# Patient Record
Sex: FEMALE | Race: BLACK OR AFRICAN AMERICAN | Employment: OTHER | URBAN - NONMETROPOLITAN AREA
[De-identification: names, ages, dates, MRNs, and addresses within clinical notes are randomized per-mention and may not be internally consistent; named-entity substitution may affect disease eponyms.]

---

## 2017-02-26 PROBLEM — K52.9 GASTROENTERITIS: Status: ACTIVE | Noted: 2017-02-26

## 2017-02-27 PROBLEM — R31.9 HEMATURIA: Status: ACTIVE | Noted: 2017-02-27

## 2017-08-07 PROBLEM — K92.1 HEMATOCHEZIA: Status: ACTIVE | Noted: 2017-08-07

## 2017-08-07 PROBLEM — R10.84 GENERALIZED ABDOMINAL PAIN: Status: ACTIVE | Noted: 2017-08-07

## 2017-11-04 PROBLEM — J35.3 HYPERTROPHY OF TONSIL AND ADENOID: Chronic | Status: ACTIVE | Noted: 2017-11-04

## 2017-11-04 PROBLEM — Z88.9 MULTIPLE DRUG ALLERGIES: Status: ACTIVE | Noted: 2017-11-04

## 2019-06-13 PROBLEM — R51.9 HEAD ACHE: Status: ACTIVE | Noted: 2019-06-13

## 2019-06-13 PROBLEM — N39.0 URINARY TRACT INFECTION: Status: ACTIVE | Noted: 2019-06-13

## 2019-06-13 PROBLEM — N12 PYELONEPHRITIS: Status: ACTIVE | Noted: 2019-06-13

## 2019-06-13 PROBLEM — R07.89 CHEST WALL PAIN: Status: ACTIVE | Noted: 2019-06-13

## 2019-07-13 PROBLEM — R52 PAIN: Status: RESOLVED | Noted: 2019-06-13 | Resolved: 2019-07-13

## 2019-07-13 PROBLEM — N39.0 URINARY TRACT INFECTION: Status: RESOLVED | Noted: 2019-06-13 | Resolved: 2019-07-13

## 2019-09-17 ENCOUNTER — PREP FOR PROCEDURE (OUTPATIENT)
Dept: SURGERY | Age: 29
End: 2019-09-17

## 2019-09-17 RX ORDER — SODIUM CHLORIDE 9 MG/ML
INJECTION, SOLUTION INTRAVENOUS CONTINUOUS
Status: CANCELLED | OUTPATIENT
Start: 2019-09-17

## 2019-09-17 RX ORDER — METOPROLOL SUCCINATE 25 MG/1
25 TABLET, EXTENDED RELEASE ORAL ONCE
Status: CANCELLED | OUTPATIENT
Start: 2019-09-17 | End: 2019-09-17

## 2019-10-08 PROBLEM — K44.9 HIATAL HERNIA: Status: ACTIVE | Noted: 2019-10-08

## 2020-01-08 PROBLEM — D57.1 SICKLE CELL DISEASE (HCC): Status: ACTIVE | Noted: 2020-01-08

## 2020-01-08 PROBLEM — D57.1 SICKLE CELL ANEMIA (HCC): Status: ACTIVE | Noted: 2020-01-08

## 2020-07-30 PROBLEM — M79.672 FOOT PAIN, BILATERAL: Status: ACTIVE | Noted: 2020-07-30

## 2020-07-30 PROBLEM — L84 CALLUS OF HEEL: Status: ACTIVE | Noted: 2020-07-30

## 2020-07-30 PROBLEM — M79.671 FOOT PAIN, BILATERAL: Status: ACTIVE | Noted: 2020-07-30

## 2021-05-31 PROBLEM — R07.9 CHEST PAIN: Status: ACTIVE | Noted: 2021-05-31

## 2021-11-10 PROBLEM — R07.89 ATYPICAL CHEST PAIN: Status: ACTIVE | Noted: 2021-05-31

## 2021-11-10 PROBLEM — Z72.0 TOBACCO USE: Status: ACTIVE | Noted: 2021-11-10

## 2022-01-04 PROBLEM — Z87.19 HISTORY OF GASTRITIS: Status: ACTIVE | Noted: 2022-01-04

## 2022-01-06 PROBLEM — K59.04 CHRONIC IDIOPATHIC CONSTIPATION: Status: ACTIVE | Noted: 2022-01-06

## 2022-01-13 ENCOUNTER — PREP FOR PROCEDURE (OUTPATIENT)
Dept: SURGERY | Age: 32
End: 2022-01-13

## 2022-01-13 RX ORDER — SODIUM CHLORIDE 450 MG/100ML
INJECTION, SOLUTION INTRAVENOUS CONTINUOUS
Status: CANCELLED | OUTPATIENT
Start: 2022-01-13

## 2022-03-02 PROBLEM — J96.00 ACUTE RESPIRATORY FAILURE (HCC): Status: ACTIVE | Noted: 2022-03-02

## 2022-03-03 PROBLEM — J45.901 EXACERBATION OF ASTHMA: Status: ACTIVE | Noted: 2019-06-13

## 2022-06-25 PROBLEM — I50.33 ACUTE ON CHRONIC DIASTOLIC CONGESTIVE HEART FAILURE (HCC): Status: ACTIVE | Noted: 2022-06-25

## 2022-07-17 PROBLEM — R13.10 DYSPHAGIA: Status: ACTIVE | Noted: 2022-07-17

## 2022-08-13 PROBLEM — Z98.890 HISTORY OF ESOPHAGOGASTRODUODENOSCOPY (EGD): Status: ACTIVE | Noted: 2022-08-13

## 2022-08-13 PROBLEM — Z98.890 S/P ROBOT-ASSISTED SURGICAL PROCEDURE: Status: ACTIVE | Noted: 2022-08-13

## 2022-08-13 PROBLEM — R13.10 DYSPHAGIA: Status: RESOLVED | Noted: 2022-07-17 | Resolved: 2022-08-13

## 2022-08-22 PROBLEM — K43.2 PORT-SITE HERNIA: Status: ACTIVE | Noted: 2022-08-22

## 2022-08-22 PROBLEM — K91.89 PORT-SITE HERNIA: Status: ACTIVE | Noted: 2022-08-22

## 2022-08-22 PROBLEM — K43.9 VENTRAL HERNIA WITHOUT OBSTRUCTION OR GANGRENE: Status: ACTIVE | Noted: 2022-08-22

## 2023-01-13 PROBLEM — R10.13 EPIGASTRIC ABDOMINAL PAIN: Status: ACTIVE | Noted: 2023-01-13

## 2023-01-29 PROBLEM — R00.0 TACHYCARDIA: Status: ACTIVE | Noted: 2023-01-29

## 2023-01-29 PROBLEM — D72.829 LEUKOCYTOSIS: Status: ACTIVE | Noted: 2023-01-29

## 2023-02-06 ENCOUNTER — TELEPHONE (OUTPATIENT)
Dept: INTERNAL MEDICINE CLINIC | Age: 33
End: 2023-02-06

## 2023-02-06 ENCOUNTER — TELEPHONE (OUTPATIENT)
Dept: SURGERY | Age: 33
End: 2023-02-06

## 2023-02-06 NOTE — TELEPHONE ENCOUNTER
Mushtaq Martinez called to see what the next step was regarding [facility] referral.  Per Ron/Alix [facility] will be sending her an application to fill out to get her reestablish with their facility. Patient's wife, Asha Dean, voiced understanding.

## 2023-02-06 NOTE — TELEPHONE ENCOUNTER
Outpatient dietitian left  [facility] for patient to callback and schedule initial RD appt re:  Tube feedings.   Callback # provided

## 2023-02-08 ENCOUNTER — TELEPHONE (OUTPATIENT)
Dept: INTERNAL MEDICINE CLINIC | Age: 33
End: 2023-02-08

## 2023-02-08 ENCOUNTER — OFFICE VISIT (OUTPATIENT)
Dept: CARDIOLOGY CLINIC | Age: 33
End: 2023-02-08
Payer: MEDICAID

## 2023-02-08 ENCOUNTER — TELEPHONE (OUTPATIENT)
Dept: SURGERY | Age: 33
End: 2023-02-08

## 2023-02-08 ENCOUNTER — HOSPITAL ENCOUNTER (EMERGENCY)
Age: 33
Discharge: HOME | End: 2023-02-08
Attending: EMERGENCY MEDICINE
Payer: MEDICARE

## 2023-02-08 VITALS
HEART RATE: 104 BPM | DIASTOLIC BLOOD PRESSURE: 88 MMHG | RESPIRATION RATE: 18 BRPM | WEIGHT: 284 LBS | BODY MASS INDEX: 48.49 KG/M2 | TEMPERATURE: 98.5 F | HEIGHT: 64 IN | OXYGEN SATURATION: 97 % | SYSTOLIC BLOOD PRESSURE: 100 MMHG

## 2023-02-08 VITALS
HEART RATE: 108 BPM | SYSTOLIC BLOOD PRESSURE: 121 MMHG | BODY MASS INDEX: 48.49 KG/M2 | DIASTOLIC BLOOD PRESSURE: 82 MMHG | HEIGHT: 64 IN | WEIGHT: 284 LBS

## 2023-02-08 DIAGNOSIS — K94.29 IRRITATION AROUND PERCUTANEOUS ENDOSCOPIC GASTROSTOMY (PEG) TUBE SITE (HCC): Primary | ICD-10-CM

## 2023-02-08 DIAGNOSIS — R06.09 DOE (DYSPNEA ON EXERTION): Primary | ICD-10-CM

## 2023-02-08 DIAGNOSIS — R06.02 SOB (SHORTNESS OF BREATH): ICD-10-CM

## 2023-02-08 DIAGNOSIS — Z98.890 S/P ROBOT-ASSISTED SURGICAL PROCEDURE: ICD-10-CM

## 2023-02-08 PROCEDURE — 99214 OFFICE O/P EST MOD 30 MIN: CPT | Performed by: INTERNAL MEDICINE

## 2023-02-08 PROCEDURE — 99283 EMERGENCY DEPT VISIT LOW MDM: CPT

## 2023-02-08 PROCEDURE — 3074F SYST BP LT 130 MM HG: CPT | Performed by: INTERNAL MEDICINE

## 2023-02-08 PROCEDURE — 3079F DIAST BP 80-89 MM HG: CPT | Performed by: INTERNAL MEDICINE

## 2023-02-08 RX ORDER — DOCUSATE SODIUM 100 MG/1
100 CAPSULE, LIQUID FILLED ORAL 2 TIMES DAILY PRN
Qty: 30 CAPSULE | Refills: 2 | Status: SHIPPED | OUTPATIENT
Start: 2023-02-08

## 2023-02-08 RX ORDER — TRAMADOL HYDROCHLORIDE 50 MG/1
50-100 TABLET ORAL EVERY 6 HOURS PRN
Qty: 30 TABLET | Refills: 0 | Status: SHIPPED | OUTPATIENT
Start: 2023-02-08 | End: 2023-02-15

## 2023-02-08 ASSESSMENT — PAIN - FUNCTIONAL ASSESSMENT: PAIN_FUNCTIONAL_ASSESSMENT: 0-10

## 2023-02-08 ASSESSMENT — PAIN SCALES - GENERAL: PAINLEVEL_OUTOF10: 10

## 2023-02-08 NOTE — PROGRESS NOTES
Pt here for check up     Pt continues with chest pain, dizziness, sees stars, sob, concerned with hard knot in right upper arm and right leg,     Pt states med list is correct from 1/13/23

## 2023-02-08 NOTE — PROGRESS NOTES
[id number] [facility] [address]  [facility] [address]  Dept: [phone]  Dept Fax: [phone]  Loc: [phone]    Visit Date: 2/8/2023    Ms. Krystal Esposito is a 28 y.o. female  who presented for:  \"Chest pain, not feeling well\"  Preoperative risk assessment   HPI:   HPI   Nino Farrar is a pleasant 28year old female patient who  has a past medical history of Acute on chronic diastolic congestive heart failure ([address] JANI (acute kidney injury) ([address] Anemia, Anesthesia, Anxiety, Asthma, CAD (coronary artery disease), Depression, Diabetes ([address] Diet-controlled type 2 diabetes mellitus ([address] Epilepsy ([address] Fibroids, Gastro - esophageal reflux disease, Hypertension, Hypertrophy of tonsil and adenoid, Malignant carcinoid tumor of other sites ([address] Migraine, PONV (postoperative nausea and vomiting), Prolonged emergence from general anesthesia, Sickle cell anemia ([address] Sickle cell trait ([address] and Tumor associated pain. She has significant family history for premature CAD. She smokes cigarettes. Patient has chronic h/o chest pains. Echo 6/2022 revealed an EF of 60-65%. In 5/2021, she was seen in ER for chest pain, abnormal EKG. STEMI alert was activated, [facility] 5/31/2021 revealed no significant CAD. She had multiple prior ER visits for chest pain evaluation. Chest CTA 11/2021 was negative for PE. She has h/o multiple prior esophageal dilation procedures, gastritis. Recent chest CTA on 1/29/2023 was negative for PE. The patient is followed by GI, has feeding tube, states that gastric surgery is being considered. She reports continued chest pains, atypical, similar to what she had in the past. Patient reports mild shortness of breath, dyspnea on exertion. Reports h/o asthma, SOB sometimes responds to inhalers.        Current Outpatient Medications:     traMADol (ULTRAM) 50 MG tablet, Take 1-2 tablets by mouth every 6 hours as needed for Pain for up to 7 days. Max Daily Amount: 400 mg, Disp: 30 tablet, Rfl: 0    erythromycin (EES) 400 MG/5ML suspension, 5 mLs by Per G Tube route 3 times daily (with meals), Disp: 450 mL, Rfl: 3    ALBUTEROL SULFATE HFA IN, Inhale 1 puff into the lungs every 4-6 hours as needed, Disp: , Rfl:     topiramate (TOPAMAX) 100 MG tablet, Take 100 mg by mouth 2 times daily, Disp: , Rfl:     metFORMIN (GLUCOPHAGE) 500 MG tablet, Take 1,000 mg by mouth daily (with breakfast), Disp: , Rfl:     linaclotide (LINZESS) 145 MCG capsule, Take 145 mcg by mouth every morning (before breakfast), Disp: , Rfl:     baclofen (LIORESAL) 10 MG tablet, Take 10 mg by mouth daily, Disp: , Rfl:     promethazine (PHENERGAN) 25 MG tablet, Take 25 mg by mouth every 4-6 hours as needed for Nausea, Disp: , Rfl:     prazosin (MINIPRESS) 1 MG capsule, Take 1 mg by mouth nightly, Disp: , Rfl:     vitamin D-3 (CHOLECALCIFEROL) 125 MCG (5000 UT) TABS, Take 5,000 Units by mouth daily, Disp: , Rfl:     zinc sulfate (ZINCATE) 220 (50 Zn) MG capsule, Take 50 mg by mouth daily, Disp: , Rfl:     ARIPiprazole (ABILIFY) 10 MG tablet, Take 10 mg by mouth nightly, Disp: , Rfl:     busPIRone (BUSPAR) 5 MG tablet, Take 5 mg by mouth 3 times daily, Disp: , Rfl:     famotidine (PEPCID) 40 MG tablet, Take 1 tablet by mouth at bedtime, Disp: 30 tablet, Rfl: 5    furosemide (LASIX) 20 MG tablet, TAKE 1 TABLET BY MOUTH ONCE A DAY, Disp: 30 tablet, Rfl: 0    pantoprazole (PROTONIX) 40 MG tablet, Take 40 mg by mouth daily, Disp: , Rfl:     bisacodyl (DULCOLAX) 5 MG EC tablet, See Prep Instructions, Disp: 4 tablet, Rfl: 0    polyethylene glycol (GLYCOLAX) 17 GM/SCOOP powder, Dispense 238 Gram Bottle.   Use as Directed, Disp: 238 g, Rfl: 0    docusate sodium (COLACE) 100 MG capsule, Take 1 capsule by mouth daily as needed for Constipation, Disp: 30 capsule, Rfl: 0    clopidogrel (PLAVIX) 75 MG tablet, Take 1 tablet by mouth daily (Patient not taking: No sig reported), Disp: 30 tablet, Rfl: 1    acetaminophen (TYLENOL) 325 MG tablet, Take 2 tablets by mouth 4 times daily as needed for Pain or Fever, Disp: 60 tablet, Rfl: 0    FLOVENT HFA 44 MCG/ACT inhaler, INHALE 2 PUFFS BY MOUTH TWICE A DAY, Disp: , Rfl:     amitriptyline (ELAVIL) 100 MG tablet, Take 200 mg by mouth nightly 2 tabs nightly, Disp: , Rfl:     montelukast (SINGULAIR) 10 MG tablet, Take 10 mg by mouth nightly, Disp: , Rfl:     amLODIPine (NORVASC) 5 MG tablet, Take 1 tablet by mouth daily, Disp: 30 tablet, Rfl: 3    escitalopram (LEXAPRO) 10 MG tablet, Take 20 mg by mouth daily , Disp: , Rfl:     albuterol (PROVENTIL) (2.5 MG/3ML) 0.083% nebulizer solution, Take 3 mLs by nebulization every 6 hours as needed for Wheezing, Disp: 90 each, Rfl: 0    glucose monitoring kit (FREESTYLE) monitoring kit, 1 kit by Does not apply route daily Or whichever system insurance will pay for, Disp: 1 kit, Rfl: 0    Spacer/Aero-Holding Chambers (E-Z SPACER) AISLINN, 1 Device by Does not apply route daily, Disp: 1 Device, Rfl: 0    Past Medical History  Ching Martinez  has a past medical history of Acute on chronic diastolic congestive heart failure ([facility] JANI (acute kidney injury) ([facility] Anemia, Anesthesia, Anxiety, Asthma, CAD (coronary artery disease), Depression, Diabetes ([address] Diet-controlled type 2 diabetes mellitus ([facility] Epilepsy ([facility] Fibroids, Gastro - esophageal reflux disease, Hypertension, Hypertrophy of tonsil and adenoid, Malignant carcinoid tumor of other sites ([address] Migraine, PONV (postoperative nausea and vomiting), Prolonged emergence from general anesthesia, Sickle cell anemia ([address] Sickle cell trait ([address] and Tumor associated pain. Social History  Ching Martinez  reports that she has been smoking cigarettes. She started smoking about 6 years ago. She has a 1.50 pack-year smoking history. She has never used smokeless tobacco. She reports that she does not currently use alcohol. She reports that she does not use drugs.     Family History  Jodee Homans family history includes Cancer in her mother; Depression in her maternal grandmother; Diabetes in her maternal grandmother; Heart Attack in her paternal uncle; Heart Disease in her father, maternal grandmother, mother, and sister; High Blood Pressure in her father and mother; Radha Oswald in her mother.     Past Surgical History   Past Surgical History:   Procedure Laterality Date    ABDOMEN SURGERY  03/23/2017    Laparoscopic , Bi lat oopherectomy Dr Tung Ya      x2    BREAST REDUCTION SURGERY      CENTRAL VENOUS CATHETER Right 12/11/2015    right subclavian single lumen mediport insertion--Dr. Michel    CHOLECYSTECTOMY, LAPAROSCOPIC N/A 7/11/2019    CHOLECYSTECTOMY LAPAROSCOPIC performed by Zainab Archibald MD at [address] 8/24/2020    COLONOSCOPY POLYPECTOMY SNARE/COLD BIOPSY performed by Kaylee Mcwilliams MD at [address] [place] [address]  10/21/2013    Dilation and curettage, Diagnostic Hysteroscopy and laparoscopy (Dr. Rafa Hartman, [facility])    EGD  2019    EGD COLONOSCOPY N/A 1/8/2019    EGD DIL performed by Jose Elias Diaz MD at [facility]    EGD COLONOSCOPY Left 5/14/2019    EGD DILATATION performed by Jose Elias Diaz MD at [facility]    EGD COLONOSCOPY Left 8/27/2019    EGD DILATATION performed by Jose Elias Diaz MD at [facility]    ENDOSCOPY, COLON, DIAGNOSTIC      GASTRIC [address]    GASTRIC [address] N/A 1/25/2023    EGD PEG TUBE PLACEMENT performed by Kaylee Mcwilliams MD at [address]  2010, 2016    [place] , [address] [address] N/A 1/17/2023    Robotic Exploratory Paparoscopy with Extensive Lysis of Adhesions G Tube Insertion performed by Darby Uribe MD at [address] [place] [address] ([address] [place] [address])  04/2016    INSERTION / REMOVAL / REPLACEMENT VENOUS ACCESS CATHETER N/A 3/8/2019    REMOVAL OF LEFT PORT AND PLACEMENT OF SINGLE LUMEN MEDIPORT RIGHT SIDE performed by Opal Esquivel MD at [address] N/A 10/8/2019    ROBOTIC DIAGNOSTIC LAPAROSCOPY,  RECURRENT HIATAL HERNIA REPAIR, REVISION FUNDOPLICATION performed by Dave Arias MD at [facility] 18 N/A 7/15/2022    ROBOTIC LAPAROSCOPY, LYSIS OF ADHESIONS performed by Opal Esquivel MD at [address] [facility]  08/12/2016    Right Port Removal, Placement of new Port Left by Dr Dony Goodman  12/02/2019    Mediport insertion-IR Dr Joseph Singh (CERVIX NOT REMOVED)  4/8/16    PORT SURGERY N/A 11/11/2019    REMOVAL OF RIGHT MEDIPORT & ATTEMPTED PLACEMENT OF LEFT SINGLE LUMEN MEDIPORT performed by Opal Esquivel MD at [facility] Right 11/29/2019    RT INTERNAL JUGULAR SINGLE LUMEN MEDIPORT INSERTION performed by Opal Esquivel MD at [address] <30 MM DIAM Left 8/8/2017    EGD/DIL performed by Dennise Gasca MD at [facility]    [place]  [address] <30 MM DIAM N/A 9/26/2017    EGD DIL performed by Dennise Gasca MD at [facility]    [place]  [address] <30 MM DIAM Left 12/12/2017    EGD DIL performed by Dennise Gasca MD at [facility]    [place]  [address] <30 MM DIAM N/A 2/13/2018    EGD  DILATATION performed by Dennise Gasca MD at [address] [facility] [address] [facility] CTR VAD W/SUBQ PORT UNDER 5 YR Bilateral 1/5/2018    EXCISION OF MEDIPORT LEFT SIDE, INSERTION MEDIPORT RIGHT  IJ performed by Opal Esquivel MD at [address] OFFICE/OUTPT [address] [facility] N/A 5/15/2018    EGD DILATATION performed by Dennise Gasca MD at [facility]    [place] OFFICE/OUTPT VISIT,PROCEDURE ONLY Bilateral 9/10/2018    REMOVAL RT MEDIPORT, INSERTION LEFT performed by Opal Esquivel MD at [address] OFFICE/OUTPT VISIT,PROCEDURE ONLY N/A 11/16/2018    MEDIPORT REPOSITIONING performed by Jackelyn Lyons MD at [address]  [facility] N/A 11/6/2017    TONSILLECTOMY AND ADENOIDECTOMY performed by Foreign Latif MD at [facility]      neoendocrine    TUNNELED VENOUS PORT PLACEMENT      UPPER GASTROINTESTINAL ENDOSCOPY Left 4/3/2018    EGD DILATION SAVORY performed by Bin Pastor MD at [address] ENDOSCOPY Left 6/26/2018    EGD DILATION SAVORY performed by Bin Pastor MD at [address] ENDOSCOPY Left 2/26/2019    EGD DILATION SAVORY performed by Bin Pastor MD at [facility] 1 7/9/2019    EGD DILATION SAVORY performed by Bin Pastor MD at [address] ENDOSCOPY Left 9/9/2019    EGD BIOPSY performed by Adrian Vázquez MD at [place] [address] 4/1/2020    EGD DILATION BALLOON performed by Mian Charles MD at [address] Left 8/24/2020    EGD ESOPHAGOGASTRODUODENOSCOPY DILATATION performed by Mian Charles MD at [facility] 1 Left 8/24/2020    EGD BIOPSY performed by Mian Charles MD at [place] [address] N/A 12/2/2020    EGD DILATION BALLOON performed by Mian Charles MD at [place] [address] Left 12/2/2020    EGD BIOPSY performed by Mian Charles MD at [place] [address] Left 1/19/2022    EGD performed by Jackelyn Lyons MD at [facility] 1 Left 1/19/2022    EGD BIOPSY performed by Jackelyn Lyons MD at [place] [address] 7/15/2022    EGD [address] performed by Jackelyn Lyons MD at [address] 1/24/2023    EGD ESOPHAGOGASTRODUODENOSCOPY performed by Kaylee Mcwilliams MD at [facility] 41 N/A 8/22/2022    OPEN LEFT ABDOMINAL PORT SITE HERNIA REPAIR WITH MESH performed by Zainab Archibald MD at [facility] 130 EXTRACTION         Review of Systems   Constitutional: Negative for chills and fever  HENT: Negative for congestion, sinus pressure, sneezing and sore throat. Eyes: Negative for pain, discharge, redness and itching. Respiratory: Negative for apnea, cough  Gastrointestinal: Negative for blood in stool, constipation, diarrhea   Endocrine: Negative for cold intolerance, heat intolerance, polydipsia. Genitourinary: Negative for dysuria, enuresis, flank pain and hematuria. Musculoskeletal: Negative for arthralgias, joint swelling and neck pain. Neurological: Negative for numbness and headaches. Psychiatric/Behavioral: Negative for agitation, confusion, decreased concentration and dysphoric mood. Objective:     LMP 12/11/2015     Wt Readings from Last 3 Encounters:   01/27/23 295 lb 8 oz (134 kg)   01/11/23 292 lb (132.5 kg)   01/04/23 291 lb (132 kg)     BP Readings from Last 3 Encounters:   02/03/23 119/80   01/11/23 138/80   01/04/23 (!) 158/113       Nursing note and vitals reviewed. Physical Exam   Constitutional: Oriented to person, place, and time. Appears well-developed and well-nourished. ENT: Moist mucous membranes. No bleeding. Tongue is midline. Head: Normocephalic and atraumatic. Eyes: EOM are normal. Pupils are equal, round, and reactive to light. Neck: Normal range of motion. Neck supple. No JVD present. Cardiovascular: Normal rate, regular rhythm, no murmur, no rubs, and intact distal pulses. Pulmonary/Chest: Effort normal and breath sounds normal. No respiratory distress. No wheezes. No rales. Abdominal: Soft. Bowel sounds are normal. No distension. There is no tenderness. Musculoskeletal: Normal range of motion. no edema.    Neurological: Alert and oriented to person, place, and time. No cranial nerve deficit. Coordination normal.   Skin: Skin is warm and dry. Psychiatric: Normal mood and affect.        Lab Results   Component Value Date/Time    CKTOTAL 144 07/09/2019 09:15 AM    CKTOTAL 195 07/07/2019 12:23 PM       Lab Results   Component Value Date/Time    WBC 14.8 01/31/2023 07:22 AM    RBC 4.34 01/31/2023 07:22 AM    RBC 5.62 05/19/2012 03:40 PM    HGB 10.1 01/31/2023 07:22 AM    HCT 30.8 01/31/2023 07:22 AM    MCV 71.0 01/31/2023 07:22 AM    MCH 23.3 01/31/2023 07:22 AM    MCHC 32.8 01/31/2023 07:22 AM    RDW 15.6 12/23/2018 11:30 AM     01/31/2023 07:22 AM    MPV 11.0 01/31/2023 07:22 AM       Lab Results   Component Value Date/Time     02/02/2023 10:00 AM    K 3.8 02/02/2023 10:00 AM    K 3.7 01/21/2023 12:13 PM     02/02/2023 10:00 AM    CO2 21 02/02/2023 10:00 AM    BUN 14 02/02/2023 10:00 AM    LABALBU 3.6 01/25/2023 07:00 PM    LABALBU 4.0 04/30/2012 08:30 PM    CREATININE 0.7 02/02/2023 10:00 AM    CALCIUM 9.0 02/02/2023 10:00 AM    LABGLOM >60 02/02/2023 10:00 AM    GLUCOSE 91 02/02/2023 10:00 AM    GLUCOSE 78 12/23/2018 11:30 AM       Lab Results   Component Value Date/Time    ALKPHOS 89 01/25/2023 07:00 PM    ALT 15 01/25/2023 07:00 PM    AST 14 01/25/2023 07:00 PM    PROT 6.7 01/25/2023 07:00 PM    BILITOT <0.2 01/25/2023 07:00 PM    BILIDIR <0.2 10/05/2022 01:40 PM    LABALBU 3.6 01/25/2023 07:00 PM    LABALBU 4.0 04/30/2012 08:30 PM       Lab Results   Component Value Date/Time    MG 2.1 02/02/2023 10:00 AM       Lab Results   Component Value Date    INR 0.96 08/22/2022    INR 1.03 07/15/2022    INR 1.05 06/24/2022    PROTIME 1.09 09/24/2011    PROTIME 1.08 09/23/2011         Lab Results   Component Value Date/Time    LABA1C 5.8 01/25/2023 05:25 AM       Lab Results   Component Value Date/Time    TRIG 92 01/14/2023 05:47 AM    HDL 41 06/25/2022 04:07 AM    LDLCALC 111 06/25/2022 04:07 AM       Lab Results   Component Value Date/Time TSH 3.340 01/29/2023 06:38 AM         Testing Reviewed:      I have individually reviewed the cardiac test below:    ECHO:   Results for orders placed or performed during the hospital encounter of 06/24/22   Echo Complete   Result Value Ref Range    Left Ventricular Ejection Fraction 63     LVEF MODALITY ECHO     Narrative    Transthoracic Echocardiography Report (TTE)     Demographics      Patient Name   Karoline Fountain Gender               Female                  D      MR #           [id number]        Race                 Black                                      Ethnicity      Account #      [de-identified]        Room Number          0011      Accession      [id number]       Date of Study        06/25/2022   Number      Date of Birth  1990       Referring Physician  Gayla Guajardo MD      Age            28 year(s)       Nahomi Biggs MD                                   Physician     Procedure    Type of Study      TTE procedure:ECHOCARDIOGRAM COMPLETE 2D W DOPPLER W COLOR. Procedure Date  Date: 06/25/2022 Start: 12:17 PM    Study Location: Bedside  Technical Quality: Poor visualization due to body habitus. Indications:Possible stroke. Additional Medical History:Epilepsy, Coronary artery disease, Smoker,  Hyperlipidemia, Carcinoid tumor, Sickle cell anemia, Hypertension,  congestive heart failure, Morbid Obesity, Diabetic, Asthma. Patient Status: Routine    Height: 64 inches Weight: 300.01 pounds BSA: 2.32 m^2 BMI: 51.5 kg/m^2    BP: 124/71 mmHg    Allergies    - See Epic. Conclusions      Summary   Normal left ventricular size and systolic function. There were no regional wall motion abnormalities. Wall thickness was within normal limits. Ejection fraction was estimated at 60-65%.    IVC size is within normal limits with normal respiratory phasic changes. Signature      ----------------------------------------------------------------   Electronically signed by Froy Lubin MD (Interpreting   physician) on 06/25/2022 at 01:09 PM   ----------------------------------------------------------------      Findings      Mitral Valve   The mitral valve structure was normal with normal leaflet separation. DOPPLER: The transmitral velocity was within the normal range with no   evidence for mitral stenosis. There was no evidence of mitral   regurgitation. Aortic Valve   The aortic valve was trileaflet with normal thickness and cuspal   separation. DOPPLER: Transaortic velocity was within the normal range with   no evidence of aortic stenosis. There was no evidence of aortic   regurgitation. Tricuspid Valve   The tricuspid valve structure was normal with normal leaflet separation. DOPPLER: There was no evidence of tricuspid stenosis. There was trace   tricuspid regurgitation. Pulmonic Valve   The pulmonic valve leaflets exhibited normal thickness, no calcification,   and normal cuspal separation. DOPPLER: The transpulmonic velocity was   within the normal range with no evidence for regurgitation. Left Atrium   Left atrial size was normal.      Left Ventricle   Normal left ventricular size and systolic function. There were no regional wall motion abnormalities. Wall thickness was within normal limits. Ejection fraction was estimated at 60-65%. Right Atrium   Right atrial size was normal.      Right Ventricle   The right ventricular size was normal with normal systolic function and   wall thickness. Pericardial Effusion   The pericardium was normal in appearance with no evidence of a pericardial   effusion. Pleural Effusion   No evidence of pleural effusion. Aorta / Great Vessels   -Aortic root dimension within normal limits.   -The Pulmonary artery is within normal limits. -IVC size is within normal limits with normal respiratory phasic changes.      M-Mode/2D Measurements & Calculations      LV Diastolic    LV Systolic Dimension: 3 cm AV Cusp Separation: 2 cmAO   Dimension: 4.3  LV Volume Diastolic: 41.9   Root Dimension: 3.2 cmLA Area:   cm              ml                          15.3 cm^2   LV FS:30.2 %    LV Volume Systolic: 35 ml   LV PW           LV EDV/LV EDV Index: 45.9   Diastolic: 1 cm CJ/56 M^9LU ESV/LV ESV   Septum          Index: 35 ml/15 m^2         RV Diastolic Dimension: 2.4 cm   Diastolic: 1.2  EF Calculated: 57.9 %   cm                                          Ascending Aorta: 3 cm                                               LA volume/Index: 39.3 ml                                               /17m^2     Doppler Measurements & Calculations      MV Peak E-Wave: 109 cm/s   AV Peak Velocity: 179  LVOT Peak Velocity: 140   MV Peak A-Wave: 70.3 cm/s  cm/s                   cm/s   MV E/A Ratio: 1.55         AV Peak Gradient:      LVOT Peak Gradient: 8   MV Peak Gradient: 4.75     12.82 mmHg             mmHg   mmHg                                                     TV Peak E-Wave: 63.8   MV Deceleration Time: 218                         cm/s   msec                                              TV Peak A-Wave: 41.1                              IVRT: 92 msec          cm/s      MV E' Septal Velocity:                            TV Peak Gradient: 1.63   13.6 cm/s                  AV DVI (Vmax):0.78     mmHg   MV A' Septal Velocity: 7.9                        TR Velocity:282 cm/s   cm/s                                              TR Gradient:31.81 mmHg   MV E' Lateral Velocity:                           PV Peak Velocity: 73.7   14.1 cm/s                                         cm/s   MV A' Lateral Velocity:                           PV Peak Gradient: 2.17   9.4 cm/s                                          mmHg   E/E' septal: 8.01   E/E' lateral: 7.73 OK ED Velocity: 137 cm/s     [id number]   ]    Cath:5/2021  LEFT VENTRICULOGRAPHY:  No regional wall motion abnormality. Ejection  fraction estimated 55%. CORONARY ANGIOGRAM:  1. Right coronary artery:  Right coronary artery is patent without  significant stenotic lesions. The right coronary artery is a relatively  small nondominant vessel. 2.  Left main coronary artery:  Left main coronary artery is patent  without significant stenotic lesions. 3.  Left circumflex artery:  Dominant vessel, patent without significant  stenotic lesions. 4.  Left anterior descending artery:  LAD is patent without significant  lesions. There is high takeoff of first diagonal branch that is patent  without significant stenotic lesions     HEMODYNAMICS:  Left ventricular end-diastolic pressure 20 mmHg. MEDICATIONS:  See MAR. COMPLICATIONS:  None. ESTIMATED BLOOD LOSS:  Less than 50 mL. ACCESS:  Right radial artery access. Vasc Band was applied. Hemostasis  was achieved. IMPRESSION:  No significant coronary artery disease. No evidence for  myocardial infarction with acute coronary syndrome based on left cardiac  catheterization findings. RECOMMENDATIONS:  The labs were reviewed and the results from workup  from the ER is now available. COVID test was positive. The patient  reported some symptoms consistent with probably what seems to be mild  COVID including loss of taste and smell. She continues to have atypical  chest pain. She will be admitted to the hospitalist medicine service,  monitor in telemetry, risk factor modification for CAD, consider further  workup for etiology of chest pain including ruling out pulmonary  embolism.     AssessmentPlan:     Elin Murray is a pleasant 28year old female patient who  has a past medical history of Acute on chronic diastolic congestive heart failure ([address] JANI (acute kidney injury) ([facility] Anemia, Anesthesia, Anxiety, Asthma, CAD (coronary artery disease), Depression, Diabetes ([facility] Diet-controlled type 2 diabetes mellitus ([address] Epilepsy ([facility] Fibroids, Gastro - esophageal reflux disease, Hypertension, Hypertrophy of tonsil and adenoid, Malignant carcinoid tumor of other sites [facility]), Migraine, PONV (postoperative nausea and vomiting), Prolonged emergence from general anesthesia, Sickle cell anemia ([address] Sickle cell trait ([facility] and Tumor associated pain. She has significant family history for premature CAD. She smokes cigarettes. Patient has chronic h/o chest pains. Echo 6/2022 revealed an EF of 60-65%. In 5/2021, she was seen in ER for chest pain, abnormal EKG. STEMI alert was activated, [address] [place] [address] 5/31/2021 revealed no significant CAD. She had multiple prior ER visits for chest pain evaluation. Chest CTA 11/2021 was negative for PE. She has h/o multiple prior esophageal dilation procedures, gastritis. Recent chest CTA on 1/29/2023 was negative for PE. The patient is followed by GI, has feeding tube, states that gastric surgery is being considered. She reports continued chest pains, atypical, similar to what she had in the past. Patient reports mild shortness of breath, dyspnea on exertion. Reports h/o asthma, SOB sometimes responds to inhalers. Chronic chest pains  Significant FH for premature CAD  Morbid obesity   DM  HTN  Gastritis  Multiple prior esophageal dilation procedures  Sickle cell anemia    Has chronic chest pains  Extensive prior cardiac work up  Echo 6/2022 revealed an EF of 60-65%  In 5/2021, she was seen in ER for chest pain, abnormal EKG. STEMI alert was activated, [facility] 5/31/2021 revealed no significant CAD  Chest CTA 11/2021 was negative for PE  Recent chest CTA on 1/29/2023 was negative for PE  Still with atypical, noncardiac, chest pain  Feeding tube in place.  Followed by GI  Chronic SOB, PARRY. Has h/o asthma. Possible surgery planned per patient. Will need to investigate for underlying structural heart disease, will proceed with a transthoracic echocardiogram. Smoking: discussed with the patient the importance of smoke cessation especially with the risk of CAD/Lung disease  LIMIT NA INTAKE  Daily weight  Call office for weight gain, leg swelling, increased SOB    Above findings and plan of care were discussed with patient in details, patient's questions were answered.      Disposition:  RTC in 12 months             Electronically signed by Mitra Roche MD, [facility]    2/8/2023 at 11:57 AM EST

## 2023-02-08 NOTE — TELEPHONE ENCOUNTER
Outpatient dietitian spoke with patient and scheduled outpatient dietitian appt for Monday, 2/13/23 @ 1130 am.  She stated she has her tube feeding running at 50 ml/hr continusous feeding via pump - formula Vital 1.5. She c/o feeling dehydrated. She has [facility] nursing services in place. I then made a call to [facility] and spoke with Yared Santiago and she stated the water flush order was only for 30 ml water flush before and after each feeding. I then called Dr Bryan Zaidi office and spoke with Ricco Roberto and gave her recommended water flushes of 260 - 270 ml 3x/day. Ricco Roberto stated they would send over the updated water flush order to [facility]. I then called the patient back and explained to her of her water flush order and to disconnect the tubing from her port and then flush water 260 ml water 3x/day using her syringes. Pt appreciated the information and callback.

## 2023-02-09 NOTE — ED TRIAGE NOTES
Pt arrives via EMS with c/o green drainage, pain, and a \"smell\" from her feeding tube. Pt states she has had the tube for ~1 month. Pt states her pain is a 10/10 and she did not take her tramadol today.

## 2023-02-09 NOTE — ED PROVIDER NOTES
[facility] EMERGENCY DEPT      CHIEF COMPLAINT       Chief Complaint   Patient presents with    Feeding Tube Problem       Nurses Notes reviewed and I agree except as noted in the HPI. HISTORY OF PRESENT ILLNESS    Chloe Luz is a 28 y.o. female who presents with complaint of drainage around the PEG tube insertion site. No fevers, no redness of the skin. Onset: Acute  Duration: Noticed drainage today  Timing:   Location of Pain: No pain  Intesity/severity: Mild  Modifying Factors: Recently inserted PEG tube  Relieved by;  Previous Episodes; Tx Before arrival: None  REVIEW OF SYSTEMS      Review of Systems   Constitutional: Negative for fever, chills, diaphoresis and fatigue. HENT: Negative for congestion, drooling, facial swelling and sore throat. Eyes: Negative for photophobia, pain and discharge. Respiratory: Negative for cough, shortness of breath, wheezing and stridor. Cardiovascular: Negative for chest pain, palpitations and leg swelling. Gastrointestinal: Negative for abdominal pain, blood in stool and abdominal distention. Drainage around PEG tube. Genitourinary: Negative for dysuria, urgency, hematuria and difficulty urinating. Musculoskeletal: Negative for gait problem, neck pain and neck stiffness. Skin; No rash, No itching  Neurological: Negative for seizures, weakness and numbness. Psychiatric/Behavioral: Negative for hallucinations, confusion and agitation.      PAST MEDICAL HISTORY    has a past medical history of Acute on chronic diastolic congestive heart failure ([address] JANI (acute kidney injury) ([address] Anemia, Anesthesia, Anxiety, Asthma, CAD (coronary artery disease), Depression, Diabetes ([address] Diet-controlled type 2 diabetes mellitus ([address] Epilepsy ([address] Fibroids, Gastro - esophageal reflux disease, Hypertension, Hypertrophy of tonsil and adenoid, Malignant carcinoid tumor of other sites [facility]), Migraine, PONV (postoperative nausea and vomiting), Prolonged emergence from general anesthesia, Sickle cell anemia ([facility] Sickle cell trait ([facility] and Tumor associated pain. SURGICAL HISTORY      has a past surgical history that includes hernia repair (2010, 2016); [place] tooth extraction; tumor removal; Breast reduction surgery; myomectomy; Partial hysterectomy (4/8/16); Dilation and curettage of uterus (10/21/2013); Central venous catheter insertion (Right, 12/11/2015); Abdominal exploration surgery; Gastric fundoplication; Endoscopy, colon, diagnostic; other surgical history (08/12/2016); Tunneled venous port placement; Hysterectomy (04/2016); Abdomen surgery (03/23/2017); pr egd balloon dilation esophagus <30 mm diam (Left, 8/8/2017); pr egd balloon dilation esophagus <30 mm diam (N/A, 9/26/2017); Tonsillectomy and adenoidectomy (N/A, 11/6/2017); pr egd balloon dilation esophagus <30 mm diam (Left, 12/12/2017); pr insj prph ctr vad w/subq port under 5 yr (Bilateral, 1/5/2018); pr egd balloon dilation esophagus <30 mm diam (N/A, 2/13/2018); Upper gastrointestinal endoscopy (Left, 4/3/2018); pr office/outpt visit,procedure only (N/A, 5/15/2018); Upper gastrointestinal endoscopy (Left, 6/26/2018); pr office/outpt visit,procedure only (Bilateral, 9/10/2018); pr office/outpt visit,procedure only (N/A, 11/16/2018); egd colonoscopy (N/A, 1/8/2019); Upper gastrointestinal endoscopy (Left, 2/26/2019); INSERTION / REMOVAL / REPLACEMENT VENOUS ACCESS CATHETER (N/A, 3/8/2019); egd colonoscopy (Left, 5/14/2019); Cholecystectomy, laparoscopic (N/A, 7/11/2019); Upper gastrointestinal endoscopy (N/A, 7/9/2019); egd colonoscopy (Left, 8/27/2019); Gastric fundoplication; Upper gastrointestinal endoscopy (Left, 9/9/2019); laparoscopy (N/A, 10/8/2019); [facility] Surgery (N/A, 11/11/2019); Port Surgery (Right, 11/29/2019); other surgical history (12/02/2019); EGD (2019); Upper gastrointestinal endoscopy (N/A, 4/1/2020); Colonoscopy (N/A, 8/24/2020);  Upper gastrointestinal endoscopy (Left, 8/24/2020); Upper gastrointestinal endoscopy (Left, 8/24/2020); Upper gastrointestinal endoscopy (N/A, 12/2/2020); Upper gastrointestinal endoscopy (Left, 12/2/2020); Dental surgery; Upper gastrointestinal endoscopy (Left, 1/19/2022); Upper gastrointestinal endoscopy (Left, 1/19/2022); Knee arthroscopy; laparoscopy (N/A, 7/15/2022); Upper gastrointestinal endoscopy (N/A, 7/15/2022); ventral hernia repair (N/A, 8/22/2022); hiatal hernia repair (N/A, 1/17/2023); Upper gastrointestinal endoscopy (N/A, 1/24/2023); and Gastrostomy tube placement (N/A, 1/25/2023).     CURRENT MEDICATIONS       Previous Medications    ACETAMINOPHEN (TYLENOL) 325 MG TABLET    Take 2 tablets by mouth 4 times daily as needed for Pain or Fever    ALBUTEROL (PROVENTIL) (2.5 MG/3ML) 0.083% NEBULIZER SOLUTION    Take 3 mLs by nebulization every 6 hours as needed for Wheezing    ALBUTEROL SULFATE HFA IN    Inhale 1 puff into the lungs every 4-6 hours as needed    AMITRIPTYLINE (ELAVIL) 100 MG TABLET    Take 200 mg by mouth nightly 2 tabs nightly    AMLODIPINE (NORVASC) 5 MG TABLET    Take 1 tablet by mouth daily    ARIPIPRAZOLE (ABILIFY) 10 MG TABLET    Take 10 mg by mouth nightly    BACLOFEN (LIORESAL) 10 MG TABLET    Take 10 mg by mouth daily    BISACODYL (DULCOLAX) 5 MG EC TABLET    See Prep Instructions    BUSPIRONE (BUSPAR) 5 MG TABLET    Take 5 mg by mouth 3 times daily    CLOPIDOGREL (PLAVIX) 75 MG TABLET    Take 1 tablet by mouth daily    DOCUSATE SODIUM (COLACE) 100 MG CAPSULE    Take 1 capsule by mouth 2 times daily as needed for Constipation    ERYTHROMYCIN (EES) 400 MG/5ML SUSPENSION    5 mLs by Per G Tube route 3 times daily (with meals)    ESCITALOPRAM (LEXAPRO) 10 MG TABLET    Take 20 mg by mouth daily     FAMOTIDINE (PEPCID) 40 MG TABLET    Take 1 tablet by mouth at bedtime    FLOVENT HFA 44 MCG/ACT INHALER    INHALE 2 PUFFS BY MOUTH TWICE A DAY    FUROSEMIDE (LASIX) 20 MG TABLET    TAKE 1 TABLET BY MOUTH ONCE A DAY    GLUCOSE MONITORING KIT (FREESTYLE) MONITORING KIT    1 kit by Does not apply route daily Or whichever system insurance will pay for    LINACLOTIDE (LINZESS) 145 MCG CAPSULE    Take 145 mcg by mouth every morning (before breakfast)    METFORMIN (GLUCOPHAGE) 500 MG TABLET    Take 1,000 mg by mouth daily (with breakfast)    MONTELUKAST (SINGULAIR) 10 MG TABLET    Take 10 mg by mouth nightly    PANTOPRAZOLE (PROTONIX) 40 MG TABLET    Take 40 mg by mouth daily    POLYETHYLENE GLYCOL (GLYCOLAX) 17 GM/SCOOP POWDER    Dispense 238 Gram Bottle. Use as Directed    PRAZOSIN (MINIPRESS) 1 MG CAPSULE    Take 1 mg by mouth nightly    PROMETHAZINE (PHENERGAN) 25 MG TABLET    Take 25 mg by mouth every 4-6 hours as needed for Nausea    SPACER/AERO-HOLDING CHAMBERS (E-Z SPACER) AISLINN    1 Device by Does not apply route daily    TOPIRAMATE (TOPAMAX) 100 MG TABLET    Take 100 mg by mouth 2 times daily    TRAMADOL (ULTRAM) 50 MG TABLET    Take 1-2 tablets by mouth every 6 hours as needed for Pain for up to 7 days. Max Daily Amount: 400 mg    VITAMIN D-3 (CHOLECALCIFEROL) 125 MCG (5000 UT) TABS    Take 5,000 Units by mouth daily    ZINC SULFATE (ZINCATE) 220 (50 ZN) MG CAPSULE    Take 50 mg by mouth daily       ALLERGIES     is allergic to latex, ketorolac tromethamine, pcn [penicillins], percocet [oxycodone-acetaminophen], amoxicillin, ciprofloxacin, compazine [prochlorperazine maleate], dicyclomine hcl, fentanyl, fioricet [butalbital-apap-caffeine], flexeril [cyclobenzaprine], gabapentin, ibuprofen [ibuprofen], keflex [cephalexin], lisinopril, lorazepam, losartan, macrobid [nitrofurantoin monohyd macro], morphine, mushroom extract complex, reglan [metoclopramide], vicodin [hydrocodone-acetaminophen], vistaril [hydroxyzine hcl], zofran [ondansetron], and adhesive tape. FAMILY HISTORY     She indicated that her mother is . She indicated that her father is alive. She indicated that both of her sisters are alive.  She indicated that both of her brothers are alive. She indicated that her maternal grandmother is . She indicated that her maternal grandfather is . She indicated that her paternal grandmother is alive. She indicated that her paternal grandfather is . She indicated that her paternal uncle is . She indicated that the status of her neg hx is unknown.   family history includes Cancer in her mother; Depression in her maternal grandmother; Diabetes in her maternal grandmother; Heart Attack in her paternal uncle; Heart Disease in her father, maternal grandmother, mother, and sister; High Blood Pressure in her father and mother; Vikki December in her mother. SOCIAL HISTORY      reports that she has been smoking cigarettes. She started smoking about 6 years ago. She has a 1.50 pack-year smoking history. She has never used smokeless tobacco. She reports that she does not currently use alcohol. She reports that she does not use drugs. PHYSICAL EXAM     INITIAL VITALS:  height is 5' 4\" (1.626 m) and weight is 284 lb (128.8 kg). Her oral temperature is 98.5 °F (36.9 °C). Her blood pressure is 144/98 (abnormal) and her pulse is 104 (abnormal). Her respiration is 18 and oxygen saturation is 97%. Physical Exam   Constitutional:  well-developed and well-nourished. HENT: Head: Normocephalic, atraumatic, Bilateral external ears normal, Oropharynx mosit, No oral exudates, Nose normal.   Eyes: PERRL, EOMI, Conjunctiva normal, No discharge. No scleral icterus  Neck: Normal range of motion, No tenderness, Supple  Cardiovascular: Normal rate, regular rhythm, S1 normal and S2 normal.  Exam reveals no gallop. Pulmonary/Chest: Effort normal and breath sounds normal. No accessory muscle usage or stridor. No respiratory distress. no wheezes. has no rales. exhibits no tenderness. Abdominal: Soft. Bowel sounds are normal.  exhibits no distension. There is no tenderness. There is no rebound and no guarding.    There is mild yellowish drainage around the PEG tube insertion site, otherwise no signs of cellulitis, no bleeding. Extremities: No edema, no tenderness, no cyanosis, no clubbing. Musculoskeletal: Good range of motion in major joints is observed. No major deformities noted. Neurological: Alert and oriented ×3, normal motor function, normal sensory function, no focal deficits. GCS 15  Skin: Skin is warm, dry and intact. No rash noted. No erythema. Psychiatric: Affect normal, judgment normal, mood normal.  DIFFERENTIAL DIAGNOSIS:           MEDICAL DECISION MAKING / ED COURSE:     1) Number and Complexity of Problems            Problem List This Visit:         Chief Complaint   Patient presents with    Feeding Tube Problem            Differential Diagnosis includes (but not limited to):  PEG tube this fracture, cellulitis        Diagnoses Considered but I have low suspicion of:                Pertinent Comorbid Conditions:        2)  Data Reviewed (none if left blank)          My Independent interpretations:     EKG:      None    Imaging: None    Labs:      None                 Decision Rules/Clinical Scores utilized: None            External Documentation Reviewed:         Previous patient encounter documents & history available on EMR was reviewed              See Formal Diagnostic Results above for the lab and radiology tests and orders.     3)  Treatment and Disposition         ED Reassessment: Same         Case discussed with consulting clinician: None         Shared Decision-Making was performed and disposition discussed with the        Patient/Family and questions answered          Social determinants of health impacting treatment or disposition: None         Code Status: Full      Summary of Patient Presentation:      JULIENNE  /   Salvador Sanchez Reviewed:    Vitals:    02/08/23 2226   BP: (!) 144/98   Pulse: (!) 104   Resp: 18   Temp: 98.5 °F (36.9 °C)   TempSrc: Oral   SpO2: 97%   Weight: 284 lb (128.8 kg)   Height: 5' 4\" (1.626 m)       The patient was seen and examined. Appropriate diagnostic testing was performed and results reviewed with the patient. The results of pertinent diagnostic studies and exam findings were discussed. The patients provisional diagnosis and plan of care were discussed with the patient and present family who expressed understanding. Any medications were reviewed and indications and risks of medications were discussed with the patient /family present. Strict verbal and written return precautions, instructions and appropriate follow-up provided to  the patient. ED Medications administered this visit:  (None if blank)  Medications - No data to display            DIAGNOSTIC RESULTS     EKG: All EKG's are interpreted by the Emergency Department Physician who either signs or Co-signs this chart in the absence of a cardiologist.      RADIOLOGY: non-plain film images(s) such as CT, Ultrasound and MRI are read by the radiologist.  Plain radiographic images are visualized and preliminarily interpreted by the emergency physician unless otherwise stated below. LABS:   Labs Reviewed - No data to display    EMERGENCY DEPARTMENT COURSE:   Vitals:    Vitals:    02/08/23 2226   BP: (!) 144/98   Pulse: (!) 104   Resp: 18   Temp: 98.5 °F (36.9 °C)   TempSrc: Oral   SpO2: 97%   Weight: 284 lb (128.8 kg)   Height: 5' 4\" (1.626 m)         CRITICAL CARE:       CONSULTS:  None    PROCEDURES:  none    FINAL IMPRESSION      1. Irritation around percutaneous endoscopic gastrostomy (PEG) tube site [facility])          DISPOSITION/PLAN   Decision To Discharge    PATIENT REFERRED TO:  No follow-up provider specified.     DISCHARGE MEDICATIONS:  New Prescriptions    No medications on file       (Please note that portions of this note were completed with a voice recognition program.  Efforts were made to edit the dictations but occasionally words are mis-transcribed.)    Raj Elmore, [address] [place] [address],   02/12/23 [id number]

## 2023-02-12 ENCOUNTER — APPOINTMENT (OUTPATIENT)
Dept: CT IMAGING | Age: 33
End: 2023-02-12
Payer: MEDICARE

## 2023-02-12 ENCOUNTER — HOSPITAL ENCOUNTER (EMERGENCY)
Age: 33
Discharge: HOME | End: 2023-02-12
Attending: FAMILY MEDICINE
Payer: MEDICARE

## 2023-02-12 VITALS
TEMPERATURE: 98.9 F | HEIGHT: 64 IN | OXYGEN SATURATION: 98 % | HEART RATE: 94 BPM | WEIGHT: 274 LBS | DIASTOLIC BLOOD PRESSURE: 93 MMHG | BODY MASS INDEX: 46.78 KG/M2 | SYSTOLIC BLOOD PRESSURE: 143 MMHG | RESPIRATION RATE: 19 BRPM

## 2023-02-12 DIAGNOSIS — R10.9 ABDOMINAL PAIN, UNSPECIFIED ABDOMINAL LOCATION: ICD-10-CM

## 2023-02-12 DIAGNOSIS — R11.2 NAUSEA AND VOMITING, UNSPECIFIED VOMITING TYPE: Primary | ICD-10-CM

## 2023-02-12 LAB
ALBUMIN SERPL BCG-MCNC: 3.8 G/DL (ref 3.5–5.1)
ALP SERPL-CCNC: 108 U/L (ref 38–126)
ALT SERPL W/O P-5'-P-CCNC: 11 U/L (ref 11–66)
ANION GAP SERPL CALC-SCNC: 8 MEQ/L (ref 8–16)
AST SERPL-CCNC: 15 U/L (ref 5–40)
B-HCG SERPL QL: NEGATIVE
BASOPHILS ABSOLUTE: 0.1 THOU/MM3 (ref 0–0.1)
BASOPHILS NFR BLD AUTO: 0.5 %
BILIRUB CONJ SERPL-MCNC: < 0.2 MG/DL (ref 0–0.3)
BILIRUB SERPL-MCNC: 0.2 MG/DL (ref 0.3–1.2)
BUN SERPL-MCNC: 17 MG/DL (ref 7–22)
CALCIUM SERPL-MCNC: 9.6 MG/DL (ref 8.5–10.5)
CHLORIDE SERPL-SCNC: 105 MEQ/L (ref 98–111)
CO2 SERPL-SCNC: 27 MEQ/L (ref 23–33)
CREAT SERPL-MCNC: 0.7 MG/DL (ref 0.4–1.2)
DEPRECATED RDW RBC AUTO: 41.6 FL (ref 35–45)
EOSINOPHIL NFR BLD AUTO: 3 %
EOSINOPHILS ABSOLUTE: 0.4 THOU/MM3 (ref 0–0.4)
ERYTHROCYTE [DISTWIDTH] IN BLOOD BY AUTOMATED COUNT: 15.9 % (ref 11.5–14.5)
GFR SERPL CREATININE-BSD FRML MDRD: > 60 ML/MIN/1.73M2
GLUCOSE SERPL-MCNC: 150 MG/DL (ref 70–108)
HCT VFR BLD AUTO: 34.2 % (ref 37–47)
HGB BLD-MCNC: 10.7 GM/DL (ref 12–16)
IMM GRANULOCYTES # BLD AUTO: 0.07 THOU/MM3 (ref 0–0.07)
IMM GRANULOCYTES NFR BLD AUTO: 0.6 %
LIPASE SERPL-CCNC: 40.6 U/L (ref 5.6–51.3)
LYMPHOCYTES ABSOLUTE: 3.3 THOU/MM3 (ref 1–4.8)
LYMPHOCYTES NFR BLD AUTO: 26.3 %
MCH RBC QN AUTO: 23.3 PG (ref 26–33)
MCHC RBC AUTO-ENTMCNC: 31.3 GM/DL (ref 32.2–35.5)
MCV RBC AUTO: 74.3 FL (ref 81–99)
MONOCYTES ABSOLUTE: 0.8 THOU/MM3 (ref 0.4–1.3)
MONOCYTES NFR BLD AUTO: 6.3 %
NEUTROPHILS NFR BLD AUTO: 63.3 %
NRBC BLD AUTO-RTO: 0 /100 WBC
OSMOLALITY SERPL CALC.SUM OF ELEC: 283.8 MOSMOL/KG (ref 275–300)
PLATELET # BLD AUTO: 349 THOU/MM3 (ref 130–400)
PMV BLD AUTO: 9.4 FL (ref 9.4–12.4)
POTASSIUM SERPL-SCNC: 3.9 MEQ/L (ref 3.5–5.2)
PROT SERPL-MCNC: 7.9 G/DL (ref 6.1–8)
RBC # BLD AUTO: 4.6 MILL/MM3 (ref 4.2–5.4)
SEGMENTED NEUTROPHILS ABSOLUTE COUNT: 8 THOU/MM3 (ref 1.8–7.7)
SODIUM SERPL-SCNC: 140 MEQ/L (ref 135–145)
WBC # BLD AUTO: 12.7 THOU/MM3 (ref 4.8–10.8)

## 2023-02-12 PROCEDURE — 96361 HYDRATE IV INFUSION ADD-ON: CPT

## 2023-02-12 PROCEDURE — 36415 COLL VENOUS BLD VENIPUNCTURE: CPT

## 2023-02-12 PROCEDURE — 6360000004 HC RX CONTRAST MEDICATION: Performed by: FAMILY MEDICINE

## 2023-02-12 PROCEDURE — 82248 BILIRUBIN DIRECT: CPT

## 2023-02-12 PROCEDURE — 80053 COMPREHEN METABOLIC PANEL: CPT

## 2023-02-12 PROCEDURE — 84703 CHORIONIC GONADOTROPIN ASSAY: CPT

## 2023-02-12 PROCEDURE — 83690 ASSAY OF LIPASE: CPT

## 2023-02-12 PROCEDURE — 6360000002 HC RX W HCPCS: Performed by: FAMILY MEDICINE

## 2023-02-12 PROCEDURE — 96375 TX/PRO/DX INJ NEW DRUG ADDON: CPT

## 2023-02-12 PROCEDURE — 96374 THER/PROPH/DIAG INJ IV PUSH: CPT

## 2023-02-12 PROCEDURE — 74177 CT ABD & PELVIS W/CONTRAST: CPT

## 2023-02-12 PROCEDURE — 2580000003 HC RX 258: Performed by: FAMILY MEDICINE

## 2023-02-12 PROCEDURE — 99285 EMERGENCY DEPT VISIT HI MDM: CPT

## 2023-02-12 PROCEDURE — 85025 COMPLETE CBC W/AUTO DIFF WBC: CPT

## 2023-02-12 RX ORDER — 0.9 % SODIUM CHLORIDE 0.9 %
1000 INTRAVENOUS SOLUTION INTRAVENOUS ONCE
Status: COMPLETED | OUTPATIENT
Start: 2023-02-12 | End: 2023-02-12

## 2023-02-12 RX ORDER — ONDANSETRON 2 MG/ML
4 INJECTION INTRAMUSCULAR; INTRAVENOUS ONCE
Status: COMPLETED | OUTPATIENT
Start: 2023-02-12 | End: 2023-02-12

## 2023-02-12 RX ORDER — MORPHINE SULFATE 4 MG/ML
4 INJECTION, SOLUTION INTRAMUSCULAR; INTRAVENOUS ONCE
Status: COMPLETED | OUTPATIENT
Start: 2023-02-12 | End: 2023-02-12

## 2023-02-12 RX ADMIN — SODIUM CHLORIDE 1000 ML: 9 INJECTION, SOLUTION INTRAVENOUS at 01:26

## 2023-02-12 RX ADMIN — HYDROMORPHONE HYDROCHLORIDE 1 MG: 1 INJECTION, SOLUTION INTRAMUSCULAR; INTRAVENOUS; SUBCUTANEOUS at 02:56

## 2023-02-12 RX ADMIN — MORPHINE SULFATE 4 MG: 4 INJECTION, SOLUTION INTRAMUSCULAR; INTRAVENOUS at 01:26

## 2023-02-12 RX ADMIN — ONDANSETRON 4 MG: 2 INJECTION INTRAMUSCULAR; INTRAVENOUS at 01:26

## 2023-02-12 RX ADMIN — IOPAMIDOL 80 ML: 755 INJECTION, SOLUTION INTRAVENOUS at 01:54

## 2023-02-12 ASSESSMENT — PAIN SCALES - GENERAL
PAINLEVEL_OUTOF10: 10
PAINLEVEL_OUTOF10: 10

## 2023-02-12 ASSESSMENT — PAIN - FUNCTIONAL ASSESSMENT: PAIN_FUNCTIONAL_ASSESSMENT: 0-10

## 2023-02-12 ASSESSMENT — PAIN DESCRIPTION - LOCATION
LOCATION: ABDOMEN
LOCATION: ABDOMEN

## 2023-02-12 NOTE — ED NOTES
Patient medicated per MAR at this time. Patient resting in bed. Respirations easy and unlabored. No distress noted. Call light within reach.       Arpan Reed RN  02/12/23 [id number]

## 2023-02-12 NOTE — ED TRIAGE NOTES
Patient presents to ED with chief complaint of abdominal pain. Patient has g-tube that was placed in January and she states pain has been getting worse ever since placement. Patient also complaining of nausea. Patient states she took tramadol at home for pain with no relief. Patient resting in bed. Respirations easy and unlabored. No distress noted. Call light within reach.

## 2023-02-12 NOTE — ED PROVIDER NOTES
EMERGENCY DEPARTMENT ENCOUNTER     CHIEF COMPLAINT   Chief Complaint   Patient presents with    Abdominal Pain        HPI   Hailey Davison is a 28 y.o. female with hx of GERD, anemia, sickle cell trait and prior hx of fundoplication for her GERD and recent gastrostomy tube insertion, who presents with worsening abdominal pain, onset was today. The duration has been constant since the onset. The patient has associated nausea, having been seen in the ED for a similar issue on 2/8/23. There are no alleviating factors. The context is that the symptoms started spontaneously, without any known precipitants. REVIEW OF SYSTEMS   GI: +abdominal pain; no nausea, vomiting or diarrhea  General: No fevers  See HPI for further details.        PAST MEDICAL & SURGICAL HISTORY   Past Medical History:   Diagnosis Date    Acute on chronic diastolic congestive heart failure ([address] 6/25/2022    JANI (acute kidney injury) ([address] 7/7/2019    Anemia     Anesthesia     migraines    Anxiety     Asthma     CAD (coronary artery disease)     Depression     Diabetes ([address]     Diet-controlled type 2 diabetes mellitus ([address] 2016    Epilepsy ([address]     Fibroids     Gastro - esophageal reflux disease     Hypertension     Hypertrophy of tonsil and adenoid 11/4/2017    Malignant carcinoid tumor of other sites ([address] 5/14/2013    Migraine     PONV (postoperative nausea and vomiting)     Prolonged emergence from general anesthesia     Sickle cell anemia (HCC)     Sickle cell trait ([address]     PT STATES SHE HAS THE TRAIT NOT THE DISEASE    Tumor associated pain     neuroendrocrine tumor, gastroma     Past Surgical History:   Procedure Laterality Date    ABDOMEN SURGERY  03/23/2017    Laparoscopic , Bi lat oopherectomy Dr Hammond Shorten      x2    BREAST REDUCTION SURGERY      CENTRAL VENOUS CATHETER Right 12/11/2015    right subclavian single lumen mediport insertion--Dr. Michel    CHOLECYSTECTOMY, LAPAROSCOPIC N/A 7/11/2019    CHOLECYSTECTOMY LAPAROSCOPIC performed by Rober Gaspar MD at [address] 8/24/2020    COLONOSCOPY POLYPECTOMY SNARE/COLD BIOPSY performed by William Garcia MD at [facility]  10/21/2013    Dilation and curettage, Diagnostic Hysteroscopy and laparoscopy (Dr. Ricky Tavares, [facility])    EGD  2019    EGD COLONOSCOPY N/A 1/8/2019    EGD DIL performed by Rito Frank MD at [facility]    EGD COLONOSCOPY Left 5/14/2019    EGD [facility]ILATATION [facility]    EGD COLONOSCOPY Left 8/27/2019    EGD DILATATION performed by Rito Frank MD at [facility] [address], COLON, DIAGNOSTIC      GASTRIC FUNDOPLICATION      [facility]    GASTRIC [address] N/A 1/25/2023    EGD PEG TUBE PLACEMENT performed by William Garcia MD at [address]  2010, 2016    [place] , [address] N/A 1/17/2023    Robotic Exploratory Paparoscopy with Extensive Lysis of Adhesions G Tube Insertion performed by Sade Rockwell MD at [facility] ([facility])  04/2016    INSERTION / REMOVAL / REPLACEMENT VENOUS ACCESS CATHETER N/A 3/8/2019    REMOVAL OF LEFT PORT AND PLACEMENT OF SINGLE LUMEN MEDIPORT RIGHT SIDE performed by Rober Gaspar MD at [address] [facility] N/A 10/8/2019    ROBOTIC DIAGNOSTIC LAPAROSCOPY,  RECURRENT HIATAL HERNIA REPAIR, REVISION FUNDOPLICATION performed by Sade Rockwell MD at [address] [facility] 7/15/2022    ROBOTIC LAPAROSCOPY, LYSIS OF ADHESIONS performed by Rober Gaspar MD at [facility]  08/12/2016    Right Port Removal, Placement of new Port Left by Dr Sarah Wright  12/02/2019    Mediport insertion-IR Dr Adriana Marroquin (CERVIX NOT REMOVED)  4/8/16    PORT SURGERY N/A 11/11/2019    REMOVAL OF RIGHT MEDIPORT & ATTEMPTED PLACEMENT OF LEFT SINGLE LUMEN MEDIPORT performed by Cristina Akhtar MD at [address] Right 11/29/2019    RT INTERNAL JUGULAR SINGLE LUMEN MEDIPORT INSERTION performed by Cristina Akhtar MD at [address]  [place]  [address] <30 MM DIAM Left 8/8/2017    EGD/DIL performed by Dagoberto Nava MD at [facility] End[facility]    OR  [address] <30 MM DIAM N/A 9/26/2017    EGD DIL performed by Dagoberto Nava MD at [facility] Endoscopy    OR  [address] <30 MM DIAM Left 12/12/2017    EGD DIL performed by Dagoberto Nava MD at [facility] End[facility]    OR  [address] <30 MM DIAM N/A 2/13/2018    EGD  DILATATION performed by Dagoberto Nava MD at [address] [facility] [address] [facility] CTR VAD W/SUBQ PORT UNDER 5 YR Bilateral 1/5/2018    EXCISION OF MEDIPORT LEFT SIDE, INSERTION MEDIPORT RIGHT  IJ performed by Cristnia Akhtar MD at [address] [facility]  [place] OFFICE/OUTPT [address] N/A 5/15/2018    EGD DILATATION performed by Dagoberto Nava MD at [address] [facility] OFFICE/OUTPT [address] Bilateral 9/10/2018    REMOVAL RT MEDIPORT, INSERTION LEFT performed by Cristina Akhtar MD at [address] [facility]  [place] OFFICE/OUTPT [address] N/A 11/16/2018    MEDIPORT REPOSITIONING performed by Cristina Akhtar MD at [address] N/A 11/6/2017    TONSILLECTOMY AND ADENOIDECTOMY performed by Chary Overton MD at [address] [facility]      neoendocrine    TUNNELED VENOUS PORT PLACEMENT      UPPER GASTROINTESTINAL ENDOSCOPY Left 4/3/2018    EGD DILATION SAVORY performed by Dagoberto Nava MD at [facility] [address] Left 6/26/2018    EGD DILATION SAVORY performed by Dagoberto Nava MD at [facility] [address] Left 2/26/2019    EGD DILATION SAVORY performed by Dagoberto Nava MD at [facility] [address] 7/9/2019    EGD DILATION SAVORY performed by Olegario Younger MD at [address] [facility] ENDOSCOPY Left 9/9/2019    EGD BIOPSY performed by Calin Yeager MD at [address] [place] [address] 4/1/2020    EGD DILATION BALLOON performed by Dawna Bernabe MD at [address] [facility] Left 8/24/2020    EGD ESOPHAGOGASTRODUODENOSCOPY DILATATION performed by Dawna Bernabe MD at [address] [place] [address] 8/24/2020    EGD BIOPSY performed by Dawna Bernabe MD at [address] [place] [address] N/A 12/2/2020    EGD DILATION BALLOON performed by Dawna Bernabe MD at [address] [place] [address] 12/2/2020    EGD BIOPSY performed by Dawna Bernabe MD at [address] [place] [address] 1/19/2022    EGD performed by Dolly Sinclair MD at [address] [place] [address] 1/19/2022    EGD BIOPSY performed by Dolly Sinclair MD at [address] [place] [address] 7/15/2022    EGD [address] performed by Dolly Sinclair MD at [address] [facility] 1/24/2023    EGD ESOPHAGOGASTRODUODENOSCOPY performed by Dawna Bernabe MD at [facility] 8/22/2022    OPEN [address] performed by Dolly Sinclair MD at [address]   Current Outpatient Rx   Medication Sig Dispense Refill    Incontinence Supply Disposable (DEPEND SILHOUETTE BRIEFS L/XL) MISC Use as needed for urinary and bowel incontinence 5 each 5    docusate sodium (COLACE) 100 MG capsule Take 1 capsule by mouth 2 times daily as needed for Constipation 30 capsule 2    traMADol (ULTRAM) 50 MG tablet Take 1-2 tablets by mouth every 6 hours as needed for Pain for up to 7 days.  Max Daily Amount: 400 mg 30 tablet 0 erythromycin (EES) 400 MG/5ML suspension 5 mLs by Per G Tube route 3 times daily (with meals) 450 mL 3    ALBUTEROL SULFATE HFA IN Inhale 1 puff into the lungs every 4-6 hours as needed      topiramate (TOPAMAX) 100 MG tablet Take 100 mg by mouth 2 times daily      metFORMIN (GLUCOPHAGE) 500 MG tablet Take 1,000 mg by mouth daily (with breakfast)      linaclotide (LINZESS) 145 MCG capsule Take 145 mcg by mouth every morning (before breakfast)      baclofen (LIORESAL) 10 MG tablet Take 10 mg by mouth daily      promethazine (PHENERGAN) 25 MG tablet Take 25 mg by mouth every 4-6 hours as needed for Nausea      prazosin (MINIPRESS) 1 MG capsule Take 1 mg by mouth nightly      vitamin D-3 (CHOLECALCIFEROL) 125 MCG (5000 UT) TABS Take 5,000 Units by mouth daily      zinc sulfate (ZINCATE) 220 (50 Zn) MG capsule Take 50 mg by mouth daily      ARIPiprazole (ABILIFY) 10 MG tablet Take 10 mg by mouth nightly      busPIRone (BUSPAR) 5 MG tablet Take 5 mg by mouth 3 times daily      famotidine (PEPCID) 40 MG tablet Take 1 tablet by mouth at bedtime 30 tablet 5    furosemide (LASIX) 20 MG tablet TAKE 1 TABLET BY MOUTH ONCE A DAY 30 tablet 0    pantoprazole (PROTONIX) 40 MG tablet Take 40 mg by mouth daily      bisacodyl (DULCOLAX) 5 MG EC tablet See Prep Instructions 4 tablet 0    polyethylene glycol (GLYCOLAX) 17 GM/SCOOP powder Dispense 238 Gram Bottle.   Use as Directed 238 g 0    clopidogrel (PLAVIX) 75 MG tablet Take 1 tablet by mouth daily 30 tablet 1    acetaminophen (TYLENOL) 325 MG tablet Take 2 tablets by mouth 4 times daily as needed for Pain or Fever 60 tablet 0    FLOVENT HFA 44 MCG/ACT inhaler INHALE 2 PUFFS BY MOUTH TWICE A DAY      amitriptyline (ELAVIL) 100 MG tablet Take 200 mg by mouth nightly 2 tabs nightly      montelukast (SINGULAIR) 10 MG tablet Take 10 mg by mouth nightly      amLODIPine (NORVASC) 5 MG tablet Take 1 tablet by mouth daily 30 tablet 3    escitalopram (LEXAPRO) 10 MG tablet Take 20 mg by mouth daily       albuterol (PROVENTIL) (2.5 MG/3ML) 0.083% nebulizer solution Take 3 mLs by nebulization every 6 hours as needed for Wheezing 90 each 0    glucose monitoring kit (FREESTYLE) monitoring kit 1 kit by Does not apply route daily Or whichever system insurance will pay for 1 kit 0    Spacer/Aero-Holding Chambers (E-Z SPACER) AISLINN 1 Device by Does not apply route daily 1 Device 0        ALLERGIES   Allergies   Allergen Reactions    Latex     Ketorolac Tromethamine Anaphylaxis     Throat swelling    Pcn [Penicillins] Anaphylaxis    Percocet [Oxycodone-Acetaminophen]      hallucinations    Amoxicillin Hives    Ciprofloxacin Hives and Swelling    Compazine [Prochlorperazine Maleate] Itching    Dicyclomine Hcl Hives    Fentanyl Other (See Comments)     Pt states that her \"lips start busting out. \"    Fioricet [Butalbital-Apap-Caffeine] Swelling     Of the mouth, tongue    Flexeril [Cyclobenzaprine] Hives    Gabapentin Swelling     tongue    Ibuprofen [Ibuprofen] Other (See Comments)     Mouth swelling and ulcers    Keflex [Cephalexin] Itching    Lisinopril     Lorazepam Hives    Losartan      Elevated pulse    Macrobid [Nitrofurantoin Monohyd Macro] Itching    Morphine Other (See Comments)     HEADACHE UP WAKING    Mushroom Extract Complex Swelling    Reglan [Metoclopramide] Itching    Vicodin [Hydrocodone-Acetaminophen] Itching    Vistaril [Hydroxyzine Hcl] Itching    Zofran [Ondansetron] Hives    Adhesive Tape Rash        SOCIAL AND FAMILY HISTORY   Social History     Socioeconomic History    Marital status:      Spouse name: Maggie Tiwari    Number of children: 0    Years of education: 12   Tobacco Use    Smoking status: Every Day     Packs/day: 0.25     Years: 6.00     Pack years: 1.50     Types: Cigarettes     Start date: 3/1/2016    Smokeless tobacco: Never    Tobacco comments:     smokes 3 cig perday    Vaping Use    Vaping Use: Never used   Substance and Sexual Activity    Alcohol use: Not Currently    Drug use: No    Sexual activity: Yes     Partners: Female     Family History   Problem Relation Age of Onset    Cancer Mother     High Blood Pressure Mother     Heart Disease Mother         MI    Liver Cancer Mother     High Blood Pressure Father     Heart Disease Father     Heart Disease Sister     Diabetes Maternal Grandmother     Depression Maternal Grandmother     Heart Disease Maternal Grandmother         CHF    Heart Attack Paternal Uncle     Stroke Neg Hx     Colon Cancer Neg Hx     Esophageal Cancer Neg Hx     Rectal Cancer Neg Hx     Stomach Cancer Neg Hx         I reviewed all pertinent nursing notes and am in agreement. I reviewed patient's social, medical and surgical histories. PHYSICAL EXAM   VITAL SIGNS: BP (!) 143/93   Pulse 94   Temp 98.9 °F (37.2 °C) (Oral)   Resp 19   Ht 5' 4\" (1.626 m)   Wt 274 lb (124.3 kg)   LMP 12/11/2015   SpO2 98%   BMI 47.03 kg/m²   Constitutional: Well developed, well nourished, mild acute distress  Eyes: Sclera nonicteric, conjunctiva moist  Respiratory: No retractions, no accessory muscle use, normal respiratory effort  Cardiovascular: Normal rate, normal rhythm, no murmurs  GI: Soft, moderate mid abdominal tenderness, no guarding, bowel sounds present, no audible bruits or palpable pulsatile masses; PEG present  Musculoskeletal: No edema, no deformity, no CVA tenderness to palpation   Integument: No rash, dry skin  Neurologic: Alert & oriented, normal speech    RADIOLOGY/PROCEDURES   CT ABDOMEN PELVIS W IV CONTRAST Additional Contrast? None   Final Result   Mild soft tissue swelling around the left upper abdomen percutaneous    gastrostomy tube. Consider cellulitis. There is no abscess. This document has been electronically signed by:  Mayelin Cardenas MD on 02/12/2023 02:21 AM      All CTs at this facility use dose modulation techniques and iterative    reconstructions, and/or weight-based dosing   when appropriate to reduce radiation to a low as reasonably achievable. LABS  Labs Reviewed   BASIC METABOLIC PANEL - Abnormal; Notable for the following components:       Result Value    Glucose 150 (*)     All other components within normal limits   CBC WITH AUTO DIFFERENTIAL - Abnormal; Notable for the following components:    WBC 12.7 (*)     Hemoglobin 10.7 (*)     Hematocrit 34.2 (*)     MCV 74.3 (*)     MCH 23.3 (*)     MCHC 31.3 (*)     RDW-CV 15.9 (*)     Segs Absolute 8.0 (*)     All other components within normal limits   HEPATIC FUNCTION PANEL - Abnormal; Notable for the following components: Total Bilirubin 0.2 (*)     All other components within normal limits   LIPASE   HCG, SERUM, QUALITATIVE   ANION GAP   GLOMERULAR FILTRATION RATE, ESTIMATED   OSMOLALITY       INITIAL IMPRESSION:  Differential diagnosis: pancreatitis, gastritis, SBO, acute on chronic abdominal pain, non-specific back pain, UTI, acute on chronic abdominal pain. PLAN  I ordered CBC, BMP, LFT, lipase, CTAP with IV contrast for diagnostics. I also ordered IV fluids, zofran and morphine for symptom control. Vitals:    02/12/23 0129   BP: (!) 143/93   Pulse: 94   Resp: 19   Temp:    SpO2: 98%             FINAL IMPRESSION   1. Nausea and vomiting, unspecified vomiting type    2. Abdominal pain, unspecified abdominal location            ED COURSE & MEDICAL DECISION MAKING   33yo female with multiple medical co-morbidities, who presents with vomiting and abdominal pain near her PEG site. She was previously diagnosed with some kind of hematoma around the insertion site. She has had prior PEG placement by Dr. Mouna Meyers and Dr. Robert Vogel. Her pain is worse today. She receives all her nutrition via the PEG, and claimed that the vomiting would occur after feeds. In any event, pt has good bowel sounds without signs of peritonitis. Will need to evaluate for SBO or other acute emergent surgical or infectious causes of her pain.     Follow-up: her CT was encouraging, did not show any signs of SBO. I did not find any evidence of cellulitis based on my bedside exam. PT did receive two doses of medications for pain, and will be dc home without any need for RX. Dispo: Discharge home. Pertinent Labs & Imaging studies reviewed and interpreted.  (See chart for details)   See EMR for medications prescribed    (This note was completed with a voice recognition program)       Jessica Menendez MD  02/12/23 [id number]

## 2023-02-13 ENCOUNTER — OFFICE VISIT (OUTPATIENT)
Dept: INTERNAL MEDICINE CLINIC | Age: 33
End: 2023-02-13
Payer: MEDICARE

## 2023-02-13 VITALS — HEIGHT: 64 IN | WEIGHT: 290.4 LBS | BODY MASS INDEX: 49.58 KG/M2

## 2023-02-13 DIAGNOSIS — K31.84 GASTROPARESIS: ICD-10-CM

## 2023-02-13 DIAGNOSIS — K90.9 INTESTINAL MALABSORPTION, UNSPECIFIED TYPE: Primary | ICD-10-CM

## 2023-02-13 PROCEDURE — 97802 MEDICAL NUTRITION INDIV IN: CPT | Performed by: DIETITIAN, REGISTERED

## 2023-02-13 NOTE — PATIENT INSTRUCTIONS
Continue 240 ml water flushes 4x/day. Keep at 5 cartons/day for Vital 1.5 continuous feeding. Dietitian to find out if ok with surgery Rupert Fried, ANIAS if can start diet soon & if can transition you over to bolus feedings. Start doing walks and/or chair exercises each day.

## 2023-02-13 NOTE — PROGRESS NOTES
[address] [place] [address]. [address] [place] [address] [place]., [facility], [address]  [phone] (phone)  [phone] (fax)    Patient Name: Lauren Dela Cruz. Date of Birth: [id number]. MRN: [id number]      Assessment: Patient is a 28 y.o. female seen for Initial MNT visit for .     -Nutritionally relevant labs:   Lab Results   Component Value Date/Time    LABA1C 5.8 01/25/2023 05:25 AM    LABA1C 5.8 06/24/2022 08:10 PM    LABA1C 6.9 (H) 11/10/2021 12:27 PM    GLUCOSE 150 (H) 02/12/2023 01:22 AM    GLUCOSE 91 02/02/2023 10:00 AM    GLUCOSE 78 12/23/2018 11:30 AM    GLUCOSE 78 03/26/2018 02:41 PM    CHOL 172 06/25/2022 04:07 AM    HDL 41 06/25/2022 04:07 AM    LDLCALC 111 06/25/2022 04:07 AM    TRIG 92 01/14/2023 05:47 AM     -Blood sugar trends: Only checking as needed. No low episodes with tube feeding. Affinity tube feeding pump in back pack. Running 50 ml/hr Vital 1.5 formula x 24 hr/day (5 cartons/day). Since Wednesday of last week giving additional water flush of 260 ml 3x/day  Pt c/o no energy and wanting more calories.    -Impression of Dietary Intake:  NPO Tube feeding dependent    Current Outpatient Medications on File Prior to Visit   Medication Sig Dispense Refill    Incontinence Supply Disposable (DEPEND SILHOUETTE BRIEFS L/XL) MISC Use as needed for urinary and bowel incontinence 5 each 5    docusate sodium (COLACE) 100 MG capsule Take 1 capsule by mouth 2 times daily as needed for Constipation 30 capsule 2    traMADol (ULTRAM) 50 MG tablet Take 1-2 tablets by mouth every 6 hours as needed for Pain for up to 7 days.  Max Daily Amount: 400 mg 30 tablet 0    erythromycin (EES) 400 MG/5ML suspension 5 mLs by Per G Tube route 3 times daily (with meals) 450 mL 3    ALBUTEROL SULFATE HFA IN Inhale 1 puff into the lungs every 4-6 hours as needed      topiramate (TOPAMAX) 100 MG tablet Take 100 mg by mouth 2 times daily      metFORMIN (GLUCOPHAGE) 500 MG tablet Take 1,000 mg by mouth daily (with breakfast)      linaclotide (LINZESS) 145 MCG capsule Take 145 mcg by mouth every morning (before breakfast)      baclofen (LIORESAL) 10 MG tablet Take 10 mg by mouth daily      promethazine (PHENERGAN) 25 MG tablet Take 25 mg by mouth every 4-6 hours as needed for Nausea      prazosin (MINIPRESS) 1 MG capsule Take 1 mg by mouth nightly      vitamin D-3 (CHOLECALCIFEROL) 125 MCG (5000 UT) TABS Take 5,000 Units by mouth daily      zinc sulfate (ZINCATE) 220 (50 Zn) MG capsule Take 50 mg by mouth daily      ARIPiprazole (ABILIFY) 10 MG tablet Take 10 mg by mouth nightly      busPIRone (BUSPAR) 5 MG tablet Take 5 mg by mouth 3 times daily      furosemide (LASIX) 20 MG tablet TAKE 1 TABLET BY MOUTH ONCE A DAY 30 tablet 0    pantoprazole (PROTONIX) 40 MG tablet Take 40 mg by mouth daily      bisacodyl (DULCOLAX) 5 MG EC tablet See Prep Instructions 4 tablet 0    polyethylene glycol (GLYCOLAX) 17 GM/SCOOP powder Dispense 238 Gram Bottle.   Use as Directed 238 g 0    acetaminophen (TYLENOL) 325 MG tablet Take 2 tablets by mouth 4 times daily as needed for Pain or Fever 60 tablet 0    FLOVENT HFA 44 MCG/ACT inhaler INHALE 2 PUFFS BY MOUTH TWICE A DAY      amitriptyline (ELAVIL) 100 MG tablet Take 200 mg by mouth nightly 2 tabs nightly      montelukast (SINGULAIR) 10 MG tablet Take 10 mg by mouth nightly      amLODIPine (NORVASC) 5 MG tablet Take 1 tablet by mouth daily 30 tablet 3    escitalopram (LEXAPRO) 10 MG tablet Take 20 mg by mouth daily       albuterol (PROVENTIL) (2.5 MG/3ML) 0.083% nebulizer solution Take 3 mLs by nebulization every 6 hours as needed for Wheezing 90 each 0    glucose monitoring kit (FREESTYLE) monitoring kit 1 kit by Does not apply route daily Or whichever system insurance will pay for 1 kit 0    Spacer/Aero-Holding Chambers (E-Z SPACER) AISLINN 1 Device by Does not apply route daily 1 Device 0    famotidine (PEPCID) 40 MG tablet Take 1 tablet by mouth at bedtime 30 tablet 5    clopidogrel (PLAVIX) 75 MG tablet Take 1 tablet by mouth daily 30 tablet 1     No current facility-administered medications on file prior to visit. Vitals from current and previous visits:  Ht 5' 4\" (1.626 m)   Wt 290 lb 6.4 oz (131.7 kg)   LMP 12/11/2015   BMI 49.85 kg/m²     -Body mass index is 49.85 kg/m². Greater than 40 - Morbid Obesity / Extreme Obesity / Grade III. -Weight goal: lose weight. Nutrition Diagnosis:   Unable to take in oral food and beverages related to currently undergoing MNT as evidenced by need for enteral nutrition support which was started by provider in the hospital d/t s/p abdominal intestinal surgery          Intervention:  - Instructions:  Continue current TF regimen and increase water flushes to 240 ml 4x/day. -Handouts given for: You-tube exercise resource list.    Patient Instructions   Continue 240 ml water flushes 4x/day. Keep at 5 cartons/day for Vital 1.5 continuous feeding. Dietitian to find out if ok with surgery Tiffanie Elise CNP if can start diet soon & if can transition you over to bolus feedings. Start doing walks and/or chair exercises each day. Estimated Daily Nutrient Needs:  Energy Requirements Based On: Kcal/kg  Weight Used for Energy Requirements:  (128.5kg)  Energy (kcal/day): ~ 1516-0142 kcals (10-15 kcals/kg/day)  Weight Used for Protein Requirements: Ideal (55 kg)  Protein (g/day):  (1.3-2 g/kg)  Method Used for Fluid Requirements: Other (Comment)  Fluid (ml/day): 2000 ml or less/day (CHF)    Current TF regimen = 1775 kcal, 80 gms protein, 905 ml free water, 7 gms fiber/day. Free water + additional water flushes (240 4x/day) = 1865 ml/day    Comprehension verified using teachback method.     Monitoring/Evaluation:   -Followup visit: 4 weeks with dietitian.   -Receptiveness to education/goals: Agreeable.  -Evaluation of education: Indicates understanding.  -Readiness to change: contemplation - ambivalent about change bumping up water flushes.  -Expected compliance: good. Thank you for your referral of this patient. Total time involved in direct patient education: 30 minutes for initial MNT visit.

## 2023-02-14 ENCOUNTER — TELEPHONE (OUTPATIENT)
Dept: SURGERY | Age: 33
End: 2023-02-14

## 2023-02-14 ENCOUNTER — OFFICE VISIT (OUTPATIENT)
Dept: SURGERY | Age: 33
End: 2023-02-14

## 2023-02-14 VITALS
RESPIRATION RATE: 20 BRPM | TEMPERATURE: 97.3 F | BODY MASS INDEX: 49.63 KG/M2 | SYSTOLIC BLOOD PRESSURE: 138 MMHG | HEIGHT: 64 IN | WEIGHT: 290.7 LBS | OXYGEN SATURATION: 98 % | DIASTOLIC BLOOD PRESSURE: 80 MMHG | HEART RATE: 89 BPM

## 2023-02-14 DIAGNOSIS — K31.84 GASTROPARESIS: ICD-10-CM

## 2023-02-14 DIAGNOSIS — Z09 S/P ABDOMINAL SURGERY, FOLLOW-UP EXAM: ICD-10-CM

## 2023-02-14 DIAGNOSIS — D64.9 CHRONIC ANEMIA: ICD-10-CM

## 2023-02-14 DIAGNOSIS — E86.0 DEHYDRATION: Primary | ICD-10-CM

## 2023-02-14 PROCEDURE — 99024 POSTOP FOLLOW-UP VISIT: CPT | Performed by: NURSE PRACTITIONER

## 2023-02-14 RX ORDER — FAMOTIDINE 40 MG/1
40 TABLET, FILM COATED ORAL NIGHTLY
Qty: 30 TABLET | Refills: 3 | Status: SHIPPED | OUTPATIENT
Start: 2023-02-14

## 2023-02-14 NOTE — TELEPHONE ENCOUNTER
Gastric Emptying scheduled for 2/21/2023 @ 815am Arrive at 745am    * Arrive 30 minutes prior    * NPO after midnight    * Bring a current list of medications    * No Zofran, Opiates, Antispasmodic agents, Atropine, Nifedipine, Procardia, Progesterone, Octreotide, Theophylline, Benzodiazepine, or Phentolamine for 48 HOURS PRIOR  * Patient should not have any caffeine on the day of their exam  * Patient will be at hospital 4.5 hours for this exam.  * Please report to Main Radiology 1st floor    Left message for patient to call office.

## 2023-02-16 ENCOUNTER — APPOINTMENT (OUTPATIENT)
Dept: CT IMAGING | Age: 33
End: 2023-02-16
Payer: MEDICARE

## 2023-02-16 ENCOUNTER — HOSPITAL ENCOUNTER (OUTPATIENT)
Age: 33
Setting detail: OBSERVATION
Discharge: HOME | End: 2023-02-18
Attending: EMERGENCY MEDICINE | Admitting: HOSPITALIST
Payer: MEDICARE

## 2023-02-16 ENCOUNTER — TELEPHONE (OUTPATIENT)
Dept: INTERNAL MEDICINE CLINIC | Age: 33
End: 2023-02-16

## 2023-02-16 ENCOUNTER — TELEPHONE (OUTPATIENT)
Dept: SURGERY | Age: 33
End: 2023-02-16

## 2023-02-16 DIAGNOSIS — K94.20 COMPLICATION OF GASTROSTOMY TUBE (HCC): Primary | ICD-10-CM

## 2023-02-16 LAB
ALBUMIN SERPL BCG-MCNC: 4 G/DL (ref 3.5–5.1)
ALP SERPL-CCNC: 108 U/L (ref 38–126)
ALT SERPL W/O P-5'-P-CCNC: 14 U/L (ref 11–66)
ANION GAP SERPL CALC-SCNC: 12 MEQ/L (ref 8–16)
AST SERPL-CCNC: 15 U/L (ref 5–40)
BASOPHILS ABSOLUTE: 0 THOU/MM3 (ref 0–0.1)
BASOPHILS NFR BLD AUTO: 0.4 %
BILIRUB CONJ SERPL-MCNC: < 0.2 MG/DL (ref 0–0.3)
BILIRUB SERPL-MCNC: 0.3 MG/DL (ref 0.3–1.2)
BUN SERPL-MCNC: 9 MG/DL (ref 7–22)
CALCIUM SERPL-MCNC: 9.5 MG/DL (ref 8.5–10.5)
CHLORIDE SERPL-SCNC: 104 MEQ/L (ref 98–111)
CO2 SERPL-SCNC: 24 MEQ/L (ref 23–33)
CREAT SERPL-MCNC: 0.6 MG/DL (ref 0.4–1.2)
DEPRECATED RDW RBC AUTO: 42.8 FL (ref 35–45)
EOSINOPHIL NFR BLD AUTO: 1.5 %
EOSINOPHILS ABSOLUTE: 0.1 THOU/MM3 (ref 0–0.4)
ERYTHROCYTE [DISTWIDTH] IN BLOOD BY AUTOMATED COUNT: 16 % (ref 11.5–14.5)
GFR SERPL CREATININE-BSD FRML MDRD: > 60 ML/MIN/1.73M2
GLUCOSE BLD STRIP.AUTO-MCNC: 83 MG/DL (ref 70–108)
GLUCOSE SERPL-MCNC: 89 MG/DL (ref 70–108)
HCT VFR BLD AUTO: 35.9 % (ref 37–47)
HGB BLD-MCNC: 11.1 GM/DL (ref 12–16)
IMM GRANULOCYTES # BLD AUTO: 0.03 THOU/MM3 (ref 0–0.07)
IMM GRANULOCYTES NFR BLD AUTO: 0.3 %
LIPASE SERPL-CCNC: 18 U/L (ref 5.6–51.3)
LYMPHOCYTES ABSOLUTE: 2.5 THOU/MM3 (ref 1–4.8)
LYMPHOCYTES NFR BLD AUTO: 26.8 %
MCH RBC QN AUTO: 23.1 PG (ref 26–33)
MCHC RBC AUTO-ENTMCNC: 30.9 GM/DL (ref 32.2–35.5)
MCV RBC AUTO: 74.8 FL (ref 81–99)
MONOCYTES ABSOLUTE: 0.4 THOU/MM3 (ref 0.4–1.3)
MONOCYTES NFR BLD AUTO: 4.8 %
NEUTROPHILS NFR BLD AUTO: 66.2 %
NRBC BLD AUTO-RTO: 0 /100 WBC
OSMOLALITY SERPL CALC.SUM OF ELEC: 277.6 MOSMOL/KG (ref 275–300)
PLATELET # BLD AUTO: 332 THOU/MM3 (ref 130–400)
PMV BLD AUTO: 10.2 FL (ref 9.4–12.4)
POTASSIUM SERPL-SCNC: 3.6 MEQ/L (ref 3.5–5.2)
PROT SERPL-MCNC: 7.9 G/DL (ref 6.1–8)
RBC # BLD AUTO: 4.8 MILL/MM3 (ref 4.2–5.4)
SEGMENTED NEUTROPHILS ABSOLUTE COUNT: 6.2 THOU/MM3 (ref 1.8–7.7)
SODIUM SERPL-SCNC: 140 MEQ/L (ref 135–145)
WBC # BLD AUTO: 9.3 THOU/MM3 (ref 4.8–10.8)

## 2023-02-16 PROCEDURE — G0378 HOSPITAL OBSERVATION PER HR: HCPCS

## 2023-02-16 PROCEDURE — 2580000003 HC RX 258: Performed by: STUDENT IN AN ORGANIZED HEALTH CARE EDUCATION/TRAINING PROGRAM

## 2023-02-16 PROCEDURE — 80076 HEPATIC FUNCTION PANEL: CPT

## 2023-02-16 PROCEDURE — 82948 REAGENT STRIP/BLOOD GLUCOSE: CPT

## 2023-02-16 PROCEDURE — 74177 CT ABD & PELVIS W/CONTRAST: CPT

## 2023-02-16 PROCEDURE — 85025 COMPLETE CBC W/AUTO DIFF WBC: CPT

## 2023-02-16 PROCEDURE — 99223 1ST HOSP IP/OBS HIGH 75: CPT | Performed by: HOSPITALIST

## 2023-02-16 PROCEDURE — 96374 THER/PROPH/DIAG INJ IV PUSH: CPT

## 2023-02-16 PROCEDURE — 36415 COLL VENOUS BLD VENIPUNCTURE: CPT

## 2023-02-16 PROCEDURE — 6360000002 HC RX W HCPCS: Performed by: STUDENT IN AN ORGANIZED HEALTH CARE EDUCATION/TRAINING PROGRAM

## 2023-02-16 PROCEDURE — 96361 HYDRATE IV INFUSION ADD-ON: CPT

## 2023-02-16 PROCEDURE — 80048 BASIC METABOLIC PNL TOTAL CA: CPT

## 2023-02-16 PROCEDURE — 83690 ASSAY OF LIPASE: CPT

## 2023-02-16 PROCEDURE — 6360000004 HC RX CONTRAST MEDICATION: Performed by: STUDENT IN AN ORGANIZED HEALTH CARE EDUCATION/TRAINING PROGRAM

## 2023-02-16 PROCEDURE — 99285 EMERGENCY DEPT VISIT HI MDM: CPT

## 2023-02-16 RX ORDER — SODIUM CHLORIDE 9 MG/ML
INJECTION, SOLUTION INTRAVENOUS PRN
Status: DISCONTINUED | OUTPATIENT
Start: 2023-02-16 | End: 2023-02-18 | Stop reason: HOSPADM

## 2023-02-16 RX ORDER — SODIUM CHLORIDE 0.9 % (FLUSH) 0.9 %
5-40 SYRINGE (ML) INJECTION EVERY 12 HOURS SCHEDULED
Status: DISCONTINUED | OUTPATIENT
Start: 2023-02-16 | End: 2023-02-18 | Stop reason: HOSPADM

## 2023-02-16 RX ORDER — DEXTROSE MONOHYDRATE 100 MG/ML
INJECTION, SOLUTION INTRAVENOUS CONTINUOUS PRN
Status: DISCONTINUED | OUTPATIENT
Start: 2023-02-16 | End: 2023-02-18 | Stop reason: HOSPADM

## 2023-02-16 RX ORDER — FAMOTIDINE 20 MG/1
40 TABLET, FILM COATED ORAL NIGHTLY
Status: DISCONTINUED | OUTPATIENT
Start: 2023-02-16 | End: 2023-02-16

## 2023-02-16 RX ORDER — BACLOFEN 10 MG/1
10 TABLET ORAL DAILY
Status: DISCONTINUED | OUTPATIENT
Start: 2023-02-16 | End: 2023-02-18 | Stop reason: HOSPADM

## 2023-02-16 RX ORDER — PROPRANOLOL HCL 60 MG
60 CAPSULE, EXTENDED RELEASE 24HR ORAL DAILY
Status: DISCONTINUED | OUTPATIENT
Start: 2023-02-16 | End: 2023-02-18 | Stop reason: HOSPADM

## 2023-02-16 RX ORDER — AMLODIPINE BESYLATE 5 MG/1
5 TABLET ORAL DAILY
Status: DISCONTINUED | OUTPATIENT
Start: 2023-02-16 | End: 2023-02-18 | Stop reason: HOSPADM

## 2023-02-16 RX ORDER — PROPRANOLOL HCL 60 MG
60 CAPSULE, EXTENDED RELEASE 24HR ORAL DAILY
COMMUNITY

## 2023-02-16 RX ORDER — FLUTICASONE PROPIONATE 110 UG/1
2 AEROSOL, METERED RESPIRATORY (INHALATION) 2 TIMES DAILY
Status: DISCONTINUED | OUTPATIENT
Start: 2023-02-16 | End: 2023-02-18 | Stop reason: HOSPADM

## 2023-02-16 RX ORDER — SODIUM CHLORIDE, SODIUM LACTATE, POTASSIUM CHLORIDE, CALCIUM CHLORIDE 600; 310; 30; 20 MG/100ML; MG/100ML; MG/100ML; MG/100ML
INJECTION, SOLUTION INTRAVENOUS CONTINUOUS
Status: DISCONTINUED | OUTPATIENT
Start: 2023-02-16 | End: 2023-02-18 | Stop reason: HOSPADM

## 2023-02-16 RX ORDER — DROPERIDOL 2.5 MG/ML
1.25 INJECTION, SOLUTION INTRAMUSCULAR; INTRAVENOUS ONCE
Status: DISCONTINUED | OUTPATIENT
Start: 2023-02-16 | End: 2023-02-16 | Stop reason: HOSPADM

## 2023-02-16 RX ORDER — ESCITALOPRAM OXALATE 5 MG/5ML
20 SOLUTION ORAL DAILY
Status: DISCONTINUED | OUTPATIENT
Start: 2023-02-16 | End: 2023-02-16

## 2023-02-16 RX ORDER — MONTELUKAST SODIUM 10 MG/1
10 TABLET ORAL NIGHTLY
Status: DISCONTINUED | OUTPATIENT
Start: 2023-02-16 | End: 2023-02-18 | Stop reason: HOSPADM

## 2023-02-16 RX ORDER — SODIUM CHLORIDE 0.9 % (FLUSH) 0.9 %
5-40 SYRINGE (ML) INJECTION PRN
Status: DISCONTINUED | OUTPATIENT
Start: 2023-02-16 | End: 2023-02-18 | Stop reason: HOSPADM

## 2023-02-16 RX ORDER — TOPIRAMATE 100 MG/1
100 TABLET, FILM COATED ORAL 2 TIMES DAILY
Status: DISCONTINUED | OUTPATIENT
Start: 2023-02-16 | End: 2023-02-18 | Stop reason: HOSPADM

## 2023-02-16 RX ORDER — DOCUSATE SODIUM 100 MG/1
100 CAPSULE, LIQUID FILLED ORAL 2 TIMES DAILY PRN
Status: DISCONTINUED | OUTPATIENT
Start: 2023-02-16 | End: 2023-02-18 | Stop reason: HOSPADM

## 2023-02-16 RX ORDER — ACETAMINOPHEN 325 MG/1
650 TABLET ORAL 4 TIMES DAILY PRN
Status: DISCONTINUED | OUTPATIENT
Start: 2023-02-16 | End: 2023-02-18 | Stop reason: HOSPADM

## 2023-02-16 RX ORDER — ALBUTEROL SULFATE 90 UG/1
2 AEROSOL, METERED RESPIRATORY (INHALATION) EVERY 4 HOURS PRN
Status: DISCONTINUED | OUTPATIENT
Start: 2023-02-16 | End: 2023-02-18 | Stop reason: HOSPADM

## 2023-02-16 RX ORDER — AMITRIPTYLINE HYDROCHLORIDE 100 MG/1
100 TABLET, FILM COATED ORAL NIGHTLY
Status: DISCONTINUED | OUTPATIENT
Start: 2023-02-16 | End: 2023-02-18 | Stop reason: HOSPADM

## 2023-02-16 RX ORDER — PROMETHAZINE HYDROCHLORIDE 25 MG/ML
6.25 INJECTION, SOLUTION INTRAMUSCULAR; INTRAVENOUS EVERY 6 HOURS PRN
Status: DISCONTINUED | OUTPATIENT
Start: 2023-02-16 | End: 2023-02-18 | Stop reason: HOSPADM

## 2023-02-16 RX ORDER — ENOXAPARIN SODIUM 100 MG/ML
30 INJECTION SUBCUTANEOUS 2 TIMES DAILY
Status: DISCONTINUED | OUTPATIENT
Start: 2023-02-16 | End: 2023-02-16

## 2023-02-16 RX ORDER — ARIPIPRAZOLE 10 MG/1
10 TABLET ORAL
Status: DISCONTINUED | OUTPATIENT
Start: 2023-02-16 | End: 2023-02-18 | Stop reason: HOSPADM

## 2023-02-16 RX ORDER — PRAZOSIN HYDROCHLORIDE 1 MG/1
1 CAPSULE ORAL NIGHTLY
Status: DISCONTINUED | OUTPATIENT
Start: 2023-02-16 | End: 2023-02-18 | Stop reason: HOSPADM

## 2023-02-16 RX ORDER — PANTOPRAZOLE SODIUM 40 MG/1
40 TABLET, DELAYED RELEASE ORAL DAILY
Status: DISCONTINUED | OUTPATIENT
Start: 2023-02-17 | End: 2023-02-18 | Stop reason: HOSPADM

## 2023-02-16 RX ORDER — INSULIN LISPRO 100 [IU]/ML
0-4 INJECTION, SOLUTION INTRAVENOUS; SUBCUTANEOUS NIGHTLY
Status: DISCONTINUED | OUTPATIENT
Start: 2023-02-16 | End: 2023-02-18 | Stop reason: HOSPADM

## 2023-02-16 RX ORDER — ESCITALOPRAM OXALATE 20 MG/1
20 TABLET ORAL DAILY
Status: DISCONTINUED | OUTPATIENT
Start: 2023-02-16 | End: 2023-02-18 | Stop reason: HOSPADM

## 2023-02-16 RX ORDER — BUSPIRONE HYDROCHLORIDE 5 MG/1
5 TABLET ORAL 3 TIMES DAILY
Status: DISCONTINUED | OUTPATIENT
Start: 2023-02-16 | End: 2023-02-18 | Stop reason: HOSPADM

## 2023-02-16 RX ORDER — INSULIN LISPRO 100 [IU]/ML
0-4 INJECTION, SOLUTION INTRAVENOUS; SUBCUTANEOUS
Status: DISCONTINUED | OUTPATIENT
Start: 2023-02-17 | End: 2023-02-18 | Stop reason: HOSPADM

## 2023-02-16 RX ORDER — DEXTROSE, SODIUM CHLORIDE, SODIUM LACTATE, POTASSIUM CHLORIDE, AND CALCIUM CHLORIDE 5; .6; .31; .03; .02 G/100ML; G/100ML; G/100ML; G/100ML; G/100ML
INJECTION, SOLUTION INTRAVENOUS CONTINUOUS
Status: DISCONTINUED | OUTPATIENT
Start: 2023-02-16 | End: 2023-02-16

## 2023-02-16 RX ORDER — POLYETHYLENE GLYCOL 3350 17 G/17G
17 POWDER, FOR SOLUTION ORAL DAILY PRN
Status: DISCONTINUED | OUTPATIENT
Start: 2023-02-16 | End: 2023-02-18 | Stop reason: HOSPADM

## 2023-02-16 RX ORDER — PROMETHAZINE HYDROCHLORIDE 25 MG/1
25 TABLET ORAL EVERY 4 HOURS PRN
Status: DISCONTINUED | OUTPATIENT
Start: 2023-02-16 | End: 2023-02-18 | Stop reason: HOSPADM

## 2023-02-16 RX ADMIN — HYDROMORPHONE HYDROCHLORIDE 0.5 MG: 1 INJECTION, SOLUTION INTRAMUSCULAR; INTRAVENOUS; SUBCUTANEOUS at 15:37

## 2023-02-16 RX ADMIN — SODIUM CHLORIDE, POTASSIUM CHLORIDE, SODIUM LACTATE AND CALCIUM CHLORIDE: 600; 310; 30; 20 INJECTION, SOLUTION INTRAVENOUS at 22:11

## 2023-02-16 RX ADMIN — IOPAMIDOL 80 ML: 755 INJECTION, SOLUTION INTRAVENOUS at 16:39

## 2023-02-16 RX ADMIN — SODIUM CHLORIDE, SODIUM LACTATE, POTASSIUM CHLORIDE, CALCIUM CHLORIDE AND DEXTROSE MONOHYDRATE: 5; 600; 310; 30; 20 INJECTION, SOLUTION INTRAVENOUS at 15:44

## 2023-02-16 ASSESSMENT — PAIN DESCRIPTION - LOCATION
LOCATION: ABDOMEN

## 2023-02-16 ASSESSMENT — PAIN DESCRIPTION - ORIENTATION
ORIENTATION: MID

## 2023-02-16 ASSESSMENT — PAIN - FUNCTIONAL ASSESSMENT
PAIN_FUNCTIONAL_ASSESSMENT: 0-10

## 2023-02-16 ASSESSMENT — PAIN DESCRIPTION - DESCRIPTORS
DESCRIPTORS: ACHING;CRAMPING;SHOOTING
DESCRIPTORS: ACHING;CRAMPING;PRESSURE
DESCRIPTORS: BURNING;SORE

## 2023-02-16 ASSESSMENT — PAIN SCALES - GENERAL
PAINLEVEL_OUTOF10: 10
PAINLEVEL_OUTOF10: 7

## 2023-02-16 ASSESSMENT — PAIN DESCRIPTION - FREQUENCY: FREQUENCY: CONTINUOUS

## 2023-02-16 ASSESSMENT — PAIN DESCRIPTION - PAIN TYPE: TYPE: ACUTE PAIN

## 2023-02-16 NOTE — PROGRESS NOTES
Pharmacy Medication History Note      List of current medications patient is taking is complete. Source of information: patient    Changes made to medication list:  Medications removed (include reason, ex. therapy complete or physician discontinued): Bisacodyl - patient states not taking  Clopidogrel - patient states not taking  Erythromycin - patient states not taking  Vitamin D3 - patient states not taking  Zinc - patient states not taking    Medications added/doses adjusted:  Amitriptyline - changed directions from 100 mg 2 tabs at night to 100 mg 1 tab at night per patient and dispense report  Aripiprazole - changed from 10 mg tablet to 1mg/mL liquid take 10 mL via peg tube daily per dispense report and patient  Escitalopram - changed from 10 mg tablet 2T daily to escitalopram 5mg/5mL liquid take 20 mL via peg tube daily per patient and dispense report  Flovent - 44 mcg to 110 mcg per patient and dispense report  Metformin - changed metformin 500 mg tabs to metformin 500mg/5mL take 10 mL via peg tube daily  Propranolol ER 60 mg once daily added per dispense report and patient    Other notes (ex. Recent course of antibiotics, Coumadin dosing):  Patient hasn't taken most of her meds in the last 2 days due to Gtube complications (even inhaler)  Patient confirmed use of both famotidine and pantoprazole  Patient states she is not using the nicotine patches shown on the dispense report  Denies use of other OTC or herbal medications.       Allergies reviewed      Electronically signed by Colby Steele on 2/16/2023 at 5:46 PM

## 2023-02-16 NOTE — ED PROVIDER NOTES
[facility] EMERGENCY DEPARTMENT    EMERGENCY MEDICINE     Pt Name: Loy Walker  MRN: [id number]  [place] 1990  Date of evaluation: 2/16/2023  Treating Resident Physician: Sivan Alcazar MD  Supervising Physician: Miiran Fernandez, [address] [place] [address]       Chief Complaint   Patient presents with    G Tube Complications     History obtained from chart review and the patient. HISTORY OF PRESENT ILLNESS    HPI    Loy Walker is a 28 y.o. female with a past medical history significant for multiple abdominal surgeries, hiatal hernia, obesity, dyslipidemia, placement of gastrostomy tube , presents to the emergency department for evaluation of leaking gastrostomy tube. Patient was seen at her GI specialist office yesterday and walking home reportedly fell and afterwards her G-tube now has a leak in it. She states every time food or meds or water injected it comes out the site of the tube external causing a mass. She also notes abdominal pain which is not new. States it feels like the balloon on the G-tube is deflating. The patient has no other acute complaints at this time.     REVIEW OF SYSTEMS   Review of Systems  ROS otherwise negative unless mentioned above in HPI    PAST MEDICAL AND SURGICAL HISTORY     Past Medical History:   Diagnosis Date    Acute on chronic diastolic congestive heart failure ([address] 6/25/2022    JANI (acute kidney injury) ([address] 7/7/2019    Anemia     Anesthesia     migraines    Anxiety     Asthma     CAD (coronary artery disease)     Depression     Diabetes ([address]     Diet-controlled type 2 diabetes mellitus ([address] 2016    Epilepsy ([address]     Fibroids     Gastro - esophageal reflux disease     Hypertension     Hypertrophy of tonsil and adenoid 11/4/2017    Malignant carcinoid tumor of other sites ([address] 5/14/2013    Migraine     PONV (postoperative nausea and vomiting)     Prolonged emergence from general anesthesia     Sickle cell anemia (HCC)     Sickle cell trait ([address] PT STATES SHE HAS THE TRAIT NOT THE DISEASE    Tumor associated pain     neuroendrocrine tumor, gastroma       Past Surgical History:   Procedure Laterality Date    ABDOMEN SURGERY  03/23/2017    Laparoscopic , Bi lat oopherectomy Dr Addis Gonzáles      x2    BREAST REDUCTION SURGERY      CENTRAL VENOUS CATHETER Right 12/11/2015    right subclavian single lumen mediport insertion--Dr. Michel    CHOLECYSTECTOMY, LAPAROSCOPIC N/A 7/11/2019    CHOLECYSTECTOMY LAPAROSCOPIC performed by Joe Pepper MD at [place] [address] N/A 8/24/2020    COLONOSCOPY POLYPECTOMY SNARE/COLD BIOPSY performed by Solis Casillas MD at [address] [place] [address]  10/21/2013    Dilation and curettage, Diagnostic Hysteroscopy and laparoscopy (Dr. Sonia Rodriguez, [facility])    EGD  2019    EGD COLONOSCOPY N/A 1/8/2019    EGD DIL performed by Svetlana Aguayo MD at [facility]    EGD COLONOSCOPY Left 5/14/2019    EGD DILATATION performed by Svetlana Aguayo MD at [facility]    EGD COLONOSCOPY Left 8/27/2019    EGD DILATATION performed by Svetlana Aguayo MD at [facility], COLON, DIAGNOSTIC      GASTRIC [address] [facility]    GASTRIC [address] N/A 1/25/2023    EGD PEG TUBE PLACEMENT performed by Solis Casillas MD at [address]  2010, 2016    [place] , [address] [facility] [facility] [address] [facility] [address] N/A 1/17/2023    Robotic Exploratory Paparoscopy with Extensive Lysis of Adhesions G Tube Insertion performed by Sabrina Song MD at [address] [place] [address] ([address] [place] [address])  04/2016    INSERTION / REMOVAL / REPLACEMENT VENOUS ACCESS CATHETER N/A 3/8/2019    REMOVAL OF LEFT PORT AND PLACEMENT OF SINGLE LUMEN MEDIPORT RIGHT SIDE performed by Joe Pepper MD at [address] [facility] 10/8/2019    ROBOTIC DIAGNOSTIC LAPAROSCOPY,  RECURRENT HIATAL HERNIA REPAIR, REVISION FUNDOPLICATION performed by David Huggins MD at [facility] 18 N/A 7/15/2022    ROBOTIC LAPAROSCOPY, LYSIS OF ADHESIONS performed by Ian Serrano MD at [facility]  08/12/2016    Right Port Removal, Placement of new Port Left by Dr Daniel Serna  12/02/2019    Mediport insertion-IR Dr Dimas Parnell (CERVIX NOT REMOVED)  4/8/16    PORT SURGERY N/A 11/11/2019    REMOVAL OF RIGHT MEDIPORT & ATTEMPTED PLACEMENT OF LEFT SINGLE LUMEN MEDIPORT performed by Ian Serrano MD at [address] Right 11/29/2019    RT INTERNAL JUGULAR SINGLE LUMEN MEDIPORT INSERTION performed by Ian Serrano MD at [address] [facility]  [address] <30 MM DIAM Left 8/8/2017    EGD/DIL performed by Vinay Jimenez MD at [facility]    MD  [address] <30 MM DIAM N/A 9/26/2017    EGD DIL performed by Vinay Jimenez MD at [facility]    MD  [address] <30 MM DIAM Left 12/12/2017    EGD DIL performed by Vinay Jimenez MD at [facility]    MD  [address] <30 MM DIAM N/A 2/13/2018    EGD  DILATATION performed by Vinay Jimenez MD at [address] [facility] [address] [facility] [address] VAD W/SUBQ PORT UNDER 5 YR Bilateral 1/5/2018    EXCISION OF MEDIPORT LEFT SIDE, INSERTION MEDIPORT RIGHT  IJ performed by Ian Serrano MD [facility] [address] [facility] OFF[facility]/OUTPT [address] [facility] N/A 5/15/2018    EGD DILATATION performed by Vinay Jimenez MD at [address] [facility] OFFICE/OUTPT VISIT,PROCEDURE ONLY Bilateral 9/10/2018    REMOVAL RT MEDIPORT, INSERTION LEFT performed by Ian Serrano MD at [address] [facility]  [place] OFFICE/OUTPT [address] [facility] N/A 11/16/2018    MEDIPORT REPOSITIONING performed by Ian Serrano MD at [address] [facility] [address] 11/6/2017    TONSILLECTOMY AND ADENOIDECTOMY performed by Jeffery Hurt MD at [address] REMOVAL      neoendocrine    TUNNELED VENOUS PORT PLACEMENT      UPPER GASTROINTESTINAL ENDOSCOPY Left 4/3/2018    EGD DILATION SAVORY performed by Apoorva Wise MD at [address] [place] [address] Left 6/26/2018    EGD DILATION SAVORY performed by Apoorva Wise MD at [address] [facility] ENDOSCOPY Left 2/26/2019    EGD DILATION SAVORY performed by Apoorva Wise MD at [address] [place] [address] 7/9/2019    EGD DILATION SAVORY performed by Apoorva Wise MD at [address] [facility] ENDOSCOPY Left 9/9/2019    EGD BIOPSY performed by Zarina Mesa MD at [address] [place] [address] 4/1/2020    EGD DILATION BALLOON performed by Jose Sloan MD at [address] [place] [address] 8/24/2020    EGD ESOPHAGOGASTRODUODENOSCOPY DILATATION performed by Jose Sloan MD at [address] [place] [address] Left 8/24/2020    EGD BIOPSY performed by Jose Sloan MD at [address] [place] [address] N/A 12/2/2020    EGD DILATION BALLOON performed by Jose Sloan MD at [address] [place] [address] Left 12/2/2020    EGD BIOPSY performed by Jose Sloan MD at [address] [place] [address] Left 1/19/2022    EGD performed by Alix Ko MD at [address] [place] [address] Left 1/19/2022    EGD BIOPSY performed by Alix Ko MD at [address] [place] [address] 7/15/2022    EGD [address] performed by Alix Ko MD at [place] [address] 1/24/2023    EGD ESOPHAGOGASTRODUODENOSCOPY performed by Jose Sloan MD at [facility] 8/22/2022    OPEN LEFT [address] [place] [address] performed by Alix Ko MD at [address] [place] [address] MEDICATIONS     Current Discharge Medication List        CONTINUE these medications which have NOT CHANGED    Details   propranolol (INDERAL LA) 60 MG extended release capsule Take 60 mg by mouth daily      famotidine (PEPCID) 40 MG tablet Take 1 tablet by mouth nightly  Qty: 30 tablet, Refills: 3      Incontinence Supply Disposable (DEPEND SILHOUETTE BRIEFS L/XL) MISC Use as needed for urinary and bowel incontinence  Qty: 5 each, Refills: 5    Associated Diagnoses: Other urinary incontinence      docusate sodium (COLACE) 100 MG capsule Take 1 capsule by mouth 2 times daily as needed for Constipation  Qty: 30 capsule, Refills: 2      erythromycin (EES) 400 MG/5ML suspension 5 mLs by Per G Tube route 3 times daily (with meals)  Qty: 450 mL, Refills: 3      ALBUTEROL SULFATE HFA IN Inhale 1 puff into the lungs every 4-6 hours as needed      topiramate (TOPAMAX) 100 MG tablet Take 100 mg by mouth 2 times daily      metFORMIN HCl 500 MG/5ML SOLN 1,000 mg daily (with breakfast) Take 10 mL via peg tube once daily      linaclotide (LINZESS) 145 MCG capsule Take 145 mcg by mouth every morning (before breakfast)      baclofen (LIORESAL) 10 MG tablet Take 10 mg by mouth daily      promethazine (PHENERGAN) 25 MG tablet Take 25 mg by mouth every 4-6 hours as needed for Nausea      prazosin (MINIPRESS) 1 MG capsule Take 1 mg by mouth nightly      ARIPiprazole (ABILIFY) 1 MG/ML SOLN solution Take 10 mg by mouth nightly 10 mL via peg tube every evening      busPIRone (BUSPAR) 5 MG tablet Take 5 mg by mouth 3 times daily      furosemide (LASIX) 20 MG tablet TAKE 1 TABLET BY MOUTH ONCE A DAY  Qty: 30 tablet, Refills: 0      pantoprazole (PROTONIX) 40 MG tablet Take 40 mg by mouth daily      bisacodyl (DULCOLAX) 5 MG EC tablet See Prep Instructions  Qty: 4 tablet, Refills: 0    Associated Diagnoses: Chronic diarrhea; Encounter for colonoscopy due to history of colonic polyp      polyethylene glycol (GLYCOLAX) 17 GM/SCOOP powder Dispense 238 Gram Bottle. Use as Directed  Qty: 238 g, Refills: 0    Associated Diagnoses: Chronic diarrhea; Encounter for colonoscopy due to history of colonic polyp      clopidogrel (PLAVIX) 75 MG tablet Take 1 tablet by mouth daily  Qty: 30 tablet, Refills: 1      acetaminophen (TYLENOL) 325 MG tablet Take 2 tablets by mouth 4 times daily as needed for Pain or Fever  Qty: 60 tablet, Refills: 0      fluticasone (FLOVENT HFA) 110 MCG/ACT inhaler 2 puffs 2 times daily      amitriptyline (ELAVIL) 100 MG tablet Take 100 mg by mouth nightly      montelukast (SINGULAIR) 10 MG tablet Take 10 mg by mouth nightly      amLODIPine (NORVASC) 5 MG tablet Take 1 tablet by mouth daily  Qty: 30 tablet, Refills: 3      escitalopram (LEXAPRO) 5 MG/5ML solution 20 mg daily Take 20 mL via peg tube once daily      albuterol (PROVENTIL) (2.5 MG/3ML) 0.083% nebulizer solution Take 3 mLs by nebulization every 6 hours as needed for Wheezing  Qty: 90 each, Refills: 0      glucose monitoring kit (FREESTYLE) monitoring kit 1 kit by Does not apply route daily Or whichever system insurance will pay for  Qty: 1 kit, Refills: 0    Associated Diagnoses: Glucose intolerance (impaired glucose tolerance)      Spacer/Aero-Holding Chambers (E-Z SPACER) AISLINN 1 Device by Does not apply route daily  Qty: 1 Device, Refills: 0             ALLERGIES     Allergies   Allergen Reactions    Latex     Ketorolac Tromethamine Anaphylaxis     Throat swelling    Pcn [Penicillins] Anaphylaxis    Percocet [Oxycodone-Acetaminophen]      hallucinations    Amoxicillin Hives    Ciprofloxacin Hives and Swelling    Compazine [Prochlorperazine Maleate] Itching    Dicyclomine Hcl Hives    Fentanyl Other (See Comments)     Pt states that her \"lips start busting out. \"    Fioricet [Butalbital-Apap-Caffeine] Swelling     Of the mouth, tongue    Flexeril [Cyclobenzaprine] Hives    Gabapentin Swelling     tongue    Ibuprofen [Ibuprofen] Other (See Comments)     Mouth swelling and ulcers    Keflex [Cephalexin] Itching    Lisinopril     Lorazepam Hives    Losartan      Elevated pulse    Macrobid [Nitrofurantoin Monohyd Macro] Itching    Morphine Other (See Comments)     HEADACHE UP WAKING    Mushroom Extract Complex Swelling    Reglan [Metoclopramide] Itching    Vicodin [Hydrocodone-Acetaminophen] Itching    Vistaril [Hydroxyzine Hcl] Itching    Zofran [Ondansetron] Hives    Adhesive Tape Rash       FAMILY HISTORY     Family History   Problem Relation Age of Onset    Cancer Mother     High Blood Pressure Mother     Heart Disease Mother         MI    Liver Cancer Mother     High Blood Pressure Father     Heart Disease Father     Heart Disease Sister     Diabetes Maternal Grandmother     Depression Maternal Grandmother     Heart Disease Maternal Grandmother         CHF    Heart Attack Paternal Uncle     Stroke Neg Hx     Colon Cancer Neg Hx     Esophageal Cancer Neg Hx     Rectal Cancer Neg Hx     Stomach Cancer Neg Hx        SOCIAL HISTORY     Social History     Tobacco Use    Smoking status: Every Day     Packs/day: 0.25     Years: 6.00     Pack years: 1.50     Types: Cigarettes     Start date: 3/1/2016    Smokeless tobacco: Never    Tobacco comments:     smokes 3 cig perday    Vaping Use    Vaping Use: Never used   Substance Use Topics    Alcohol use: Not Currently    Drug use: No       PHYSICAL EXAM     ED Triage Vitals   BP Temp Temp src Pulse Resp SpO2 Height Weight   -- -- -- -- -- -- -- --       Vitals Reviewed:    Vitals:    02/16/23 1758 02/16/23 1902 02/16/23 2005 02/16/23 2258   BP: (!) 150/92 (!) 160/103 (!) 152/98    Pulse: 89 82 84    Resp: 17 17 17    Temp:       TempSrc:       SpO2: 100% 100% 99%    Weight:    283 lb (128.4 kg)   Height:    5' 4\" (1.626 m)       Initial vital signs and nursing assessment reviewed and normal. Body mass index is 48.58 kg/m². Pulsoximetry is normal per my interpretation. Physical Exam  Vitals and nursing note reviewed.    Constitutional: General: She is not in acute distress. Appearance: Normal appearance. She is obese. She is not ill-appearing, toxic-appearing or diaphoretic. HENT:      Head: Normocephalic and atraumatic. Nose: Nose normal.      Mouth/Throat:      Mouth: Mucous membranes are moist.      Pharynx: Oropharynx is clear. Eyes:      Conjunctiva/sclera: Conjunctivae normal.   Cardiovascular:      Rate and Rhythm: Normal rate and regular rhythm. Pulses: Normal pulses. Heart sounds: Normal heart sounds. Pulmonary:      Effort: Pulmonary effort is normal.      Breath sounds: Normal breath sounds. Abdominal:      General: Bowel sounds are normal.      Palpations: Abdomen is soft. Tenderness: There is generalized abdominal tenderness. Musculoskeletal:      Cervical back: Normal range of motion. Skin:     General: Skin is warm and dry. Capillary Refill: Capillary refill takes less than 2 seconds. Neurological:      Mental Status: She is alert and oriented to person, place, and time. FORMAL DIAGNOSTIC RESULTS     RADIOLOGY: Interpretation per the Radiologist below, if available at the time of this note (none if blank):    CT ABDOMEN PELVIS W IV CONTRAST Additional Contrast? None   Final Result   1. No solid organ injury. No fracture. 2. The percutaneous gastrostomy tube terminates within the stomach unchanged in position from the prior study. No bowel obstruction, free fluid, fluid collection, or free air is identified. The left rectus musculature is thickened at the gastrostomy tube    insertion site however no abdominal wall fluid collection or subcutaneous fat stranding is observed. 3. Chronic findings are discussed. **This report has been created using voice recognition software. It may contain minor errors which are inherent in voice recognition technology. **      Final report electronically signed by Dr Tabby Delgado on 2/16/2023 4:50 PM          LABS: (none if blank)  Labs Reviewed   CBC WITH AUTO DIFFERENTIAL - Abnormal; Notable for the following components:       Result Value    Hemoglobin 11.1 (*)     Hematocrit 35.9 (*)     MCV 74.8 (*)     MCH 23.1 (*)     MCHC 30.9 (*)     RDW-CV 16.0 (*)     All other components within normal limits   HEPATIC FUNCTION PANEL   BASIC METABOLIC PANEL W/ REFLEX TO MG FOR LOW K   LIPASE   ANION GAP   GLOMERULAR FILTRATION RATE, ESTIMATED   OSMOLALITY   BASIC METABOLIC PANEL W/ REFLEX TO MG FOR LOW K   POCT GLUCOSE   POCT GLUCOSE   POCT GLUCOSE       (Any cultures that may have been sent were not resulted at the time of this patient visit)    [facility]     ____________    ED COURSE:  ED Course as of 02/16/23 2313   Thu Feb 16, 2023   1234 Call to Dr. Marian Buckley - needs to be changed under endoscopy, needs to be admitted for change in OR. Will keep NPO until than. [SC]   1328 Updated patient on plan for admission, she wants pain meds.  [SC]      ED Course User Index  [SC] Rajeev Arzate MD         ED MEDICATIONS ADMINISTERED:  (None if blank)  Medications   sodium chloride flush 0.9 % injection 5-40 mL (has no administration in time range)   sodium chloride flush 0.9 % injection 5-40 mL (has no administration in time range)   0.9 % sodium chloride infusion (has no administration in time range)   polyethylene glycol (GLYCOLAX) packet 17 g (has no administration in time range)   acetaminophen (TYLENOL) tablet 650 mg (has no administration in time range)   albuterol sulfate HFA (PROVENTIL;VENTOLIN;PROAIR) 108 (90 Base) MCG/ACT inhaler 2 puff (has no administration in time range)   amitriptyline (ELAVIL) tablet 100 mg (0 mg Oral Held 2/16/23 2215)   amLODIPine (NORVASC) tablet 5 mg (0 mg Oral Held 2/16/23 2231)   ARIPiprazole (ABILIFY) tablet 10 mg (0 mg Oral Held 2/16/23 2216)   baclofen (LIORESAL) tablet 10 mg (0 mg Oral Held 2/16/23 2216)   busPIRone (BUSPAR) tablet 5 mg (0 mg Oral Held 2/16/23 2217)   docusate sodium (COLACE) capsule 100 mg (has no administration in time range)   fluticasone (FLOVENT HFA) 110 MCG/ACT inhaler 2 puff (has no administration in time range)   linaclotide (LINZESS) capsule 145 mcg (Patient Supplied) (has no administration in time range)   montelukast (SINGULAIR) tablet 10 mg (0 mg Oral Held 2/16/23 2218)   pantoprazole (PROTONIX) tablet 40 mg (has no administration in time range)   prazosin (MINIPRESS) capsule 1 mg (0 mg Oral Held 2/16/23 2219)   promethazine (PHENERGAN) tablet 25 mg (has no administration in time range)   propranolol (INDERAL LA) extended release capsule 60 mg (0 mg Oral Held 2/16/23 2219)   topiramate (TOPAMAX) tablet 100 mg (0 mg Oral Held 2/16/23 2219)   insulin lispro (HUMALOG) injection vial 0-4 Units (has no administration in time range)   insulin lispro (HUMALOG) injection vial 0-4 Units (has no administration in time range)   lactated ringers IV soln infusion ( IntraVENous New Bag 2/16/23 2211)   promethazine (PHENERGAN) injection 6.25 mg (has no administration in time range)   glucose chewable tablet 16 g (has no administration in time range)   dextrose bolus 10% 125 mL (has no administration in time range)     Or   dextrose bolus 10% 250 mL (has no administration in time range)   glucagon (rDNA) injection 1 mg (has no administration in time range)   dextrose 10 % infusion (has no administration in time range)   escitalopram (LEXAPRO) tablet 20 mg (0 mg Oral Held 2/16/23 2218)   HYDROmorphone (DILAUDID) injection 0.5 mg (0.5 mg IntraVENous Given 2/16/23 1537)   iopamidol (ISOVUE-370) 76 % injection 80 mL (80 mLs IntraVENous Given 2/16/23 1639)         PROCEDURES: (None if blank)  Procedures:       CRITICAL CARE: (None if blank)      DISCHARGE PRESCRIPTIONS: (None if blank)  Current Discharge Medication List          MDM:   Medical Decision Making  26-year-old female with a complex medical history including recent placement of a gastrostomy tube initially 4 weeks ago and a repeat 1 3 weeks ago. Now presenting with leaking from the distal portion of her tube every time it is used. She also has abdominal pain however she states this is no worse than usual.  Did also have some nausea. I talked to her gastroenterologist Dr. Nitza Davis, who notes that the tube needs to be replaced with endoscopy guidance given that is a fairly recent site and there is a risk of not being able to pass a new tube. He recommends patient be admitted under the medicine service and either he or his colleague Dr. Titi Hernandez will replace the tube in endoscopy. Given patient's ongoing abdominal pain and status may be slightly worse than usual she was given pain medication as well as labs and a CT scan. CT scan is unremarkable does not show any new concerning findings or misplacement of the G-tube. She was given additional dose of antiemetic in the emergency department as well as IV fluids. Hospital service consulted for admission. Problems Addressed:  Complication of gastrostomy tube [facility]): undiagnosed new problem with uncertain prognosis    Amount and/or Complexity of Data Reviewed  External Data Reviewed: notes. Labs: ordered. Radiology: ordered and independent interpretation performed. Discussion of management or test interpretation with external provider(s): Gastroenterology, Hospitalist Service    Risk  Parenteral controlled substances. Decision regarding hospitalization. FINAL IMPRESSION     Final diagnoses:   Complication of gastrostomy tube ([facility]         DISPOSITION / PLAN   DISPOSITION Admitted 02/16/2023 06:42:25 PM        This transcription was electronically signed. Parts of this transcriptions may have been dictated by use of voice recognition software and electronically transcribed, and parts may have been transcribed with the assistance of an ED scribe. The transcription may contain errors not detected in proofreading.  Please refer to my supervising physician's documentation if my documentation differs.     Electronically Signed: Lucille Roy MD, 02/16/23, 11:13 PM         Constance Diop MD  Resident  02/16/23 9186

## 2023-02-16 NOTE — ED NOTES
Pt presents to ED with G-tub complications and burning at site. Pt had g-tube placed in January and receives feeds. Pt states she was walking home from the doctors office last night and fell, denies LOC. Since last night patient has noted leaking from g-tube and states \"I can feel the balloon slowly deflating\". Patient states burning when feeds are going on. Pain rated at a 10 out of 10. VSS, call light left within reach.       Emily Leon Trout  02/16/23 3772

## 2023-02-16 NOTE — TELEPHONE ENCOUNTER
Outpatient Dietitian callback to patient. Patient requesting extra fiber for her tube feeding - pt state Karmen Hebert CNP recommending this. Explained to patient will add to her current TF order for her to get fiber powder (Nutrisource Fiber) and RD will need this approved by Wing Georgie CNP. Pt states she is doing her extra water flushes each day of 240 ml 4x/day. Call made to [facility] and spoke with Drew Choudhury, to add Nutrisource Fiber - 1 scoop 2 x/daily via her PEG on her current TF order of Vital 1.5 formula 5 cartons/day. They will contact Wing Georgie CNP for this approval.  Appreciation given.

## 2023-02-16 NOTE — TELEPHONE ENCOUNTER
Patients HH calling [facility]-states she has a small hole in the Gtube-it is still working and she is using it.

## 2023-02-16 NOTE — ED NOTES
Right sided chest power injectable medi port accessed. Patient tolerated well. Blood work sent to the lab.       Kavita Guzman RN  02/16/23 [id number]

## 2023-02-17 LAB
ANION GAP SERPL CALC-SCNC: 13 MEQ/L (ref 8–16)
BUN SERPL-MCNC: 8 MG/DL (ref 7–22)
CALCIUM SERPL-MCNC: 9.1 MG/DL (ref 8.5–10.5)
CHLORIDE SERPL-SCNC: 103 MEQ/L (ref 98–111)
CO2 SERPL-SCNC: 24 MEQ/L (ref 23–33)
CREAT SERPL-MCNC: 0.6 MG/DL (ref 0.4–1.2)
GFR SERPL CREATININE-BSD FRML MDRD: > 60 ML/MIN/1.73M2
GLUCOSE BLD STRIP.AUTO-MCNC: 106 MG/DL (ref 70–108)
GLUCOSE BLD STRIP.AUTO-MCNC: 59 MG/DL (ref 70–108)
GLUCOSE BLD STRIP.AUTO-MCNC: 69 MG/DL (ref 70–108)
GLUCOSE BLD STRIP.AUTO-MCNC: 75 MG/DL (ref 70–108)
GLUCOSE BLD STRIP.AUTO-MCNC: 76 MG/DL (ref 70–108)
GLUCOSE BLD STRIP.AUTO-MCNC: 78 MG/DL (ref 70–108)
GLUCOSE BLD STRIP.AUTO-MCNC: 94 MG/DL (ref 70–108)
GLUCOSE SERPL-MCNC: 84 MG/DL (ref 70–108)
OSMOLALITY SERPL CALC.SUM OF ELEC: 276.9 MOSMOL/KG (ref 275–300)
POTASSIUM SERPL-SCNC: 3.9 MEQ/L (ref 3.5–5.2)
SODIUM SERPL-SCNC: 140 MEQ/L (ref 135–145)

## 2023-02-17 PROCEDURE — 80048 BASIC METABOLIC PNL TOTAL CA: CPT

## 2023-02-17 PROCEDURE — 6370000000 HC RX 637 (ALT 250 FOR IP): Performed by: STUDENT IN AN ORGANIZED HEALTH CARE EDUCATION/TRAINING PROGRAM

## 2023-02-17 PROCEDURE — 94640 AIRWAY INHALATION TREATMENT: CPT

## 2023-02-17 PROCEDURE — 99232 SBSQ HOSP IP/OBS MODERATE 35: CPT | Performed by: NURSE PRACTITIONER

## 2023-02-17 PROCEDURE — 96372 THER/PROPH/DIAG INJ SC/IM: CPT

## 2023-02-17 PROCEDURE — 6370000000 HC RX 637 (ALT 250 FOR IP): Performed by: NURSE PRACTITIONER

## 2023-02-17 PROCEDURE — 2580000003 HC RX 258: Performed by: STUDENT IN AN ORGANIZED HEALTH CARE EDUCATION/TRAINING PROGRAM

## 2023-02-17 PROCEDURE — G0378 HOSPITAL OBSERVATION PER HR: HCPCS

## 2023-02-17 PROCEDURE — 36415 COLL VENOUS BLD VENIPUNCTURE: CPT

## 2023-02-17 PROCEDURE — 6360000002 HC RX W HCPCS: Performed by: STUDENT IN AN ORGANIZED HEALTH CARE EDUCATION/TRAINING PROGRAM

## 2023-02-17 PROCEDURE — 96376 TX/PRO/DX INJ SAME DRUG ADON: CPT

## 2023-02-17 PROCEDURE — 82948 REAGENT STRIP/BLOOD GLUCOSE: CPT

## 2023-02-17 RX ORDER — MORPHINE SULFATE 2 MG/ML
1 INJECTION, SOLUTION INTRAMUSCULAR; INTRAVENOUS ONCE
Status: DISCONTINUED | OUTPATIENT
Start: 2023-02-17 | End: 2023-02-18 | Stop reason: HOSPADM

## 2023-02-17 RX ORDER — SUMATRIPTAN 6 MG/.5ML
6 INJECTION, SOLUTION SUBCUTANEOUS ONCE
Status: COMPLETED | OUTPATIENT
Start: 2023-02-17 | End: 2023-02-17

## 2023-02-17 RX ADMIN — PROMETHAZINE HYDROCHLORIDE 6.25 MG: 25 INJECTION INTRAMUSCULAR; INTRAVENOUS at 08:44

## 2023-02-17 RX ADMIN — HYDROMORPHONE HYDROCHLORIDE 0.5 MG: 1 INJECTION, SOLUTION INTRAMUSCULAR; INTRAVENOUS; SUBCUTANEOUS at 08:43

## 2023-02-17 RX ADMIN — HYDROMORPHONE HYDROCHLORIDE 0.5 MG: 1 INJECTION, SOLUTION INTRAMUSCULAR; INTRAVENOUS; SUBCUTANEOUS at 19:42

## 2023-02-17 RX ADMIN — FLUTICASONE PROPIONATE 2 PUFF: 110 AEROSOL, METERED RESPIRATORY (INHALATION) at 09:25

## 2023-02-17 RX ADMIN — DEXTROSE MONOHYDRATE 125 ML: 100 INJECTION, SOLUTION INTRAVENOUS at 12:43

## 2023-02-17 RX ADMIN — HYDROMORPHONE HYDROCHLORIDE 0.5 MG: 1 INJECTION, SOLUTION INTRAMUSCULAR; INTRAVENOUS; SUBCUTANEOUS at 22:46

## 2023-02-17 RX ADMIN — DEXTROSE MONOHYDRATE 125 ML: 100 INJECTION, SOLUTION INTRAVENOUS at 19:48

## 2023-02-17 RX ADMIN — SUMATRIPTAN 6 MG: 6 INJECTION, SOLUTION SUBCUTANEOUS at 12:53

## 2023-02-17 RX ADMIN — HYDROMORPHONE HYDROCHLORIDE 0.5 MG: 1 INJECTION, SOLUTION INTRAMUSCULAR; INTRAVENOUS; SUBCUTANEOUS at 00:25

## 2023-02-17 RX ADMIN — SODIUM CHLORIDE, PRESERVATIVE FREE 10 ML: 5 INJECTION INTRAVENOUS at 00:36

## 2023-02-17 RX ADMIN — SODIUM CHLORIDE, POTASSIUM CHLORIDE, SODIUM LACTATE AND CALCIUM CHLORIDE: 600; 310; 30; 20 INJECTION, SOLUTION INTRAVENOUS at 12:57

## 2023-02-17 RX ADMIN — SODIUM CHLORIDE, PRESERVATIVE FREE 10 ML: 5 INJECTION INTRAVENOUS at 19:48

## 2023-02-17 RX ADMIN — HYDROMORPHONE HYDROCHLORIDE 0.5 MG: 1 INJECTION, SOLUTION INTRAMUSCULAR; INTRAVENOUS; SUBCUTANEOUS at 15:27

## 2023-02-17 RX ADMIN — HYDROMORPHONE HYDROCHLORIDE 0.5 MG: 1 INJECTION, SOLUTION INTRAMUSCULAR; INTRAVENOUS; SUBCUTANEOUS at 03:39

## 2023-02-17 RX ADMIN — PROMETHAZINE HYDROCHLORIDE 6.25 MG: 25 INJECTION INTRAMUSCULAR; INTRAVENOUS at 23:50

## 2023-02-17 RX ADMIN — FLUTICASONE PROPIONATE 2 PUFF: 110 AEROSOL, METERED RESPIRATORY (INHALATION) at 16:26

## 2023-02-17 ASSESSMENT — PAIN DESCRIPTION - ORIENTATION
ORIENTATION: MID
ORIENTATION: MID
ORIENTATION: MID;UPPER
ORIENTATION: LEFT

## 2023-02-17 ASSESSMENT — PAIN DESCRIPTION - LOCATION
LOCATION: ABDOMEN

## 2023-02-17 ASSESSMENT — PAIN SCALES - GENERAL
PAINLEVEL_OUTOF10: 9
PAINLEVEL_OUTOF10: 8
PAINLEVEL_OUTOF10: 10
PAINLEVEL_OUTOF10: 10
PAINLEVEL_OUTOF10: 9
PAINLEVEL_OUTOF10: 10
PAINLEVEL_OUTOF10: 10
PAINLEVEL_OUTOF10: 9
PAINLEVEL_OUTOF10: 9

## 2023-02-17 ASSESSMENT — PAIN DESCRIPTION - DESCRIPTORS
DESCRIPTORS: TENDER
DESCRIPTORS: ACHING;PRESSURE
DESCRIPTORS: TENDER
DESCRIPTORS: PRESSURE;SHARP

## 2023-02-17 NOTE — RT PROTOCOL NOTE
RT Inhaler-Nebulizer Bronchodilator Protocol Note    There is a bronchodilator order in the chart from a provider indicating to follow the RT Bronchodilator Protocol and there is an Initiate RT Inhaler-Nebulizer Bronchodilator Protocol order as well (see protocol at bottom of note). CXR Findings:  No results found. The findings from the last RT Protocol Assessment were as follows:   History Pulmonary Disease: None or smoker <15 pack years  Respiratory Pattern:    Breath Sounds:    Cough: Strong, spontaneous, non-productive  Indication for Bronchodilator Therapy: On home bronchodilators  Bronchodilator Assessment Score:      Aerosolized bronchodilator medication orders have been revised according to the RT Inhaler-Nebulizer Bronchodilator Protocol below. Respiratory Therapist to perform RT Therapy Protocol Assessment initially then follow the protocol. Repeat RT Therapy Protocol Assessment PRN for score 0-3 or on second treatment, BID, and PRN for scores above 3. No Indications - adjust the frequency to every 6 hours PRN wheezing or bronchospasm, if no treatments needed after 48 hours then discontinue using Per Protocol order mode. If indication present, adjust the RT bronchodilator orders based on the Bronchodilator Assessment Score as indicated below. Use Inhaler orders unless patient has one or more of the following: on home nebulizer, not able to hold breath for 10 seconds, is not alert and oriented, cannot activate and use MDI correctly, or respiratory rate 25 breaths per minute or more, then use the equivalent nebulizer order(s) with same Frequency and PRN reasons based on the score. If a patient is on this medication at home then do not decrease Frequency below that used at home. 0-3 - enter or revise RT bronchodilator order(s) to equivalent RT Bronchodilator order with Frequency of every 4 hours PRN for wheezing or increased work of breathing using Per Protocol order mode.         4-6 - enter or revise RT Bronchodilator order(s) to two equivalent RT bronchodilator orders with one order with BID Frequency and one order with Frequency of every 4 hours PRN wheezing or increased work of breathing using Per Protocol order mode. 7-10 - enter or revise RT Bronchodilator order(s) to two equivalent RT bronchodilator orders with one order with TID Frequency and one order with Frequency of every 4 hours PRN wheezing or increased work of breathing using Per Protocol order mode. 11-13 - enter or revise RT Bronchodilator order(s) to one equivalent RT bronchodilator order with QID Frequency and an Albuterol order with Frequency of every 4 hours PRN wheezing or increased work of breathing using Per Protocol order mode. Greater than 13 - enter or revise RT Bronchodilator order(s) to one equivalent RT bronchodilator order with every 4 hours Frequency and an Albuterol order with Frequency of every 2 hours PRN wheezing or increased work of breathing using Per Protocol order mode. RT to enter RT Home Evaluation for COPD & MDI Assessment order using Per Protocol order mode.     Electronically signed by Lara Stephens RCP on 2/17/2023 at 9:29 AM

## 2023-02-17 NOTE — ED NOTES
Patient assisted to restroom and returned to bed at this time without complication. Patient resting in bed. Respirations easy and unlabored. No distress noted. Call light within reach.       Darren Ortega RN  02/16/23 2006

## 2023-02-17 NOTE — CARE COORDINATION
02/17/23 1306   Readmission Assessment   Number of Days since last admission? 8-30 days   Previous Disposition Home with Home Health   Who is being Interviewed Patient   What was the patient's/caregiver's perception as to why they think they needed to return back to the hospital?   (hole in PEG tube)   Did you visit your Primary Care Physician after you left the hospital, before you returned this time? Yes   Did you see a specialist, such as Cardiac, Pulmonary, Orthopedic Physician, etc. after you left the hospital? Yes   Who advised the patient to return to the hospital? Self-referral   Does the patient report anything that got in the way of taking their medications? Yes   What reasons did they give? Other (Comment)  (Hole in PEG tube.)   In our efforts to provide the best possible care to you and others like you, can you think of anything that we could have done to help you after you left the hospital the first time, so that you might not have needed to return so soon? Other (Comment)  Theodore Lafleur was ready for discharge.  She plans to return home and resume the services she has in place.)

## 2023-02-17 NOTE — PLAN OF CARE
Problem: Pain  Goal: Verbalizes/displays adequate comfort level or baseline comfort level  Outcome: Progressing  Flowsheets (Taken 2/17/2023 1827)  Verbalizes/displays adequate comfort level or baseline comfort level:   Encourage patient to monitor pain and request assistance   Assess pain using appropriate pain scale   Administer analgesics based on type and severity of pain and evaluate response   Implement non-pharmacological measures as appropriate and evaluate response     Problem: Skin/Tissue Integrity  Goal: Absence of new skin breakdown  Description: 1. Monitor for areas of redness and/or skin breakdown  2. Assess vascular access sites hourly  3. Every 4-6 hours minimum:  Change oxygen saturation probe site  4. Every 4-6 hours:  If on nasal continuous positive airway pressure, respiratory therapy assess nares and determine need for appliance change or resting period.   Outcome: Progressing     Problem: Safety - Adult  Goal: Free from fall injury  Outcome: Progressing  Flowsheets (Taken 2/17/2023 1827)  Free From Fall Injury: Instruct family/caregiver on patient safety     Problem: Risk for Elopement  Goal: Patient will not exit the unit/facility without proper excort  Outcome: Progressing  Flowsheets (Taken 2/17/2023 1827)  Nursing Interventions for Elopement Risk:   Assist with personal care needs such as toileting, eating, dressing, as needed to reduce the risk of wandering   Make sure patient has all necessary personal care items     Problem: Chronic Conditions and Co-morbidities  Goal: Patient's chronic conditions and co-morbidity symptoms are monitored and maintained or improved  Outcome: Progressing  Flowsheets (Taken 2/17/2023 1827)  Care Plan - Patient's Chronic Conditions and Co-Morbidity Symptoms are Monitored and Maintained or Improved: Monitor and assess patient's chronic conditions and comorbid symptoms for stability, deterioration, or improvement     Problem: Respiratory - Adult  Goal: Achieves optimal ventilation and oxygenation  2/17/2023 1827 by Александр Moya RN  Outcome: Progressing  Flowsheets (Taken 2/17/2023 1827)  Achieves optimal ventilation and oxygenation:   Assess for changes in respiratory status   Assess for changes in mentation and behavior    Care plan reviewed with patient. Patient verbalize understanding of the plan of care and contribute to goal setting.

## 2023-02-17 NOTE — ED PROVIDER NOTES
[address]    Pt Name: Eduardo Claudio  MRN: [id number]  [place] 1990  Date of evaluation: 9/12/20      I personally saw and examined the patient. I have reviewed and agree with the Resident findings, including all diagnostic interpretations and treatment plans as written. I was present for the key portion of any procedures performed and the inclusive time noted in any critical care statement. History: This patient was seen with Titi Veras, resident physician. This is a 43-year-old female that had a fall while walking and apparently pulled on the G-tube. Since that time it has been unable to be used. She states that it sprays everywhere. She is dependent on the G-tube for feeding. We obtained a CT scan of the abdomen that does show that the G-tube terminates within the stomach as expected. However it is likely cracked based on the clinical presentation. My resident has discussed the case with GI and they are planning on replacing it. Patient is being admitted.                 DO Snow Bullard DO  02/16/23 1904

## 2023-02-17 NOTE — CARE COORDINATION
Case Management Assessment  Initial Evaluation    Date/Time of Evaluation: 2/17/2023 12:59 PM  Assessment Completed by: Kait Dwyer RN    If patient is discharged prior to next notation, then this note serves as note for discharge by case management. Patient Name: Sara Fairchild                   YOB: 1990  Diagnosis: Complication of gastrostomy tube [facility]) [K94.20]                   Date / Time: 2/16/2023 12:34 PM  Location: [address] [place] [address]     Patient Admission Status: Observation   Readmission Risk Low 0-14, Mod 15-19), High > 20: Readmission Risk Score: 20.7    Current PCP: AURELIO Ugalde CNP  PCP verified by CM? Yes    Chart Reviewed: Yes      History Provided by: Patient  Patient Orientation: Alert and Oriented, Person, Place, Situation, Self    Patient Cognition: Alert    Hospitalization in the last 30 days (Readmission):  Yes    If yes, Readmission Assessment in  Navigator will be completed. Advance Directives:      Code Status: Full Code   Patient's Primary Decision Maker is: Legal Next of Kin      Discharge Planning:    Patient lives with: Spouse/Significant Other Type of Home: Apartment  Primary Care Giver: Self  Patient Support Systems include: Spouse/Significant Other, Family Members   Current Financial resources: Medicaid, Medicare  Current community resources: ECF/Home Care  Current services prior to admission: Home Care, [facility] ([place] [id number] and [facility] ([facility] [place] for enteral feedings.)            Current DME:              Type of Home Care services:  Nursing Services    ADLS  Prior functional level: Independent in ADLs/IADLs  Current functional level: Independent in ADLs/IADLs    Family can provide assistance at DC: Yes  Would you like Case Management to discuss the discharge plan with any other family members/significant others, and if so, who?  No  Plans to Return to Present Housing: Yes  Other Identified Issues/Barriers to RETURNING to current housing: None  Potential Assistance needed at discharge: Home Care            Potential DME:    Patient expects to discharge to: [address] [place] [address] for transportation at discharge: Other (see comment) (Lyft)    Financial    Payor: Kelle Ryan / Plan: Aniket Vitale / Product Type: *No Product type* /     Does insurance require precert for SNF: Yes    Potential assistance Purchasing Medications: No  Meds-to-Beds request:        [address] [place] [address] [id number] [place], [place] [id number] [facility] [phone] [facility] [phone]  [address] [place] [id number]  Phone: [phone] Fax: [phone]    [address] [place], [place] [address] [place] [address]  Phone: [phone] Fax: [phone] [facility] [address]  [address] [place] [address] [phone] Jeneane Laws [phone]  [facility]  [address] [place] [address]  Phone: [phone] Fax: [phone]      Notes:    Factors facilitating achievement of predicted outcomes: Family support, Caregiver support, Cooperative, Pleasant, and Has needed Durable Medical Equipment at home    Barriers to discharge: Pain    Additional Case Management Notes: Patient presents for evaluation of leaking gastrostomy tube. GI consult for gastrostomy tube replacement. IV fluids, DM management, prn Tylenol, Dilaudid, and Phenergan, NPO, incentive spirometry, SCD's, up with assistance. CT of abdomen:  1. No solid organ injury. No fracture. 2. The percutaneous gastrostomy tube terminates within the stomach unchanged in position from the prior study. No bowel obstruction, free fluid, fluid collection, or free air is identified. The left rectus musculature is thickened at the gastrostomy tube    insertion site however no abdominal wall fluid collection or subcutaneous fat stranding is observed. 3. Chronic findings are discussed.      Procedure: N/A    The Plan for Transition of Care is related to the following treatment goals of Complication of gastrostomy tube [facility]) [K94.20]    Patient Goals/Plan/Treatment Preferences: Met with Carolyn Holland. She resides at home with her wife. She is current with [facility] for enteral feedings and supplies and [facility] for skilled nursing. Insurance and PCP verified. Carolyn Holland is able to afford her medications and has the DME needed. She uses [employer] for transportation. Carolyn Holland will return home at discharge and resume the services she has in place. Transportation/Food Security/Housekeeping Addressed: No issues identified.      Rich Lindo RN  Case Management Department

## 2023-02-17 NOTE — PLAN OF CARE
Problem: Respiratory - Adult  Goal: Achieves optimal ventilation and oxygenation  Outcome: Progressing   Continue therapy as ordered to maintain aeration.

## 2023-02-17 NOTE — H&P
History & Physical       Patient: Beatriz Duran  YOB: 1990    MRN: [id number]     Acct: [de-identified]    PCP: AURELIO Lazaro CNP    Date of Admission: 2/16/2023    Date of Service: Patient seen / examined on 02/16/23 and admitted to Observation with expected LOS greater than two midnights due to medical therapy. ASSESSMENT / PLAN:    PEG Tube Leakage  Secondary to trauma. Leak observed with flush. PEG tube unable to be replaced in ED (d/t recent placement & lack of maturity). Patient has a mediport that can be accessed only inpatient and therefore will need to be admitted for PEG tube replacement in endoscopy suite. GI consulted by ED provider. Keep NPO in anticipation of replacement tomorrow. Hx of HTN  Amlodipine, propanolol resumed with hold parameters. Type 2 DM  Home metformin held. Low-dose corrective algorithm. Hypoglycemic protocol. POC glucose. Hx of Asthma  Bronchodilators as needed. Pulmonary hygiene - incentive spirometry. Hiatal Hernia  PPI resumed. Patient has consultation with general surgery at [facility] scheduled to address the hiatal hernia. Anxiety / Depression  Amitriptyline, BuSpar, Lexapro resumed. Hx of Migraine  Topiramate resumed    Chief Complaint:  Feeding tube leak    History of Present Illness:  28 y.o. female with an hx of HTN, DM 2, Asthma, sickle cell trait, migraine, anxiety and depression who presented to [address] [place] [address] for evaluation of feeding tube leakage. Patient reports walking home from her doctor's appointment yesterday. She felt lightheaded and fell on her way home. She sustained trauma to her PEG tube. Overnight, she noticed leakage around her feeding tube site and came in today for evaluation. Patient had her PEG tube originally placed on 01/13/2023 by Dr. Shelia Salazar. Patient's G-tube became dislodged and was replaced on 01/25/2023 by Dr. Malvin Loyola.   Patient PEG tube was placed due to hiatal hernia, leukocytosis, nausea/vomiting, dehydration chronic epigastric pain. Patient denies chest pain, shortness of breath, palpitations, lightheadedness, dizziness, syncope or LOC. In the ED, CT scan of the abdomen does show the G-tube terminates within the stomach unchanged in position from prior study. However, after attempting to flush it was determined that there was leakage from the site. GI was contacted by ED provided who advised admission with plans for PEG tube replacement. Hospitalist was contacted for admission. Please refer to A&P for further information.       Past Medical History:    Past Medical History:   Diagnosis Date    Acute on chronic diastolic congestive heart failure ([address] 6/25/2022    JANI (acute kidney injury) ([facility] 7/7/2019    Anemia     Anesthesia     migraines    Anxiety     Asthma     CAD (coronary artery disease)     Depression     Diabetes ([facility]     Diet-controlled type 2 diabetes mellitus ([facility] 2016    Epilepsy ([facility]     Fibroids     Gastro - esophageal reflux disease     Hypertension     Hypertrophy of tonsil and adenoid 11/4/2017    Malignant carcinoid tumor of other sites ([facility] 5/14/2013    Migraine     PONV (postoperative nausea and vomiting)     Prolonged emergence from general anesthesia     Sickle cell anemia (HCC)     Sickle cell trait (HCC)     PT STATES SHE HAS THE TRAIT NOT THE DISEASE    Tumor associated pain     neuroendrocrine tumor, gastroma     Past Surgical History:    Past Surgical History:   Procedure Laterality Date    ABDOMEN SURGERY  03/23/2017    Laparoscopic , Bi lat oopherectomy Dr Denise Duval      x2    BREAST REDUCTION SURGERY      CENTRAL VENOUS CATHETER Right 12/11/2015    right subclavian single lumen mediport insertion--Dr. Michel    CHOLECYSTECTOMY, LAPAROSCOPIC N/A 7/11/2019    CHOLECYSTECTOMY LAPAROSCOPIC performed by Sarkis Wilcox MD at [address] [place] [address] N/A 8/24/2020    COLONOSCOPY POLYPECTOMY SNARE/COLD BIOPSY performed by Latesha Raphael MD at [address] [facility] OF UTERUS  10/21/2013    Dilation and curettage, Diagnostic Hysteroscopy and laparoscopy (Dr. Briaan Shukla, [facility])    EGD  2019    EGD COLONOSCOPY N/A 1/8/2019    EGD DIL performed by Eladio Serrano MD at [facility]    EGD COLONOSCOPY Left 5/14/2019    EGD DILATATION performed by Eladio Serrano MD at [facility] Endoscopy    EGD COLONOSCOPY Left 8/27/2019    EGD DILATATION performed by Eladio Serrano MD at [facility] Endoscopy    ENDOSCOPY, COLON, DIAGNOSTIC      GASTRIC FUNDOPLICATION      OSU    GASTRIC [address] N/A 1/25/2023    EGD PEG TUBE PLACEMENT performed by Latesha Raphael MD at [address]  2010, 2016    [place] , [address] [address] N/A 1/17/2023    Robotic Exploratory Paparoscopy with Extensive Lysis of Adhesions G Tube Insertion performed by Imani Martinez MD at . [facility] [address])  04/2016    INSERTION / REMOVAL / REPLACEMENT VENOUS ACCESS CATHETER N/A 3/8/2019    REMOVAL OF LEFT PORT AND PLACEMENT OF SINGLE LUMEN MEDIPORT RIGHT SIDE performed by Merary Hill MD [facility] [address] [facility] N/A 10/8/2019    ROBOTIC DIAGNOSTIC LAPAROSCOPY,  RECURRENT HIATAL HERNIA REPAIR, REVISION FUNDOPLICATION [facility] N/A 7/15/2022    ROBOTIC LAPAROSCOPY, LYSIS OF ADHESIONS performed by Merary Hill MD at [address] [facility]  08/12/2016    Right Port Removal, Placement of new Port Left by Dr Manish Winn  12/02/2019    Mediport insertion-IR Dr Glory Grimaldo (CERVIX NOT REMOVED)  4/8/16    PORT SURGERY N/A 11/11/2019    REMOVAL OF RIGHT MEDIPORT & ATTEMPTED PLACEMENT OF LEFT SINGLE LUMEN MEDIPORT performed by Merary Hill MD at [address] [facility] Right 11/29/2019    RT INTERNAL JUGULAR SINGLE LUMEN MEDIPORT INSERTION performed by Jesusita Otero MD at [address] <30 MM DIAM Left 8/8/2017    EGD/DIL performed by Taqueria Espino MD at [facility] Endoscopy    [address] <30 MM DIAM N/A 9/26/2017    EGD DIL performed by Taqueria Espino MD at [facility] Endoscopy    PA  [address] <30 MM DIAM Left 12/12/2017    EGD DIL performed by Taqueria Espino MD at [facility] Endoscopy    [address] <30 MM DIAM N/A 2/13/2018    EGD  DILATATION performed by Taqueria Espino MD at [address] [facility] [address] [facility] [address] VAD W/SUBQ PORT UNDER 5 YR Bilateral 1/5/2018    EXCISION OF MEDIPORT LEFT SIDE, INSERTION MEDIPORT RIGHT  IJ performed by Jesusita Otero MD at [address] OFFICE/OUTPT [address] [facility] N/A 5/15/2018    EGD DILATATION performed by Taqueria Espino MD at [address] [facility] OFFICE/OUTPT VISIT,PROCEDURE ONLY Bilateral 9/10/2018    REMOVAL RT MEDIPORT, INSERTION LEFT performed by Jesusita Otero MD at [address] OFFICE/OUTPT [address] [facility] [address] N/A 11/16/2018    MEDIPORT REPOSITIONING performed by Jesusita Otero MD at [address] [facility] N/A 11/6/2017    TONSILLECTOMY AND ADENOIDECTOMY performed by Leigh Viera MD at [facility]      neoendocrine    TUNNELED VENOUS PORT PLACEMENT      UPPER GASTROINTESTINAL ENDOSCOPY Left 4/3/2018    EGD DILATION SAVORY performed by Taqueria Espino MD at [address] Left 6/26/2018    EGD DILATION SAVORY performed by [facility] MD at [facility] Left 2/26/2019    EGD DILATION SAVORY performed by Taqueria Espino MD at [facility] N/A 7/9/2019    EGD DILATION SAVORY performed by Taqueria Espino MD at [address] [facility] ENDOSCOPY Left 9/9/2019    EGD BIOPSY performed by Wendy Perez MD at [facility] [address] 4/1/2020    EGD DILATION BALLOON performed by Nikki Berry MD at . [address] Left 8/24/2020    EGD ESOPHAGOGASTRODUODENOSCOPY DILATATION performed by Nikki Berry MD at [facility] [address] Left 8/24/2020    EGD BIOPSY performed by Nikki Berry MD at [facility] [address] N/A 12/2/2020    EGD DILATION BALLOON performed by Nikki Berry MD at [facility] [address] [facility] 12/2/2020    EGD BIOPSY performed by Nikki Berry MD at [facility] [address] Left 1/19/2022    EGD performed by Keanu Lu MD at [facility] [address] [facility] 1/19/2022    EGD BIOPSY performed by Keanu Lu MD at [place] [address] 7/15/2022    EGD [address] performed by Keanu Lu MD at . [address] 1/24/2023    EGD ESOPHAGOGASTRODUODENOSCOPY performed by Nikki Berry MD at [place] [address] N/A 8/22/2022    [address] performed by Keanu Lu MD at [facility]        Medications Prior to Admission:   No current facility-administered medications on file prior to encounter.      Current Outpatient Medications on File Prior to Encounter   Medication Sig Dispense Refill    propranolol (INDERAL LA) 60 MG extended release capsule Take 60 mg by mouth daily      famotidine (PEPCID) 40 MG tablet Take 1 tablet by mouth nightly 30 tablet 3    Incontinence Supply Disposable (DEPEND SILHOUETTE BRIEFS L/XL) MISC Use as needed for urinary and bowel incontinence 5 each 5    docusate sodium (COLACE) 100 MG capsule Take 1 capsule by mouth 2 times daily as needed for Constipation 30 capsule 2 erythromycin (EES) 400 MG/5ML suspension 5 mLs by Per G Tube route 3 times daily (with meals) (Patient not taking: Reported on 2/16/2023) 450 mL 3    ALBUTEROL SULFATE HFA IN Inhale 1 puff into the lungs every 4-6 hours as needed      topiramate (TOPAMAX) 100 MG tablet Take 100 mg by mouth 2 times daily      metFORMIN HCl 500 MG/5ML SOLN 1,000 mg daily (with breakfast) Take 10 mL via peg tube once daily      linaclotide (LINZESS) 145 MCG capsule Take 145 mcg by mouth every morning (before breakfast)      baclofen (LIORESAL) 10 MG tablet Take 10 mg by mouth daily      promethazine (PHENERGAN) 25 MG tablet Take 25 mg by mouth every 4-6 hours as needed for Nausea      prazosin (MINIPRESS) 1 MG capsule Take 1 mg by mouth nightly      ARIPiprazole (ABILIFY) 1 MG/ML SOLN solution Take 10 mg by mouth nightly 10 mL via peg tube every evening      busPIRone (BUSPAR) 5 MG tablet Take 5 mg by mouth 3 times daily      furosemide (LASIX) 20 MG tablet TAKE 1 TABLET BY MOUTH ONCE A DAY 30 tablet 0    pantoprazole (PROTONIX) 40 MG tablet Take 40 mg by mouth daily      bisacodyl (DULCOLAX) 5 MG EC tablet See Prep Instructions (Patient not taking: Reported on 2/16/2023) 4 tablet 0    polyethylene glycol (GLYCOLAX) 17 GM/SCOOP powder Dispense 238 Gram Bottle.   Use as Directed 238 g 0    clopidogrel (PLAVIX) 75 MG tablet Take 1 tablet by mouth daily (Patient not taking: Reported on 2/16/2023) 30 tablet 1    acetaminophen (TYLENOL) 325 MG tablet Take 2 tablets by mouth 4 times daily as needed for Pain or Fever 60 tablet 0    fluticasone (FLOVENT HFA) 110 MCG/ACT inhaler 2 puffs 2 times daily      amitriptyline (ELAVIL) 100 MG tablet Take 100 mg by mouth nightly      montelukast (SINGULAIR) 10 MG tablet Take 10 mg by mouth nightly      amLODIPine (NORVASC) 5 MG tablet Take 1 tablet by mouth daily 30 tablet 3    escitalopram (LEXAPRO) 5 MG/5ML solution 20 mg daily Take 20 mL via peg tube once daily      albuterol (PROVENTIL) (2.5 MG/3ML) 0.083% nebulizer solution Take 3 mLs by nebulization every 6 hours as needed for Wheezing 90 each 0    glucose monitoring kit (FREESTYLE) monitoring kit 1 kit by Does not apply route daily Or whichever system insurance will pay for 1 kit 0    Spacer/Aero-Holding Chambers (E-Z SPACER) AISLINN 1 Device by Does not apply route daily 1 Device 0     Allergies:   Latex, Ketorolac tromethamine, Pcn [penicillins], Percocet [oxycodone-acetaminophen], Amoxicillin, Ciprofloxacin, Compazine [prochlorperazine maleate], Dicyclomine hcl, Fentanyl, Fioricet [butalbital-apap-caffeine], Flexeril [cyclobenzaprine], Gabapentin, Ibuprofen [ibuprofen], Keflex [cephalexin], Lisinopril, Lorazepam, Losartan, Macrobid [nitrofurantoin monohyd macro], Morphine, Mushroom extract complex, Reglan [metoclopramide], Vicodin [hydrocodone-acetaminophen], Vistaril [hydroxyzine hcl], Zofran [ondansetron], and Adhesive tape    Social History:   Social History     Socioeconomic History    Marital status:      Spouse name: Candi Pastor    Number of children: 0    Years of education: 12    Highest education level: Not on file   Occupational History    Not on file   Tobacco Use    Smoking status: Every Day     Packs/day: 0.25     Years: 6.00     Pack years: 1.50     Types: Cigarettes     Start date: 3/1/2016    Smokeless tobacco: Never    Tobacco comments:     smokes 3 cig perday    Vaping Use    Vaping Use: Never used   Substance and Sexual Activity    Alcohol use: Not Currently    Drug use: No    Sexual activity: Yes     Partners: Female   Other Topics Concern    Not on file   Social History Narrative    Not on file     Social Determinants of Health     Financial Resource Strain: Not on file   Food Insecurity: Not on file   Transportation Needs: Not on file   Physical Activity: Not on file   Stress: Not on file   Social Connections: Not on file   Intimate Partner Violence: Not on file   Housing Stability: Not on file     Family History:    Family History   Problem Relation Age of Onset    Cancer Mother     High Blood Pressure Mother     Heart Disease Mother         MI    Liver Cancer Mother     High Blood Pressure Father     Heart Disease Father     Heart Disease Sister     Diabetes Maternal Grandmother     Depression Maternal Grandmother     Heart Disease Maternal Grandmother         CHF    Heart Attack Paternal Uncle     Stroke Neg Hx     Colon Cancer Neg Hx     Esophageal Cancer Neg Hx     Rectal Cancer Neg Hx     Stomach Cancer Neg Hx      REVIEW OF SYSTEMS:  A 14-point ROS was obtained and negative, with the exception of pertinent positives as listed below:    Positive for abdominal pain and nausea. PHYSICAL EXAM:  Vitals:    02/16/23 1644 02/16/23 1758 02/16/23 1902 02/16/23 2005   BP: (!) 144/92 (!) 150/92 (!) 160/103 (!) 152/98   Pulse: 85 89 82 84   Resp: 16 17 17 17   Temp:       TempSrc:       SpO2: 99% 100% 100% 99%   Weight:         General appearance: Alert / well-appearing. Cooperative. NAD. HEENT:  Normocephalic / atraumatic. PERRL. EOM intact. Conjunctivae appear normal.  Neck: Supple. No JVD. Respiratory: Normal respiratory effort on RA. CTAB. No wheezes / rales / rhonchi. Cardiovascular: RRR. Normal S1/S2. No murmurs / rubs / gallops. Abdomen: Soft / non-tender / non-distended. BS present. G tube present. Musculoskeletal: No cyanosis or edema. Skin: Warm / dry. Normal turgor. Neurologic: A/O x 3. Speech normal. Answers questions appropriately. CN intact. No obvious focal neurologic deficits. Psychiatric: Thought content / judgment / insight appear appropriate. Capillary refill: Brisk bilaterally. Peripheral pulses: +2 bilaterally.     Labs:   Results for orders placed or performed during the hospital encounter of 02/16/23   CBC with Auto Differential   Result Value Ref Range    WBC 9.3 4.8 - 10.8 thou/mm3    RBC 4.80 4.20 - 5.40 mill/mm3    Hemoglobin 11.1 (L) 12.0 - 16.0 gm/dl    Hematocrit 35.9 (L) 37.0 - 47.0 %    MCV 74.8 (L) 81.0 - 99.0 fL    MCH 23.1 (L) 26.0 - 33.0 pg    MCHC 30.9 (L) 32.2 - 35.5 gm/dl    RDW-CV 16.0 (H) 11.5 - 14.5 %    RDW-SD 42.8 35.0 - 45.0 fL    Platelets 212 630 - 623 thou/mm3    MPV 10.2 9.4 - 12.4 fL    Seg Neutrophils 66.2 %    Lymphocytes 26.8 %    Monocytes 4.8 %    Eosinophils 1.5 %    Basophils 0.4 %    Immature Granulocytes 0.3 %    Segs Absolute 6.2 1.8 - 7.7 thou/mm3    Lymphocytes Absolute 2.5 1.0 - 4.8 thou/mm3    Monocytes Absolute 0.4 0.4 - 1.3 thou/mm3    Eosinophils Absolute 0.1 0.0 - 0.4 thou/mm3    Basophils Absolute 0.0 0.0 - 0.1 thou/mm3    Immature Grans (Abs) 0.03 0.00 - 0.07 thou/mm3    nRBC 0 /100 wbc   Hepatic Function Panel   Result Value Ref Range    Albumin 4.0 3.5 - 5.1 g/dL    Total Bilirubin 0.3 0.3 - 1.2 mg/dL    Bilirubin, Direct <0.2 0.0 - 0.3 mg/dL    Alkaline Phosphatase 108 38 - 126 U/L    AST 15 5 - 40 U/L    ALT 14 11 - 66 U/L    Total Protein 7.9 6.1 - 8.0 g/dL   Basic Metabolic Panel w/ Reflex to MG   Result Value Ref Range    Sodium 140 135 - 145 meq/L    Potassium reflex Magnesium 3.6 3.5 - 5.2 meq/L    Chloride 104 98 - 111 meq/L    CO2 24 23 - 33 meq/L    Glucose 89 70 - 108 mg/dL    BUN 9 7 - 22 mg/dL    Creatinine 0.6 0.4 - 1.2 mg/dL    Calcium 9.5 8.5 - 10.5 mg/dL   Lipase   Result Value Ref Range    Lipase 18.0 5.6 - 51.3 U/L   Anion Gap   Result Value Ref Range    Anion Gap 12.0 8.0 - 16.0 meq/L   Glomerular Filtration Rate, Estimated   Result Value Ref Range    Est, Glom Filt Rate >60 >60 ml/min/1.73m2   Osmolality   Result Value Ref Range    Osmolality Calc 277.6 275.0 - 300.0 mOsmol/kg       Radiology:     Result Date: 2/16/2023  PROCEDURE: CT ABDOMEN PELVIS W IV CONTRAST CLINICAL INFORMATION: Fall. Abdominal pain. Pain at G-tube site. COMPARISON: CT abdomen and pelvis 2/12/2023.  TECHNIQUE: Axial 5 mm CT images were obtained through the abdomen and pelvis after the administration of 80  cc Isovue 370 intravenous contrast. Coronal and sagittal reconstructions were obtained. All CT scans at this facility use dose modulation, iterative reconstruction, and/or weight-based dosing when appropriate to reduce radiation dose to as low as reasonably achievable. FINDINGS: Lung bases: Unremarkable. Liver/gallbladder/bilary tree: The gallbladder is surgically absent. No biliary ductal dilatation is identified. Hepatic steatosis is unchanged. No liver lesions are observed. Pancreas: Normal. Spleen : Normal. Adrenal glands: Normal. Kidneys/ ureters/ bladder: No renal calculus, hydronephrosis, or hydroureter is present. The urinary bladder is partially distended and grossly unremarkable. Gastrointestinal:  The G-tube terminates within the stomach (series 301, image 25). No bowel obstruction, free fluid, fluid collection, or free air is visualized. The rectus abdominal musculature surrounding the exiting portion of the catheter is thickened. No intramuscular fluid collection or abdominal wall fluid collection is observed. Retroperitoneum / lymph nodes: The aorta is not dilated. No lymphadenopathy is present. Pelvis: The uterus appears surgically absent. No adnexal lesions are observed. Musculoskeletal: The visualized skeletal structures appear intact. Abdominal wall related to subcutaneous injections is observed. 1. No solid organ injury. No fracture. 2. The percutaneous gastrostomy tube terminates within the stomach unchanged in position from the prior study. No bowel obstruction, free fluid, fluid collection, or free air is identified. The left rectus musculature is thickened at the gastrostomy tube  insertion site however no abdominal wall fluid collection or subcutaneous fat stranding is observed. 3. Chronic findings are discussed. **This report has been created using voice recognition software. It may contain minor errors which are inherent in voice recognition technology. ** Final report electronically signed by Dr Katalina Garg on 2/16/2023 4:50 PM      FEN/GI/DVT:  IVF: LR @ 75 mL/hr  Electrolytes: Monitor and replace per protocols  Diet:  NPO  GI PPX: On PPI for GERD  DVT Prophylaxis: SCDs    CODE STATUS:  Full    Thank you AURELIO Flaherty CNP for the opportunity to be involved in this patient's care.     Electronically signed by Savannah Morris MD on 2/16/2023 at 8:44 PM

## 2023-02-17 NOTE — ED NOTES
ED to inpatient nurses report    Chief Complaint   Patient presents with    G Tube Complications      Present to ED from home  LOC: alert and orientated to name, place, date  Vital signs   Vitals:    02/16/23 1407 02/16/23 1644 02/16/23 1758 02/16/23 1902   BP:  (!) 144/92 (!) 150/92 (!) 160/103   Pulse: 89 85 89 82   Resp: 18 16 17 17   Temp:       TempSrc:       SpO2: 100% 99% 100% 100%   Weight:          Oxygen Baseline room air WNL    Current needs required room air WNL Bipap/Cpap No  LDAs:    Mobility: Independent  Pending ED orders: none at this time  Present condition: patient resting in cot with call light in reach.        C-SSRS Risk of Suicide: No Risk  Swallow Screening    Preferred Language: English     Electronically signed by Deshaun Mayberry RN on 2/16/2023 at 7:27 PM     Ventura Guzman RN  02/16/23 1930

## 2023-02-17 NOTE — PROGRESS NOTES
Comprehensive Nutrition Assessment    Type and Reason for Visit:  Initial, Positive Nutrition Screen (weight loss, home TF)    Nutrition Recommendations/Plan:   When able to resume TF - recommend substitute Vital 1.2 at 60 ml/hr (for home regimen as [facility] does not carry Vital 1.5). Recommend 180 ml free water flush three times/day (if no IVF). Recommend folllow up with OP RD at discharge (pt. Followed pta). Will monitor need for additional nutrition interventions. Malnutrition Assessment:  Malnutrition Status: At risk for malnutrition (Comment) (02/17/23 1908)    Context:  Acute Illness     Findings of the 6 clinical characteristics of malnutrition:  Energy Intake:   (receives TF for nutrition support)  Weight Loss:  No significant weight loss     Body Fat Loss:  No significant body fat loss     Muscle Mass Loss:  No significant muscle mass loss    Fluid Accumulation:  Unable to assess     Strength:  Not Performed    Nutrition Assessment:     Pt. nutritionally compromised AEB NPO status, TF on hold for PEG tube replacement. At risk for further nutrition compromise r/t admit with PEG tube leakage (PEG tube was placed due to hiatal hernia, leukocytosis, nausea/vomiting, dehydration chronic epigastric pain) and underlying medical condition (hx CHF, CAD, depression, DM, epilepsy, GERD, malignant carcinoid tumor of other sites). Nutrition Related Findings:      Wound Type: Surgical Incision (abdominal incision - PEG tube; replaced 1/25; plan replacement today)     Pt. Report/Treatments/Miscellaneous: pt. Familiar to RDs from recent admit; pt. Also followed by OP RD for home TF management; many GI issues; followed by GI- plan gastric emptying study 2/23 -suspect gastroparesis; OP RD chart notes reviewed- state pt.  Told them GI CNP wanted her to start fiber modular (nothing documented in GI note); concerned w/ additional fiber if possibility of gastroparesis - recommend hold additional fiber until gastric emptying study resulted; pt. Reports overall tolerating TF pta; states had some nausea but has gotten better; was eating a little chicken noodle soup pta (as instructed by GI)  GI Status: reports regular BMs pta  Pertinent Labs: 2/17: Glucose 84, Sodium 140, BUN 8, Cr 0.6  Pertinent Meds: Lexapro, Phenergan, Linzess, Colace, Glycolax      Current Nutrition Intake & Therapies:    Average Meal Intake: NPO     Diet NPO  Current Tube Feeding (TF) Orders:  Feeding Route: PEG  Formula: Peptide Based  Schedule: Continuous  Feeding Regimen: Vital 1.5 at 50 ml/hr; substitute Vital 1.2 at 60 ml/hr  Additives/Modulars: None  Water Flushes: 240 ml free water flush four times/day pta; recommend 180 ml TID  Current TF & Flush Orders Provides: Vital 1.5 at 50 ml/hr w/ 240 ml free water flush provides pt. with 1778 kcals, 80 grams protein, 222 gm CHO, 7 gm fiber, 1865 ml free water (905 TF, 960 flushes) in 2145 ml volume (1185 TF, 960 flushes)/24 hours  Goal TF & Flush Orders Provides: Vital 1.2 at 60 ml/hr providing pt. with 1728 kcals, 108 gm protein, 159 gm CHO, 7 gm fiber, 1708 ml free water (1168 TF, 540 flushes) in 1980 ml volume (1440 TF, 540 flushes)/24 hours    Anthropometric Measures:  Height: 5' 4\" (162.6 cm)  Ideal Body Weight (IBW): 120 lbs (55 kg)    Admission Body Weight: 283 lb (128.4 kg) (2/16 ? stated weight; no edema)  Current Body Weight: 283 lb (128.4 kg) (2/16 ? stated weight; no edema),   IBW.     Current BMI (kg/m2): 48.6  Usual Body Weight:  (per EMR: 12/2/20: 294# 3 oz, 3/2/22: 301# 14 oz, 8/22/22: 293# 13 oz)                       BMI Categories: Obese Class 3 (BMI 40.0 or greater)    Estimated Daily Nutrient Needs:  Energy Requirements Based On: Kcal/kg  Weight Used for Energy Requirements: Other (Comment) (128)  Energy (kcal/day): 9614-7461 kcals (12-15)  Weight Used for Protein Requirements: Ideal (55)  Protein (g/day):  grams (1.3-2)  Method Used for Fluid Requirements: Other (Comment)  Fluid (ml/day): 2000 ml or less/day (CHF)    Nutrition Diagnosis:   Inadequate oral intake related to altered GI function as evidenced by nutrition support - enteral nutrition    Nutrition Interventions:   Food and/or Nutrient Delivery: Start Tube Feeding  Nutrition Education/Counseling: Education initiated (2/17 Discussed plan for Vital 1.2 as substitute for Vital 1.5. Pt. voices understanding.)  Coordination of Nutrition Care: Continue to monitor while inpatient       Goals:     Goals:  Tolerate nutrition support at goal rate, by next RD assessment       Nutrition Monitoring and Evaluation:      Food/Nutrient Intake Outcomes: Diet Advancement/Tolerance, Enteral Nutrition Intake/Tolerance  Physical Signs/Symptoms Outcomes: Biochemical Data, Chewing or Swallowing, GI Status, Fluid Status or Edema, Nutrition Focused Physical Findings, Skin, Weight    Discharge Planning:    Enteral Nutrition     Saddie Cowden, RD, LD  Contact: [phone]

## 2023-02-17 NOTE — PROGRESS NOTES
Hospitalist Progress Note    Patient:  Loy Walker      Unit/Bed:5K-18/018-A    YOB: 1990    MRN: [id number]       Acct: [de-identified]     PCP: AURELIO Osorio CNP    Date of Admission: 2/16/2023    Assessment/Plan:    Peg tube malfunction secondary to trauma. PEG tube was dislodged. NPO / IVF. GI consulted for replacement. Active Migraine. Unable to take PO due to dysphagia. [place] SQ Imitrex. Primary hypertension. Unable to give meds until PEG is placed. Type 2 diabetes. Holding metformin. SSI. Hypoglycemic protocol in place. Hx of Asthma. No exacerbation. Hiatal hernia, aware. Anxiety/depression/  Morbid obesity. BMI 48. 58. Chief Complaint: Feeding tube leak    Hospital Course:     28 y.o. female with an hx of HTN, DM 2, Asthma, sickle cell trait, migraine, anxiety and depression who presented to [facility] for evaluation of feeding tube leakage. Patient reports walking home from her doctor's appointment yesterday. She felt lightheaded and fell on her way home. She sustained trauma to her PEG tube. Overnight, she noticed leakage around her feeding tube site and came in today for evaluation. Patient had her PEG tube originally placed on 01/13/2023 by Dr. Jose A Russell. Patient's G-tube became dislodged and was replaced on 01/25/2023 by Dr. Edgard Vizcarra. Patient PEG tube was placed due to hiatal hernia, leukocytosis, nausea/vomiting, dehydration chronic epigastric pain. Patient denies chest pain, shortness of breath, palpitations, lightheadedness, dizziness, syncope or LOC. In the ED, CT scan of the abdomen does show the G-tube terminates within the stomach unchanged in position from prior study. However, after attempting to flush it was determined that there was leakage from the site. GI was contacted by ED provided who advised admission with plans for PEG tube replacement. Hospitalist was contacted for admission.   Please refer to A&P for further information. Subjective: reports active migraine. Sitting in the dark. Wash cloth on head. Reports generalized headache. Photosensitivity. Wants excerdrin. Also reports going abdominal pain. Asking about when she can be discharged. Medications:  Reviewed    Infusion Medications    sodium chloride      lactated ringers IV soln 75 mL/hr at 02/17/23 1257    dextrose       Scheduled Medications    morphine  1 mg IntraVENous Once    sodium chloride flush  5-40 mL IntraVENous 2 times per day    amitriptyline  100 mg Oral Nightly    amLODIPine  5 mg Oral Daily    ARIPiprazole  10 mg Oral QHS    baclofen  10 mg Oral Daily    busPIRone  5 mg Oral TID    fluticasone  2 puff Inhalation BID    linaclotide  145 mcg Oral QAM AC    montelukast  10 mg Oral Nightly    pantoprazole  40 mg Oral Daily    prazosin  1 mg Oral Nightly    propranolol  60 mg Oral Daily    topiramate  100 mg Oral BID    insulin lispro  0-4 Units SubCUTAneous TID WC    insulin lispro  0-4 Units SubCUTAneous Nightly    escitalopram  20 mg Oral Daily     PRN Meds: HYDROmorphone **OR** HYDROmorphone, sodium chloride flush, sodium chloride, polyethylene glycol, acetaminophen, albuterol sulfate HFA, docusate sodium, promethazine, promethazine, glucose, dextrose bolus **OR** dextrose bolus, glucagon (rDNA), dextrose      Intake/Output Summary (Last 24 hours) at 2/17/2023 1314  Last data filed at 2/17/2023 0056  Gross per 24 hour   Intake 688.44 ml   Output --   Net 688.44 ml       Diet:  Diet NPO    Exam:  BP (!) 142/78   Pulse 90   Temp 97.6 °F (36.4 °C) (Oral)   Resp 16   Ht 5' 4\" (1.626 m)   Wt 283 lb (128.4 kg)   LMP 12/11/2015   SpO2 97%   BMI 48.58 kg/m²     General appearance: No apparent distress, appears stated age and cooperative. HEENT: Pupils equal, round, and reactive to light. Conjunctivae/corneas clear. Neck: Supple, with full range of motion. No jugular venous distention. Trachea midline. Respiratory:  Normal respiratory effort. Clear to auscultation, bilaterally without Rales/Wheezes/Rhonchi. Cardiovascular: Regular rate and rhythm with normal S1/S2 without murmurs, rubs or gallops. Abdomen: Soft, obese, non-tender, non-distended with normal bowel sounds. Musculoskeletal: passive and active ROM x 4 extremities. Skin: Skin color, texture, turgor normal.  No rashes or lesions. Neurologic:  Neurovascularly intact without any focal sensory/motor deficits. Cranial nerves: II-XII intact, grossly non-focal.  Psychiatric: Alert and oriented, thought content appropriate, normal insight  Capillary Refill: Brisk,< 3 seconds   Peripheral Pulses: +2 palpable, equal bilaterally       Labs:   Recent Labs     02/16/23  1400   WBC 9.3   HGB 11.1*   HCT 35.9*        Recent Labs     02/16/23  1400 02/17/23  0452    140   K 3.6 3.9    103   CO2 24 24   BUN 9 8   CREATININE 0.6 0.6   CALCIUM 9.5 9.1     Recent Labs     02/16/23  1400   AST 15   ALT 14   BILIDIR <0.2   BILITOT 0.3   ALKPHOS 108     No results for input(s): INR in the last 72 hours. No results for input(s): Cherre Gash in the last 72 hours. Urinalysis:      Lab Results   Component Value Date/Time    NITRU NEGATIVE 01/29/2023 05:16 AM    WBCUA 2-4 01/29/2023 05:16 AM    WBCUA 0-5 12/23/2018 10:43 AM    BACTERIA NONE SEEN 01/29/2023 05:16 AM    RBCUA 0-2 01/29/2023 05:16 AM    BLOODU NEGATIVE 01/29/2023 05:16 AM    SPECGRAV 1.021 03/02/2022 12:01 AM    GLUCOSEU NEGATIVE 01/29/2023 05:16 AM       Radiology:  CT ABDOMEN PELVIS W IV CONTRAST Additional Contrast? None   Final Result   1. No solid organ injury. No fracture. 2. The percutaneous gastrostomy tube terminates within the stomach unchanged in position from the prior study. No bowel obstruction, free fluid, fluid collection, or free air is identified.  The left rectus musculature is thickened at the gastrostomy tube    insertion site however no abdominal wall fluid collection or subcutaneous fat stranding is observed. 3. Chronic findings are discussed. **This report has been created using voice recognition software. It may contain minor errors which are inherent in voice recognition technology. **      Final report electronically signed by Dr Micheline Landers on 2/16/2023 4:50 PM          Diet: Diet NPO    DVT prophylaxis: [] Lovenox                                 [x] SCDs                                 [] SQ Heparin                                 [] Encourage ambulation           [] Already on Anticoagulation     Disposition:    [x] Home       [] TCU       [] Rehab       [] Psych       [] SNF       [] [place]       [] Other-    Code Status: Full Code        Electronically signed by AURELIO Castro CNP on 2/17/2023 at 1:14 PM

## 2023-02-17 NOTE — ED NOTES
ED to inpatient nurses report    Chief Complaint   Patient presents with    G Tube Complications      Present to ED from home  LOC: alert and orientated to name, place, date  Vital signs   Vitals:    02/16/23 1644 02/16/23 1758 02/16/23 1902 02/16/23 2005   BP: (!) 144/92 (!) 150/92 (!) 160/103 (!) 152/98   Pulse: 85 89 82 84   Resp: 16 17 17 17   Temp:       TempSrc:       SpO2: 99% 100% 100% 99%   Weight:          Oxygen Baseline room air    Current needs required none Bipap/Cpap No  LDAs:    Mobility: Requires assistance * 1  Pending ED orders: none  Present condition: stable      C-SSRS Risk of Suicide: No Risk  Swallow Screening pass  Preferred Language: [place]     Electronically signed by Alta Cedeño RN on 2/16/2023 at 8:15 PM      Alta Cedeño RN  02/16/23 2016

## 2023-02-18 VITALS
WEIGHT: 283 LBS | HEART RATE: 82 BPM | HEIGHT: 64 IN | OXYGEN SATURATION: 97 % | TEMPERATURE: 98.4 F | BODY MASS INDEX: 48.32 KG/M2 | DIASTOLIC BLOOD PRESSURE: 81 MMHG | RESPIRATION RATE: 18 BRPM | SYSTOLIC BLOOD PRESSURE: 166 MMHG

## 2023-02-18 LAB
GLUCOSE BLD STRIP.AUTO-MCNC: 71 MG/DL (ref 70–108)
GLUCOSE BLD STRIP.AUTO-MCNC: 77 MG/DL (ref 70–108)
GLUCOSE BLD STRIP.AUTO-MCNC: 80 MG/DL (ref 70–108)

## 2023-02-18 PROCEDURE — 6360000002 HC RX W HCPCS: Performed by: STUDENT IN AN ORGANIZED HEALTH CARE EDUCATION/TRAINING PROGRAM

## 2023-02-18 PROCEDURE — 96361 HYDRATE IV INFUSION ADD-ON: CPT

## 2023-02-18 PROCEDURE — 99239 HOSP IP/OBS DSCHRG MGMT >30: CPT | Performed by: NURSE PRACTITIONER

## 2023-02-18 PROCEDURE — 92610 EVALUATE SWALLOWING FUNCTION: CPT

## 2023-02-18 PROCEDURE — G0378 HOSPITAL OBSERVATION PER HR: HCPCS

## 2023-02-18 PROCEDURE — 82948 REAGENT STRIP/BLOOD GLUCOSE: CPT

## 2023-02-18 PROCEDURE — 94640 AIRWAY INHALATION TREATMENT: CPT

## 2023-02-18 PROCEDURE — 6360000002 HC RX W HCPCS: Performed by: NURSE PRACTITIONER

## 2023-02-18 PROCEDURE — 2580000003 HC RX 258: Performed by: STUDENT IN AN ORGANIZED HEALTH CARE EDUCATION/TRAINING PROGRAM

## 2023-02-18 PROCEDURE — 96376 TX/PRO/DX INJ SAME DRUG ADON: CPT

## 2023-02-18 PROCEDURE — 94761 N-INVAS EAR/PLS OXIMETRY MLT: CPT

## 2023-02-18 RX ORDER — HEPARIN SODIUM,PORCINE 10 UNIT/ML
5 VIAL (ML) INTRAVENOUS PRN
Status: DISCONTINUED | OUTPATIENT
Start: 2023-02-18 | End: 2023-02-18

## 2023-02-18 RX ORDER — HEPARIN SODIUM (PORCINE) LOCK FLUSH IV SOLN 100 UNIT/ML 100 UNIT/ML
500 SOLUTION INTRAVENOUS PRN
Status: DISCONTINUED | OUTPATIENT
Start: 2023-02-18 | End: 2023-02-18 | Stop reason: HOSPADM

## 2023-02-18 RX ADMIN — HYDROMORPHONE HYDROCHLORIDE 0.5 MG: 1 INJECTION, SOLUTION INTRAMUSCULAR; INTRAVENOUS; SUBCUTANEOUS at 12:48

## 2023-02-18 RX ADMIN — HEPARIN 500 UNITS: 100 SYRINGE at 15:32

## 2023-02-18 RX ADMIN — SODIUM CHLORIDE, POTASSIUM CHLORIDE, SODIUM LACTATE AND CALCIUM CHLORIDE: 600; 310; 30; 20 INJECTION, SOLUTION INTRAVENOUS at 01:58

## 2023-02-18 RX ADMIN — SODIUM CHLORIDE, PRESERVATIVE FREE 10 ML: 5 INJECTION INTRAVENOUS at 09:03

## 2023-02-18 RX ADMIN — FLUTICASONE PROPIONATE 2 PUFF: 110 AEROSOL, METERED RESPIRATORY (INHALATION) at 08:28

## 2023-02-18 RX ADMIN — HYDROMORPHONE HYDROCHLORIDE 0.5 MG: 1 INJECTION, SOLUTION INTRAMUSCULAR; INTRAVENOUS; SUBCUTANEOUS at 06:24

## 2023-02-18 RX ADMIN — HYDROMORPHONE HYDROCHLORIDE 0.5 MG: 1 INJECTION, SOLUTION INTRAMUSCULAR; INTRAVENOUS; SUBCUTANEOUS at 09:34

## 2023-02-18 RX ADMIN — HYDROMORPHONE HYDROCHLORIDE 0.5 MG: 1 INJECTION, SOLUTION INTRAMUSCULAR; INTRAVENOUS; SUBCUTANEOUS at 01:56

## 2023-02-18 ASSESSMENT — ENCOUNTER SYMPTOMS
SINUS PRESSURE: 0
RHINORRHEA: 0
ANAL BLEEDING: 0
NAUSEA: 1
EYE PAIN: 0
ALLERGIC/IMMUNOLOGIC NEGATIVE: 1
PHOTOPHOBIA: 0
CONSTIPATION: 0
EYE ITCHING: 0
VOMITING: 0
ABDOMINAL PAIN: 1
COUGH: 0
COLOR CHANGE: 0
CHEST TIGHTNESS: 0
ABDOMINAL DISTENTION: 0
APNEA: 0
EYE DISCHARGE: 0
WHEEZING: 0
DIARRHEA: 1
BACK PAIN: 0
CHOKING: 0
BLOOD IN STOOL: 0
SHORTNESS OF BREATH: 0
SORE THROAT: 0

## 2023-02-18 ASSESSMENT — PAIN DESCRIPTION - ORIENTATION
ORIENTATION: LEFT;UPPER
ORIENTATION: MID
ORIENTATION: MID
ORIENTATION: LEFT;UPPER
ORIENTATION: LEFT;UPPER

## 2023-02-18 ASSESSMENT — PAIN DESCRIPTION - LOCATION
LOCATION: ABDOMEN
LOCATION_2: HEAD
LOCATION: ABDOMEN

## 2023-02-18 ASSESSMENT — PAIN SCALES - GENERAL
PAINLEVEL_OUTOF10: 9
PAINLEVEL_OUTOF10: 10
PAINLEVEL_OUTOF10: 6
PAINLEVEL_OUTOF10: 8
PAINLEVEL_OUTOF10: 10
PAINLEVEL_OUTOF10: 9
PAINLEVEL_OUTOF10: 6
PAINLEVEL_OUTOF10: 10

## 2023-02-18 ASSESSMENT — PAIN DESCRIPTION - INTENSITY: RATING_2: 10

## 2023-02-18 ASSESSMENT — PAIN DESCRIPTION - PAIN TYPE
TYPE: ACUTE PAIN

## 2023-02-18 ASSESSMENT — PAIN DESCRIPTION - DESCRIPTORS
DESCRIPTORS: SHARP
DESCRIPTORS: TENDER
DESCRIPTORS: OTHER (COMMENT)
DESCRIPTORS: TENDER
DESCRIPTORS: BURNING
DESCRIPTORS_2: ACHING
DESCRIPTORS: SHARP

## 2023-02-18 ASSESSMENT — PAIN - FUNCTIONAL ASSESSMENT
PAIN_FUNCTIONAL_ASSESSMENT: ACTIVITIES ARE NOT PREVENTED
PAIN_FUNCTIONAL_ASSESSMENT: ACTIVITIES ARE NOT PREVENTED

## 2023-02-18 NOTE — CONSULTS
Chief Complaint:  PEG tube malfunction    History of present Illness: Placido Vinson is a 28 y.o. female, well known to the practice, again admitted to [address] [place] [address] on 2/16/23 after sustaining a fall and damaging her PEG tube. The patient was seen at our office on 2/15/23, please refer to my OV note in Epic. She states her \"insurance didn't pick me up. \"  She states she contacted her transportation and was told it would \"almost 2 hours before they could get there and your office closed and the doors were locked. \"  She decided to walk home. She states she was crossing the area where the [employer] was located between [facility] and [facility] when she felt \"light-headed\" and fell, striking her abdomen as she fell. She states she \"caught myself\" but there are no abrasions to her palms or knees indicating a fall. There are no abrasions or bruising to the abdomen. The patient states she noted the PEG tube leaking when she arrived home on 2/15/23, but did not come to the ER until 2/16/23 after her home health nurse evaluated the site, called our office and we directed her to the ER. The patient currently complains of pain \"all in my stomach. \"  She has been taking IV Dilaudid every 3 hours per the RN. She denies nausea or vomiting for me. States she was \"able to take some chicken broth at my house after the fall. \"  She has long standing history of GERD and dysphagia. Recently had EGD with dilation with Dr Lorenzo Flannery on October 17, 2022. She had robotic laparoscopy with Dr Carmen Kay on 1/13/23 for complaints of abdominal pain, recurrent hiatal hernia and excess nausea and vomiting. He performed extensive lysis of adhesions and placed a G tube during that procedure. The G tube was then dislodged and replaced by Dr Lorenzo Flannery on 1/25/23.   Patient had been tolerating her tube feeds prior to admission but was complaining of loose stools so we had recommended a higher fiber content tube feed at her last office visit on Thursday. We had referred her to [facility] per her request during her 2/15 office visit for the chronic abdominal pain and recurrent hiatal hernia as well as dysphagia. She is noted to be lying comfortably in the hospital bed, in no acute distress, playing on her cellphone, upon my arrival to the bedside. I noted a large soiled and moist abdominal dressing around her PEG tube. I removed this. I examined the PEG tube and noted two tears near the distal tip of the tubing at the level of the flush ports. There was another pinpoint hole noted under the clamp near the 15 cm writing on the tubing. I called and discussed the findings with Dr Germán Eden, as he is covering for Dr Robert Vogel who is out of town. He stated to \"cut off the broken portion off tubing and replace the flush ports. \"  I questioned cutting it at the level of the 15 cm writing but Dr Ana Bruno assured me the PEG tube would still function properly even at a shorter length. I cut the tubing at the 15 cm parth and replaced the flush ports. I flushed the tube multiple times with tap water and no further leakage was noted. I called and spoke with Yeni Truong, AURELIO-CNP, Attending, regarding this. Past Medical History:  has a past medical history of Acute on chronic diastolic congestive heart failure ([address] JANI (acute kidney injury) ([address] Anemia, Anesthesia, Anxiety, Asthma, CAD (coronary artery disease), Depression, Diabetes ([address] Diet-controlled type 2 diabetes mellitus ([address] Epilepsy ([address] Fibroids, Gastro - esophageal reflux disease, Hypertension, Hypertrophy of tonsil and adenoid, Malignant carcinoid tumor of other sites [facility]), Migraine, PONV (postoperative nausea and vomiting), Prolonged emergence from general anesthesia, Sickle cell anemia ([address] Sickle cell trait ([address] and Tumor associated pain.     Past Surgical History:   Past Surgical History:   Procedure Laterality Date    ABDOMEN SURGERY  03/23/2017 Laparoscopic , Bi lat oopherectomy Dr Thomas Latif      x2    BREAST REDUCTION SURGERY      CENTRAL VENOUS CATHETER Right 12/11/2015    right subclavian single lumen mediport insertion--Dr. Michel    CHOLECYSTECTOMY, LAPAROSCOPIC N/A 7/11/2019    CHOLECYSTECTOMY LAPAROSCOPIC performed by Higinio Cuevas MD at [address] 8/24/2020    COLONOSCOPY POLYPECTOMY SNARE/COLD BIOPSY performed by Mansoor Roque MD at [facility]  10/21/2013    Dilation and curettage, Diagnostic Hysteroscopy and laparoscopy (Dr. Elijah Mckinney, [facility])    EGD  2019    EGD COLONOSCOPY N/A 1/8/2019    EGD DIL performed by Jeri Ellsworth MD at [facility]    EGD COLONOSCOPY Left 5/14/2019    EGD DILATATION performed by Jeri Ellsworth MD at [facility]    EGD COLONOSCOPY Left 8/27/2019    EGD DILATATION [facility] [address], COLON, DIAGNOSTIC      GASTRIC [address]    GASTRIC [address] [facility] N/A 1/25/2023    EGD PEG TUBE PLACEMENT performed by Mansoor Roque MD at [address]  2010, 2016    [place] , [address] [address] N/A 1/17/2023    Robotic Exploratory Paparoscopy with Extensive Lysis of Adhesions G Tube Insertion performed by Melanie Lundberg MD at [address] [place] [address] ([facility])  04/2016    INSERTION / REMOVAL / REPLACEMENT VENOUS ACCESS CATHETER N/A 3/8/2019    REMOVAL OF LEFT PORT AND PLACEMENT OF SINGLE LUMEN MEDIPORT RIGHT SIDE performed by Higinio Cuevas MD at [address] N/A 10/8/2019    ROBOTIC DIAGNOSTIC LAPAROSCOPY,  RECURRENT HIATAL HERNIA REPAIR, REVISION FUNDOPLICATION performed by Melanie Lundberg MD at [address] [facility] 7/15/2022    ROBOTIC LAPAROSCOPY, LYSIS OF ADHESIONS performed by Higinio Cuevas MD at [address] [place] [address] SURGICAL HISTORY  08/12/2016    Right Port Removal, Placement of new Port Left by Dr Lizzy Hawk  12/02/2019    Mediport insertion-IR Dr Doris Morse (CERVIX NOT REMOVED)  4/8/16    PORT SURGERY N/A 11/11/2019    REMOVAL OF RIGHT MEDIPORT & ATTEMPTED PLACEMENT OF LEFT SINGLE LUMEN MEDIPORT performed by Carlos Alberto Ryan MD at [address] Right 11/29/2019    RT INTERNAL JUGULAR SINGLE LUMEN MEDIPORT INSERTION performed by Carlos Alberto Ryan MD at [address] [facility]  [place]  [address] <30 MM DIAM Left 8/8/2017    EGD[facility]DIL [facility]    [place]  [address] <30 MM DIAM N/A 9/26/2017    EGD DIL performed by Mark Chapa MD at [facility]    [place]  [address] <30 MM DIAM Left 12/12/2017    EGD DIL [facility]    [place]  [address] <30 MM DIAM N/A 2/13/2018    EGD  DILATATION performed by Mark Chapa MD at [address] [facility] [address] [facility] CTR VAD W/SUBQ PORT UNDER 5 YR Bilateral 1/5/2018    EXCISION OF MEDIPORT LEFT SIDE, INSERTION MEDIPORT RIGHT  IJ performed by Carlos Alberto Ryan MD at [address] OFFICE/OUTPT [address] [facility] N/A 5/15/2018    EGD DILATATION performed by Mark Chapa MD at [address] [facility] OFFICE/OUTPT VISIT,PROCEDURE ONLY Bilateral 9/10/2018    REMOVAL RT MEDIPORT, INSERTION LEFT performed by Carlos Alberto Ryan MD at [address]  [place] OFFICE/OUTPT [address] [facility] N/A 11/16/2018    MEDIPORT REPOSITIONING performed by Carlos Alberto Ryan MD at [address] [facility] [address] 11/6/2017    TONSILLECTOMY AND ADENOIDECTOMY performed by Ysaebl Joel MD at [address] [facility]      neoendocrine    TUNNELED VENOUS PORT PLACEMENT      UPPER GASTROINTESTINAL ENDOSCOPY Left 4/3/2018    EGD DILATION SAVORY performed by Mark Chapa MD at [address] [facility] GASTROINTESTINAL ENDOSCOPY Left 6/26/2018    EGD DILATION SAVORY performed by Taqueria Espino MD at [facility] Left 2/26/2019    EGD DILATION SAVORY performed by Taqueria Espino MD at [place] [address] 7/9/2019    EGD DILATION SAVORY performed by Taqueria Espino MD at [address] [facility] ENDOSCOPY Left 9/9/2019    EGD BIOPSY performed by Jarvis Foster MD at [place] [address] 4/1/2020    EGD DILATION BALLOON performed by Gibran Daugherty MD at [facility] Left 8/24/2020    EGD ESOPHAGOGASTRODUODENOSCOPY DILATATION performed by Gibran Daugherty MD at [facility] [address] Left 8/24/2020    EGD BIOPSY performed by Gibran Daugherty MD at [place] [address] N/A 12/2/2020    EGD DILATION BALLOON performed by Gibran Daugherty MD at [place] [address] Left 12/2/2020    EGD BIOPSY performed by Gibran Daugherty MD at [place] [address] Left 1/19/2022    EGD performed by Jesusita Otero MD at [facility] [address] Left 1/19/2022    EGD BIOPSY performed by Jesusita Otero MD at [place] [address] 7/15/2022    EGD [address] performed by Jesusita Otero MD at [address] [facility] 1/24/2023    EGD ESOPHAGOGASTRODUODENOSCOPY performed by Gibran Daugherty MD at [facility] 8/22/2022    OPEN LEFT [address] [facility] performed by Jesusita Otero MD at [address] [facility]. S.W History:  reports that she has been smoking cigarettes. She started smoking about 6 years ago. She has a 1.50 pack-year smoking history.  She has never used smokeless tobacco. She reports that she does not currently use alcohol. She reports that she does not use drugs. Family History: family history includes Cancer in her mother; Depression in her maternal grandmother; Diabetes in her maternal grandmother; Heart Attack in her paternal uncle; Heart Disease in her father, maternal grandmother, mother, and sister; High Blood Pressure in her father and mother; Aby Leaks in her mother.     Review of Systems:   -History obtained from chart review and the patient  General ROS: negative  Psychological ROS: positive for - anxiety and depression  negative for - suicidal ideation  ENT ROS: negative  Hematological and Lymphatic ROS: positive for - hx of anemia and Sickle Cell trait  negative for - bleeding problems, blood clots, blood transfusions, bruising, fatigue, jaundice, night sweats, pallor, swollen lymph nodes, or weight loss  Endocrine ROS: positive for - DM  negative for - breast changes, galactorrhea, hair pattern changes, hot flashes, malaise/lethargy, mood swings, palpitations, polydipsia/polyuria, skin changes, temperature intolerance, or unexpected weight changes  Respiratory ROS: negative  Cardiovascular ROS: negative  Gastrointestinal ROS: positive for - abdominal pain, heartburn, and swallowing difficulty/pain  negative for - appetite loss, blood in stools, change in bowel habits, constipation, diarrhea, gas/bloating, hematemesis, melena, or stool incontinence  Genito-Urinary ROS: negative  Musculoskeletal ROS: negative  Neurological ROS: negative  Dermatological ROS: negative    Allergies: Latex, Ketorolac tromethamine, Pcn [penicillins], Percocet [oxycodone-acetaminophen], Amoxicillin, Ciprofloxacin, Compazine [prochlorperazine maleate], Dicyclomine hcl, Fentanyl, Fioricet [butalbital-apap-caffeine], Flexeril [cyclobenzaprine], Gabapentin, Ibuprofen [ibuprofen], Keflex [cephalexin], Lisinopril, Lorazepam, Losartan, Macrobid [nitrofurantoin monohyd macro], Morphine, Mushroom extract complex, Reglan [metoclopramide], Vicodin [hydrocodone-acetaminophen], Vistaril [hydroxyzine hcl], Zofran [ondansetron], and Adhesive tape    Home Meds:   Prior to Admission medications    Medication Sig Start Date End Date Taking?  Authorizing Provider   propranolol (INDERAL LA) 60 MG extended release capsule Take 60 mg by mouth daily   Yes Historical Provider, MD   famotidine (PEPCID) 40 MG tablet Take 1 tablet by mouth nightly 2/14/23   AURELIO Bonner CNP   Incontinence Supply Disposable (DEPEND SILHOUETTE BRIEFS L/XL) MISC Use as needed for urinary and bowel incontinence 2/9/23   Tray Blanton MD   docusate sodium (COLACE) 100 MG capsule Take 1 capsule by mouth 2 times daily as needed for Constipation 2/8/23   AURELIO Bonner CNP   ALBUTEROL SULFATE HFA IN Inhale 1 puff into the lungs every 4-6 hours as needed    Historical Provider, MD   topiramate (TOPAMAX) 100 MG tablet Take 100 mg by mouth 2 times daily    Historical Provider, MD   metFORMIN HCl 500 MG/5ML SOLN 1,000 mg daily (with breakfast) Take 10 mL via peg tube once daily    Historical Provider, MD   linaclotide (LINZESS) 145 MCG capsule Take 145 mcg by mouth every morning (before breakfast)    Historical Provider, MD   baclofen (LIORESAL) 10 MG tablet Take 10 mg by mouth daily    Historical Provider, MD   promethazine (PHENERGAN) 25 MG tablet Take 25 mg by mouth every 4-6 hours as needed for Nausea    Historical Provider, MD   prazosin (MINIPRESS) 1 MG capsule Take 1 mg by mouth nightly    Historical Provider, MD   ARIPiprazole (ABILIFY) 1 MG/ML SOLN solution Take 10 mg by mouth nightly 10 mL via peg tube every evening    Historical Provider, MD   busPIRone (BUSPAR) 5 MG tablet Take 5 mg by mouth 3 times daily    Historical Provider, MD   furosemide (LASIX) 20 MG tablet TAKE 1 TABLET BY MOUTH ONCE A DAY 10/24/22   Zhao García MD   pantoprazole (PROTONIX) 40 MG tablet Take 40 mg by mouth daily    Historical Provider, MD   polyethylene glycol (GLYCOLAX) 17 GM/SCOOP powder Dispense 238 Gram Bottle.   Use as Directed 9/21/22   AURELIO Scales CNP   acetaminophen (TYLENOL) 325 MG tablet Take 2 tablets by mouth 4 times daily as needed for Pain or Fever 3/3/22   Tristan Martinez MD   fluticasone [facility] HFA) 110 MCG/ACT inhaler 2 puffs 2 times daily 2/4/22   Historical Provider, MD   amitriptyline (ELAVIL) 100 MG tablet Take 100 mg by mouth nightly 4/5/21   Historical Provider, MD   montelukast (SINGULAIR) 10 MG tablet Take 10 mg by mouth nightly    Historical Provider, MD   amLODIPine (NORVASC) 5 MG tablet Take 1 tablet by mouth daily 4/2/20   Elaina Tran MD   escitalopram (LEXAPRO) 5 MG/5ML solution 20 mg daily Take 20 mL via peg tube once daily    Historical Provider, MD   albuterol (PROVENTIL) (2.5 MG/3ML) 0.083% nebulizer solution Take 3 mLs by nebulization every 6 hours as needed for Wheezing 8/2/18   Gracy Lennox, APRN - CNP   glucose monitoring kit (FREESTYLE) monitoring kit 1 kit by Does not apply route daily Or whichever system insurance will pay for 7/31/18   Loren Evans DO   Spacer/Aero-Holding Chambers (E-Z SPACER) AISLINN 1 Device by Does not apply route daily 8/10/17   Loren Evans DO       Current Meds:  Current Facility-Administered Medications:     HYDROmorphone (DILAUDID) injection 0.25 mg, 0.25 mg, IntraVENous, Q3H PRN **OR** HYDROmorphone (DILAUDID) injection 0.5 mg, 0.5 mg, IntraVENous, Q3H PRN, Caitlyn Cunningham MD, 0.5 mg at 02/18/23 0934    morphine (PF) injection 1 mg, 1 mg, IntraVENous, Once, Caitlyn Cunningham MD    sodium chloride flush 0.9 % injection 5-40 mL, 5-40 mL, IntraVENous, 2 times per day, Andris Essex, MD, 10 mL at 02/18/23 0903    sodium chloride flush 0.9 % injection 5-40 mL, 5-40 mL, IntraVENous, PRN, Andris Essex, MD    0.9 % sodium chloride infusion, , IntraVENous, PRN, Andris Essex, MD    polyethylene glycol (GLYCOLAX) packet 17 g, 17 g, Oral, Daily PRN, Andris Essex, MD    acetaminophen ([address]) tablet 650 mg, 650 mg, Oral, 4x Daily PRN, Tianna Shepherd MD    albuterol sulfate HFA (PROVENTIL;VENTOLIN;PROAIR) 108 (90 Base) MCG/ACT inhaler 2 puff, 2 puff, Inhalation, Q4H PRN, Tianna Shepherd MD    amitriptyline (ELAVIL) tablet 100 mg, 100 mg, Oral, Nightly, Tianna Shepherd MD    amLODIPine (NORVASC) tablet 5 mg, 5 mg, Oral, Daily, Tianna Shepherd MD    ARIPiprazole (ABILIFY) tablet 10 mg, 10 mg, Oral, QHS, Tianna Shepherd MD    baclofen (LIORESAL) tablet 10 mg, 10 mg, Oral, Daily, Tianna Shepherd MD    busPIRone (BUSPAR) tablet 5 mg, 5 mg, Oral, TID, Tianna Shepherd MD    docusate sodium (COLACE) capsule 100 mg, 100 mg, Oral, BID PRN, Tianna Shepherd MD    fluticasone (FLOVENT HFA) 110 MCG/ACT inhaler 2 puff, 2 puff, Inhalation, BID, Tianna Shepherd MD, 2 puff at 02/18/23 0211    linaclotide (LINZESS) capsule 145 mcg (Patient Supplied), 145 mcg, Oral, QAM AC, Tianna Shepherd MD    montelukast (SINGULAIR) tablet 10 mg, 10 mg, Oral, Nightly, Tianna Shepherd MD    pantoprazole (PROTONIX) tablet 40 mg, 40 mg, Oral, Daily, Tianna Shepherd MD    prazosin (MINIPRESS) capsule 1 mg, 1 mg, Oral, Nightly, Tianna Shepherd MD    promethazine (PHENERGAN) tablet 25 mg, 25 mg, Oral, Q4H PRN, Tianna Shepherd MD    propranolol (INDERAL LA) extended release capsule 60 mg, 60 mg, Oral, Daily, Tianna Shepherd MD    topiramate (TOPAMAX) tablet 100 mg, 100 mg, Oral, BID, Tianna Shepherd MD    insulin lispro (HUMALOG) injection vial 0-4 Units, 0-4 Units, SubCUTAneous, TID WC, Tianna Shepherd MD    insulin lispro (HUMALOG) injection vial 0-4 Units, 0-4 Units, SubCUTAneous, Nightly, Tianna Shepherd MD    lactated ringers IV soln infusion, , IntraVENous, Continuous, Tianna Shepherd MD, Last Rate: 75 mL/hr at 02/18/23 0158, New Bag at 02/18/23 0158    promethazine (PHENERGAN) injection 6.25 mg, 6.25 mg, IntraMUSCular, Q6H PRN, Tianna Shepherd MD, 6.25 mg at 02/17/23 2490    glucose chewable tablet 16 g, 4 tablet, Oral, PRN, Tianna Shepherd MD    dextrose bolus 10% 125 mL, 125 mL, IntraVENous, PRN, Stopped at 02/17/23 2018 **OR** dextrose bolus 10% 250 mL, 250 mL, IntraVENous, PRN, Anayeli Dalal MD    glucagon (rDNA) injection 1 mg, 1 mg, SubCUTAneous, PRN, Anayeli Dalal MD    dextrose 10 % infusion, , IntraVENous, Continuous PRN, Anayeli Dalal MD    escitalopram (LEXAPRO) tablet 20 mg, 20 mg, Oral, Daily, Anayeli Dalal MD    Vital Signs:  Vitals:    02/18/23 0934   BP:    Pulse:    Resp: 18   Temp:    SpO2:        Weight:  Wt Readings from Last 3 Encounters:   02/16/23 283 lb (128.4 kg)   02/15/23 289 lb (131.1 kg)   02/14/23 290 lb 11.2 oz (131.9 kg)       Physical Exam:  General Appearance:  awake, alert, oriented, in no acute distress, well developed, well nourished, and obese  female  General: Pt is lying comfortably in bed , in no apparent distress playing with cellphone  HEENT: Atraumatic, Pupils reactive, No pallor/icterus. Oral mucosa moist/No thrush  Neck: No thyroid enlargement, No cervical or supraclavicular lymphaedenopathy  CVS: Regular rate and rhythm, No murmurs. No rubs or gallops  RS: Good b/l air entry, Clear to auscultation b/l  Abd: soft, non-tender, non-distended, no visible veins.  (+) scars. No hepatosplenomegaly or palpable masses, bowel sounds active. PEG tube LUQ near breast.  2 small tears noted in tubing near flush ports. Additional tear noted just distal to 15 cm writing.     Ext: No clubbing, cyanosis, edema  CNS: alert, oriented, no gross focal motor deficits    Labs:   Lab Results   Component Value Date/Time    WBC 9.3 02/16/2023 02:00 PM    HGB 11.1 02/16/2023 02:00 PM    HCT 35.9 02/16/2023 02:00 PM    MCV 74.8 02/16/2023 02:00 PM     02/16/2023 02:00 PM     Lab Results   Component Value Date/Time     02/17/2023 04:52 AM    K 3.9 02/17/2023 04:52 AM     02/17/2023 04:52 AM    CO2 24 02/17/2023 04:52 AM    BUN 8 02/17/2023 04:52 AM    CREATININE 0.6 02/17/2023 04:52 AM    GLUCOSE 84 02/17/2023 04:52 AM    GLUCOSE 78 12/23/2018 11:30 AM CALCIUM 9.1 02/17/2023 04:52 AM     Lab Results   Component Value Date/Time    ALKPHOS 108 02/16/2023 02:00 PM    ALT 14 02/16/2023 02:00 PM    AST 15 02/16/2023 02:00 PM    PROT 7.9 02/16/2023 02:00 PM    BILITOT 0.3 02/16/2023 02:00 PM    BILIDIR <0.2 02/16/2023 02:00 PM    LABALBU 4.0 02/16/2023 02:00 PM    LABALBU 4.0 04/30/2012 08:30 PM     Lab Results   Component Value Date/Time    LACTA 1.1 01/29/2023 12:11 AM     Lab Results   Component Value Date/Time    AMYLASE 30 10/05/2022 01:40 PM     Lab Results   Component Value Date/Time    LIPASE 18.0 02/16/2023 02:00 PM     Lab Results   Component Value Date/Time    INR 0.96 08/22/2022 10:18 AM       Radiology:  PROCEDURE: CT ABDOMEN PELVIS W IV CONTRAST       CLINICAL INFORMATION: Fall. Abdominal pain. Pain at G-tube site. COMPARISON: CT abdomen and pelvis 2/12/2023. TECHNIQUE: Axial 5 mm CT images were obtained through the abdomen and pelvis after the administration of 80  cc Isovue 370 intravenous contrast. Coronal and sagittal reconstructions were obtained. All CT scans at this facility use dose modulation, iterative reconstruction, and/or weight-based dosing when appropriate to reduce radiation dose to as low as reasonably achievable. FINDINGS:    Lung bases: Unremarkable. Liver/gallbladder/bilary tree: The gallbladder is surgically absent. No biliary ductal dilatation is identified. Hepatic steatosis is unchanged. No liver lesions are observed. Pancreas: Normal.   Spleen : Normal.   Adrenal glands: Normal.       Kidneys/ ureters/ bladder: No renal calculus, hydronephrosis, or hydroureter is present. The urinary bladder is partially distended and grossly unremarkable. Gastrointestinal:  The G-tube terminates within the stomach (series 301, image 25). No bowel obstruction, free fluid, fluid collection, or free air is visualized.  The rectus abdominal musculature surrounding the exiting portion of the catheter is thickened. No intramuscular fluid collection or abdominal wall fluid collection is observed. Retroperitoneum / lymph nodes: The aorta is not dilated. No lymphadenopathy is present. Pelvis: The uterus appears surgically absent. No adnexal lesions are observed. Musculoskeletal: The visualized skeletal structures appear intact. Abdominal wall related to subcutaneous injections is observed. Impression   1. No solid organ injury. No fracture. 2. The percutaneous gastrostomy tube terminates within the stomach unchanged in position from the prior study. No bowel obstruction, free fluid, fluid collection, or free air is identified. The left rectus musculature is thickened at the gastrostomy tube    insertion site however no abdominal wall fluid collection or subcutaneous fat stranding is observed. 3. Chronic findings are discussed. **This report has been created using voice recognition software. It may contain minor errors which are inherent in voice recognition technology. **       Final report electronically signed by Dr Terra Rosales on 2/16/2023 4:50 PM     ASSESSMENT AND PLAN:   PEG tube malfunction - s/p fall per pt report. Tube trimmed to remove broken areas and flush ports replaced. Tube flushes with water flushes w/o signs of additional leakage. Will have RN apply abdominal binder to further protect PEG tube. May resume tube feeding per home. Dysphagia - chronic issue for patient. Will have Speech Therapy see to identify safest diet consistency for patient. Abdominal  pain - chronic. Pain mgt per Attending. Pt was using Tramadol at home according to our office records on 2/15/23. Would recommend resuming oral opioids and stop IV narcotics but will defer to Attending. Hx of HTN - home antihypertensive resumed by Attending  Hx of DM type 2 - SSI per Attending  Hx of Asthma  Hx of hiatal hernia with GERD - continue PPI.   Hx of Anxiety/Depression - home amitriptyline, aripiprazole, buspirone, and escitalopram resumed by Attending. If patient tolerates oral nutrition and resumption of TF, can be discharged this weekend per our standpoint. Follow up our office next week to re-evaluate the PEG tube. Thank You   for allowing me to participate in the care of this patient. Assessment and POC were discussed with Dr Haritha Mcdaniel as well as Dr Mahesh Garnica via telephone.     Time In Room:  0945  Time Out Room:  1005  Total Dictation Time:  65 min (including chart review) and time fixing PEG tube    AURELIO Crooks - CNP  2/18/2023  10:19 AM

## 2023-02-18 NOTE — PROGRESS NOTES
[address] [place] [address] Dr Jyoti WILCOX [place]  [place] [address]  Dept: [phone]  Dept Fax: [phone]  Loc: [phone]    Visit Date: 2/14/2023    Eboni Dubon is a 28 y.o. female who presents today for:  Chief Complaint   Patient presents with    Post-Op Check     S/P 1. Robotic diagnostic laparoscopy with extensive lysis of adhesions (one hour). 2.  Robotic insertion, 24-French G-tube 1/13/23 - Seen in ER 2/12 for nausea, vomiting and pain near PEG tube       HPI:     HPI    Ade Wu is a 35-year old female patient who presents status post robotic diagnostic laparoscopy with extensive IVANIA, robotic insertion of G-tube on 1/13/23. Tube was dislodged right after surgery and subsequently needed PEG replaced by Dr. Man Malik. No hiatal hernia was found during surgery. When Dr Man Malik attempted to replace this on 1/24/23 there was large amount of food particles in the patient's stomach even though patient was NPO at the time and states she was compliant with that order. There is concern for gastroparesis and gastric emptying study is planned for 2/21/23 at [facility]. Patient is now on tube feeds continuous due to intolerance to solid food. She is using Vital at 50 cc/hr per dieticians order. She is doing 180 cc water 6 times a day. She states epigastric pain is better than before surgery but still present. Taking Ultram for pain. Constant nausea. No real vomiting. Taking zofran and Phenergan as needed for nausea. Having diarrhea due to tube feeds. No urinary complaints. Taking Protonix and Pepcid for GERD. Incisions are healing without signs of infection. PEG in place without bleeding or drainage. Tube workout without issues. No fevers or chills. SOB on dyspnea. No chest pain. Referred patient back to [address] [place] [address] but she does not want to see them now for many reasons. Considering [facility] as an option.      Past Medical History:   Diagnosis Date    Acute on chronic diastolic congestive heart failure ([facility] 6/25/2022    JANI (acute kidney injury) ([address] 7/7/2019    Anemia     Anesthesia     migraines    Anxiety     Asthma     CAD (coronary artery disease)     Depression     Diabetes ([address]     Diet-controlled type 2 diabetes mellitus ([address] 2016    Epilepsy ([address]     Fibroids     Gastro - esophageal reflux disease     Hypertension     Hypertrophy of tonsil and adenoid 11/4/2017    Malignant carcinoid tumor of other sites ([facility] 5/14/2013    Migraine     PONV (postoperative nausea and vomiting)     Prolonged emergence from general anesthesia     Sickle cell anemia (HCC)     Sickle cell trait ([facility]     PT STATES SHE HAS THE TRAIT NOT THE DISEASE    Tumor associated pain     neuroendrocrine tumor, gastroma      Past Surgical History:   Procedure Laterality Date    ABDOMEN SURGERY  03/23/2017    Laparoscopic , Bi lat oopherectomy Dr Trinity Nolasco      x2    BREAST REDUCTION SURGERY      CENTRAL VENOUS CATHETER Right 12/11/2015    right subclavian single lumen mediport insertion--Dr. Michel    CHOLECYSTECTOMY, LAPAROSCOPIC N/A 7/11/2019    CHOLECYSTECTOMY LAPAROSCOPIC performed by Patterson Bosworth, MD at [address] [place] [address] N/A 8/24/2020    COLONOSCOPY POLYPECTOMY SNARE/COLD BIOPSY performed by Sean Azar MD at [address] [place] [address]  10/21/2013    Dilation and curettage, Diagnostic Hysteroscopy and laparoscopy (Dr. Tommie Hatchet, [facility])    EGD  2019    EGD COLONOSCOPY N/A 1/8/2019    EGD DIL performed by Gabriella Chauhan MD at [facility]    EGD COLONOSCOPY Left 5/14/2019    EGD DILATATION performed by Gabriella Chauhan MD at [facility]    EGD COLONOSCOPY Left 8/27/2019    EGD DILATATION performed by Gabriella Chauhan MD at [facility]    ENDOSCOPY, COLON, DIAGNOSTIC      GASTRIC FUNDOPLICATION      [facility]    GASTRIC FUNDOPLICATION      GASTROSTOMY TUBE PLACEMENT N/A 1/25/2023    EGD PEG TUBE PLACEMENT performed by Dylan Arnold MD at [address]  2010, 2016    [place] , [address] N/A 1/17/2023    Robotic Exploratory Paparoscopy with Extensive Lysis of Adhesions G Tube Insertion performed by Eleazar Marcelo MD at [address] ([address])  04/2016    INSERTION / REMOVAL / REPLACEMENT VENOUS ACCESS CATHETER N/A 3/8/2019    REMOVAL OF LEFT PORT AND PLACEMENT OF SINGLE LUMEN MEDIPORT RIGHT SIDE performed by Dasia Gonzalez MD at [address] N/A 10/8/2019    ROBOTIC DIAGNOSTIC LAPAROSCOPY,  RECURRENT HIATAL HERNIA REPAIR, REVISION FUNDOPLICATION performed by Eleazar Marcelo MD at [address] N/A 7/15/2022    ROBOTIC LAPAROSCOPY, LYSIS OF ADHESIONS performed by Dasia Gonzalez MD at [facility]  08/12/2016    Right Port Removal, Placement of new Port Left by Dr Curtis Landry  12/02/2019    Mediport insertion-IR Dr Kelvin Meza (CERVIX NOT REMOVED)  4/8/16    PORT SURGERY N/A 11/11/2019    REMOVAL OF RIGHT MEDIPORT & ATTEMPTED PLACEMENT OF LEFT SINGLE LUMEN MEDIPORT performed by Dasia Gonzalez MD at [facility] Right 11/29/2019    RT INTERNAL JUGULAR SINGLE LUMEN MEDIPORT INSERTION performed by Dasia Gonzalez MD at [address] <30 MM DIAM Left 8/8/2017    EGD/DIL performed by Odilia Quan MD at [address] Endoscopy    [place]  [address] <30 MM DIAM N/A 9/26/2017    EGD DIL performed by Odilia Quan MD at [address] Endoscopy    [place]  [address] <30 MM DIAM Left 12/12/2017    EGD DIL performed by Odilia Quan MD at [address] Endoscopy    [place]  [address] <30 MM DIAM N/A 2/13/2018    EGD  DILATATION performed by Odilia Quan MD at [address] [facility] [address] [facility] CTR VAD W/SUBQ PORT UNDER 5 YR Bilateral 1/5/2018    EXCISION OF MEDIPORT LEFT SIDE, INSERTION MEDIPORT RIGHT  IJ performed by Iron Michel MD at [facility] OR    SC OFFICE/OUTPT VISIT,PROCEDURE ONLY N/A 5/15/2018    EGD DILATATION performed by Ajit Washburn MD at [facility] Endoscopy    SC OFFICE/OUTPT VISIT,PROCEDURE ONLY Bilateral 9/10/2018    REMOVAL RT MEDIPORT, INSERTION LEFT performed by Iron Michel MD at [facility] OR    SC OFFICE/OUTPT VISIT,PROCEDURE ONLY N/A 11/16/2018    MEDIPORT REPOSITIONING performed by Iron Michel MD at [facility] OR    TONSILLECTOMY AND ADENOIDECTOMY N/A 11/6/2017    TONSILLECTOMY AND ADENOIDECTOMY performed by Charlie Weiss MD at [facility] OR    TUMOR REMOVAL      neoendocrine    TUNNELED VENOUS PORT PLACEMENT      UPPER GASTROINTESTINAL ENDOSCOPY Left 4/3/2018    EGD DILATION SAVORY performed by Ajit Washburn MD at [facility] Endoscopy    UPPER GASTROINTESTINAL ENDOSCOPY Left 6/26/2018    EGD DILATION SAVORY performed by Ajit Washburn MD at [facility] Endoscopy    UPPER GASTROINTESTINAL ENDOSCOPY Left 2/26/2019    EGD DILATION SAVORY performed by Ajit Washburn MD at [facility] Endoscopy    UPPER GASTROINTESTINAL ENDOSCOPY N/A 7/9/2019    EGD DILATION SAVORY performed by Ajit Washburn MD at [facility] Endoscopy    UPPER GASTROINTESTINAL ENDOSCOPY Left 9/9/2019    EGD BIOPSY performed by Vivienne Leon MD at [facility] Endoscopy    UPPER GASTROINTESTINAL ENDOSCOPY N/A 4/1/2020    EGD DILATION BALLOON performed by Frida Elaine MD at [facility] OR    UPPER GASTROINTESTINAL ENDOSCOPY Left 8/24/2020    EGD ESOPHAGOGASTRODUODENOSCOPY DILATATION performed by Frida Elaine MD at [facility] Endoscopy    UPPER GASTROINTESTINAL ENDOSCOPY Left 8/24/2020    EGD BIOPSY performed by Frida Elaine MD at [facility] Endoscopy    UPPER GASTROINTESTINAL ENDOSCOPY N/A 12/2/2020    EGD DILATION BALLOON performed by Frida Elaine MD at [facility] Endoscopy    UPPER GASTROINTESTINAL ENDOSCOPY Left 12/2/2020    EGD BIOPSY performed by Frida Elaine MD at [facility] Endoscopy    UPPER GASTROINTESTINAL  ENDOSCOPY Left 1/19/2022    EGD performed by Cony Salmon MD at [facility]    UPPER GASTROINTESTINAL ENDOSCOPY Left 1/19/2022    EGD BIOPSY performed by Cony Salmon MD at [facility]    UPPER GASTROINTESTINAL ENDOSCOPY N/A 7/15/2022    EGD WITH DILATION AND BIOPSY performed by Cony Salmon MD at [address] [facility] N/A 1/24/2023    EGD ESOPHAGOGASTRODUODENOSCOPY performed by Ivy Ulrich MD at [place] [address] N/A 8/22/2022    OPEN LEFT ABDOMINAL PORT [address] performed by Cony Salmon MD at [facility]         Family History   Problem Relation Age of Onset    Cancer Mother     High Blood Pressure Mother     Heart Disease Mother         MI    Liver Cancer Mother     High Blood Pressure Father     Heart Disease Father     Heart Disease Sister     Diabetes Maternal Grandmother     Depression Maternal Grandmother     Heart Disease Maternal Grandmother         CHF    Heart Attack Paternal Uncle     Stroke Neg Hx     Colon Cancer Neg Hx     Esophageal Cancer Neg Hx     Rectal Cancer Neg Hx     Stomach Cancer Neg Hx        Social History     Tobacco Use    Smoking status: Every Day     Packs/day: 0.25     Years: 6.00     Pack years: 1.50     Types: Cigarettes     Start date: 3/1/2016    Smokeless tobacco: Never    Tobacco comments:     smokes 3 cig perday    Substance Use Topics    Alcohol use: Not Currently      Current Outpatient Medications   Medication Sig Dispense Refill    famotidine (PEPCID) 40 MG tablet Take 1 tablet by mouth nightly 30 tablet 3    Incontinence Supply Disposable (DEPEND SILHOUETTE BRIEFS L/XL) MISC Use as needed for urinary and bowel incontinence 5 each 5    docusate sodium (COLACE) 100 MG capsule Take 1 capsule by mouth 2 times daily as needed for Constipation 30 capsule 2    ALBUTEROL SULFATE HFA IN Inhale 1 puff into the lungs every 4-6 hours as needed      topiramate (TOPAMAX) 100 MG tablet Take 100 mg by mouth 2 times daily      metFORMIN HCl 500 MG/5ML SOLN 1,000 mg daily (with breakfast) Take 10 mL via peg tube once daily      linaclotide (LINZESS) 145 MCG capsule Take 145 mcg by mouth every morning (before breakfast)      baclofen (LIORESAL) 10 MG tablet Take 10 mg by mouth daily      promethazine (PHENERGAN) 25 MG tablet Take 25 mg by mouth every 4-6 hours as needed for Nausea      prazosin (MINIPRESS) 1 MG capsule Take 1 mg by mouth nightly      ARIPiprazole (ABILIFY) 1 MG/ML SOLN solution Take 10 mg by mouth nightly 10 mL via peg tube every evening      busPIRone (BUSPAR) 5 MG tablet Take 5 mg by mouth 3 times daily      furosemide (LASIX) 20 MG tablet TAKE 1 TABLET BY MOUTH ONCE A DAY 30 tablet 0    pantoprazole (PROTONIX) 40 MG tablet Take 40 mg by mouth daily      polyethylene glycol (GLYCOLAX) 17 GM/SCOOP powder Dispense 238 Gram Bottle.   Use as Directed 238 g 0    acetaminophen (TYLENOL) 325 MG tablet Take 2 tablets by mouth 4 times daily as needed for Pain or Fever 60 tablet 0    fluticasone (FLOVENT HFA) 110 MCG/ACT inhaler 2 puffs 2 times daily      amitriptyline (ELAVIL) 100 MG tablet Take 100 mg by mouth nightly      montelukast (SINGULAIR) 10 MG tablet Take 10 mg by mouth nightly      amLODIPine (NORVASC) 5 MG tablet Take 1 tablet by mouth daily 30 tablet 3    escitalopram (LEXAPRO) 5 MG/5ML solution 20 mg daily Take 20 mL via peg tube once daily      albuterol (PROVENTIL) (2.5 MG/3ML) 0.083% nebulizer solution Take 3 mLs by nebulization every 6 hours as needed for Wheezing 90 each 0    glucose monitoring kit (FREESTYLE) monitoring kit 1 kit by Does not apply route daily Or whichever system insurance will pay for 1 kit 0    Spacer/Aero-Holding Chambers (E-Z SPACER) AISLINN 1 Device by Does not apply route daily 1 Device 0    propranolol (INDERAL LA) 60 MG extended release capsule Take 60 mg by mouth daily       No current facility-administered medications for this visit.      Facility-Administered Medications Ordered in Other Visits   Medication Dose Route Frequency Provider Last Rate Last Admin    heparin flush 100 UNIT/ML injection 500 Units  500 Units IntraCATHeter PRN AURELIO Roland - CNP   500 Units at 02/18/23 1532    HYDROmorphone (DILAUDID) injection 0.25 mg  0.25 mg IntraVENous Q3H PRN Audrey Plummer MD        Or    HYDROmorphone (DILAUDID) injection 0.5 mg  0.5 mg IntraVENous Q3H PRN Audrey Plummer MD   0.5 mg at 02/18/23 1248    morphine (PF) injection 1 mg  1 mg IntraVENous Once Audrey Plummer MD        sodium chloride flush 0.9 % injection 5-40 mL  5-40 mL IntraVENous 2 times per day Arabella Cardenas MD   10 mL at 02/18/23 0903    sodium chloride flush 0.9 % injection 5-40 mL  5-40 mL IntraVENous PRN Arabella Cardenas MD        0.9 % sodium chloride infusion   IntraVENous PRN Arabella Cardenas MD        polyethylene glycol (GLYCOLAX) packet 17 g  17 g Oral Daily PRN Arabella Cardenas MD        acetaminophen (TYLENOL) tablet 650 mg  650 mg Oral 4x Daily PRN Arabella Cardenas MD        albuterol sulfate HFA (PROVENTIL;VENTOLIN;PROAIR) 108 (90 Base) MCG/ACT inhaler 2 puff  2 puff Inhalation Q4H PRN Arabella Cardenas MD        amitriptyline (ELAVIL) tablet 100 mg  100 mg Oral Nightly Arabella Cardenas MD        amLODIPine (NORVASC) tablet 5 mg  5 mg Oral Daily Arabella Cardenas MD        ARIPiprazole (ABILIFY) tablet 10 mg  10 mg Oral QHS Arabella Cardenas MD        baclofen (LIORESAL) tablet 10 mg  10 mg Oral Daily Arabella Cardenas MD        busPIRone (BUSPAR) tablet 5 mg  5 mg Oral TID Arabella Cardenas MD        docusate sodium (COLACE) capsule 100 mg  100 mg Oral BID PRN Arabella Cardenas MD        fluticasone (FLOVENT HFA) 110 MCG/ACT inhaler 2 puff  2 puff Inhalation BID Arabella Cardenas MD   2 puff at 02/18/23 3284    linaclotide (LINZESS) capsule 145 mcg (Patient Supplied)  145 mcg Oral QAM AC Arabella Cardenas MD        montelukast (SINGULAIR) tablet 10 mg  10 mg Oral Nightly Arabella Cardenas MD        pantoprazole (PROTONIX) tablet 40 mg  40 mg Oral Daily Jony Fountain MD        prazosin (MINIPRESS) capsule 1 mg  1 mg Oral Nightly Jony Fountain MD        promethazine (PHENERGAN) tablet 25 mg  25 mg Oral Q4H PRN Jony Fountain MD        propranolol (INDERAL LA) extended release capsule 60 mg  60 mg Oral Daily Jony Fountain MD        topiramate (TOPAMAX) tablet 100 mg  100 mg Oral BID Jony Fountain MD        insulin lispro (HUMALOG) injection vial 0-4 Units  0-4 Units SubCUTAneous TID WC Jony Fountain MD        insulin lispro (HUMALOG) injection vial 0-4 Units  0-4 Units SubCUTAneous Nightly Jony Fountain MD        lactated ringers IV soln infusion   IntraVENous Continuous Jony Fountain MD   Stopped at 02/18/23 1529    promethazine (PHENERGAN) injection 6.25 mg  6.25 mg IntraMUSCular Q6H PRN Jony Fountain MD   6.25 mg at 02/17/23 2350    glucose chewable tablet 16 g  4 tablet Oral PRN Jony Fountain MD        dextrose bolus 10% 125 mL  125 mL IntraVENous PRN Jony Fountain MD   Stopped at 02/17/23 2001    Or    dextrose bolus 10% 250 mL  250 mL IntraVENous PRN Jony Fountain MD        glucagon (rDNA) injection 1 mg  1 mg SubCUTAneous PRN Jony Fountain MD        dextrose 10 % infusion   IntraVENous Continuous PRN Jony Fountain MD        escitalopram (LEXAPRO) tablet 20 mg  20 mg Oral Daily Jony Fountain MD         Allergies   Allergen Reactions    Latex     Ketorolac Tromethamine Anaphylaxis     Throat swelling    Pcn [Penicillins] Anaphylaxis    Percocet [Oxycodone-Acetaminophen]      hallucinations    Amoxicillin Hives    Ciprofloxacin Hives and Swelling    Compazine [Prochlorperazine Maleate] Itching    Dicyclomine Hcl Hives    Fentanyl Other (See Comments)     Pt states that her \"lips start busting out. \"    Fioricet [Butalbital-Apap-Caffeine] Swelling     Of the mouth, tongue    Flexeril [Cyclobenzaprine] Hives    Gabapentin Swelling     tongue    Ibuprofen [Ibuprofen] Other (See Comments)     Mouth swelling and ulcers    Keflex [Cephalexin] Itching    Lisinopril     Lorazepam Hives    Losartan      Elevated pulse    Macrobid [Nitrofurantoin Monohyd Macro] Itching    Morphine Other (See Comments)     HEADACHE UP WAKING    Mushroom Extract Complex Swelling    Reglan [Metoclopramide] Itching    Vicodin [Hydrocodone-Acetaminophen] Itching    Vistaril [Hydroxyzine Hcl] Itching    Zofran [Ondansetron] Hives    Adhesive Tape Rash       Subjective:     Review of Systems   Constitutional:  Positive for appetite change and fatigue. Negative for activity change, chills, diaphoresis, fever and unexpected weight change. HENT:  Negative for congestion, dental problem, hearing loss, rhinorrhea, sinus pressure and sore throat. Eyes:  Negative for photophobia, pain, discharge, itching and visual disturbance. Respiratory:  Negative for apnea, cough, choking, chest tightness, shortness of breath and wheezing. Cardiovascular:  Negative for chest pain, palpitations and leg swelling. Gastrointestinal:  Positive for abdominal pain, diarrhea and nausea. Negative for abdominal distention, anal bleeding, blood in stool, constipation and vomiting. Endocrine: Negative. Genitourinary:  Negative for decreased urine volume, difficulty urinating, dysuria, frequency and urgency. Musculoskeletal:  Negative for arthralgias, back pain, gait problem, joint swelling, myalgias and neck pain. Skin:  Negative for color change, pallor, rash and wound. Allergic/Immunologic: Negative. Neurological:  Positive for headaches. Negative for dizziness, tremors, weakness and numbness. Hematological: Negative. Psychiatric/Behavioral: Negative.        Objective:   /80 (Site: Right Upper Arm, Position: Sitting, Cuff Size: Medium Adult)   Pulse 89   Temp 97.3 °F (36.3 °C) (Temporal)   Resp 20   Ht 5' 4\" (1.626 m)   Wt 290 lb 11.2 oz (131.9 kg)   LMP 12/11/2015   SpO2 98%   BMI 49.90 kg/m²   Wt Readings from Last 3 Encounters:   02/16/23 283 lb (128.4 kg) 02/15/23 289 lb (131.1 kg)   02/14/23 290 lb 11.2 oz (131.9 kg)         Physical Exam  Vitals reviewed. Constitutional:       General: She is not in acute distress. Appearance: Normal appearance. She is well-developed. She is obese. She is not ill-appearing or toxic-appearing. HENT:      Head: Normocephalic and atraumatic. Right Ear: Hearing and external ear normal.      Left Ear: Hearing and external ear normal.      Nose: Nose normal.      Mouth/Throat:      Mouth: Mucous membranes are not pale, not dry and not cyanotic. Eyes:      General: Lids are normal.   Neck:      Trachea: Trachea and phonation normal.   Cardiovascular:      Rate and Rhythm: Normal rate and regular rhythm. Pulses: Normal pulses. Heart sounds: S1 normal and S2 normal.   Pulmonary:      Effort: Pulmonary effort is normal. No tachypnea, bradypnea, accessory muscle usage or respiratory distress. Breath sounds: Normal breath sounds. No decreased breath sounds, wheezing or rales. Chest:      Chest wall: No tenderness. Abdominal:      General: Bowel sounds are normal. There is no distension. Palpations: Abdomen is soft. There is no mass. Tenderness: There is abdominal tenderness in the epigastric area. Musculoskeletal:         General: No tenderness. Normal range of motion. Cervical back: Normal range of motion and neck supple. Skin:     General: Skin is warm and dry. Findings: No abrasion, bruising, burn, ecchymosis, erythema, laceration, lesion or rash. Neurological:      Mental Status: She is alert and oriented to person, place, and time. Motor: No tremor, atrophy or abnormal muscle tone. Coordination: Coordination normal.      Gait: Gait normal.      Deep Tendon Reflexes: Reflexes are normal and symmetric. Psychiatric:         Speech: Speech normal.         Behavior: Behavior normal.         Thought Content:  Thought content normal.       Lab Results   Component Value Date    WBC 9.3 02/16/2023    HGB 11.1 (L) 02/16/2023    HCT 35.9 (L) 02/16/2023    MCV 74.8 (L) 02/16/2023     02/16/2023     Lab Results   Component Value Date/Time     02/17/2023 04:52 AM    K 3.9 02/17/2023 04:52 AM     02/17/2023 04:52 AM    CO2 24 02/17/2023 04:52 AM    BUN 8 02/17/2023 04:52 AM    CREATININE 0.6 02/17/2023 04:52 AM    GLUCOSE 84 02/17/2023 04:52 AM    GLUCOSE 78 12/23/2018 11:30 AM    CALCIUM 9.1 02/17/2023 04:52 AM      Patient Active Problem List   Diagnosis    Intractable abdominal pain    Nausea and vomiting    Carcinoid tumor    Pelvic inflammatory disease    Intractable nausea and vomiting    Anemia    Diet-controlled type 2 diabetes mellitus ([address]    Esophageal dysphagia    Esophageal stricture    Morbid obesity (HCC)    Migraine equivalent    Mild persistent asthma without complication    Dyslipidemia    Moderate persistent asthma with acute exacerbation    Gastroenteritis    Hematuria    Diarrhea    Diarrhea of presumed infectious origin    Hematochezia    Generalized abdominal pain    Hypertrophy of tonsil and adenoid    Multiple drug allergies    S/P T&A (status post tonsillectomy and adenoidectomy)    Iron deficiency anemia    Poor intravenous access    Abnormal Pap smear of cervix    Abnormal uterine bleeding    Exacerbation of asthma    Chest wall pain    Female pelvic pain    Head ache    Heartburn    Incarcerated ventral hernia    Malignant carcinoid tumor of other sites [facility])    Pyelonephritis    Spigelian hernia    Uterine leiomyoma    Acute kidney injury ([address]    Hypernatremia    Hypokalemia    Volume depletion, gastrointestinal loss    Essential hypertension    Hiatal hernia    S/P Nissen fundoplication (without gastrostomy tube) procedure    Sickle cell anemia (HCC)    Sickle cell disease (HCC)    Callus of heel    Foot pain, bilateral    Atypical chest pain    Tobacco use    History of gastritis    Weight gain    Chronic idiopathic constipation Acute respiratory failure (HCC)    Stroke-like symptom    Acute on chronic diastolic congestive heart failure (HCC)    Dysphagia    S/P robot-assisted surgical procedure    History of esophagogastroduodenoscopy (EGD)    Port-site hernia    Ventral hernia without obstruction or gangrene    Abdominal pain, epigastric    Tachycardia    Leukocytosis    Complication of gastrostomy tube [facility])       Assessment:     Status post diagnostic laparoscopy with extensive IVANIA, robotic insertion of G-tube on 1/13/23  Replacement of PEG tube by Dr. Arash Ochoa on 1/25/23  Epigastric pain  Nausea  Morbid obesity (BMI 48)  Possible gastroparesis     Plan:     Abdomen benign. Incisions are healing well without signs of infection. Continue wound care as directed. PEG in place. Working well. Continue tube feeds and water flushes per dietician recommendations. Follow up with her in 4 weeks. GI appointment. Gastric emptying study ordered. Continue clear liquids as tolerated  Okay for imodium as needed  Wear abdominal binder for comfort as needed  Ultram for pain control  Antiemetics as needed  Lifting/activity restrictions discussed with patient. Questions answered. Follow up in 2 weeks. Signs and symptoms reviewed with patient that would be concerning and need her to return to office for re-evaluation. Patient states she will call if she has questions or concerns.   Referral to [facility] if [facility] not wanted  Follow-up with PCP as directed       Electronically signed by AURELIO Andrade CNP on 2/18/2023 at 6:56 PM

## 2023-02-18 NOTE — DISCHARGE SUMMARY
Hospital Medicine Discharge Summary      Patient Identification:   Timbo Guadarrama   : 1990  MRN: [id number]   Account: [de-identified]      Patient's PCP: AURELIO Lozoya CNP    Admit Date: 2023     Discharge Date:   2023    Admitting Physician: Kellie Angeles MD     Discharge Physician: Kathrene Jeans, APRN - CNP     Discharge Diagnoses and Hospital Course:    Presented to the ED after a fall and PEG tube now leaking. Peg tube malfunction secondary to trauma. PEG tube noted to have holes in tube. She was kept NPO  and given IVF. GI consulted for replacement. They seen today. Tube was cut at bedside and ports were replaced. PEG was functional. TF restarted per dietician recs and tolerated. Seen by SLP who gave recs for soft/bite sized diet and thin liquids. Active Migraine, improved. Unable to take PO due to dysphagia. Given SQ Imitrex . Primary hypertension. Home regimen to resume. Type 2 diabetes. Held metformin IP. Given SSI. Hypoglycemic protocol in place. Hx of Asthma. No exacerbation. Hiatal hernia, aware. Anxiety/depression  Morbid obesity. BMI 48.58. PEG tube fixed by GI. Home today with OP follow up. The patient was seen and examined on day of discharge and this discharge summary is in conjunction with any daily progress note from day of discharge. Exam:     Vitals:  Vitals:    23 1004 23 1107 23 1115 23 1248   BP:  (!) 151/109 (!) 166/81    Pulse:  82     Resp:    Temp:  98.4 °F (36.9 °C)     TempSrc:  Oral     SpO2:  97%     Weight:       Height:         Weight: Weight: 283 lb (128.4 kg)     24 hour intake/output:  Intake/Output Summary (Last 24 hours) at 2023 1357  Last data filed at 2023 0645  Gross per 24 hour   Intake 2813 ml   Output --   Net 2813 ml           Labs:  For convenience and continuity at follow-up the following most recent labs are provided:      CBC:    Lab Results   Component Value Date/Time    WBC 9.3 02/16/2023 02:00 PM    HGB 11.1 02/16/2023 02:00 PM    HCT 35.9 02/16/2023 02:00 PM     02/16/2023 02:00 PM       Renal:    Lab Results   Component Value Date/Time     02/17/2023 04:52 AM    K 3.9 02/17/2023 04:52 AM     02/17/2023 04:52 AM    CO2 24 02/17/2023 04:52 AM    BUN 8 02/17/2023 04:52 AM    CREATININE 0.6 02/17/2023 04:52 AM    CALCIUM 9.1 02/17/2023 04:52 AM    PHOS 4.6 02/02/2023 10:00 AM         Significant Diagnostic Studies    Radiology:   CT ABDOMEN PELVIS W IV CONTRAST Additional Contrast? None   Final Result   1. No solid organ injury. No fracture. 2. The percutaneous gastrostomy tube terminates within the stomach unchanged in position from the prior study. No bowel obstruction, free fluid, fluid collection, or free air is identified. The left rectus musculature is thickened at the gastrostomy tube    insertion site however no abdominal wall fluid collection or subcutaneous fat stranding is observed. 3. Chronic findings are discussed. **This report has been created using voice recognition software. It may contain minor errors which are inherent in voice recognition technology. **      Final report electronically signed by Dr Chuck Ling on 2/16/2023 4:50 PM             Consults:     IP CONSULT TO GI    Disposition:    [x] Home       [] TCU       [] Rehab       [] Psych       [] SNF       [] [place]       [] Other-    Condition at Discharge: Stable    Code Status:  Full Code     Patient Instructions: Activity: activity as tolerated  Diet: ADULT DIET; Dysphagia - Soft and Bite Sized      Follow-up visits:   No follow-up provider specified.        Discharge Medications:        Medication List        CONTINUE taking these medications      acetaminophen 325 MG tablet  Commonly known as: TYLENOL  Take 2 tablets by mouth 4 times daily as needed for Pain or Fever     * ALBUTEROL SULFATE HFA IN     * albuterol (2.5 MG/3ML) 0.083% nebulizer solution  Commonly known as: PROVENTIL  Take 3 mLs by nebulization every 6 hours as needed for Wheezing     amitriptyline 100 MG tablet  Commonly known as: ELAVIL     amLODIPine 5 MG tablet  Commonly known as: NORVASC  Take 1 tablet by mouth daily     ARIPiprazole 1 MG/ML Soln solution  Commonly known as: ABILIFY     baclofen 10 MG tablet  Commonly known as: LIORESAL     busPIRone 5 MG tablet  Commonly known as: BUSPAR     Depend Silhouette Briefs L/XL Misc  Use as needed for urinary and bowel incontinence     docusate sodium 100 MG capsule  Commonly known as: COLACE  Take 1 capsule by mouth 2 times daily as needed for Constipation     E-Z Spacer [place]ina  1 Device by Does not apply route daily     escitalopram 5 MG/5ML solution  Commonly known as: LEXAPRO     famotidine 40 MG tablet  Commonly known as: Pepcid  Take 1 tablet by mouth nightly     fluticasone 110 MCG/ACT inhaler  Commonly known as: FLOVENT HFA     furosemide 20 MG tablet  Commonly known as: LASIX  TAKE 1 TABLET BY MOUTH ONCE A DAY     glucose monitoring kit  1 kit by Does not apply route daily Or whichever system insurance will pay for     linaclotide 145 MCG capsule  Commonly known as: LINZESS     metFORMIN HCl 500 MG/5ML Soln     montelukast 10 MG tablet  Commonly known as: SINGULAIR     pantoprazole 40 MG tablet  Commonly known as: PROTONIX     polyethylene glycol 17 GM/SCOOP powder  Commonly known as: GLYCOLAX  Dispense 238 Gram Bottle. Use as Directed     prazosin 1 MG capsule  Commonly known as: MINIPRESS     promethazine 25 MG tablet  Commonly known as: PHENERGAN     propranolol 60 MG extended release capsule  Commonly known as: INDERAL LA     topiramate 100 MG tablet  Commonly known as: TOPAMAX           * This list has 2 medication(s) that are the same as other medications prescribed for you. Read the directions carefully, and ask your doctor or other care provider to review them with you.                 STOP taking these medications      clopidogrel 75 MG tablet  Commonly known as: PLAVIX     Dulcolax 5 MG EC tablet  Generic drug: bisacodyl     erythromycin 400 MG/5ML suspension  Commonly known as: EES              Time Spent on discharge is more than 35 minutes in the examination, evaluation, counseling and review of medications and discharge plan. Signed: Thank you AURELIO Truong CNP for the opportunity to be involved in this patient's care.     Electronically signed by AURELIO Salazar CNP on 2/18/2023 at 1:57 PM

## 2023-02-18 NOTE — DISCHARGE INSTRUCTIONS
Learning About Living With a Feeding Tube  What is tube feeding? Your body needs nutrition to stay strong and help you live a healthy life. If you're unable to eat, or if you have an illness that makes it hard to swallow food, you may need a feeding tube. The tube is placed in the stomach and is used to give food, liquids, and medicines. Depending on why you need a feeding tube, you may have it for several weeks or months or longer. When you first get a feeding tube, your biggest challenge may be your new relationship with food. For many people, eating and savoring food is one of the most pleasing parts of daily life. You may grieve the loss of the daily habit of eating and the social aspects of sharing food with others. If you've struggled to get enough nutrition--if it's been hard to eat or swallow--having a feeding tube can help you regain your health and strength. And understanding how a feeding tube works is a first step toward dealing with changes that come with having the tube. It can also help you avoid common problems that can occur. What can you expect when you have a feeding tube? After surgery to insert a feeding tube, you'll have a 6- to 12-inch tube coming out of your belly. The tube is about the same width as a pen. There are different ways the tube can be used for feeding. Your doctor will help you decide which is best for you and how often feedings should occur. A feeding syringe. A syringe is connected to the tube. A nutritional mixture (formula) is put into the syringe and flows into the tube and your stomach. This is called bolus feeding. A gravity bag. Formula is placed into a special bag that is hung on a hook or a pole. The height and weight of the bag make the food flow down the tube and into your stomach. A bag and pump. A pump is used to push formula from a bag through the tube. This is also called continuous feeding. How do you use a feeding tube?   It's important that the food you use for tube feeding has the right blend of nutrients for you. And the food needs to be the correct thickness so the tube doesn't clog. For most people, a liquid formula that you can buy in a can works best for tube feeding. Your doctor or dietitian will help you find the right formula to use. Each time you use the tube for feeding:  Make sure that the tube-feeding formula is at room temperature. Wash your hands before you handle the tube and formula. Wash the top of the can of formula before you open it. Follow your doctor's instructions for how much formula to use for each feeding. If using a feeding syringe: Connect the syringe to the tube, and put the formula into the syringe. Hold the syringe up high so the formula flows into the tube. Use the plunger on the syringe to gently push any remaining formula into the tube. If using a gravity bag: Connect the bag to the tube, and add the formula to the bag. Hang the bag on a hook or pole about 18 inches above the stomach. Depending on the type of formula, the food may take a few hours to flow through the tube. Ask your doctor what you can expect and how long it should take. If using a bag and pump, follow the instructions that come with the pump. Flush the tube with warm water before and after feedings or before and after giving medicines through the tube. You can use a syringe to push water through the tube. Sit up or keep your head up during the feeding and for 30 to 60 minutes after. If you feel sick to your stomach or have stomach cramps during the feeding, slow the rate that the formula comes through the tube. Then slowly increase the rate as you can manage it. Keep the formula in the refrigerator after you open it. Follow your doctor's instructions about how long formula can sit out at room temperature. Throw away any open cans of food after 24 hours, even if they have been refrigerated.   Talk with your doctor about changing your feedings or medicines if you are having problems with diarrhea, constipation, or vomiting. How do you care for a feeding tube? Keep it clean. That's the most important thing you need to know about caring for your tube. Flush the tube with warm water before and after feedings or giving medicines. You can use a syringe to push water through the tube. Clean the end (opening) of the tube every day with an antiseptic wipe. Always wash your hands before touching the tube. Tape the tube to your body so the end is facing up. Look for medical tape in your local drugstore. It may irritate your skin less than other types of tape. Change the position of the tape every few days. Clamp the tube when you're not using it. Put the clamp close to your body so that food and liquids don't run down the tube. Keep the skin around the tube clean and dry. How do you avoid problems with a feeding tube? Blocked tube. A blocked tube can happen when the tube isn't flushed or when formula or medicines are too thick. Prevent blockage by flushing the tube with warm water before and after using the tube. If the tube is blocked, try to clear it by flushing the tube. Call your doctor if the tube won't clear. Don't use a wire or anything else to try to unclog a tube. A wire can poke a hole in the tube. Tube falls out. Don't try to put the tube back in by yourself. Call your doctor right away. The tube needs to be replaced before the opening in your belly closes, which can happen within hours. Leaking tube. A tube that leaks may be blocked, or it may not fit right. After checking the tube and flushing it to make sure that the tube isn't blocked, call your doctor. Where can you learn more? Go to http://www.woods.com/ and enter F416 to learn more about \"Learning About Living With a Feeding Tube. \"  Current as of: May 9, 2022               Content Version: 13.5  © 7837-4034 [employer].    Care instructions adapted under license by [facility] ([facility]). If you have questions about a medical condition or this instruction, always ask your healthcare professional. [address] any warranty or liability for your use of this information.

## 2023-02-18 NOTE — PLAN OF CARE
Problem: Respiratory - Adult  Goal: Achieves optimal ventilation and oxygenation  2/18/2023 0831 by Enrrique Garcia RCP  Outcome: Adequate for Discharge     Problem: Respiratory - Adult  Goal: Clear lung sounds  Description: Clear lung sounds  Outcome: Adequate for Discharge     Patient mutually agreed on goals.

## 2023-02-18 NOTE — PROGRESS NOTES
[facility] [facility] 5[facility]  Clinical Swallow Evaluation      SLP Individual Minutes  Time In: 1844  Time Out: 7627  Minutes: 8  Timed Code Treatment Minutes: 0 Minutes       Date: 2023  Patient Name: Sara Fairchild      CSN: [id number]   : 1990  (28 y.o.)  Gender: female   Referring Physician:  AURELIO Silva CNP    Diagnosis: <principal problem not specified>    History of Present Illness/Injury: Patient admit to [facility] with above medical dx. Per physician H&P, patient is a \"31 y.o. female with an hx of HTN, DM 2, Asthma, sickle cell trait, migraine, anxiety and depression who presented to [address] [place] [address] for evaluation of feeding tube leakage. Patient reports walking home from her doctor's appointment yesterday. She felt lightheaded and fell on her way home. She sustained trauma to her PEG tube. Overnight, she noticed leakage around her feeding tube site and came in today for evaluation. Patient had her PEG tube originally placed on 2023 by Dr. Holden Gamez. Patient's G-tube became dislodged and was replaced on 2023 by Dr. Arianna Chacon. Patient PEG tube was placed due to hiatal hernia, leukocytosis, nausea/vomiting, dehydration chronic epigastric pain. Patient denies chest pain, shortness of breath, palpitations, lightheadedness, dizziness, syncope or LOC. In the ED, CT scan of the abdomen does show the G-tube terminates within the stomach unchanged in position from prior study. However, after attempting to flush it was determined that there was leakage from the site. GI was contacted by ED provided who advised admission with plans for PEG tube replacement. Hospitalist was contacted for admission. Please refer to A&P for further information. \"   ST consult for CSE to further assess and determine patient appropriateness to resume PO intake.      Past Medical History:   Diagnosis Date    Acute on chronic diastolic congestive heart failure ([facility] 6/25/2022    JANI (acute kidney injury) ([facility] 7/7/2019    Anemia     Anesthesia     migraines    Anxiety     Asthma     CAD (coronary artery disease)     Depression     Diabetes ([facility]     Diet-controlled type 2 diabetes mellitus ([facility] 2016    Epilepsy ([facility]     Fibroids     Gastro - esophageal reflux disease     Hypertension     Hypertrophy of tonsil and adenoid 11/4/2017    Malignant carcinoid tumor of other sites ([facility] 5/14/2013    Migraine     PONV (postoperative nausea and vomiting)     Prolonged emergence from general anesthesia     Sickle cell anemia (HCC)     Sickle cell trait ([facility]     PT STATES SHE HAS THE TRAIT NOT THE DISEASE    Tumor associated pain     neuroendrocrine tumor, gastroma       SUBJECTIVE:  Session approved by Aletha Anaya. Patient seen upright in bed. Pleasant and cooperative. OBJECTIVE:    Pain:  No pain reported. Current Diet: NPO (baseline diet is soft/bite sized and thin liquids)    Respiratory Status:  Room Air    Behavioral Observation:  Alert and cooperative    CRANIAL NERVE ASSESSMENT   CN V (Trigeminal) Closes and Opens Mandible WFL    Rotary Jaw Movement WFL      CN VII (Facial) Cheeks Hold Food out of Sulci WFL    Opens, Closes/Seals, Protrudes, Retracts Lips WFL    General Appearance WFL    Sensation Not Tested      CN X (Vagus - Pharyngeal) Raises Back of Tongue WFL      CN XI (Accessory) Lifts Soft Palate WFL      CN XII (Hypoglossal) Elevates Tongue Up and Back WFL    Protrusion   WFL    Lateralizes Tongue WFL    Sensation Not Tested      Other Observations Dentition Intact     Vocal Quality WFL     Cough Not tested      Patient Evaluated Using: Thin Liquids, Puree, and Coarse Solids    Oral Phase:  WFL    Pharyngeal Phase: WFL:  Pharyngeal phase appears WFL but cannot rule out pharyngeal phase deficits from a bedside swallowing evaluation alone.     Signs and Symptoms of Laryngeal Penetration/Aspiration: No signs/symptoms of aspiration evident in this evaluation, but cannot rule out silent aspiration. Impressions: Patient presents with Mod I oral swallow function with inability to fully discern potential presence of pharyngeal phase deficits without formal instrumentation. Demonstration of slow mastication; however, effective bolus formation/transit when provided with extra time. Patient with what appeared to be a timely pharyngeal swallow and absence of overt s/s of airway invasion. Admits to difficulty chewing up breads as well as meats that aren't cut up. Recommendations for resumption of soft/bite sized diet and thin liquids. No further ST services warranted at this time. RECOMMENDATIONS/ASSESSMENT:  Instrumental Evaluation: Instrumental evaluation not indicated at this time. Diet Recommendations:  Soft/bite sized diet and thin liquids   Strategies:  Full Upright Position, Small Bite/Sip, and Alternate Solids and Liquids   Rehabilitation Potential: excellent  Discharge Recommendations: Home with Home Exercise Program    EDUCATION:  Learner: Patient  Education:  Reviewed results and recommendations of this evaluation and Reviewed diet and strategies  Evaluation of Education: Verbalizes understanding and Family not present    PLAN:  No further speech therapy services indicated. PATIENT GOAL:    Return to least restrictive diet.       Liliana Markham M.S. Kush Jacob 2/18/2023

## 2023-02-18 NOTE — PLAN OF CARE
Problem: Pain  Goal: Verbalizes/displays adequate comfort level or baseline comfort level  2/17/2023 2230 by Claudene Plater, RN  Outcome: Progressing  2/17/2023 1827 by Matthew Savage RN  Outcome: Progressing  Flowsheets (Taken 2/17/2023 1827)  Verbalizes/displays adequate comfort level or baseline comfort level:   Encourage patient to monitor pain and request assistance   Assess pain using appropriate pain scale   Administer analgesics based on type and severity of pain and evaluate response   Implement non-pharmacological measures as appropriate and evaluate response     Problem: Skin/Tissue Integrity  Goal: Absence of new skin breakdown  Description: 1. Monitor for areas of redness and/or skin breakdown  2. Assess vascular access sites hourly  3. Every 4-6 hours minimum:  Change oxygen saturation probe site  4. Every 4-6 hours:  If on nasal continuous positive airway pressure, respiratory therapy assess nares and determine need for appliance change or resting period. 2/17/2023 2230 by Claudene Plater, RN  Outcome: Progressing  2/17/2023 1827 by Matthew Savage RN  Outcome: Progressing     Problem: Safety - Adult  Goal: Free from fall injury  2/17/2023 2230 by Claudene Plater, RN  Outcome: Progressing  2/17/2023 1827 by Matthew Savage RN  Outcome: Progressing  Flowsheets (Taken 2/17/2023 1827)  Free From Fall Injury: Instruct family/caregiver on patient safety     Problem: Risk for Elopement  Goal: Patient will not exit the unit/facility without proper excort  2/17/2023 2230 by Claudene Plater, RN  Outcome: Progressing  2/17/2023 1827 by Matthew Savage RN  Outcome: Progressing  Flowsheets (Taken 2/17/2023 1827)  Nursing Interventions for Elopement Risk:   Assist with personal care needs such as toileting, eating, dressing, as needed to reduce the risk of wandering   Make sure patient has all necessary personal care items   Care plan reviewed with patient.   Patient verbalizes understanding of the plan of care and contributes to goal setting.

## 2023-02-18 NOTE — DISCHARGE INSTR - DIET
Good nutrition is important when healing from an illness, injury, or surgery. Follow any nutrition recommendations given to you during your hospital stay. If you were given an oral nutrition supplement while in the hospital, continue to take this supplement at home. You can take it with meals, in-between meals, and/or before bedtime. These supplements can be purchased at most local grocery stores, pharmacies, and chain [employer]-stores. If you have any questions about your diet or nutrition, call the hospital and ask for the dietitian.   Soft bite sized food

## 2023-02-20 ENCOUNTER — HOSPITAL ENCOUNTER (OUTPATIENT)
Dept: NON INVASIVE DIAGNOSTICS | Age: 33
Discharge: HOME | End: 2023-02-20
Payer: MEDICARE

## 2023-02-20 ENCOUNTER — HOSPITAL ENCOUNTER (OUTPATIENT)
Age: 33
Setting detail: OBSERVATION
Discharge: HOME | End: 2023-02-22
Attending: EMERGENCY MEDICINE | Admitting: INTERNAL MEDICINE
Payer: MEDICARE

## 2023-02-20 DIAGNOSIS — R06.02 SOB (SHORTNESS OF BREATH): ICD-10-CM

## 2023-02-20 DIAGNOSIS — K94.23 LEAKING PERCUTANEOUS ENDOSCOPIC GASTROSTOMY (PEG) TUBE (HCC): Primary | ICD-10-CM

## 2023-02-20 DIAGNOSIS — Z98.890 S/P ROBOT-ASSISTED SURGICAL PROCEDURE: ICD-10-CM

## 2023-02-20 DIAGNOSIS — R06.09 DOE (DYSPNEA ON EXERTION): ICD-10-CM

## 2023-02-20 LAB
LV EF: 58 %
LVEF MODALITY: NORMAL

## 2023-02-20 PROCEDURE — 96374 THER/PROPH/DIAG INJ IV PUSH: CPT

## 2023-02-20 PROCEDURE — 99285 EMERGENCY DEPT VISIT HI MDM: CPT

## 2023-02-20 PROCEDURE — 93307 TTE W/O DOPPLER COMPLETE: CPT

## 2023-02-20 RX ORDER — SODIUM CHLORIDE, SODIUM LACTATE, POTASSIUM CHLORIDE, CALCIUM CHLORIDE 600; 310; 30; 20 MG/100ML; MG/100ML; MG/100ML; MG/100ML
INJECTION, SOLUTION INTRAVENOUS CONTINUOUS
Status: ACTIVE | OUTPATIENT
Start: 2023-02-21 | End: 2023-02-22

## 2023-02-20 RX ORDER — TRAMADOL HYDROCHLORIDE 50 MG/1
50-100 TABLET ORAL EVERY 6 HOURS PRN
Qty: 30 TABLET | Refills: 0 | Status: SHIPPED | OUTPATIENT
Start: 2023-02-20 | End: 2023-02-27

## 2023-02-20 ASSESSMENT — PAIN DESCRIPTION - LOCATION: LOCATION: ABDOMEN

## 2023-02-20 ASSESSMENT — PAIN SCALES - GENERAL: PAINLEVEL_OUTOF10: 8

## 2023-02-20 ASSESSMENT — PAIN - FUNCTIONAL ASSESSMENT: PAIN_FUNCTIONAL_ASSESSMENT: 0-10

## 2023-02-20 NOTE — CARE COORDINATION
2/20/23, 7:22 AM EST    Patient goals/plan/ treatment preferences discussed by  and . Patient goals/plan/ treatment preferences reviewed with patient/ family. Patient/ family verbalize understanding of discharge plan and are in agreement with goal/plan/treatment preferences. Understanding was demonstrated using the teach back method. AVS provided by RN at time of discharge, which includes all necessary medical information pertaining to the patients current course of illness, treatment, post-discharge goals of care, and treatment preferences. Services At/After Discharge: Home Health       IMM Letter  IMM Letter given to Patient/Family/Significant other/Guardian/POA/by[de-identified] Pt. Access  IMM Letter date given[de-identified] 02/16/23  IMM Letter time given[de-identified] 1909  Observation Status Letter date given[de-identified] 02/16/23  Observation Status Letter time given[de-identified] 1909     Patient [facility] with [facility].

## 2023-02-21 PROBLEM — K94.23 PEG TUBE MALFUNCTION (HCC): Status: ACTIVE | Noted: 2023-02-21

## 2023-02-21 LAB
ALBUMIN SERPL BCG-MCNC: 3.8 G/DL (ref 3.5–5.1)
ALP SERPL-CCNC: 105 U/L (ref 38–126)
ALT SERPL W/O P-5'-P-CCNC: 15 U/L (ref 11–66)
ANION GAP SERPL CALC-SCNC: 12 MEQ/L (ref 8–16)
AST SERPL-CCNC: 17 U/L (ref 5–40)
BASOPHILS ABSOLUTE: 0 THOU/MM3 (ref 0–0.1)
BASOPHILS NFR BLD AUTO: 0.3 %
BILIRUB CONJ SERPL-MCNC: < 0.2 MG/DL (ref 0–0.3)
BILIRUB SERPL-MCNC: < 0.2 MG/DL (ref 0.3–1.2)
BUN SERPL-MCNC: 12 MG/DL (ref 7–22)
CALCIUM SERPL-MCNC: 9.5 MG/DL (ref 8.5–10.5)
CHLORIDE SERPL-SCNC: 104 MEQ/L (ref 98–111)
CO2 SERPL-SCNC: 25 MEQ/L (ref 23–33)
CREAT SERPL-MCNC: 0.6 MG/DL (ref 0.4–1.2)
DEPRECATED RDW RBC AUTO: 42.6 FL (ref 35–45)
EOSINOPHIL NFR BLD AUTO: 1.9 %
EOSINOPHILS ABSOLUTE: 0.2 THOU/MM3 (ref 0–0.4)
ERYTHROCYTE [DISTWIDTH] IN BLOOD BY AUTOMATED COUNT: 16 % (ref 11.5–14.5)
FERRITIN SERPL IA-MCNC: 61 NG/ML (ref 10–291)
GFR SERPL CREATININE-BSD FRML MDRD: > 60 ML/MIN/1.73M2
GLUCOSE BLD STRIP.AUTO-MCNC: 63 MG/DL (ref 70–108)
GLUCOSE BLD STRIP.AUTO-MCNC: 83 MG/DL (ref 70–108)
GLUCOSE BLD STRIP.AUTO-MCNC: 88 MG/DL (ref 70–108)
GLUCOSE SERPL-MCNC: 96 MG/DL (ref 70–108)
HCT VFR BLD AUTO: 34.6 % (ref 37–47)
HGB BLD-MCNC: 10.9 GM/DL (ref 12–16)
IMM GRANULOCYTES # BLD AUTO: 0.06 THOU/MM3 (ref 0–0.07)
IMM GRANULOCYTES NFR BLD AUTO: 0.5 %
IRON SATN MFR SERPL: 8 % (ref 20–50)
IRON SERPL-MCNC: 28 UG/DL (ref 50–170)
LIPASE SERPL-CCNC: 50.6 U/L (ref 5.6–51.3)
LYMPHOCYTES ABSOLUTE: 3 THOU/MM3 (ref 1–4.8)
LYMPHOCYTES NFR BLD AUTO: 24.3 %
MCH RBC QN AUTO: 23.4 PG (ref 26–33)
MCHC RBC AUTO-ENTMCNC: 31.5 GM/DL (ref 32.2–35.5)
MCV RBC AUTO: 74.4 FL (ref 81–99)
MONOCYTES ABSOLUTE: 0.7 THOU/MM3 (ref 0.4–1.3)
MONOCYTES NFR BLD AUTO: 6 %
NEUTROPHILS NFR BLD AUTO: 67 %
NRBC BLD AUTO-RTO: 0 /100 WBC
OSMOLALITY SERPL CALC.SUM OF ELEC: 280.9 MOSMOL/KG (ref 275–300)
PLATELET # BLD AUTO: 316 THOU/MM3 (ref 130–400)
PMV BLD AUTO: 9.4 FL (ref 9.4–12.4)
POTASSIUM SERPL-SCNC: 4.1 MEQ/L (ref 3.5–5.2)
PROT SERPL-MCNC: 7.5 G/DL (ref 6.1–8)
RBC # BLD AUTO: 4.65 MILL/MM3 (ref 4.2–5.4)
SEGMENTED NEUTROPHILS ABSOLUTE COUNT: 8.2 THOU/MM3 (ref 1.8–7.7)
SODIUM SERPL-SCNC: 141 MEQ/L (ref 135–145)
TIBC SERPL-MCNC: 360 UG/DL (ref 171–450)
WBC # BLD AUTO: 12.3 THOU/MM3 (ref 4.8–10.8)

## 2023-02-21 PROCEDURE — 6360000002 HC RX W HCPCS: Performed by: PSYCHIATRY & NEUROLOGY

## 2023-02-21 PROCEDURE — 96366 THER/PROPH/DIAG IV INF ADDON: CPT

## 2023-02-21 PROCEDURE — 96376 TX/PRO/DX INJ SAME DRUG ADON: CPT

## 2023-02-21 PROCEDURE — 80076 HEPATIC FUNCTION PANEL: CPT

## 2023-02-21 PROCEDURE — 83690 ASSAY OF LIPASE: CPT

## 2023-02-21 PROCEDURE — 6370000000 HC RX 637 (ALT 250 FOR IP): Performed by: PHYSICIAN ASSISTANT

## 2023-02-21 PROCEDURE — 2580000003 HC RX 258: Performed by: STUDENT IN AN ORGANIZED HEALTH CARE EDUCATION/TRAINING PROGRAM

## 2023-02-21 PROCEDURE — 82728 ASSAY OF FERRITIN: CPT

## 2023-02-21 PROCEDURE — G0378 HOSPITAL OBSERVATION PER HR: HCPCS

## 2023-02-21 PROCEDURE — 96361 HYDRATE IV INFUSION ADD-ON: CPT

## 2023-02-21 PROCEDURE — 83540 ASSAY OF IRON: CPT

## 2023-02-21 PROCEDURE — C9113 INJ PANTOPRAZOLE SODIUM, VIA: HCPCS | Performed by: PHYSICIAN ASSISTANT

## 2023-02-21 PROCEDURE — 2580000003 HC RX 258: Performed by: PHYSICIAN ASSISTANT

## 2023-02-21 PROCEDURE — 36415 COLL VENOUS BLD VENIPUNCTURE: CPT

## 2023-02-21 PROCEDURE — 80048 BASIC METABOLIC PNL TOTAL CA: CPT

## 2023-02-21 PROCEDURE — 6370000000 HC RX 637 (ALT 250 FOR IP): Performed by: PSYCHIATRY & NEUROLOGY

## 2023-02-21 PROCEDURE — 83550 IRON BINDING TEST: CPT

## 2023-02-21 PROCEDURE — 82948 REAGENT STRIP/BLOOD GLUCOSE: CPT

## 2023-02-21 PROCEDURE — 94640 AIRWAY INHALATION TREATMENT: CPT

## 2023-02-21 PROCEDURE — 6360000002 HC RX W HCPCS: Performed by: STUDENT IN AN ORGANIZED HEALTH CARE EDUCATION/TRAINING PROGRAM

## 2023-02-21 PROCEDURE — 6360000002 HC RX W HCPCS: Performed by: PHYSICIAN ASSISTANT

## 2023-02-21 PROCEDURE — 85025 COMPLETE CBC W/AUTO DIFF WBC: CPT

## 2023-02-21 PROCEDURE — 2580000003 HC RX 258: Performed by: PSYCHIATRY & NEUROLOGY

## 2023-02-21 PROCEDURE — 96375 TX/PRO/DX INJ NEW DRUG ADDON: CPT

## 2023-02-21 PROCEDURE — 96365 THER/PROPH/DIAG IV INF INIT: CPT

## 2023-02-21 PROCEDURE — 99221 1ST HOSP IP/OBS SF/LOW 40: CPT | Performed by: PHYSICIAN ASSISTANT

## 2023-02-21 RX ORDER — POLYETHYLENE GLYCOL 3350 17 G/17G
17 POWDER, FOR SOLUTION ORAL DAILY PRN
Status: DISCONTINUED | OUTPATIENT
Start: 2023-02-21 | End: 2023-02-23 | Stop reason: HOSPADM

## 2023-02-21 RX ORDER — FLUTICASONE PROPIONATE 110 UG/1
2 AEROSOL, METERED RESPIRATORY (INHALATION) 2 TIMES DAILY
Status: DISCONTINUED | OUTPATIENT
Start: 2023-02-21 | End: 2023-02-23 | Stop reason: HOSPADM

## 2023-02-21 RX ORDER — INSULIN LISPRO 100 [IU]/ML
0-4 INJECTION, SOLUTION INTRAVENOUS; SUBCUTANEOUS NIGHTLY
Status: DISCONTINUED | OUTPATIENT
Start: 2023-02-21 | End: 2023-02-23 | Stop reason: HOSPADM

## 2023-02-21 RX ORDER — POTASSIUM CHLORIDE 20 MEQ/1
40 TABLET, EXTENDED RELEASE ORAL PRN
Status: DISCONTINUED | OUTPATIENT
Start: 2023-02-21 | End: 2023-02-23 | Stop reason: HOSPADM

## 2023-02-21 RX ORDER — ACETAMINOPHEN 650 MG/1
650 SUPPOSITORY RECTAL EVERY 6 HOURS PRN
Status: DISCONTINUED | OUTPATIENT
Start: 2023-02-21 | End: 2023-02-23 | Stop reason: HOSPADM

## 2023-02-21 RX ORDER — FAMOTIDINE 20 MG/1
40 TABLET, FILM COATED ORAL NIGHTLY
Status: DISCONTINUED | OUTPATIENT
Start: 2023-02-21 | End: 2023-02-23 | Stop reason: HOSPADM

## 2023-02-21 RX ORDER — ESCITALOPRAM OXALATE 20 MG/1
20 TABLET ORAL DAILY
Status: DISCONTINUED | OUTPATIENT
Start: 2023-02-21 | End: 2023-02-23 | Stop reason: HOSPADM

## 2023-02-21 RX ORDER — SODIUM CHLORIDE 9 MG/ML
INJECTION, SOLUTION INTRAVENOUS PRN
Status: DISCONTINUED | OUTPATIENT
Start: 2023-02-21 | End: 2023-02-23 | Stop reason: HOSPADM

## 2023-02-21 RX ORDER — ARIPIPRAZOLE 10 MG/1
10 TABLET ORAL
Status: DISCONTINUED | OUTPATIENT
Start: 2023-02-21 | End: 2023-02-23 | Stop reason: HOSPADM

## 2023-02-21 RX ORDER — SODIUM CHLORIDE 9 MG/ML
INJECTION, SOLUTION INTRAVENOUS CONTINUOUS
Status: DISCONTINUED | OUTPATIENT
Start: 2023-02-21 | End: 2023-02-21

## 2023-02-21 RX ORDER — BACLOFEN 10 MG/1
10 TABLET ORAL DAILY
Status: DISCONTINUED | OUTPATIENT
Start: 2023-02-21 | End: 2023-02-23 | Stop reason: HOSPADM

## 2023-02-21 RX ORDER — BUSPIRONE HYDROCHLORIDE 5 MG/1
5 TABLET ORAL 3 TIMES DAILY
Status: DISCONTINUED | OUTPATIENT
Start: 2023-02-21 | End: 2023-02-23 | Stop reason: HOSPADM

## 2023-02-21 RX ORDER — SODIUM CHLORIDE 0.9 % (FLUSH) 0.9 %
5-40 SYRINGE (ML) INJECTION PRN
Status: DISCONTINUED | OUTPATIENT
Start: 2023-02-21 | End: 2023-02-23 | Stop reason: HOSPADM

## 2023-02-21 RX ORDER — ALBUTEROL SULFATE 2.5 MG/3ML
2.5 SOLUTION RESPIRATORY (INHALATION) EVERY 6 HOURS PRN
Status: DISCONTINUED | OUTPATIENT
Start: 2023-02-21 | End: 2023-02-23 | Stop reason: HOSPADM

## 2023-02-21 RX ORDER — PROPRANOLOL HCL 60 MG
60 CAPSULE, EXTENDED RELEASE 24HR ORAL DAILY
Status: DISCONTINUED | OUTPATIENT
Start: 2023-02-21 | End: 2023-02-23 | Stop reason: HOSPADM

## 2023-02-21 RX ORDER — MONTELUKAST SODIUM 10 MG/1
10 TABLET ORAL NIGHTLY
Status: DISCONTINUED | OUTPATIENT
Start: 2023-02-21 | End: 2023-02-23 | Stop reason: HOSPADM

## 2023-02-21 RX ORDER — INSULIN LISPRO 100 [IU]/ML
0-4 INJECTION, SOLUTION INTRAVENOUS; SUBCUTANEOUS
Status: DISCONTINUED | OUTPATIENT
Start: 2023-02-21 | End: 2023-02-23 | Stop reason: HOSPADM

## 2023-02-21 RX ORDER — AMITRIPTYLINE HYDROCHLORIDE 100 MG/1
100 TABLET, FILM COATED ORAL NIGHTLY
Status: DISCONTINUED | OUTPATIENT
Start: 2023-02-21 | End: 2023-02-21 | Stop reason: ALTCHOICE

## 2023-02-21 RX ORDER — POTASSIUM CHLORIDE 7.45 MG/ML
10 INJECTION INTRAVENOUS PRN
Status: DISCONTINUED | OUTPATIENT
Start: 2023-02-21 | End: 2023-02-23 | Stop reason: HOSPADM

## 2023-02-21 RX ORDER — PRAZOSIN HYDROCHLORIDE 1 MG/1
1 CAPSULE ORAL NIGHTLY
Status: DISCONTINUED | OUTPATIENT
Start: 2023-02-21 | End: 2023-02-23 | Stop reason: HOSPADM

## 2023-02-21 RX ORDER — ACETAMINOPHEN 325 MG/1
650 TABLET ORAL EVERY 6 HOURS PRN
Status: DISCONTINUED | OUTPATIENT
Start: 2023-02-21 | End: 2023-02-23 | Stop reason: HOSPADM

## 2023-02-21 RX ORDER — MAGNESIUM SULFATE IN WATER 40 MG/ML
2000 INJECTION, SOLUTION INTRAVENOUS PRN
Status: DISCONTINUED | OUTPATIENT
Start: 2023-02-21 | End: 2023-02-23 | Stop reason: HOSPADM

## 2023-02-21 RX ORDER — HYDRALAZINE HYDROCHLORIDE 20 MG/ML
5 INJECTION INTRAMUSCULAR; INTRAVENOUS EVERY 6 HOURS PRN
Status: DISCONTINUED | OUTPATIENT
Start: 2023-02-21 | End: 2023-02-23 | Stop reason: HOSPADM

## 2023-02-21 RX ORDER — PANTOPRAZOLE SODIUM 40 MG/10ML
40 INJECTION, POWDER, LYOPHILIZED, FOR SOLUTION INTRAVENOUS DAILY
Status: DISCONTINUED | OUTPATIENT
Start: 2023-02-21 | End: 2023-02-23 | Stop reason: HOSPADM

## 2023-02-21 RX ORDER — FUROSEMIDE 40 MG/1
20 TABLET ORAL DAILY
Status: DISCONTINUED | OUTPATIENT
Start: 2023-02-21 | End: 2023-02-21 | Stop reason: ALTCHOICE

## 2023-02-21 RX ORDER — SODIUM CHLORIDE 0.9 % (FLUSH) 0.9 %
5-40 SYRINGE (ML) INJECTION EVERY 12 HOURS SCHEDULED
Status: DISCONTINUED | OUTPATIENT
Start: 2023-02-21 | End: 2023-02-23 | Stop reason: HOSPADM

## 2023-02-21 RX ORDER — DEXTROSE MONOHYDRATE 100 MG/ML
INJECTION, SOLUTION INTRAVENOUS CONTINUOUS PRN
Status: DISCONTINUED | OUTPATIENT
Start: 2023-02-21 | End: 2023-02-23 | Stop reason: HOSPADM

## 2023-02-21 RX ORDER — AMLODIPINE BESYLATE 5 MG/1
5 TABLET ORAL DAILY
Status: DISCONTINUED | OUTPATIENT
Start: 2023-02-21 | End: 2023-02-23 | Stop reason: HOSPADM

## 2023-02-21 RX ORDER — TOPIRAMATE 100 MG/1
100 TABLET, FILM COATED ORAL 2 TIMES DAILY
Status: DISCONTINUED | OUTPATIENT
Start: 2023-02-21 | End: 2023-02-23 | Stop reason: HOSPADM

## 2023-02-21 RX ADMIN — IRON SUCROSE 300 MG: 20 INJECTION, SOLUTION INTRAVENOUS at 18:35

## 2023-02-21 RX ADMIN — Medication 16 G: at 18:59

## 2023-02-21 RX ADMIN — HYDROMORPHONE HYDROCHLORIDE 0.25 MG: 1 INJECTION, SOLUTION INTRAMUSCULAR; INTRAVENOUS; SUBCUTANEOUS at 15:46

## 2023-02-21 RX ADMIN — FLUTICASONE PROPIONATE 2 PUFF: 110 AEROSOL, METERED RESPIRATORY (INHALATION) at 17:04

## 2023-02-21 RX ADMIN — SODIUM CHLORIDE, PRESERVATIVE FREE 10 ML: 5 INJECTION INTRAVENOUS at 09:09

## 2023-02-21 RX ADMIN — PANTOPRAZOLE SODIUM 40 MG: 40 INJECTION, POWDER, FOR SOLUTION INTRAVENOUS at 08:58

## 2023-02-21 RX ADMIN — SODIUM CHLORIDE: 9 INJECTION, SOLUTION INTRAVENOUS at 04:27

## 2023-02-21 RX ADMIN — HYDROMORPHONE HYDROCHLORIDE 0.5 MG: 1 INJECTION, SOLUTION INTRAMUSCULAR; INTRAVENOUS; SUBCUTANEOUS at 22:30

## 2023-02-21 RX ADMIN — HYDROMORPHONE HYDROCHLORIDE 0.25 MG: 1 INJECTION, SOLUTION INTRAMUSCULAR; INTRAVENOUS; SUBCUTANEOUS at 05:02

## 2023-02-21 RX ADMIN — HYDROMORPHONE HYDROCHLORIDE 0.5 MG: 1 INJECTION, SOLUTION INTRAMUSCULAR; INTRAVENOUS; SUBCUTANEOUS at 02:07

## 2023-02-21 RX ADMIN — SODIUM CHLORIDE, POTASSIUM CHLORIDE, SODIUM LACTATE AND CALCIUM CHLORIDE: 600; 310; 30; 20 INJECTION, SOLUTION INTRAVENOUS at 01:27

## 2023-02-21 RX ADMIN — HYDROMORPHONE HYDROCHLORIDE 0.25 MG: 1 INJECTION, SOLUTION INTRAMUSCULAR; INTRAVENOUS; SUBCUTANEOUS at 10:46

## 2023-02-21 RX ADMIN — HYDROMORPHONE HYDROCHLORIDE 0.25 MG: 1 INJECTION, SOLUTION INTRAMUSCULAR; INTRAVENOUS; SUBCUTANEOUS at 20:13

## 2023-02-21 ASSESSMENT — ENCOUNTER SYMPTOMS
ABDOMINAL PAIN: 1
EYES NEGATIVE: 1
RESPIRATORY NEGATIVE: 1
ALLERGIC/IMMUNOLOGIC NEGATIVE: 1

## 2023-02-21 ASSESSMENT — PAIN DESCRIPTION - ORIENTATION
ORIENTATION: MID

## 2023-02-21 ASSESSMENT — PAIN SCALES - GENERAL
PAINLEVEL_OUTOF10: 9
PAINLEVEL_OUTOF10: 10
PAINLEVEL_OUTOF10: 9
PAINLEVEL_OUTOF10: 9
PAINLEVEL_OUTOF10: 10

## 2023-02-21 ASSESSMENT — PAIN - FUNCTIONAL ASSESSMENT
PAIN_FUNCTIONAL_ASSESSMENT: 0-10
PAIN_FUNCTIONAL_ASSESSMENT: 0-10

## 2023-02-21 ASSESSMENT — PAIN DESCRIPTION - DESCRIPTORS
DESCRIPTORS: SHARP;STABBING
DESCRIPTORS: SHARP;BURNING
DESCRIPTORS: STABBING;SHARP

## 2023-02-21 ASSESSMENT — PAIN DESCRIPTION - LOCATION
LOCATION: ABDOMEN

## 2023-02-21 NOTE — PROGRESS NOTES
Comprehensive Nutrition Assessment    Type and Reason for Visit:  Initial, RD Nutrition Re-Screen/LOS (PEG tube)    Nutrition Recommendations/Plan:   NPO vs Diet per Physician  When able to resume TF - recommend substitute Vital 1.2 at 60 ml/hr (for home regimen as [facility] does not carry Vital 1.5). Recommend 180 ml free water flush three times/day (if no IVF). Recommend folllow up with OP RD at discharge (pt. Followed pta). Will monitor need for additional nutrition interventions. Malnutrition Assessment:  Malnutrition Status: At risk for malnutrition (Comment) (02/21/23 1218)    Context:  Acute Illness     Findings of the 6 clinical characteristics of malnutrition:  Energy Intake:  Mild decrease in energy intake (Comment)  Weight Loss:  No significant weight loss     Body Fat Loss:  No significant body fat loss     Muscle Mass Loss:  No significant muscle mass loss    Fluid Accumulation:  No significant fluid accumulation     Strength:  Not Performed    Nutrition Assessment:     Pt. nutritionally compromised AEB NPO; TF held. At risk for further nutrition compromise r/t admit with PEG tube leakage (PEG tube was placed due to hiatal hernia, leukocytosis, nausea/vomiting, dehydration chronic epigastric pain), recent admit 2/16-2/18 for similar issues, and underlying medical condition (hx CHF, CAD, depression, DM, epilepsy, GERD, malignant carcinoid tumor of other sites). Nutrition Related Findings:    Pt. Report/Treatments/Miscellaneous: Pt seen resting in bed, reported not running TF since discharge d/t PEG tube leakage and stated she has not been doing well with soft diet that was started last admit; SLP rec soft/bite sized diet with thin liquids 2/18; Per GI note last admit pt was scheduled for Gastric Emptying study 2/21/23 d/t suspected gastroparesis; Per OP RD notes pt.  Told them GI CNP wanted her to start fiber modular (nothing documented in GI note); concerned w/ additional fiber if possibility of gastroparesis - recommend hold additional fiber until gastric emptying study resulted; Pt was tolerating TF prior to last admit; GI Consulted. GI Status: No BM  Pertinent Labs: Na 141, BUN 12, Cr 0.6, Glucose 96  Pertinent Meds: Pepcid, Humalog, Protonix, IVF 75 ml/hr     Wound Type: Surgical Incision (abd)       Current Nutrition Intake & Therapies:    Average Meal Intake: NPO  Average Supplements Intake: NPO  Diet NPO  Current Tube Feeding (TF) Orders:  Feeding Route: PEG  Formula: Peptide Based  Schedule: Continuous  Feeding Regimen: Vital 1.5 at 50 ml/hr; substitute Vital 1.2 at 60 ml/hr  Additives/Modulars: None  Water Flushes: 240 ml free water flush four times/day pta; recommend 180 ml TID  Current TF & Flush Orders Provides: Vital 1.5 at 50 ml/hr w/ 240 ml free water flush provides pt. with 1778 kcals, 80 grams protein, 222 gm CHO, 7 gm fiber, 1865 ml free water (905 TF, 960 flushes) in 2145 ml volume (1185 TF, 960 flushes)/24 hours  Goal TF & Flush Orders Provides: Vital 1.2 at 60 ml/hr providing pt. with 1728 kcals, 108 gm protein, 159 gm CHO, 7 gm fiber, 1708 ml free water (1168 TF, 540 flushes) in 1980 ml volume (1440 TF, 540 flushes)/24 hours    Anthropometric Measures:  Height: 5' 4\" (162.6 cm)  Ideal Body Weight (IBW): 120 lbs (55 kg)    Admission Body Weight: 283 lb (128.4 kg) (2/20; No Edema)  Current Body Weight: 283 lb (128.4 kg) (2/20; No Edema), 235.8 % IBW.  Weight Source: Stated  Current BMI (kg/m2): 48.6  Usual Body Weight:  (per EMR: 12/2/20: 294# 3 oz, 3/2/22: 301# 14 oz, 8/22/22: 293# 13 oz)     Weight Adjustment For: No Adjustment                 BMI Categories: Obese Class 3 (BMI 40.0 or greater)    Estimated Daily Nutrient Needs:  Energy Requirements Based On: Kcal/kg  Weight Used for Energy Requirements: Current (128.4 kg)  Energy (kcal/day): 7835-3414 (12-15 kcal/kg)  Weight Used for Protein Requirements: Ideal (55 kg)  Protein (g/day):  (1.3-2 g/kg IBW) Fluid (ml/day): 2000 ml or less/day (CHF)    Nutrition Diagnosis:   Inadequate oral intake related to altered GI function as evidenced by nutrition support - enteral nutrition    Nutrition Interventions:   Food and/or Nutrient Delivery: Continue NPO (resume TF when medically able)  Nutrition Education/Counseling: Education not indicated  Coordination of Nutrition Care: Continue to monitor while inpatient       Goals:     Goals: Initiate nutrition support, by next RD assessment       Nutrition Monitoring and Evaluation:   Behavioral-Environmental Outcomes: None Identified  Food/Nutrient Intake Outcomes: Diet Advancement/Tolerance, Enteral Nutrition Intake/Tolerance  Physical Signs/Symptoms Outcomes: Biochemical Data, GI Status, Fluid Status or Edema, Weight, Skin, Nutrition Focused Physical Findings    Discharge Planning:     Too soon to determine, Enteral [address], [address]  Contact: [phone]

## 2023-02-21 NOTE — PROGRESS NOTES
Internal Medicine Resident Progress Note    Patient:  Grecia Ferreira    YOB: 1990  Unit/Bed:5K-09/009-A  MRN: [id number]    Acct: [de-identified]   PCP: AURELIO Perez CNP    Date of Admission: 2/20/2023    Code Status: Full Code    Assessment/Plan:  Complication of PEG tube  Patient was recently discharged within the last few days after similar complaint with PEG tube. PEG tube appeared to be slowly coming out with some fluid seeping out from the PEG tube site. GI consulted, hold p.o. medications, converted to IV, n.p.o. Chronic microcytic anemia  Baseline hemoglobin 12, hemoglobin on admission 10.9. Iron panel: Iron 28 iron saturation 8% TIBC 360 ferritin 61. Likely secondary to sickle cell anemia  Ordered IV Venofer, switch to p.o. iron every other day upon discharge. Primary hypertension, controlled  Continue home regimen as soon as PEG tube is secure and usable. IV hydralazine as needed ordered. NIDDM 2  Takes metformin at home. Low-dose sliding scale ordered with Accu-Cheks, hypoglycemic protocol  Sickle cell anemia  Patient has a port placed in center of chest from 12/2015, patient claims this is for sickle cell anemia. Cannot find records for reason in chart. Depression  Lexapro held  History of asthma  Not in acute exacerbation. As needed albuterol ordered. Hiatal hernia  Noted in history, had G-tube placement attempt on 1/13/2023. Expected discharge date:  2/22    Disposition:   [x] Home  [] TCU  [] Rehab  [] Psych  [] SNF  [] [place]  [] Other-    ===================================================================      Chief Complaint: Complications with PEG tube    Hospital Course:     Patient is a 26-year-old female with a PMH of hiatal hernia, sickle cell anemia, chronic microcytic anemia, short bowel syndrome who presented to [address] for PEG tube leaking. Patient was recently discharged on 2/18 for similar issue.   Per H&P, patient stated she has been advancing diet and home and it is going well. GI was consulted on 2/21. All p.o. medications held and patient placed on n.p.o. Subjective (past 24 hours):     Patient was found laying in bed in mild distress. Patient admitted to vomiting in the last 24 hours, headache, continued nausea, and a mixture of constipation and diarrhea. ROS: reviewed complete ROS unchanged unless otherwise stated in hospital course/subjective portion. Medications:  Reviewed    Infusion Medications    sodium chloride      dextrose      lactated ringers IV soln Stopped (02/21/23 8539)     Scheduled Medications    amLODIPine  5 mg Oral Daily    ARIPiprazole  10 mg Oral QHS    baclofen  10 mg Oral Daily    busPIRone  5 mg Oral TID    escitalopram  20 mg PEG Tube Daily    [Held by provider] famotidine  40 mg Oral Nightly    fluticasone  2 puff Inhalation BID    montelukast  10 mg Oral Nightly    pantoprazole  40 mg IntraVENous Daily    prazosin  1 mg Oral Nightly    propranolol  60 mg Oral Daily    topiramate  100 mg Oral BID    sodium chloride flush  5-40 mL IntraVENous 2 times per day    insulin lispro  0-4 Units SubCUTAneous TID WC    insulin lispro  0-4 Units SubCUTAneous Nightly     PRN Meds: albuterol, sodium chloride flush, sodium chloride, polyethylene glycol, acetaminophen **OR** acetaminophen, potassium chloride **OR** potassium alternative oral replacement **OR** potassium chloride, magnesium sulfate, HYDROmorphone, glucose, dextrose bolus **OR** dextrose bolus, glucagon (rDNA), dextrose, trimethobenzamide      No intake or output data in the 24 hours ending 02/21/23 1615    Exam:  BP (!) 141/79   Pulse 86   Temp 98.7 °F (37.1 °C) (Oral)   Resp 16   Ht 5' 4\" (1.626 m)   Wt 283 lb (128.4 kg)   LMP 12/11/2015   SpO2 100%   BMI 48.58 kg/m²     General: No distress, appears stated age. Eyes:  PERRL. Conjunctivae/corneas clear. HENT: Head normal appearing.  Nares normal. Oral mucosa moist. Hearing intact.   Neck: Supple, with full range of motion. Trachea midline.  No gross JVD appreciated.  Respiratory:  Normal effort. Clear to auscultation, without rales or wheezes or rhonchi.  Port noted in center of chest  Cardiovascular: Normal rate, regular rhythm with normal S1/S2 without murmurs.    No lower extremity edema.   Abdomen: Soft, non-tender, non-distended with normal bowel sounds.  PEG tube noted in center of abdomen, with some fluid coming out  Musculoskeletal: No joint swelling or tenderness. Normal tone. No abnormal movements.   Skin: Warm and dry. No rashes or lesions.  Neurologic:  No focal sensory/motor deficits in the upper or lower extremities. Cranial nerves:  grossly non-focal 2-12.     Psychiatric: Alert and oriented, normal insight and thought content.   Capillary Refill: Brisk,< 3 seconds.  Peripheral Pulses: +2 palpable, equal bilaterally.       Labs:   Recent Labs     02/21/23 0135   WBC 12.3*   HGB 10.9*   HCT 34.6*        Recent Labs     02/21/23 0135      K 4.1      CO2 25   BUN 12   CREATININE 0.6   CALCIUM 9.5     Recent Labs     02/21/23 0135   AST 17   ALT 15   BILIDIR <0.2   BILITOT <0.2*   ALKPHOS 105     No results for input(s): INR in the last 72 hours.  No results for input(s): TROPONINT in the last 72 hours.  No results for input(s): PROCAL in the last 72 hours.   Lab Results   Component Value Date/Time    NITRU NEGATIVE 01/29/2023 05:16 AM    WBCUA 2-4 01/29/2023 05:16 AM    WBCUA 0-5 12/23/2018 10:43 AM    BACTERIA NONE SEEN 01/29/2023 05:16 AM    RBCUA 0-2 01/29/2023 05:16 AM    BLOODU NEGATIVE 01/29/2023 05:16 AM    SPECGRAV 1.021 03/02/2022 12:01 AM    GLUCOSEU NEGATIVE 01/29/2023 05:16 AM       Radiology (48 hours):  No results found.     Last echo:    Lab Results   Component Value Date    LVEF 63 06/25/2022               DVT prophylaxis:    [] Lovenox  [x] SCDs  [] SQ Heparin  [] Gtt Herparin  [] Encourage ambulation   [] Already on  Anticoagulation       Diet: Diet NPO  PT/OT: None  Tele: [] Continuous, [x] None  IVF: LR 75 cc/hr    Electronically signed by Galileo Pruett DO on 2/21/2023 at 4:15 PM    Case was discussed with Attending, Dr. Jocelyne Norwood MD

## 2023-02-21 NOTE — H&P
Hospitalist - History & Physical      Patient: Denis     Unit/Bed:17/017A  YOB: 1990  MRN: [id number]   Acct: [de-identified]   PCP: AURELIO Miguel CNP      Assessment and Plan:        PEG tube malfunction:   GI consult for replacement  Essential hypertension:   Continue home medications  Type 2 DM: Metformin held  Blood sugars have been well controlled      CC:  PEG tube is leaking    HPI: Patient reports that her tube is leaking again. The patient states she had an issue last week and GI made some modifications to her PEG. Now she has more leaking. She has been advancing her diet at home and that is going well. Patient is admitted to observation for GI to replace the tube. ROS: Review of Systems   Constitutional: Negative. HENT: Negative. Eyes: Negative. Respiratory: Negative. Cardiovascular: Negative. Gastrointestinal:  Positive for abdominal pain. Endocrine: Negative. Genitourinary: Negative. Musculoskeletal: Negative. Skin: Negative. Allergic/Immunologic: Negative. Neurological: Negative. Hematological: Negative. Psychiatric/Behavioral: Negative.        PMH:    Past Medical History:   Diagnosis Date    Acute on chronic diastolic congestive heart failure ([address] 6/25/2022    JANI (acute kidney injury) ([address] 7/7/2019    Anemia     Anesthesia     migraines    Anxiety     Asthma     CAD (coronary artery disease)     Depression     Diabetes ([address]     Diet-controlled type 2 diabetes mellitus ([address] 2016    Epilepsy ([facility]     Fibroids     Gastro - esophageal reflux disease     Hypertension     Hypertrophy of tonsil and adenoid 11/4/2017    Malignant carcinoid tumor of other sites ([address] 5/14/2013    Migraine     PONV (postoperative nausea and vomiting)     Prolonged emergence from general anesthesia     Sickle cell anemia (HCC)     Sickle cell trait ([address]     PT STATES SHE HAS THE TRAIT NOT THE DISEASE    Tumor associated pain neuroendrocrine tumor, gastroma     SHX:    Social History     Socioeconomic History    Marital status:      Spouse name: Trent Blair    Number of children: 0    Years of education: 12    Highest education level: Not on file   Occupational History    Not on file   Tobacco Use    Smoking status: Every Day     Packs/day: 0.25     Years: 6.00     Pack years: 1.50     Types: Cigarettes     Start date: 3/1/2016    Smokeless tobacco: Never    Tobacco comments:     smokes 3 cig perday    Vaping Use    Vaping Use: Never used   Substance and Sexual Activity    Alcohol use: Not Currently    Drug use: No    Sexual activity: Yes     Partners: Female   Other Topics Concern    Not on file   Social History Narrative    Not on file     Social Determinants of Health     Financial Resource Strain: Not on file   Food Insecurity: Not on file   Transportation Needs: Not on file   Physical Activity: Not on file   Stress: Not on file   Social Connections: Not on file   Intimate Partner Violence: Not on file   Housing Stability: Not on file     FHX:   Family History   Problem Relation Age of Onset    Cancer Mother     High Blood Pressure Mother     Heart Disease Mother         MI    Liver Cancer Mother     High Blood Pressure Father     Heart Disease Father     Heart Disease Sister     Diabetes Maternal Grandmother     Depression Maternal Grandmother     Heart Disease Maternal Grandmother         CHF    Heart Attack Paternal Uncle     Stroke Neg Hx     Colon Cancer Neg Hx     Esophageal Cancer Neg Hx     Rectal Cancer Neg Hx     Stomach Cancer Neg Hx      Allergies:    Allergies   Allergen Reactions    Latex     Ketorolac Tromethamine Anaphylaxis     Throat swelling    Pcn [Penicillins] Anaphylaxis    Percocet [Oxycodone-Acetaminophen]      hallucinations    Amoxicillin Hives    Ciprofloxacin Hives and Swelling    Compazine [Prochlorperazine Maleate] Itching    Dicyclomine Hcl Hives    Fentanyl Other (See Comments)     Pt states that her \"lips start busting out. \"    Fioricet [Butalbital-Apap-Caffeine] Swelling     Of the mouth, tongue    Flexeril [Cyclobenzaprine] Hives    Gabapentin Swelling     tongue    Ibuprofen [Ibuprofen] Other (See Comments)     Mouth swelling and ulcers    Keflex [Cephalexin] Itching    Lisinopril     Lorazepam Hives    Losartan      Elevated pulse    Macrobid [Nitrofurantoin Monohyd Macro] Itching    Morphine Other (See Comments)     HEADACHE UP WAKING    Mushroom Extract Complex Swelling    Reglan [Metoclopramide] Itching    Vicodin [Hydrocodone-Acetaminophen] Itching    Vistaril [Hydroxyzine Hcl] Itching    Zofran [Ondansetron] Hives    Adhesive Tape Rash     Medications:     sodium chloride      sodium chloride 75 mL/hr at 02/21/23 0427    lactated ringers IV soln Stopped (02/21/23 0427)      amLODIPine  5 mg Oral Daily    ARIPiprazole  10 mg Oral QHS    baclofen  10 mg Oral Daily    busPIRone  5 mg Oral TID    escitalopram  20 mg PEG Tube Daily    famotidine  40 mg Oral Nightly    fluticasone  2 puff Inhalation BID    montelukast  10 mg Oral Nightly    pantoprazole  40 mg IntraVENous Daily    prazosin  1 mg Oral Nightly    propranolol  60 mg Oral Daily    topiramate  100 mg Oral BID    sodium chloride flush  5-40 mL IntraVENous 2 times per day     albuterol, 2.5 mg, Q6H PRN  sodium chloride flush, 5-40 mL, PRN  sodium chloride, , PRN  polyethylene glycol, 17 g, Daily PRN  acetaminophen, 650 mg, Q6H PRN   Or  acetaminophen, 650 mg, Q6H PRN  potassium chloride, 40 mEq, PRN   Or  potassium alternative oral replacement, 40 mEq, PRN   Or  potassium chloride, 10 mEq, PRN  magnesium sulfate, 2,000 mg, PRN  HYDROmorphone, 0.25 mg, Q4H PRN      Labs:   Recent Results (from the past 24 hour(s))   CBC with Auto Differential    Collection Time: 02/21/23  1:35 AM   Result Value Ref Range    WBC 12.3 (H) 4.8 - 10.8 thou/mm3    RBC 4.65 4.20 - 5.40 mill/mm3    Hemoglobin 10.9 (L) 12.0 - 16.0 gm/dl    Hematocrit 34.6 (L) 37.0 - 47.0 %    MCV 74.4 (L) 81.0 - 99.0 fL    MCH 23.4 (L) 26.0 - 33.0 pg    MCHC 31.5 (L) 32.2 - 35.5 gm/dl    RDW-CV 16.0 (H) 11.5 - 14.5 %    RDW-SD 42.6 35.0 - 45.0 fL    Platelets 898 670 - 241 thou/mm3    MPV 9.4 9.4 - 12.4 fL    Seg Neutrophils 67.0 %    Lymphocytes 24.3 %    Monocytes 6.0 %    Eosinophils 1.9 %    Basophils 0.3 %    Immature Granulocytes 0.5 %    Segs Absolute 8.2 (H) 1.8 - 7.7 thou/mm3    Lymphocytes Absolute 3.0 1.0 - 4.8 thou/mm3    Monocytes Absolute 0.7 0.4 - 1.3 thou/mm3    Eosinophils Absolute 0.2 0.0 - 0.4 thou/mm3    Basophils Absolute 0.0 0.0 - 0.1 thou/mm3    Immature Grans (Abs) 0.06 0.00 - 0.07 thou/mm3    nRBC 0 /100 wbc   Basic Metabolic Panel w/ Reflex to MG    Collection Time: 02/21/23  1:35 AM   Result Value Ref Range    Sodium 141 135 - 145 meq/L    Potassium reflex Magnesium 4.1 3.5 - 5.2 meq/L    Chloride 104 98 - 111 meq/L    CO2 25 23 - 33 meq/L    Glucose 96 70 - 108 mg/dL    BUN 12 7 - 22 mg/dL    Creatinine 0.6 0.4 - 1.2 mg/dL    Calcium 9.5 8.5 - 10.5 mg/dL   Hepatic Function Panel    Collection Time: 02/21/23  1:35 AM   Result Value Ref Range    Albumin 3.8 3.5 - 5.1 g/dL    Total Bilirubin <0.2 (L) 0.3 - 1.2 mg/dL    Bilirubin, Direct <0.2 0.0 - 0.3 mg/dL    Alkaline Phosphatase 105 38 - 126 U/L    AST 17 5 - 40 U/L    ALT 15 11 - 66 U/L    Total Protein 7.5 6.1 - 8.0 g/dL   Lipase    Collection Time: 02/21/23  1:35 AM   Result Value Ref Range    Lipase 50.6 5.6 - 51.3 U/L   Osmolality    Collection Time: 02/21/23  1:35 AM   Result Value Ref Range    Osmolality Calc 280.9 275.0 - 300.0 mOsmol/kg   Anion Gap    Collection Time: 02/21/23  1:35 AM   Result Value Ref Range    Anion Gap 12.0 8.0 - 16.0 meq/L   Glomerular Filtration Rate, Estimated    Collection Time: 02/21/23  1:35 AM   Result Value Ref Range    Est, Glom Filt Rate >60 >60 ml/min/1.73m2         Vital Signs: T: 98.1F P: 101 RR: 18 B/P: 154/98: FiO2: RA: O2 Sat:100%: I/O: No intake or output data in the 24 hours ending 02/21/23 0708      General:   no acute distress  HEENT:  normocephalic and atraumatic. No scleral icterus. PEARLA, mucous membranes moist  Neck: supple. Trachea midline. No JVD. Full ROM, no meningismus. Lungs: clear to auscultation. No retractions, no accessory muscle use. Cardiac: RRR, no murmur, 2+ pulses  Abdomen: soft. Nontender. Bowel sounds active  Extremities:  No clubbing, cyanosis x 4, no edema    Vasculature: capillary refill < 3 seconds. Skin:  warm and dry. no visible rashes  Psych:  Alert and oriented x3. Affect appropriate  Lymph:  No supraclavicular adenopathy. Neurologic:  CN II-XII grossly intact. No focal deficit. Data: (All radiographs, tracings, PFTs, and imaging are personally viewed and interpreted unless otherwise noted).    EKG:  none this encounter        Electronically signed by  Anthony Kim PA-C

## 2023-02-21 NOTE — ED NOTES
Pt resting in bed, medicated per mar. PT denies any further needs at this time, call light within reach.       Beckie Medina RN  02/21/23 2006

## 2023-02-21 NOTE — ED NOTES
Pt resting in bed on phone. Respirations easy and unlabored. Call light within reach.       Jj Latif  02/21/23 0503

## 2023-02-21 NOTE — PROGRESS NOTES
Pt admitted to 5 from ED from  home . Complaints: Peg tube malfunction. IV normal saline infusing into the medi port at a rate of 75 mls/ hour with about 900 mls in the bag still. IV site free of s/s of infection or infiltration. Vital signs obtained. Assessment and data collection initiated. Two nurse skin assessment performed by Tad Pfeiffer and Mariano Peng RN. Oriented to room. Policies and procedures for  explained. All questions answered with no further questions at this time. Fall prevention and safety brochure discussed with patient. Bed alarm on. Call light in reach. Oriented to room. Gila Moreno, RN, RN 2/21/2023 8:40AM     Explained patients right to have family, representative or physician notified of their admission. Patient has Declined for physician to be notified. Patient has Declined for family/representative to be notified.

## 2023-02-21 NOTE — ED TRIAGE NOTES
Pt presents to the ED through triage with c/o g tube complication. Pt states she was told her g tube may not be working properly due to having leakage around the site.  Pt states she is having abdominal cramping above where the g tube is placed

## 2023-02-21 NOTE — ED PROVIDER NOTES
[address] [facility] [address] [address] EMERGENCY DEPT    EMERGENCY MEDICINE     Pt Name: Eliot Sánchez  MRN: [id number]  [place] 1990  Date of evaluation: 2/20/2023  Treating Resident Physician: Markus Hargrove MD  Supervising Physician: Kelle Menjivar, [facility]       Chief Complaint   Patient presents with    G Tube Complications     History obtained from chart review and the patient. HISTORY OF PRESENT ILLNESS    HPI    Eliot Sánchez is a 28 y.o. female with a past medical history significant for 26-year-old female with a complex medical history including recent issues with a newly placed PEG tube. , presents to the emergency department for evaluation of leaking from her PEG tube. Patient was seen and admitted last week for leaking from her PEG tube, gastroenterology was consulted and they recommended shortening the tube and cutting out the broken section. She was discharged home and now is still having leaking from the PEG tube at a more proximal site. She reports waking up with a puddle of tube feed on her belly and that the tube feed pump does not work correctly. She reports having her home health nurse come and try to flush this and again having water and tube feeds shooting out from the tube. She does note continued abdominal pain however this is no worse than her normal.  Also has been eating more soft foods this is going well. No fevers. The patient has no other acute complaints at this time.            REVIEW OF SYSTEMS   Review of Systems  Negative unless documented in HPI    PAST MEDICAL AND SURGICAL HISTORY     Past Medical History:   Diagnosis Date    Acute on chronic diastolic congestive heart failure ([address] 6/25/2022    JANI (acute kidney injury) ([address] 7/7/2019    Anemia     Anesthesia     migraines    Anxiety     Asthma     CAD (coronary artery disease)     Depression     Diabetes ([address]     Diet-controlled type 2 diabetes mellitus ([address] 2016    Epilepsy ([address]     Fibroids     Gastro - esophageal reflux disease     Hypertension     Hypertrophy of tonsil and adenoid 11/4/2017    Malignant carcinoid tumor of other sites [facility]) 5/14/2013    Migraine     PONV (postoperative nausea and vomiting)     Prolonged emergence from general anesthesia     Sickle cell anemia ([facility]     Sickle cell trait ([facility]     PT STATES SHE HAS THE TRAIT NOT THE DISEASE    Tumor associated pain     neuroendrocrine tumor, gastroma       Past Surgical History:   Procedure Laterality Date    ABDOMEN SURGERY  03/23/2017    Laparoscopic , Bi lat oopherectomy Dr Zeinab Lamar      x2    [address] VENOUS CATHETER Right 12/11/2015    right subclavian single lumen mediport insertion--Dr. Michel    CHOLECYSTECTOMY, LAPAROSCOPIC N/A 7/11/2019    CHOLECYSTECTOMY LAPAROSCOPIC performed by Annemarie Negro MD at [address] 8/24/2020    COLONOSCOPY POLYPECTOMY SNARE/COLD BIOPSY performed by Latanya Millan MD at [address] [place] [address]  10/21/2013    Dilation and curettage, Diagnostic Hysteroscopy and laparoscopy (Dr. Ashley Wren, [facility])    EGD  2019    EGD COLONOSCOPY N/A 1/8/2019    EGD DIL performed by Emili Welch MD at [facility]    EGD COLONOSCOPY Left 5/14/2019    EGD DILATATION performed by Emili Welch MD at [facility]    EGD COLONOSCOPY Left 8/27/2019    EGD DILATATION performed by Emili Welch MD at [facility]    ENDOSCOPY, COLON, DIAGNOSTIC      GASTRIC [facility]    GASTRIC [address] N/A 1/25/2023    EGD PEG TUBE PLACEMENT performed by Latanya Millan MD at [address]  2010, 2016    [place] , [address] N/A 1/17/2023    Robotic Exploratory Paparoscopy with Extensive Lysis of Adhesions G Tube Insertion performed by Gary Murrieta MD at [address] [facility] ([address] [place] [address])  04/2016 INSERTION / REMOVAL / REPLACEMENT VENOUS ACCESS CATHETER N/A 3/8/2019    REMOVAL OF LEFT PORT AND PLACEMENT OF SINGLE LUMEN MEDIPORT RIGHT SIDE performed by Annemarie Negro MD at [address] N/A 10/8/2019    ROBOTIC DIAGNOSTIC LAPAROSCOPY,  RECURRENT HIATAL HERNIA REPAIR, REVISION FUNDOPLICATION performed by Gary Murrieta MD at [facility] N/A 7/15/2022    ROBOTIC LAPAROSCOPY, LYSIS OF ADHESIONS performed by Annemarie Negro MD at [address] [facility] [facility] [address]  08/12/2016    Right Port Removal, Placement of new Port Left by Dr Shari Arroyo  12/02/2019    Mediport insertion-IR Dr Ana Shirley (CERVIX NOT REMOVED)  4/8/16    PORT SURGERY N/A 11/11/2019    REMOVAL OF RIGHT MEDIPORT & ATTEMPTED PLACEMENT OF LEFT SINGLE LUMEN MEDIPORT performed by Annemarie Negro MD at [address] Right 11/29/2019    RT INTERNAL JUGULAR SINGLE LUMEN MEDIPORT INSERTION performed by Annemarie Negro MD at [address] <30 MM DIAM Left 8/8/2017    EGD/DIL performed by Emili Welch MD at [facility]    MD  [address] <30 MM DIAM N/A 9/26/2017    EGD DIL performed by Emili Welch MD at [facility]    MD  [address] <30 MM DIAM Left 12/12/2017    EGD DIL performed by Emili Welch MD at [facility]    MD  [address] <30 MM DIAM N/A 2/13/2018    EGD  DILATATION performed by Emili Welch MD at [address] [facility] [address] [facility] CTR VAD W/SUBQ PORT UNDER 5 YR Bilateral 1/5/2018    EXCISION OF MEDIPORT LEFT SIDE, INSERTION MEDIPORT RIGHT  IJ performed by Annemarie Negro MD at [address] OFFICE/OUTPT [address] N/A 5/15/2018    EGD DILATATION performed by Emili Welch MD at [facility]    MD OFFICE/OUTPT VISIT,PROCEDURE ONLY Bilateral 9/10/2018    REMOVAL RT MEDIPORT, INSERTION LEFT performed by Caesar Kerr MD at [address] [facility] OFFICE/OUTPT [address] N/A 11/16/2018    MEDIPORT REPOSITIONING performed by Caesar Kerr MD at [address] [facility] [address] [facility] [address] N/A 11/6/2017    TONSILLECTOMY AND ADENOIDECTOMY performed by Jony Regalado MD at [address] [facility]      neoendocrine    TUNNELED VENOUS PORT PLACEMENT      UPPER GASTROINTESTINAL ENDOSCOPY Left 4/3/2018    EGD DILATION SAVORY performed by Christopher Guillermo MD at [facility] Left 6/26/2018    EGD DILATION SAVORY performed by Christopher Guillermo MD at [address] ENDOSCOPY Left 2/26/2019    EGD DILATION SAVORY performed by Christopher Guillermo MD at [facility] N/A 7/9/2019    EGD DILATION SAVORY performed by Christopher Guillermo MD at [address] ENDOSCOPY Left 9/9/2019    EGD BIOPSY performed by Juan Kingston MD at [facility] 4/1/2020    EGD DILATION BALLOON performed by Dominique Reynoso MD at [address] [place] [address] Left 8/24/2020    EGD ESOPHAGOGASTRODUODENOSCOPY DILATATION performed by Dominique Reynoso MD at [facility] 8/24/2020    EGD BIOPSY performed by Dominique Reynoso MD at [facility] N/A 12/2/2020    EGD DILATION BALLOON performed by Dominique Reynoso MD at [facility] 12/2/2020    EGD BIOPSY performed by Dominique Reynoso MD at [facility] 1/19/2022    EGD performed by Caesar Kerr MD at [facility] 1/19/2022    EGD BIOPSY performed by Caesar Kerr MD at [facility] 7/15/2022    EGD [address] performed by Caesar Kerr MD at [place] [facility] [address] UPPER GASTROINTESTINAL ENDOSCOPY N/A 1/24/2023    EGD ESOPHAGOGASTRODUODENOSCOPY performed by Keaton Aguilera MD at [place] [address] N/A 8/22/2022    OPEN LEFT ABDOMINAL PORT SITE HERNIA REPAIR WITH MESH performed by Gurmeet Santoyo MD at [address] [place] [address]     Previous Medications    ACETAMINOPHEN (TYLENOL) 325 MG TABLET    Take 2 tablets by mouth 4 times daily as needed for Pain or Fever    ALBUTEROL (PROVENTIL) (2.5 MG/3ML) 0.083% NEBULIZER SOLUTION    Take 3 mLs by nebulization every 6 hours as needed for Wheezing    ALBUTEROL SULFATE HFA IN    Inhale 1 puff into the lungs every 4-6 hours as needed    AMITRIPTYLINE (ELAVIL) 100 MG TABLET    Take 100 mg by mouth nightly    AMLODIPINE (NORVASC) 5 MG TABLET    Take 1 tablet by mouth daily    ARIPIPRAZOLE (ABILIFY) 1 MG/ML SOLN SOLUTION    Take 10 mg by mouth nightly 10 mL via peg tube every evening    BACLOFEN (LIORESAL) 10 MG TABLET    Take 10 mg by mouth daily    BUSPIRONE (BUSPAR) 5 MG TABLET    Take 5 mg by mouth 3 times daily    DOCUSATE SODIUM (COLACE) 100 MG CAPSULE    Take 1 capsule by mouth 2 times daily as needed for Constipation    ESCITALOPRAM (LEXAPRO) 5 MG/5ML SOLUTION    20 mg daily Take 20 mL via peg tube once daily    FAMOTIDINE (PEPCID) 40 MG TABLET    Take 1 tablet by mouth nightly    FLUTICASONE (FLOVENT HFA) 110 MCG/ACT INHALER    2 puffs 2 times daily    FUROSEMIDE (LASIX) 20 MG TABLET    TAKE 1 TABLET BY MOUTH ONCE A DAY    GLUCOSE MONITORING KIT (FREESTYLE) MONITORING KIT    1 kit by Does not apply route daily Or whichever system insurance will pay for    INCONTINENCE SUPPLY DISPOSABLE (DEPEND SILHOUETTE BRIEFS L/XL) MISC    Use as needed for urinary and bowel incontinence    LINACLOTIDE (LINZESS) 145 MCG CAPSULE    Take 145 mcg by mouth every morning (before breakfast)    METFORMIN  MG/5ML SOLN    1,000 mg daily (with breakfast) Take 10 mL via peg tube once daily MONTELUKAST (SINGULAIR) 10 MG TABLET    Take 10 mg by mouth nightly    PANTOPRAZOLE (PROTONIX) 40 MG TABLET    Take 40 mg by mouth daily    POLYETHYLENE GLYCOL (GLYCOLAX) 17 GM/SCOOP POWDER    Dispense 238 Gram Bottle. Use as Directed    PRAZOSIN (MINIPRESS) 1 MG CAPSULE    Take 1 mg by mouth nightly    PROMETHAZINE (PHENERGAN) 25 MG TABLET    Take 25 mg by mouth every 4-6 hours as needed for Nausea    PROPRANOLOL (INDERAL LA) 60 MG EXTENDED RELEASE CAPSULE    Take 60 mg by mouth daily    SPACER/AERO-HOLDING CHAMBERS (E-Z SPACER) AISLINN    1 Device by Does not apply route daily    TOPIRAMATE (TOPAMAX) 100 MG TABLET    Take 100 mg by mouth 2 times daily    TRAMADOL (ULTRAM) 50 MG TABLET    Take 1-2 tablets by mouth every 6 hours as needed for Pain for up to 7 days. Max Daily Amount: 400 mg       ALLERGIES     Allergies   Allergen Reactions    Latex     Ketorolac Tromethamine Anaphylaxis     Throat swelling    Pcn [Penicillins] Anaphylaxis    Percocet [Oxycodone-Acetaminophen]      hallucinations    Amoxicillin Hives    Ciprofloxacin Hives and Swelling    Compazine [Prochlorperazine Maleate] Itching    Dicyclomine Hcl Hives    Fentanyl Other (See Comments)     Pt states that her \"lips start busting out. \"    Fioricet [Butalbital-Apap-Caffeine] Swelling     Of the mouth, tongue    Flexeril [Cyclobenzaprine] Hives    Gabapentin Swelling     tongue    Ibuprofen [Ibuprofen] Other (See Comments)     Mouth swelling and ulcers    Keflex [Cephalexin] Itching    Lisinopril     Lorazepam Hives    Losartan      Elevated pulse    Macrobid [Nitrofurantoin Monohyd Macro] Itching    Morphine Other (See Comments)     HEADACHE UP WAKING    Mushroom Extract Complex Swelling    Reglan [Metoclopramide] Itching    Vicodin [Hydrocodone-Acetaminophen] Itching    Vistaril [Hydroxyzine Hcl] Itching    Zofran [Ondansetron] Hives    Adhesive Tape Rash       FAMILY HISTORY     Family History   Problem Relation Age of Onset    Cancer Mother High Blood Pressure Mother     Heart Disease Mother         [place]    Liver Cancer Mother     High Blood Pressure Father     Heart Disease Father     Heart Disease Sister     Diabetes Maternal Grandmother     Depression Maternal Grandmother     Heart Disease Maternal Grandmother         CHF    Heart Attack Paternal Uncle     Stroke Neg Hx     Colon Cancer Neg Hx     Esophageal Cancer Neg Hx     Rectal Cancer Neg Hx     Stomach Cancer Neg Hx        SOCIAL HISTORY     Social History     Tobacco Use    Smoking status: Every Day     Packs/day: 0.25     Years: 6.00     Pack years: 1.50     Types: Cigarettes     Start date: 3/1/2016    Smokeless tobacco: Never    Tobacco comments:     smokes 3 cig perday    Vaping Use    Vaping Use: Never used   Substance Use Topics    Alcohol use: Not Currently    Drug use: No       PHYSICAL EXAM     ED Triage Vitals [02/20/23 2214]   BP Temp Temp Source Heart Rate Resp SpO2 Height Weight   (!) 166/115 98.1 °F (36.7 °C) Oral 98 18 99 % 5' 4\" (1.626 m) 283 lb (128.4 kg)       Vitals Reviewed:    Vitals:    02/20/23 2214 02/21/23 0131 02/21/23 0209   BP: (!) 166/115 (!) 133/110 (!) 154/98   Pulse: 98 97 (!) 101   Resp: 18 18 18   Temp: 98.1 °F (36.7 °C)     TempSrc: Oral     SpO2: 99% 100% 100%   Weight: 283 lb (128.4 kg)     Height: 5' 4\" (1.626 m)         Initial vital signs and nursing assessment reviewed and normal. Body mass index is 48.58 kg/m². Pulsoximetry is normal per my interpretation. Physical Exam  Vitals and nursing note reviewed. Constitutional:       General: She is not in acute distress. Appearance: Normal appearance. She is obese. She is not ill-appearing, toxic-appearing or diaphoretic. HENT:      Head: Normocephalic and atraumatic. Nose: Nose normal.      Mouth/Throat:      Mouth: Mucous membranes are moist.      Pharynx: Oropharynx is clear.    Eyes:      Conjunctiva/sclera: Conjunctivae normal.   Cardiovascular:      Rate and Rhythm: Normal rate and regular rhythm. Pulses: Normal pulses. Heart sounds: Normal heart sounds. Pulmonary:      Effort: Pulmonary effort is normal.      Breath sounds: Normal breath sounds. Abdominal:      General: Abdomen is flat. Bowel sounds are normal.      Palpations: Abdomen is soft. Tenderness: There is generalized abdominal tenderness (baseline for patient, no worse than usual). Musculoskeletal:      Cervical back: Normal range of motion. Skin:     General: Skin is warm and dry. Capillary Refill: Capillary refill takes less than 2 seconds. Neurological:      Mental Status: She is alert and oriented to person, place, and time. FORMAL DIAGNOSTIC RESULTS     RADIOLOGY: Interpretation per the Radiologist below, if available at the time of this note (none if blank): No orders to display       LABS: (none if blank)  Labs Reviewed   CBC WITH AUTO DIFFERENTIAL - Abnormal; Notable for the following components:       Result Value    WBC 12.3 (*)     Hemoglobin 10.9 (*)     Hematocrit 34.6 (*)     MCV 74.4 (*)     MCH 23.4 (*)     MCHC 31.5 (*)     RDW-CV 16.0 (*)     Segs Absolute 8.2 (*)     All other components within normal limits   HEPATIC FUNCTION PANEL - Abnormal; Notable for the following components: Total Bilirubin <0.2 (*)     All other components within normal limits   BASIC METABOLIC PANEL W/ REFLEX TO MG FOR LOW K   LIPASE   OSMOLALITY   ANION GAP   GLOMERULAR FILTRATION RATE, ESTIMATED       (Any cultures that may have been sent were not resulted at the time of this patient visit)    [facility]     ED COURSE:  ED Course as of 02/21/23 0244 Mon Feb 20, 2023   2341 Call to Dr. Almanzar Congress - Gastroenterology he recommends admitting the patient to the hospitalist service, he will likely replace the tube tomorrow afternoon.   N.p.o. after midnight. [SC]   2351 Referral for admission sent to [facility]	Monty Payne PA-C [facility]) via [facility].    [SC]      ED Course User Index  [SC] Marques Shankar MD         ED MEDICATIONS ADMINISTERED:  (None if blank)  Medications   lactated ringers IV soln infusion ( IntraVENous New Bag 2/21/23 0127)   HYDROmorphone (DILAUDID) injection 0.5 mg (0.5 mg IntraVENous Given 2/21/23 0207)         PROCEDURES: (None if blank)  Procedures:       CRITICAL CARE: (None if blank)      DISCHARGE PRESCRIPTIONS: (None if blank)  New Prescriptions    No medications on file       MDM:   Medical Decision Making  28-year-old female with medical history including CHF, hiatal hernia, PEG tube placement, recent PEG tube malfunctions. Presented again with fluid leaking from her PEG tube, pain around her PEG tube site. Patient does have multiple points of leakage from the PEG tube. Given the PEG tube was placed less than 6 weeks ago, the tube cannot be replaced in the emergency department and she needs to see gastroenterology for this. I called her gastroenterologist Dr. Vicki Garcia, who recommends admission to the hospital and that he would likely replace the center and to scopic guidance tomorrow afternoon, he also recommends n.p.o. after midnight. Patient's port was accessed, labs were obtained, hospitalist service consulted for admission. Problems Addressed:  Leaking percutaneous endoscopic gastrostomy (PEG) tube [facility]): undiagnosed new problem with uncertain prognosis    Amount and/or Complexity of Data Reviewed  External Data Reviewed: notes. Labs: ordered. Discussion of management or test interpretation with external provider(s): Gastroenterology - Dr. Michael Jarvis  Parenteral controlled substances. Decision regarding hospitalization. FINAL IMPRESSION     Final diagnoses:   Leaking percutaneous endoscopic gastrostomy (PEG) tube ([facility]       DISPOSITION / PLAN   DISPOSITION Admitted 02/21/2023 02:11:23 AM        This transcription was electronically signed.  Parts of this transcriptions may have been dictated by use of voice recognition software and electronically transcribed, and parts may have been transcribed with the assistance of an ED scribe. The transcription may contain errors not detected in proofreading. Please refer to my supervising physician's documentation if my documentation differs.     Electronically Signed: Cait Alvarado MD, 02/21/23, 2:44 AM         Yair Armando MD  Resident  02/21/23 0658

## 2023-02-22 ENCOUNTER — ANESTHESIA (OUTPATIENT)
Dept: ENDOSCOPY | Age: 33
End: 2023-02-22
Payer: MEDICARE

## 2023-02-22 ENCOUNTER — ANESTHESIA EVENT (OUTPATIENT)
Dept: ENDOSCOPY | Age: 33
End: 2023-02-22
Payer: MEDICARE

## 2023-02-22 VITALS
RESPIRATION RATE: 18 BRPM | TEMPERATURE: 98.1 F | DIASTOLIC BLOOD PRESSURE: 98 MMHG | OXYGEN SATURATION: 96 % | HEIGHT: 64 IN | WEIGHT: 283 LBS | BODY MASS INDEX: 48.32 KG/M2 | HEART RATE: 88 BPM | SYSTOLIC BLOOD PRESSURE: 143 MMHG

## 2023-02-22 LAB
ANION GAP SERPL CALC-SCNC: 12 MEQ/L (ref 8–16)
BASOPHILS ABSOLUTE: 0 THOU/MM3 (ref 0–0.1)
BASOPHILS NFR BLD AUTO: 0.4 %
BUN SERPL-MCNC: 9 MG/DL (ref 7–22)
CALCIUM SERPL-MCNC: 9.3 MG/DL (ref 8.5–10.5)
CHLORIDE SERPL-SCNC: 107 MEQ/L (ref 98–111)
CO2 SERPL-SCNC: 23 MEQ/L (ref 23–33)
CREAT SERPL-MCNC: 0.6 MG/DL (ref 0.4–1.2)
DEPRECATED RDW RBC AUTO: 42.5 FL (ref 35–45)
EOSINOPHIL NFR BLD AUTO: 1.9 %
EOSINOPHILS ABSOLUTE: 0.2 THOU/MM3 (ref 0–0.4)
ERYTHROCYTE [DISTWIDTH] IN BLOOD BY AUTOMATED COUNT: 15.9 % (ref 11.5–14.5)
GFR SERPL CREATININE-BSD FRML MDRD: > 60 ML/MIN/1.73M2
GLUCOSE BLD STRIP.AUTO-MCNC: 92 MG/DL (ref 70–108)
GLUCOSE SERPL-MCNC: 86 MG/DL (ref 70–108)
HCT VFR BLD AUTO: 37.8 % (ref 37–47)
HGB BLD-MCNC: 11.6 GM/DL (ref 12–16)
IMM GRANULOCYTES # BLD AUTO: 0.03 THOU/MM3 (ref 0–0.07)
IMM GRANULOCYTES NFR BLD AUTO: 0.3 %
LYMPHOCYTES ABSOLUTE: 2.3 THOU/MM3 (ref 1–4.8)
LYMPHOCYTES NFR BLD AUTO: 26.1 %
MCH RBC QN AUTO: 22.9 PG (ref 26–33)
MCHC RBC AUTO-ENTMCNC: 30.7 GM/DL (ref 32.2–35.5)
MCV RBC AUTO: 74.7 FL (ref 81–99)
MONOCYTES ABSOLUTE: 0.6 THOU/MM3 (ref 0.4–1.3)
MONOCYTES NFR BLD AUTO: 6.7 %
NEUTROPHILS NFR BLD AUTO: 64.6 %
NRBC BLD AUTO-RTO: 0 /100 WBC
PLATELET # BLD AUTO: 302 THOU/MM3 (ref 130–400)
PMV BLD AUTO: 9.4 FL (ref 9.4–12.4)
POTASSIUM SERPL-SCNC: 4 MEQ/L (ref 3.5–5.2)
RBC # BLD AUTO: 5.06 MILL/MM3 (ref 4.2–5.4)
SEGMENTED NEUTROPHILS ABSOLUTE COUNT: 5.7 THOU/MM3 (ref 1.8–7.7)
SODIUM SERPL-SCNC: 142 MEQ/L (ref 135–145)
WBC # BLD AUTO: 8.9 THOU/MM3 (ref 4.8–10.8)

## 2023-02-22 PROCEDURE — 6360000002 HC RX W HCPCS: Performed by: STUDENT IN AN ORGANIZED HEALTH CARE EDUCATION/TRAINING PROGRAM

## 2023-02-22 PROCEDURE — G0378 HOSPITAL OBSERVATION PER HR: HCPCS

## 2023-02-22 PROCEDURE — 2709999900 HC NON-CHARGEABLE SUPPLY: Performed by: INTERNAL MEDICINE

## 2023-02-22 PROCEDURE — 2580000003 HC RX 258: Performed by: PHYSICIAN ASSISTANT

## 2023-02-22 PROCEDURE — 36415 COLL VENOUS BLD VENIPUNCTURE: CPT

## 2023-02-22 PROCEDURE — 3609013300 HC EGD TUBE PLACEMENT: Performed by: INTERNAL MEDICINE

## 2023-02-22 PROCEDURE — 6360000002 HC RX W HCPCS: Performed by: NURSE ANESTHETIST, CERTIFIED REGISTERED

## 2023-02-22 PROCEDURE — C9113 INJ PANTOPRAZOLE SODIUM, VIA: HCPCS | Performed by: PHYSICIAN ASSISTANT

## 2023-02-22 PROCEDURE — 94640 AIRWAY INHALATION TREATMENT: CPT

## 2023-02-22 PROCEDURE — 6360000002 HC RX W HCPCS: Performed by: PHYSICIAN ASSISTANT

## 2023-02-22 PROCEDURE — 3700000000 HC ANESTHESIA ATTENDED CARE: Performed by: INTERNAL MEDICINE

## 2023-02-22 PROCEDURE — 2500000003 HC RX 250 WO HCPCS: Performed by: NURSE ANESTHETIST, CERTIFIED REGISTERED

## 2023-02-22 PROCEDURE — 3700000001 HC ADD 15 MINUTES (ANESTHESIA): Performed by: INTERNAL MEDICINE

## 2023-02-22 PROCEDURE — 96376 TX/PRO/DX INJ SAME DRUG ADON: CPT

## 2023-02-22 PROCEDURE — 2580000003 HC RX 258: Performed by: NURSE ANESTHETIST, CERTIFIED REGISTERED

## 2023-02-22 PROCEDURE — 82948 REAGENT STRIP/BLOOD GLUCOSE: CPT

## 2023-02-22 PROCEDURE — 85025 COMPLETE CBC W/AUTO DIFF WBC: CPT

## 2023-02-22 PROCEDURE — 7100000011 HC PHASE II RECOVERY - ADDTL 15 MIN: Performed by: INTERNAL MEDICINE

## 2023-02-22 PROCEDURE — 7100000010 HC PHASE II RECOVERY - FIRST 15 MIN: Performed by: INTERNAL MEDICINE

## 2023-02-22 PROCEDURE — 80048 BASIC METABOLIC PNL TOTAL CA: CPT

## 2023-02-22 PROCEDURE — 99238 HOSP IP/OBS DSCHRG MGMT 30/<: CPT | Performed by: STUDENT IN AN ORGANIZED HEALTH CARE EDUCATION/TRAINING PROGRAM

## 2023-02-22 PROCEDURE — 6360000002 HC RX W HCPCS: Performed by: PSYCHIATRY & NEUROLOGY

## 2023-02-22 RX ORDER — PROPOFOL 10 MG/ML
INJECTION, EMULSION INTRAVENOUS PRN
Status: DISCONTINUED | OUTPATIENT
Start: 2023-02-22 | End: 2023-02-22 | Stop reason: SDUPTHER

## 2023-02-22 RX ORDER — HEPARIN SODIUM (PORCINE) LOCK FLUSH IV SOLN 100 UNIT/ML 100 UNIT/ML
500 SOLUTION INTRAVENOUS ONCE
Status: COMPLETED | OUTPATIENT
Start: 2023-02-22 | End: 2023-02-22

## 2023-02-22 RX ORDER — LIDOCAINE HYDROCHLORIDE 20 MG/ML
INJECTION, SOLUTION INFILTRATION; PERINEURAL PRN
Status: DISCONTINUED | OUTPATIENT
Start: 2023-02-22 | End: 2023-02-22 | Stop reason: SDUPTHER

## 2023-02-22 RX ORDER — SODIUM CHLORIDE 9 MG/ML
INJECTION, SOLUTION INTRAVENOUS CONTINUOUS PRN
Status: DISCONTINUED | OUTPATIENT
Start: 2023-02-22 | End: 2023-02-22 | Stop reason: SDUPTHER

## 2023-02-22 RX ORDER — SODIUM CHLORIDE 0.9 % (FLUSH) 0.9 %
5-40 SYRINGE (ML) INJECTION EVERY 12 HOURS SCHEDULED
Status: CANCELLED | OUTPATIENT
Start: 2023-02-22

## 2023-02-22 RX ORDER — SODIUM CHLORIDE 0.9 % (FLUSH) 0.9 %
5-40 SYRINGE (ML) INJECTION PRN
Status: CANCELLED | OUTPATIENT
Start: 2023-02-22

## 2023-02-22 RX ORDER — SODIUM CHLORIDE 9 MG/ML
INJECTION, SOLUTION INTRAVENOUS PRN
Status: CANCELLED | OUTPATIENT
Start: 2023-02-22

## 2023-02-22 RX ADMIN — FLUTICASONE PROPIONATE 2 PUFF: 110 AEROSOL, METERED RESPIRATORY (INHALATION) at 06:30

## 2023-02-22 RX ADMIN — SODIUM CHLORIDE: 9 INJECTION, SOLUTION INTRAVENOUS at 16:51

## 2023-02-22 RX ADMIN — PROPOFOL 50 MG: 10 INJECTION, EMULSION INTRAVENOUS at 17:05

## 2023-02-22 RX ADMIN — HYDROMORPHONE HYDROCHLORIDE 0.5 MG: 1 INJECTION, SOLUTION INTRAMUSCULAR; INTRAVENOUS; SUBCUTANEOUS at 18:22

## 2023-02-22 RX ADMIN — PANTOPRAZOLE SODIUM 40 MG: 40 INJECTION, POWDER, FOR SOLUTION INTRAVENOUS at 09:25

## 2023-02-22 RX ADMIN — LIDOCAINE HYDROCHLORIDE 100 MG: 20 INJECTION, SOLUTION INFILTRATION; PERINEURAL at 17:01

## 2023-02-22 RX ADMIN — HYDROMORPHONE HYDROCHLORIDE 0.5 MG: 1 INJECTION, SOLUTION INTRAMUSCULAR; INTRAVENOUS; SUBCUTANEOUS at 12:19

## 2023-02-22 RX ADMIN — SODIUM CHLORIDE, PRESERVATIVE FREE 10 ML: 5 INJECTION INTRAVENOUS at 09:25

## 2023-02-22 RX ADMIN — HYDROMORPHONE HYDROCHLORIDE 0.5 MG: 1 INJECTION, SOLUTION INTRAMUSCULAR; INTRAVENOUS; SUBCUTANEOUS at 09:08

## 2023-02-22 RX ADMIN — HEPARIN 500 UNITS: 100 SYRINGE at 20:31

## 2023-02-22 RX ADMIN — PROPOFOL 50 MG: 10 INJECTION, EMULSION INTRAVENOUS at 17:09

## 2023-02-22 RX ADMIN — HYDROMORPHONE HYDROCHLORIDE 0.5 MG: 1 INJECTION, SOLUTION INTRAMUSCULAR; INTRAVENOUS; SUBCUTANEOUS at 04:03

## 2023-02-22 RX ADMIN — PROPOFOL 100 MG: 10 INJECTION, EMULSION INTRAVENOUS at 17:01

## 2023-02-22 RX ADMIN — HYDROMORPHONE HYDROCHLORIDE 0.5 MG: 1 INJECTION, SOLUTION INTRAMUSCULAR; INTRAVENOUS; SUBCUTANEOUS at 15:06

## 2023-02-22 RX ADMIN — TRIMETHOBENZAMIDE HYDROCHLORIDE 200 MG: 100 INJECTION INTRAMUSCULAR at 04:03

## 2023-02-22 RX ADMIN — FLUTICASONE PROPIONATE 2 PUFF: 110 AEROSOL, METERED RESPIRATORY (INHALATION) at 18:08

## 2023-02-22 ASSESSMENT — PAIN DESCRIPTION - DESCRIPTORS
DESCRIPTORS: STABBING
DESCRIPTORS: ACHING;SHOOTING
DESCRIPTORS: CRUSHING
DESCRIPTORS: JABBING
DESCRIPTORS: STABBING;SPASM
DESCRIPTORS: ACHING

## 2023-02-22 ASSESSMENT — PAIN DESCRIPTION - LOCATION
LOCATION: ABDOMEN

## 2023-02-22 ASSESSMENT — PAIN SCALES - GENERAL
PAINLEVEL_OUTOF10: 9
PAINLEVEL_OUTOF10: 9
PAINLEVEL_OUTOF10: 10
PAINLEVEL_OUTOF10: 9
PAINLEVEL_OUTOF10: 7
PAINLEVEL_OUTOF10: 5
PAINLEVEL_OUTOF10: 9
PAINLEVEL_OUTOF10: 9

## 2023-02-22 ASSESSMENT — PAIN DESCRIPTION - PAIN TYPE
TYPE: ACUTE PAIN
TYPE: ACUTE PAIN

## 2023-02-22 ASSESSMENT — PAIN DESCRIPTION - FREQUENCY: FREQUENCY: CONTINUOUS

## 2023-02-22 ASSESSMENT — PAIN - FUNCTIONAL ASSESSMENT
PAIN_FUNCTIONAL_ASSESSMENT: ACTIVITIES ARE NOT PREVENTED
PAIN_FUNCTIONAL_ASSESSMENT: PREVENTS OR INTERFERES SOME ACTIVE ACTIVITIES AND ADLS
PAIN_FUNCTIONAL_ASSESSMENT: PREVENTS OR INTERFERES SOME ACTIVE ACTIVITIES AND ADLS
PAIN_FUNCTIONAL_ASSESSMENT: NONE - DENIES PAIN
PAIN_FUNCTIONAL_ASSESSMENT: ACTIVITIES ARE NOT PREVENTED

## 2023-02-22 ASSESSMENT — PAIN DESCRIPTION - ORIENTATION
ORIENTATION: OTHER (COMMENT)
ORIENTATION: OTHER (COMMENT)
ORIENTATION: LOWER
ORIENTATION: LOWER

## 2023-02-22 ASSESSMENT — PAIN DESCRIPTION - ONSET: ONSET: ON-GOING

## 2023-02-22 NOTE — CARE COORDINATION
02/22/23 1112   Readmission Assessment   Number of Days since last admission? 1-7 days   Previous Disposition Home with Home Health   Who is being Interviewed Patient   What was the patient's/caregiver's perception as to why they think they needed to return back to the hospital?   (Leaking PEG tube)   Did you visit your Primary Care Physician after you left the hospital, before you returned this time? No   Why weren't you able to visit your PCP? Other (Comment)  (Patient was re-admitted two days after discharge.)   Did you see a specialist, such as Cardiac, Pulmonary, Orthopedic Physician, etc. after you left the hospital? No   Who advised the patient to return to the hospital? Self-referral   What reasons did they give? Other (Comment)  (PEG malfunctioning per patient.)   In our efforts to provide the best possible care to you and others like you, can you think of anything that we could have done to help you after you left the hospital the first time, so that you might not have needed to return so soon?  Other (Comment)  Brendan Shaikh plans to return home with [facility].)

## 2023-02-22 NOTE — PROGRESS NOTES
CLINICAL PHARMACY: DISCHARGE MED RECONCILIATION/REVIEW    [facility]) Select Patient?: No  Total # of Interventions Recommended: 0   -   Total # Interventions Accepted: 0  Intervention Severity:   - Level 1 Intervention Present?: No   - Level 2 #: 0   - Level 3 #: 0   Time Spent (min): 15    Additional Documentation:

## 2023-02-22 NOTE — PROGRESS NOTES
Recovery mode. Patient denies discomfort. Passing gas, taking fluids. Dr. Shannon Capps discussed findings with patient. Report called to [facility] on 5K. Discharge instructions provided and understanding verbalized.

## 2023-02-22 NOTE — RT PROTOCOL NOTE
RT Inhaler-Nebulizer Bronchodilator Protocol Note    There is a bronchodilator order in the chart from a provider indicating to follow the RT Bronchodilator Protocol and there is an Initiate RT Inhaler-Nebulizer Bronchodilator Protocol order as well (see protocol at bottom of note). CXR Findings:  No results found. The findings from the last RT Protocol Assessment were as follows:   History Pulmonary Disease: Chronic pulmonary disease (Asthma)  Respiratory Pattern: Regular pattern and RR 12-20 bpm  Breath Sounds: Clear breath sounds  Cough: Strong, spontaneous, non-productive  Indication for Bronchodilator Therapy: On home bronchodilators (prn at home)  Bronchodilator Assessment Score: 2    Aerosolized bronchodilator medication orders have been revised according to the RT Inhaler-Nebulizer Bronchodilator Protocol below. Respiratory Therapist to perform RT Therapy Protocol Assessment initially then follow the protocol. Repeat RT Therapy Protocol Assessment PRN for score 0-3 or on second treatment, BID, and PRN for scores above 3. No Indications - adjust the frequency to every 6 hours PRN wheezing or bronchospasm, if no treatments needed after 48 hours then discontinue using Per Protocol order mode. If indication present, adjust the RT bronchodilator orders based on the Bronchodilator Assessment Score as indicated below. Use Inhaler orders unless patient has one or more of the following: on home nebulizer, not able to hold breath for 10 seconds, is not alert and oriented, cannot activate and use MDI correctly, or respiratory rate 25 breaths per minute or more, then use the equivalent nebulizer order(s) with same Frequency and PRN reasons based on the score. If a patient is on this medication at home then do not decrease Frequency below that used at home.     0-3 - enter or revise RT bronchodilator order(s) to equivalent RT Bronchodilator order with Frequency of every 4 hours PRN for wheezing or increased work of breathing using Per Protocol order mode. 4-6 - enter or revise RT Bronchodilator order(s) to two equivalent RT bronchodilator orders with one order with BID Frequency and one order with Frequency of every 4 hours PRN wheezing or increased work of breathing using Per Protocol order mode. 7-10 - enter or revise RT Bronchodilator order(s) to two equivalent RT bronchodilator orders with one order with TID Frequency and one order with Frequency of every 4 hours PRN wheezing or increased work of breathing using Per Protocol order mode. 11-13 - enter or revise RT Bronchodilator order(s) to one equivalent RT bronchodilator order with QID Frequency and an Albuterol order with Frequency of every 4 hours PRN wheezing or increased work of breathing using Per Protocol order mode. Greater than 13 - enter or revise RT Bronchodilator order(s) to one equivalent RT bronchodilator order with every 4 hours Frequency and an Albuterol order with Frequency of every 2 hours PRN wheezing or increased work of breathing using Per Protocol order mode. RT to enter RT Home Evaluation for COPD & MDI Assessment order using Per Protocol order mode.     Electronically signed by Paulette Halsted, RCP on 2/22/2023 at 6:36 AM

## 2023-02-22 NOTE — PLAN OF CARE
Problem: Chronic Conditions and Co-morbidities  Goal: Patient's chronic conditions and co-morbidity symptoms are monitored and maintained or improved  Outcome: Progressing  Flowsheets (Taken 2/21/2023 1919)  Care Plan - Patient's Chronic Conditions and Co-Morbidity Symptoms are Monitored and Maintained or Improved: Monitor and assess patient's chronic conditions and comorbid symptoms for stability, deterioration, or improvement     Problem: Pain  Goal: Verbalizes/displays adequate comfort level or baseline comfort level  Outcome: Progressing  Flowsheets (Taken 2/21/2023 1919)  Verbalizes/displays adequate comfort level or baseline comfort level:   Encourage patient to monitor pain and request assistance   Assess pain using appropriate pain scale   Administer analgesics based on type and severity of pain and evaluate response   Implement non-pharmacological measures as appropriate and evaluate response     Problem: Safety - Adult  Goal: Free from fall injury  Outcome: Progressing  Flowsheets (Taken 2/21/2023 1919)  Free From Fall Injury: Instruct family/caregiver on patient safety     Problem: Nutrition Deficit:  Goal: Optimize nutritional status  Outcome: Progressing  Flowsheets (Taken 2/21/2023 1919)  Nutrient intake appropriate for improving, restoring, or maintaining nutritional needs:   Assess nutritional status and recommend course of action   Monitor oral intake, labs, and treatment plans   Recommend appropriate diets, oral nutritional supplements, and vitamin/mineral supplements    Care plan reviewed with patient. Patient verbalize's understanding of the plan of care and contribute to goal setting.

## 2023-02-22 NOTE — PLAN OF CARE
Problem: Chronic Conditions and Co-morbidities  Goal: Patient's chronic conditions and co-morbidity symptoms are monitored and maintained or improved  2/22/2023 0150 by Olga Rios RN  Outcome: Progressing  2/21/2023 1919 by Ana Turcios RN  Outcome: Progressing  Flowsheets (Taken 2/21/2023 1919)  Care Plan - Patient's Chronic Conditions and Co-Morbidity Symptoms are Monitored and Maintained or Improved: Monitor and assess patient's chronic conditions and comorbid symptoms for stability, deterioration, or improvement     Problem: Pain  Goal: Verbalizes/displays adequate comfort level or baseline comfort level  2/22/2023 0150 by Olga Rios RN  Outcome: Progressing  2/21/2023 1919 by Ana Turcios RN  Outcome: Progressing  Flowsheets (Taken 2/21/2023 1919)  Verbalizes/displays adequate comfort level or baseline comfort level:   Encourage patient to monitor pain and request assistance   Assess pain using appropriate pain scale   Administer analgesics based on type and severity of pain and evaluate response   Implement non-pharmacological measures as appropriate and evaluate response     Problem: Safety - Adult  Goal: Free from fall injury  2/22/2023 0150 by Olga Rios RN  Outcome: Progressing  2/21/2023 1919 by Ana Turcios RN  Outcome: Progressing  [address] [place] (Taken 2/21/2023 1919)  Free From Fall Injury: Instruct family/caregiver on patient safety     Problem: Nutrition Deficit:  Goal: Optimize nutritional status  2/22/2023 0150 by Olga Rios RN  Outcome: Progressing  2/21/2023 1919 by Ana Turcios RN  Outcome: Progressing  Flowsheets (Taken 2/21/2023 1919)  Nutrient intake appropriate for improving, restoring, or maintaining nutritional needs:   Assess nutritional status and recommend course of action   Monitor oral intake, labs, and treatment plans   Recommend appropriate diets, oral nutritional supplements, and vitamin/mineral supplements    Care plan reviewed with patient. Patient verbalizes understanding of the plan of care and contribute to goal setting.

## 2023-02-22 NOTE — CONSULTS
[address]                    [address] [place] [address]                                  CONSULTATION    PATIENT NAME: Rachelle Castellanos                  :        1990  MED REC NO:   [id number]                           ROOM:       0009  ACCOUNT NO:   [de-identified]                           ADMIT DATE: 2023  PROVIDER:     JANICE Otero:  2023    HISTORY OF PRESENT ILLNESS:  The patient is known to the practice, had  recently placed PEG tube by me after she removed her gastrostomy tube  placed during the surgery with lysis of adhesions by Dr. Ella Davidson. G-tube was placed on 2023. The patient had dysphagia, nausea, vomit, inability to keep food. Had  fundoplication surgery, a Nissen which was done more than one time. She  had fundoplication done by Dr. Yaw Knight lately who was searching for  her gastrotomy tube. She removed it. I have to put a new PEG tube in  her not long time ago. Since then, she was able to use the tube with no  difficulty. She was tolerating that, but she was not able to eat oral  food in significant amount. She needs a PEG tube for maintenance. She  had seen my nurse practitioner and evaluated in the office recently. She said in the way back home she fell and she had a little damage with  her tube, which is difficult to believe as the tube is very hard,  difficult to cut really even with the scissor. The tube was repaired  twice in her last admission to [address] [place] [address] per the nurse practitioner  with the tube was cut, was removed and a cap was inserted with no  difficulty. She went home. She came yesterday through the ER,  complaining the PEG tube being leaking. A hole was seen over the  retainer ring with leakage when fluid was given in it, indicating recent  damage to the PEG tube.   At this time, she is complaining of diffuse  abdominal discomfort, not very severe, more around the PEG tube. The  tube looked site to be clean. Mostly pus coming out of it. Seen a  leakage from a few pin hole above the retainer ring at this time. The  patient complained not able to keep food down. She needs a PEG tube. She asked that to be repaired to go home. She lives with her wife at  this time. She seemed to take the medication at home, however, but not  all the time. PAST MEDICAL HISTORY:  Significant for dysphagia chronic, PEG tube  placement more than one time in the past, a fundoplication and hernia  repair, lysis of adhesions, tooth surgery in the past, chronic  congestive heart failure, coronary artery disease, depression, diabetes,  morbid obese, gastric reflux, hypertension, possible migraine,  postoperative nausea and vomit, prolonged emergent from general  anesthesia, sickle cell trait. PAST SURGICAL HISTORY:  Multiple surgeries including PEG tube replaced  twice, exploratory laparotomy, lysis of adhesions, breast reduction,  hernia repair, dental surgery, colonoscopy with polypectomy, gastric  fundoplication, multiple laparoscopy, upper endoscopy dilation done more  than one time, acute GI bleed with EGD. SOCIAL HISTORY:  She stopped smoking six years ago. No smokeless  tobacco.  No current alcohol or drug abuse. FAMILY HISTORY:  Significant for cancer in her mother, depression,  diabetes, coronary artery disease. ALLERGIES:  EXTENSIVE LIST INCLUDING PENICILLIN LISTED, PERCOCET,  AMOXICILLIN, CIPRO, COMPAZINE, FENTANYL, FLEXERIL, LISINOPRIL,  LORAZEPAM, LOSARTAN, MACROBID, MUSHROOM EXTRACT COMPLEX, REGLAN,  VICODIN. MEDICATIONS:  Listed Inderal, Pepcid, Colace, albuterol inhaler,  Topamax, Linzess, Abilify, BuSpar, Lasix, Protonix, flavored inhalers,  Singulair, Norvasc, Proventil. PHYSICAL EXAMINATION:  GENERAL:  Appeared to be comfortable, not short of breath, not using  accessory muscle.   VITAL SIGNS:  Her BMI is 48.58, her weight 283, her temperature 98.7,  her blood pressure 140/79. HEENT:  Her head is atraumatic. Sclerae anicteric. Her pupils are  reactive to light and accommodation. Her oral cavity is normal with no  lesion. NECK:  Supple. CHEST:  Poor air entry bilaterally. CARDIOVASCULAR:  S1, S2. No murmur. ABDOMEN:  Soft. No tenderness. The PEG tube site appeared to be clean.  _____ more than one proximal to the retainer ring, likely by somebody, I  believe, the patient herself or her wife, difficult to confirm. The  patient is denying that at this time. EXTREMITIES:  No clubbing. No cyanosis. LABORATORY DATA:  Sodium and potassium are normal.  BUN and creatinine  are normal.  ALT and AST are normal.  White blood cell 12, H and H 10.9  and 34.6, MCV is 74.4. Iron 28, saturation is 8. IMPRESSION:  1. Dysphagia, PEG tube placed. 2.  Malfunctioning of PEG tube had been more than one time in short  period of time. I believe somebody doing that, it could be the patient  herself due to some specific type of disorder. 3.  Morbid obesity. 4.  Diabetes. 5.  Hypertension. 6.  Congestive heart failure. PLAN:  1. Keep n.p.o.  2.  Can eat if the plan with Speech Therapy, more on soft diet. 3.  PEG tube to be replaced tomorrow with endoscopy. 4.  Counseled the patient about preventing from damaging the PEG tube as  repeat endoscopy on her would change the PEG tube will have a high risk  for bleeding complications because the PEG tube site is fresh, has been  a little bit more than a month. 5.  A psychology consultation is needed in this patient with readmission  for the same problem. Thank you for allowing me to participate in this patient's care.         Sheyla Stewart M.D.    D: 02/21/2023 18:41:37       T: 02/21/2023 20:32:51     [email or web address]  Job#: [id number]     Doc#: [id number]    CC:  Breana Anna CNP

## 2023-02-22 NOTE — CARE COORDINATION
2/22/23, 1:41 PM EST    Patient goals/plan/ treatment preferences discussed by  and . Patient goals/plan/ treatment preferences reviewed with patient/ family. Patient/ family verbalize understanding of discharge plan and are in agreement with goal/plan/treatment preferences. Understanding was demonstrated using the teach back method. AVS provided by RN at time of discharge, which includes all necessary medical information pertaining to the patients current course of illness, treatment, post-discharge goals of care, and treatment preferences. Services At/After Discharge: Nursing service       IMM Letter  Observation Status Letter date given[de-identified] 02/21/23  Observation Status Letter time given[de-identified] 0214  Observation Status Letter given to Patient/Family/Significant other/Guardian/POA/by[de-identified] [place]. [place] and [place].

## 2023-02-22 NOTE — CARE COORDINATION
Case Management Assessment  Initial Evaluation    Date/Time of Evaluation: 2/22/2023 11:24 AM  Assessment Completed by: Ping Koch RN    If patient is discharged prior to next notation, then this note serves as note for discharge by case management. Patient Name: Marylene Haddock                   YOB: 1990  Diagnosis: PEG tube malfunction ([facility] [address] [K94.23]  Leaking percutaneous endoscopic gastrostomy (PEG) tube [facility]) [K94.23]                   Date / Time: 2/20/2023 10:11 PM  [facility]     Patient Admission Status: Observation   Readmission Risk Low 0-14, Mod 15-19), High > 20: Readmission Risk Score: 20.7    Current PCP: AURELIO Kim CNP  PCP verified by CM? Yes    Chart Reviewed: Yes      History Provided by: Patient  Patient Orientation: Alert and Oriented, Person, Place, Situation, Self    Patient Cognition: Alert    Hospitalization in the last 30 days (Readmission):  Yes    If yes, Readmission Assessment in  Navigator will be completed. Advance Directives:      Code Status: Full Code   Patient's Primary Decision Maker is: Legal Next of Kin      Discharge Planning:    Patient lives with: Spouse/Significant Other Type of Home: Apartment  Primary Care Giver: Self  Patient Support Systems include: Spouse/Significant Other   Current Financial resources: Medicaid, Medicare  Current community resources: ECF/Home Care  Current services prior to admission: Home Care            Current DME:              Type of Home Care services:  Nursing Services    ADLS  Prior functional level: Independent in ADLs/IADLs  Current functional level: Independent in ADLs/IADLs    Family can provide assistance at DC: Yes  Would you like Case Management to discuss the discharge plan with any other family members/significant others, and if so, who?  No  Plans to Return to Present Housing: Yes  Other Identified Issues/Barriers to RETURNING to current housing: None  Potential Assistance needed at discharge: Kulwant John Paul            Potential DME:    Patient expects to discharge to: [address] [place] [address] for transportation at discharge: [address]: Desiree Vera / Plan: Bernadette Gates / Product Type: *No Product type* /     Does insurance require precert for SNF: Yes    Potential assistance Purchasing Medications: No  Meds-to-Beds request:        [facility] [id number] - [place], [address] [phone] - F [phone]  [address] [address]  Phone: [phone] Fax: [address] [place], [place] - [address]  [address] [place] [address]  Phone: [phone] Fax: [phone] [facility] [address] [place]  [address] [place], [place] [address] [phone] Bambi Segundo [phone] [address] [place] [address]  [address] [place] [address]  Phone: [phone] Fax: [phone]      Notes:    Factors facilitating achievement of predicted outcomes: Family support, Motivated, and Pleasant    Barriers to discharge: Pain and medical stability. Additional Case Management Notes: Patient presents due to PEG tube malfunction. Iron level 28- IV Venofer x 1 dose. GI consulted, plan for PEG exchange today, Magnesium and Potassium replacement protocol, prn Tylenol, Dilaudid, and Tigan, NPO, SCD's, up with assistance. Procedure: EGD and PEG exchange today. The Plan for Transition of Care is related to the following treatment goals of PEG tube malfunction ([facility] [address] [K94.23]  Leaking percutaneous endoscopic gastrostomy (PEG) tube [facility]) [X64.61]    Patient Goals/Plan/Treatment Preferences: Carolyn Holland is from home with her wife. She is current with [facility] and [facility] for PEG care and enteral feedings. Insurance and PCP verified. She will have transportation to home, uses Lyft. Carolyn Holland will return home at discharge and resume the services she has in place.    Transportation/Food Security/Housekeeping Addressed: No issues identified.      Marcia Hinojosa RN  Case Management Department

## 2023-02-22 NOTE — PLAN OF CARE
Problem: Respiratory - Adult  Goal: Clear lung sounds  2/22/2023 1811 by Tori Ledesma RCP  Outcome: Progressing  Note: Mdi to maintain open airways. Patient mutually agreed on goals.

## 2023-02-22 NOTE — BRIEF OP NOTE
Brief Postoperative Note      Patient: Romel Martins  YOB: 1990  MRN: [id number]    Date of Procedure: 2/22/2023    [facility]Op Diagnosis[facility] [K94.23]    Post-Op Diagnosis: The placement of the PEG tube by the guidance of endoscopy       Procedure(s):  EGD PEG TUBE PLACEMENT    Surgeon(s):  Saida Garnica MD    Assistant:  * No surgical staff found *    Anesthesia: Monitor Anesthesia Care    Estimated Blood Loss (mL): None    Complications: None    Specimens:   * No specimens in log *    Implants:  Implant Name Type Inv. Item Serial No.  Lot No. LRB No. Used Action   gastrostomy tube with enfit connector 20fr     [id number] N/A 1 Implanted         Drains:   Gastrostomy/Enterostomy/Jejunostomy Tube Percutaneous Endoscopic Gastrostomy (PEG) LUQ (Active)   Drainage Appearance Green 02/18/23 0829   Site Description Painful 02/18/23 1107   J Port Status Clamped 02/17/23 1933   G Port Status Clamped 02/17/23 1933   Surrounding Skin Clean, dry & intact 02/21/23 0830   Dressing Status Other (Comment) 02/21/23 0830   Dressing Type Other (Comment) 02/16/23 2244   G-Tube Care Completed Yes 02/21/23 0830   Tube Feeding Other Tube Feeding (must specify product in comment) 02/18/23 1203   Tube feeding/verify rate (mL/hr) 60 mL/hr 02/18/23 1203   Tube Feeding Intake (mL) 182 ml 02/18/23 1526   Free Water/Flush (mL) 60 mL 02/02/23 1945   Action Taken Other (comment) 02/22/23 0930   Residual Volume (ml) 5 ml 01/31/23 1314       [REMOVED] External Urinary Catheter (Removed)   Site Assessment Clean;Dry; Intact 01/16/23 0427   Placement Repositioned 01/16/23 0427   Securement Method Securing device (Describe) 01/16/23 0427   Catheter Care Suction Canister/Tubing changed 01/14/23 1407   Perineal Care Yes 01/15/23 2010   Suction 40 mmgHg continuous 01/16/23 0427   Urine Color Yellow 01/15/23 2010   Urine Appearance Clear 01/14/23 1407   Output (mL) 1000 mL 01/14/23 2333       Findings: Replacement of the PEG tube by guidance of endoscopy    Electronically signed by Kaden Elaine MD on 2/22/2023 at 5:18 PM

## 2023-02-22 NOTE — PROGRESS NOTES
EGD completed. Tolerated well. Photos taken. No biopsies. Gastrostomy tube replacement. 20FR gastrostomy tube inserted and placement verified with scope. Scope [id number].

## 2023-02-22 NOTE — PRE SEDATION
[facility]  Sedation/Analgesia History & Physical    Patient: Bea Campos: 1990  Med Rec#: [id number] Acc#: [id number]   Provider Performing Procedure: Angeline Grimaldo MD  Primary Care Physician: AURELIO Carlisle CNP    PRE-PROCEDURE   Full CODE [x]Yes  DNR-CCA/DNR-CC []Yes   Brief History/Pre-Procedure Diagnosis: History of dysphagia has malfunction of the PEG tube which we took more than 1 time to fix however likely patient Kept on cath and holes in it so we have to replace it          MEDICAL HISTORY  []CAD/Valve  []Liver Disease  []Lung Disease []Diabetes  []Hypertension []Renal Disease  []Additional information:       has a past medical history of Acute on chronic diastolic congestive heart failure ([address] JANI (acute kidney injury) ([facility] [address] Anemia, Anesthesia, Anxiety, Asthma, CAD (coronary artery disease), Depression, Diabetes ([facility] [address] Diet-controlled type 2 diabetes mellitus ([facility] [address] Epilepsy ([facility] [address] Fibroids, Gastro - esophageal reflux disease, Hypertension, Hypertrophy of tonsil and adenoid, Malignant carcinoid tumor of other sites [facility]), Migraine, PONV (postoperative nausea and vomiting), Prolonged emergence from general anesthesia, Sickle cell anemia ([address] Sickle cell trait ([address] and Tumor associated pain. SURGICAL HISTORY   has a past surgical history that includes hernia repair (2010, 2016); [place] tooth extraction; tumor removal; Breast reduction surgery; myomectomy; Partial hysterectomy (4/8/16); Dilation and curettage of uterus (10/21/2013); Central venous catheter insertion (Right, 12/11/2015); Abdominal exploration surgery; Gastric fundoplication; Endoscopy, colon, diagnostic; other surgical history (08/12/2016); Tunneled venous port placement; Hysterectomy (04/2016); Abdomen surgery (03/23/2017); pr egd balloon dilation esophagus <30 mm diam (Left, 8/8/2017); pr egd balloon dilation esophagus <30 mm diam (N/A, 9/26/2017);  Tonsillectomy and adenoidectomy (N/A, 11/6/2017); pr egd balloon dilation esophagus <30 mm diam (Left, 12/12/2017); pr insj prph ctr vad w/subq port under 5 yr (Bilateral, 1/5/2018); pr egd balloon dilation esophagus <30 mm diam (N/A, 2/13/2018); Upper gastrointestinal endoscopy (Left, 4/3/2018); pr office/outpt visit,procedure only (N/A, 5/15/2018); Upper gastrointestinal endoscopy (Left, 6/26/2018); pr office/outpt visit,procedure only (Bilateral, 9/10/2018); pr office/outpt visit,procedure only (N/A, 11/16/2018); egd colonoscopy (N/A, 1/8/2019); Upper gastrointestinal endoscopy (Left, 2/26/2019); INSERTION / REMOVAL / REPLACEMENT VENOUS ACCESS CATHETER (N/A, 3/8/2019); egd colonoscopy (Left, 5/14/2019); Cholecystectomy, laparoscopic (N/A, 7/11/2019); Upper gastrointestinal endoscopy (N/A, 7/9/2019); egd colonoscopy (Left, 8/27/2019); Gastric fundoplication; Upper gastrointestinal endoscopy (Left, 9/9/2019); laparoscopy (N/A, 10/8/2019); [address] Surgery (N/A, 11/11/2019); Port Surgery (Right, 11/29/2019); other surgical history (12/02/2019); EGD (2019); Upper gastrointestinal endoscopy (N/A, 4/1/2020); Colonoscopy (N/A, 8/24/2020); Upper gastrointestinal endoscopy (Left, 8/24/2020); Upper gastrointestinal endoscopy (Left, 8/24/2020); Upper gastrointestinal endoscopy (N/A, 12/2/2020); Upper gastrointestinal endoscopy (Left, 12/2/2020); Dental surgery; Upper gastrointestinal endoscopy (Left, 1/19/2022); Upper gastrointestinal endoscopy (Left, 1/19/2022); Knee arthroscopy; laparoscopy (N/A, 7/15/2022); Upper gastrointestinal endoscopy (N/A, 7/15/2022); ventral hernia repair (N/A, 8/22/2022); hiatal hernia repair (N/A, 1/17/2023); Upper gastrointestinal endoscopy (N/A, 1/24/2023); and Gastrostomy tube placement (N/A, 1/25/2023).   Additional information:       ALLERGIES   Allergies as of 02/20/2023 - Reviewed 02/20/2023   Allergen Reaction Noted    Latex  05/15/2018    Ketorolac tromethamine Anaphylaxis 07/03/2012    Pcn [penicillins] Anaphylaxis 10/17/2016    Percocet [oxycodone-acetaminophen]  07/03/2012    Amoxicillin Hives 02/04/2016    Ciprofloxacin Hives and Swelling 09/20/2012    Compazine [prochlorperazine maleate] Itching 09/01/2016    Dicyclomine hcl Hives 05/17/2013    Fentanyl Other (See Comments) 11/01/2016    Fioricet [butalbital-apap-caffeine] Swelling 08/24/2012    Flexeril [cyclobenzaprine] Hives 03/08/2013    Gabapentin Swelling 07/30/2020    Ibuprofen [ibuprofen] Other (See Comments) 07/03/2012    Keflex [cephalexin] Itching 03/09/2016    Lisinopril  05/14/2019    Lorazepam Hives 10/29/2013    Losartan  09/19/2019    Macrobid [nitrofurantoin monohyd macro] Itching 02/11/2016    Morphine Other (See Comments) 07/03/2012    Mushroom extract complex Swelling 10/17/2016    Reglan [metoclopramide] Itching 10/16/2015    Vicodin [hydrocodone-acetaminophen] Itching 11/11/2019    Vistaril [hydroxyzine hcl] Itching 03/12/2017    Zofran [ondansetron] Hives 03/08/2013    Adhesive tape Rash 03/22/2017     Additional information:       MEDICATIONS   Coumadin Use Last 7 Days [x]No []Yes  Antiplatelet drug therapy use last 7 days  [x]No []Yes  Other anticoagulant use last 7 days  [x]No []Yes    Current Facility-Administered Medications:     albuterol (PROVENTIL) nebulizer solution 2.5 mg, 2.5 mg, Nebulization, Q6H PRN, Latricia Rojas PA-C    amLODIPine (NORVASC) tablet 5 mg, 5 mg, Oral, Daily, Latricia Rojas PA-C    ARIPiprazole (ABILIFY) tablet 10 mg, 10 mg, Oral, QHS, Latricia Rojas PA-C    baclofen (LIORESAL) tablet 10 mg, 10 mg, Oral, Daily, Latricia Rojas PA-C    busPIRone (BUSPAR) tablet 5 mg, 5 mg, Oral, TID, Latricia Rojas PA-C    escitalopram (LEXAPRO) tablet 20 mg, 20 mg, PEG Tube, Daily, Latricia R Brown, PA-C    [Held by provider] famotidine (PEPCID) tablet 40 mg, 40 mg, Oral, Nightly, Latricia Rojas PA-C    fluticasone (FLOVENT HFA) 110 MCG/ACT inhaler 2 puff, 2 puff, Inhalation, BID, [employer], PA-C, 2 puff at 02/22/23 0630 montelukast (SINGULAIR) tablet 10 mg, 10 mg, Oral, Nightly, Latricia Rojas PA-C    pantoprazole (PROTONIX) injection 40 mg, 40 mg, IntraVENous, Daily, [employer], PA-C, 40 mg at 02/22/23 5808    prazosin (MINIPRESS) capsule 1 mg, 1 mg, Oral, Nightly, JIMENEZ Fung-C    propranolol (INDERAL LA) extended release capsule 60 mg, 60 mg, Oral, Daily, Latricia Rojas PA-C    topiramate (TOPAMAX) tablet 100 mg, 100 mg, Oral, BID, Latricia Rojas PA-C    sodium chloride flush 0.9 % injection 5-40 mL, 5-40 mL, IntraVENous, 2 times per day, [place], PA-C, 10 mL at 02/22/23 1075    sodium chloride flush 0.9 % injection 5-40 mL, 5-40 mL, IntraVENous, PRN, [employer], PA-C    0.9 % sodium chloride infusion, , IntraVENous, PRN, [employer], PA-C    polyethylene glycol (GLYCOLAX) packet 17 g, 17 g, Oral, Daily PRN, [employer], PA-C    acetaminophen (TYLENOL) tablet 650 mg, 650 mg, Oral, Q6H PRN **OR** acetaminophen (TYLENOL) suppository 650 mg, 650 mg, Rectal, Q6H PRN, Latricia Rojas PA-C    potassium chloride (KLOR-CON M) extended release tablet 40 mEq, 40 mEq, Oral, PRN **OR** potassium bicarb-citric acid (EFFER-K) effervescent tablet 40 mEq, 40 mEq, Oral, PRN **OR** potassium chloride 10 mEq/100 mL IVPB (Peripheral Line), 10 mEq, IntraVENous, PRN, [employer], PA-C    magnesium sulfate 2000 mg in 50 mL IVPB premix, 2,000 mg, IntraVENous, PRN, JIMENEZ Fung-C    insulin lispro (HUMALOG) injection vial 0-4 Units, 0-4 Units, SubCUTAneous, TID Lane GRANDE DO    insulin lispro (HUMALOG) injection vial 0-4 Units, 0-4 Units, SubCUTAneous, Nightly, Christiano Tanner, DO    glucose chewable tablet 16 g, 4 tablet, Oral, PRN, Christiano Dent, DO, 16 g at 02/21/23 5195    dextrose bolus 10% 125 mL, 125 mL, IntraVENous, PRN **OR** dextrose bolus 10% 250 mL, 250 mL, IntraVENous, PRN, Christiano Dent, DO    glucagon (rDNA) injection 1 mg, 1 mg, SubCUTAneous, PRN, [place], DO    dextrose 10 % infusion, , IntraVENous, Continuous PRN, Aidee Mujica Ciro, DO    trimethobenzamide Lenord Leas) injection 200 mg, 200 mg, IntraMUSCular, Q6H PRN, Aidee Tanner, DO, 200 mg at 02/22/23 0403    hydrALAZINE (APRESOLINE) injection 5 mg, 5 mg, IntraVENous, Q6H PRN, Aidee Barriosblat, DO    HYDROmorphone (DILAUDID) injection 0.25 mg, 0.25 mg, IntraVENous, Q3H PRN **OR** HYDROmorphone (DILAUDID) injection 0.5 mg, 0.5 mg, IntraVENous, Q3H PRN, Shellie Bernardo, , 0.5 mg at 02/22/23 1506  Prior to Admission medications    Medication Sig Start Date End Date Taking? Authorizing Provider   traMADol (ULTRAM) 50 MG tablet Take 1-2 tablets by mouth every 6 hours as needed for Pain for up to 7 days.  Max Daily Amount: 400 mg 2/20/23 2/27/23  AURELIO Oswald CNP   propranolol (INDERAL LA) 60 MG extended release capsule Take 60 mg by mouth daily    Historical Provider, MD   famotidine (PEPCID) 40 MG tablet Take 1 tablet by mouth nightly 2/14/23   AURELIO Oswald CNP   Incontinence Supply Disposable (DEPEND SILHOUETTE BRIEFS L/XL) MISC Use as needed for urinary and bowel incontinence 2/9/23   Ella Davidson MD   docusate sodium (COLACE) 100 MG capsule Take 1 capsule by mouth 2 times daily as needed for Constipation 2/8/23   AURELIO Oswald CNP   topiramate (TOPAMAX) 100 MG tablet Take 100 mg by mouth 2 times daily    Historical Provider, MD   metFORMIN HCl 500 MG/5ML SOLN 1,000 mg daily (with breakfast) Take 10 mL via peg tube once daily    Historical Provider, MD   linaclotide (LINZESS) 145 MCG capsule Take 145 mcg by mouth every morning (before breakfast)    Historical Provider, MD   baclofen (LIORESAL) 10 MG tablet Take 10 mg by mouth daily    Historical Provider, MD   promethazine (PHENERGAN) 25 MG tablet Take 25 mg by mouth every 4-6 hours as needed for Nausea    Historical Provider, MD   prazosin (MINIPRESS) 1 MG capsule Take 1 mg by mouth nightly Historical Provider, MD   ARIPiprazole (ABILIFY) 1 MG/ML SOLN solution Take 10 mg by mouth nightly 10 mL via peg tube every evening    Historical Provider, MD   busPIRone (BUSPAR) 5 MG tablet Take 5 mg by mouth 3 times daily    Historical Provider, MD   pantoprazole (PROTONIX) 40 MG tablet Take 40 mg by mouth daily    Historical Provider, MD   polyethylene glycol (GLYCOLAX) 17 GM/SCOOP powder Dispense 238 Gram Bottle.   Use as Directed 9/21/22   AURELIO Barksdale CNP   acetaminophen (TYLENOL) 325 MG tablet Take 2 tablets by mouth 4 times daily as needed for Pain or Fever 3/3/22   Amelia Lerma MD   fluticasone [facility] HFA) 110 MCG/ACT inhaler 2 puffs 2 times daily 2/4/22   Historical Provider, MD   montelukast (SINGULAIR) 10 MG tablet Take 10 mg by mouth nightly    Historical Provider, MD   amLODIPine (NORVASC) 5 MG tablet Take 1 tablet by mouth daily 4/2/20   Brooke Ayoub MD   escitalopram (LEXAPRO) 5 MG/5ML solution 20 mg daily Take 20 mL via peg tube once daily    Historical Provider, MD   albuterol (PROVENTIL) (2.5 MG/3ML) 0.083% nebulizer solution Take 3 mLs by nebulization every 6 hours as needed for Wheezing 8/2/18   AURELIO Howell CNP   glucose monitoring kit (FREESTYLE) monitoring kit 1 kit by Does not apply route daily Or whichever system insurance will pay for 7/31/18   Khalida Tello DO   Spacer/Aero-Holding Chambers (E-Z SPACER) AISLINN 1 Device by Does not apply route daily 8/10/17   Khalida Tello DO     Additional information:       PHYSICAL:   Heart:  [x]Regular rate and rhythm  []Other:    Lungs:  [x]Clear    []Other:    Abdomen: [x]Soft    []Other:    Mental Status: [x]Alert & Oriented  []Other:      VITAL SIGNS   Patient Vitals for the past 24 hrs:   BP Temp Temp src Pulse Resp SpO2   02/22/23 1607 (!) 162/95 -- -- 78 18 99 %   02/22/23 1530 137/88 98.6 °F (37 °C) Oral 91 18 100 %   02/22/23 1506 -- -- -- -- 18 --   02/22/23 1219 -- -- -- -- 18 --   02/22/23 0908 -- -- -- -- 18 --   02/22/23 0900 (!) 144/99 98 °F (36.7 °C) Oral 86 18 99 %   02/22/23 0400 129/89 98.2 °F (36.8 °C) Oral 93 18 100 %   02/21/23 2010 (!) 145/90 98.3 °F (36.8 °C) Oral 89 18 100 %       PLANNED PROCEDURE    [x]EGD  []Colonoscopy []Flex Sigmoid  []ERCP []EUS   []Cystoscopy  [] CATH [] BRONCH   Consent: I have discussed with the patient and/or the patient representative the indication, alternatives, and the possible risks and/or complications of the planned procedure and the anesthesia methods. The patient and/or patient representative appear to understand and agree to proceed. SEDATION  Planned agent:[x]Midazolam []Meperidine [x]Sublimaze []Morphine  []Diazepam  []Other:     ASA Classification: Class 3 - A patient with severe systemic disease that limits activity but is not incapacitating    Airway Assessment: Mallampati Class III - (soft palate & base of uvula are visible)    Monitoring and Safety: The patient will be placed on a cardiac monitor and vital signs, pulse oximetry and level of consciousness will be continuously evaluated throughout the procedure. The patient will be closely monitored until recovery from the medications is complete and the patient has returned to baseline status. Respiratory therapy will be on standby during the procedure. [x]Pre-procedure diagnostic studies complete and results available. Comment:    [x]Previous sedation/anesthesia experiences assessed. Comment:    [x]The patient is an appropriate candidate to undergo the planned procedure sedation and anesthesia. (Refer to nursing sedation/analgesia documentation record)  [x]Formulation and discussion of sedation/procedure plan, risks, and expectations with patient and/or responsible adult completed. [x]Patient examined immediately prior to the procedure.  (Refer to nursing sedation/analgesia documentation record)    Alejo Omer MD, MD   Electronically signed 2/22/2023 at 4:52 PM

## 2023-02-22 NOTE — ANESTHESIA PRE PROCEDURE
Department of Anesthesiology  Preprocedure Note       Name:  Migel Whelan   Age:  28 y.o.  :  1990                                          MRN:  [id number]         Date:  2023      Surgeon: Duy Greenwood):  Keaton Aguilera MD    Procedure: Procedure(s):  EGD PEG TUBE PLACEMENT    Medications prior to admission:   Prior to Admission medications    Medication Sig Start Date End Date Taking? Authorizing Provider   traMADol (ULTRAM) 50 MG tablet Take 1-2 tablets by mouth every 6 hours as needed for Pain for up to 7 days.  Max Daily Amount: 400 mg 23  AURELIO Vega CNP   propranolol (INDERAL LA) 60 MG extended release capsule Take 60 mg by mouth daily    Historical Provider, MD   famotidine (PEPCID) 40 MG tablet Take 1 tablet by mouth nightly 23   AURELIO Vega CNP   Incontinence Supply Disposable (DEPEND SILHOUETTE BRIEFS L/XL) MISC Use as needed for urinary and bowel incontinence 23   Marquez Ayers MD   docusate sodium (COLACE) 100 MG capsule Take 1 capsule by mouth 2 times daily as needed for Constipation 23   AURELIO Vega CNP   topiramate (TOPAMAX) 100 MG tablet Take 100 mg by mouth 2 times daily    Historical Provider, MD   metFORMIN HCl 500 MG/5ML SOLN 1,000 mg daily (with breakfast) Take 10 mL via peg tube once daily    Historical Provider, MD   linaclotide (LINZESS) 145 MCG capsule Take 145 mcg by mouth every morning (before breakfast)    Historical Provider, MD   baclofen (LIORESAL) 10 MG tablet Take 10 mg by mouth daily    Historical Provider, MD   promethazine (PHENERGAN) 25 MG tablet Take 25 mg by mouth every 4-6 hours as needed for Nausea    Historical Provider, MD   prazosin (MINIPRESS) 1 MG capsule Take 1 mg by mouth nightly    Historical Provider, MD   ARIPiprazole (ABILIFY) 1 MG/ML SOLN solution Take 10 mg by mouth nightly 10 mL via peg tube every evening    Historical Provider, MD   busPIRone (BUSPAR) 5 MG tablet Take 5 mg by mouth 3 times daily    Historical Provider, MD   pantoprazole (PROTONIX) 40 MG tablet Take 40 mg by mouth daily    Historical Provider, MD   polyethylene glycol (GLYCOLAX) 17 GM/SCOOP powder Dispense 238 Gram Bottle. Use as Directed 9/21/22   AURELIO Goodwin CNP   acetaminophen (TYLENOL) 325 MG tablet Take 2 tablets by mouth 4 times daily as needed for Pain or Fever 3/3/22   Sarbjit Mahoney MD   fluticasone (FLOVENT HFA) 110 MCG/ACT inhaler 2 puffs 2 times daily 2/4/22   Historical Provider, MD   montelukast (SINGULAIR) 10 MG tablet Take 10 mg by mouth nightly    Historical Provider, MD   amLODIPine (NORVASC) 5 MG tablet Take 1 tablet by mouth daily 4/2/20   Jorge Moss MD   escitalopram (LEXAPRO) 5 MG/5ML solution 20 mg daily Take 20 mL via peg tube once daily    Historical Provider, MD   albuterol (PROVENTIL) (2.5 MG/3ML) 0.083% nebulizer solution Take 3 mLs by nebulization every 6 hours as needed for Wheezing 8/2/18   AURELIO Chaparro CNP   glucose monitoring kit (FREESTYLE) monitoring kit 1 kit by Does not apply route daily Or whichever system insurance will pay for 7/31/18   Hill Echevarria DO   Spacer/Aero-Holding Chambers (E-Z SPACER) AISLINN 1 Device by Does not apply route daily 8/10/17   Hill Echevarria DO       Current medications:    No current facility-administered medications for this visit. No current outpatient medications on file.      Facility-Administered Medications Ordered in Other Visits   Medication Dose Route Frequency Provider Last Rate Last Admin    albuterol (PROVENTIL) nebulizer solution 2.5 mg  2.5 mg Nebulization Q6H PRN Christophe Shane PA-C        amLODIPine (NORVASC) tablet 5 mg  5 mg Oral Daily Christophe Shane PA-C        ARIPiprazole (ABILIFY) tablet 10 mg  10 mg Oral QHS Christophe Shane PA-C        baclofen (LIORESAL) tablet 10 mg  10 mg Oral Daily Christophe Shane PA-C        busPIRone (BUSPAR) tablet 5 mg  5 mg Oral TID Christophe Shane PA-C        escitalopram (LEXAPRO) tablet 20 mg  20 mg PEG Tube Daily Latricia R WHITNEY Rojas        [Held by provider] famotidine (PEPCID) tablet 40 mg  40 mg Oral Nightly [employer], PA-C        fluticasone (FLOVENT HFA) 110 MCG/ACT inhaler 2 puff  2 puff Inhalation BID [employer], PA-C   2 puff at 02/22/23 0630    montelukast (SINGULAIR) tablet 10 mg  10 mg Oral Nightly [employer], PA-C        pantoprazole (PROTONIX) injection 40 mg  40 mg IntraVENous Daily [employer], PA-C   40 mg at 02/22/23 4062    prazosin (MINIPRESS) capsule 1 mg  1 mg Oral Nightly Latricia R WHITNEY Rojas        propranolol (INDERAL LA) extended release capsule 60 mg  60 mg Oral Daily [employer], PA-C        topiramate (TOPAMAX) tablet 100 mg  100 mg Oral BID [employer], PA-C        sodium chloride flush 0.9 % injection 5-40 mL  5-40 mL IntraVENous 2 times per day [employer], PA-C   10 mL at 02/22/23 5236    sodium chloride flush 0.9 % injection 5-40 mL  5-40 mL IntraVENous PRN [employer], PA-C        0.9 % sodium chloride infusion   IntraVENous PRN [employer], PA-C        polyethylene glycol (GLYCOLAX) packet 17 g  17 g Oral Daily PRN [employer], PA-C        acetaminophen (TYLENOL) tablet 650 mg  650 mg Oral Q6H PRN [employer], PA-C        Or    acetaminophen (TYLENOL) suppository 650 mg  650 mg Rectal Q6H PRN [employer], PA-C        potassium chloride (KLOR-CON M) extended release tablet 40 mEq  40 mEq Oral PRN [employer], PA-C        Or    potassium bicarb-citric acid (EFFER-K) effervescent tablet 40 mEq  40 mEq Oral PRN [employer], PA-C        Or    potassium chloride 10 mEq/100 mL IVPB (Peripheral Line)  10 mEq IntraVENous PRN [employer], PA-C        magnesium sulfate 2000 mg in 50 mL IVPB premix  2,000 mg IntraVENous PRN [employer], PA-C        insulin lispro (HUMALOG) injection vial 0-4 Units  0-4 Units SubCUTAneous TID [address] [employer] [place], DO        insulin lispro (HUMALOG) injection vial 0-4 Units  0-4 Units SubCUTAneous Nightly [facility], DO        glucose chewable tablet 16 g  4 tablet Oral PRN [facility]   16 g at 02/21/23 1859    dextrose bolus 10% 125 mL  125 mL IntraVENous PRN Alvarado Trung Wainblat, DO        Or    dextrose bolus 10% 250 mL  250 mL IntraVENous PRN Alvarado Trung Wainblat, DO        glucagon (rDNA) injection 1 mg  1 mg SubCUTAneous PRN Alvarado Trung [place]ainblat, DO        dextrose 10 % infusion   IntraVENous Continuous PRN [facility], DO        trimethobenzamide Azeb Ice) injection 200 mg  200 mg IntraMUSCular Q6H PRN [facility]   200 mg at 02/22/23 0403    hydrALAZINE (APRESOLINE) injection 5 mg  5 mg IntraVENous Q6H PRN [place], DO        HYDROmorphone (DILAUDID) injection 0.25 mg  0.25 mg IntraVENous Q3H PRN Jo Ann Manger, DO        Or    HYDROmorphone (DILAUDID) injection 0.5 mg  0.5 mg IntraVENous Q3H PRN Jo Ann Manger, DO   0.5 mg at 02/22/23 1506       Allergies: Allergies   Allergen Reactions    Latex     Ketorolac Tromethamine Anaphylaxis     Throat swelling    Pcn [Penicillins] Anaphylaxis    Percocet [Oxycodone-Acetaminophen]      hallucinations    Amoxicillin Hives    Ciprofloxacin Hives and Swelling    Compazine [Prochlorperazine Maleate] Itching    Dicyclomine Hcl Hives    Fentanyl Other (See Comments)     Pt states that her \"lips start busting out. \"    Fioricet [Butalbital-Apap-Caffeine] Swelling     Of the mouth, tongue    Flexeril [Cyclobenzaprine] Hives    Gabapentin Swelling     tongue    Ibuprofen [Ibuprofen] Other (See Comments)     Mouth swelling and ulcers    Keflex [Cephalexin] Itching    Lisinopril     Lorazepam Hives    Losartan      Elevated pulse    Macrobid [Nitrofurantoin Monohyd Macro] Itching    Morphine Other (See Comments)     HEADACHE UP WAKING    Mushroom Extract Complex Swelling    Reglan [Metoclopramide] Itching    Vicodin [Hydrocodone-Acetaminophen] Itching    Vistaril [Hydroxyzine Hcl] Itching    Zofran [Ondansetron] Hives    Adhesive Tape Rash       Problem List:    Patient Active Problem List   Diagnosis Code    Intractable abdominal pain R10.9    Nausea and vomiting R11.2    Carcinoid tumor D3A.00    Pelvic inflammatory disease N73.9    Intractable nausea and vomiting R11.2    Anemia D64.9    Diet-controlled type 2 diabetes mellitus ([facility] E11.9    Esophageal dysphagia R13.19    Esophageal stricture K22.2    Morbid obesity ([facility] E66.01    Migraine equivalent G43. 109    Mild persistent asthma without complication R11.69    Dyslipidemia E78.5    Moderate persistent asthma with acute exacerbation J45.41    Gastroenteritis K52.9    Hematuria R31.9    Diarrhea R19.7    Diarrhea of presumed infectious origin R19.7    Hematochezia K92.1    Generalized abdominal pain R10.84    Hypertrophy of tonsil and adenoid J35.3    Multiple drug allergies Z88.9    S/P T&A (status post tonsillectomy and adenoidectomy) Z90.89    Iron deficiency anemia D50.9    Poor intravenous access Z78.9    Abnormal Pap smear of cervix R87.619    Abnormal uterine bleeding N93.9    Exacerbation of asthma J45. 0    Chest wall pain R07.89    Female pelvic pain R10.2    Head ache R51.9    Heartburn R12    Incarcerated ventral hernia K43.6    Malignant carcinoid tumor of other sites ([facility] C7A.098    Pyelonephritis N12    Spigelian hernia K43.9    Uterine leiomyoma D25.9    Acute kidney injury ([facility] N17.9    Hypernatremia E87.0    Hypokalemia E87.6    Volume depletion, gastrointestinal loss E86.9    Essential hypertension I10    Hiatal hernia K44.9    S/P Nissen fundoplication (without gastrostomy tube) procedure Z98.890    Sickle cell anemia (HCC) D57.1    Sickle cell disease (HCC) D57.1    Callus of heel L84    Foot pain, bilateral M79.671, M79.672    Atypical chest pain R07.89    Tobacco use Z72.0    History of gastritis Z87.19    Weight gain R63.5    Chronic idiopathic constipation K59.04    Acute respiratory failure (HCC) J96.00    Stroke-like symptom R29.90    Acute on chronic diastolic congestive heart failure (HCC) I50.33    Dysphagia R13.10    S/P robot-assisted surgical procedure Z98.890    History of esophagogastroduodenoscopy (EGD) Z98.890    Port-site hernia K91.89, K43.2    Ventral hernia without obstruction or gangrene K43.9    Abdominal pain, epigastric R10.13    Tachycardia R00.0    Leukocytosis A18.287    Com[facility] K94.20    PEG tube malfunction ([facility]) K94.23       Past Medical History:        Diagnosis Date    Acute on chronic diastolic congestive heart failure ([address] 6/25/2022    JANI (acute kidney injury) ([facility] 7/7/2019    Anemia     Anesthesia     migraines    Anxiety     Asthma     CAD (coronary artery disease)     Depression     Diabetes ([address]     Diet-controlled type 2 diabetes mellitus ([address] 2016    Epilepsy ([address]     Fibroids     Gastro - esophageal reflux disease     Hypertension     Hypertrophy of tonsil and adenoid 11/4/2017    Malignant carcinoid tumor of other sites ([address] 5/14/2013    Migraine     PONV (postoperative nausea and vomiting)     Prolonged emergence from general anesthesia     Sickle cell anemia (HCC)     Sickle cell trait (HCC)     PT STATES SHE HAS THE TRAIT NOT THE DISEASE    Tumor associated pain     neuroendrocrine tumor, gastroma       Past Surgical History:        Procedure Laterality Date    ABDOMEN SURGERY  03/23/2017    Laparoscopic , Bi lat oopherectomy Dr Joey Mitchell      x2    BREAST REDUCTION SURGERY      CENTRAL VENOUS CATHETER Right 12/11/2015    right subclavian single lumen mediport insertion--Dr. Michel    CHOLECYSTECTOMY, LAPAROSCOPIC N/A 7/11/2019    CHOLECYSTECTOMY LAPAROSCOPIC performed by Valerie Fernandez MD at [place] [address] N/A 8/24/2020 COLONOSCOPY POLYPECTOMY SNARE/COLD BIOPSY performed by Hussain Prieto MD at [address] OF UTERUS  10/21/2013    Dilation and curettage, Diagnostic Hysteroscopy and laparoscopy (Dr. Wilfrido Mcdaniel, [facility])   [facility] EGD  2019    EGD COLONOSCOPY N/A 1/8/2019    EGD DIL performed by Torrey Perry MD at [facility] EGD COLONOSCOPY Left 5/14/2019    EGD DILATATION performed by Torrey Perry MD at [facility]    EGD COLONOSCOPY Left 8/27/2019    [facility]GD [facility]    ENDOSCOPY, COLON, DIAGNOSTIC      GASTRIC FUNDOPLICATION      [facility]    GASTRIC FUNDOPLICATION      [address] N/A 1/25/2023    EGD PEG TUBE PLACEMENT performed by Hussain Prieto MD at [place] [address]  2010, 2016    [place] , [facility] N/A 1/17/2023    Robotic Exploratory Paparoscopy with Extensive Lysis of Adhesions G Tube Insertion performed by Pineda Lee MD at [address] ([address] [place] [address])  04/2016    INSERTION / REMOVAL / REPLACEMENT VENOUS ACCESS CATHETER N/A 3/8/2019    REMOVAL OF LEFT PORT AND PLACEMENT OF SINGLE LUMEN MEDIPORT RIGHT SIDE performed by Shary Osgood, MD at [address] [place] [address] N/A 10/8/2019    ROBOTIC DIAGNOSTIC LAPAROSCOPY,  RECURRENT HIATAL HERNIA REPAIR, REVISION FUNDOPLICATION performed by Pineda Lee MD at [place] [address] 7/15/2022    ROBOTIC LAPAROSCOPY, LYSIS OF ADHESIONS performed by Shary Osgood, MD at [place] [address]  08/12/2016    Right Port Removal, Placement of new Port Left by Dr Codi Boone  12/02/2019    Mediport insertion-IR Dr Delvis Dockery (CERVIX NOT REMOVED)  4/8/16    PORT SURGERY N/A 11/11/2019    REMOVAL OF RIGHT MEDIPORT & ATTEMPTED PLACEMENT OF LEFT SINGLE LUMEN MEDIPORT performed by Kelly Mayen Adan Serrano MD at [address] [place] Right 11/29/2019    RT INTERNAL JUGULAR SINGLE LUMEN MEDIPORT INSERTION performed by Jose Skinner MD at [address] <30 MM DIAM Left 8/8/2017    EGD/DIL performed by Juan Cedillo MD at [facility] End[facility]    [place]  [address] <30 MM DIAM N/A 9/26/2017    EGD DIL performed by Juan Cedillo MD at [address] [facility]  [address] <30 MM DIAM Left 12/12/2017    EGD DIL performed by Juan Cedillo MD at [address] [facility]  [address] <30 MM DIAM N/A 2/13/2018    EGD  DILATATION performed by Juan Cedillo MD at [facility] [address] [facility] CTR VAD W/SUBQ PORT UNDER 5 YR Bilateral 1/5/2018    EXCISION OF MEDIPORT LEFT SIDE, INSERTION MEDIPORT RIGHT  IJ performed by Jose Skinner MD at [facility] OFFICE/OUTPT [facility] N/A 5/15/2018    EGD DILATATION performed by Juan Cedillo MD at [facility] OFFICE/OUTPT VISIT,PROCEDURE ONLY Bilateral 9/10/2018    REMOVAL RT MEDIPORT, INSERTION LEFT performed by Jose Skinner MD at [address] OFFICE/OUTPT [facility] N/A 11/16/2018    MEDIPORT REPOSITIONING performed by Jose Skinner MD at [address] N/A 11/6/2017    TONSILLECTOMY AND ADENOIDECTOMY performed by Walter Manrique MD at [facility]      neoendocrine    TUNNELED VENOUS PORT PLACEMENT      UPPER GASTROINTESTINAL ENDOSCOPY Left 4/3/2018    EGD DILATION SAVORY performed by Juan Cedillo MD at [address] [facility] ENDOSCOPY Left 6/26/2018    EGD DILATION SAVORY performed by Juan Cedillo MD at [address] [facility] ENDOSCOPY Left 2/26/2019    EGD DILATION SAVORY performed by Juan Cedillo MD at [facility] N/A 7/9/2019    EGD DILATION SAVORY performed by Juan Cedillo MD at [facility] End[facility]    UPPER GASTROINTESTINAL ENDOSCOPY Left 9/9/2019    EGD BIOPSY performed by Zeeshan Burk MD at [address] [facility] N/A 4/1/2020    EGD DILATION BALLOON performed by Angeline Grimaldo MD at [facility] 8/24/2020    EGD ESOPHAGOGASTRODUODENOSCOPY DILATATION performed by Angeline Grimaldo MD at [address] [facility] 8/24/2020    EGD BIOPSY performed by Angeline Grimaldo MD at [facility] N/A 12/2/2020    EGD DILATION BALLOON performed by Angeline Grimaldo MD at [address] [facility] 12/2/2020    EGD BIOPSY performed by Angeline Grimaldo MD at [address] [place] [address] 1/19/2022    EGD performed by Neli Sweet MD at [address] [place] [address] 1/19/2022    EGD BIOPSY performed by Neli Sweet MD at [address] [place] [address] 7/15/2022    E[address] performed by Neli Sweet MD at [address] [place] [address] 1/24/2023    EGD ESOPHAGOGASTRODUODENOSCOPY performed by Angeline Grimaldo MD at [address] N/A 8/22/2022    [place] performed by Neli Sweet MD at [facility] EXTRACTION         Social History:    Social History     Tobacco Use    Smoking status: Every Day     Packs/day: 0.25     Years: 6.00     Pack years: 1.50     Types: Cigarettes     Start date: 3/1/2016    Smokeless tobacco: Never    Tobacco comments:     smokes 3 cig perday    Substance Use Topics    Alcohol use: Not Currently                                Ready to quit: Not Answered  Counseling given: Not Answered  Tobacco comments: smokes 3 cig perday       Vital Signs (Current): There were no vitals filed for this visit. BP Readings from Last 3 Encounters:   02/22/23 (!) 162/95   02/18/23 (!) 166/81   02/15/23 (!) 161/117       NPO Status:                                                                                 BMI:   Wt Readings from Last 3 Encounters:   02/20/23 283 lb (128.4 kg)   02/16/23 283 lb (128.4 kg)   02/15/23 289 lb (131.1 kg)     There is no height or weight on file to calculate BMI.    CBC:   Lab Results   Component Value Date/Time    WBC 8.9 02/22/2023 04:51 AM    RBC 5.06 02/22/2023 04:51 AM    RBC 5.62 05/19/2012 03:40 PM    HGB 11.6 02/22/2023 04:51 AM    HCT 37.8 02/22/2023 04:51 AM    MCV 74.7 02/22/2023 04:51 AM    RDW 15.6 12/23/2018 11:30 AM     02/22/2023 04:51 AM       CMP:   Lab Results   Component Value Date/Time     02/22/2023 04:51 AM    K 4.0 02/22/2023 04:51 AM     02/22/2023 04:51 AM    CO2 23 02/22/2023 04:51 AM    BUN 9 02/22/2023 04:51 AM    CREATININE 0.6 02/22/2023 04:51 AM    AGRATIO 1.4 12/23/2018 11:30 AM    LABGLOM >60 02/22/2023 04:51 AM    GLUCOSE 86 02/22/2023 04:51 AM    GLUCOSE 78 12/23/2018 11:30 AM    PROT 7.5 02/21/2023 01:35 AM    CALCIUM 9.3 02/22/2023 04:51 AM    BILITOT <0.2 02/21/2023 01:35 AM    ALKPHOS 105 02/21/2023 01:35 AM    AST 17 02/21/2023 01:35 AM    ALT 15 02/21/2023 01:35 AM       POC Tests:   Recent Labs     02/22/23  0742   POCGLU 92       Coags:   Lab Results   Component Value Date/Time    PROTIME 1.09 09/24/2011 05:36 AM    INR 0.96 08/22/2022 10:18 AM    APTT 36.4 08/22/2022 10:18 AM       HCG (If Applicable):   Lab Results   Component Value Date    PREGTESTUR NEGATIVE 02/26/2017    PREGSERUM NEGATIVE 02/12/2023        ABGs: No results found for: PHART, PO2ART, JCT7JXM, KFT7ICO, BEART, Z5KOYGLU     Type & Screen (If Applicable):  Lab Results   Component Value Date    LABRH POS 03/31/2020       Drug/Infectious Status (If Applicable):  No results found for: HIV, HEPCAB    COVID-19 Screening (If Applicable):   Lab Results Component Value Date/Time    COVID19 NOT DETECTED 12/25/2022 03:20 PM           Anesthesia Evaluation  Patient summary reviewed and Nursing notes reviewed   history of anesthetic complications (pt denied anesthetic complications when asked ): PONV. Airway: Mallampati: II  TM distance: <3 FB   Neck ROM: full  Mouth opening: > = 3 FB   Dental:    (+) poor dentition      Pulmonary:   (+) asthma:                            Cardiovascular:    (+) hypertension: no interval change, CAD: no interval change, CHF: no interval change,       ECG reviewed  Rhythm: regular  Rate: normal  Echocardiogram reviewed                  Neuro/Psych:   (+) headaches:, psychiatric history:            GI/Hepatic/Renal:   (+) hiatal hernia, GERD:, morbid obesity          Endo/Other:    (+) DiabetesType II DM, , .                 Abdominal:   (+) obese,           Vascular: Other Findings:             Anesthesia Plan      MAC     ASA 3       Induction: intravenous. Anesthetic plan and risks discussed with patient. Use of blood products discussed with patient whom. Plan discussed with CRNA.     Attending anesthesiologist reviewed and agrees with Preprocedure content                SHANTEL DENNEY   2/22/2023

## 2023-02-22 NOTE — DISCHARGE SUMMARY
Internal Medicine Resident Discharge Summary      Patient Identification:   Junie Mccrary   : 1990  MRN: [id number]   Account: [de-identified]   Patient's PCP: AURELIO Roper CNP    Admit Date: 2023   Discharge Date:   23      Admitting Physician: Raulito Ramsey MD  Discharge Physician: Adalgisa Collins DO       Discharge Diagnoses:  Complication of PEG tube  Patient was recently discharged within the last few days after similar complaint with PEG tube. PEG tube appeared to be slowly coming out with some fluid seeping out from the PEG tube site. Follow-up with GI in 2 weeks, PCP  Chronic microcytic anemia  Baseline hemoglobin 12, hemoglobin on admission 10.9. Iron panel: Iron 28 iron saturation 8% TIBC 360 ferritin 61. Likely secondary to sickle cell anemia  Continue with p.o. iron every other day upon discharge. Primary hypertension, controlled  NIDDM 2  Sickle cell anemia  Patient has a port placed in center of chest from 2015, patient claims this is for sickle cell anemia. Cannot find records for reason in chart. Depression  History of asthma  Not in acute exacerbation. Hiatal hernia  Noted in history, had G-tube placement attempt on 2023. Hospital Course:   Junie Mccrary is a 28 y.o. female with PMHx of hiatal hernia, sickle cell anemia, chronic microcytic anemia, short bowel syndrome who presented to [address] for PEG tube leaking. Patient was recently discharged on  for trauma to the PEG tube. Per H&P, patient stated she has been advancing diet and home and it is going well. GI was consulted on  and made patient n.p.o. at midnight. Patient had PEG tube replacement by Dr. Greta Salinas. The patient was seen and examined on day of discharge and this discharge summary is in conjunction with any daily progress note from day of discharge. The patient is discharged in stable condition.        Exam:   Vitals:  Vitals:    23 1530 02/22/23 1607 02/22/23 1719 02/22/23 1726   BP: 137/88 (!) 162/95 (!) 169/84 (!) 150/105   Pulse: 91 78 85 87   Resp: 18 18 16 16   Temp: 98.6 °F (37 °C)      TempSrc: Oral      SpO2: 100% 99% 99% 96%   Weight:       Height:         Weight: Weight: 283 lb (128.4 kg)     General appearance: No apparent distress, well developed, appears stated age. Eyes:  Pupils equal, round, and reactive to light. Conjunctivae/corneas clear. HENT: Head normal in appearance. External nares normal.  Oral mucosa moist without lesions. Hearing grossly intact. Neck: Supple, with full range of motion. Trachea midline. No gross JVD appreciated. Respiratory:  Normal respiratory effort. Clear to auscultation, bilaterally without rales or wheezes or rhonchi. Cardiovascular: Normal rate, regular rhythm with normal S1/S2 without murmurs. No lower extremity edema. Abdomen: Soft, non-tender, non-distended with normal bowel sounds. Musculoskeletal: There is no joint swelling or tenderness. Normal tone. No abnormal movements. Skin: Warm and dry. No rashes or lesions. Neurologic:  No focal sensory/motor deficits in the upper and lower extremities. Cranial nerves:  grossly non-focal 2-12. Psychiatric: Alert and oriented, normal insight and thought content. Capillary Refill: Brisk,< 3 seconds. Peripheral Pulses: +2 palpable, equal bilaterally. Labs:  For convenience the most recent labs are provided:  CBC:    Lab Results   Component Value Date/Time    WBC 8.9 02/22/2023 04:51 AM    HGB 11.6 02/22/2023 04:51 AM    HCT 37.8 02/22/2023 04:51 AM     02/22/2023 04:51 AM     Renal:    Lab Results   Component Value Date/Time     02/22/2023 04:51 AM    K 4.0 02/22/2023 04:51 AM     02/22/2023 04:51 AM    CO2 23 02/22/2023 04:51 AM    BUN 9 02/22/2023 04:51 AM    CREATININE 0.6 02/22/2023 04:51 AM    CALCIUM 9.3 02/22/2023 04:51 AM    PHOS 4.6 02/02/2023 10:00 AM     Liver:   Lab Results   Component Value Date/Time AST 17 02/21/2023 01:35 AM    ALT 15 02/21/2023 01:35 AM         Significant Diagnostic Studies    Radiology:   No orders to display          Consults:   IP CONSULT TO GI    Disposition: Home  Condition at Discharge: Stable    Code Status:  Full Code     Patient Instructions:    Discharge lab work: None  Activity: activity as tolerated  Diet: Diet NPO      Follow-up visits:   [address]/[facility]  [address] [id number]  [address] [place] [address], [place] [id number]    Follow up on 3/10/2023  your appointment time is at 9:30a, Please arrive 15mins early, Bring insurance card & Photo ID, co-pay, medication bottles & completed forms. Discharge Medications:      Medication List        CHANGE how you take these medications      albuterol (2.5 MG/3ML) 0.083% nebulizer solution  Commonly known as: PROVENTIL  Take 3 mLs by nebulization every 6 hours as needed for Wheezing  What changed: Another medication with the same name was removed. Continue taking this medication, and follow the directions you see here.             CONTINUE taking these medications      acetaminophen 325 MG tablet  Commonly known as: TYLENOL  Take 2 tablets by mouth 4 times daily as needed for Pain or Fever     amLODIPine 5 MG tablet  Commonly known as: NORVASC  Take 1 tablet by mouth daily     ARIPiprazole 1 MG/ML Soln solution  Commonly known as: ABILIFY     baclofen 10 MG tablet  Commonly known as: LIORESAL     busPIRone 5 MG tablet  Commonly known as: BUSPAR     Depend Silhouette Briefs L/XL Misc  Use as needed for urinary and bowel incontinence     docusate sodium 100 MG capsule  Commonly known as: COLACE  Take 1 capsule by mouth 2 times daily as needed for Constipation     E-Z Spacer Medina  1 Device by Does not apply route daily     escitalopram 5 MG/5ML solution  Commonly known as: LEXAPRO     famotidine 40 MG tablet  Commonly known as: Pepcid  Take 1 tablet by mouth nightly     fluticasone 110 MCG/ACT inhaler  Commonly known as: FLOVENT HFA     glucose monitoring kit  1 kit by Does not apply route daily Or whichever system insurance will pay for     linaclotide 145 MCG capsule  Commonly known as: LINZESS     metFORMIN HCl 500 MG/5ML Soln     montelukast 10 MG tablet  Commonly known as: SINGULAIR     pantoprazole 40 MG tablet  Commonly known as: PROTONIX     polyethylene glycol 17 GM/SCOOP powder  Commonly known as: GLYCOLAX  Dispense 238 Gram Bottle. Use as Directed     prazosin 1 MG capsule  Commonly known as: MINIPRESS     promethazine 25 MG tablet  Commonly known as: PHENERGAN     propranolol 60 MG extended release capsule  Commonly known as: INDERAL LA     topiramate 100 MG tablet  Commonly known as: TOPAMAX     traMADol 50 MG tablet  Commonly known as: ULTRAM  Take 1-2 tablets by mouth every 6 hours as needed for Pain for up to 7 days. Max Daily Amount: 400 mg            STOP taking these medications      amitriptyline 100 MG tablet  Commonly known as: ELAVIL     furosemide 20 MG tablet  Commonly known as: LASIX                   Time Spent on discharge is 35 minutes in the examination, evaluation, counseling and review of medications and discharge plan. Thank you AURELIO Roper CNP for the opportunity to be involved in this patient's care.       Signed:    Electronically signed by Adalgisa Collins DO on 2/22/23 at 6:05 PM EST     Case was discussed with Attending, Dr. Ayesha Lawrence DO

## 2023-02-23 NOTE — OP NOTE
[address] [place] [address]                                OPERATIVE REPORT    PATIENT NAME: Willow Cox                  :        1990  MED REC NO:   [id number]                           ROOM:       0009  ACCOUNT NO:   [de-identified]                           ADMIT DATE: 2023  PROVIDER:     JANICE Baer OF PROCEDURE:  2023    SURGEON:  Kay Díaz MD    INDICATIONS:  The patient has malfunction of PEG tube, dysphagia. PEG  tube was cut more than one time. The patient claimed that she fell on  the floor and had it damaged. We have fixed it more than one time. New  holes were seen recently close to the end of it. ASA CLASSIFICATION:  III. ESTIMATED BLOOD LOSS:  None. DESCRIPTION OF PROCEDURE:  The patient was brought to the GI lab. Consent was obtained. Risks involved with the procedure explained to  the patient. Informed consent obtained. The patient was monitored  during the procedure with pulse oximetry, blood pressure monitoring, and  oxygen by nasal cannula. Sedation by incremental doses of IV propofol  was given Anesthesia Service to achieve monitored anesthesia care. With the patient in the supine position, the PEG tube site was cleaned  more than one time. Mouthpiece was put to keep the oral cavity open. After sedation with IV propofol infusion, monitored anesthesia care was  induced. The scope was advanced without difficulty up to the antrum. The PEG tube site was grabbed using a [employer]. The PEG tube from the  skin site was cut, and tube was inserted into the PEG tube. Then, the  PEG tube was pulled off the oral cavity with a guidewire. The PEG tube  was discarded. From the skin site over the guidewire, I inserted a  20-[place] replacement PEG tube over the guidewire. Scope was advanced  again to inspect the insertion.   The PEG tube confirmed to be into the  gastric lumen. The balloon was inflated to the 's  recommendations of 20 mL. The PEG was advanced. The skin marked at 6  cm. Scope was withdrawn. The procedure was terminated with no  immediate complication. IMPRESSION:  Replacement of the PEG tube using endoscopy. PLAN:  1. Start using the PEG tube today for feeding and medication as I  confirm the position with endoscopy. 2.  From GI point of view, the patient is stable to be discharged home. 3.  The patient should really stop damaging the PEG tube as likely this  will lead to more complications with the PEG tube as the site might  become infected. I have discussed that with her. She thinks that she  is going to do what would damage the PEG tube. The patient will be  transferred to the floor, and PEG tube can be started to be used today.         Tevin Valle M.D.    D: 02/22/2023 17:36:34       T: 02/23/2023 1:06:22     [email or web address]  Job#: [id number]     Doc#: [id number]    CC:

## 2023-02-24 NOTE — ANESTHESIA POSTPROCEDURE EVALUATION
Department of Anesthesiology  Postprocedure Note    Patient: Romel Martins  MRN: [id number]  YOB: 1990  Date of evaluation: 2/24/2023      Procedure Summary     Date: 02/22/23 Room / Location: [address] [place] [address]    Anesthesia Start: 0364 Anesthesia Stop: 6819    Procedure: EGD PEG TUBE PLACEMENT (Abdomen) Diagnosis:       PEG tube malfunction ([address]      (PEG tube malfunction ([address] [Q50.31])    Surgeons: Saida Garnica MD Responsible Provider: Alem Ruiz MD    Anesthesia Type: MAC ASA Status: 3          Anesthesia Type: No value filed.     Juan Phase I:      Juan Phase II: Juan Score: 10      Anesthesia Post Evaluation    Patient location during evaluation: PACU  Patient participation: complete - patient participated  Level of consciousness: awake and alert  Airway patency: patent  Nausea & Vomiting: no nausea  Complications: no  Cardiovascular status: blood pressure returned to baseline and hemodynamically stable  Respiratory status: acceptable and spontaneous ventilation  Hydration status: euvolemic

## 2023-02-28 ENCOUNTER — HOSPITAL ENCOUNTER (EMERGENCY)
Age: 33
Discharge: HOME | End: 2023-02-28
Attending: EMERGENCY MEDICINE
Payer: MEDICARE

## 2023-02-28 ENCOUNTER — APPOINTMENT (OUTPATIENT)
Dept: GENERAL RADIOLOGY | Age: 33
End: 2023-02-28
Payer: MEDICARE

## 2023-02-28 VITALS
BODY MASS INDEX: 49 KG/M2 | OXYGEN SATURATION: 99 % | TEMPERATURE: 98.6 F | HEIGHT: 64 IN | HEART RATE: 80 BPM | WEIGHT: 287 LBS | RESPIRATION RATE: 17 BRPM | DIASTOLIC BLOOD PRESSURE: 91 MMHG | SYSTOLIC BLOOD PRESSURE: 140 MMHG

## 2023-02-28 DIAGNOSIS — R10.9 ABDOMINAL PAIN, UNSPECIFIED ABDOMINAL LOCATION: Primary | ICD-10-CM

## 2023-02-28 LAB
ALBUMIN SERPL BCG-MCNC: 3.9 G/DL (ref 3.5–5.1)
ALP SERPL-CCNC: 118 U/L (ref 38–126)
ALT SERPL W/O P-5'-P-CCNC: 13 U/L (ref 11–66)
ANION GAP SERPL CALC-SCNC: 13 MEQ/L (ref 8–16)
AST SERPL-CCNC: 13 U/L (ref 5–40)
BASOPHILS ABSOLUTE: 0 THOU/MM3 (ref 0–0.1)
BASOPHILS NFR BLD AUTO: 0.4 %
BILIRUB CONJ SERPL-MCNC: < 0.2 MG/DL (ref 0–0.3)
BILIRUB SERPL-MCNC: 0.2 MG/DL (ref 0.3–1.2)
BUN SERPL-MCNC: 12 MG/DL (ref 7–22)
CALCIUM SERPL-MCNC: 9.3 MG/DL (ref 8.5–10.5)
CHLORIDE SERPL-SCNC: 107 MEQ/L (ref 98–111)
CO2 SERPL-SCNC: 23 MEQ/L (ref 23–33)
CREAT SERPL-MCNC: 0.6 MG/DL (ref 0.4–1.2)
DEPRECATED RDW RBC AUTO: 43.8 FL (ref 35–45)
EKG ATRIAL RATE: 95 BPM
EKG P AXIS: 36 DEGREES
EKG P-R INTERVAL: 144 MS
EKG Q-T INTERVAL: 358 MS
EKG QRS DURATION: 82 MS
EKG QTC CALCULATION (BAZETT): 449 MS
EKG R AXIS: 25 DEGREES
EKG T AXIS: 5 DEGREES
EKG VENTRICULAR RATE: 95 BPM
EOSINOPHIL NFR BLD AUTO: 2.2 %
EOSINOPHILS ABSOLUTE: 0.3 THOU/MM3 (ref 0–0.4)
ERYTHROCYTE [DISTWIDTH] IN BLOOD BY AUTOMATED COUNT: 16 % (ref 11.5–14.5)
GFR SERPL CREATININE-BSD FRML MDRD: > 60 ML/MIN/1.73M2
GLUCOSE SERPL-MCNC: 94 MG/DL (ref 70–108)
HCT VFR BLD AUTO: 36.6 % (ref 37–47)
HGB BLD-MCNC: 11.1 GM/DL (ref 12–16)
HGB RETIC QN AUTO: 25.1 PG (ref 28.2–35.7)
IMM GRANULOCYTES # BLD AUTO: 0.07 THOU/MM3 (ref 0–0.07)
IMM GRANULOCYTES NFR BLD AUTO: 0.6 %
IMM RETICS NFR: 21.9 % (ref 3–15.9)
LIPASE SERPL-CCNC: 20.6 U/L (ref 5.6–51.3)
LYMPHOCYTES ABSOLUTE: 2.3 THOU/MM3 (ref 1–4.8)
LYMPHOCYTES NFR BLD AUTO: 20.2 %
MCH RBC QN AUTO: 23 PG (ref 26–33)
MCHC RBC AUTO-ENTMCNC: 30.3 GM/DL (ref 32.2–35.5)
MCV RBC AUTO: 75.9 FL (ref 81–99)
MONOCYTES ABSOLUTE: 0.6 THOU/MM3 (ref 0.4–1.3)
MONOCYTES NFR BLD AUTO: 5.6 %
NEUTROPHILS NFR BLD AUTO: 71 %
NRBC BLD AUTO-RTO: 0 /100 WBC
OSMOLALITY SERPL CALC.SUM OF ELEC: 284.5 MOSMOL/KG (ref 275–300)
PLATELET # BLD AUTO: 276 THOU/MM3 (ref 130–400)
PMV BLD AUTO: 9.9 FL (ref 9.4–12.4)
POTASSIUM SERPL-SCNC: 4.2 MEQ/L (ref 3.5–5.2)
PROT SERPL-MCNC: 7.6 G/DL (ref 6.1–8)
RBC # BLD AUTO: 4.82 MILL/MM3 (ref 4.2–5.4)
RETICS # AUTO: 94 THOU/MM3 (ref 20–115)
RETICS/RBC NFR AUTO: 2 % (ref 0.5–2)
SEGMENTED NEUTROPHILS ABSOLUTE COUNT: 8.1 THOU/MM3 (ref 1.8–7.7)
SODIUM SERPL-SCNC: 143 MEQ/L (ref 135–145)
WBC # BLD AUTO: 11.4 THOU/MM3 (ref 4.8–10.8)

## 2023-02-28 PROCEDURE — 2580000003 HC RX 258: Performed by: STUDENT IN AN ORGANIZED HEALTH CARE EDUCATION/TRAINING PROGRAM

## 2023-02-28 PROCEDURE — 93010 ELECTROCARDIOGRAM REPORT: CPT | Performed by: INTERNAL MEDICINE

## 2023-02-28 PROCEDURE — 96374 THER/PROPH/DIAG INJ IV PUSH: CPT

## 2023-02-28 PROCEDURE — 99285 EMERGENCY DEPT VISIT HI MDM: CPT

## 2023-02-28 PROCEDURE — 71045 X-RAY EXAM CHEST 1 VIEW: CPT

## 2023-02-28 PROCEDURE — 83690 ASSAY OF LIPASE: CPT

## 2023-02-28 PROCEDURE — 6360000004 HC RX CONTRAST MEDICATION: Performed by: STUDENT IN AN ORGANIZED HEALTH CARE EDUCATION/TRAINING PROGRAM

## 2023-02-28 PROCEDURE — 93005 ELECTROCARDIOGRAM TRACING: CPT | Performed by: STUDENT IN AN ORGANIZED HEALTH CARE EDUCATION/TRAINING PROGRAM

## 2023-02-28 PROCEDURE — 49465 FLUORO EXAM OF G/COLON TUBE: CPT

## 2023-02-28 PROCEDURE — 85046 RETICYTE/HGB CONCENTRATE: CPT

## 2023-02-28 PROCEDURE — 43762 RPLC GTUBE NO REVJ TRC: CPT

## 2023-02-28 PROCEDURE — 6360000002 HC RX W HCPCS: Performed by: EMERGENCY MEDICINE

## 2023-02-28 PROCEDURE — 82248 BILIRUBIN DIRECT: CPT

## 2023-02-28 PROCEDURE — 85025 COMPLETE CBC W/AUTO DIFF WBC: CPT

## 2023-02-28 PROCEDURE — 36415 COLL VENOUS BLD VENIPUNCTURE: CPT

## 2023-02-28 PROCEDURE — 6360000002 HC RX W HCPCS: Performed by: STUDENT IN AN ORGANIZED HEALTH CARE EDUCATION/TRAINING PROGRAM

## 2023-02-28 PROCEDURE — 80053 COMPREHEN METABOLIC PANEL: CPT

## 2023-02-28 RX ORDER — HEPARIN SODIUM (PORCINE) LOCK FLUSH IV SOLN 100 UNIT/ML 100 UNIT/ML
300 SOLUTION INTRAVENOUS ONCE
Status: COMPLETED | OUTPATIENT
Start: 2023-02-28 | End: 2023-02-28

## 2023-02-28 RX ORDER — 0.9 % SODIUM CHLORIDE 0.9 %
1000 INTRAVENOUS SOLUTION INTRAVENOUS ONCE
Status: COMPLETED | OUTPATIENT
Start: 2023-02-28 | End: 2023-02-28

## 2023-02-28 RX ORDER — DROPERIDOL 2.5 MG/ML
2.5 INJECTION, SOLUTION INTRAMUSCULAR; INTRAVENOUS ONCE
Status: COMPLETED | OUTPATIENT
Start: 2023-02-28 | End: 2023-02-28

## 2023-02-28 RX ADMIN — DIATRIZOATE MEGLUMINE AND DIATRIZOATE SODIUM 30 ML: 600; 100 SOLUTION ORAL; RECTAL at 07:19

## 2023-02-28 RX ADMIN — HEPARIN 300 UNITS: 100 SYRINGE at 08:49

## 2023-02-28 RX ADMIN — SODIUM CHLORIDE 1000 ML: 9 INJECTION, SOLUTION INTRAVENOUS at 07:05

## 2023-02-28 RX ADMIN — DROPERIDOL 2.5 MG: 2.5 INJECTION, SOLUTION INTRAMUSCULAR; INTRAVENOUS at 07:51

## 2023-02-28 ASSESSMENT — PAIN SCALES - GENERAL: PAINLEVEL_OUTOF10: 10

## 2023-02-28 ASSESSMENT — PAIN - FUNCTIONAL ASSESSMENT: PAIN_FUNCTIONAL_ASSESSMENT: 0-10

## 2023-02-28 ASSESSMENT — PAIN DESCRIPTION - LOCATION: LOCATION: ABDOMEN

## 2023-02-28 NOTE — DISCHARGE INSTRUCTIONS
Take your medications as prescribed. Keep your scheduled appointment with the general surgeon tomorrow for reevaluation of your pain and symptoms. Return to the emergency department for any new or worsening symptoms.

## 2023-02-28 NOTE — ED PROVIDER NOTES
[facility] [address] [address] EMERGENCY DEPT      EMERGENCY MEDICINE     Pt Name: Alia Moore  MRN: [id number]  [place] 1990  Date of evaluation: 2/28/2023  Provider: Tiffanie Harris MD  Supervising Physician: Dr Chevy Cunningham       Chief Complaint   Patient presents with    Abdominal Pain    Nausea     HISTORY OF PRESENT ILLNESS   Alia Moore is a 28 y.o. female with pmhx of CKD, carcinoid tumor partially removed at [address] [place] [address] sickle cell anemia, with who presents to the emergency department for abdominal pain around her G-tube site over the last 2 days. She also describes having some minimal discharge that was yellowish 3 days ago noticed by her nurse that has not been present since then. She mentions having some nausea but no emesis. Chronically has diarrhea but denies any blood in it. Denies any new international travel. She also mentions having potential flareup of her sickle cell disease with bilateral leg pain which is where she typically gets her flareup she states. She took tramadol this morning. She also states that when she puts water with her G-tube this morning was little bit more difficult than usual.    She denies any chest pain, does however mention some minimal dyspnea a few days ago. Patient was recently seen here 2/20/2023 for leaking percutaneous endoscopic gastrostomy tube, she had been recently discharged from the hospital from a similar complaint. She has follow-up with GI in 2 weeks. She has chronic microcytic anemia, she also has sickle cell anemia and has a port that is placed in 2015. Had it replaced by GI during this visit.        PASTMEDICAL HISTORY     Past Medical History:   Diagnosis Date    Acute on chronic diastolic congestive heart failure ([address] 6/25/2022    JANI (acute kidney injury) ([address] 7/7/2019    Anemia     Anesthesia     migraines    Anxiety     Asthma     CAD (coronary artery disease)     Depression     Diabetes ([address]     Diet-controlled type 2 diabetes mellitus ([address] 2016    Epilepsy ([address]     Fibroids     Gastro - esophageal reflux disease     Hypertension     Hypertrophy of tonsil and adenoid 11/4/2017    Malignant carcinoid tumor of other sites [facility]) 5/14/2013    Migraine     PONV (postoperative nausea and vomiting)     Prolonged emergence from general anesthesia     Sickle cell anemia (HCC)     Sickle cell trait ([facility]     PT STATES SHE HAS THE TRAIT NOT THE DISEASE    Tumor associated pain     neuroendrocrine tumor, gastroma       Patient Active Problem List   Diagnosis Code    Intractable abdominal pain R10.9    Nausea and vomiting R11.2    Carcinoid tumor D3A.00    Pelvic inflammatory disease N73.9    Intractable nausea and vomiting R11.2    Anemia D64.9    Diet-controlled type 2 diabetes mellitus ([address] E11.9    Esophageal dysphagia R13.19    Esophageal stricture K22.2    Morbid obesity ([facility] E66.01    Migraine equivalent G43.109    Mild persistent asthma without complication W69.27    Dyslipidemia E78.5    Moderate persistent asthma with acute exacerbation J45.41    Gastroenteritis K52.9    Hematuria R31.9    Diarrhea R19.7    Diarrhea of presumed infectious origin R19.7    Hematochezia K92.1    Generalized abdominal pain R10.84    Hypertrophy of tonsil and adenoid J35.3    Multiple drug allergies Z88.9    S/P T&A (status post tonsillectomy and adenoidectomy) Z90.89    Iron deficiency anemia D50.9    Poor intravenous access Z78.9    Abnormal Pap smear of cervix R87.619    Abnormal uterine bleeding N93.9    Exacerbation of asthma J45.901    Chest wall pain R07.89    Female pelvic pain R10.2    Head ache R51.9    Heartburn R12    Incarcerated ventral hernia K43.6    Malignant carcinoid tumor of other sites [facility]) C7A.098    Pyelonephritis N12    Spigelian hernia K43.9    Uterine leiomyoma D25.9    Acute kidney injury (HCC) N17.9    Hypernatremia E87.0    Hypokalemia E87.6    Volume depletion, gastrointestinal loss E86.9    Essential hypertension I10 Hiatal hernia K44.9    S/P Nissen fundoplication (without gastrostomy tube) procedure Z98.890    Sickle cell anemia (HCC) D57.1    Sickle cell disease (HCC) D57.1    Callus of heel L84    Foot pain, bilateral M79.671, M79.672    Atypical chest pain R07.89    Tobacco use Z72.0    History of gastritis Z87.19    Weight gain R63.5    Chronic idiopathic constipation K59.04    Acute respiratory failure ([facility]) J96.00    Stroke-like symptom R29.90    Acute on chronic diastolic congestive heart failure ([facility]) I50.33    Dysphagia R13.10    S/P robot-assisted surgical procedure Z98.890    History of esophagogastroduodenoscopy (EGD) Z98.890    Port-site hernia K91.89, K43.2    Ventral hernia without obstruction or gangrene K43.9    Abdominal pain, epigastric R10.13    Tachycardia R00.0    Leukocytosis C57.908    Complication of gastrostomy tube ([facility]) K94.20    Leaking percutaneous endoscopic gastrostomy (PEG) tube ([facility] K94.23     SURGICAL HISTORY       Past Surgical History:   Procedure Laterality Date    ABDOMEN SURGERY  03/23/2017    Laparoscopic , Bi lat oopherectomy Dr Timothy Lopez      x2    BREAST REDUCTION SURGERY      CENTRAL VENOUS CATHETER Right 12/11/2015    right subclavian single lumen mediport insertion--Dr. Michel    CHOLECYSTECTOMY, LAPAROSCOPIC N/A 7/11/2019    CHOLECYSTECTOMY LAPAROSCOPIC performed by Sena Arce MD at [place] [address] N/A 8/24/2020    COLONOSCOPY POLYPECTOMY SNARE/COLD BIOPSY performed by Kate Young MD at [address] [place] [address]  10/21/2013    Dilation and curettage, Diagnostic Hysteroscopy and laparoscopy (Dr. Christina Cm, [facility])    EGD  2019    EGD COLONOSCOPY N/A 1/8/2019    EGD DIL performed by Su Monzon MD at [facility]    EGD COLONOSCOPY Left 5/14/2019    EGD DILATATION performed by Su Monzon MD at [facility] End[facility]    EGD COLONOSCOPY Left 8/27/2019    EGD DILATATION performed by Stephanie Grissom MD at [facility]    ENDOSCOPY, COLON, DIAGNOSTIC      GASTRIC FUNDOPLICATION      [facility]    GASTRIC FUNDOPLICATION      GASTROSTOMY TUBE PLACEMENT N/A 1/25/2023    EGD PEG TUBE PLACEMENT performed by Jules Pop MD at [facility] 34 N/A 2/22/2023    EGD PEG TUBE PLACEMENT performed by Jules Pop MD at [address]  2010, 2016    [place] , [address] N/A 1/17/2023    Robotic Exploratory Paparoscopy with Extensive Lysis of Adhesions G Tube Insertion performed by Carole Oates MD at [address] ([address] [facility] [address])  04/2016    INSERTION / REMOVAL / REPLACEMENT VENOUS ACCESS CATHETER N/A 3/8/2019    REMOVAL OF LEFT PORT AND PLACEMENT OF SINGLE LUMEN MEDIPORT RIGHT SIDE performed by Alcira Washington MD at [address] [facility] N/A 10/8/2019    ROBOTIC DIAGNOSTIC LAPAROSCOPY,  RECURRENT HIATAL HERNIA REPAIR, REVISION FUNDOPLICATION performed by Carole Oates MD at [facility] 18 N/A 7/15/2022    ROBOTIC LAPAROSCOPY, LYSIS OF ADHESIONS performed by Alcira Washington MD at [facility]  08/12/2016    Right Port Removal, Placement of new Port Left by Dr Willeen Siemens  12/02/2019    Mediport insertion-IR Dr Chrissy Centeno (CERVIX NOT REMOVED)  4/8/16    PORT SURGERY N/A 11/11/2019    REMOVAL OF RIGHT MEDIPORT & ATTEMPTED PLACEMENT OF LEFT SINGLE LUMEN MEDIPORT performed by Alcira Washington MD at [facility] Right 11/29/2019    RT INTERNAL JUGULAR SINGLE LUMEN MEDIPORT INSERTION performed by Alcira Washington MD at [address] [facility] [facility]  [address] <30 MM DIAM Left 8/8/2017    EGD/DIL performed by Stephanie Grissom MD at [facility] Endoscopy    [place]  [address] <30 MM DIAM N/A 9/26/2017    EGD DIL performed by Stephanie Grissom MD at [address] DILATION ESOPHAGUS <30 MM DIAM Left 12/12/2017    EGD DIL performed by Evaristo Pop MD at [facility]    [place]  [address] <30 MM DIAM N/A 2/13/2018    EGD  DILATATION performed by Evaristo Pop MD at [address] [facility] [address] CTR VAD W/SUBQ PORT UNDER 5 YR Bilateral 1/5/2018    EXCISION OF MEDIPORT LEFT SIDE, INSERTION MEDIPORT RIGHT  IJ performed by Estuardo Armenta MD at [facility] OFFICE/OUTPT [address] [place] [address] N/A 5/15/2018    EGD DILATATION performed by Evaristo Pop MD at [address] [facility] OFFICE/OUTPT VISIT,PROCEDURE ONLY Bilateral 9/10/2018    REMOVAL RT MEDIPORT, INSERTION LEFT performed by Estuardo Armenta MD at [facility] OFFICE/OUTPT [address] [place] [address] N/A 11/16/2018    MEDIPORT REPOSITIONING performed by Estuardo Armenta MD at [address] [facility] [address] N/A 11/6/2017    TONSILLECTOMY AND ADENOIDECTOMY performed by Jyothi Riley MD at [facility]      neoendocrine    TUNNELED VENOUS PORT PLACEMENT      UPPER GASTROINTESTINAL ENDOSCOPY Left 4/3/2018    EGD DILATION SAVORY performed by Evaristo Pop MD at [address] [facility] Left 6/26/2018    EGD DILATION SAVORY performed by Evaristo Pop MD at [address] [facility],[address]G[facility] Left 2/26/2019    EGD DILATION SAVORY performed by Evaristo Pop MD at [address] [facility],7G N/A 7/9/2019    EGD DILATION SAVORY performed by Evaristo Pop MD at [address] [facility] Left 9/9/2019    EGD BIOPSY performed by Reshma Pollock MD at [address] [facility] 4/1/2020    EGD DILATION BALLOON performed by Blaise Beaver MD at [address] [place] [address] Left 8/24/2020    EGD ESOPHAGOGASTRODUODENOSCOPY DILATATION performed by Blaise Beaver MD at [address] [facility],7G Left 8/24/2020    EGD BIOPSY performed by Dali Vigil MD at [facility] 110 N/A 12/2/2020    EGD DILATION BALLOON performed by Dali Vigil MD at [facility] [address] Left 12/2/2020    EGD BIOPSY performed by Dali Vigil MD at [facility] 110 Left 1/19/2022    EGD performed by Kaila Ruiz MD at [address] Endoscopy    [address] Left 1/19/2022    EGD BIOPSY performed by Kaila Ruiz MD at [facility] 110 N/A 7/15/2022    EGD [address] performed by Kaila Ruiz MD at [address] 1/24/2023    EGD ESOPHAGOGASTRODUODENOSCOPY performed by Dali Vigil MD at [facility] N/A 8/22/2022    OPEN LEFT ABDOMINAL [address] performed by Kaila Ruiz MD at [address] [facility] [address]       Previous Medications    ACETAMINOPHEN (TYLENOL) 325 MG TABLET    Take 2 tablets by mouth 4 times daily as needed for Pain or Fever    ALBUTEROL (PROVENTIL) (2.5 MG/3ML) 0.083% NEBULIZER SOLUTION    Take 3 mLs by nebulization every 6 hours as needed for Wheezing    AMLODIPINE (NORVASC) 5 MG TABLET    Take 1 tablet by mouth daily    ARIPIPRAZOLE (ABILIFY) 1 MG/ML SOLN SOLUTION    Take 10 mg by mouth nightly 10 mL via peg tube every evening    BACLOFEN (LIORESAL) 10 MG TABLET    Take 10 mg by mouth daily    BUSPIRONE (BUSPAR) 5 MG TABLET    Take 5 mg by mouth 3 times daily    DOCUSATE SODIUM (COLACE) 100 MG CAPSULE    Take 1 capsule by mouth 2 times daily as needed for Constipation    ESCITALOPRAM (LEXAPRO) 5 MG/5ML SOLUTION    20 mg daily Take 20 mL via peg tube once daily    FAMOTIDINE (PEPCID) 40 MG TABLET    Take 1 tablet by mouth nightly    FLUTICASONE (FLOVENT HFA) 110 MCG/ACT INHALER    2 puffs 2 times daily    GLUCOSE MONITORING KIT (PaperFliesSTYLE) MONITORING KIT    1 kit by Does not apply route daily Or whichever system insurance will pay for    INCONTINENCE SUPPLY DISPOSABLE (DEPEND SILHOUETTE BRIEFS L/XL) MISC    Use as needed for urinary and bowel incontinence    LINACLOTIDE (LINZESS) 145 MCG CAPSULE    Take 145 mcg by mouth every morning (before breakfast)    METFORMIN  MG/5ML SOLN    1,000 mg daily (with breakfast) Take 10 mL via peg tube once daily    MONTELUKAST (SINGULAIR) 10 MG TABLET    Take 10 mg by mouth nightly    PANTOPRAZOLE (PROTONIX) 40 MG TABLET    Take 40 mg by mouth daily    POLYETHYLENE GLYCOL (GLYCOLAX) 17 GM/SCOOP POWDER    Dispense 238 Gram Bottle. Use as Directed    PRAZOSIN (MINIPRESS) 1 MG CAPSULE    Take 1 mg by mouth nightly    PROMETHAZINE (PHENERGAN) 25 MG TABLET    Take 25 mg by mouth every 4-6 hours as needed for Nausea    PROPRANOLOL (INDERAL LA) 60 MG EXTENDED RELEASE CAPSULE    Take 60 mg by mouth daily    SPACER/AERO-HOLDING CHAMBERS (E-Z SPACER) AISLINN    1 Device by Does not apply route daily    SULFAMETHOXAZOLE-TRIMETHOPRIM (BACTRIM DS;SEPTRA DS) 800-160 MG PER TABLET    Take 1 tablet by mouth 2 times daily for 5 days    TOPIRAMATE (TOPAMAX) 100 MG TABLET    Take 100 mg by mouth 2 times daily       ALLERGIES     is allergic to latex, ketorolac tromethamine, pcn [penicillins], amoxicillin, ciprofloxacin, compazine [prochlorperazine maleate], dicyclomine hcl, fentanyl, fioricet [butalbital-apap-caffeine], flexeril [cyclobenzaprine], gabapentin, ibuprofen [ibuprofen], keflex [cephalexin], lisinopril, lorazepam, losartan, macrobid [nitrofurantoin monohyd macro], mushroom extract complex, reglan [metoclopramide], vicodin [hydrocodone-acetaminophen], vistaril [hydroxyzine hcl], zofran [ondansetron], and adhesive tape. FAMILY HISTORY     She indicated that her mother is . She indicated that her father is alive. She indicated that both of her sisters are alive. She indicated that both of her brothers are alive. She indicated that her maternal grandmother is . She indicated that her maternal grandfather is . She indicated that her paternal grandmother is alive. She indicated that her paternal grandfather is . She indicated that her paternal uncle is . She indicated that the status of her neg hx is unknown. SOCIAL HISTORY       Social History     Tobacco Use    Smoking status: Every Day     Packs/day: 0.25     Years: 6.00     Pack years: 1.50     Types: Cigarettes     Start date: 3/1/2016    Smokeless tobacco: Never    Tobacco comments:     smokes 3 cig perday    Vaping Use    Vaping Use: Never used   Substance Use Topics    Alcohol use: Not Currently    Drug use: No       PHYSICAL EXAM       ED Triage Vitals [23 0533]   BP Temp Temp Source Heart Rate Resp SpO2 Height Weight   (!) 133/92 98.6 °F (37 °C) Oral 88 16 98 % 5' 4\" (1.626 m) 287 lb (130.2 kg)       Physical Exam  Vitals and nursing note reviewed. Constitutional:       General: She is not in acute distress. Appearance: She is obese. She is not ill-appearing, toxic-appearing or diaphoretic. HENT:      Head: Normocephalic and atraumatic. Right Ear: External ear normal.      Left Ear: External ear normal.      Nose: Nose normal.      Mouth/Throat:      Mouth: Mucous membranes are moist.      Pharynx: Oropharynx is clear. Eyes:      General: No scleral icterus. Conjunctiva/sclera: Conjunctivae normal.   Cardiovascular:      Rate and Rhythm: Normal rate and regular rhythm. Pulses: Normal pulses. Heart sounds: Normal heart sounds. No murmur heard. Pulmonary:      Effort: Pulmonary effort is normal. No respiratory distress. Breath sounds: Normal breath sounds. No wheezing. Abdominal:      General: Abdomen is flat. There is no distension. Palpations: Abdomen is soft. Tenderness: There is no abdominal tenderness. There is no guarding or rebound.       Comments: G-tube present left upper quadrant, site is well-appearing no surrounding edema erythema or fluctuance palpated. Multiple old surgical scars throughout abdomen that are well-healed. Musculoskeletal:         General: No deformity. Normal range of motion. Cervical back: Normal range of motion and neck supple. No rigidity. No muscular tenderness. Comments: DP pulses are 2+ bilaterally, normal dorsiflexion and plantarflexion. Lymphadenopathy:      Cervical: No cervical adenopathy. Skin:     General: Skin is warm and dry. Capillary Refill: Capillary refill takes less than 2 seconds. Coloration: Skin is not jaundiced. Neurological:      General: No focal deficit present. Mental Status: She is alert and oriented to person, place, and time. Psychiatric:         Mood and Affect: Mood normal.         Behavior: Behavior normal.       FORMAL DIAGNOSTIC RESULTS     RADIOLOGY: Interpretation per the Radiologist below, if available at the time of this note (none if blank):    XR CHEST PORTABLE   Final Result   Stable radiographic appearance of the chest. No evidence of an acute process. **This report has been created using voice recognition software. It may contain minor errors which are inherent in voice recognition technology. **      Final report electronically signed by Dr. Kyle Bloch on 2/28/2023 7:34 AM      XR INJ CONTRAST GASTRIC TUBE PERC   Final Result   Patent G-tube which appears normally positioned. **This report has been created using voice recognition software. It may contain minor errors which are inherent in voice recognition technology. **      Final report electronically signed by Dr. Kyle Bloch on 2/28/2023 7:36 AM          LABS: (none if blank)  Labs Reviewed   CBC WITH AUTO DIFFERENTIAL - Abnormal; Notable for the following components:       Result Value    WBC 11.4 (*)     Hemoglobin 11.1 (*)     Hematocrit 36.6 (*)     MCV 75.9 (*)     MCH 23.0 (*)     MCHC 30.3 (*) RDW-CV 16.0 (*)     Segs Absolute 8.1 (*)     All other components within normal limits   HEPATIC FUNCTION PANEL - Abnormal; Notable for the following components: Total Bilirubin 0.2 (*)     All other components within normal limits   RETICULOCYTES - Abnormal; Notable for the following components:    Immature Retic Fract 21.9 (*)     Retic Hemoglobin 25.1 (*)     All other components within normal limits   BASIC METABOLIC PANEL   LIPASE   ANION GAP   GLOMERULAR FILTRATION RATE, ESTIMATED   OSMOLALITY       (Any cultures that may have been sent were not resulted at the time of this patient visit)    [facility] / ED COURSE:     External Documentation Reviewed:         Previous patient encounter documents & history available on EMR was reviewed: Patient was seen on 2/20/2023. 1)           Differential Diagnosis includes (but not limited to):  G Tube malfunction, sickle cell crisis, gastritis, viral illness        Diagnoses Considered but I have low suspicion of:   Cellullitis       Decision Rules/Clinical Scores utilized:               See Formal Diagnostic Results above for the lab and radiology tests and orders. 3)  Treatment and Disposition         ED Reassessment:  See ED course         Case discussed with consulting clinician:           Shared Decision-Making was performed and disposition discussed with the        Patient/Family and questions answered          Social determinants of health impacting treatment or disposition:  none      Summary of Patient Presentation:      ED Course as of 02/28/23 0829   e Feb 28, 2023   0624 ECHO 2/8/23    \" Conclusions      Summary   Normal left ventricle size and systolic function. Ejection fraction was   estimated at 55-60%. There were no regional left ventricular wall motion   abnormalities and wall thickness was within normal limits. \"      [AL]   8701 Immature Retic Fract(!): 21.9  baseline [AL]   0717 Hemoglobin Quant(!): 11.1  baseline [AL] 0717 WBC(!): 11.4 [AL]   0718 Platelet Count: 856 [AL]   0741 Lipase: 20.6 [AL]   0741 Sodium: 143 [AL]   0741 Potassium: 4.2 [AL]   0741 Chloride: 107 [AL]   0741 Creatinine: 0.6 [AL]   0741 AST: 13 [AL]   0741 ALT: 13 [AL]   0741 XR INJ CONTRAST GASTRIC TUBE PERC  \"IMPRESSION:  Patent G-tube which appears normally positioned. \" [AL]   5044 XR CHEST PORTABLE  \"IMPRESSION:  Stable radiographic appearance of the chest. No evidence of an acute process. \" [AL]   [id number] She was reevaluated has significant improvement of her pain is comfortable with being discharged home to follow-up with her outpatient general surgeon tomorrow. [AL]      ED Course User Index  [AL] Mikhail Gaytan MD         MDM:   This is a 43-year-old female who was recently mated for evaluation of a malfunctioning G-tube had a replaced on this admission returns today with some pain and irritation in her site over the last few days. No fevers at home. Her abdominal examination is benign, G-tube study performed today shows patency no issues. She has no erythema warmth or edema at the local site of her G-tube. No signs of sickle cell crisis at this time or acute chest.  She is not having any chest pain. She has some mild dyspnea few days ago however is not having any respiratory distress her breathing is nonlabored. She initially declined the pain medication ordered, then agreed. She has follow-up with general surgery scheduled for tomorrow for reevaluation. Vitals Reviewed:    Vitals:    02/28/23 0533 02/28/23 0640 02/28/23 0750   BP: (!) 133/92 (!) 145/99 (!) 140/91   Pulse: 88 98 80   Resp: 16 16 17   Temp: 98.6 °F (37 °C)     TempSrc: Oral     SpO2: 98% 100% 99%   Weight: 287 lb (130.2 kg)     Height: 5' 4\" (1.626 m)         The patient was seen and examined. Appropriate diagnostic testing was performed and results reviewed with the patient. The results of pertinent diagnostic studies and exam findings were discussed.  The patients provisional diagnosis and plan of care were discussed with the patient and present family who expressed understanding. Any medications were reviewed and indications and risks of medications were discussed with the patient /family present. Strict verbal and written return precautions, instructions and appropriate follow-up provided to  the patient . ED Medications administered this visit:  (None if blank)  Medications   diatrizoate meglumine-sodium (GASTROGRAFIN) 66-10 % solution 30 mL (30 mLs PEG Tube Given 2/28/23 0719)   droperidol (INAPSINE) injection 2.5 mg (2.5 mg IntraVENous Given 2/28/23 0751)   0.9 % sodium chloride bolus (1,000 mLs IntraVENous New Bag 2/28/23 0705)         PROCEDURES: (None if blank)  Procedures:     CRITICAL CARE: (None if blank)      DISCHARGE PRESCRIPTIONS: (None if blank)  New Prescriptions    No medications on file       FINAL IMPRESSION      1.  Abdominal pain, unspecified abdominal location        DISPOSITION/PLAN   DISPOSITION Decision To Discharge 02/28/2023 08:29:14 AM      OUTPATIENT FOLLOW UP THE PATIENT:  Lennox Au, APRN - CNP  [facility]  [phone]    Schedule an appointment as soon as possible for a visit in 2 days      General Surgeon    Go in 1 day      MD Elaina Mcmillan MD  Resident  02/28/23 3286

## 2023-02-28 NOTE — ED TRIAGE NOTES
Patient presents to ED from home with complaints of abdominal pain and nausea for several weeks, denies vomiting or irregular stools. Patient states pain is in ULQ near site entry for gtube. Patient states that there is a quarter size mass under her skin that is painful to touch. Patient has history of sickle cell disease and hiatal hernia and is due to see a surgeon tomorrow. Patient states she has am implanted port and does not want any peripheral IV's started.

## 2023-02-28 NOTE — ED NOTES
Pt resting on cot with call light in reach. Medicated with droperidol. No concerns voiced at this time.      Flaco Azevedo RN  02/28/23 0800

## 2023-03-06 DIAGNOSIS — Z09 S/P ABDOMINAL SURGERY, FOLLOW-UP EXAM: Primary | ICD-10-CM

## 2023-03-06 RX ORDER — TRAMADOL HYDROCHLORIDE 50 MG/1
50 TABLET ORAL EVERY 6 HOURS PRN
Qty: 30 TABLET | Refills: 0 | Status: SHIPPED | OUTPATIENT
Start: 2023-03-06 | End: 2023-03-12

## 2023-03-07 ENCOUNTER — HOSPITAL ENCOUNTER (OUTPATIENT)
Dept: NUCLEAR MEDICINE | Age: 33
Discharge: HOME | End: 2023-03-07
Payer: MEDICARE

## 2023-03-07 DIAGNOSIS — K31.84 GASTROPARESIS: ICD-10-CM

## 2023-03-07 PROCEDURE — 3430000000 HC RX DIAGNOSTIC RADIOPHARMACEUTICAL: Performed by: NURSE PRACTITIONER

## 2023-03-07 PROCEDURE — A9541 TC99M SULFUR COLLOID: HCPCS | Performed by: NURSE PRACTITIONER

## 2023-03-07 PROCEDURE — 78264 GASTRIC EMPTYING IMG STUDY: CPT

## 2023-03-07 RX ADMIN — Medication 1 MILLICURIE: at 08:32

## 2023-03-08 ENCOUNTER — APPOINTMENT (OUTPATIENT)
Dept: GENERAL RADIOLOGY | Age: 33
End: 2023-03-08
Payer: MEDICARE

## 2023-03-08 ENCOUNTER — HOSPITAL ENCOUNTER (EMERGENCY)
Age: 33
Discharge: HOME | End: 2023-03-08
Attending: EMERGENCY MEDICINE
Payer: MEDICARE

## 2023-03-08 VITALS
HEART RATE: 91 BPM | RESPIRATION RATE: 18 BRPM | BODY MASS INDEX: 42.91 KG/M2 | OXYGEN SATURATION: 99 % | SYSTOLIC BLOOD PRESSURE: 149 MMHG | TEMPERATURE: 98.8 F | DIASTOLIC BLOOD PRESSURE: 82 MMHG | WEIGHT: 250 LBS

## 2023-03-08 DIAGNOSIS — K94.23 PEG TUBE MALFUNCTION (HCC): Primary | ICD-10-CM

## 2023-03-08 LAB
ALBUMIN SERPL BCG-MCNC: 3.9 G/DL (ref 3.5–5.1)
ALP SERPL-CCNC: 120 U/L (ref 38–126)
ALT SERPL W/O P-5'-P-CCNC: 12 U/L (ref 11–66)
AMPHETAMINES UR QL SCN: NEGATIVE
ANION GAP SERPL CALC-SCNC: 10 MEQ/L (ref 8–16)
AST SERPL-CCNC: 15 U/L (ref 5–40)
BACTERIA URNS QL MICRO: ABNORMAL /HPF
BARBITURATES UR QL SCN: NEGATIVE
BASOPHILS ABSOLUTE: 0 THOU/MM3 (ref 0–0.1)
BASOPHILS NFR BLD AUTO: 0.3 %
BENZODIAZ UR QL SCN: NEGATIVE
BILIRUB SERPL-MCNC: 0.3 MG/DL (ref 0.3–1.2)
BILIRUB UR QL STRIP.AUTO: NEGATIVE
BUN SERPL-MCNC: 12 MG/DL (ref 7–22)
BZE UR QL SCN: NEGATIVE
CALCIUM SERPL-MCNC: 9.5 MG/DL (ref 8.5–10.5)
CANNABINOIDS UR QL SCN: NEGATIVE
CASTS #/AREA URNS LPF: ABNORMAL /LPF
CASTS 2: ABNORMAL /LPF
CHARACTER UR: CLEAR
CHLORIDE SERPL-SCNC: 106 MEQ/L (ref 98–111)
CHP ED QC CHECK: 78
CO2 SERPL-SCNC: 24 MEQ/L (ref 23–33)
COLOR: YELLOW
CREAT SERPL-MCNC: 0.6 MG/DL (ref 0.4–1.2)
CRYSTALS URNS MICRO: ABNORMAL
DEPRECATED RDW RBC AUTO: 44.3 FL (ref 35–45)
EOSINOPHIL NFR BLD AUTO: 1.9 %
EOSINOPHILS ABSOLUTE: 0.2 THOU/MM3 (ref 0–0.4)
EPITHELIAL CELLS, UA: ABNORMAL /HPF
ERYTHROCYTE [DISTWIDTH] IN BLOOD BY AUTOMATED COUNT: 15.9 % (ref 11.5–14.5)
FENTANYL: NEGATIVE
GFR SERPL CREATININE-BSD FRML MDRD: > 60 ML/MIN/1.73M2
GLUCOSE BLD STRIP.AUTO-MCNC: 78 MG/DL (ref 70–108)
GLUCOSE SERPL-MCNC: 80 MG/DL (ref 70–108)
GLUCOSE UR QL STRIP.AUTO: NEGATIVE MG/DL
HCT VFR BLD AUTO: 39.2 % (ref 37–47)
HGB BLD-MCNC: 11.4 GM/DL (ref 12–16)
HGB UR QL STRIP.AUTO: ABNORMAL
IMM GRANULOCYTES # BLD AUTO: 0.04 THOU/MM3 (ref 0–0.07)
IMM GRANULOCYTES NFR BLD AUTO: 0.4 %
KETONES UR QL STRIP.AUTO: NEGATIVE
LIPASE SERPL-CCNC: 19.5 U/L (ref 5.6–51.3)
LYMPHOCYTES ABSOLUTE: 2.4 THOU/MM3 (ref 1–4.8)
LYMPHOCYTES NFR BLD AUTO: 23.9 %
MAGNESIUM SERPL-MCNC: 2.1 MG/DL (ref 1.6–2.4)
MCH RBC QN AUTO: 22.6 PG (ref 26–33)
MCHC RBC AUTO-ENTMCNC: 29.1 GM/DL (ref 32.2–35.5)
MCV RBC AUTO: 77.8 FL (ref 81–99)
MISCELLANEOUS 2: ABNORMAL
MONOCYTES ABSOLUTE: 0.5 THOU/MM3 (ref 0.4–1.3)
MONOCYTES NFR BLD AUTO: 5 %
NEUTROPHILS NFR BLD AUTO: 68.5 %
NITRITE UR QL STRIP: NEGATIVE
NRBC BLD AUTO-RTO: 0 /100 WBC
OPIATES UR QL SCN: NEGATIVE
OSMOLALITY SERPL CALC.SUM OF ELEC: 278.1 MOSMOL/KG (ref 275–300)
OXYCODONE: NEGATIVE
PCP UR QL SCN: NEGATIVE
PH UR STRIP.AUTO: 7.5 [PH] (ref 5–9)
PLATELET # BLD AUTO: 359 THOU/MM3 (ref 130–400)
PMV BLD AUTO: 9.3 FL (ref 9.4–12.4)
POTASSIUM SERPL-SCNC: 4 MEQ/L (ref 3.5–5.2)
PROT SERPL-MCNC: 7.9 G/DL (ref 6.1–8)
PROT UR STRIP.AUTO-MCNC: NEGATIVE MG/DL
RBC # BLD AUTO: 5.04 MILL/MM3 (ref 4.2–5.4)
RBC URINE: ABNORMAL /HPF
RENAL EPI CELLS #/AREA URNS HPF: ABNORMAL /[HPF]
SEGMENTED NEUTROPHILS ABSOLUTE COUNT: 6.8 THOU/MM3 (ref 1.8–7.7)
SODIUM SERPL-SCNC: 140 MEQ/L (ref 135–145)
SP GR UR REFRACT.AUTO: 1.02 (ref 1–1.03)
UROBILINOGEN, URINE: 1 EU/DL (ref 0–1)
WBC # BLD AUTO: 9.9 THOU/MM3 (ref 4.8–10.8)
WBC #/AREA URNS HPF: ABNORMAL /HPF
WBC #/AREA URNS HPF: NEGATIVE /[HPF]
YEAST LIKE FUNGI URNS QL MICRO: ABNORMAL

## 2023-03-08 PROCEDURE — 81001 URINALYSIS AUTO W/SCOPE: CPT

## 2023-03-08 PROCEDURE — 43762 RPLC GTUBE NO REVJ TRC: CPT

## 2023-03-08 PROCEDURE — 6360000004 HC RX CONTRAST MEDICATION: Performed by: EMERGENCY MEDICINE

## 2023-03-08 PROCEDURE — 80053 COMPREHEN METABOLIC PANEL: CPT

## 2023-03-08 PROCEDURE — 6370000000 HC RX 637 (ALT 250 FOR IP): Performed by: EMERGENCY MEDICINE

## 2023-03-08 PROCEDURE — 6360000002 HC RX W HCPCS: Performed by: EMERGENCY MEDICINE

## 2023-03-08 PROCEDURE — 49465 FLUORO EXAM OF G/COLON TUBE: CPT

## 2023-03-08 PROCEDURE — 83735 ASSAY OF MAGNESIUM: CPT

## 2023-03-08 PROCEDURE — 83690 ASSAY OF LIPASE: CPT

## 2023-03-08 PROCEDURE — 85025 COMPLETE CBC W/AUTO DIFF WBC: CPT

## 2023-03-08 PROCEDURE — 99284 EMERGENCY DEPT VISIT MOD MDM: CPT

## 2023-03-08 PROCEDURE — 36415 COLL VENOUS BLD VENIPUNCTURE: CPT

## 2023-03-08 PROCEDURE — 80307 DRUG TEST PRSMV CHEM ANLYZR: CPT

## 2023-03-08 PROCEDURE — 96374 THER/PROPH/DIAG INJ IV PUSH: CPT

## 2023-03-08 PROCEDURE — 82948 REAGENT STRIP/BLOOD GLUCOSE: CPT

## 2023-03-08 RX ORDER — MORPHINE SULFATE 2 MG/ML
2 INJECTION, SOLUTION INTRAMUSCULAR; INTRAVENOUS ONCE
Status: COMPLETED | OUTPATIENT
Start: 2023-03-08 | End: 2023-03-08

## 2023-03-08 RX ORDER — HEPARIN SODIUM (PORCINE) LOCK FLUSH IV SOLN 100 UNIT/ML 100 UNIT/ML
300 SOLUTION INTRAVENOUS ONCE
Status: COMPLETED | OUTPATIENT
Start: 2023-03-08 | End: 2023-03-08

## 2023-03-08 RX ORDER — LIDOCAINE HYDROCHLORIDE 20 MG/ML
JELLY TOPICAL PRN
Status: DISCONTINUED | OUTPATIENT
Start: 2023-03-08 | End: 2023-03-08 | Stop reason: HOSPADM

## 2023-03-08 RX ADMIN — LIDOCAINE HYDROCHLORIDE: 20 JELLY TOPICAL at 11:37

## 2023-03-08 RX ADMIN — HEPARIN 300 UNITS: 100 SYRINGE at 15:30

## 2023-03-08 RX ADMIN — MORPHINE SULFATE 2 MG: 2 INJECTION, SOLUTION INTRAMUSCULAR; INTRAVENOUS at 13:32

## 2023-03-08 RX ADMIN — DIATRIZOATE MEGLUMINE AND DIATRIZOATE SODIUM 30 ML: 600; 100 SOLUTION ORAL; RECTAL at 14:14

## 2023-03-08 ASSESSMENT — ENCOUNTER SYMPTOMS
CHEST TIGHTNESS: 0
RHINORRHEA: 0
DIARRHEA: 0
SORE THROAT: 0
WHEEZING: 0
NAUSEA: 0
EYE ITCHING: 0
ABDOMINAL PAIN: 1
EYE REDNESS: 0
CHOKING: 0
BLOOD IN STOOL: 0
VOICE CHANGE: 0
SHORTNESS OF BREATH: 0
SINUS PRESSURE: 0
PHOTOPHOBIA: 0
VOMITING: 0
BACK PAIN: 0
CONSTIPATION: 0
TROUBLE SWALLOWING: 0
EYE PAIN: 0
ABDOMINAL DISTENTION: 0
COUGH: 0
EYE DISCHARGE: 0

## 2023-03-08 ASSESSMENT — PAIN - FUNCTIONAL ASSESSMENT
PAIN_FUNCTIONAL_ASSESSMENT: 0-10

## 2023-03-08 ASSESSMENT — PAIN SCALES - GENERAL
PAINLEVEL_OUTOF10: 8

## 2023-03-08 ASSESSMENT — PAIN DESCRIPTION - LOCATION: LOCATION: ABDOMEN

## 2023-03-08 NOTE — ED NOTES
Pt ambulated to bathroom for urine specimen, tolerated well. Pt states the PEG tube does not connect to her feeding system and that she just needs a \"connector. \" PEG tube insertion site cleaned up and Lido applied.  VSS     [facility]  03/08/23 3992

## 2023-03-08 NOTE — DISCHARGE INSTRUCTIONS
Patient had what appears to be a minor complication of the PEG tube. This is been replaced. She is instructed to follow-up with her primary care physician and gastroenterologist and do so within the next 1 to 2 days. She is instructed to continue PEG tube feedings as previously prescribed. She is instructed to return to the nearest emergency room immediately for any new or worsening complaints.

## 2023-03-08 NOTE — ED NOTES
Pt to ED via intake with c/o PEG tube misplacement. Pt reports they went to [facility] after the PEG tube fell out. Pt reports the tube was replaced with the wrong tube. Pt reports they were told by Dr Elaine to come to ER for replacement.      Destiny Anderson RN  03/08/23 2668

## 2023-03-08 NOTE — ED NOTES
Patient resting in bed. Respirations easy and unlabored. No distress noted. Call light within reach. Pt states she feels her BG is low and wanted BG checked.           Dacia Chavis RN  03/08/23 [address] [place] [address], RN  03/08/23 1459

## 2023-03-08 NOTE — ED PROVIDER NOTES
[facility]  eMERGENCY dEPARTMENT eNCOUnter          CHIEF COMPLAINT       Chief Complaint   Patient presents with    Other     PEG tube malfunction       Nurses Notes reviewed and I agree except as noted in the HPI. HISTORY OF PRESENT ILLNESS    Lucas Smith is a 28 y.o. female who presents PEG tube complications. Apparently she had a PEG tube dislodgment yesterday and went to [facility] where they replaced it with a PEG tube. Today she goes home and she finds out that this does not work with her tube feeds. She came in here for further evaluation and treatment. Patient is complaining of a little abdominal pain. No blood through the ostomy. No blood in her stool. No blood in her urine. Denies being pregnant has a history of hysterectomy according to her. Patient states that there is fluid coming out of the PEG tube. Appears to be in the correct place. No other physical complaints at this time. REVIEW OF SYSTEMS     Review of Systems   Constitutional:  Negative for activity change, appetite change, diaphoresis, fatigue and unexpected weight change. HENT:  Negative for congestion, ear discharge, ear pain, hearing loss, rhinorrhea, sinus pressure, sore throat, trouble swallowing and voice change. Eyes:  Negative for photophobia, pain, discharge, redness and itching. Respiratory:  Negative for cough, choking, chest tightness, shortness of breath and wheezing. Cardiovascular:  Negative for chest pain, palpitations and leg swelling. Gastrointestinal:  Positive for abdominal pain. Negative for abdominal distention, blood in stool, constipation, diarrhea, nausea and vomiting. Endocrine: Negative for polydipsia, polyphagia and polyuria. Genitourinary:  Negative for decreased urine volume, difficulty urinating, dysuria, enuresis, frequency, hematuria and urgency.    Musculoskeletal:  Negative for arthralgias, back pain, gait problem, myalgias, neck pain and neck stiffness. Skin:  Negative for pallor and rash. Allergic/Immunologic: Negative for immunocompromised state. Neurological:  Negative for dizziness, tremors, seizures, syncope, facial asymmetry, weakness, light-headedness, numbness and headaches. Hematological:  Negative for adenopathy. Does not bruise/bleed easily. Psychiatric/Behavioral:  Negative for agitation, hallucinations and suicidal ideas. The patient is not nervous/anxious. PAST MEDICAL HISTORY    has a past medical history of Acute on chronic diastolic congestive heart failure ([address] JANI (acute kidney injury) ([address] Anemia, Anesthesia, Anxiety, Asthma, CAD (coronary artery disease), Depression, Diabetes ([facility] [address] Diet-controlled type 2 diabetes mellitus ([facility] [address] Epilepsy ([facility] [address] Fibroids, Gastro - esophageal reflux disease, Hypertension, Hypertrophy of tonsil and adenoid, Malignant carcinoid tumor of other sites [facility]), Migraine, PONV (postoperative nausea and vomiting), Prolonged emergence from general anesthesia, Sickle cell anemia ([address] Sickle cell trait ([facility] [address] and Tumor associated pain. SURGICAL HISTORY      has a past surgical history that includes hernia repair (2010, 2016); [place] tooth extraction; tumor removal; Breast reduction surgery; myomectomy; Partial hysterectomy (4/8/16); Dilation and curettage of uterus (10/21/2013); Central venous catheter insertion (Right, 12/11/2015); Abdominal exploration surgery; Gastric fundoplication; Endoscopy, colon, diagnostic; other surgical history (08/12/2016); Tunneled venous port placement; Hysterectomy (04/2016); Abdomen surgery (03/23/2017); pr egd balloon dilation esophagus <30 mm diam (Left, 8/8/2017); pr egd balloon dilation esophagus <30 mm diam (N/A, 9/26/2017);  Tonsillectomy and adenoidectomy (N/A, 11/6/2017); pr egd balloon dilation esophagus <30 mm diam (Left, 12/12/2017); pr insj prph ctr vad w/subq port under 5 yr (Bilateral, 1/5/2018); pr egd balloon dilation esophagus <30 mm diam (N/A, 2/13/2018); Upper gastrointestinal endoscopy (Left, 4/3/2018); pr office/outpt visit,procedure only (N/A, 5/15/2018); Upper gastrointestinal endoscopy (Left, 6/26/2018); pr office/outpt visit,procedure only (Bilateral, 9/10/2018); pr office/outpt visit,procedure only (N/A, 11/16/2018); egd colonoscopy (N/A, 1/8/2019); Upper gastrointestinal endoscopy (Left, 2/26/2019); INSERTION / REMOVAL / REPLACEMENT VENOUS ACCESS CATHETER (N/A, 3/8/2019); egd colonoscopy (Left, 5/14/2019); Cholecystectomy, laparoscopic (N/A, 7/11/2019); Upper gastrointestinal endoscopy (N/A, 7/9/2019); egd colonoscopy (Left, 8/27/2019); Gastric fundoplication; Upper gastrointestinal endoscopy (Left, 9/9/2019); laparoscopy (N/A, 10/8/2019); [address] Surgery (N/A, 11/11/2019); Port Surgery (Right, 11/29/2019); other surgical history (12/02/2019); EGD (2019); Upper gastrointestinal endoscopy (N/A, 4/1/2020); Colonoscopy (N/A, 8/24/2020); Upper gastrointestinal endoscopy (Left, 8/24/2020); Upper gastrointestinal endoscopy (Left, 8/24/2020); Upper gastrointestinal endoscopy (N/A, 12/2/2020); Upper gastrointestinal endoscopy (Left, 12/2/2020); Dental surgery; Upper gastrointestinal endoscopy (Left, 1/19/2022); Upper gastrointestinal endoscopy (Left, 1/19/2022); Knee arthroscopy; laparoscopy (N/A, 7/15/2022); Upper gastrointestinal endoscopy (N/A, 7/15/2022); ventral hernia repair (N/A, 8/22/2022); hiatal hernia repair (N/A, 1/17/2023); Upper gastrointestinal endoscopy (N/A, 1/24/2023); Gastrostomy tube placement (N/A, 1/25/2023); and Gastrostomy tube placement (N/A, 2/22/2023). CURRENT MEDICATIONS       Discharge Medication List as of 3/8/2023  3:20 PM        CONTINUE these medications which have NOT CHANGED    Details   traMADol (ULTRAM) 50 MG tablet Take 1 tablet by mouth every 6 hours as needed for Pain for up to 30 doses.  Max Daily Amount: 200 mg, Disp-30 tablet, R-0Normal      propranolol (INDERAL LA) 60 MG extended release capsule Take 60 mg by mouth dailyHistorical Med      famotidine (PEPCID) 40 MG tablet Take 1 tablet by mouth nightly, Disp-30 tablet, R-3Normal      Incontinence Supply Disposable (DEPEND SILHOUETTE BRIEFS L/XL) MISC Disp-5 each, R-5, PrintUse as needed for urinary and bowel incontinence      docusate sodium (COLACE) 100 MG capsule Take 1 capsule by mouth 2 times daily as needed for Constipation, Disp-30 capsule, R-2Normal      topiramate (TOPAMAX) 100 MG tablet Take 100 mg by mouth 2 times dailyHistorical Med      metFORMIN HCl 500 MG/5ML SOLN 1,000 mg daily (with breakfast) Take 10 mL via peg tube once dailyHistorical Med      linaclotide (LINZESS) 145 MCG capsule Take 145 mcg by mouth every morning (before breakfast)Historical Med      baclofen (LIORESAL) 10 MG tablet Take 10 mg by mouth dailyHistorical Med      promethazine (PHENERGAN) 25 MG tablet Take 25 mg by mouth every 4-6 hours as needed for NauseaHistorical Med      prazosin (MINIPRESS) 1 MG capsule Take 1 mg by mouth nightlyHistorical Med      ARIPiprazole (ABILIFY) 1 MG/ML SOLN solution Take 10 mg by mouth nightly 10 mL via peg tube every eveningHistorical Med      busPIRone (BUSPAR) 5 MG tablet Take 5 mg by mouth 3 times dailyHistorical Med      pantoprazole (PROTONIX) 40 MG tablet Take 40 mg by mouth dailyHistorical Med      polyethylene glycol (GLYCOLAX) 17 GM/SCOOP powder Dispense 238 Gram Bottle.   Use as Directed, Disp-238 g, R-0Normal      acetaminophen (TYLENOL) 325 MG tablet Take 2 tablets by mouth 4 times daily as needed for Pain or Fever, Disp-60 tablet, R-0Normal      fluticasone (FLOVENT HFA) 110 MCG/ACT inhaler 2 puffs 2 times dailyHistorical Med      montelukast (SINGULAIR) 10 MG tablet Take 10 mg by mouth nightlyHistorical Med      amLODIPine (NORVASC) 5 MG tablet Take 1 tablet by mouth daily, Disp-30 tablet, R-3Print      escitalopram (LEXAPRO) 5 MG/5ML solution 20 mg daily Take 20 mL via peg tube once dailyHistorical Med      albuterol (PROVENTIL) (2.5 MG/3ML) 0.083% nebulizer solution Take 3 mLs by nebulization every 6 hours as needed for Wheezing, Disp-90 each, R-0Normal      glucose monitoring kit (FREESTYLE) monitoring kit DAILY Starting 2018, Disp-1 kit, R-0, NormalOr whichever system insurance will pay for      Spacer/Aero-Holding Chambers (E-Z SPACER) AISLINN DAILY Starting Thu 8/10/2017, Disp-1 Device, R-0, Normal             ALLERGIES     is allergic to latex, ketorolac tromethamine, pcn [penicillins], amoxicillin, ciprofloxacin, compazine [prochlorperazine maleate], dicyclomine hcl, fentanyl, fioricet [butalbital-apap-caffeine], flexeril [cyclobenzaprine], gabapentin, ibuprofen [ibuprofen], keflex [cephalexin], lisinopril, lorazepam, losartan, macrobid [nitrofurantoin monohyd macro], mushroom extract complex, reglan [metoclopramide], vicodin [hydrocodone-acetaminophen], vistaril [hydroxyzine hcl], zofran [ondansetron], and adhesive tape.    FAMILY HISTORY     She indicated that her mother is . She indicated that her father is alive. She indicated that both of her sisters are alive. She indicated that both of her brothers are alive. She indicated that her maternal grandmother is . She indicated that her maternal grandfather is . She indicated that her paternal grandmother is alive. She indicated that her paternal grandfather is . She indicated that her paternal uncle is . She indicated that the status of her neg hx is unknown.   family history includes Cancer in her mother; Depression in her maternal grandmother; Diabetes in her maternal grandmother; Heart Attack in her paternal uncle; Heart Disease in her father, maternal grandmother, mother, and sister; High Blood Pressure in her father and mother; Liver Cancer in her mother.    SOCIAL HISTORY      reports that she has been smoking cigarettes. She started smoking about 7 years ago. She has a 1.50 pack-year smoking history. She has never used smokeless  tobacco. She reports that she does not currently use alcohol. She reports that she does not use drugs. PHYSICAL EXAM     INITIAL VITALS:  weight is 250 lb (113.4 kg). Her oral temperature is 98.8 °F (37.1 °C). Her blood pressure is 149/82 (abnormal) and her pulse is 91. Her respiration is 18 and oxygen saturation is 99%. Physical Exam  Vitals and nursing note reviewed. Constitutional:       General: She is not in acute distress. Appearance: She is well-developed. She is not diaphoretic. HENT:      Head: Normocephalic and atraumatic. Right Ear: External ear normal.      Left Ear: External ear normal.      Nose: Nose normal.      Mouth/Throat:      Pharynx: No oropharyngeal exudate. Eyes:      General: No scleral icterus. Right eye: No discharge. Left eye: No discharge. Conjunctiva/sclera: Conjunctivae normal.      Pupils: Pupils are equal, round, and reactive to light. Neck:      Thyroid: No thyromegaly. Vascular: No JVD. Trachea: No tracheal deviation. Cardiovascular:      Rate and Rhythm: Normal rate and regular rhythm. Pulses:           Carotid pulses are 2+ on the right side and 2+ on the left side. Radial pulses are 2+ on the right side and 2+ on the left side. Femoral pulses are 2+ on the right side and 2+ on the left side. Popliteal pulses are 2+ on the right side and 2+ on the left side. Dorsalis pedis pulses are 2+ on the right side and 2+ on the left side. Posterior tibial pulses are 2+ on the right side and 2+ on the left side. Heart sounds: Normal heart sounds, S1 normal and S2 normal. No murmur heard. No friction rub. No gallop. Pulmonary:      Effort: Pulmonary effort is normal.      Breath sounds: Normal breath sounds. No stridor. No decreased breath sounds, wheezing, rhonchi or rales. Chest:      Chest wall: No tenderness. Abdominal:      General: Bowel sounds are normal. There is no distension. Palpations: Abdomen is soft. There is no mass. Tenderness: There is no abdominal tenderness. There is no guarding or rebound. Hernia: No hernia is present. Comments: PEG tube in place, feeding material and fluid in tube. Minor leakage around the ostomy. No signs of infection. Musculoskeletal:         General: No tenderness. Normal range of motion. Cervical back: Normal range of motion and neck supple. Right lower leg: No edema. Left lower leg: No edema. Lymphadenopathy:      Cervical: No cervical adenopathy. Skin:     General: Skin is warm and dry. Capillary Refill: Capillary refill takes less than 2 seconds. Findings: No bruising, ecchymosis, lesion or rash. Neurological:      General: No focal deficit present. Mental Status: She is alert and oriented to person, place, and time. Mental status is at baseline. Cranial Nerves: No cranial nerve deficit. Coordination: Coordination normal.      Deep Tendon Reflexes: Reflexes are normal and symmetric. Psychiatric:         Speech: Speech normal.         Behavior: Behavior normal.         Thought Content: Thought content normal.         Judgment: Judgment normal.         DIFFERENTIAL DIAGNOSIS:   PEG tube malfunction complication of PEG tube    DIAGNOSTIC RESULTS     EKG: All EKG's are interpreted by the Emergency Department Physician who either signs or Co-signs this chart in the absence of a cardiologist.  None    RADIOLOGY: non-plain film images(s) such as CT, Ultrasound and MRI are read by the radiologist.  XR INJ CONTRAST GASTRIC TUBE PERC   Final Result   PEG tube is present within the gastric antrum. **This report has been created using voice recognition software. It may contain minor errors which are inherent in voice recognition technology. **      Final report electronically signed by Dr. Davis Seen on 3/8/2023 3:07 PM            LABS:   Labs Reviewed   CBC WITH AUTO DIFFERENTIAL - Abnormal; Notable for the following components:       Result Value    Hemoglobin 11.4 (*)     MCV 77.8 (*)     MCH 22.6 (*)     MCHC 29.1 (*)     RDW-CV 15.9 (*)     MPV 9.3 (*)     All other components within normal limits   URINE WITH REFLEXED MICRO - Abnormal; Notable for the following components:    Blood, Urine TRACE (*)     All other components within normal limits   POCT GLUCOSE - Normal   LIPASE   MAGNESIUM   COMPREHENSIVE METABOLIC PANEL   URINE DRUG SCREEN   ANION GAP   GLOMERULAR FILTRATION RATE, ESTIMATED   OSMOLALITY   POCT GLUCOSE       EMERGENCY DEPARTMENT COURSE:   Vitals:    Vitals:    03/08/23 1139 03/08/23 1237 03/08/23 1334 03/08/23 1514   BP: (!) 148/92 (!) 141/94 (!) 155/102 (!) 149/82   Pulse: 88 84 94 91   Resp: 18 20 18 18   Temp:       TempSrc:       SpO2: 100% 100% 99% 99%   Weight:         Patient was assessed at bedside, PEG tube appears to be functioning, however they do not have connectors to their feeding apparatus. We will make an attempt to find the appropriate feeding tube. Here today we found the appropriate feeding tube. I went back in and changed out the tube without any incident. X-ray did reveal that the tube was in place. At this point I feel the patient be safely discharged home. She was given 1 dose of pain medication here. She is instructed to follow-up with her gastroenterologist.  This was discussed extensively at bedside with the patient who understood and agreed with the plan. Patient is subsequently discharged home in stable condition. Patient had what appears to be a minor complication of the PEG tube. This is been replaced. She is instructed to follow-up with her primary care physician and gastroenterologist and do so within the next 1 to 2 days. She is instructed to continue PEG tube feedings as previously prescribed. She is instructed to return to the nearest emergency room immediately for any new or worsening complaints.     CRITICAL CARE:   None    CONSULTS:  None    PROCEDURES:  PEG tube replaced.   No complications    FINAL IMPRESSION      1. PEG tube malfunction [facility])          DISPOSITION/PLAN   Discharge    PATIENT REFERRED TO:  Chevy Lacey MD  [address] [place] [address]. [address]  [phone]    Call in 1 day      DISCHARGE MEDICATIONS:  Discharge Medication List as of 3/8/2023  3:20 PM          (Please note that portions of this note were completed with a voice recognition program.  Efforts were made to edit the dictations but occasionally words are mis-transcribed.)    DO Rona Sheppard DO  03/08/23 2147

## 2023-03-08 NOTE — ED NOTES
Pt ambulated to the bathroom, tolerated well. Pt returned to bed. Pt is complaining of pain at this time. Pt updated on POC. Pt voices no other concerns at this time. [place] provided for comfort. S.       [place] Rising  03/08/23 5433

## 2023-03-08 NOTE — ED NOTES
Pt resting in bed. Pt medicated per MAR. Pt updated on POC. Pt voices no concerns at this time. Respirations are even and unlabored. CORTEZ Burnett  03/08/23 2228

## 2023-03-13 DIAGNOSIS — Z09 S/P ABDOMINAL SURGERY, FOLLOW-UP EXAM: ICD-10-CM

## 2023-03-13 RX ORDER — TRAMADOL HYDROCHLORIDE 50 MG/1
50 TABLET ORAL EVERY 12 HOURS PRN
Qty: 14 TABLET | Refills: 0 | Status: SHIPPED | OUTPATIENT
Start: 2023-03-13 | End: 2023-03-20

## 2023-03-15 ENCOUNTER — HOSPITAL ENCOUNTER (EMERGENCY)
Age: 33
Discharge: HOME | End: 2023-03-16
Attending: STUDENT IN AN ORGANIZED HEALTH CARE EDUCATION/TRAINING PROGRAM
Payer: MEDICARE

## 2023-03-15 DIAGNOSIS — R10.9 ABDOMINAL PAIN, UNSPECIFIED ABDOMINAL LOCATION: Primary | ICD-10-CM

## 2023-03-15 PROCEDURE — 96376 TX/PRO/DX INJ SAME DRUG ADON: CPT

## 2023-03-15 PROCEDURE — 96374 THER/PROPH/DIAG INJ IV PUSH: CPT

## 2023-03-15 PROCEDURE — 96375 TX/PRO/DX INJ NEW DRUG ADDON: CPT

## 2023-03-15 PROCEDURE — 83690 ASSAY OF LIPASE: CPT

## 2023-03-15 PROCEDURE — 80053 COMPREHEN METABOLIC PANEL: CPT

## 2023-03-15 PROCEDURE — 84484 ASSAY OF TROPONIN QUANT: CPT

## 2023-03-15 PROCEDURE — 99285 EMERGENCY DEPT VISIT HI MDM: CPT

## 2023-03-15 PROCEDURE — 36415 COLL VENOUS BLD VENIPUNCTURE: CPT

## 2023-03-15 PROCEDURE — 82248 BILIRUBIN DIRECT: CPT

## 2023-03-15 PROCEDURE — 85025 COMPLETE CBC W/AUTO DIFF WBC: CPT

## 2023-03-15 RX ORDER — MORPHINE SULFATE 4 MG/ML
4 INJECTION, SOLUTION INTRAMUSCULAR; INTRAVENOUS ONCE
Status: COMPLETED | OUTPATIENT
Start: 2023-03-16 | End: 2023-03-16

## 2023-03-15 ASSESSMENT — PAIN - FUNCTIONAL ASSESSMENT: PAIN_FUNCTIONAL_ASSESSMENT: 0-10

## 2023-03-15 ASSESSMENT — PAIN DESCRIPTION - LOCATION: LOCATION: ABDOMEN

## 2023-03-15 ASSESSMENT — PAIN SCALES - GENERAL: PAINLEVEL_OUTOF10: 10

## 2023-03-16 ENCOUNTER — APPOINTMENT (OUTPATIENT)
Dept: CT IMAGING | Age: 33
End: 2023-03-16
Payer: MEDICARE

## 2023-03-16 VITALS
OXYGEN SATURATION: 95 % | TEMPERATURE: 97.7 F | SYSTOLIC BLOOD PRESSURE: 124 MMHG | HEIGHT: 64 IN | WEIGHT: 290 LBS | HEART RATE: 81 BPM | BODY MASS INDEX: 49.51 KG/M2 | DIASTOLIC BLOOD PRESSURE: 79 MMHG | RESPIRATION RATE: 20 BRPM

## 2023-03-16 LAB
ALBUMIN SERPL BCG-MCNC: 4.1 G/DL (ref 3.5–5.1)
ALP SERPL-CCNC: 142 U/L (ref 38–126)
ALT SERPL W/O P-5'-P-CCNC: 12 U/L (ref 11–66)
AMORPH SED URNS QL MICRO: ABNORMAL
ANION GAP SERPL CALC-SCNC: 10 MEQ/L (ref 8–16)
AST SERPL-CCNC: 13 U/L (ref 5–40)
BACTERIA URNS QL MICRO: ABNORMAL /HPF
BASOPHILS ABSOLUTE: 0.1 THOU/MM3 (ref 0–0.1)
BASOPHILS NFR BLD AUTO: 0.5 %
BILIRUB CONJ SERPL-MCNC: < 0.2 MG/DL (ref 0–0.3)
BILIRUB SERPL-MCNC: < 0.2 MG/DL (ref 0.3–1.2)
BILIRUB UR QL STRIP.AUTO: NEGATIVE
BUN SERPL-MCNC: 18 MG/DL (ref 7–22)
CALCIUM SERPL-MCNC: 9.9 MG/DL (ref 8.5–10.5)
CASTS #/AREA URNS LPF: ABNORMAL /LPF
CHARACTER UR: ABNORMAL
CHLORIDE SERPL-SCNC: 102 MEQ/L (ref 98–111)
CO2 SERPL-SCNC: 26 MEQ/L (ref 23–33)
COLOR: ABNORMAL
CREAT SERPL-MCNC: 0.8 MG/DL (ref 0.4–1.2)
CRYSTALS URNS MICRO: PRESENT
DEPRECATED RDW RBC AUTO: 43 FL (ref 35–45)
EKG ATRIAL RATE: 83 BPM
EKG P AXIS: 46 DEGREES
EKG P-R INTERVAL: 152 MS
EKG Q-T INTERVAL: 358 MS
EKG QRS DURATION: 80 MS
EKG QTC CALCULATION (BAZETT): 420 MS
EKG R AXIS: 14 DEGREES
EKG T AXIS: 28 DEGREES
EKG VENTRICULAR RATE: 83 BPM
EOSINOPHIL NFR BLD AUTO: 1.7 %
EOSINOPHILS ABSOLUTE: 0.2 THOU/MM3 (ref 0–0.4)
EPITHELIAL CELLS, UA: ABNORMAL /HPF
ERYTHROCYTE [DISTWIDTH] IN BLOOD BY AUTOMATED COUNT: 15.7 % (ref 11.5–14.5)
GFR SERPL CREATININE-BSD FRML MDRD: > 60 ML/MIN/1.73M2
GLUCOSE SERPL-MCNC: 116 MG/DL (ref 70–108)
GLUCOSE UR QL STRIP.AUTO: NEGATIVE MG/DL
HCT VFR BLD AUTO: 38.4 % (ref 37–47)
HGB BLD-MCNC: 11.7 GM/DL (ref 12–16)
HGB UR QL STRIP.AUTO: ABNORMAL
IMM GRANULOCYTES # BLD AUTO: 0.08 THOU/MM3 (ref 0–0.07)
IMM GRANULOCYTES NFR BLD AUTO: 0.6 %
KETONES UR QL STRIP.AUTO: NEGATIVE
LACTATE SERPL-SCNC: 0.7 MMOL/L (ref 0.5–2)
LIPASE SERPL-CCNC: 29.4 U/L (ref 5.6–51.3)
LYMPHOCYTES ABSOLUTE: 3.6 THOU/MM3 (ref 1–4.8)
LYMPHOCYTES NFR BLD AUTO: 28.4 %
MCH RBC QN AUTO: 23 PG (ref 26–33)
MCHC RBC AUTO-ENTMCNC: 30.5 GM/DL (ref 32.2–35.5)
MCV RBC AUTO: 75.6 FL (ref 81–99)
MISCELLANEOUS 2: ABNORMAL
MISCELLANEOUS LAB TEST RESULT: ABNORMAL
MONOCYTES ABSOLUTE: 0.7 THOU/MM3 (ref 0.4–1.3)
MONOCYTES NFR BLD AUTO: 5.2 %
MUCOUS THREADS URNS QL MICRO: ABNORMAL
NEUTROPHILS NFR BLD AUTO: 63.6 %
NITRITE UR QL STRIP: NEGATIVE
NRBC BLD AUTO-RTO: 0 /100 WBC
OSMOLALITY SERPL CALC.SUM OF ELEC: 278.6 MOSMOL/KG (ref 275–300)
PH UR STRIP.AUTO: 5.5 [PH] (ref 5–9)
PLATELET # BLD AUTO: 402 THOU/MM3 (ref 130–400)
PMV BLD AUTO: 9.2 FL (ref 9.4–12.4)
POTASSIUM SERPL-SCNC: 4.2 MEQ/L (ref 3.5–5.2)
PROT SERPL-MCNC: 7.7 G/DL (ref 6.1–8)
PROT UR STRIP.AUTO-MCNC: 30 MG/DL
RBC # BLD AUTO: 5.08 MILL/MM3 (ref 4.2–5.4)
RBC URINE: ABNORMAL /HPF
RENAL EPI CELLS #/AREA URNS HPF: ABNORMAL /[HPF]
SEGMENTED NEUTROPHILS ABSOLUTE COUNT: 8.1 THOU/MM3 (ref 1.8–7.7)
SODIUM SERPL-SCNC: 138 MEQ/L (ref 135–145)
SP GR UR REFRACT.AUTO: > 1.03 (ref 1–1.03)
TROPONIN T: < 0.01 NG/ML
UROBILINOGEN, URINE: 0.2 EU/DL (ref 0–1)
WBC # BLD AUTO: 12.8 THOU/MM3 (ref 4.8–10.8)
WBC #/AREA URNS HPF: ABNORMAL /HPF
WBC #/AREA URNS HPF: NEGATIVE /[HPF]
YEAST LIKE FUNGI URNS QL MICRO: ABNORMAL

## 2023-03-16 PROCEDURE — 83605 ASSAY OF LACTIC ACID: CPT

## 2023-03-16 PROCEDURE — 96376 TX/PRO/DX INJ SAME DRUG ADON: CPT

## 2023-03-16 PROCEDURE — 74177 CT ABD & PELVIS W/CONTRAST: CPT

## 2023-03-16 PROCEDURE — 6360000002 HC RX W HCPCS: Performed by: STUDENT IN AN ORGANIZED HEALTH CARE EDUCATION/TRAINING PROGRAM

## 2023-03-16 PROCEDURE — 6360000004 HC RX CONTRAST MEDICATION: Performed by: STUDENT IN AN ORGANIZED HEALTH CARE EDUCATION/TRAINING PROGRAM

## 2023-03-16 PROCEDURE — 93005 ELECTROCARDIOGRAM TRACING: CPT | Performed by: STUDENT IN AN ORGANIZED HEALTH CARE EDUCATION/TRAINING PROGRAM

## 2023-03-16 PROCEDURE — 96375 TX/PRO/DX INJ NEW DRUG ADDON: CPT

## 2023-03-16 PROCEDURE — 81001 URINALYSIS AUTO W/SCOPE: CPT

## 2023-03-16 PROCEDURE — 93010 ELECTROCARDIOGRAM REPORT: CPT | Performed by: INTERNAL MEDICINE

## 2023-03-16 PROCEDURE — 96374 THER/PROPH/DIAG INJ IV PUSH: CPT

## 2023-03-16 RX ORDER — MORPHINE SULFATE 4 MG/ML
4 INJECTION, SOLUTION INTRAMUSCULAR; INTRAVENOUS ONCE
Status: COMPLETED | OUTPATIENT
Start: 2023-03-16 | End: 2023-03-16

## 2023-03-16 RX ORDER — DIPHENHYDRAMINE HYDROCHLORIDE 50 MG/ML
25 INJECTION INTRAMUSCULAR; INTRAVENOUS ONCE
Status: COMPLETED | OUTPATIENT
Start: 2023-03-16 | End: 2023-03-16

## 2023-03-16 RX ADMIN — MORPHINE SULFATE 4 MG: 4 INJECTION, SOLUTION INTRAMUSCULAR; INTRAVENOUS at 06:27

## 2023-03-16 RX ADMIN — MORPHINE SULFATE 4 MG: 4 INJECTION, SOLUTION INTRAMUSCULAR; INTRAVENOUS at 01:49

## 2023-03-16 RX ADMIN — MORPHINE SULFATE 4 MG: 4 INJECTION, SOLUTION INTRAMUSCULAR; INTRAVENOUS at 00:09

## 2023-03-16 RX ADMIN — HYDROMORPHONE HYDROCHLORIDE 1 MG: 1 INJECTION, SOLUTION INTRAMUSCULAR; INTRAVENOUS; SUBCUTANEOUS at 03:25

## 2023-03-16 RX ADMIN — IOPAMIDOL 80 ML: 755 INJECTION, SOLUTION INTRAVENOUS at 01:13

## 2023-03-16 RX ADMIN — DIPHENHYDRAMINE HYDROCHLORIDE 25 MG: 50 INJECTION, SOLUTION INTRAMUSCULAR; INTRAVENOUS at 04:42

## 2023-03-16 ASSESSMENT — PAIN SCALES - GENERAL
PAINLEVEL_OUTOF10: 10
PAINLEVEL_OUTOF10: 7
PAINLEVEL_OUTOF10: 9
PAINLEVEL_OUTOF10: 7
PAINLEVEL_OUTOF10: 9
PAINLEVEL_OUTOF10: 7
PAINLEVEL_OUTOF10: 10
PAINLEVEL_OUTOF10: 9
PAINLEVEL_OUTOF10: 10
PAINLEVEL_OUTOF10: 10
PAINLEVEL_OUTOF10: 7
PAINLEVEL_OUTOF10: 10

## 2023-03-16 ASSESSMENT — PAIN - FUNCTIONAL ASSESSMENT
PAIN_FUNCTIONAL_ASSESSMENT: 0-10

## 2023-03-16 ASSESSMENT — PAIN DESCRIPTION - LOCATION
LOCATION: ABDOMEN

## 2023-03-16 NOTE — ED NOTES
Pt updated on plan of care. Voiced no needs. Call light in reach.      Linnette Worley RN  03/16/23 0021

## 2023-03-16 NOTE — ED PROVIDER NOTES
[facility] EMERGENCY DEPT      EMERGENCY MEDICINE     Pt Name: Samantha Nichols  MRN: [id number]  Birthdate 1990  Date of evaluation: 3/15/2023  Provider: Ryan Collins MD    CHIEF COMPLAINT       Chief Complaint   Patient presents with    Abdominal Pain     HISTORY OF PRESENT ILLNESS   Samantha Nichols is a pleasant 32 y.o. female who presents to the emergency department from from home, by private vehicle for evaluation of abdominal pain.  Patient reports she has chronic abdominal pain and a G-tube in place for gastroparesis.  She reports generalized cramping abdominal pain that she rates 10/10 in severity.  The pain is acute on chronic in nature.  She denies fevers.  She denies nausea or vomiting.  She denies chest pain or shortness of breath.  She denies dysuria, hematuria, increased urinary frequency or urgency.  She denies vaginal discharge or concern for sexually transmitted infections.    PASTMEDICAL HISTORY     Past Medical History:   Diagnosis Date    Acute on chronic diastolic congestive heart failure ([facility]) 6/25/2022    JANI (acute kidney injury) ([facility]) 7/7/2019    Anemia     Anesthesia     migraines    Anxiety     Asthma     CAD (coronary artery disease)     Depression     Diabetes ([facility])     Diet-controlled type 2 diabetes mellitus ([facility]) 2016    Epilepsy ([facility])     Fibroids     Gastro - esophageal reflux disease     Hypertension     Hypertrophy of tonsil and adenoid 11/4/2017    Malignant carcinoid tumor of other sites ([facility]) 5/14/2013    Migraine     PONV (postoperative nausea and vomiting)     Prolonged emergence from general anesthesia     Sickle cell anemia (HCC)     Sickle cell trait ([facility])     PT STATES SHE HAS THE TRAIT NOT THE DISEASE    Tumor associated pain     neuroendrocrine tumor, gastroma       Patient Active Problem List   Diagnosis Code    Intractable abdominal pain R10.9    Nausea and vomiting R11.2    Carcinoid tumor D3A.00    Pelvic inflammatory disease N73.9    Intractable  nausea and vomiting R11.2    Anemia D64.9    Diet-controlled type 2 diabetes mellitus (HCC) E11.9    Esophageal dysphagia R13.19    Esophageal stricture K22.2    Morbid obesity (HCC) E66.01    Migraine equivalent G43.109    Mild persistent asthma without complication V49.43    Dyslipidemia E78.5    Moderate persistent asthma with acute exacerbation J45.41    Gastroenteritis K52.9    Hematuria R31.9    Diarrhea R19.7    Diarrhea of presumed infectious origin R19.7    Hematochezia K92.1    Generalized abdominal pain R10.84    Hypertrophy of tonsil and adenoid J35.3    Multiple drug allergies Z88.9    S/P T&A (status post tonsillectomy and adenoidectomy) Z90.89    Iron deficiency anemia D50.9    Poor intravenous access Z78.9    Abnormal Pap smear of cervix R87.619    Abnormal uterine bleeding N93.9    Exacerbation of asthma J45.901    Chest wall pain R07.89    Female pelvic pain R10.2    Head ache R51.9    Heartburn R12    Incarcerated ventral hernia K43.6    Malignant carcinoid tumor of other sites [facility]) C7A.098    Pyelonephritis N12    Spigelian hernia K43.9    Uterine leiomyoma D25.9    Acute kidney injury (HCC) N17.9    Hypernatremia E87.0    Hypokalemia E87.6    Volume depletion, gastrointestinal loss E86.9    Essential hypertension I10    Hiatal hernia K44.9    S/P Nissen fundoplication (without gastrostomy tube) procedure Z98.890    Sickle cell anemia (HCC) D57.1    Sickle cell disease (HCC) D57.1    Callus of heel L84    Foot pain, bilateral M79.671, M79.672    Atypical chest pain R07.89    Tobacco use Z72.0    History of gastritis Z87.19    Weight gain R63.5    Chronic idiopathic constipation K59.04    Acute respiratory failure (HCC) J96.00    Stroke-like symptom R29.90    Acute on chronic diastolic congestive heart failure (HCC) I50.33    Dysphagia R13.10    S/P robot-assisted surgical procedure Z98.890    History of esophagogastroduodenoscopy (EGD) Z98.890    Port-site hernia K91.89, K43.2    Ventral hernia without obstruction or gangrene K43.9    Abdominal pain, epigastric R10.13    Tachycardia R00.0    Leukocytosis D02.430    Complication of gastrostomy tube ([facility] [address] K94.20    Leaking percutaneous endoscopic gastrostomy (PEG) tube ([facility] [address] K94.23     SURGICAL HISTORY       Past Surgical History:   Procedure Laterality Date    ABDOMEN SURGERY  03/23/2017    Laparoscopic , Bi lat oopherectomy Dr Chely Lozano      x2    BREAST REDUCTION SURGERY      CENTRAL VENOUS CATHETER Right 12/11/2015    right subclavian single lumen mediport insertion--Dr. Michel    CHOLECYSTECTOMY, LAPAROSCOPIC N/A 7/11/2019    CHOLECYSTECTOMY LAPAROSCOPIC performed by Wesley Bautista MD at [address] 8/24/2020    COLONOSCOPY POLYPECTOMY SNARE/COLD BIOPSY performed by Lupe Hahn MD at [address] [place] [address]  10/21/2013    Dilation and curettage, Diagnostic Hysteroscopy and laparoscopy (Dr. Raisa Garcia, [facility])    EGD  2019    EGD COLONOSCOPY N/A 1/8/2019    EGD DIL performed by Kassidy Van MD at [facility]    EGD COLONOSCOPY Left 5/14/2019    EGD DILATATION performed by Kassidy Van MD at [facility]    EGD COLONOSCOPY Left 8/27/2019    EGD DILATATION performed by Kassidy Van MD at [facility]    ENDOSCOPY, COLON, DIAGNOSTIC      GASTRIC FUNDOPLICATION      [facility]    GASTRIC [address] N/A 1/25/2023    EGD PEG TUBE PLACEMENT performed by Lupe Hahn MD at [place] [address] N/A 2/22/2023    EGD PEG TUBE PLACEMENT performed by Lupe Hahn MD at [address]  2010, 2016    [place] , [address] [address] N/A 1/17/2023    Robotic Exploratory Paparoscopy with Extensive Lysis of Adhesions G Tube Insertion performed by Africa Garcia MD at [address] [facility] ([address] [place] [address])  04/2016    INSERTION / REMOVAL / REPLACEMENT VENOUS ACCESS CATHETER N/A 3/8/2019    REMOVAL OF LEFT PORT AND PLACEMENT OF SINGLE LUMEN MEDIPORT RIGHT SIDE performed by Jah Culver MD at [address] N/A 10/8/2019    ROBOTIC DIAGNOSTIC LAPAROSCOPY,  RECURRENT HIATAL HERNIA REPAIR, REVISION FUNDOPLICATION performed by Ella Davidson MD at [place] [address] N/A 7/15/2022    ROBOTIC LAPAROSCOPY, LYSIS OF ADHESIONS performed by Jah Culver MD at [address] [facility]  08/12/2016    Right Port Removal, Placement of new Port Left by Dr Sandrine Abdi  12/02/2019    Mediport insertion-IR Dr Tai Worley (CERVIX NOT REMOVED)  4/8/16    PORT SURGERY N/A 11/11/2019    REMOVAL OF RIGHT MEDIPORT & ATTEMPTED PLACEMENT OF LEFT SINGLE LUMEN MEDIPORT performed by Jah Culver MD at [facility] Right 11/29/2019    RT INTERNAL JUGULAR SINGLE LUMEN MEDIPORT INSERTION performed by Jah Culver MD at [facility]  [address] <30 MM DIAM Left 8/8/2017    EGD/DIL performed by Jono Morales MD at [facility]    [place]  [address] <30 MM DIAM N/A 9/26/2017    EGD DIL performed by Jono Morales MD at [facility]    [place]  [address] <30 MM DIAM Left 12/12/2017    EGD DIL performed by Jono Morales MD at [facility]    [place]  [address] <30 MM DIAM N/A 2/13/2018    EGD  DILATATION performed by Jono Morales MD at [facility] [address] [facility] CTR VAD W/SUBQ PORT UNDER 5 YR Bilateral 1/5/2018    EXCISION OF MEDIPORT LEFT SIDE, INSERTION MEDIPORT RIGHT  IJ performed by Jah Culver MD at [facility] OFFICE/OUTPT [facility] N/A 5/15/2018    EGD DILATATION performed by Jono Morales MD at [address] [facility] OFFICE/OUTPT VISIT,PROCEDURE ONLY Bilateral 9/10/2018    REMOVAL RT MEDIPORT, INSERTION LEFT performed by Jah Culver MD at [facility] OFFICE/OUTPT VISIT,PROCEDURE ONLY N/A 11/16/2018    MEDIPORT REPOSITIONING performed by Seward Saint, MD at [address] N/A 11/6/2017    TONSILLECTOMY AND ADENOIDECTOMY performed by Adam Peñaloza MD at [address] [place] [address]      neoendocrine    TUNNELED VENOUS PORT PLACEMENT      UPPER GASTROINTESTINAL ENDOSCOPY Left 4/3/2018    EGD DILATION SAVORY performed by Tamera Salazar MD at [place] [address] Left 6/26/2018    EGD DILATION SAVORY performed by Tamera Salazar MD at [address] [facility] ENDOSCOPY Left 2/26/2019    EGD DILATION SAVORY performed by Tamera Salazar MD at [place] [address] 7/9/2019    EGD DILATION SAVORY performed by Tamera Salazar MD at [facility] ENDOSCOPY Left 9/9/2019    EGD BIOPSY performed by Keli Mo MD at [place] [address] 4/1/2020    EGD DILATION BALLOON performed by Keyonna Mercado MD at [address] [place] [address] Left 8/24/2020    EGD ESOPHAGOGASTRODUODENOSCOPY DILATATION performed by Keyonna Mercado MD at [place] [address] Left 8/24/2020    EGD BIOPSY performed by Keyonna Mercado MD at [place] [address] N/A 12/2/2020    EGD DILATION BALLOON performed by Keyonna Mercado MD at [place] [address] Left 12/2/2020    EGD BIOPSY performed by Keynona Mercado MD at [place] [address] Left 1/19/2022    EGD performed by Seward Saint, MD at [place] [address] Left 1/19/2022    EGD BIOPSY performed by Seward Saint, MD at [place] [address] 7/15/2022    EGD [address] performed by Seward Saint, MD at [address] [place] [address] 1/24/2023    EGD ESOPHAGOGASTRODUODENOSCOPY performed by Pham Pat MD at [facility] [address] N/A 8/22/2022    OPEN LEFT ABDOMINAL PORT SITE HERNIA REPAIR WITH MESH performed by Lola Solano MD at [address] [place] [address]       Discharge Medication List as of 3/16/2023  6:31 AM        CONTINUE these medications which have NOT CHANGED    Details   traMADol (ULTRAM) 50 MG tablet Take 1 tablet by mouth every 12 hours as needed for Pain for up to 7 days.  Max Daily Amount: 100 mg, Disp-14 tablet, R-0Normal      propranolol (INDERAL LA) 60 MG extended release capsule Take 60 mg by mouth dailyHistorical Med      famotidine (PEPCID) 40 MG tablet Take 1 tablet by mouth nightly, Disp-30 tablet, R-3Normal      Incontinence Supply Disposable (DEPEND SILHOUETTE BRIEFS L/XL) MISC Disp-5 each, R-5, PrintUse as needed for urinary and bowel incontinence      docusate sodium (COLACE) 100 MG capsule Take 1 capsule by mouth 2 times daily as needed for Constipation, Disp-30 capsule, R-2Normal      topiramate (TOPAMAX) 100 MG tablet Take 100 mg by mouth 2 times dailyHistorical Med      metFORMIN HCl 500 MG/5ML SOLN 1,000 mg daily (with breakfast) Take 10 mL via peg tube once dailyHistorical Med      linaclotide (LINZESS) 145 MCG capsule Take 145 mcg by mouth every morning (before breakfast)Historical Med      baclofen (LIORESAL) 10 MG tablet Take 10 mg by mouth dailyHistorical Med      promethazine (PHENERGAN) 25 MG tablet Take 25 mg by mouth every 4-6 hours as needed for NauseaHistorical Med      prazosin (MINIPRESS) 1 MG capsule Take 1 mg by mouth nightlyHistorical Med      ARIPiprazole (ABILIFY) 1 MG/ML SOLN solution Take 10 mg by mouth nightly 10 mL via peg tube every eveningHistorical Med      busPIRone (BUSPAR) 5 MG tablet Take 5 mg by mouth 3 times dailyHistorical Med      pantoprazole (PROTONIX) 40 MG tablet Take 40 mg by mouth dailyHistorical Med polyethylene glycol (GLYCOLAX) 17 GM/SCOOP powder Dispense 238 Gram Bottle. Use as Directed, Disp-238 g, R-0Normal      acetaminophen (TYLENOL) 325 MG tablet Take 2 tablets by mouth 4 times daily as needed for Pain or Fever, Disp-60 tablet, R-0Normal      fluticasone (FLOVENT HFA) 110 MCG/ACT inhaler 2 puffs 2 times dailyHistorical Med      montelukast (SINGULAIR) 10 MG tablet Take 10 mg by mouth nightlyHistorical Med      amLODIPine (NORVASC) 5 MG tablet Take 1 tablet by mouth daily, Disp-30 tablet, R-3Print      escitalopram (LEXAPRO) 5 MG/5ML solution 20 mg daily Take 20 mL via peg tube once dailyHistorical Med      albuterol (PROVENTIL) (2.5 MG/3ML) 0.083% nebulizer solution Take 3 mLs by nebulization every 6 hours as needed for Wheezing, Disp-90 each, R-0Normal      glucose monitoring kit (FREESTYLE) monitoring kit DAILY Starting 2018, Disp-1 kit, R-0, NormalOr whichever system insurance will pay for      Ina Pacheco (E-Z SPACER) AISLINN DAILY Starting Thu 8/10/2017, Disp-1 Device, R-0, Normal             ALLERGIES     is allergic to latex, ketorolac tromethamine, pcn [penicillins], amoxicillin, ciprofloxacin, compazine [prochlorperazine maleate], dicyclomine hcl, fentanyl, fioricet [butalbital-apap-caffeine], flexeril [cyclobenzaprine], gabapentin, ibuprofen [ibuprofen], keflex [cephalexin], lisinopril, lorazepam, losartan, macrobid [nitrofurantoin monohyd macro], mushroom extract complex, reglan [metoclopramide], vicodin [hydrocodone-acetaminophen], vistaril [hydroxyzine hcl], zofran [ondansetron], and adhesive tape. FAMILY HISTORY     She indicated that her mother is . She indicated that her father is alive. She indicated that both of her sisters are alive. She indicated that both of her brothers are alive. She indicated that her maternal grandmother is . She indicated that her maternal grandfather is . She indicated that her paternal grandmother is alive. She indicated that her paternal grandfather is . She indicated that her paternal uncle is . She indicated that the status of her neg hx is unknown. SOCIAL HISTORY       Social History     Tobacco Use    Smoking status: Every Day     Packs/day: 0.25     Years: 6.00     Pack years: 1.50     Types: Cigarettes     Start date: 3/1/2016    Smokeless tobacco: Never    Tobacco comments:     smokes 3 cig perday    Vaping Use    Vaping Use: Never used   Substance Use Topics    Alcohol use: Not Currently    Drug use: No       PHYSICAL EXAM       ED Triage Vitals [03/15/23 2328]   BP Temp Temp Source Heart Rate Resp SpO2 Height Weight   (!) 155/117 97.7 °F (36.5 °C) Oral 90 18 100 % 5' 4\" (1.626 m) 290 lb (131.5 kg)       Additional Vital Signs:  Vitals:    23 0627   BP:    Pulse:    Resp: 20   Temp:    SpO2:      Physical Exam  Constitutional:  Well developed, well nourished, no acute distress, non-toxic appearance   Eyes: No scleral icterus conjunctiva normal   HENT:  Atraumatic, external ears normal, nose normal, oropharynx moist, no pharyngeal exudates. Cardiovascular:  Normal rate, normal rhythm, no murmurs, no gallops, no rubs   GI:  Soft, nondistended, G-tube in place, nontender, no organomegaly, no mass, no rebound, no guarding   :  No costovertebral angle tenderness   Musculoskeletal:  No edema, no tenderness, no deformities. Back- no tenderness  Integument:  Well hydrated, no rash   Lymphatic:  No lymphadenopathy noted   Neurologic:  Alert & oriented x 3, no focal deficits noted   Psychiatric:  Speech and behavior appropriate  FORMAL DIAGNOSTIC RESULTS     RADIOLOGY: Interpretation per the Radiologist below, if available at the time of this note (none if blank):    CT ABDOMEN PELVIS W IV CONTRAST Additional Contrast? None   Final Result   1. Gastrostomy tube again noted. No high-grade bowel inflammatory changes. 2. No evidence of a high-grade bowel obstruction.       This document has been electronically signed by: Iliana Canas DO    on 03/16/2023 01:54 AM      All CTs at this facility use dose modulation techniques and iterative    reconstructions, and/or weight-based dosing   when appropriate to reduce radiation to a low as reasonably achievable. LABS: (none if blank)  Labs Reviewed   CBC WITH AUTO DIFFERENTIAL - Abnormal; Notable for the following components:       Result Value    WBC 12.8 (*)     Hemoglobin 11.7 (*)     MCV 75.6 (*)     MCH 23.0 (*)     MCHC 30.5 (*)     RDW-CV 15.7 (*)     Platelets 150 (*)     MPV 9.2 (*)     Segs Absolute 8.1 (*)     Immature Grans (Abs) 0.08 (*)     All other components within normal limits   BASIC METABOLIC PANEL - Abnormal; Notable for the following components:    Glucose 116 (*)     All other components within normal limits   HEPATIC FUNCTION PANEL - Abnormal; Notable for the following components: Total Bilirubin <0.2 (*)     Alkaline Phosphatase 142 (*)     All other components within normal limits   URINE WITH REFLEXED MICRO - Abnormal; Notable for the following components:    Specific Gravity, Urine > 1.030 (*)     Blood, Urine LARGE (*)     Protein, UA 30 (*)     Color, UA GABRIELA (*)     Character, Urine TURBID (*)     All other components within normal limits   LIPASE   LACTIC ACID   TROPONIN   ANION GAP   GLOMERULAR FILTRATION RATE, ESTIMATED   OSMOLALITY       (Any cultures that may have been sent were not resulted at the time of this patient visit)    [facility] / ED COURSE:     1) Number and Complexity of Problems            Problem List This Visit:         Chief Complaint   Patient presents with    Abdominal Pain            Differential Diagnosis includes (but not limited to):   Pancreatitis, cholecystitis, gastroenteritis, acute on chronic abdominal pain, irritable bowel syndrome        Diagnoses Considered but I have low suspicion of:   Appendicitis, AAA rupture, pneumoperitoneum             Pertinent Comorbid Conditions:    Chronic abdominal pain, G-tube in place    2)  Data Reviewed (none if left blank)          My Independent interpretations:       Labs:     Mild leukocytosis otherwise unremarkable                 Decision Rules/Clinical Scores utilized: None            External Documentation Reviewed:         Previous patient encounter documents & history available on EMR was reviewed              See Formal Diagnostic Results above for the lab and radiology tests and orders. 3)  Treatment and Disposition         ED Reassessment: Patient required multiple rounds of pain medication to improve her pain. Her laboratory eval as well as the imaging were overall unremarkable for acute pathology. Discussed admission for intractable abdominal pain however the patient prefers discharge with outpatient follow-up. I recommended Bentyl however the patient reports she is allergic to Bentyl and prefers supportive care otherwise. Strict return precautions discussed. Relative degree of diagnostic uncertainty considering the persistence of her pain despite a negative work-up was discussed. Case discussed with consulting clinician: None         Shared Decision-Making was performed and disposition discussed with the        Patient/Family and questions answered          Social determinants of health impacting treatment or disposition: None         Code Status: Did not discuss      Summary of Patient Presentation:      JULIENNE  /   Josefa Alonso Reviewed:    Vitals:    03/16/23 0406 03/16/23 0521 03/16/23 0625 03/16/23 0627   BP: (!) 150/97 (!) 141/94 124/79    Pulse: 83 73 81    Resp: 20 20 20 20   Temp:       TempSrc:       SpO2: 94% 100% 95%    Weight:       Height:           The patient was seen and examined. Appropriate diagnostic testing was performed and results reviewed with the patient. The results of pertinent diagnostic studies and exam findings were discussed.  The patients provisional diagnosis and plan of care were discussed with the patient and present family who expressed understanding. Any medications were reviewed and indications and risks of medications were discussed with the patient /family present. Strict verbal and written return precautions, instructions and appropriate follow-up provided to  the patient. ED Medications administered this visit:  (None if blank)  Medications   morphine injection 4 mg (4 mg IntraVENous Given 3/16/23 0009)   iopamidol (ISOVUE-370) 76 % injection 80 mL (80 mLs IntraVENous Given 3/16/23 0113)   morphine injection 4 mg (4 mg IntraVENous Given 3/16/23 0149)   HYDROmorphone (DILAUDID) injection 1 mg (1 mg IntraVENous Given 3/16/23 0325)   diphenhydrAMINE (BENADRYL) injection 25 mg (25 mg IntraVENous Given 3/16/23 0442)   morphine injection 4 mg (4 mg IntraVENous Given 3/16/23 0627)         PROCEDURES: (None if blank)  Procedures:     CRITICAL CARE: (None if blank)      DISCHARGE PRESCRIPTIONS: (None if blank)  Discharge Medication List as of 3/16/2023  6:31 AM          FINAL IMPRESSION      1.  Abdominal pain, unspecified abdominal location          DISPOSITION/PLAN   DISPOSITION Decision To Discharge 03/16/2023 06:32:03 AM      OUTPATIENT FOLLOW UP THE PATIENT:  Cheyenne Marcelo, APRN - CNP  [address]    In 3 days        MD Zurdo Laguna MD  03/16/23 6788

## 2023-03-16 NOTE — ED NOTES
Pt ambulated to restroom and back with no assistance. Lab was contacted and they are unable to locate urine at this time. Steady gait. Provider updated. Updated on plan of care. Call light in reach.      Suri Deras RN  03/16/23 5873 Dayday Winchester RN  03/16/23 5200

## 2023-03-16 NOTE — ED TRIAGE NOTES
Patient presents to ED via EMS with chief complaint of abdominal pain. Patient states abdominal pain began Sunday and has worsened. Pain rated 10/10. Patient has  peg tube with continuous tube feedings. Patient resting in bed. Respirations easy and unlabored. No distress noted. Call light within reach.

## 2023-03-16 NOTE — ED NOTES
Pt in bed sleeping with no s/s of distress noted. Voiced no needs. Call light in reach.      Adalberto Pierce, CATY  03/16/23 6429

## 2023-03-16 NOTE — ED NOTES
Pt in bed resting with eyes closed and lights dimmed. Updated on plan of care. Call light in reach.      Jen Moreno, CATY  03/16/23 9757

## 2023-03-17 ENCOUNTER — HOSPITAL ENCOUNTER (EMERGENCY)
Age: 33
Discharge: HOME | End: 2023-03-17
Attending: EMERGENCY MEDICINE
Payer: MEDICARE

## 2023-03-17 ENCOUNTER — APPOINTMENT (OUTPATIENT)
Dept: CT IMAGING | Age: 33
End: 2023-03-17
Payer: MEDICARE

## 2023-03-17 VITALS
OXYGEN SATURATION: 100 % | WEIGHT: 290 LBS | BODY MASS INDEX: 49.51 KG/M2 | RESPIRATION RATE: 18 BRPM | SYSTOLIC BLOOD PRESSURE: 157 MMHG | HEART RATE: 71 BPM | HEIGHT: 64 IN | DIASTOLIC BLOOD PRESSURE: 84 MMHG | TEMPERATURE: 98.8 F

## 2023-03-17 DIAGNOSIS — R10.84 GENERALIZED ABDOMINAL PAIN: Primary | ICD-10-CM

## 2023-03-17 LAB
ALBUMIN SERPL BCG-MCNC: 4.2 G/DL (ref 3.5–5.1)
ALP SERPL-CCNC: 122 U/L (ref 38–126)
ALT SERPL W/O P-5'-P-CCNC: 13 U/L (ref 11–66)
ANION GAP SERPL CALC-SCNC: 9 MEQ/L (ref 8–16)
AST SERPL-CCNC: 15 U/L (ref 5–40)
BASOPHILS ABSOLUTE: 0.1 THOU/MM3 (ref 0–0.1)
BASOPHILS NFR BLD AUTO: 0.6 %
BILIRUB SERPL-MCNC: 0.2 MG/DL (ref 0.3–1.2)
BILIRUB UR QL STRIP.AUTO: NEGATIVE
BUN SERPL-MCNC: 14 MG/DL (ref 7–22)
CALCIUM SERPL-MCNC: 9.5 MG/DL (ref 8.5–10.5)
CHARACTER UR: CLEAR
CHLORIDE SERPL-SCNC: 104 MEQ/L (ref 98–111)
CO2 SERPL-SCNC: 27 MEQ/L (ref 23–33)
COLOR: YELLOW
CREAT SERPL-MCNC: 0.7 MG/DL (ref 0.4–1.2)
DEPRECATED RDW RBC AUTO: 43 FL (ref 35–45)
EOSINOPHIL NFR BLD AUTO: 2 %
EOSINOPHILS ABSOLUTE: 0.2 THOU/MM3 (ref 0–0.4)
ERYTHROCYTE [DISTWIDTH] IN BLOOD BY AUTOMATED COUNT: 15.9 % (ref 11.5–14.5)
GFR SERPL CREATININE-BSD FRML MDRD: > 60 ML/MIN/1.73M2
GLUCOSE SERPL-MCNC: 93 MG/DL (ref 70–108)
GLUCOSE UR QL STRIP.AUTO: NEGATIVE MG/DL
HCT VFR BLD AUTO: 42.6 % (ref 37–47)
HGB BLD-MCNC: 12.5 GM/DL (ref 12–16)
HGB UR QL STRIP.AUTO: NEGATIVE
IMM GRANULOCYTES # BLD AUTO: 0.04 THOU/MM3 (ref 0–0.07)
IMM GRANULOCYTES NFR BLD AUTO: 0.4 %
KETONES UR QL STRIP.AUTO: NEGATIVE
LACTIC ACID, SEPSIS: 1.2 MMOL/L (ref 0.5–1.9)
LIPASE SERPL-CCNC: 18.5 U/L (ref 5.6–51.3)
LYMPHOCYTES ABSOLUTE: 2.9 THOU/MM3 (ref 1–4.8)
LYMPHOCYTES NFR BLD AUTO: 31.7 %
MAGNESIUM SERPL-MCNC: 2.1 MG/DL (ref 1.6–2.4)
MCH RBC QN AUTO: 22.4 PG (ref 26–33)
MCHC RBC AUTO-ENTMCNC: 29.3 GM/DL (ref 32.2–35.5)
MCV RBC AUTO: 76.2 FL (ref 81–99)
MONOCYTES ABSOLUTE: 0.6 THOU/MM3 (ref 0.4–1.3)
MONOCYTES NFR BLD AUTO: 6.4 %
NEUTROPHILS NFR BLD AUTO: 58.9 %
NITRITE UR QL STRIP: NEGATIVE
NRBC BLD AUTO-RTO: 0 /100 WBC
OSMOLALITY SERPL CALC.SUM OF ELEC: 279.6 MOSMOL/KG (ref 275–300)
PH UR STRIP.AUTO: >= 9 [PH] (ref 5–9)
PLATELET # BLD AUTO: 394 THOU/MM3 (ref 130–400)
PMV BLD AUTO: 9.4 FL (ref 9.4–12.4)
POTASSIUM SERPL-SCNC: 3.6 MEQ/L (ref 3.5–5.2)
PROT SERPL-MCNC: 8.1 G/DL (ref 6.1–8)
PROT UR STRIP.AUTO-MCNC: NEGATIVE MG/DL
RBC # BLD AUTO: 5.59 MILL/MM3 (ref 4.2–5.4)
SEGMENTED NEUTROPHILS ABSOLUTE COUNT: 5.4 THOU/MM3 (ref 1.8–7.7)
SODIUM SERPL-SCNC: 140 MEQ/L (ref 135–145)
SP GR UR REFRACT.AUTO: > 1.03 (ref 1–1.03)
UROBILINOGEN, URINE: 0.2 EU/DL (ref 0–1)
WBC # BLD AUTO: 9.1 THOU/MM3 (ref 4.8–10.8)
WBC #/AREA URNS HPF: NEGATIVE /[HPF]

## 2023-03-17 PROCEDURE — 6370000000 HC RX 637 (ALT 250 FOR IP): Performed by: EMERGENCY MEDICINE

## 2023-03-17 PROCEDURE — 85025 COMPLETE CBC W/AUTO DIFF WBC: CPT

## 2023-03-17 PROCEDURE — 2580000003 HC RX 258: Performed by: EMERGENCY MEDICINE

## 2023-03-17 PROCEDURE — 93005 ELECTROCARDIOGRAM TRACING: CPT | Performed by: EMERGENCY MEDICINE

## 2023-03-17 PROCEDURE — 80053 COMPREHEN METABOLIC PANEL: CPT

## 2023-03-17 PROCEDURE — 83605 ASSAY OF LACTIC ACID: CPT

## 2023-03-17 PROCEDURE — 83735 ASSAY OF MAGNESIUM: CPT

## 2023-03-17 PROCEDURE — 99285 EMERGENCY DEPT VISIT HI MDM: CPT

## 2023-03-17 PROCEDURE — 36415 COLL VENOUS BLD VENIPUNCTURE: CPT

## 2023-03-17 PROCEDURE — 81003 URINALYSIS AUTO W/O SCOPE: CPT

## 2023-03-17 PROCEDURE — 6360000002 HC RX W HCPCS: Performed by: EMERGENCY MEDICINE

## 2023-03-17 PROCEDURE — 96374 THER/PROPH/DIAG INJ IV PUSH: CPT

## 2023-03-17 PROCEDURE — 74177 CT ABD & PELVIS W/CONTRAST: CPT

## 2023-03-17 PROCEDURE — 83690 ASSAY OF LIPASE: CPT

## 2023-03-17 PROCEDURE — 6360000004 HC RX CONTRAST MEDICATION: Performed by: EMERGENCY MEDICINE

## 2023-03-17 PROCEDURE — C9113 INJ PANTOPRAZOLE SODIUM, VIA: HCPCS | Performed by: EMERGENCY MEDICINE

## 2023-03-17 RX ORDER — 0.9 % SODIUM CHLORIDE 0.9 %
1000 INTRAVENOUS SOLUTION INTRAVENOUS ONCE
Status: DISCONTINUED | OUTPATIENT
Start: 2023-03-17 | End: 2023-03-17

## 2023-03-17 RX ORDER — DROPERIDOL 2.5 MG/ML
1.25 INJECTION, SOLUTION INTRAMUSCULAR; INTRAVENOUS EVERY 6 HOURS PRN
Status: DISCONTINUED | OUTPATIENT
Start: 2023-03-17 | End: 2023-03-17 | Stop reason: HOSPADM

## 2023-03-17 RX ORDER — SUCRALFATE 1 G/1
1 TABLET ORAL ONCE
Status: COMPLETED | OUTPATIENT
Start: 2023-03-17 | End: 2023-03-17

## 2023-03-17 RX ORDER — PANTOPRAZOLE SODIUM 40 MG/10ML
40 INJECTION, POWDER, LYOPHILIZED, FOR SOLUTION INTRAVENOUS ONCE
Status: COMPLETED | OUTPATIENT
Start: 2023-03-17 | End: 2023-03-17

## 2023-03-17 RX ORDER — ACETAMINOPHEN 325 MG/1
650 TABLET ORAL ONCE
Status: COMPLETED | OUTPATIENT
Start: 2023-03-17 | End: 2023-03-17

## 2023-03-17 RX ORDER — 0.9 % SODIUM CHLORIDE 0.9 %
1000 INTRAVENOUS SOLUTION INTRAVENOUS ONCE
Status: COMPLETED | OUTPATIENT
Start: 2023-03-17 | End: 2023-03-17

## 2023-03-17 RX ORDER — DIPHENHYDRAMINE HYDROCHLORIDE 50 MG/ML
50 INJECTION INTRAMUSCULAR; INTRAVENOUS ONCE
Status: DISCONTINUED | OUTPATIENT
Start: 2023-03-17 | End: 2023-03-17 | Stop reason: HOSPADM

## 2023-03-17 RX ADMIN — ACETAMINOPHEN 650 MG: 325 TABLET ORAL at 11:25

## 2023-03-17 RX ADMIN — IOPAMIDOL 80 ML: 755 INJECTION, SOLUTION INTRAVENOUS at 10:53

## 2023-03-17 RX ADMIN — SODIUM CHLORIDE 1000 ML: 9 INJECTION, SOLUTION INTRAVENOUS at 11:24

## 2023-03-17 RX ADMIN — PANTOPRAZOLE SODIUM 40 MG: 40 INJECTION, POWDER, FOR SOLUTION INTRAVENOUS at 11:25

## 2023-03-17 RX ADMIN — SODIUM CHLORIDE 1000 ML: 9 INJECTION, SOLUTION INTRAVENOUS at 10:20

## 2023-03-17 RX ADMIN — SUCRALFATE 1 G: 1 TABLET ORAL at 11:25

## 2023-03-17 ASSESSMENT — PAIN - FUNCTIONAL ASSESSMENT: PAIN_FUNCTIONAL_ASSESSMENT: 0-10

## 2023-03-17 ASSESSMENT — PAIN SCALES - GENERAL: PAINLEVEL_OUTOF10: 10

## 2023-03-17 ASSESSMENT — PAIN DESCRIPTION - LOCATION: LOCATION: ABDOMEN

## 2023-03-17 NOTE — ED PROVIDER NOTES
[place] ENCOUNTER          Pt Name: Chelsea Hinojosa  MRN: [id number]  [place] 1990  Date of evaluation: 3/17/2023    CHIEF COMPLAINT       Chief Complaint   Patient presents with    Abdominal Pain       Nurses Notes reviewed and I agree except as noted in the HPI. HISTORY OF PRESENT ILLNESS    Chelsea Hinojosa is a 28 y.o. pleasant female who presents to the emergency department for evaluation of abdominal pain. She reports her pain is ongoing and generalized in nature, at times feels crampy at other times as discomfort. She describes her pain as being severe. Patient states that she was seen in the emergency department yesterday and that they would not admit her at that time. She states she called Dr. Zach Duval office and patient states \"Dr. Zach Duval office said that I should have stayed\". Patient denies fever, vomiting, black or bloody stools. Her last bowel movement was this morning. She also reports having a bowel movement last night after she went home from the emergency department. She states she is having ongoing diarrhea. She also reports ongoing nausea. She states she tried to take tramadol which she has leftover from her surgery for pain but it is not helping. Patient has been getting her continuous tube feeds as directed. Her most recent abdominal surgery was in January at [facility], she had a hiatal hernia and reports that her fundoplication band was cut, she was admitted for 21 days. The patient has no other acute complaints at this time.         REVIEW OF SYSTEMS   Review of Systems    PAST MEDICAL AND SURGICAL HISTORY     Past Medical History:   Diagnosis Date    Acute on chronic diastolic congestive heart failure ([address] 6/25/2022    JANI (acute kidney injury) ([address] 7/7/2019    Anemia     Anesthesia     migraines    Anxiety     Asthma     CAD (coronary artery disease)     Depression     Diabetes ([address]     Diet-controlled type 2 diabetes mellitus ([facility] 2016    Epilepsy ([facility]     Fibroids     Gastro - esophageal reflux disease     Hypertension     Hypertrophy of tonsil and adenoid 11/4/2017    Malignant carcinoid tumor of other sites ([facility] 5/14/2013    Migraine     PONV (postoperative nausea and vomiting)     Prolonged emergence from general anesthesia     Sickle cell anemia ([facility]     Sickle cell trait ([facility]     PT STATES SHE HAS THE TRAIT NOT THE DISEASE    Tumor associated pain     neuroendrocrine tumor, gastroma     Past Surgical History:   Procedure Laterality Date    ABDOMEN SURGERY  03/23/2017    Laparoscopic , Bi lat oopherectomy Dr Luanna Lundborg      x2    [address] VENOUS CATHETER Right 12/11/2015    right subclavian single lumen mediport insertion--Dr. Michel    CHOLECYSTECTOMY, LAPAROSCOPIC N/A 7/11/2019    CHOLECYSTECTOMY LAPAROSCOPIC performed by Diana Hagen MD at [address] 8/24/2020    COLONOSCOPY POLYPECTOMY SNARE/COLD BIOPSY performed by Kaden Elaine MD at [address] [place] [address]  10/21/2013    Dilation and curettage, Diagnostic Hysteroscopy and laparoscopy (Dr. Domingo Lopez, [facility])    EGD  2019    EGD COLONOSCOPY N/A 1/8/2019    EGD DIL performed by Beatris Hernandez MD at [facility]    EGD COLONOSCOPY Left 5/14/2019    EGD DILATATION performed by Beatris Hernandez MD at [facility]    EGD COLONOSCOPY Left 8/27/2019    EGD DILATATION performed by Beatris Hernandez MD at [facility]    ENDOSCOPY, COLON, DIAGNOSTIC      GASTRIC FUNDOPLICATION      [facility]    GASTRIC [address] N/A 1/25/2023    EGD PEG TUBE PLACEMENT performed by Kaden Elaine MD at [facility] N/A 2/22/2023    EGD PEG TUBE PLACEMENT performed by Kaden Elaine MD at [address]  2010, 2016    Richie Crow 1 1/17/2023    Robotic Exploratory Paparoscopy with Extensive Lysis of Adhesions G Tube Insertion performed by Femi Cheek MD at [facility] ([address])  04/2016    INSERTION / REMOVAL / REPLACEMENT VENOUS ACCESS CATHETER N/A 3/8/2019    REMOVAL OF LEFT PORT AND PLACEMENT OF SINGLE LUMEN MEDIPORT RIGHT SIDE performed by Rg Felder MD at [address] [facility] N/A 10/8/2019    ROBOTIC DIAGNOSTIC LAPAROSCOPY,  RECURRENT HIATAL HERNIA REPAIR, REVISION FUNDOPLICATION performed by Femi Cheek MD at [facility] 18 N/A 7/15/2022    ROBOTIC LAPAROSCOPY, LYSIS OF ADHESIONS performed by Rg Felder MD at [facility]  08/12/2016    Right Port Removal, Placement of new Port Left by Dr Juana Mendez  12/02/2019    Mediport insertion-IR Dr Jacinta Hernandez (CERVIX NOT REMOVED)  4/8/16    PORT SURGERY N/A 11/11/2019    REMOVAL OF RIGHT MEDIPORT & ATTEMPTED PLACEMENT OF LEFT SINGLE LUMEN MEDIPORT performed by Rg Felder MD at [facility] Right 11/29/2019    RT INTERNAL JUGULAR SINGLE LUMEN MEDIPORT INSERTION performed by Rg Felder MD at [facility]  [address] <30 MM DIAM Left 8/8/2017    EGD/DIL performed by Yrn Gu MD at [facility]    [place]  [address] <30 MM DIAM N/A 9/26/2017    EGD DIL performed by Yrn Gu MD at [facility]    [place]  [address] <30 MM DIAM Left 12/12/2017    EGD DIL performed by Yrn Gu MD at [facility]    [place]  [address] <30 MM DIAM N/A 2/13/2018    EGD  DILATATION performed by Yrn Gu MD at [facility] [address] [facility] VAD W/SUBQ PORT UNDER 5 YR Bilateral 1/5/2018    EXCISION OF MEDIPORT LEFT SIDE, INSERTION MEDIPORT RIGHT  IJ performed by Rg Felder MD at [facility] OFFICE/OUTPT [address] [place] [address] ONLY N/A 5/15/2018    EGD DILATATION performed by Steph Deutsch MD at [address] [facility] OFFICE/OUTPT VISIT,PROCEDURE ONLY Bilateral 9/10/2018    REMOVAL RT MEDIPORT, INSERTION LEFT performed by Lola Solano MD at [address] OFFICE/OUTPT [address] [facility] [address] N/A 11/16/2018    MEDIPORT REPOSITIONING performed by Lola Solano MD at [address] N/A 11/6/2017    TONSILLECTOMY AND ADENOIDECTOMY performed by Lisha Lewis MD at [address] [facility]      neoendocrine    TUNNELED VENOUS PORT PLACEMENT      UPPER GASTROINTESTINAL ENDOSCOPY Left 4/3/2018    EGD DILATION SAVORY performed by Steph Deutsch MD at [place] [address] Left 6/26/2018    EGD DILATION SAVORY performed by Steph Deutsch MD at [address] [facility] ENDOSCOPY Left 2/26/2019    EGD DILATION SAVORY performed by Steph Deutsch MD at [place] [address] N/A 7/9/2019    EGD DILATION SAVORY performed by Steph Deutsch MD at [place] [address] Left 9/9/2019    EGD BIOPSY performed by Smai Bonds MD at [place] [address] 4/1/2020    EGD DILATION BALLOON performed by Pham Pat MD at [address] [place] [address] Left 8/24/2020    EGD ESOPHAGOGASTRODUODENOSCOPY DILATATION performed by Pham Pat MD at [place] [address] Left 8/24/2020    EGD BIOPSY performed by Pham Pat MD at [place] [address] N/A 12/2/2020    EGD DILATION BALLOON performed by Pham Pat MD at [place] [address] Left 12/2/2020    EGD BIOPSY performed by Pham Pat MD at [place] [address] Left 1/19/2022    EGD performed by Lola Solano MD at [place] [address] Left 1/19/2022 EGD BIOPSY performed by Diana Hagen MD at [facility] [address] 7/15/2022    EGD WITH DILATION AND BIOPSY performed by Diana Hagen MD at [facility] [address]offey 1/24/2023    EGD ESOPHAGOGASTRODUODENOSCOPY performed by Kaden Elaine MD at [facility] N/A 8/22/2022    OPEN LEFT [address] performed by Diana Hagen MD at [address] [facility]   No current facility-administered medications for this encounter. Current Outpatient Medications:     traMADol (ULTRAM) 50 MG tablet, Take 1 tablet by mouth every 12 hours as needed for Pain for up to 7 days.  Max Daily Amount: 100 mg, Disp: 14 tablet, Rfl: 0    propranolol (INDERAL LA) 60 MG extended release capsule, Take 60 mg by mouth daily, Disp: , Rfl:     famotidine (PEPCID) 40 MG tablet, Take 1 tablet by mouth nightly, Disp: 30 tablet, Rfl: 3    Incontinence Supply Disposable (DEPEND SILHOUETTE BRIEFS L/XL) MISC, Use as needed for urinary and bowel incontinence, Disp: 5 each, Rfl: 5    docusate sodium (COLACE) 100 MG capsule, Take 1 capsule by mouth 2 times daily as needed for Constipation, Disp: 30 capsule, Rfl: 2    topiramate (TOPAMAX) 100 MG tablet, Take 100 mg by mouth 2 times daily, Disp: , Rfl:     metFORMIN HCl 500 MG/5ML SOLN, 1,000 mg daily (with breakfast) Take 10 mL via peg tube once daily, Disp: , Rfl:     linaclotide (LINZESS) 145 MCG capsule, Take 145 mcg by mouth every morning (before breakfast), Disp: , Rfl:     baclofen (LIORESAL) 10 MG tablet, Take 10 mg by mouth daily, Disp: , Rfl:     promethazine (PHENERGAN) 25 MG tablet, Take 25 mg by mouth every 4-6 hours as needed for Nausea, Disp: , Rfl:     prazosin (MINIPRESS) 1 MG capsule, Take 1 mg by mouth nightly, Disp: , Rfl:     ARIPiprazole (ABILIFY) 1 MG/ML SOLN solution, Take 10 mg by mouth nightly 10 mL via peg tube every evening, Disp: , Rfl:     busPIRone (BUSPAR) 5 MG tablet, Take 5 mg by mouth 3 times daily, Disp: , Rfl:     pantoprazole (PROTONIX) 40 MG tablet, Take 40 mg by mouth daily, Disp: , Rfl:     polyethylene glycol (GLYCOLAX) 17 GM/SCOOP powder, Dispense 238 Gram Bottle. Use as Directed, Disp: 238 g, Rfl: 0    acetaminophen (TYLENOL) 325 MG tablet, Take 2 tablets by mouth 4 times daily as needed for Pain or Fever, Disp: 60 tablet, Rfl: 0    fluticasone (FLOVENT HFA) 110 MCG/ACT inhaler, 2 puffs 2 times daily, Disp: , Rfl:     montelukast (SINGULAIR) 10 MG tablet, Take 10 mg by mouth nightly, Disp: , Rfl:     amLODIPine (NORVASC) 5 MG tablet, Take 1 tablet by mouth daily, Disp: 30 tablet, Rfl: 3    escitalopram (LEXAPRO) 5 MG/5ML solution, 20 mg daily Take 20 mL via peg tube once daily, Disp: , Rfl:     albuterol (PROVENTIL) (2.5 MG/3ML) 0.083% nebulizer solution, Take 3 mLs by nebulization every 6 hours as needed for Wheezing, Disp: 90 each, Rfl: 0    glucose monitoring kit (FREESTYLE) monitoring kit, 1 kit by Does not apply route daily Or whichever system insurance will pay for, Disp: 1 kit, Rfl: 0    Spacer/Aero-Holding Chambers (E-Z SPACER) AISLINN, 1 Device by Does not apply route daily, Disp: 1 Device, Rfl: 0    ALLERGIES     Allergies   Allergen Reactions    Latex     Ketorolac Tromethamine Anaphylaxis     Throat swelling    Pcn [Penicillins] Anaphylaxis    Amoxicillin Hives    Ciprofloxacin Hives and Swelling    Compazine [Prochlorperazine Maleate] Itching    Dicyclomine Hcl Hives    Fentanyl Other (See Comments)     Pt states that her \"lips start busting out. \"    Fioricet [Butalbital-Apap-Caffeine] Swelling     Of the mouth, tongue    Flexeril [Cyclobenzaprine] Hives    Gabapentin Swelling     tongue    Ibuprofen [Ibuprofen] Other (See Comments)     Mouth swelling and ulcers    Keflex [Cephalexin] Itching    Lisinopril     Lorazepam Hives    Losartan      Elevated pulse    Macrobid [Nitrofurantoin Monohyd Macro] Itching    Mushroom Extract Complex Swelling    Reglan [Metoclopramide] Itching    Vicodin [Hydrocodone-Acetaminophen] Itching    Vistaril [Hydroxyzine Hcl] Itching    Zofran [Ondansetron] Hives    Adhesive Tape Rash       SOCIAL HISTORY     Social History     Social History Narrative    Not on file     Social History     Tobacco Use    Smoking status: Every Day     Packs/day: 0.25     Years: 6.00     Pack years: 1.50     Types: Cigarettes     Start date: 3/1/2016    Smokeless tobacco: Never    Tobacco comments:     smokes 3 cig perday    Vaping Use    Vaping Use: Never used   Substance Use Topics    Alcohol use: Not Currently    Drug use: No       FAMILY HISTORY     Family History   Problem Relation Age of Onset    Cancer Mother     High Blood Pressure Mother     Heart Disease Mother         MI    Liver Cancer Mother     High Blood Pressure Father     Heart Disease Father     Heart Disease Sister     Diabetes Maternal Grandmother     Depression Maternal Grandmother     Heart Disease Maternal Grandmother         CHF    Heart Attack Paternal Uncle     Stroke Neg Hx     Colon Cancer Neg Hx     Esophageal Cancer Neg Hx     Rectal Cancer Neg Hx     Stomach Cancer Neg Hx        PHYSICAL EXAM     ED Triage Vitals [03/17/23 0902]   BP Temp Temp Source Heart Rate Resp SpO2 Height Weight   136/82 98.8 °F (37.1 °C) Oral 77 18 98 % 5' 4\" (1.626 m) 290 lb (131.5 kg)       Additional Vital Signs:  Vitals:    03/17/23 1123   BP: (!) 157/84   Pulse: 71   Resp: 18   Temp:    SpO2: 100%       CONSTITUTIONAL: [Awake, alert, non toxic, well developed, well nourished, no acute distress]  HEAD: [Normocephalic, atraumatic]  EYES: [Pupils equal, round & reactive to light, extraocular movements intact, no nystagmus, clear conjunctiva, non-icteric sclera]  ENT: [External ear canal clear without evidence of cerumen impaction or foreign body, TM's clear without erythema or bulging. Nares patent without drainage, septum appears midline.  Moist mucus membranes, oropharynx clear without exudate, erythema, or mass. Uvula midline]  NECK: [Nontender and supple. No meningismus, no appreciated lymphadenopathy. Intact full range of motion. C-spine midline without vertebral tenderness. Trachea midline.]  CHEST: [Inspection normal, no lesions, equal rise. No crepitus or tenderness upon palpation.]  CARDIOVASCULAR: [Regular rate, rhythm, normal S1 and S2. No appreciated murmurs, rubs, or gallops. No pulse deficits appreciated. Intact distal perfusion. JVD not appreciated.]  PULMONARY: [Respiratory distress absent. Respiratory effort normal. Breath sounds clear to auscultation without rhonchi, rales, or wheezing. No accessory muscle use. No stridor]  ABDOMEN: [Inspection normal, with well healed surgical scars, PEG tube in place with tube feeds infusing, site is clean/dry/intact. Soft, mild diffuse tenderness, non-distended, with normoactive bowel sounds. No palpable masses, rebound, or guarding]  MUSCULOSKELETAL: [Extremities nontender to palpation. No gross deformity or evidence of external trauma. Intact range of motion. Sensation intact. No clubbing, cyanosis, or edema.]  SKIN: [Warm, dry. No jaundice, rash, urticaria, or petechiae]  NEUROLOGIC: [Alert and oriented x 3, GCS 15, normal mentation for age. Moves all four extremities. No gross sensory deficit. Cerebellar function grossly normal.]  PSYCHIATRIC: [Normal mood and affect, thought process is clear and linear]         MEDICAL DECISION MAKING   Initial Assessment:   51-year-old female who presents for generalized abdominal pain, acute on chronic, reports nausea, on exam reports diffuse tenderness. .  Differential diagnosis includes but is not limited to chronic abdominal pain, dehydration,? Small bowel obstruction,? Pancreatitis,?   Hepatitis    My imaging interpretation: (none if blank)    Decision Rules/Clinical Scores utilized in MDM:      CDC Social determinants of health considered to potentially effect treatment and/or disposition plan:    Healthy People 2030, U.S. Department of Health and Human Services, Office of Disease Prevention and Health Promotion. Medical Commodities impacting treatment or disposition:  See below  Past Medical History:   Diagnosis Date    Acute on chronic diastolic congestive heart failure ([facility] 6/25/2022    JANI (acute kidney injury) ([facility] 7/7/2019    Anemia     Anesthesia     migraines    Anxiety     Asthma     CAD (coronary artery disease)     Depression     Diabetes ([address]     Diet-controlled type 2 diabetes mellitus ([address] 2016    Epilepsy ([address]     Fibroids     Gastro - esophageal reflux disease     Hypertension     Hypertrophy of tonsil and adenoid 11/4/2017    Malignant carcinoid tumor of other sites ([facility] 5/14/2013    Migraine     PONV (postoperative nausea and vomiting)     Prolonged emergence from general anesthesia     Sickle cell anemia (HCC)     Sickle cell trait ([facility]     PT STATES SHE HAS THE TRAIT NOT THE DISEASE    Tumor associated pain     neuroendrocrine tumor, gastroma       Plan:   CBC, CMP, lipase, urinalysis, EKG, CT of the abdomen and pelvis  IV fluids, IV antiemetic, due to patient's reported more than 20 allergies we will give Benadryl IV to avoid itching, also Protonix and Carafate  Plan to review records or speak with patient's GI specialist to confirm her statements above regarding need for admission      PREVIOUS RECORDS   Old records reviewed, patient's telephone conversation with the GI office this morning reviewed. Recommendation from GI as below. Patient had CT scan that found no abnormalities yesterday. Patient should use a heating pad and Tylenol for chronic abdominal pain. Make sure patient is having daily bowel movement as she was given narcotics in ED and can worsen abdominal pain by causing constipation. Patient HGB is stable at 11.8 as she has chronic anemia. She should present to ER for persistent 10/10 abdominal pain. Thanks. Previous visit summary and patient history available on EMR and was reviewed. History obtained from chart review and the patient. Pertinent previous records reviewed:  previous notes, admissions and hospitalizations . Code Status: Available at time of initial evaluation and reviewed. DIAGNOSTIC RESULTS     EKG: All EKG's are interpreted by the Emergency Department Physician who either signs or Co-signsthis chart in the absence of a cardiologist.  EKG shows normal sinus rhythm at a rate of 67 bpm, ND interval 146 ms, QRS duration 82 ms, QTc 412 ms, no ST elevation or depression, my interpretation. RADIOLOGY: non-plain film images(s) such as CT,Ultrasound and MRI are read by the radiologist.    CT ABDOMEN PELVIS W IV CONTRAST Additional Contrast? None   Final Result       1. No evidence of acute intra-abdominal or intrapelvic abnormality. 2. Moderate retained stool. **This report has been created using voice recognition software. It may contain minor errors which are inherent in voice recognition technology. **      Final report electronically signed by Dr. Eli Salinas MD on 3/17/2023 11:21 AM        [] Visualized and interpreted by me   [x] Radiologist's Wet Read Report Reviewed   [] Discussed withRadiologist.    LABS:   Labs Reviewed   CBC WITH AUTO DIFFERENTIAL - Abnormal; Notable for the following components:       Result Value    RBC 5.59 (*)     MCV 76.2 (*)     MCH 22.4 (*)     MCHC 29.3 (*)     RDW-CV 15.9 (*)     All other components within normal limits   COMPREHENSIVE METABOLIC PANEL - Abnormal; Notable for the following components:     Total Protein 8.1 (*)     Total Bilirubin 0.2 (*)     All other components within normal limits   URINALYSIS WITH REFLEX TO CULTURE - Abnormal; Notable for the following components:    Specific Gravity, Urine > 1.030 (*)     All other components within normal limits   LIPASE   MAGNESIUM   ANION GAP   GLOMERULAR FILTRATION RATE, ESTIMATED   OSMOLALITY   LACTATE, SEPSIS       (Any cultures that may have been sent were not resulted at the time of this patient visit)    ED Medications administered this visit:   Medications   0.9 % sodium chloride bolus (0 mLs IntraVENous Stopped 3/17/23 1233)   pantoprazole (PROTONIX) injection 40 mg (40 mg IntraVENous Given 3/17/23 1125)   sucralfate (CARAFATE) tablet 1 g (1 g Oral Given 3/17/23 1125)   acetaminophen (TYLENOL) tablet 650 mg (650 mg Oral Given 3/17/23 1125)   iopamidol (ISOVUE-370) 76 % injection 80 mL (80 mLs IntraVENous Given 3/17/23 1053)         ED COURSE    Patient advised of documented recommendation for GI NP above. Labs and CT results are reassuring. Given concern that narcotics could lead to constipation and worsening of her pain, no narcotics given in the ED. Patient states she would like to go home at this time. Will discharge per her request.     Available laboratory and imaging results were independently reviewed and clinically correlated. CRITICAL CARE:   None    CONSULTS:  None    PROCEDURES:  None    Shared decision making was performed with the patient and/or present family. Goals of care were discussed with patient and/or present family. The results of pertinent diagnostic studies and exam findings were discussed. The patients provisional diagnosis and plan of care were discussed with the patient and present family. The patient and/or present family expressed understanding of the diagnosis and plan. The nurse was instructed to provide written instructions and appropriate follow-up information. The patient understands their need and responsibility to obtain additional follow-up as instructed. The patient is comfortable with the plan and discharge. The risks of medications administered and prescribed were discussed with the patient and family present.       MEDICATION CHANGES     Discharge Medication List as of 3/17/2023 11:58 AM          CLINICAL IMPRESSION 1. Generalized abdominal pain          DISPOSITION/PLAN     Final diagnoses:   Generalized abdominal pain       Dispo: Discharge to home    PATIENT REFERRED TO:  Aracelis Mckoy, AURELIO - CNP  [facility]    Schedule an appointment as soon as possible for a visit       Lupe Hahn MD  [address] [place] [address]. [address]  [phone]    Schedule an appointment as soon as possible for a visit       DISCHARGE MEDICATIONS:  Discharge Medication List as of 3/17/2023 11:58 AM          (Please note that portions of this note were completed with a voice recognition program.  Efforts were made to edit the dictations but occasionally words are mis-transcribed.)    Provider:  I personally performed the services described in the documentation, reviewed and edited the documentation which was dictated, and it accurately records my words and actions.     Christine Naylor MD 3/17/23 4:19 PM         Christine Naylor MD  03/17/23 6423

## 2023-03-17 NOTE — DISCHARGE INSTRUCTIONS
Be sure to continue your home medications as directed. If you develop fever, vomiting, or for any other concerns, return to the emergency department.

## 2023-03-17 NOTE — ED NOTES
Pt to ER due to abdominal pain. She states she was here yesterday for the same thing.  Rating pain 10/10     Rocio Herman RN  03/17/23 3445

## 2023-03-18 LAB
EKG ATRIAL RATE: 67 BPM
EKG P AXIS: 42 DEGREES
EKG P-R INTERVAL: 146 MS
EKG Q-T INTERVAL: 390 MS
EKG QRS DURATION: 82 MS
EKG QTC CALCULATION (BAZETT): 412 MS
EKG R AXIS: 19 DEGREES
EKG T AXIS: 22 DEGREES
EKG VENTRICULAR RATE: 67 BPM

## 2023-03-18 PROCEDURE — 93010 ELECTROCARDIOGRAM REPORT: CPT | Performed by: INTERNAL MEDICINE

## 2023-03-20 DIAGNOSIS — Z09 S/P ABDOMINAL SURGERY, FOLLOW-UP EXAM: ICD-10-CM

## 2023-03-20 RX ORDER — TRAMADOL HYDROCHLORIDE 50 MG/1
50 TABLET ORAL EVERY 12 HOURS PRN
Qty: 14 TABLET | Refills: 0 | Status: SHIPPED | OUTPATIENT
Start: 2023-03-20 | End: 2023-03-25 | Stop reason: SDUPTHER

## 2023-03-21 ENCOUNTER — TELEPHONE (OUTPATIENT)
Dept: INTERNAL MEDICINE CLINIC | Age: 33
End: 2023-03-21

## 2023-03-21 NOTE — TELEPHONE ENCOUNTER
Pt calling outpatient dietitian to find out if she is allowed to eat and stop her TF for awhile. Pt states she drinks water ok without choking or coughing and she also provides herself with water flushes through her tube feeding. After reviewing her chart from Feb. SLP rec soft/bite sized diet with thin liquids on 2/18. Explained to trial these foods for pleasure - 1-2 bites at a time to prevent Nausea and vomiting. Pt has an upcoming appt with this outpatient RD next week 3/29/23 and encouraged pt to keep a food journal of what she is eating on a trial basis. Pt agreed.

## 2023-03-25 ENCOUNTER — HOSPITAL ENCOUNTER (EMERGENCY)
Age: 33
Discharge: HOME | End: 2023-03-25
Attending: EMERGENCY MEDICINE
Payer: MEDICARE

## 2023-03-25 ENCOUNTER — APPOINTMENT (OUTPATIENT)
Dept: CT IMAGING | Age: 33
End: 2023-03-25
Payer: MEDICARE

## 2023-03-25 VITALS
SYSTOLIC BLOOD PRESSURE: 127 MMHG | HEIGHT: 64 IN | RESPIRATION RATE: 20 BRPM | BODY MASS INDEX: 41.83 KG/M2 | OXYGEN SATURATION: 99 % | WEIGHT: 245 LBS | DIASTOLIC BLOOD PRESSURE: 91 MMHG | HEART RATE: 70 BPM | TEMPERATURE: 97.6 F

## 2023-03-25 DIAGNOSIS — R10.10 PAIN OF UPPER ABDOMEN: Primary | ICD-10-CM

## 2023-03-25 LAB
ALBUMIN SERPL BCG-MCNC: 4 G/DL (ref 3.5–5.1)
ALP SERPL-CCNC: 128 U/L (ref 38–126)
ALT SERPL W/O P-5'-P-CCNC: 11 U/L (ref 11–66)
ANION GAP SERPL CALC-SCNC: 9 MEQ/L (ref 8–16)
AST SERPL-CCNC: 12 U/L (ref 5–40)
B-HCG SERPL QL: NEGATIVE
BASOPHILS ABSOLUTE: 0 THOU/MM3 (ref 0–0.1)
BASOPHILS NFR BLD AUTO: 0.4 %
BILIRUB CONJ SERPL-MCNC: < 0.2 MG/DL (ref 0–0.3)
BILIRUB SERPL-MCNC: 0.2 MG/DL (ref 0.3–1.2)
BUN SERPL-MCNC: 18 MG/DL (ref 7–22)
CALCIUM SERPL-MCNC: 9.6 MG/DL (ref 8.5–10.5)
CHLORIDE SERPL-SCNC: 105 MEQ/L (ref 98–111)
CO2 SERPL-SCNC: 25 MEQ/L (ref 23–33)
CREAT SERPL-MCNC: 0.7 MG/DL (ref 0.4–1.2)
DEPRECATED RDW RBC AUTO: 43.1 FL (ref 35–45)
EOSINOPHIL NFR BLD AUTO: 1.7 %
EOSINOPHILS ABSOLUTE: 0.2 THOU/MM3 (ref 0–0.4)
ERYTHROCYTE [DISTWIDTH] IN BLOOD BY AUTOMATED COUNT: 16 % (ref 11.5–14.5)
GFR SERPL CREATININE-BSD FRML MDRD: > 60 ML/MIN/1.73M2
GLUCOSE SERPL-MCNC: 96 MG/DL (ref 70–108)
HCT VFR BLD AUTO: 38.4 % (ref 37–47)
HGB BLD-MCNC: 11.7 GM/DL (ref 12–16)
IMM GRANULOCYTES # BLD AUTO: 0.05 THOU/MM3 (ref 0–0.07)
IMM GRANULOCYTES NFR BLD AUTO: 0.5 %
LIPASE SERPL-CCNC: 22.8 U/L (ref 5.6–51.3)
LYMPHOCYTES ABSOLUTE: 2.8 THOU/MM3 (ref 1–4.8)
LYMPHOCYTES NFR BLD AUTO: 27.6 %
MCH RBC QN AUTO: 22.9 PG (ref 26–33)
MCHC RBC AUTO-ENTMCNC: 30.5 GM/DL (ref 32.2–35.5)
MCV RBC AUTO: 75.1 FL (ref 81–99)
MONOCYTES ABSOLUTE: 0.6 THOU/MM3 (ref 0.4–1.3)
MONOCYTES NFR BLD AUTO: 5.8 %
NEUTROPHILS NFR BLD AUTO: 64 %
NRBC BLD AUTO-RTO: 0 /100 WBC
OSMOLALITY SERPL CALC.SUM OF ELEC: 279.3 MOSMOL/KG (ref 275–300)
PLATELET # BLD AUTO: 296 THOU/MM3 (ref 130–400)
PMV BLD AUTO: 9.1 FL (ref 9.4–12.4)
POTASSIUM SERPL-SCNC: 4.2 MEQ/L (ref 3.5–5.2)
PROT SERPL-MCNC: 7.9 G/DL (ref 6.1–8)
RBC # BLD AUTO: 5.11 MILL/MM3 (ref 4.2–5.4)
SEGMENTED NEUTROPHILS ABSOLUTE COUNT: 6.5 THOU/MM3 (ref 1.8–7.7)
SODIUM SERPL-SCNC: 139 MEQ/L (ref 135–145)
WBC # BLD AUTO: 10.2 THOU/MM3 (ref 4.8–10.8)

## 2023-03-25 PROCEDURE — 36415 COLL VENOUS BLD VENIPUNCTURE: CPT

## 2023-03-25 PROCEDURE — 2580000003 HC RX 258: Performed by: EMERGENCY MEDICINE

## 2023-03-25 PROCEDURE — 6370000000 HC RX 637 (ALT 250 FOR IP): Performed by: EMERGENCY MEDICINE

## 2023-03-25 PROCEDURE — 2500000003 HC RX 250 WO HCPCS: Performed by: EMERGENCY MEDICINE

## 2023-03-25 PROCEDURE — 74177 CT ABD & PELVIS W/CONTRAST: CPT

## 2023-03-25 PROCEDURE — 82248 BILIRUBIN DIRECT: CPT

## 2023-03-25 PROCEDURE — 84703 CHORIONIC GONADOTROPIN ASSAY: CPT

## 2023-03-25 PROCEDURE — 83690 ASSAY OF LIPASE: CPT

## 2023-03-25 PROCEDURE — 99285 EMERGENCY DEPT VISIT HI MDM: CPT

## 2023-03-25 PROCEDURE — 96374 THER/PROPH/DIAG INJ IV PUSH: CPT

## 2023-03-25 PROCEDURE — 80053 COMPREHEN METABOLIC PANEL: CPT

## 2023-03-25 PROCEDURE — 85025 COMPLETE CBC W/AUTO DIFF WBC: CPT

## 2023-03-25 PROCEDURE — 6360000004 HC RX CONTRAST MEDICATION: Performed by: EMERGENCY MEDICINE

## 2023-03-25 PROCEDURE — A4216 STERILE WATER/SALINE, 10 ML: HCPCS | Performed by: EMERGENCY MEDICINE

## 2023-03-25 RX ORDER — DROPERIDOL 2.5 MG/ML
1.25 INJECTION, SOLUTION INTRAMUSCULAR; INTRAVENOUS EVERY 6 HOURS PRN
Status: DISCONTINUED | OUTPATIENT
Start: 2023-03-25 | End: 2023-03-25 | Stop reason: HOSPADM

## 2023-03-25 RX ORDER — MORPHINE SULFATE 10 MG/ML
6 INJECTION, SOLUTION INTRAMUSCULAR; INTRAVENOUS ONCE
Status: DISCONTINUED | OUTPATIENT
Start: 2023-03-25 | End: 2023-03-25

## 2023-03-25 RX ORDER — TRAMADOL HYDROCHLORIDE 50 MG/1
50 TABLET ORAL EVERY 6 HOURS PRN
Qty: 6 TABLET | Refills: 0 | Status: SHIPPED | OUTPATIENT
Start: 2023-03-25 | End: 2023-03-27

## 2023-03-25 RX ORDER — SUCRALFATE 1 G/1
1 TABLET ORAL ONCE
Status: DISCONTINUED | OUTPATIENT
Start: 2023-03-25 | End: 2023-03-25 | Stop reason: HOSPADM

## 2023-03-25 RX ORDER — 0.9 % SODIUM CHLORIDE 0.9 %
500 INTRAVENOUS SOLUTION INTRAVENOUS ONCE
Status: COMPLETED | OUTPATIENT
Start: 2023-03-25 | End: 2023-03-25

## 2023-03-25 RX ORDER — ACETAMINOPHEN 500 MG
1000 TABLET ORAL ONCE
Status: DISCONTINUED | OUTPATIENT
Start: 2023-03-25 | End: 2023-03-25 | Stop reason: HOSPADM

## 2023-03-25 RX ORDER — TRAMADOL HYDROCHLORIDE 50 MG/1
50 TABLET ORAL ONCE
Status: COMPLETED | OUTPATIENT
Start: 2023-03-25 | End: 2023-03-25

## 2023-03-25 RX ADMIN — IOPAMIDOL 80 ML: 755 INJECTION, SOLUTION INTRAVENOUS at 09:41

## 2023-03-25 RX ADMIN — FAMOTIDINE 20 MG: 10 INJECTION, SOLUTION INTRAVENOUS at 08:13

## 2023-03-25 RX ADMIN — SODIUM CHLORIDE 500 ML: 9 INJECTION, SOLUTION INTRAVENOUS at 08:12

## 2023-03-25 RX ADMIN — TRAMADOL HYDROCHLORIDE 50 MG: 50 TABLET, COATED ORAL at 11:03

## 2023-03-25 NOTE — DISCHARGE INSTRUCTIONS
Return to the Emergency Department immediately if you develop worsenign pain, fever, vomiting, dark or bloody stools , or you have any other concerns. Please follow up with your primary care and GI doctor in 2-3 days.

## 2023-03-25 NOTE — ED NOTES
Patient to ED via EMS for acute/chronic abdominal pain. Patient currently getting nutrition through a peg tube.  Patient reports abd pain worsened yesterday with episodes of emesis and diarrhea      Anastasiia Stover RN  03/25/23 5978

## 2023-03-25 NOTE — ED NOTES
Reassessment of the patients Abdominal Pain   is unchanged, the patients pain reassessment is a 8/10, Side rails up times 2, call light in reach, will continue to monitor.      Sal Villanueva RN  03/25/23 1014

## 2023-03-25 NOTE — ED PROVIDER NOTES
Intractable abdominal pain R10.9    Nausea and vomiting R11.2    Carcinoid tumor D3A.00    Pelvic inflammatory disease N73.9    Intractable nausea and vomiting R11.2    Anemia D64.9    Diet-controlled type 2 diabetes mellitus (HCC) E11.9    Esophageal dysphagia R13.19    Esophageal stricture K22.2    Morbid obesity (HCC) E66.01    Migraine equivalent G43.109    Mild persistent asthma without complication L72.63    Dyslipidemia E78.5    Moderate persistent asthma with acute exacerbation J45.41    Gastroenteritis K52.9    Hematuria R31.9    Diarrhea R19.7    Diarrhea of presumed infectious origin R19.7    Hematochezia K92.1    Generalized abdominal pain R10.84    Hypertrophy of tonsil and adenoid J35.3    Multiple drug allergies Z88.9    S/P T&A (status post tonsillectomy and adenoidectomy) Z90.89    Iron deficiency anemia D50.9    Poor intravenous access Z78.9    Abnormal Pap smear of cervix R87.619    Abnormal uterine bleeding N93.9    Exacerbation of asthma J45.901    Chest wall pain R07.89    Female pelvic pain R10.2    Head ache R51.9    Heartburn R12    Incarcerated ventral hernia K43.6    Malignant carcinoid tumor of other sites [facility]) C7A.098    Pyelonephritis N12    Spigelian hernia K43.9    Uterine leiomyoma D25.9    Acute kidney injury (HCC) N17.9    Hypernatremia E87.0    Hypokalemia E87.6    Volume depletion, gastrointestinal loss E86.9    Essential hypertension I10    Hiatal hernia K44.9    S/P Nissen fundoplication (without gastrostomy tube) procedure Z98.890    Sickle cell anemia (HCC) D57.1    Sickle cell disease (HCC) D57.1    Callus of heel L84    Foot pain, bilateral M79.671, M79.672    Atypical chest pain R07.89    Tobacco use Z72.0    History of gastritis Z87.19    Weight gain R63.5    Chronic idiopathic constipation K59.04    Acute respiratory failure (HCC) J96.00    Stroke-like symptom R29.90    Acute on chronic diastolic congestive heart failure (HCC) I50.33    Dysphagia R13.10    S/P performed by Hunter Figueroa MD at [address] [facility] ([address])  04/2016    INSERTION / REMOVAL / REPLACEMENT VENOUS ACCESS CATHETER N/A 3/8/2019    REMOVAL OF LEFT PORT AND PLACEMENT OF SINGLE LUMEN MEDIPORT RIGHT SIDE performed by Heather Lindsay MD at [address] [facility] N/A 10/8/2019    ROBOTIC DIAGNOSTIC LAPAROSCOPY,  RECURRENT HIATAL HERNIA REPAIR, REVISION FUNDOPLICATION performed by Hunter Figueroa MD at [address] N/A 7/15/2022    ROBOTIC LAPAROSCOPY, LYSIS OF ADHESIONS performed by Heather Lindsay MD at [address]  08/12/2016    Right Port Removal, Placement of new Port Left by Dr Tere Steele  12/02/2019    Mediport insertion-IR Dr Claudette Ling (CERVIX NOT REMOVED)  4/8/16    PORT SURGERY N/A 11/11/2019    REMOVAL OF RIGHT MEDIPORT & ATTEMPTED PLACEMENT OF LEFT SINGLE LUMEN MEDIPORT performed by Heather Lindsay MD at [address] [facility] Right 11/29/2019    RT INTERNAL JUGULAR SINGLE LUMEN MEDIPORT INSERTION performed by Heather Lindsay MD at [address] [facility]  [address] <30 MM DIAM Left 8/8/2017    EGD/DIL performed by Britt Zhou MD at [facility]    [place]  [address] <30 MM DIAM N/A 9/26/2017    EGD DIL performed by Britt Zhou MD at [facility]    [place]  [address] <30 MM DIAM Left 12/12/2017    EGD DIL performed by Britt Zhou MD at [facility]    [address] <30 MM DIAM N/A 2/13/2018    EGD  DILATATION performed by Britt Zhou MD at [address] [facility] [address] [facility] CTR VAD W/SUBQ PORT UNDER 5 YR Bilateral 1/5/2018    EXCISION OF MEDIPORT LEFT SIDE, INSERTION MEDIPORT RIGHT  IJ performed by Heather Lindsay MD at [address] [facility] OFFICE/OUTPT [address] [facility] N/A 5/15/2018    EGD DILATATION performed by Britt Zhou MD at [address] [facility]

## 2023-03-27 ENCOUNTER — OFFICE VISIT (OUTPATIENT)
Dept: INTERNAL MEDICINE CLINIC | Age: 33
End: 2023-03-27

## 2023-03-27 VITALS
SYSTOLIC BLOOD PRESSURE: 158 MMHG | HEART RATE: 86 BPM | BODY MASS INDEX: 50.02 KG/M2 | WEIGHT: 293 LBS | HEIGHT: 64 IN | DIASTOLIC BLOOD PRESSURE: 110 MMHG

## 2023-03-27 DIAGNOSIS — D50.9 IRON DEFICIENCY ANEMIA, UNSPECIFIED IRON DEFICIENCY ANEMIA TYPE: ICD-10-CM

## 2023-03-27 DIAGNOSIS — G40.909 NONINTRACTABLE EPILEPSY WITHOUT STATUS EPILEPTICUS, UNSPECIFIED EPILEPSY TYPE (HCC): ICD-10-CM

## 2023-03-27 DIAGNOSIS — F41.1 GAD (GENERALIZED ANXIETY DISORDER): ICD-10-CM

## 2023-03-27 DIAGNOSIS — E11.69 DIABETES MELLITUS TYPE 2 IN OBESE (HCC): ICD-10-CM

## 2023-03-27 DIAGNOSIS — I10 ESSENTIAL HYPERTENSION: ICD-10-CM

## 2023-03-27 DIAGNOSIS — D57.3 SICKLE CELL TRAIT (HCC): ICD-10-CM

## 2023-03-27 DIAGNOSIS — D56.3 ALPHA THALASSEMIA MINOR: ICD-10-CM

## 2023-03-27 DIAGNOSIS — F51.01 PRIMARY INSOMNIA: ICD-10-CM

## 2023-03-27 DIAGNOSIS — R10.13 ABDOMINAL PAIN, EPIGASTRIC: Primary | ICD-10-CM

## 2023-03-27 DIAGNOSIS — R11.2 NAUSEA AND VOMITING, UNSPECIFIED VOMITING TYPE: ICD-10-CM

## 2023-03-27 DIAGNOSIS — Z86.018 HISTORY OF UTERINE FIBROID: ICD-10-CM

## 2023-03-27 DIAGNOSIS — R19.7 DIARRHEA OF PRESUMED INFECTIOUS ORIGIN: ICD-10-CM

## 2023-03-27 DIAGNOSIS — J45.20 MILD INTERMITTENT ASTHMA WITHOUT COMPLICATION: ICD-10-CM

## 2023-03-27 DIAGNOSIS — E66.9 DIABETES MELLITUS TYPE 2 IN OBESE (HCC): ICD-10-CM

## 2023-03-27 SDOH — ECONOMIC STABILITY: HOUSING INSECURITY
IN THE LAST 12 MONTHS, WAS THERE A TIME WHEN YOU DID NOT HAVE A STEADY PLACE TO SLEEP OR SLEPT IN A SHELTER (INCLUDING NOW)?: NO

## 2023-03-27 SDOH — ECONOMIC STABILITY: FOOD INSECURITY: WITHIN THE PAST 12 MONTHS, YOU WORRIED THAT YOUR FOOD WOULD RUN OUT BEFORE YOU GOT MONEY TO BUY MORE.: NEVER TRUE

## 2023-03-27 SDOH — ECONOMIC STABILITY: FOOD INSECURITY: WITHIN THE PAST 12 MONTHS, THE FOOD YOU BOUGHT JUST DIDN'T LAST AND YOU DIDN'T HAVE MONEY TO GET MORE.: NEVER TRUE

## 2023-03-27 SDOH — ECONOMIC STABILITY: INCOME INSECURITY: HOW HARD IS IT FOR YOU TO PAY FOR THE VERY BASICS LIKE FOOD, HOUSING, MEDICAL CARE, AND HEATING?: NOT HARD AT ALL

## 2023-03-27 ASSESSMENT — ENCOUNTER SYMPTOMS
NAUSEA: 1
VOMITING: 1
COUGH: 0
TROUBLE SWALLOWING: 0
EYE PAIN: 0
ABDOMINAL PAIN: 1
DIARRHEA: 1
SHORTNESS OF BREATH: 0
ABDOMINAL DISTENTION: 1
WHEEZING: 0
BLOOD IN STOOL: 0
RECTAL PAIN: 0
CONSTIPATION: 0
ANAL BLEEDING: 0
BACK PAIN: 0

## 2023-03-27 ASSESSMENT — PATIENT HEALTH QUESTIONNAIRE - PHQ9
4. FEELING TIRED OR HAVING LITTLE ENERGY: 3
SUM OF ALL RESPONSES TO PHQ QUESTIONS 1-9: 16
5. POOR APPETITE OR OVEREATING: 3
3. TROUBLE FALLING OR STAYING ASLEEP: 3
9. THOUGHTS THAT YOU WOULD BE BETTER OFF DEAD, OR OF HURTING YOURSELF: 0
8. MOVING OR SPEAKING SO SLOWLY THAT OTHER PEOPLE COULD HAVE NOTICED. OR THE OPPOSITE, BEING SO FIGETY OR RESTLESS THAT YOU HAVE BEEN MOVING AROUND A LOT MORE THAN USUAL: 3
SUM OF ALL RESPONSES TO PHQ QUESTIONS 1-9: 16
SUM OF ALL RESPONSES TO PHQ QUESTIONS 1-9: 16
10. IF YOU CHECKED OFF ANY PROBLEMS, HOW DIFFICULT HAVE THESE PROBLEMS MADE IT FOR YOU TO DO YOUR WORK, TAKE CARE OF THINGS AT HOME, OR GET ALONG WITH OTHER PEOPLE: 1
SUM OF ALL RESPONSES TO PHQ QUESTIONS 1-9: 16
SUM OF ALL RESPONSES TO PHQ9 QUESTIONS 1 & 2: 3
7. TROUBLE CONCENTRATING ON THINGS, SUCH AS READING THE NEWSPAPER OR WATCHING TELEVISION: 1
2. FEELING DOWN, DEPRESSED OR HOPELESS: 0
1. LITTLE INTEREST OR PLEASURE IN DOING THINGS: 3
6. FEELING BAD ABOUT YOURSELF - OR THAT YOU ARE A FAILURE OR HAVE LET YOURSELF OR YOUR FAMILY DOWN: 0

## 2023-03-29 ENCOUNTER — OFFICE VISIT (OUTPATIENT)
Dept: INTERNAL MEDICINE CLINIC | Age: 33
End: 2023-03-29
Payer: MEDICARE

## 2023-03-29 VITALS — HEIGHT: 64 IN | WEIGHT: 293 LBS | BODY MASS INDEX: 50.02 KG/M2

## 2023-03-29 DIAGNOSIS — K31.84 GASTROPARESIS: ICD-10-CM

## 2023-03-29 DIAGNOSIS — K90.9 INTESTINAL MALABSORPTION, UNSPECIFIED TYPE: Primary | ICD-10-CM

## 2023-03-29 PROCEDURE — 97803 MED NUTRITION INDIV SUBSEQ: CPT | Performed by: DIETITIAN, REGISTERED

## 2023-03-29 NOTE — PATIENT INSTRUCTIONS
[facility]  - how to crush and appropriately give yourself the phenergan  - if you can transition to bolus feedings. Do another Nutrisource fiber scoop each day. - So a total scoops is 2/day    Total water flushes - ~250 ml/day.   - 30 ml x 8/day = 240 ml/day  OR you can do 60 ml 5x/day = 300 ml/day

## 2023-03-29 NOTE — PROGRESS NOTES
feeding to 40 ml/hr, bolusing 2 scoops Nutrisource fiber and increasing water flushes.  -Expected compliance: good. Thank you for your referral of this patient. Total time involved in direct patient education: 30 minutes for follow-up MNT visit.

## 2023-03-30 PROBLEM — K90.9 INTESTINAL MALABSORPTION: Status: ACTIVE | Noted: 2023-03-30

## 2023-03-30 PROBLEM — K31.84 GASTROPARESIS: Status: ACTIVE | Noted: 2023-03-30

## 2023-04-10 ENCOUNTER — HOSPITAL ENCOUNTER (OUTPATIENT)
Dept: INFUSION THERAPY | Age: 33
Discharge: HOME | End: 2023-04-10
Payer: MEDICARE

## 2023-04-10 ENCOUNTER — OFFICE VISIT (OUTPATIENT)
Dept: ONCOLOGY | Age: 33
End: 2023-04-10
Payer: MEDICARE

## 2023-04-10 VITALS
HEART RATE: 97 BPM | TEMPERATURE: 98.2 F | WEIGHT: 293 LBS | HEIGHT: 64 IN | DIASTOLIC BLOOD PRESSURE: 94 MMHG | RESPIRATION RATE: 18 BRPM | BODY MASS INDEX: 50.02 KG/M2 | SYSTOLIC BLOOD PRESSURE: 140 MMHG | OXYGEN SATURATION: 98 %

## 2023-04-10 VITALS
TEMPERATURE: 98.2 F | SYSTOLIC BLOOD PRESSURE: 140 MMHG | DIASTOLIC BLOOD PRESSURE: 94 MMHG | RESPIRATION RATE: 18 BRPM | HEART RATE: 97 BPM

## 2023-04-10 DIAGNOSIS — D50.8 OTHER IRON DEFICIENCY ANEMIA: Primary | ICD-10-CM

## 2023-04-10 LAB — GLUCOSE BLD STRIP.AUTO-MCNC: 110 MG/DL (ref 70–108)

## 2023-04-10 PROCEDURE — 99211 OFF/OP EST MAY X REQ PHY/QHP: CPT

## 2023-04-10 PROCEDURE — 82948 REAGENT STRIP/BLOOD GLUCOSE: CPT

## 2023-04-10 PROCEDURE — 3074F SYST BP LT 130 MM HG: CPT | Performed by: INTERNAL MEDICINE

## 2023-04-10 PROCEDURE — 99205 OFFICE O/P NEW HI 60 MIN: CPT | Performed by: INTERNAL MEDICINE

## 2023-04-10 PROCEDURE — 3078F DIAST BP <80 MM HG: CPT | Performed by: INTERNAL MEDICINE

## 2023-04-10 RX ORDER — EPINEPHRINE 1 MG/ML
0.3 INJECTION, SOLUTION, CONCENTRATE INTRAVENOUS PRN
Status: CANCELLED | OUTPATIENT
Start: 2023-04-10

## 2023-04-10 RX ORDER — DIPHENHYDRAMINE HYDROCHLORIDE 50 MG/ML
50 INJECTION INTRAMUSCULAR; INTRAVENOUS
Status: CANCELLED | OUTPATIENT
Start: 2023-04-10

## 2023-04-10 RX ORDER — FAMOTIDINE 10 MG/ML
20 INJECTION, SOLUTION INTRAVENOUS
Status: CANCELLED | OUTPATIENT
Start: 2023-04-10

## 2023-04-10 RX ORDER — ACETAMINOPHEN 325 MG/1
650 TABLET ORAL
Status: CANCELLED | OUTPATIENT
Start: 2023-04-10

## 2023-04-10 RX ORDER — SODIUM CHLORIDE 9 MG/ML
5-250 INJECTION, SOLUTION INTRAVENOUS PRN
Status: CANCELLED | OUTPATIENT
Start: 2023-04-10

## 2023-04-10 RX ORDER — ONDANSETRON 2 MG/ML
8 INJECTION INTRAMUSCULAR; INTRAVENOUS
Status: CANCELLED | OUTPATIENT
Start: 2023-04-10

## 2023-04-10 RX ORDER — ALBUTEROL SULFATE 90 UG/1
4 AEROSOL, METERED RESPIRATORY (INHALATION) PRN
Status: CANCELLED | OUTPATIENT
Start: 2023-04-10

## 2023-04-10 RX ORDER — SODIUM CHLORIDE 0.9 % (FLUSH) 0.9 %
5-40 SYRINGE (ML) INJECTION PRN
Status: CANCELLED | OUTPATIENT
Start: 2023-04-10

## 2023-04-10 RX ORDER — SODIUM CHLORIDE 9 MG/ML
INJECTION, SOLUTION INTRAVENOUS CONTINUOUS
Status: CANCELLED | OUTPATIENT
Start: 2023-04-10

## 2023-04-10 RX ORDER — HEPARIN SODIUM (PORCINE) LOCK FLUSH IV SOLN 100 UNIT/ML 100 UNIT/ML
500 SOLUTION INTRAVENOUS PRN
Status: CANCELLED | OUTPATIENT
Start: 2023-04-10

## 2023-04-17 ENCOUNTER — HOSPITAL ENCOUNTER (OUTPATIENT)
Age: 33
Discharge: HOME | End: 2023-04-17
Payer: MEDICARE

## 2023-04-17 ENCOUNTER — HOSPITAL ENCOUNTER (OUTPATIENT)
Dept: GENERAL RADIOLOGY | Age: 33
Discharge: HOME | End: 2023-04-17
Payer: MEDICARE

## 2023-04-17 DIAGNOSIS — K94.13 MALFUNCTION OF JEJUNOSTOMY TUBE (HCC): ICD-10-CM

## 2023-04-17 PROCEDURE — 49465 FLUORO EXAM OF G/COLON TUBE: CPT

## 2023-04-17 PROCEDURE — 6360000004 HC RX CONTRAST MEDICATION: Performed by: SURGERY

## 2023-04-17 RX ADMIN — DIATRIZOATE MEGLUMINE AND DIATRIZOATE SODIUM 30 ML: 600; 100 SOLUTION ORAL; RECTAL at 13:36

## 2023-04-18 ENCOUNTER — SOCIAL WORK (OUTPATIENT)
Dept: INFUSION THERAPY | Age: 33
End: 2023-04-18

## 2023-04-18 NOTE — PROGRESS NOTES
Oncology Social Work    Date: 4/18/2023  Time: 1:35 PM  Name: Jaimie Stahl  MRN: [id number]     Contact Type: Follow-up    Note:   Situation: This staff contacted Jaimie Stahl via phone support to introduce myself as their assigned Oncology Social Worker. Background:  Hollis Wyman had been in to the [facility] and completed a Distress Screening tool. This staff was performing a follow-up contact with her    Assessment: Hollis Wyman was out shopping with her wife during this conversation. After introducing myself, she requested information regarding her next iron treatment. This staff assured her, I would obtain the information and get back to her ASAP.  Marcia Escobar expressed that she fells as though things are going okay at this time. She didn't complain about any pain. She acknowledged her ability to provide physical activity (walking, driving in a car, shopping, drinking liquids, etc.). - She appeared pleasant during this brief conversation but was definitely preoccupied by the event. 1:58p - Follow-up with Oncology Nurse Travis Valentin) shared that Samantha's iron treatment has not been scheduled at this time. It is pending approval from her insurance company. A call was placed to Hollis Wyman to convey this information. She expressed concern because she feels she is getting weaker and weaker. She was encouraged to contact her insurance company for a determination as to why the iron treatment has not been approved. Offered assistance based on the outcome of her findings. Recommendation: Follow-up will be initiated by this staff or her Nurse Navigator to provide the requested information.  provided her with my contact information and will remain available for support.         FRANCES Robert, KUNAL, KACI  Oncology Social Worker      Electronically signed by FRANCES Robert LSW, ACHP-SW on 4/18/2023 at 1:35 PM

## 2023-04-19 ENCOUNTER — TELEPHONE (OUTPATIENT)
Dept: ONCOLOGY | Age: 33
End: 2023-04-19

## 2023-04-19 DIAGNOSIS — D50.8 OTHER IRON DEFICIENCY ANEMIA: Primary | ICD-10-CM

## 2023-04-19 NOTE — TELEPHONE ENCOUNTER
Pt called back and stated that she is seeing a GI doctor in [place] this coming Friday and is wondering if she could get the blood work completed then? She is interested in the iron infusions.

## 2023-04-19 NOTE — TELEPHONE ENCOUNTER
Called patient per Dr. Ki Rowland request to see if she is still interested in receiving iron. If so, we need recent blood work completed. The iron blood work from February is too old. Message left for patient to call us back.

## 2023-04-26 ENCOUNTER — TELEPHONE (OUTPATIENT)
Dept: ONCOLOGY | Age: 33
End: 2023-04-26

## 2023-04-26 NOTE — TELEPHONE ENCOUNTER
Noted patient had not completed the blood work ordered by Dr. Ronny Mckoy for iron studies at [facility] like originally planned. I called patient and she said \"[facility] said they couldn't do it there. \" She said she would come in today to complete the  blood work and was fine with lab drawing the blood even though she has a port. Patient will be in this afternoon. Scot Fernandez updated.

## 2023-05-02 ENCOUNTER — HOSPITAL ENCOUNTER (OUTPATIENT)
Age: 33
Discharge: HOME | End: 2023-05-02
Payer: MEDICARE

## 2023-05-02 DIAGNOSIS — D50.8 OTHER IRON DEFICIENCY ANEMIA: ICD-10-CM

## 2023-05-02 LAB
BASOPHILS ABSOLUTE: 0 THOU/MM3 (ref 0–0.1)
BASOPHILS NFR BLD AUTO: 0.3 %
DEPRECATED RDW RBC AUTO: 43.8 FL (ref 35–45)
EOSINOPHIL NFR BLD AUTO: 1.6 %
EOSINOPHILS ABSOLUTE: 0.2 THOU/MM3 (ref 0–0.4)
ERYTHROCYTE [DISTWIDTH] IN BLOOD BY AUTOMATED COUNT: 16.5 % (ref 11.5–14.5)
HCT VFR BLD AUTO: 44.5 % (ref 37–47)
HGB BLD-MCNC: 12.9 GM/DL (ref 12–16)
IMM GRANULOCYTES # BLD AUTO: 0.05 THOU/MM3 (ref 0–0.07)
IMM GRANULOCYTES NFR BLD AUTO: 0.5 %
IRON SATN MFR SERPL: 9 % (ref 20–50)
IRON SERPL-MCNC: 33 UG/DL (ref 50–170)
LYMPHOCYTES ABSOLUTE: 2.5 THOU/MM3 (ref 1–4.8)
LYMPHOCYTES NFR BLD AUTO: 25.9 %
MCH RBC QN AUTO: 21.9 PG (ref 26–33)
MCHC RBC AUTO-ENTMCNC: 29 GM/DL (ref 32.2–35.5)
MCV RBC AUTO: 75.7 FL (ref 81–99)
MONOCYTES ABSOLUTE: 0.5 THOU/MM3 (ref 0.4–1.3)
MONOCYTES NFR BLD AUTO: 4.8 %
NEUTROPHILS NFR BLD AUTO: 66.9 %
NRBC BLD AUTO-RTO: 0 /100 WBC
PLATELET # BLD AUTO: 319 THOU/MM3 (ref 130–400)
PMV BLD AUTO: 9.8 FL (ref 9.4–12.4)
RBC # BLD AUTO: 5.88 MILL/MM3 (ref 4.2–5.4)
SEGMENTED NEUTROPHILS ABSOLUTE COUNT: 6.6 THOU/MM3 (ref 1.8–7.7)
TIBC SERPL-MCNC: 374 UG/DL (ref 171–450)
WBC # BLD AUTO: 9.8 THOU/MM3 (ref 4.8–10.8)

## 2023-05-02 PROCEDURE — 83540 ASSAY OF IRON: CPT

## 2023-05-02 PROCEDURE — 83550 IRON BINDING TEST: CPT

## 2023-05-02 PROCEDURE — 85025 COMPLETE CBC W/AUTO DIFF WBC: CPT

## 2023-05-02 PROCEDURE — 36415 COLL VENOUS BLD VENIPUNCTURE: CPT

## 2023-05-02 PROCEDURE — 82607 VITAMIN B-12: CPT

## 2023-05-02 PROCEDURE — 82746 ASSAY OF FOLIC ACID SERUM: CPT

## 2023-05-03 LAB
FOLATE SERPL-MCNC: 8.4 NG/ML (ref 4.8–24.2)
VIT B12 SERPL-MCNC: 431 PG/ML (ref 211–911)

## 2023-05-04 RX ORDER — ALBUTEROL SULFATE 90 UG/1
4 AEROSOL, METERED RESPIRATORY (INHALATION) PRN
OUTPATIENT
Start: 2023-05-04

## 2023-05-04 RX ORDER — ACETAMINOPHEN 325 MG/1
650 TABLET ORAL
OUTPATIENT
Start: 2023-05-04

## 2023-05-04 RX ORDER — FAMOTIDINE 10 MG/ML
20 INJECTION, SOLUTION INTRAVENOUS
OUTPATIENT
Start: 2023-05-04

## 2023-05-04 RX ORDER — EPINEPHRINE 1 MG/ML
0.3 INJECTION, SOLUTION, CONCENTRATE INTRAVENOUS PRN
OUTPATIENT
Start: 2023-05-04

## 2023-05-04 RX ORDER — SODIUM CHLORIDE 0.9 % (FLUSH) 0.9 %
5-40 SYRINGE (ML) INJECTION PRN
OUTPATIENT
Start: 2023-05-04

## 2023-05-04 RX ORDER — SODIUM CHLORIDE 9 MG/ML
5-250 INJECTION, SOLUTION INTRAVENOUS PRN
OUTPATIENT
Start: 2023-05-04

## 2023-05-04 RX ORDER — HEPARIN SODIUM (PORCINE) LOCK FLUSH IV SOLN 100 UNIT/ML 100 UNIT/ML
500 SOLUTION INTRAVENOUS PRN
OUTPATIENT
Start: 2023-05-04

## 2023-05-04 RX ORDER — ONDANSETRON 2 MG/ML
8 INJECTION INTRAMUSCULAR; INTRAVENOUS
OUTPATIENT
Start: 2023-05-04

## 2023-05-04 RX ORDER — SODIUM CHLORIDE 9 MG/ML
INJECTION, SOLUTION INTRAVENOUS CONTINUOUS
OUTPATIENT
Start: 2023-05-04

## 2023-05-04 RX ORDER — DIPHENHYDRAMINE HYDROCHLORIDE 50 MG/ML
50 INJECTION INTRAMUSCULAR; INTRAVENOUS
OUTPATIENT
Start: 2023-05-04

## 2023-05-07 ENCOUNTER — HOSPITAL ENCOUNTER (EMERGENCY)
Age: 33
Discharge: HOME | End: 2023-05-08
Attending: EMERGENCY MEDICINE
Payer: MEDICARE

## 2023-05-07 ENCOUNTER — APPOINTMENT (OUTPATIENT)
Dept: CT IMAGING | Age: 33
End: 2023-05-07
Payer: MEDICARE

## 2023-05-07 DIAGNOSIS — G89.29 CHRONIC ABDOMINAL PAIN: Primary | ICD-10-CM

## 2023-05-07 DIAGNOSIS — R10.9 CHRONIC ABDOMINAL PAIN: Primary | ICD-10-CM

## 2023-05-07 LAB
ALBUMIN SERPL BCG-MCNC: 4.3 G/DL (ref 3.5–5.1)
ALP SERPL-CCNC: 130 U/L (ref 38–126)
ALT SERPL W/O P-5'-P-CCNC: 13 U/L (ref 11–66)
ANION GAP SERPL CALC-SCNC: 14 MEQ/L (ref 8–16)
AST SERPL-CCNC: 15 U/L (ref 5–40)
BASOPHILS ABSOLUTE: 0 THOU/MM3 (ref 0–0.1)
BASOPHILS NFR BLD AUTO: 0.2 %
BILIRUB SERPL-MCNC: < 0.2 MG/DL (ref 0.3–1.2)
BUN SERPL-MCNC: 16 MG/DL (ref 7–22)
CALCIUM SERPL-MCNC: 9.8 MG/DL (ref 8.5–10.5)
CHLORIDE SERPL-SCNC: 103 MEQ/L (ref 98–111)
CO2 SERPL-SCNC: 24 MEQ/L (ref 23–33)
CREAT SERPL-MCNC: 0.7 MG/DL (ref 0.4–1.2)
DEPRECATED RDW RBC AUTO: 42.7 FL (ref 35–45)
EOSINOPHIL NFR BLD AUTO: 0.6 %
EOSINOPHILS ABSOLUTE: 0.1 THOU/MM3 (ref 0–0.4)
ERYTHROCYTE [DISTWIDTH] IN BLOOD BY AUTOMATED COUNT: 16.2 % (ref 11.5–14.5)
FLUAV RNA RESP QL NAA+PROBE: NOT DETECTED
FLUBV RNA RESP QL NAA+PROBE: NOT DETECTED
GFR SERPL CREATININE-BSD FRML MDRD: > 60 ML/MIN/1.73M2
GLUCOSE SERPL-MCNC: 105 MG/DL (ref 70–108)
HCT VFR BLD AUTO: 41.7 % (ref 37–47)
HGB BLD-MCNC: 12.3 GM/DL (ref 12–16)
IMM GRANULOCYTES # BLD AUTO: 0.07 THOU/MM3 (ref 0–0.07)
IMM GRANULOCYTES NFR BLD AUTO: 0.5 %
LIPASE SERPL-CCNC: 17.8 U/L (ref 5.6–51.3)
LYMPHOCYTES ABSOLUTE: 2.7 THOU/MM3 (ref 1–4.8)
LYMPHOCYTES NFR BLD AUTO: 20.2 %
MCH RBC QN AUTO: 21.9 PG (ref 26–33)
MCHC RBC AUTO-ENTMCNC: 29.5 GM/DL (ref 32.2–35.5)
MCV RBC AUTO: 74.2 FL (ref 81–99)
MONOCYTES ABSOLUTE: 0.7 THOU/MM3 (ref 0.4–1.3)
MONOCYTES NFR BLD AUTO: 5.5 %
NEUTROPHILS NFR BLD AUTO: 73 %
NRBC BLD AUTO-RTO: 0 /100 WBC
OSMOLALITY SERPL CALC.SUM OF ELEC: 282.8 MOSMOL/KG (ref 275–300)
PLATELET # BLD AUTO: 322 THOU/MM3 (ref 130–400)
PMV BLD AUTO: 9.8 FL (ref 9.4–12.4)
POTASSIUM SERPL-SCNC: 3.6 MEQ/L (ref 3.5–5.2)
PROT SERPL-MCNC: 7.9 G/DL (ref 6.1–8)
RBC # BLD AUTO: 5.62 MILL/MM3 (ref 4.2–5.4)
SARS-COV-2 RNA RESP QL NAA+PROBE: NOT DETECTED
SEGMENTED NEUTROPHILS ABSOLUTE COUNT: 9.7 THOU/MM3 (ref 1.8–7.7)
SODIUM SERPL-SCNC: 141 MEQ/L (ref 135–145)
WBC # BLD AUTO: 13.3 THOU/MM3 (ref 4.8–10.8)

## 2023-05-07 PROCEDURE — 93005 ELECTROCARDIOGRAM TRACING: CPT | Performed by: EMERGENCY MEDICINE

## 2023-05-07 PROCEDURE — 80053 COMPREHEN METABOLIC PANEL: CPT

## 2023-05-07 PROCEDURE — 96372 THER/PROPH/DIAG INJ SC/IM: CPT

## 2023-05-07 PROCEDURE — 85025 COMPLETE CBC W/AUTO DIFF WBC: CPT

## 2023-05-07 PROCEDURE — 99284 EMERGENCY DEPT VISIT MOD MDM: CPT

## 2023-05-07 PROCEDURE — 87636 SARSCOV2 & INF A&B AMP PRB: CPT

## 2023-05-07 PROCEDURE — 74176 CT ABD & PELVIS W/O CONTRAST: CPT

## 2023-05-07 PROCEDURE — 6360000002 HC RX W HCPCS: Performed by: EMERGENCY MEDICINE

## 2023-05-07 PROCEDURE — 96361 HYDRATE IV INFUSION ADD-ON: CPT

## 2023-05-07 PROCEDURE — 36415 COLL VENOUS BLD VENIPUNCTURE: CPT

## 2023-05-07 PROCEDURE — 83690 ASSAY OF LIPASE: CPT

## 2023-05-07 PROCEDURE — 96375 TX/PRO/DX INJ NEW DRUG ADDON: CPT

## 2023-05-07 PROCEDURE — 96374 THER/PROPH/DIAG INJ IV PUSH: CPT

## 2023-05-07 RX ORDER — MORPHINE SULFATE 4 MG/ML
4 INJECTION, SOLUTION INTRAMUSCULAR; INTRAVENOUS ONCE
Status: COMPLETED | OUTPATIENT
Start: 2023-05-07 | End: 2023-05-07

## 2023-05-07 RX ORDER — LOPERAMIDE HYDROCHLORIDE 2 MG/1
4 CAPSULE ORAL ONCE
Status: COMPLETED | OUTPATIENT
Start: 2023-05-08 | End: 2023-05-08

## 2023-05-07 RX ORDER — PROMETHAZINE HYDROCHLORIDE 25 MG/ML
25 INJECTION, SOLUTION INTRAMUSCULAR; INTRAVENOUS ONCE
Status: COMPLETED | OUTPATIENT
Start: 2023-05-08 | End: 2023-05-08

## 2023-05-07 RX ORDER — 0.9 % SODIUM CHLORIDE 0.9 %
1000 INTRAVENOUS SOLUTION INTRAVENOUS ONCE
Status: COMPLETED | OUTPATIENT
Start: 2023-05-08 | End: 2023-05-08

## 2023-05-07 RX ADMIN — MORPHINE SULFATE 4 MG: 4 INJECTION, SOLUTION INTRAMUSCULAR; INTRAVENOUS at 23:20

## 2023-05-07 ASSESSMENT — PAIN - FUNCTIONAL ASSESSMENT: PAIN_FUNCTIONAL_ASSESSMENT: 0-10

## 2023-05-07 ASSESSMENT — PAIN DESCRIPTION - PAIN TYPE: TYPE: ACUTE PAIN

## 2023-05-07 ASSESSMENT — PAIN SCALES - GENERAL: PAINLEVEL_OUTOF10: 10

## 2023-05-08 VITALS
OXYGEN SATURATION: 99 % | TEMPERATURE: 97.9 F | SYSTOLIC BLOOD PRESSURE: 142 MMHG | RESPIRATION RATE: 20 BRPM | HEART RATE: 106 BPM | DIASTOLIC BLOOD PRESSURE: 106 MMHG

## 2023-05-08 PROCEDURE — 6370000000 HC RX 637 (ALT 250 FOR IP): Performed by: EMERGENCY MEDICINE

## 2023-05-08 PROCEDURE — 93010 ELECTROCARDIOGRAM REPORT: CPT | Performed by: NUCLEAR MEDICINE

## 2023-05-08 PROCEDURE — 2580000003 HC RX 258: Performed by: EMERGENCY MEDICINE

## 2023-05-08 PROCEDURE — 6360000002 HC RX W HCPCS: Performed by: EMERGENCY MEDICINE

## 2023-05-08 RX ORDER — HEPARIN SODIUM (PORCINE) LOCK FLUSH IV SOLN 100 UNIT/ML 100 UNIT/ML
300 SOLUTION INTRAVENOUS ONCE
Status: COMPLETED | OUTPATIENT
Start: 2023-05-08 | End: 2023-05-08

## 2023-05-08 RX ADMIN — SODIUM CHLORIDE 1000 ML: 9 INJECTION, SOLUTION INTRAVENOUS at 00:13

## 2023-05-08 RX ADMIN — HEPARIN 300 UNITS: 100 SYRINGE at 01:29

## 2023-05-08 RX ADMIN — PROMETHAZINE HYDROCHLORIDE 25 MG: 25 INJECTION, SOLUTION INTRAMUSCULAR; INTRAVENOUS at 00:16

## 2023-05-08 RX ADMIN — LOPERAMIDE HYDROCHLORIDE 4 MG: 2 CAPSULE ORAL at 00:32

## 2023-05-08 RX ADMIN — HYDROMORPHONE HYDROCHLORIDE 0.5 MG: 1 INJECTION, SOLUTION INTRAMUSCULAR; INTRAVENOUS; SUBCUTANEOUS at 00:33

## 2023-05-08 ASSESSMENT — PAIN SCALES - GENERAL: PAINLEVEL_OUTOF10: 8

## 2023-05-08 ASSESSMENT — PAIN DESCRIPTION - PAIN TYPE: TYPE: ACUTE PAIN

## 2023-05-08 ASSESSMENT — PAIN - FUNCTIONAL ASSESSMENT: PAIN_FUNCTIONAL_ASSESSMENT: 0-10

## 2023-05-08 NOTE — ED NOTES
Pt to the ED via EMS. Patient presents with complaints of N-V, Diarrhea and abdominal after having her PEG tube removed 2 weeks ago. EKG done, IV inserted. Patient is alert and oriented x 4. Respirations are regular and unlabored. Patient provided blanket. Call light within reach.      Nikhil Tolbert RN  05/07/23 [id number]

## 2023-05-08 NOTE — ED PROVIDER NOTES
[address] [address] EMERGENCY DEPT  [address] [facility] [address]  Phone: [phone]      Pt Name: Scottie Nieves  WOV:[id number]  Bob 1990  Date of evaluation: 5/7/2023      CHIEF COMPLAINT       Chief Complaint   Patient presents with    Abdominal Pain    Emesis       HISTORY OF PRESENT ILLNESS   Patient is a 42-year-old female who presents with nausea vomiting and diarrhea for the last 2 weeks since her PEG tube was removed. She has a history of complicated GI issues including a Niesen fundoplication. Severe GERD. Her surgeon is out of [place]. Patient reports that he is aware of her GERD issues and told her to hang in there and that she will need gastric bypass surgery. He is not aware of the diarrhea and the discomfort in her abdomen however. She denies any sick contacts, bad food exposure or recent antibiotic use or travel. REVIEW OF SYSTEMS     Review of Systems      PAST MEDICAL HISTORY    has a past medical history of Acute on chronic diastolic congestive heart failure ([address] JANI (acute kidney injury) ([address] Anemia, Anesthesia, Anxiety, Asthma, CAD (coronary artery disease), Depression, Diabetes ([address] Diet-controlled type 2 diabetes mellitus ([address] Epilepsy ([address] Fibroids, Gastro - esophageal reflux disease, Hypertension, Hypertrophy of tonsil and adenoid, Malignant carcinoid tumor of other sites ([address] Migraine, PONV (postoperative nausea and vomiting), Prolonged emergence from general anesthesia, Sickle cell anemia ([address] Sickle cell trait ([address] and Tumor associated pain. SURGICAL HISTORY      has a past surgical history that includes hernia repair (2010, 2016); [place] tooth extraction; tumor removal; Breast reduction surgery; myomectomy; Partial hysterectomy (4/8/16); Dilation and curettage of uterus (10/21/2013); Central venous catheter insertion (Right, 12/11/2015);  Abdominal exploration surgery; Gastric fundoplication; Endoscopy, colon, diagnostic; other surgical history

## 2023-05-08 NOTE — ED NOTES
Upon first contact with pt, pt resting quietly in bed. Pt medicated per MAR and updated on POC. Pt complains of nausea. Provider notified.       Destiny Galdamez RN  05/07/23 3062

## 2023-05-08 NOTE — DISCHARGE INSTRUCTIONS
Your work-up did not demonstrate any new concerning problems. Your blood work and your CAT scan were normal.  Please talk to your specialist tomorrow about your pain and other symptoms that concern you.

## 2023-05-10 LAB
EKG ATRIAL RATE: 105 BPM
EKG P AXIS: 50 DEGREES
EKG P-R INTERVAL: 148 MS
EKG Q-T INTERVAL: 338 MS
EKG QRS DURATION: 78 MS
EKG QTC CALCULATION (BAZETT): 446 MS
EKG R AXIS: 17 DEGREES
EKG T AXIS: 25 DEGREES
EKG VENTRICULAR RATE: 105 BPM

## 2023-05-11 ENCOUNTER — CARE COORDINATION (OUTPATIENT)
Dept: CARE COORDINATION | Age: 33
End: 2023-05-11

## 2023-05-11 ENCOUNTER — TELEPHONE (OUTPATIENT)
Dept: PHARMACY | Facility: CLINIC | Age: 33
End: 2023-05-11

## 2023-05-11 NOTE — TELEPHONE ENCOUNTER
Received a referral:  from Care Coordinator to review patients medications. Called patient to schedule a time to speak with a pharmacist over the telephone. Spoke to patient and advised them of the above message. Patient verified understanding and scheduled their appointment: tomorrow at [facility].    [address] [place] [address] free: [phone]    For Pharmacy Admin Tracking Only    Program: [address] in place:  No  Recommendation Provided To: Patient/Caregiver: 1 via Telephone  Intervention Detail: Scheduled Appointment  Intervention Accepted By: Patient/Caregiver: 1  Gap Closed?: Yes   Time Spent (min): 15

## 2023-05-11 NOTE — TELEPHONE ENCOUNTER
----- Message from Herb Bill RN sent at 5/11/2023 11:03 AM EDT -----  Regarding: medication review  New pt to care coordination. Follows with multiple providers. Has some specialists here locally but also follows with several at [facility]. Pt is on multiple medications. Has significant GI hx.  Had G-tube at one time but recently removed and now taking all medications orally. Would benefit from detailed medication review. Pt agreeable with pharmacist reaching out.

## 2023-05-11 NOTE — CARE COORDINATION
Ambulatory Care Coordination Note  2023    Patient Current Location:  Home: Manuel Stevens  [address] [place] [address]    ACM contacted the patient by telephone. Verified name and  with patient as identifiers. Provided introduction to self, and explanation of the ACM role. ACM: Mago Walton RN    Challenges to be reviewed by the provider   Additional needs identified to be addressed with provider: No  none               Method of communication with provider: none. Fany Suero [facility] and assistance in managing chronic conditions and assisting with health care needs. Pt has h/o: CHF, CAD, anemia, DM, gastroparesis, obesity. Pt reports that she is currently being worked up by [facility] for bariatric surgery. Recently had psychiatric eval.  Pt was recommended to get established with psychiatrist and psychologist/counselor. Pt would like to f/u with someone locally. Provided pt with information on area providers. Suggested she reach out to [facility] first as they have wrap around support services and could most likely get her in sooner than some of the other providers. Informed her that they have walk in for new pt's. Hours are 8-2pm M-F. Anemia-had recent appt with hematology/oncology. Pt will be getting IV iron infusions. First infusion scheduled 5/15  Has mediport  Had G-tube but recently removed. Taking in foods and fluids orally. Has some n/v. Takes phenergan. Has h/o abd pain. GI referred to Pain mgmt. Following with dietician at IM office. Has appt . Pt is hoping to lose wt prior to bariatric surgery. Smoker. Working on quitting. CAD/CHF-no on diuretics. Follows with Dr. Monica Chowdary of care:  Pt is on multiple meds. Still receiving a couple of them in liquid form. Informed pt that she will have to get new script from her PCP in order to get changed. Pharmacy referral.  Pt reports that one of her bp meds makes her nauseated.

## 2023-05-12 ENCOUNTER — TELEPHONE (OUTPATIENT)
Dept: PHARMACY | Facility: CLINIC | Age: 33
End: 2023-05-12

## 2023-05-15 ENCOUNTER — HOSPITAL ENCOUNTER (OUTPATIENT)
Dept: INFUSION THERAPY | Age: 33
Discharge: HOME | End: 2023-05-15
Payer: MEDICARE

## 2023-05-15 VITALS
OXYGEN SATURATION: 98 % | HEART RATE: 87 BPM | HEIGHT: 64 IN | DIASTOLIC BLOOD PRESSURE: 89 MMHG | TEMPERATURE: 98 F | RESPIRATION RATE: 16 BRPM | SYSTOLIC BLOOD PRESSURE: 161 MMHG | WEIGHT: 293 LBS | BODY MASS INDEX: 50.02 KG/M2

## 2023-05-15 DIAGNOSIS — R13.19 ESOPHAGEAL DYSPHAGIA: ICD-10-CM

## 2023-05-15 DIAGNOSIS — D57.1 HB-SS DISEASE WITHOUT CRISIS (HCC): Primary | ICD-10-CM

## 2023-05-15 DIAGNOSIS — D50.8 IRON DEFICIENCY ANEMIA SECONDARY TO INADEQUATE DIETARY IRON INTAKE: ICD-10-CM

## 2023-05-15 PROCEDURE — 96365 THER/PROPH/DIAG IV INF INIT: CPT

## 2023-05-15 PROCEDURE — 96375 TX/PRO/DX INJ NEW DRUG ADDON: CPT

## 2023-05-15 PROCEDURE — 6360000002 HC RX W HCPCS: Performed by: INTERNAL MEDICINE

## 2023-05-15 PROCEDURE — 2580000003 HC RX 258: Performed by: INTERNAL MEDICINE

## 2023-05-15 RX ORDER — DIPHENHYDRAMINE HYDROCHLORIDE 50 MG/ML
25 INJECTION INTRAMUSCULAR; INTRAVENOUS ONCE
Status: COMPLETED | OUTPATIENT
Start: 2023-05-15 | End: 2023-05-15

## 2023-05-15 RX ORDER — SODIUM CHLORIDE 9 MG/ML
5-250 INJECTION, SOLUTION INTRAVENOUS PRN
Status: CANCELLED | OUTPATIENT
Start: 2023-05-22

## 2023-05-15 RX ORDER — SODIUM CHLORIDE 0.9 % (FLUSH) 0.9 %
5-40 SYRINGE (ML) INJECTION PRN
Status: CANCELLED | OUTPATIENT
Start: 2023-05-22

## 2023-05-15 RX ORDER — DIPHENHYDRAMINE HYDROCHLORIDE 50 MG/ML
50 INJECTION INTRAMUSCULAR; INTRAVENOUS
Status: CANCELLED | OUTPATIENT
Start: 2023-05-22

## 2023-05-15 RX ORDER — SODIUM CHLORIDE 9 MG/ML
5-250 INJECTION, SOLUTION INTRAVENOUS PRN
Status: DISCONTINUED | OUTPATIENT
Start: 2023-05-15 | End: 2023-05-16 | Stop reason: HOSPADM

## 2023-05-15 RX ORDER — SODIUM CHLORIDE 0.9 % (FLUSH) 0.9 %
5-40 SYRINGE (ML) INJECTION PRN
Status: DISCONTINUED | OUTPATIENT
Start: 2023-05-15 | End: 2023-05-16 | Stop reason: HOSPADM

## 2023-05-15 RX ORDER — HEPARIN SODIUM (PORCINE) LOCK FLUSH IV SOLN 100 UNIT/ML 100 UNIT/ML
500 SOLUTION INTRAVENOUS PRN
Status: DISCONTINUED | OUTPATIENT
Start: 2023-05-15 | End: 2023-05-16 | Stop reason: HOSPADM

## 2023-05-15 RX ORDER — HEPARIN SODIUM (PORCINE) LOCK FLUSH IV SOLN 100 UNIT/ML 100 UNIT/ML
500 SOLUTION INTRAVENOUS PRN
Status: CANCELLED | OUTPATIENT
Start: 2023-05-22

## 2023-05-15 RX ORDER — ACETAMINOPHEN 325 MG/1
650 TABLET ORAL
Status: CANCELLED | OUTPATIENT
Start: 2023-05-22

## 2023-05-15 RX ORDER — SODIUM CHLORIDE 9 MG/ML
INJECTION, SOLUTION INTRAVENOUS CONTINUOUS
Status: CANCELLED | OUTPATIENT
Start: 2023-05-22

## 2023-05-15 RX ORDER — DIPHENHYDRAMINE HYDROCHLORIDE 50 MG/ML
25 INJECTION INTRAMUSCULAR; INTRAVENOUS ONCE
Status: CANCELLED
Start: 2023-05-15 | End: 2023-05-15

## 2023-05-15 RX ORDER — ALBUTEROL SULFATE 90 UG/1
4 AEROSOL, METERED RESPIRATORY (INHALATION) PRN
Status: CANCELLED | OUTPATIENT
Start: 2023-05-22

## 2023-05-15 RX ORDER — DIPHENHYDRAMINE HYDROCHLORIDE 50 MG/ML
25 INJECTION INTRAMUSCULAR; INTRAVENOUS ONCE
Start: 2023-05-22 | End: 2023-05-22

## 2023-05-15 RX ORDER — ONDANSETRON 2 MG/ML
8 INJECTION INTRAMUSCULAR; INTRAVENOUS
Status: CANCELLED | OUTPATIENT
Start: 2023-05-22

## 2023-05-15 RX ORDER — EPINEPHRINE 1 MG/ML
0.3 INJECTION, SOLUTION INTRAMUSCULAR; SUBCUTANEOUS PRN
Status: CANCELLED | OUTPATIENT
Start: 2023-05-22

## 2023-05-15 RX ADMIN — HEPARIN 500 UNITS: 100 SYRINGE at 12:27

## 2023-05-15 RX ADMIN — SODIUM CHLORIDE 20 ML/HR: 9 INJECTION, SOLUTION INTRAVENOUS at 11:41

## 2023-05-15 RX ADMIN — IRON SUCROSE 200 MG: 20 INJECTION, SOLUTION INTRAVENOUS at 11:56

## 2023-05-15 RX ADMIN — DIPHENHYDRAMINE HYDROCHLORIDE 25 MG: 50 INJECTION, SOLUTION INTRAMUSCULAR; INTRAVENOUS at 11:55

## 2023-05-15 NOTE — PROGRESS NOTES
Patient assessed for the following post venofer    Dizziness   No  Lightheadedness  No      Acute nausea/vomiting No  Headache   No  Chest pain/pressure  No  Rash/itching   No  Shortness of breath  No    Patient kept for 20 minutes observation post infusion venofer  Patient tolerated  treatment venofer without any complications. Last vital signs:   BP (!) 161/89   Pulse 87   Temp 98 °F (36.7 °C) (Oral)   Resp 16   Ht 5' 4\" (1.626 m)   Wt 298 lb (135.2 kg)   LMP 07/11/2015   SpO2 98%   BMI 51.15 kg/m²         Patient instructed if experience any of the above symptoms following today's infusion,he/she is to notify MD immediately or go to the emergency department. Discharge instructions given to patient. Verbalizes understanding. Ambulated off unit per self with belongings.

## 2023-05-15 NOTE — DISCHARGE INSTRUCTIONS
Please contact your Oncologist if you have any questions regarding the VENOFER that you received today. You are instructed to call the office or go to the Emergency Dept. If you experience any of the following symptoms:    Dizziness/lightheadedness   Acute nausea or vomiting-not relieved by medications  Headaches-not relieved by medications  New chest pain or pressure  New rash /itching  New shortness of breath  Fever,chills or signs or symptoms of infection    Make sure you are drinking 48 to 64 ounces of water daily-if you are unable to drink fluids let us know right away.

## 2023-05-15 NOTE — PLAN OF CARE
Problem: Chronic Conditions and Co-morbidities  Goal: Patient's chronic conditions and co-morbidity symptoms are monitored and maintained or improved  Outcome: Progressing  Flowsheets (Taken 5/15/2023 1232)  Care Plan - Patient's Chronic Conditions and Co-Morbidity Symptoms are Monitored and Maintained or Improved: Monitor and assess patient's chronic conditions and comorbid symptoms for stability, deterioration, or improvement  Note: Discussed indications for venofer infusion     Problem: Safety - Adult  Goal: Free from fall injury  Outcome: Progressing  Flowsheets (Taken 5/15/2023 1232)  Free From Fall Injury: Instruct family/caregiver on patient safety  Note: No falls occurred with visit today. Problem: Discharge Planning  Goal: Discharge to home or other facility with appropriate resources  Outcome: Progressing  Flowsheets (Taken 5/15/2023 1232)  Discharge to home or other facility with appropriate resources: Identify barriers to discharge with patient and caregiver  Note: Verbalize understanding of discharge instructions, follow up appointments, and when to call Physician. Care plan reviewed with patient. Patient verbalizes understanding of the plan of care and contributes to goal setting.

## 2023-05-16 ENCOUNTER — TELEPHONE (OUTPATIENT)
Dept: INTERNAL MEDICINE CLINIC | Age: 33
End: 2023-05-16

## 2023-05-16 NOTE — TELEPHONE ENCOUNTER
Outpatient dietitian callback to patient. Patient left msge she was sick this morning and could not come in for her appt with the RD today. Patient stated she had yellow vomitus this morning and tasted bad. Explained to patient appropriate foods to consume and to eat very small mini meals (pt no longer with G-tube for tube feedings, patient with gastroparesis, so appropriate food suggestions given for this). Patient stated she has a phone consultation this Friday with a [address] [place] [address] dietitian - explained to patient to keep this appt. R/s her missed appt with me today.

## 2023-05-18 ENCOUNTER — TELEPHONE (OUTPATIENT)
Dept: ONCOLOGY | Age: 33
End: 2023-05-18

## 2023-05-22 ENCOUNTER — HOSPITAL ENCOUNTER (OUTPATIENT)
Dept: INFUSION THERAPY | Age: 33
Discharge: HOME | End: 2023-05-22
Payer: MEDICARE

## 2023-05-22 ENCOUNTER — CARE COORDINATION (OUTPATIENT)
Dept: CARE COORDINATION | Age: 33
End: 2023-05-22

## 2023-05-22 VITALS
HEART RATE: 95 BPM | HEIGHT: 64 IN | BODY MASS INDEX: 49.99 KG/M2 | DIASTOLIC BLOOD PRESSURE: 79 MMHG | WEIGHT: 292.8 LBS | RESPIRATION RATE: 18 BRPM | TEMPERATURE: 99.1 F | SYSTOLIC BLOOD PRESSURE: 123 MMHG | OXYGEN SATURATION: 98 %

## 2023-05-22 DIAGNOSIS — D50.8 IRON DEFICIENCY ANEMIA SECONDARY TO INADEQUATE DIETARY IRON INTAKE: ICD-10-CM

## 2023-05-22 DIAGNOSIS — D57.1 HB-SS DISEASE WITHOUT CRISIS (HCC): Primary | ICD-10-CM

## 2023-05-22 DIAGNOSIS — R13.19 ESOPHAGEAL DYSPHAGIA: ICD-10-CM

## 2023-05-22 PROCEDURE — 6360000002 HC RX W HCPCS: Performed by: INTERNAL MEDICINE

## 2023-05-22 PROCEDURE — 2580000003 HC RX 258: Performed by: INTERNAL MEDICINE

## 2023-05-22 PROCEDURE — 96375 TX/PRO/DX INJ NEW DRUG ADDON: CPT

## 2023-05-22 PROCEDURE — 96365 THER/PROPH/DIAG IV INF INIT: CPT

## 2023-05-22 RX ORDER — HEPARIN SODIUM (PORCINE) LOCK FLUSH IV SOLN 100 UNIT/ML 100 UNIT/ML
500 SOLUTION INTRAVENOUS PRN
Status: DISCONTINUED | OUTPATIENT
Start: 2023-05-22 | End: 2023-05-23 | Stop reason: HOSPADM

## 2023-05-22 RX ORDER — DIPHENHYDRAMINE HYDROCHLORIDE 50 MG/ML
50 INJECTION INTRAMUSCULAR; INTRAVENOUS
Status: CANCELLED | OUTPATIENT
Start: 2023-05-29

## 2023-05-22 RX ORDER — ALBUTEROL SULFATE 90 UG/1
4 AEROSOL, METERED RESPIRATORY (INHALATION) PRN
Status: CANCELLED | OUTPATIENT
Start: 2023-05-29

## 2023-05-22 RX ORDER — SODIUM CHLORIDE 0.9 % (FLUSH) 0.9 %
5-40 SYRINGE (ML) INJECTION PRN
Status: CANCELLED | OUTPATIENT
Start: 2023-05-29

## 2023-05-22 RX ORDER — ONDANSETRON 2 MG/ML
8 INJECTION INTRAMUSCULAR; INTRAVENOUS
Status: CANCELLED | OUTPATIENT
Start: 2023-05-29

## 2023-05-22 RX ORDER — PROCHLORPERAZINE MALEATE 10 MG
10 TABLET ORAL ONCE
Status: DISCONTINUED | OUTPATIENT
Start: 2023-05-22 | End: 2023-05-23 | Stop reason: HOSPADM

## 2023-05-22 RX ORDER — PROCHLORPERAZINE MALEATE 10 MG
10 TABLET ORAL EVERY 6 HOURS PRN
Status: CANCELLED
Start: 2023-05-29

## 2023-05-22 RX ORDER — SODIUM CHLORIDE 0.9 % (FLUSH) 0.9 %
5-40 SYRINGE (ML) INJECTION PRN
Status: DISCONTINUED | OUTPATIENT
Start: 2023-05-22 | End: 2023-05-23 | Stop reason: HOSPADM

## 2023-05-22 RX ORDER — SODIUM CHLORIDE 9 MG/ML
5-250 INJECTION, SOLUTION INTRAVENOUS PRN
Status: CANCELLED | OUTPATIENT
Start: 2023-05-29

## 2023-05-22 RX ORDER — PROCHLORPERAZINE MALEATE 10 MG
10 TABLET ORAL EVERY 6 HOURS PRN
Status: CANCELLED
Start: 2023-05-22

## 2023-05-22 RX ORDER — EPINEPHRINE 1 MG/ML
0.3 INJECTION, SOLUTION INTRAMUSCULAR; SUBCUTANEOUS PRN
Status: CANCELLED | OUTPATIENT
Start: 2023-05-29

## 2023-05-22 RX ORDER — SODIUM CHLORIDE 9 MG/ML
5-250 INJECTION, SOLUTION INTRAVENOUS PRN
Status: DISCONTINUED | OUTPATIENT
Start: 2023-05-22 | End: 2023-05-23 | Stop reason: HOSPADM

## 2023-05-22 RX ORDER — DIPHENHYDRAMINE HYDROCHLORIDE 50 MG/ML
25 INJECTION INTRAMUSCULAR; INTRAVENOUS ONCE
Status: COMPLETED | OUTPATIENT
Start: 2023-05-22 | End: 2023-05-22

## 2023-05-22 RX ORDER — SODIUM CHLORIDE 9 MG/ML
INJECTION, SOLUTION INTRAVENOUS CONTINUOUS
Status: CANCELLED | OUTPATIENT
Start: 2023-05-29

## 2023-05-22 RX ORDER — HEPARIN SODIUM (PORCINE) LOCK FLUSH IV SOLN 100 UNIT/ML 100 UNIT/ML
500 SOLUTION INTRAVENOUS PRN
Status: CANCELLED | OUTPATIENT
Start: 2023-05-29

## 2023-05-22 RX ORDER — DIPHENHYDRAMINE HYDROCHLORIDE 50 MG/ML
25 INJECTION INTRAMUSCULAR; INTRAVENOUS ONCE
Status: CANCELLED
Start: 2023-05-29 | End: 2023-05-29

## 2023-05-22 RX ORDER — ACETAMINOPHEN 325 MG/1
650 TABLET ORAL
Status: CANCELLED | OUTPATIENT
Start: 2023-05-29

## 2023-05-22 RX ADMIN — SODIUM CHLORIDE, PRESERVATIVE FREE 10 ML: 5 INJECTION INTRAVENOUS at 13:25

## 2023-05-22 RX ADMIN — HEPARIN 500 UNITS: 100 SYRINGE at 13:26

## 2023-05-22 RX ADMIN — SODIUM CHLORIDE 50 ML/HR: 9 INJECTION, SOLUTION INTRAVENOUS at 11:43

## 2023-05-22 RX ADMIN — IRON SUCROSE 200 MG: 20 INJECTION, SOLUTION INTRAVENOUS at 11:59

## 2023-05-22 RX ADMIN — DIPHENHYDRAMINE HYDROCHLORIDE 25 MG: 50 INJECTION, SOLUTION INTRAMUSCULAR; INTRAVENOUS at 11:44

## 2023-05-22 ASSESSMENT — PAIN SCALES - GENERAL: PAINLEVEL_OUTOF10: 7

## 2023-05-22 ASSESSMENT — PAIN DESCRIPTION - LOCATION: LOCATION: ABDOMEN

## 2023-05-22 NOTE — PLAN OF CARE
Problem: Chronic Conditions and Co-morbidities  Goal: Patient's chronic conditions and co-morbidity symptoms are monitored and maintained or improved  Outcome: Adequate for Discharge  Flowsheets (Taken 5/22/2023 1724)  Care Plan - Patient's Chronic Conditions and Co-Morbidity Symptoms are Monitored and Maintained or Improved:   Monitor and assess patient's chronic conditions and comorbid symptoms for stability, deterioration, or improvement   Collaborate with multidisciplinary team to address chronic and comorbid conditions and prevent exacerbation or deterioration  Note: Patient verbalizes understanding to verbal information given on venofer, including action and possible side effects. Aware to call MD if develop complications. Problem: Safety - Adult  Goal: Free from fall injury  Outcome: Adequate for Discharge  Flowsheets (Taken 5/22/2023 1724)  Free From Fall Injury: Instruct family/caregiver on patient safety  Note: Patient free of falls this visit. Fall risks assessed. Precautions discussed. Call light within reach. Problem: Discharge Planning  Goal: Discharge to home or other facility with appropriate resources  Outcome: Adequate for Discharge  Flowsheets (Taken 5/22/2023 1724)  Discharge to home or other facility with appropriate resources:   Identify barriers to discharge with patient and caregiver   Arrange for needed discharge resources and transportation as appropriate  Note: Patient verbalizes understanding of discharge instructions, follow up appointment, and when to call physician if needed      Problem: Infection - Adult  Goal: Absence of infection at discharge  Outcome: Adequate for Discharge  Flowsheets (Taken 5/22/2023 1724)  Absence of infection at discharge:   Assess and monitor for signs and symptoms of infection   Monitor all insertion sites i.e., indwelling lines, tubes and drains  Note: Mediport site with no redness or warmth.  Skin over port site intact with no signs of

## 2023-05-22 NOTE — CARE COORDINATION
Ambulatory Care Coordination Note  2023    Patient Current Location: [facility]     ACM contacted the patient by telephone. Verified name and  with patient as identifiers. Provided introduction to self, and explanation of the ACM role. Challenges to be reviewed by the provider   Additional needs identified to be addressed with provider: No  none               Method of communication with provider: none. ACM: Henrry King, RN    Ping Olsen was referred to care coordination by payor for education and assistance in managing chronic conditions and assisting with health care needs. Pt has h/o: CAD, anemia, DM, gastroparesis, obesity. Spoke with Ping Olsen today. Pt currently receiving IV venofer at oncology center. Gastroparesis: has appt with GI tomorrow (ANISA Gonzales). Has been vomiting and having abd pain. Takes phenergan at Home PRN n/v. Has EGD .    [facility] provider order stool studies. Reminded pt to obtain. Working with [facility] center at [facility]. Had phone visit with dietician. Also following with dietician here locally. Missed last appt d/t feeling ill. Has appt on . Offered patient enrollment in the Remote Patient Monitoring (RPM) program for in-home monitoring: NA.  Pt was advised to establish care with local therapist.  Pt reports that she has been unsuccessful. Interested in going through [facility]. I provided pt with number to arrange appt to get established at the center. Plan of care: Will provide pt with pharmacy number for med review since she missed her phone outreach  Pt to call [facility] and arrange appt for assess. [facility] close f[facility] with [facility]  F/u with GI tomorrow and completed EGD on   Continue with IV iron infusions. General Assessment    Do you have any symptoms that are causing concern?: Yes  Reported Symptoms: Vomiting, Nausea, Abdominal Pain (Comment: has phenergan to take PRN.   has appt with GI tomorrow.)         Lab Results

## 2023-05-22 NOTE — DISCHARGE INSTRUCTIONS
You received IV Venofer (Iron Sucrose) while in outpatient oncology clinic. Call if any uncontrolled nausea/vomiting  Call if any fevers/chills/ problems or concerns  Call if any bleeding  Call if any chest pain/pressure  Call if any severe shortness of breath  Drink at least (6) 8oz glasses of fluids daily     If develop any of above condition, please call your MD or go to Emergency Department.

## 2023-05-22 NOTE — PROGRESS NOTES
Patient assessed for the following post iron infusion:    Dizziness   No  Lightheadedness  No      Acute nausea/vomiting Yes, mild nausea  Headache   No  Chest pain/pressure  No  Rash/itching   No  Shortness of breath  No    Patient kept for 20 minutes observation post infusion. Patient tolerated treatment Venofer and IV Benadryl without any complications. Last vital signs:   /79   Pulse 95   Temp 99.1 °F (37.3 °C) (Oral)   Resp 18   Ht 5' 4\" (1.626 m)   Wt 292 lb 12.8 oz (132.8 kg)   LMP 07/11/2015   SpO2 98%   BMI 50.26 kg/m²     Patient instructed if experience any of the above symptoms following today's infusion,he/she is to notify MD immediately or go to the emergency department. Discharge instructions given to patient. Verbalizes understanding. Ambulated off unit per self with belongings.

## 2023-05-23 ENCOUNTER — PREP FOR PROCEDURE (OUTPATIENT)
Dept: SURGERY | Age: 33
End: 2023-05-23

## 2023-05-23 RX ORDER — SODIUM CHLORIDE 0.9 % (FLUSH) 0.9 %
5-40 SYRINGE (ML) INJECTION PRN
OUTPATIENT
Start: 2023-05-23

## 2023-05-23 RX ORDER — SODIUM CHLORIDE 0.9 % (FLUSH) 0.9 %
5-40 SYRINGE (ML) INJECTION EVERY 12 HOURS SCHEDULED
OUTPATIENT
Start: 2023-05-23

## 2023-05-23 RX ORDER — SODIUM CHLORIDE 9 MG/ML
25 INJECTION, SOLUTION INTRAVENOUS PRN
OUTPATIENT
Start: 2023-05-23

## 2023-05-23 RX ORDER — SODIUM CHLORIDE 450 MG/100ML
INJECTION, SOLUTION INTRAVENOUS CONTINUOUS
OUTPATIENT
Start: 2023-05-23

## 2023-05-24 ENCOUNTER — ANESTHESIA (OUTPATIENT)
Dept: ENDOSCOPY | Age: 33
End: 2023-05-24
Payer: MEDICARE

## 2023-05-24 ENCOUNTER — HOSPITAL ENCOUNTER (OUTPATIENT)
Age: 33
Setting detail: OUTPATIENT SURGERY
Discharge: HOME | End: 2023-05-24
Attending: SURGERY | Admitting: SURGERY
Payer: MEDICARE

## 2023-05-24 ENCOUNTER — ANESTHESIA EVENT (OUTPATIENT)
Dept: ENDOSCOPY | Age: 33
End: 2023-05-24
Payer: MEDICARE

## 2023-05-24 VITALS
HEART RATE: 74 BPM | SYSTOLIC BLOOD PRESSURE: 126 MMHG | WEIGHT: 293 LBS | HEIGHT: 64 IN | RESPIRATION RATE: 18 BRPM | OXYGEN SATURATION: 98 % | TEMPERATURE: 96.5 F | BODY MASS INDEX: 50.02 KG/M2 | DIASTOLIC BLOOD PRESSURE: 82 MMHG

## 2023-05-24 PROCEDURE — 6360000002 HC RX W HCPCS: Performed by: SURGERY

## 2023-05-24 PROCEDURE — 7100000011 HC PHASE II RECOVERY - ADDTL 15 MIN: Performed by: SURGERY

## 2023-05-24 PROCEDURE — 6360000002 HC RX W HCPCS: Performed by: NURSE ANESTHETIST, CERTIFIED REGISTERED

## 2023-05-24 PROCEDURE — 2500000003 HC RX 250 WO HCPCS: Performed by: NURSE ANESTHETIST, CERTIFIED REGISTERED

## 2023-05-24 PROCEDURE — 2580000003 HC RX 258: Performed by: SURGERY

## 2023-05-24 PROCEDURE — 7100000010 HC PHASE II RECOVERY - FIRST 15 MIN: Performed by: SURGERY

## 2023-05-24 PROCEDURE — 3609017100 HC EGD: Performed by: SURGERY

## 2023-05-24 PROCEDURE — 3700000001 HC ADD 15 MINUTES (ANESTHESIA): Performed by: SURGERY

## 2023-05-24 PROCEDURE — 3700000000 HC ANESTHESIA ATTENDED CARE: Performed by: SURGERY

## 2023-05-24 RX ORDER — LIDOCAINE HYDROCHLORIDE 20 MG/ML
INJECTION, SOLUTION EPIDURAL; INFILTRATION; INTRACAUDAL; PERINEURAL PRN
Status: DISCONTINUED | OUTPATIENT
Start: 2023-05-24 | End: 2023-05-24 | Stop reason: SDUPTHER

## 2023-05-24 RX ORDER — SODIUM CHLORIDE 0.9 % (FLUSH) 0.9 %
5-40 SYRINGE (ML) INJECTION EVERY 12 HOURS SCHEDULED
Status: DISCONTINUED | OUTPATIENT
Start: 2023-05-24 | End: 2023-05-24 | Stop reason: HOSPADM

## 2023-05-24 RX ORDER — SODIUM CHLORIDE 9 MG/ML
INJECTION, SOLUTION INTRAVENOUS CONTINUOUS
Status: DISCONTINUED | OUTPATIENT
Start: 2023-05-24 | End: 2023-05-24 | Stop reason: HOSPADM

## 2023-05-24 RX ORDER — ONDANSETRON 2 MG/ML
INJECTION INTRAMUSCULAR; INTRAVENOUS PRN
Status: DISCONTINUED | OUTPATIENT
Start: 2023-05-24 | End: 2023-05-24 | Stop reason: SDUPTHER

## 2023-05-24 RX ORDER — PROPOFOL 10 MG/ML
INJECTION, EMULSION INTRAVENOUS PRN
Status: DISCONTINUED | OUTPATIENT
Start: 2023-05-24 | End: 2023-05-24 | Stop reason: SDUPTHER

## 2023-05-24 RX ORDER — HEPARIN SODIUM (PORCINE) LOCK FLUSH IV SOLN 100 UNIT/ML 100 UNIT/ML
500 SOLUTION INTRAVENOUS PRN
Status: DISCONTINUED | OUTPATIENT
Start: 2023-05-24 | End: 2023-05-24 | Stop reason: HOSPADM

## 2023-05-24 RX ADMIN — HEPARIN 500 UNITS: 100 SYRINGE at 08:56

## 2023-05-24 RX ADMIN — ONDANSETRON 4 MG: 2 INJECTION INTRAMUSCULAR; INTRAVENOUS at 08:31

## 2023-05-24 RX ADMIN — SODIUM CHLORIDE: 9 INJECTION, SOLUTION INTRAVENOUS at 07:59

## 2023-05-24 RX ADMIN — LIDOCAINE HYDROCHLORIDE 80 MG: 20 INJECTION, SOLUTION EPIDURAL; INFILTRATION; INTRACAUDAL; PERINEURAL at 08:05

## 2023-05-24 RX ADMIN — PROPOFOL 300 MG: 10 INJECTION, EMULSION INTRAVENOUS at 08:05

## 2023-05-24 ASSESSMENT — PAIN - FUNCTIONAL ASSESSMENT: PAIN_FUNCTIONAL_ASSESSMENT: NONE - DENIES PAIN

## 2023-05-24 NOTE — BRIEF OP NOTE
Brief Postoperative Note      Patient: Jovany Argueta  YOB: 1990  MRN: [id number]    Date of Procedure: 5/24/2023    Pre-Op Diagnosis Codes:     * Nausea and vomiting, unspecified vomiting type [R11.2]    Post-Op Diagnosis: possible Gastric Paresis       Procedure(s):  EGD    Surgeon(s):  Zainab Archibald MD    Assistant:      Anesthesia: Monitor Anesthesia Care    Estimated Blood Loss (mL): 1 ml    Complications: none    Specimens:       Implants:        Drains:   Gastrostomy/Enterostomy/Jejunostomy Tube Percutaneous Endoscopic Gastrostomy (PEG) LUQ (Active)       Findings: see op note      Electronically signed by Zainab Archibald MD on 5/24/2023 at 8:28 AM

## 2023-05-24 NOTE — H&P
[address] [address]  History and Physical Update      Pt Name: Jovany Argueta  MRN: [id number]  YOB: 1990  Date of evaluation: 5/23/2023    [x] I have examined the patient and reviewed the H&P/Consult and there are no changes to the patient or plans. [] I have examined the patient and reviewed the H&P/Consult and have noted the following changes:         Zainab Archibald MD  Electronically signed 5/23/2023 at 10:16 PM

## 2023-05-24 NOTE — PROGRESS NOTES
Recovery mode. Patient denies discomfort. Passing gas, taking fluids.  discussed findings with patient and significant other. Discharge instructions provided and understanding verbalized.

## 2023-05-24 NOTE — ANESTHESIA POSTPROCEDURE EVALUATION
Department of Anesthesiology  Postprocedure Note    Patient: Nayeli Jarrell  MRN: [id number]  YOB: 1990  Date of evaluation: 5/24/2023      Procedure Summary     Date: 05/24/23 Room / Location: [address] [place] [address]    Anesthesia Start: 6283 Anesthesia Stop: 3010    Procedure: EGD (Esophagus) Diagnosis:       Nausea and vomiting, unspecified vomiting type      (Nausea and vomiting, unspecified vomiting type [R11.2])    Surgeons: Ofe Holland MD Responsible Provider: Ashley Marroquin DO    Anesthesia Type: MAC ASA Status: 3          Anesthesia Type: No value filed.     Juan Phase I: Juan Score: 10    Juan Phase II:        Anesthesia Post Evaluation    Patient location during evaluation: bedside  Patient participation: complete - patient participated  Level of consciousness: awake and alert  Airway patency: patent  Nausea & Vomiting: no nausea and no vomiting  Complications: no  Cardiovascular status: hemodynamically stable  Respiratory status: spontaneous ventilation and acceptable  Hydration status: euvolemic

## 2023-05-24 NOTE — H&P
[address] [place] [address]                       PREOPERATIVE HISTORY AND PHYSICAL    PATIENT NAME: Kenzie Ramirez                  :        1990  MED REC NO:   [id number]                           ROOM:  ACCOUNT NO:   [de-identified]                           ADMIT DATE: 2023  PROVIDER:     Samantha Martini. Lamin Irving MD    CHIEF COMPLAINT:  Persistent hematemesis, nausea and vomiting. HISTORY OF PRESENT ILLNESS:  The patient is a challenging 26-year-old  female who has had multiple abdominal issues when she was teenager who  has had Nissen fundoplication x2 with the last hiatal hernia repair per  Dr. Cate Marie with an insertion of a G-tube that eventually was removed. The patient has persistent nausea and vomiting. She actually is being  worked up at [facility], but has been having some recent increasing  nausea and vomiting and what she claims to be hematemesis. Because of  this, she is being admitted at this time for EGD. PAST MEDICAL HISTORY:  Positive for hypertension, asthma, depression,  diabetes. PAST SURGICAL HISTORY:  Her surgeries include laparoscopic bilateral  oophorectomy. She has had breast reduction, Mediport placement,  cholecystectomy. She has had multiple colonoscopies and EGDs. She has  had gastric fundoplication x2 and hysterectomy. She has had a lateral  abdominal wall hernia repair. Again multiple Mediports placed. SOCIAL HISTORY:  She has a history of alcohol, but denies any now. She  is a nonsmoker. FAMILY HISTORY:  Positive for hypertension in both parents, history of  depression and diabetes. MEDICATIONS:  Include albuterol, baclofen, BuSpar, Colace, Flovent,  GlycoLax, Linzess, metformin, Minipress, Norvasc, Pepcid, promethazine,  propranolol, Singulair, tramadol. REVIEW OF SYSTEMS:  A 10-point review of systems otherwise negative  except above.     PHYSICAL

## 2023-05-24 NOTE — ANESTHESIA PRE PROCEDURE
REPAIR N/A 8/22/2022    OPEN LEFT ABDOMINAL PORT SITE HERNIA REPAIR WITH MESH performed by Leanna Phillips MD at [address] EXTRACTION         Social History:    Social History     Tobacco Use    Smoking status: Every Day     Packs/day: 0.25     Years: 6.00     Pack years: 1.50     Types: Cigarettes     Start date: 3/1/2016    Smokeless tobacco: Never    Tobacco comments:     smokes 3 cig perday    Substance Use Topics    Alcohol use: Not Currently                                Ready to quit: Not Answered  Counseling given: Not Answered  Tobacco comments: smokes 3 cig perday       Vital Signs (Current): There were no vitals filed for this visit.                                            BP Readings from Last 3 Encounters:   05/24/23 (!) 155/96   05/23/23 124/75   05/22/23 123/79       NPO Status:                                                                                 BMI:   Wt Readings from Last 3 Encounters:   05/24/23 294 lb 6.4 oz (133.5 kg)   05/22/23 292 lb 12.8 oz (132.8 kg)   05/15/23 298 lb (135.2 kg)     There is no height or weight on file to calculate BMI.    CBC:   Lab Results   Component Value Date/Time    WBC 13.3 05/07/2023 10:50 PM    RBC 5.62 05/07/2023 10:50 PM    RBC 5.62 05/19/2012 03:40 PM    HGB 12.3 05/07/2023 10:50 PM    HCT 41.7 05/07/2023 10:50 PM    MCV 74.2 05/07/2023 10:50 PM    RDW 15.6 12/23/2018 11:30 AM     05/07/2023 10:50 PM       CMP:   Lab Results   Component Value Date/Time     05/07/2023 10:50 PM    K 3.6 05/07/2023 10:50 PM     05/07/2023 10:50 PM    CO2 24 05/07/2023 10:50 PM    BUN 16 05/07/2023 10:50 PM    CREATININE 0.7 05/07/2023 10:50 PM    AGRATIO 1.4 12/23/2018 11:30 AM    LABGLOM >60 05/07/2023 10:50 PM    GLUCOSE 105 05/07/2023 10:50 PM    GLUCOSE 78 12/23/2018 11:30 AM    PROT 7.9 05/07/2023 10:50 PM    CALCIUM 9.8 05/07/2023 10:50 PM    BILITOT <0.2 05/07/2023 10:50 PM    ALKPHOS 130 05/07/2023 10:50 PM    AST 15

## 2023-05-25 NOTE — OP NOTE
the  endoscopy suite, placed in the left lateral decubitus position. After  adequate Diprivan anesthesia was administered, the Olympus endoscope was  inserted into the back of the throat, passed down through the esophagus  down to the EG junction at about 35 cm. The scope was easily fit into  the body of the stomach. There was no evidence of esophagitis. No  evidence of ulcers or any blood that could be seen. However, there were  food products still in the stomach and even with insufflation, I had  trouble finding the outlet of the stomach. The patient then began  having emesis around the tube of gastric contents, and I was concerned  about aspiration. Retroflex did not reveal any abnormalities. Scope  was withdrawn back to the EG junction and then onto the rest of  esophagus for now. The patient tolerated the procedure well. In review  of the chart, it should be noted that the patient has had eight to nine  CT scans this year with ER visits that have all essentially been  negative. The patient did have an upper GI performed in 01/2023 that  was normal without evidence of gastric outlet obstruction. CLARA SCHUSTER [facility], MD    D: 05/24/2023 12:02:32       T: 05/24/2023 12:08:55     [id number]  Job#: [id number]     Doc#: [id number]    CC:

## 2023-05-31 ENCOUNTER — CARE COORDINATION (OUTPATIENT)
Dept: CARE COORDINATION | Age: 33
End: 2023-05-31

## 2023-05-31 ENCOUNTER — HOSPITAL ENCOUNTER (OUTPATIENT)
Dept: INFUSION THERAPY | Age: 33
Discharge: HOME | End: 2023-05-31
Payer: MEDICARE

## 2023-05-31 ENCOUNTER — TELEPHONE (OUTPATIENT)
Dept: PHARMACY | Age: 33
End: 2023-05-31

## 2023-05-31 VITALS
HEART RATE: 78 BPM | TEMPERATURE: 97.7 F | SYSTOLIC BLOOD PRESSURE: 150 MMHG | BODY MASS INDEX: 50.02 KG/M2 | WEIGHT: 293 LBS | OXYGEN SATURATION: 100 % | DIASTOLIC BLOOD PRESSURE: 88 MMHG | RESPIRATION RATE: 18 BRPM | HEIGHT: 64 IN

## 2023-05-31 DIAGNOSIS — D50.8 IRON DEFICIENCY ANEMIA SECONDARY TO INADEQUATE DIETARY IRON INTAKE: ICD-10-CM

## 2023-05-31 DIAGNOSIS — D57.1 HB-SS DISEASE WITHOUT CRISIS (HCC): Primary | ICD-10-CM

## 2023-05-31 DIAGNOSIS — R13.19 ESOPHAGEAL DYSPHAGIA: ICD-10-CM

## 2023-05-31 PROCEDURE — 2580000003 HC RX 258: Performed by: INTERNAL MEDICINE

## 2023-05-31 PROCEDURE — 96375 TX/PRO/DX INJ NEW DRUG ADDON: CPT

## 2023-05-31 PROCEDURE — 6360000002 HC RX W HCPCS: Performed by: INTERNAL MEDICINE

## 2023-05-31 PROCEDURE — 96365 THER/PROPH/DIAG IV INF INIT: CPT

## 2023-05-31 RX ORDER — SODIUM CHLORIDE 9 MG/ML
5-250 INJECTION, SOLUTION INTRAVENOUS PRN
Status: DISCONTINUED | OUTPATIENT
Start: 2023-05-31 | End: 2023-06-01 | Stop reason: HOSPADM

## 2023-05-31 RX ORDER — SODIUM CHLORIDE 0.9 % (FLUSH) 0.9 %
5-40 SYRINGE (ML) INJECTION PRN
OUTPATIENT
Start: 2023-06-05

## 2023-05-31 RX ORDER — ALBUTEROL SULFATE 90 UG/1
4 AEROSOL, METERED RESPIRATORY (INHALATION) PRN
OUTPATIENT
Start: 2023-06-05

## 2023-05-31 RX ORDER — ONDANSETRON 2 MG/ML
8 INJECTION INTRAMUSCULAR; INTRAVENOUS
OUTPATIENT
Start: 2023-06-05

## 2023-05-31 RX ORDER — DIPHENHYDRAMINE HYDROCHLORIDE 50 MG/ML
25 INJECTION INTRAMUSCULAR; INTRAVENOUS ONCE
Start: 2023-06-05 | End: 2023-06-05

## 2023-05-31 RX ORDER — DIPHENHYDRAMINE HYDROCHLORIDE 50 MG/ML
50 INJECTION INTRAMUSCULAR; INTRAVENOUS
OUTPATIENT
Start: 2023-06-05

## 2023-05-31 RX ORDER — HEPARIN SODIUM (PORCINE) LOCK FLUSH IV SOLN 100 UNIT/ML 100 UNIT/ML
500 SOLUTION INTRAVENOUS PRN
Status: DISCONTINUED | OUTPATIENT
Start: 2023-05-31 | End: 2023-06-01 | Stop reason: HOSPADM

## 2023-05-31 RX ORDER — ACETAMINOPHEN 325 MG/1
650 TABLET ORAL
OUTPATIENT
Start: 2023-06-05

## 2023-05-31 RX ORDER — HEPARIN SODIUM (PORCINE) LOCK FLUSH IV SOLN 100 UNIT/ML 100 UNIT/ML
500 SOLUTION INTRAVENOUS PRN
Status: CANCELLED | OUTPATIENT
Start: 2023-06-05

## 2023-05-31 RX ORDER — SODIUM CHLORIDE 9 MG/ML
5-250 INJECTION, SOLUTION INTRAVENOUS PRN
OUTPATIENT
Start: 2023-06-05

## 2023-05-31 RX ORDER — DIPHENHYDRAMINE HYDROCHLORIDE 50 MG/ML
25 INJECTION INTRAMUSCULAR; INTRAVENOUS ONCE
Status: COMPLETED | OUTPATIENT
Start: 2023-05-31 | End: 2023-05-31

## 2023-05-31 RX ORDER — EPINEPHRINE 1 MG/ML
0.3 INJECTION, SOLUTION INTRAMUSCULAR; SUBCUTANEOUS PRN
OUTPATIENT
Start: 2023-06-05

## 2023-05-31 RX ORDER — SODIUM CHLORIDE 0.9 % (FLUSH) 0.9 %
5-40 SYRINGE (ML) INJECTION PRN
Status: DISCONTINUED | OUTPATIENT
Start: 2023-05-31 | End: 2023-06-01 | Stop reason: HOSPADM

## 2023-05-31 RX ORDER — SODIUM CHLORIDE 9 MG/ML
INJECTION, SOLUTION INTRAVENOUS CONTINUOUS
OUTPATIENT
Start: 2023-06-05

## 2023-05-31 RX ADMIN — SODIUM CHLORIDE, PRESERVATIVE FREE 10 ML: 5 INJECTION INTRAVENOUS at 11:22

## 2023-05-31 RX ADMIN — SODIUM CHLORIDE 20 ML/HR: 9 INJECTION, SOLUTION INTRAVENOUS at 11:22

## 2023-05-31 RX ADMIN — DIPHENHYDRAMINE HYDROCHLORIDE 25 MG: 50 INJECTION, SOLUTION INTRAMUSCULAR; INTRAVENOUS at 11:25

## 2023-05-31 RX ADMIN — SODIUM CHLORIDE, PRESERVATIVE FREE 10 ML: 5 INJECTION INTRAVENOUS at 12:25

## 2023-05-31 RX ADMIN — IRON SUCROSE 200 MG: 20 INJECTION, SOLUTION INTRAVENOUS at 11:46

## 2023-05-31 RX ADMIN — HEPARIN 500 UNITS: 100 SYRINGE at 12:25

## 2023-05-31 NOTE — TELEPHONE ENCOUNTER
Pt called us and left message on 5/30/23 asking about the smoking cessation program.  I called pt back at this time and pt is a self referral to [facility] for Smoking Cessation Patient Education/Counseling. I spoke with pt and explained our 12 week smoking cessation program and pt is interested in starting the program.  We set up patients first appointment for 6/7/2023 at 11am to be done in the office. Pt notified to come to the Medication Management Department located in [facility] on the first floor of [address] [address]. Pt had no questions at this time.

## 2023-05-31 NOTE — CARE COORDINATION
Ambulatory Care Coordination Note  2023    Patient Current Location:  Home: [place]  [facility] [place]     ACM contacted the patient by telephone. Verified name and  with patient as identifiers. Provided introduction to self, and explanation of the ACM role. Challenges to be reviewed by the provider   Additional needs identified to be addressed with provider: No  none               Method of communication with provider: none. ACM: Early Sadie, RN    Roro Wynn was referred to care coordination by payor for education and assistance in managing chronic conditions and assisting with health care needs. Pt has h/o: CAD, anemia, DM, gastroparesis, obesity. Spoke with Roro Wynn today. Gastroparesis-had recent EGD. N/v improved according to pt. Following with GI locally and at [facility]. Pt has had recent appt with both. Was going to go on E-mycin for gastroparesis. GI today recommended prucalopride instead. Pt will be starting that daily once obtained. Diet: pt working on cutting out pop. Down to 1 per day. Encouraged to try qod to eventually wean completely off. Has appt with local dietician tomorrow. Pt needs to start monitoring wts weekly. Pt has OTC benefits card and will use to obtain scale  Pt reports that she had recent counselor appt but following visit discovered that provider is not in network. Pt had decided to f/u with [facility] to get established with new counselor. Referred to [facility] for sleep eval.  By [facility] bariatric surgery. Referral was sent yesterday. Message left with pulmonary to see if they received it. Active smoker. Working on quitting. Down to 2-4 cigarettes daily. Pt contacted smoking cessation clinic. Has first appt on .    My chart message sent with pharmacist contact info for med review and my contact info per pt's request.   Plan of care:  Continue close f/u with [facility] bariatric center and GI  Continue iron infusions as

## 2023-05-31 NOTE — PLAN OF CARE
Problem: Chronic Conditions and Co-morbidities  Goal: Patient's chronic conditions and co-morbidity symptoms are monitored and maintained or improved  Outcome: Progressing  Flowsheets (Taken 5/31/2023 1134)  Care Plan - Patient's Chronic Conditions and Co-Morbidity Symptoms are Monitored and Maintained or Improved: Monitor and assess patient's chronic conditions and comorbid symptoms for stability, deterioration, or improvement  Note: Discussed indications for venofer     Problem: Safety - Adult  Goal: Free from fall injury  Outcome: Progressing  Flowsheets (Taken 5/31/2023 1134)  Free From Fall Injury: Instruct family/caregiver on patient safety  Note: No falls occurred with visit today. Problem: Discharge Planning  Goal: Discharge to home or other facility with appropriate resources  Outcome: Progressing  Flowsheets (Taken 5/31/2023 1134)  Discharge to home or other facility with appropriate resources: Identify barriers to discharge with patient and caregiver  Note: Verbalize understanding of discharge instructions, follow up appointments, and when to call Physician. Care plan reviewed with patient. Patient verbalizes understanding of the plan of care and contributes to goal setting.

## 2023-05-31 NOTE — PROGRESS NOTES
Patient assessed for the following post venofer    Dizziness   No  Lightheadedness  No      Acute nausea/vomiting No  Headache   No  Chest pain/pressure  No  Rash/itching   No  Shortness of breath  No    Patient kept for 20 minutes observation post infusion   Patient tolerated treatment venofer without any complications. Last vital signs:   BP (!) 150/88   Pulse 78   Temp 97.7 °F (36.5 °C) (Oral)   Resp 18   Ht 5' 4\" (1.626 m)   Wt 294 lb (133.4 kg)   LMP 07/11/2015   SpO2 100%   BMI 50.46 kg/m²         Patient instructed if experience any of the above symptoms following today's infusion,he/she is to notify MD immediately or go to the emergency department. Discharge instructions given to patient. Verbalizes understanding. Ambulated off unit per self with belongings.

## 2023-06-01 ENCOUNTER — CARE COORDINATION (OUTPATIENT)
Dept: CARE COORDINATION | Age: 33
End: 2023-06-01

## 2023-06-01 ENCOUNTER — OFFICE VISIT (OUTPATIENT)
Dept: INTERNAL MEDICINE CLINIC | Age: 33
End: 2023-06-01

## 2023-06-01 VITALS — BODY MASS INDEX: 50.02 KG/M2 | WEIGHT: 293 LBS | HEIGHT: 64 IN

## 2023-06-01 DIAGNOSIS — K90.9 INTESTINAL MALABSORPTION, UNSPECIFIED TYPE: ICD-10-CM

## 2023-06-01 DIAGNOSIS — K31.84 GASTROPARESIS: Primary | ICD-10-CM

## 2023-06-01 DIAGNOSIS — Z71.3 DIETARY COUNSELING AND SURVEILLANCE: ICD-10-CM

## 2023-06-01 NOTE — PROGRESS NOTES
[address] [place] [address]. [address] [place] [address] Lucien., [facility], [address]  [phone] (phone)  [phone] (fax)    Patient Name: Hang Parson. Date of Birth: [id number]. MRN: [id number]      Assessment: Patient is a 28 y.o. female seen for follow-up MNT visit for   K90.9 (ICD-10-CM) - Intestinal malabsorption, unspecified type   K31.84 (ICD-10-CM) - Gastroparesis     -Nutritionally relevant labs:   Lab Results   Component Value Date/Time    LABA1C 5.8 01/25/2023 05:25 AM    LABA1C 5.8 06/24/2022 08:10 PM    LABA1C 6.9 (H) 11/10/2021 12:27 PM    GLUCOSE 105 05/07/2023 10:50 PM    GLUCOSE 96 03/25/2023 07:32 AM    GLUCOSE 78 12/23/2018 11:30 AM    GLUCOSE 78 03/26/2018 02:41 PM    CHOL 172 06/25/2022 04:07 AM    HDL 41 06/25/2022 04:07 AM    LDLCALC 111 06/25/2022 04:07 AM    TRIG 92 01/14/2023 05:47 AM     Pt had telephone consultation with [address] [place] [address] registered Dietitians to help prepare her for future bariatric surgery. Pt states they will do beny n y procedure.    -Food recall:   Breakfast: skips. Last night made the following meal: chicken broccoi and rice with peppers. Other foods she likes is mac and cheese, fibrous vegetables. Pt states she is use to cooking [address] Using crisco shortening in cooking.    -Main Beverages: no pop or other sweetened drinks.    Does not tolerate     -Impression of Dietary Intake: in general, an \"unhealthy\" diet    Current Outpatient Medications on File Prior to Visit   Medication Sig Dispense Refill    promethazine (PHENERGAN) 25 MG tablet Take 1 tablet by mouth every 4-6 hours as needed for Nausea 40 tablet 2    erythromycin base (E-MYCIN) 250 MG tablet Take 1 tablet by mouth 4 times daily 120 tablet 2    propranolol (INDERAL LA) 60 MG extended release capsule Take 1 capsule by mouth daily      famotidine (PEPCID) 40 MG tablet Take 1 tablet by mouth nightly 30 tablet 3    Incontinence Supply Disposable (DEPEND SILHOUETTE

## 2023-06-01 NOTE — CARE COORDINATION
Pt called stating that she is working on getting connected with a counselor and plans on going through [facility] Pt was hoping to be able to make an appt but when she spoke with them today she was informed that she will have to come in to get established during their walk in hours. Pt was concerned as she did not think her transportation would be able to take her without a set appt. Informed her that she will just need to provide transport the time in which she is able to go as a walk in. Provided her with walk in hours. Pt requesting info. Be sent via My Chart.

## 2023-06-01 NOTE — PATIENT INSTRUCTIONS
Buy individual containers of light yogurt, applesauce cups, fruit cups. Buy Glucerna or Ensure Max or Boost Glucose control oral supplement drinks. Refer to Gastroparesis guidelines for sample menu ideas for snacks and meals. Keep protein foods moist such as chicken or beef of pork loin  - chop up fine. - Avoid fibrous meats such as steaks and chops and sausage. 56 - 64 ounces fluid/day  - water, crystal lite or diluted orange juice. - no pop or other sweet drinks. Bring a 2 week food log to next dietitian appt. Go to [email or web address] com then go to search bar and search \"Walk [address]\"

## 2023-06-07 ENCOUNTER — HOSPITAL ENCOUNTER (OUTPATIENT)
Dept: PHARMACY | Age: 33
Setting detail: THERAPIES SERIES
Discharge: HOME | End: 2023-06-07

## 2023-06-07 RX ORDER — NICOTINE 21 MG/24HR
1 PATCH, TRANSDERMAL 24 HOURS TRANSDERMAL EVERY 24 HOURS
COMMUNITY

## 2023-06-07 NOTE — PROGRESS NOTES
Medication Management   [facility]  [phone] (phone)  [phone] (fax)    Gabriela Mahoney is a 28 y.o. female has been referred to our service by self referral.     Pt reports they are ready and motivated to quit. History:  Family/friends for support: yes. Wife, she smokes, but wants to quit. Career: currently not working  Home environment/smoke free zones in house: Yes - only smokes outside  Past Medical history/diagnosis reviewed:  Yes  Medication list reviewed: Yes    Past Medical History:      Past Medical History:   Diagnosis Date    Acute on chronic diastolic congestive heart failure ([address] 6/25/2022    JANI (acute kidney injury) ([address] 7/7/2019    Anemia     Anesthesia     migraines    Anxiety     Asthma     CAD (coronary artery disease)     Depression     Diabetes ([address]     Diet-controlled type 2 diabetes mellitus ([address] 2016    Epilepsy ([address]     Fibroids     Gastro - esophageal reflux disease     Hypertension     Hypertrophy of tonsil and adenoid 11/4/2017    Malignant carcinoid tumor of other sites ([address] 5/14/2013    Migraine     PONV (postoperative nausea and vomiting)     Prolonged emergence from general anesthesia     Sickle cell anemia (HCC)     Sickle cell trait ([address]     PT STATES SHE HAS THE TRAIT NOT THE DISEASE    Tumor associated pain     neuroendrocrine tumor, gastroma         Scaling:  Importance level: 10  Confidence level: 10    Fagerstrom Test:    How soon after you wake up do you smoke your first cigarette?  within 5\"(3)   Do you find it difficult to refrain from smoking in places where it is forbidden, e.g., in [employer], at [employer], [employer]? Yes (1 if yes)4   Which cigarette would you hate to most give up?  all others(0)     How many cigarettes per day do you smoke? 10 or less(0)       Do you smoke more frequently during the first hours of waking than during the rest of the day?  No (1 if yes)   Do you smoke if you are so ill that you are in

## 2023-06-15 ENCOUNTER — HOSPITAL ENCOUNTER (INPATIENT)
Age: 33
LOS: 2 days | Discharge: HOME | DRG: 074 | End: 2023-06-17
Attending: HOSPITALIST | Admitting: STUDENT IN AN ORGANIZED HEALTH CARE EDUCATION/TRAINING PROGRAM
Payer: MEDICARE

## 2023-06-15 ENCOUNTER — APPOINTMENT (OUTPATIENT)
Dept: CT IMAGING | Age: 33
DRG: 074 | End: 2023-06-15
Payer: MEDICARE

## 2023-06-15 DIAGNOSIS — R11.2 INTRACTABLE NAUSEA AND VOMITING: ICD-10-CM

## 2023-06-15 DIAGNOSIS — R10.9 INTRACTABLE ABDOMINAL PAIN: Primary | ICD-10-CM

## 2023-06-15 LAB
ALBUMIN SERPL BCG-MCNC: 4.5 G/DL (ref 3.5–5.1)
ALP SERPL-CCNC: 147 U/L (ref 38–126)
ALT SERPL W/O P-5'-P-CCNC: 16 U/L (ref 11–66)
ANION GAP SERPL CALC-SCNC: 12 MEQ/L (ref 8–16)
AST SERPL-CCNC: 17 U/L (ref 5–40)
B-HCG SERPL QL: NEGATIVE
BASOPHILS ABSOLUTE: 0 THOU/MM3 (ref 0–0.1)
BASOPHILS NFR BLD AUTO: 0.4 %
BILIRUB SERPL-MCNC: 0.2 MG/DL (ref 0.3–1.2)
BUN SERPL-MCNC: 12 MG/DL (ref 7–22)
CALCIUM SERPL-MCNC: 9.9 MG/DL (ref 8.5–10.5)
CHLORIDE SERPL-SCNC: 103 MEQ/L (ref 98–111)
CO2 SERPL-SCNC: 24 MEQ/L (ref 23–33)
CREAT SERPL-MCNC: 0.8 MG/DL (ref 0.4–1.2)
DEPRECATED RDW RBC AUTO: 48.7 FL (ref 35–45)
EOSINOPHIL NFR BLD AUTO: 1.8 %
EOSINOPHILS ABSOLUTE: 0.2 THOU/MM3 (ref 0–0.4)
ERYTHROCYTE [DISTWIDTH] IN BLOOD BY AUTOMATED COUNT: 19.1 % (ref 11.5–14.5)
GFR SERPL CREATININE-BSD FRML MDRD: > 60 ML/MIN/1.73M2
GLUCOSE SERPL-MCNC: 99 MG/DL (ref 70–108)
HCT VFR BLD AUTO: 44.5 % (ref 37–47)
HGB BLD-MCNC: 13.4 GM/DL (ref 12–16)
IMM GRANULOCYTES # BLD AUTO: 0.04 THOU/MM3 (ref 0–0.07)
IMM GRANULOCYTES NFR BLD AUTO: 0.3 %
LIPASE SERPL-CCNC: 22.7 U/L (ref 5.6–51.3)
LYMPHOCYTES ABSOLUTE: 2.8 THOU/MM3 (ref 1–4.8)
LYMPHOCYTES NFR BLD AUTO: 23.6 %
MCH RBC QN AUTO: 22.6 PG (ref 26–33)
MCHC RBC AUTO-ENTMCNC: 30.1 GM/DL (ref 32.2–35.5)
MCV RBC AUTO: 75 FL (ref 81–99)
MONOCYTES ABSOLUTE: 0.6 THOU/MM3 (ref 0.4–1.3)
MONOCYTES NFR BLD AUTO: 5 %
NEUTROPHILS NFR BLD AUTO: 68.9 %
NRBC BLD AUTO-RTO: 0 /100 WBC
OSMOLALITY SERPL CALC.SUM OF ELEC: 277.3 MOSMOL/KG (ref 275–300)
PLATELET # BLD AUTO: 306 THOU/MM3 (ref 130–400)
PMV BLD AUTO: 9.6 FL (ref 9.4–12.4)
POTASSIUM SERPL-SCNC: 3.7 MEQ/L (ref 3.5–5.2)
PROT SERPL-MCNC: 8.2 G/DL (ref 6.1–8)
RBC # BLD AUTO: 5.93 MILL/MM3 (ref 4.2–5.4)
SEGMENTED NEUTROPHILS ABSOLUTE COUNT: 8.3 THOU/MM3 (ref 1.8–7.7)
SODIUM SERPL-SCNC: 139 MEQ/L (ref 135–145)
WBC # BLD AUTO: 12 THOU/MM3 (ref 4.8–10.8)

## 2023-06-15 PROCEDURE — 36415 COLL VENOUS BLD VENIPUNCTURE: CPT

## 2023-06-15 PROCEDURE — 80053 COMPREHEN METABOLIC PANEL: CPT

## 2023-06-15 PROCEDURE — 96374 THER/PROPH/DIAG INJ IV PUSH: CPT

## 2023-06-15 PROCEDURE — 6370000000 HC RX 637 (ALT 250 FOR IP): Performed by: STUDENT IN AN ORGANIZED HEALTH CARE EDUCATION/TRAINING PROGRAM

## 2023-06-15 PROCEDURE — 6360000002 HC RX W HCPCS: Performed by: NURSE PRACTITIONER

## 2023-06-15 PROCEDURE — 93010 ELECTROCARDIOGRAM REPORT: CPT | Performed by: NUCLEAR MEDICINE

## 2023-06-15 PROCEDURE — 99223 1ST HOSP IP/OBS HIGH 75: CPT | Performed by: HOSPITALIST

## 2023-06-15 PROCEDURE — 96376 TX/PRO/DX INJ SAME DRUG ADON: CPT

## 2023-06-15 PROCEDURE — 84703 CHORIONIC GONADOTROPIN ASSAY: CPT

## 2023-06-15 PROCEDURE — 96372 THER/PROPH/DIAG INJ SC/IM: CPT

## 2023-06-15 PROCEDURE — 74177 CT ABD & PELVIS W/CONTRAST: CPT

## 2023-06-15 PROCEDURE — 93005 ELECTROCARDIOGRAM TRACING: CPT | Performed by: NURSE PRACTITIONER

## 2023-06-15 PROCEDURE — 1200000000 HC SEMI PRIVATE

## 2023-06-15 PROCEDURE — 6360000002 HC RX W HCPCS: Performed by: STUDENT IN AN ORGANIZED HEALTH CARE EDUCATION/TRAINING PROGRAM

## 2023-06-15 PROCEDURE — 83690 ASSAY OF LIPASE: CPT

## 2023-06-15 PROCEDURE — 6360000004 HC RX CONTRAST MEDICATION: Performed by: NURSE PRACTITIONER

## 2023-06-15 PROCEDURE — 99285 EMERGENCY DEPT VISIT HI MDM: CPT

## 2023-06-15 PROCEDURE — 6370000000 HC RX 637 (ALT 250 FOR IP): Performed by: NURSE PRACTITIONER

## 2023-06-15 PROCEDURE — 85025 COMPLETE CBC W/AUTO DIFF WBC: CPT

## 2023-06-15 PROCEDURE — 2580000003 HC RX 258: Performed by: STUDENT IN AN ORGANIZED HEALTH CARE EDUCATION/TRAINING PROGRAM

## 2023-06-15 RX ORDER — PROMETHAZINE HYDROCHLORIDE 25 MG/1
25 TABLET ORAL EVERY 4 HOURS PRN
Status: DISCONTINUED | OUTPATIENT
Start: 2023-06-15 | End: 2023-06-15 | Stop reason: SDUPTHER

## 2023-06-15 RX ORDER — DIPHENHYDRAMINE HCL 25 MG
25 TABLET ORAL EVERY 6 HOURS
Status: DISCONTINUED | OUTPATIENT
Start: 2023-06-15 | End: 2023-06-16

## 2023-06-15 RX ORDER — PANTOPRAZOLE SODIUM 40 MG/1
40 TABLET, DELAYED RELEASE ORAL
Status: DISCONTINUED | OUTPATIENT
Start: 2023-06-16 | End: 2023-06-16

## 2023-06-15 RX ORDER — POLYETHYLENE GLYCOL 3350 17 G/17G
17 POWDER, FOR SOLUTION ORAL DAILY PRN
Status: DISCONTINUED | OUTPATIENT
Start: 2023-06-15 | End: 2023-06-17

## 2023-06-15 RX ORDER — FAMOTIDINE 20 MG/1
40 TABLET, FILM COATED ORAL NIGHTLY
Status: DISCONTINUED | OUTPATIENT
Start: 2023-06-15 | End: 2023-06-16

## 2023-06-15 RX ORDER — SODIUM CHLORIDE 0.9 % (FLUSH) 0.9 %
5-40 SYRINGE (ML) INJECTION EVERY 12 HOURS SCHEDULED
Status: DISCONTINUED | OUTPATIENT
Start: 2023-06-15 | End: 2023-06-17 | Stop reason: HOSPADM

## 2023-06-15 RX ORDER — ENOXAPARIN SODIUM 100 MG/ML
30 INJECTION SUBCUTANEOUS EVERY 12 HOURS
Status: DISCONTINUED | OUTPATIENT
Start: 2023-06-15 | End: 2023-06-17 | Stop reason: HOSPADM

## 2023-06-15 RX ORDER — PRAZOSIN HYDROCHLORIDE 1 MG/1
1 CAPSULE ORAL NIGHTLY
Status: DISCONTINUED | OUTPATIENT
Start: 2023-06-15 | End: 2023-06-17 | Stop reason: HOSPADM

## 2023-06-15 RX ORDER — BACLOFEN 10 MG/1
10 TABLET ORAL DAILY
Status: DISCONTINUED | OUTPATIENT
Start: 2023-06-15 | End: 2023-06-17 | Stop reason: HOSPADM

## 2023-06-15 RX ORDER — PROMETHAZINE HYDROCHLORIDE 6.25 MG/5ML
25 SYRUP ORAL ONCE
Status: DISCONTINUED | OUTPATIENT
Start: 2023-06-15 | End: 2023-06-15

## 2023-06-15 RX ORDER — AMLODIPINE BESYLATE 5 MG/1
5 TABLET ORAL DAILY
Status: DISCONTINUED | OUTPATIENT
Start: 2023-06-15 | End: 2023-06-17 | Stop reason: HOSPADM

## 2023-06-15 RX ORDER — ACETAMINOPHEN 325 MG/1
650 TABLET ORAL EVERY 6 HOURS PRN
Status: CANCELLED | OUTPATIENT
Start: 2023-06-15

## 2023-06-15 RX ORDER — ALBUTEROL SULFATE 2.5 MG/3ML
2.5 SOLUTION RESPIRATORY (INHALATION) EVERY 6 HOURS PRN
Status: DISCONTINUED | OUTPATIENT
Start: 2023-06-15 | End: 2023-06-17

## 2023-06-15 RX ORDER — SODIUM CHLORIDE 0.9 % (FLUSH) 0.9 %
5-40 SYRINGE (ML) INJECTION PRN
Status: DISCONTINUED | OUTPATIENT
Start: 2023-06-15 | End: 2023-06-17 | Stop reason: HOSPADM

## 2023-06-15 RX ORDER — METOCLOPRAMIDE 10 MG/1
10 TABLET ORAL
Status: DISCONTINUED | OUTPATIENT
Start: 2023-06-15 | End: 2023-06-16

## 2023-06-15 RX ORDER — BUSPIRONE HYDROCHLORIDE 5 MG/1
5 TABLET ORAL 3 TIMES DAILY
Status: DISCONTINUED | OUTPATIENT
Start: 2023-06-16 | End: 2023-06-17 | Stop reason: HOSPADM

## 2023-06-15 RX ORDER — PROPRANOLOL HCL 60 MG
60 CAPSULE, EXTENDED RELEASE 24HR ORAL DAILY
Status: DISCONTINUED | OUTPATIENT
Start: 2023-06-15 | End: 2023-06-17 | Stop reason: HOSPADM

## 2023-06-15 RX ORDER — PROMETHAZINE HYDROCHLORIDE 25 MG/ML
25 INJECTION, SOLUTION INTRAMUSCULAR; INTRAVENOUS ONCE
Status: COMPLETED | OUTPATIENT
Start: 2023-06-15 | End: 2023-06-15

## 2023-06-15 RX ORDER — ESCITALOPRAM OXALATE 20 MG/1
20 TABLET ORAL DAILY
Status: DISCONTINUED | OUTPATIENT
Start: 2023-06-16 | End: 2023-06-17 | Stop reason: HOSPADM

## 2023-06-15 RX ORDER — SODIUM CHLORIDE 9 MG/ML
INJECTION, SOLUTION INTRAVENOUS PRN
Status: DISCONTINUED | OUTPATIENT
Start: 2023-06-15 | End: 2023-06-17 | Stop reason: HOSPADM

## 2023-06-15 RX ORDER — HALOPERIDOL 5 MG/ML
5 INJECTION INTRAMUSCULAR ONCE
Status: DISCONTINUED | OUTPATIENT
Start: 2023-06-15 | End: 2023-06-15

## 2023-06-15 RX ORDER — PROMETHAZINE HYDROCHLORIDE 25 MG/1
12.5 TABLET ORAL EVERY 6 HOURS PRN
Status: DISCONTINUED | OUTPATIENT
Start: 2023-06-15 | End: 2023-06-17 | Stop reason: HOSPADM

## 2023-06-15 RX ORDER — MONTELUKAST SODIUM 10 MG/1
10 TABLET ORAL NIGHTLY
Status: DISCONTINUED | OUTPATIENT
Start: 2023-06-15 | End: 2023-06-17 | Stop reason: HOSPADM

## 2023-06-15 RX ORDER — DIPHENHYDRAMINE HYDROCHLORIDE 50 MG/ML
25 INJECTION INTRAMUSCULAR; INTRAVENOUS ONCE
Status: DISCONTINUED | OUTPATIENT
Start: 2023-06-15 | End: 2023-06-15

## 2023-06-15 RX ORDER — ACETAMINOPHEN 650 MG/1
650 SUPPOSITORY RECTAL EVERY 6 HOURS PRN
Status: CANCELLED | OUTPATIENT
Start: 2023-06-15

## 2023-06-15 RX ORDER — DIPHENHYDRAMINE HCL 25 MG
25 TABLET ORAL EVERY 6 HOURS PRN
Status: DISCONTINUED | OUTPATIENT
Start: 2023-06-15 | End: 2023-06-17 | Stop reason: HOSPADM

## 2023-06-15 RX ORDER — TOPIRAMATE 100 MG/1
100 TABLET, FILM COATED ORAL 2 TIMES DAILY
Status: DISCONTINUED | OUTPATIENT
Start: 2023-06-15 | End: 2023-06-17 | Stop reason: HOSPADM

## 2023-06-15 RX ORDER — PROCHLORPERAZINE EDISYLATE 5 MG/ML
10 INJECTION INTRAMUSCULAR; INTRAVENOUS EVERY 6 HOURS PRN
Status: DISCONTINUED | OUTPATIENT
Start: 2023-06-15 | End: 2023-06-16 | Stop reason: SDUPTHER

## 2023-06-15 RX ORDER — ARIPIPRAZOLE 10 MG/1
10 TABLET ORAL
Status: DISCONTINUED | OUTPATIENT
Start: 2023-06-15 | End: 2023-06-17 | Stop reason: HOSPADM

## 2023-06-15 RX ADMIN — PROMETHAZINE HYDROCHLORIDE 12.5 MG: 25 TABLET ORAL at 21:38

## 2023-06-15 RX ADMIN — HYDROMORPHONE HYDROCHLORIDE 0.5 MG: 1 INJECTION, SOLUTION INTRAMUSCULAR; INTRAVENOUS; SUBCUTANEOUS at 17:48

## 2023-06-15 RX ADMIN — IOPAMIDOL 80 ML: 755 INJECTION, SOLUTION INTRAVENOUS at 14:22

## 2023-06-15 RX ADMIN — HYDROMORPHONE HYDROCHLORIDE 0.5 MG: 1 INJECTION, SOLUTION INTRAMUSCULAR; INTRAVENOUS; SUBCUTANEOUS at 21:31

## 2023-06-15 RX ADMIN — Medication: at 14:21

## 2023-06-15 RX ADMIN — SODIUM CHLORIDE, PRESERVATIVE FREE 10 ML: 5 INJECTION INTRAVENOUS at 21:34

## 2023-06-15 RX ADMIN — HYDROMORPHONE HYDROCHLORIDE 1 MG: 1 INJECTION, SOLUTION INTRAMUSCULAR; INTRAVENOUS; SUBCUTANEOUS at 13:03

## 2023-06-15 RX ADMIN — PROMETHAZINE HYDROCHLORIDE 25 MG: 25 INJECTION INTRAMUSCULAR; INTRAVENOUS at 13:41

## 2023-06-15 ASSESSMENT — PAIN - FUNCTIONAL ASSESSMENT
PAIN_FUNCTIONAL_ASSESSMENT: 0-10

## 2023-06-15 ASSESSMENT — PAIN DESCRIPTION - DESCRIPTORS
DESCRIPTORS: SHARP
DESCRIPTORS: SHARP

## 2023-06-15 ASSESSMENT — PAIN SCALES - GENERAL
PAINLEVEL_OUTOF10: 7
PAINLEVEL_OUTOF10: 7
PAINLEVEL_OUTOF10: 10
PAINLEVEL_OUTOF10: 7
PAINLEVEL_OUTOF10: 7
PAINLEVEL_OUTOF10: 10
PAINLEVEL_OUTOF10: 9
PAINLEVEL_OUTOF10: 10

## 2023-06-15 ASSESSMENT — PAIN DESCRIPTION - LOCATION
LOCATION: ABDOMEN

## 2023-06-15 ASSESSMENT — PAIN DESCRIPTION - PAIN TYPE
TYPE: ACUTE PAIN;CHRONIC PAIN
TYPE: ACUTE PAIN
TYPE: ACUTE PAIN
TYPE: CHRONIC PAIN;ACUTE PAIN
TYPE: ACUTE PAIN
TYPE: ACUTE PAIN

## 2023-06-15 ASSESSMENT — PAIN DESCRIPTION - FREQUENCY
FREQUENCY: CONTINUOUS
FREQUENCY: CONTINUOUS

## 2023-06-15 NOTE — ED PROVIDER NOTES
visualized and preliminarily interpreted by the emergency physician unless otherwise stated below. CT ABDOMEN PELVIS W IV CONTRAST Additional Contrast? None   Final Result   Postsurgical changes in the abdomen. Study otherwise unremarkable. No acute findings. **This report has been created using voice recognition software. It may contain minor errors which are inherent in voice recognition technology. **      Final report electronically signed by Dr. Emmanuelle Perdomo on 6/15/2023 2:55 PM          LABS:   Labs Reviewed   CBC WITH AUTO DIFFERENTIAL - Abnormal; Notable for the following components:       Result Value    WBC 12.0 (*)     RBC 5.93 (*)     MCV 75.0 (*)     MCH 22.6 (*)     MCHC 30.1 (*)     RDW-CV 19.1 (*)     RDW-SD 48.7 (*)     Segs Absolute 8.3 (*)     All other components within normal limits   COMPREHENSIVE METABOLIC PANEL W/ REFLEX TO MG FOR LOW K - Abnormal; Notable for the following components:    Alkaline Phosphatase 147 (*)     Total Protein 8.2 (*)     Total Bilirubin 0.2 (*)     All other components within normal limits   HCG, SERUM, QUALITATIVE   LIPASE   ANION GAP   GLOMERULAR FILTRATION RATE, ESTIMATED   OSMOLALITY       EMERGENCY DEPARTMENT COURSE:   Vitals:    Vitals:    06/15/23 1425 06/15/23 1525 06/15/23 1625 06/15/23 1745   BP: (!) 152/94 (!) 153/108 (!) 156/113    Pulse: 74 72 78 72   Resp: 18 18 18 18   Temp:       TempSrc:       SpO2: 99% 98% 97% 100%   Weight:           MDM    Patient was seen and evaluated in the emergency department, patient appeared to be in acute distress associate with abdominal pain. Vital signs reviewed, hypertension noted. Physical exam was completed, generalized abdominal tenderness noted on exam, bowel sounds are active. Multiple old surgical scars noted on abdomen. Labs and imaging are ordered, lab work is reassuring, no significant dehydration noted. Patient was treated medications below, she denied any improvement in her pain.   CT scan

## 2023-06-15 NOTE — ED TRIAGE NOTES
Pt presents to ED via [facility] for c/o on-going abdominal pain. Pt has history of gastroparesis and is attempting to get gastric bypass sx to \"fix\" the problem. Pt reports being unable to keep anything down (water, food, medications). Upon initial assessment, pt ambulates from hallway on the EMS cot to ED bed with steady gait. Pt is A&Ox4, resps easy and unlabored. Pt reports a generalized, sharp abdominal pain, rating 10/10. Pt reports contacting Dr Isis Young office who advised ED treatment. IV established with blood drawn and sent to lab. Fluids initiated at this time at [facility]. Advised pt of want for urine specimen when available. Voiced understanding. Cool wash cloth applied to pt's head. Pt reports having \"a headache so bad because I've thrown up so much. \" Pt provided with blanket and lights dimmed for comfort. No other concerns voiced at this time. Protocol orders placed. Awaiting provider assessment and further orders. Will monitor.

## 2023-06-15 NOTE — ED NOTES
In to medicate pt. Pt begins to moan and complain d/t medications. Pt states \"I can't take Haldol. It made me crawl out of my skin the last time I took it. \" This RN informed pt it is not on allergies list and pt can refuse. Ericdaniel Huerta NP to bedside. Pt begins to argue with provider stating pt is allergic to Haldol and Droperidol and is unable to take medications. Pt stating that \"this doctor told me he was going to give me Morphine and now he won't give me anything. \" Provider stating to pt, multiple times, that pt is able to refuse medications and narcotics would not be prescribed d/t medications not helping previous. Pt continues to moan and complain. Pt remains A&Ox4, resps easy and unlabored. IV shows no s/s of infection or infiltration. Pt pain remains unchanged at this time. Will monitor.      Leena Adler RN  06/15/23 9325

## 2023-06-15 NOTE — ED NOTES
In to medicate pt. Informed pt of the medications that were orders. Pt begins to moan and complain, stating \"I crawled out of my skin the last time I had that. It's on my allergies list in [place]. \" Velta Olszewski NP to bedside to discuss with pt. Pt states \"you said you were going to give me Morphine. \" Pt begins to moan and complain more at this time and argue with provider. Pt remains A&Ox4, resps easy and unlabored. IV shows no s/s of infection or infiltration. Pt reports no change in pain. Will monitor.      Marbin Kelley RN  06/15/23 5334

## 2023-06-15 NOTE — ED NOTES
In for hourly rounding. Pt resting on cot in position of comfort. Pt remains A&Ox4, resps easy and unlabored. IV shows no s/s of infection or infiltration. Pt reports improvement of nausea at this time. Pt pain remains unchanged at this time from previous assessment. Medicated pt per MAR. Pt continues to moan and states \"I don't think this doctor believes me. My doctor wants him to call him. \" Updated pt on POC. Out to speak with provider. Will monitor.      Poncho Mckay RN  06/15/23 4023

## 2023-06-15 NOTE — ED NOTES
Pt ambulated to restroom at this time. Jitendra Estrada NP at bedside to discuss POC. Pt remains A&Ox4, resps easy and unlabored. Pt continues to moan and states \"i'm in so much pain. I just want the pain to go away. \" NP remains at bedside. IV infusing with no s/s of infection or infiltration. Pt reports improvement of pain, rating 7/10. Pt medicated per [facility] for nausea. Will continue to monitor.      Nevaeh Saldana RN  06/15/23 6190

## 2023-06-15 NOTE — ED NOTES
In for hourly rounding. Pt resting on cot in position of comfort. Pt remains A&Ox4, resps easy and unlabored. IV infusing and shows no s/s of infection or infiltration. Pt pain remains unchanged at this time. Medicated pt per MAR. Verified Phenergan orders with provider, changes noted. Will medicate when medication arrives from pharmacy. Updated pt on POC. Will monitor.      Nilda Stevens RN  06/15/23 9112

## 2023-06-16 LAB
AMPHETAMINES UR QL SCN: NEGATIVE
ANION GAP SERPL CALC-SCNC: 12 MEQ/L (ref 8–16)
BARBITURATES UR QL SCN: NEGATIVE
BASOPHILS ABSOLUTE: 0 THOU/MM3 (ref 0–0.1)
BASOPHILS NFR BLD AUTO: 0.4 %
BENZODIAZ UR QL SCN: NEGATIVE
BUN SERPL-MCNC: 10 MG/DL (ref 7–22)
BZE UR QL SCN: NEGATIVE
CALCIUM SERPL-MCNC: 9.3 MG/DL (ref 8.5–10.5)
CANNABINOIDS UR QL SCN: NEGATIVE
CHLORIDE SERPL-SCNC: 104 MEQ/L (ref 98–111)
CO2 SERPL-SCNC: 24 MEQ/L (ref 23–33)
CREAT SERPL-MCNC: 0.6 MG/DL (ref 0.4–1.2)
DEPRECATED RDW RBC AUTO: 48.9 FL (ref 35–45)
EOSINOPHIL NFR BLD AUTO: 2.1 %
EOSINOPHILS ABSOLUTE: 0.2 THOU/MM3 (ref 0–0.4)
ERYTHROCYTE [DISTWIDTH] IN BLOOD BY AUTOMATED COUNT: 19.2 % (ref 11.5–14.5)
FENTANYL: NEGATIVE
GFR SERPL CREATININE-BSD FRML MDRD: > 60 ML/MIN/1.73M2
GLUCOSE BLD STRIP.AUTO-MCNC: 90 MG/DL (ref 70–108)
GLUCOSE BLD STRIP.AUTO-MCNC: 91 MG/DL (ref 70–108)
GLUCOSE BLD STRIP.AUTO-MCNC: 91 MG/DL (ref 70–108)
GLUCOSE SERPL-MCNC: 93 MG/DL (ref 70–108)
HCG UR QL: NEGATIVE
HCT VFR BLD AUTO: 41.5 % (ref 37–47)
HGB BLD-MCNC: 12.7 GM/DL (ref 12–16)
IMM GRANULOCYTES # BLD AUTO: 0.03 THOU/MM3 (ref 0–0.07)
IMM GRANULOCYTES NFR BLD AUTO: 0.3 %
LYMPHOCYTES ABSOLUTE: 2.2 THOU/MM3 (ref 1–4.8)
LYMPHOCYTES NFR BLD AUTO: 22.6 %
MCH RBC QN AUTO: 22.9 PG (ref 26–33)
MCHC RBC AUTO-ENTMCNC: 30.6 GM/DL (ref 32.2–35.5)
MCV RBC AUTO: 74.8 FL (ref 81–99)
MONOCYTES ABSOLUTE: 0.6 THOU/MM3 (ref 0.4–1.3)
MONOCYTES NFR BLD AUTO: 6 %
NEUTROPHILS NFR BLD AUTO: 68.6 %
NRBC BLD AUTO-RTO: 0 /100 WBC
OPIATES UR QL SCN: NEGATIVE
OSMOLALITY SERPL CALC.SUM OF ELEC: 278.1 MOSMOL/KG (ref 275–300)
OXYCODONE: NEGATIVE
PCP UR QL SCN: NEGATIVE
PLATELET # BLD AUTO: 272 THOU/MM3 (ref 130–400)
PMV BLD AUTO: 9.5 FL (ref 9.4–12.4)
POTASSIUM SERPL-SCNC: 3.6 MEQ/L (ref 3.5–5.2)
RBC # BLD AUTO: 5.55 MILL/MM3 (ref 4.2–5.4)
SEGMENTED NEUTROPHILS ABSOLUTE COUNT: 6.8 THOU/MM3 (ref 1.8–7.7)
SODIUM SERPL-SCNC: 140 MEQ/L (ref 135–145)
WBC # BLD AUTO: 9.9 THOU/MM3 (ref 4.8–10.8)

## 2023-06-16 PROCEDURE — 6360000002 HC RX W HCPCS

## 2023-06-16 PROCEDURE — 36415 COLL VENOUS BLD VENIPUNCTURE: CPT

## 2023-06-16 PROCEDURE — 80048 BASIC METABOLIC PNL TOTAL CA: CPT

## 2023-06-16 PROCEDURE — 6360000002 HC RX W HCPCS: Performed by: STUDENT IN AN ORGANIZED HEALTH CARE EDUCATION/TRAINING PROGRAM

## 2023-06-16 PROCEDURE — 6370000000 HC RX 637 (ALT 250 FOR IP): Performed by: STUDENT IN AN ORGANIZED HEALTH CARE EDUCATION/TRAINING PROGRAM

## 2023-06-16 PROCEDURE — 6370000000 HC RX 637 (ALT 250 FOR IP)

## 2023-06-16 PROCEDURE — 99232 SBSQ HOSP IP/OBS MODERATE 35: CPT | Performed by: INTERNAL MEDICINE

## 2023-06-16 PROCEDURE — 85025 COMPLETE CBC W/AUTO DIFF WBC: CPT

## 2023-06-16 PROCEDURE — 81025 URINE PREGNANCY TEST: CPT

## 2023-06-16 PROCEDURE — 82948 REAGENT STRIP/BLOOD GLUCOSE: CPT

## 2023-06-16 PROCEDURE — 80307 DRUG TEST PRSMV CHEM ANLYZR: CPT

## 2023-06-16 PROCEDURE — 1200000000 HC SEMI PRIVATE

## 2023-06-16 PROCEDURE — C9113 INJ PANTOPRAZOLE SODIUM, VIA: HCPCS

## 2023-06-16 RX ORDER — ACETAMINOPHEN, ASPIRIN AND CAFFEINE 250; 250; 65 MG/1; MG/1; MG/1
1 TABLET, FILM COATED ORAL EVERY 6 HOURS PRN
Status: DISCONTINUED | OUTPATIENT
Start: 2023-06-16 | End: 2023-06-17 | Stop reason: HOSPADM

## 2023-06-16 RX ORDER — SUCRALFATE 1 G/1
1 TABLET ORAL
Status: DISCONTINUED | OUTPATIENT
Start: 2023-06-16 | End: 2023-06-17 | Stop reason: HOSPADM

## 2023-06-16 RX ORDER — ONDANSETRON 2 MG/ML
4 INJECTION INTRAMUSCULAR; INTRAVENOUS EVERY 6 HOURS
Status: DISCONTINUED | OUTPATIENT
Start: 2023-06-16 | End: 2023-06-16

## 2023-06-16 RX ORDER — TRAMADOL HYDROCHLORIDE 50 MG/1
50 TABLET ORAL EVERY 6 HOURS PRN
Status: DISCONTINUED | OUTPATIENT
Start: 2023-06-16 | End: 2023-06-17 | Stop reason: HOSPADM

## 2023-06-16 RX ORDER — LANOLIN ALCOHOL/MO/W.PET/CERES
3 CREAM (GRAM) TOPICAL NIGHTLY PRN
Status: DISCONTINUED | OUTPATIENT
Start: 2023-06-16 | End: 2023-06-17 | Stop reason: HOSPADM

## 2023-06-16 RX ORDER — PANTOPRAZOLE SODIUM 40 MG/1
40 TABLET, DELAYED RELEASE ORAL
Status: DISCONTINUED | OUTPATIENT
Start: 2023-06-16 | End: 2023-06-16

## 2023-06-16 RX ORDER — DEXTROSE MONOHYDRATE 100 MG/ML
INJECTION, SOLUTION INTRAVENOUS CONTINUOUS PRN
Status: DISCONTINUED | OUTPATIENT
Start: 2023-06-16 | End: 2023-06-17 | Stop reason: HOSPADM

## 2023-06-16 RX ORDER — DICYCLOMINE HYDROCHLORIDE 10 MG/1
10 CAPSULE ORAL 3 TIMES DAILY
Status: DISCONTINUED | OUTPATIENT
Start: 2023-06-16 | End: 2023-06-17 | Stop reason: HOSPADM

## 2023-06-16 RX ORDER — INSULIN LISPRO 100 [IU]/ML
0-4 INJECTION, SOLUTION INTRAVENOUS; SUBCUTANEOUS
Status: DISCONTINUED | OUTPATIENT
Start: 2023-06-16 | End: 2023-06-17 | Stop reason: HOSPADM

## 2023-06-16 RX ORDER — ERYTHROMYCIN ETHYLSUCCINATE 400 MG/5ML
400 SUSPENSION ORAL
Status: DISCONTINUED | OUTPATIENT
Start: 2023-06-16 | End: 2023-06-17 | Stop reason: HOSPADM

## 2023-06-16 RX ORDER — PANTOPRAZOLE SODIUM 40 MG/10ML
40 INJECTION, POWDER, LYOPHILIZED, FOR SOLUTION INTRAVENOUS 2 TIMES DAILY
Status: DISCONTINUED | OUTPATIENT
Start: 2023-06-16 | End: 2023-06-17 | Stop reason: HOSPADM

## 2023-06-16 RX ORDER — FAMOTIDINE 20 MG/1
20 TABLET, FILM COATED ORAL NIGHTLY
Status: DISCONTINUED | OUTPATIENT
Start: 2023-06-16 | End: 2023-06-17 | Stop reason: HOSPADM

## 2023-06-16 RX ORDER — PROMETHAZINE HYDROCHLORIDE 25 MG/ML
12.5 INJECTION, SOLUTION INTRAMUSCULAR; INTRAVENOUS EVERY 6 HOURS PRN
Status: DISCONTINUED | OUTPATIENT
Start: 2023-06-16 | End: 2023-06-17 | Stop reason: HOSPADM

## 2023-06-16 RX ORDER — ACETAMINOPHEN 325 MG/1
650 TABLET ORAL EVERY 8 HOURS PRN
Status: CANCELLED | OUTPATIENT
Start: 2023-06-16

## 2023-06-16 RX ORDER — ACETAMINOPHEN 325 MG/1
650 TABLET ORAL EVERY 6 HOURS PRN
Status: DISCONTINUED | OUTPATIENT
Start: 2023-06-16 | End: 2023-06-17 | Stop reason: HOSPADM

## 2023-06-16 RX ORDER — INSULIN LISPRO 100 [IU]/ML
0-4 INJECTION, SOLUTION INTRAVENOUS; SUBCUTANEOUS NIGHTLY
Status: DISCONTINUED | OUTPATIENT
Start: 2023-06-16 | End: 2023-06-17 | Stop reason: HOSPADM

## 2023-06-16 RX ORDER — DIPHENHYDRAMINE HYDROCHLORIDE 50 MG/ML
25 INJECTION INTRAMUSCULAR; INTRAVENOUS EVERY 6 HOURS PRN
Status: DISCONTINUED | OUTPATIENT
Start: 2023-06-16 | End: 2023-06-17 | Stop reason: HOSPADM

## 2023-06-16 RX ORDER — PROMETHAZINE HYDROCHLORIDE 25 MG/ML
6.25 INJECTION, SOLUTION INTRAMUSCULAR; INTRAVENOUS ONCE
Status: COMPLETED | OUTPATIENT
Start: 2023-06-16 | End: 2023-06-16

## 2023-06-16 RX ADMIN — TOPIRAMATE 100 MG: 100 TABLET, FILM COATED ORAL at 10:03

## 2023-06-16 RX ADMIN — ENOXAPARIN SODIUM 30 MG: 100 INJECTION SUBCUTANEOUS at 21:31

## 2023-06-16 RX ADMIN — PANTOPRAZOLE SODIUM 40 MG: 40 TABLET, DELAYED RELEASE ORAL at 06:08

## 2023-06-16 RX ADMIN — AMLODIPINE BESYLATE 5 MG: 5 TABLET ORAL at 00:33

## 2023-06-16 RX ADMIN — DIPHENHYDRAMINE HYDROCHLORIDE 25 MG: 25 TABLET ORAL at 09:57

## 2023-06-16 RX ADMIN — SUCRALFATE 1 G: 1 TABLET ORAL at 17:00

## 2023-06-16 RX ADMIN — ACETAMINOPHEN 650 MG: 325 TABLET ORAL at 09:59

## 2023-06-16 RX ADMIN — HYDROMORPHONE HYDROCHLORIDE 0.25 MG: 1 INJECTION, SOLUTION INTRAMUSCULAR; INTRAVENOUS; SUBCUTANEOUS at 12:04

## 2023-06-16 RX ADMIN — PROMETHAZINE HYDROCHLORIDE 12.5 MG: 25 TABLET ORAL at 14:03

## 2023-06-16 RX ADMIN — HYDROMORPHONE HYDROCHLORIDE 0.5 MG: 1 INJECTION, SOLUTION INTRAMUSCULAR; INTRAVENOUS; SUBCUTANEOUS at 08:03

## 2023-06-16 RX ADMIN — PROPRANOLOL HYDROCHLORIDE 60 MG: 60 CAPSULE, EXTENDED RELEASE ORAL at 10:03

## 2023-06-16 RX ADMIN — BACLOFEN 10 MG: 10 TABLET ORAL at 09:57

## 2023-06-16 RX ADMIN — PROMETHAZINE HYDROCHLORIDE 12.5 MG: 25 TABLET ORAL at 06:08

## 2023-06-16 RX ADMIN — FAMOTIDINE 20 MG: 20 TABLET, FILM COATED ORAL at 21:18

## 2023-06-16 RX ADMIN — ACETAMINOPHEN, ASPIRIN, CAFFEINE 1 TABLET: 250; 65; 250 TABLET, FILM COATED ORAL at 17:00

## 2023-06-16 RX ADMIN — HYDROMORPHONE HYDROCHLORIDE 0.5 MG: 1 INJECTION, SOLUTION INTRAMUSCULAR; INTRAVENOUS; SUBCUTANEOUS at 04:50

## 2023-06-16 RX ADMIN — PANTOPRAZOLE SODIUM 40 MG: 40 INJECTION, POWDER, FOR SOLUTION INTRAVENOUS at 22:09

## 2023-06-16 RX ADMIN — PROMETHAZINE HYDROCHLORIDE 6.25 MG: 25 INJECTION INTRAMUSCULAR; INTRAVENOUS at 16:35

## 2023-06-16 RX ADMIN — BUSPIRONE HYDROCHLORIDE 5 MG: 5 TABLET ORAL at 09:58

## 2023-06-16 RX ADMIN — ESCITALOPRAM OXALATE 20 MG: 20 TABLET ORAL at 09:58

## 2023-06-16 RX ADMIN — METFORMIN HYDROCHLORIDE 1000 MG: 500 TABLET ORAL at 09:57

## 2023-06-16 RX ADMIN — DICYCLOMINE HYDROCHLORIDE 10 MG: 10 CAPSULE ORAL at 17:00

## 2023-06-16 RX ADMIN — FAMOTIDINE 40 MG: 20 TABLET, FILM COATED ORAL at 00:33

## 2023-06-16 RX ADMIN — AMLODIPINE BESYLATE 5 MG: 5 TABLET ORAL at 09:58

## 2023-06-16 RX ADMIN — ENOXAPARIN SODIUM 30 MG: 100 INJECTION SUBCUTANEOUS at 00:40

## 2023-06-16 RX ADMIN — HYDROMORPHONE HYDROCHLORIDE 0.5 MG: 1 INJECTION, SOLUTION INTRAMUSCULAR; INTRAVENOUS; SUBCUTANEOUS at 00:33

## 2023-06-16 RX ADMIN — TRAMADOL HYDROCHLORIDE 50 MG: 50 TABLET, COATED ORAL at 21:18

## 2023-06-16 RX ADMIN — ENOXAPARIN SODIUM 30 MG: 100 INJECTION SUBCUTANEOUS at 10:05

## 2023-06-16 ASSESSMENT — PAIN DESCRIPTION - LOCATION
LOCATION: ABDOMEN
LOCATION: HEAD
LOCATION: ABDOMEN
LOCATION: HEAD
LOCATION: ABDOMEN

## 2023-06-16 ASSESSMENT — PAIN DESCRIPTION - DESCRIPTORS
DESCRIPTORS: ACHING

## 2023-06-16 ASSESSMENT — PAIN SCALES - GENERAL
PAINLEVEL_OUTOF10: 7
PAINLEVEL_OUTOF10: 10
PAINLEVEL_OUTOF10: 5
PAINLEVEL_OUTOF10: 6
PAINLEVEL_OUTOF10: 8
PAINLEVEL_OUTOF10: 7

## 2023-06-16 ASSESSMENT — PAIN - FUNCTIONAL ASSESSMENT
PAIN_FUNCTIONAL_ASSESSMENT: PREVENTS OR INTERFERES SOME ACTIVE ACTIVITIES AND ADLS
PAIN_FUNCTIONAL_ASSESSMENT: PREVENTS OR INTERFERES SOME ACTIVE ACTIVITIES AND ADLS

## 2023-06-16 ASSESSMENT — PAIN DESCRIPTION - ORIENTATION: ORIENTATION: LOWER;MID

## 2023-06-16 NOTE — PLAN OF CARE
Problem: Discharge Planning  Goal: Discharge to home or other facility with appropriate resources  6/16/2023 0311 by Ana Gupta RN  Outcome: Progressing  Flowsheets (Taken 6/15/2023 2205)  Discharge to home or other facility with appropriate resources: Identify barriers to discharge with patient and caregiver  6/16/2023 0311 by Ana Gupta RN  Outcome: Progressing  Flowsheets  Taken 6/15/2023 2205  Discharge to home or other facility with appropriate resources: Identify barriers to discharge with patient and caregiver  Taken 6/15/2023 2131  Discharge to home or other facility with appropriate resources: Identify barriers to discharge with patient and caregiver     Problem: Pain  Goal: Verbalizes/displays adequate comfort level or baseline comfort level  6/16/2023 0311 by Ana Gupta RN  Outcome: Progressing  Flowsheets (Taken 6/15/2023 2344)  Verbalizes/displays adequate comfort level or baseline comfort level:   Encourage patient to monitor pain and request assistance   Assess pain using appropriate pain scale  6/16/2023 0311 by Ana Gupta RN  Outcome: Progressing  Flowsheets  Taken 6/15/2023 2344  Verbalizes/displays adequate comfort level or baseline comfort level:   Encourage patient to monitor pain and request assistance   Assess pain using appropriate pain scale  Taken 6/15/2023 2131  Verbalizes/displays adequate comfort level or baseline comfort level:   Encourage patient to monitor pain and request assistance   Assess pain using appropriate pain scale     Problem: Safety - Adult  Goal: Free from fall injury  6/16/2023 0311 by Ana Gupta RN  Outcome: Progressing  Flowsheets (Taken 6/16/2023 0311)  Free From Fall Injury:   Instruct family/caregiver on patient safety   Based on caregiver fall risk screen, instruct family/caregiver to ask for assistance with transferring infant if caregiver noted to have fall risk factors  6/16/2023 0311 by Ana Gupta RN  Outcome:

## 2023-06-16 NOTE — ED NOTES
ED to inpatient nurses report    Chief Complaint   Patient presents with    Abdominal Pain    Nausea    Emesis    Diarrhea      Present to ED from home  LOC: alert and orientated to name, place, date  Vital signs   Vitals:    06/15/23 1625 06/15/23 1745 06/15/23 1827 06/15/23 2016   BP: (!) 156/113  108/88 (!) 155/104   Pulse: 78 72 72 74   Resp: 18 18 16 18   Temp:       TempSrc:       SpO2: 97% 100% 96% 98%   Weight:          Oxygen Baseline Room Air    Current needs required Room Air Bipap/Cpap No  LDAs:   Peripheral IV 06/15/23 Right Forearm (Active)   Site Assessment Clean, dry & intact 06/15/23 1158   Line Status Blood return noted; Flushed;Normal saline locked;Specimen collected 06/15/23 1158   Phlebitis Assessment No symptoms 06/15/23 1158   Infiltration Assessment 0 06/15/23 1158   Dressing Status Clean, dry & intact 06/15/23 1158   Dressing Type Transparent 06/15/23 1158   Dressing Intervention New 06/15/23 1158     Mobility: Independent  Pending ED orders: NA  Present condition: Pt resting on cot in position of comfort. Pt is A&Ox4, resps easy and unlabored. IV shows no s/s of infection or infiltration.   Person of contract, phone number   Our promise was given to patient    C-SSRS Risk of Suicide: No Risk  Swallow Screening    Preferred Language: English     Electronically signed by Poncho Mckay RN on 6/15/2023 at 9:02 PM       Poncho Mckay RN  06/15/23 2103

## 2023-06-16 NOTE — H&P
Case Management Assessment  Initial Evaluation    Date/Time of Evaluation: 6/16/2023 12:24 PM  Assessment Completed by: Kathy Narvaez RN    If patient is discharged prior to next notation, then this note serves as note for discharge by case management. Patient Name: Hang Parson                   YOB: 1990  Diagnosis: Intractable abdominal pain [R10.9]  Intractable nausea and vomiting [R11.2]                   Date / Time: 6/15/2023 11:42 AM  Location: [address] [place] [address]     Patient Admission Status: Inpatient   Readmission Risk Low 0-14, Mod 15-19), High > 20: Readmission Risk Score: 21.3    Current PCP: AURELIO Vargas CNP  PCP verified by CM? Yes    Chart Reviewed: Yes      History Provided by: Patient  Patient Orientation: Alert and Oriented    Patient Cognition: Alert    Hospitalization in the last 30 days (Readmission):  No    If yes, Readmission Assessment in CM Navigator will be completed. Advance Directives:      Code Status: Full Code   Patient's Primary Decision Maker is: Legal Next of Kin  Wife Fely/confirmed    Discharge Planning:    Patient lives with: Spouse/Significant Other   Type of Home: Apartment  Primary Care Giver: Self  Patient Support Systems include: Spouse/Significant Other   Current Financial resources: Medicare, Medicaid  Current community resources: Transportation  Current services prior to admission: None  Current DME:  cane/ wheelchair  Type of Home Care services:  None    ADLS  Prior functional level: Independent in ADLs/IADLs  Current functional level: Independent in ADLs/IADLs    Family can provide assistance at DC: Yes  Would you like Case Management to discuss the discharge plan with any other family members/significant others, and if so, who?  No  Plans to Return to Present Housing: Yes  Other Identified Issues/Barriers to RETURNING to current housing: none  Potential Assistance needed at discharge: N/A  Potential DME:  na  Patient expects to
been unable to perform the gastric emptying study due to intractable nausea and vomiting outpatient. She also states that she is set to have a gastric bypass surgery performed at a later date however has not been able to follow with the free diet recommendation due to its high protein content which causes increased nausea and vomiting. She states that she has been told by several doctors at this point the gastroparesis does not cause abdominal pain and she is very frustrated with the healthcare system as she feels like her issues are not being heard. She states that she has been unable to keep anything down and is currently vomiting up green bile without any food particulate in it. Her laboratory results do not appear very abnormal however the patient is in acute abdominal pain and is actively vomiting on exam.  She is being admitted to help control the vomiting as well as start her on treatment for gastroparesis to help with the abdominal pain at this time.     Past Medical History:          Diagnosis Date    Acute on chronic diastolic congestive heart failure ([address] 6/25/2022    JANI (acute kidney injury) ([address] 7/7/2019    Anemia     Anesthesia     migraines    Anxiety     Asthma     CAD (coronary artery disease)     Depression     Diabetes ([address]     Diet-controlled type 2 diabetes mellitus ([address] 2016    Epilepsy ([address]     Fibroids     Gastro - esophageal reflux disease     Hypertension     Hypertrophy of tonsil and adenoid 11/4/2017    Malignant carcinoid tumor of other sites ([address] 5/14/2013    Migraine     PONV (postoperative nausea and vomiting)     Prolonged emergence from general anesthesia     Sickle cell anemia (HCC)     Sickle cell trait ([address]     PT STATES SHE HAS THE TRAIT NOT THE DISEASE    Tumor associated pain     neuroendrocrine tumor, gastroma       Past Surgical History:          Procedure Laterality Date    ABDOMEN SURGERY  03/23/2017    Laparoscopic , Bi lat oopherectomy Dr Love Mclean

## 2023-06-16 NOTE — PLAN OF CARE
Problem: Discharge Planning  Goal: Discharge to home or other facility with appropriate resources  6/16/2023 1140 by Zonia Petersen RN  Outcome: Progressing  Flowsheets (Taken 6/16/2023 1140)  Discharge to home or other facility with appropriate resources:   Identify barriers to discharge with patient and caregiver   Arrange for needed discharge resources and transportation as appropriate     Problem: Pain  Goal: Verbalizes/displays adequate comfort level or baseline comfort level  6/16/2023 1140 by Zonia Petersen RN  Outcome: Progressing  Flowsheets (Taken 6/16/2023 1140)  Verbalizes/displays adequate comfort level or baseline comfort level:   Encourage patient to monitor pain and request assistance   Assess pain using appropriate pain scale   Administer analgesics based on type and severity of pain and evaluate response   Implement non-pharmacological measures as appropriate and evaluate response     Problem: Safety - Adult  Goal: Free from fall injury  6/16/2023 1140 by Zonia Petersen RN  Outcome: Progressing  Flowsheets  Taken 6/16/2023 1140  Free From Fall Injury: Instruct family/caregiver on patient safety  Taken 6/16/2023 1137  Free From Fall Injury: Instruct family/caregiver on patient safety     Problem: Neurosensory - Adult  Goal: Achieves stable or improved neurological status  Outcome: Progressing  Flowsheets (Taken 6/16/2023 1140)  Achieves stable or improved neurological status:   Maintain blood pressure and fluid volume within ordered parameters to optimize cerebral perfusion and minimize risk of hemorrhage   Monitor temperature, glucose, and sodium.  Initiate appropriate interventions as ordered     Problem: Respiratory - Adult  Goal: Achieves optimal ventilation and oxygenation  Outcome: Progressing  Flowsheets (Taken 6/16/2023 1140)  Achieves optimal ventilation and oxygenation:   Assess for changes in respiratory status   Assess and instruct to report shortness of breath or any respiratory

## 2023-06-16 NOTE — ED NOTES
Pt calling out for nausea medications. Updated pt that medication would be coming from pharmacy. Pt reports wanting to try and rest. Lights dimmed, curtain pulled for privacy. When asked if pt wanted woke up for nausea medications, pt asks \"it is oral or is it a shot? If it's oral, yeah wake me up. \" Updated pt on POC. Pt readjusted in bed for comfort. Will monitor.      Dell Larose RN  06/15/23 2034

## 2023-06-17 ENCOUNTER — APPOINTMENT (OUTPATIENT)
Dept: GENERAL RADIOLOGY | Age: 33
DRG: 074 | End: 2023-06-17
Payer: MEDICARE

## 2023-06-17 VITALS
RESPIRATION RATE: 16 BRPM | DIASTOLIC BLOOD PRESSURE: 96 MMHG | TEMPERATURE: 98.1 F | HEIGHT: 64 IN | HEART RATE: 84 BPM | WEIGHT: 293 LBS | OXYGEN SATURATION: 95 % | BODY MASS INDEX: 50.02 KG/M2 | SYSTOLIC BLOOD PRESSURE: 139 MMHG

## 2023-06-17 LAB
ALBUMIN SERPL BCG-MCNC: 4.5 G/DL (ref 3.5–5.1)
ALP SERPL-CCNC: 115 U/L (ref 38–126)
ALT SERPL W/O P-5'-P-CCNC: 13 U/L (ref 11–66)
ANION GAP SERPL CALC-SCNC: 14 MEQ/L (ref 8–16)
AST SERPL-CCNC: 14 U/L (ref 5–40)
BILIRUB SERPL-MCNC: 0.4 MG/DL (ref 0.3–1.2)
BUN SERPL-MCNC: 9 MG/DL (ref 7–22)
CALCIUM SERPL-MCNC: 9.8 MG/DL (ref 8.5–10.5)
CHLORIDE SERPL-SCNC: 105 MEQ/L (ref 98–111)
CO2 SERPL-SCNC: 24 MEQ/L (ref 23–33)
CREAT SERPL-MCNC: 0.7 MG/DL (ref 0.4–1.2)
DEPRECATED RDW RBC AUTO: 47.6 FL (ref 35–45)
ERYTHROCYTE [DISTWIDTH] IN BLOOD BY AUTOMATED COUNT: 19 % (ref 11.5–14.5)
GFR SERPL CREATININE-BSD FRML MDRD: > 60 ML/MIN/1.73M2
GLUCOSE BLD STRIP.AUTO-MCNC: 83 MG/DL (ref 70–108)
GLUCOSE BLD STRIP.AUTO-MCNC: 94 MG/DL (ref 70–108)
GLUCOSE SERPL-MCNC: 79 MG/DL (ref 70–108)
HCT VFR BLD AUTO: 43.8 % (ref 37–47)
HGB BLD-MCNC: 13.5 GM/DL (ref 12–16)
MAGNESIUM SERPL-MCNC: 2.2 MG/DL (ref 1.6–2.4)
MCH RBC QN AUTO: 22.9 PG (ref 26–33)
MCHC RBC AUTO-ENTMCNC: 30.8 GM/DL (ref 32.2–35.5)
MCV RBC AUTO: 74.2 FL (ref 81–99)
PLATELET # BLD AUTO: 306 THOU/MM3 (ref 130–400)
PMV BLD AUTO: 9.7 FL (ref 9.4–12.4)
POTASSIUM SERPL-SCNC: 3.8 MEQ/L (ref 3.5–5.2)
PROT SERPL-MCNC: 7.4 G/DL (ref 6.1–8)
RBC # BLD AUTO: 5.9 MILL/MM3 (ref 4.2–5.4)
SODIUM SERPL-SCNC: 143 MEQ/L (ref 135–145)
WBC # BLD AUTO: 13.1 THOU/MM3 (ref 4.8–10.8)

## 2023-06-17 PROCEDURE — 6370000000 HC RX 637 (ALT 250 FOR IP)

## 2023-06-17 PROCEDURE — 74018 RADEX ABDOMEN 1 VIEW: CPT

## 2023-06-17 PROCEDURE — 6370000000 HC RX 637 (ALT 250 FOR IP): Performed by: STUDENT IN AN ORGANIZED HEALTH CARE EDUCATION/TRAINING PROGRAM

## 2023-06-17 PROCEDURE — 6360000002 HC RX W HCPCS

## 2023-06-17 PROCEDURE — 80053 COMPREHEN METABOLIC PANEL: CPT

## 2023-06-17 PROCEDURE — C9113 INJ PANTOPRAZOLE SODIUM, VIA: HCPCS

## 2023-06-17 PROCEDURE — 85027 COMPLETE CBC AUTOMATED: CPT

## 2023-06-17 PROCEDURE — 82948 REAGENT STRIP/BLOOD GLUCOSE: CPT

## 2023-06-17 PROCEDURE — 6360000002 HC RX W HCPCS: Performed by: STUDENT IN AN ORGANIZED HEALTH CARE EDUCATION/TRAINING PROGRAM

## 2023-06-17 PROCEDURE — 36415 COLL VENOUS BLD VENIPUNCTURE: CPT

## 2023-06-17 PROCEDURE — 2580000003 HC RX 258: Performed by: STUDENT IN AN ORGANIZED HEALTH CARE EDUCATION/TRAINING PROGRAM

## 2023-06-17 PROCEDURE — 99238 HOSP IP/OBS DSCHRG MGMT 30/<: CPT | Performed by: INTERNAL MEDICINE

## 2023-06-17 PROCEDURE — 83735 ASSAY OF MAGNESIUM: CPT

## 2023-06-17 RX ORDER — DIPHENHYDRAMINE HCL 25 MG
25 TABLET ORAL EVERY 6 HOURS PRN
Qty: 84 TABLET | Refills: 0 | Status: SHIPPED | OUTPATIENT
Start: 2023-06-17 | End: 2023-07-08

## 2023-06-17 RX ORDER — DICYCLOMINE HYDROCHLORIDE 10 MG/1
10 CAPSULE ORAL 3 TIMES DAILY
Qty: 42 CAPSULE | Refills: 0 | Status: SHIPPED | OUTPATIENT
Start: 2023-06-17 | End: 2023-07-01

## 2023-06-17 RX ORDER — POLYETHYLENE GLYCOL 3350 17 G/17G
17 POWDER, FOR SOLUTION ORAL DAILY
Status: DISCONTINUED | OUTPATIENT
Start: 2023-06-17 | End: 2023-06-17 | Stop reason: HOSPADM

## 2023-06-17 RX ORDER — ACETAMINOPHEN 325 MG/1
650 TABLET ORAL EVERY 6 HOURS PRN
Qty: 168 TABLET | Refills: 0 | Status: SHIPPED | OUTPATIENT
Start: 2023-06-17 | End: 2023-07-08

## 2023-06-17 RX ORDER — CODEINE SULFATE 30 MG/1
30 TABLET ORAL ONCE
Status: COMPLETED | OUTPATIENT
Start: 2023-06-17 | End: 2023-06-17

## 2023-06-17 RX ORDER — METOCLOPRAMIDE 10 MG/1
10 TABLET ORAL
Qty: 63 TABLET | Refills: 0 | Status: SHIPPED | OUTPATIENT
Start: 2023-06-17 | End: 2023-07-08

## 2023-06-17 RX ORDER — ACETAMINOPHEN 325 MG/1
650 TABLET ORAL ONCE
Status: COMPLETED | OUTPATIENT
Start: 2023-06-17 | End: 2023-06-17

## 2023-06-17 RX ORDER — HEPARIN SODIUM 100 [USP'U]/ML
500 INJECTION, SOLUTION INTRAVENOUS
Status: COMPLETED | OUTPATIENT
Start: 2023-06-17 | End: 2023-06-17

## 2023-06-17 RX ORDER — ALBUTEROL SULFATE 2.5 MG/3ML
2.5 SOLUTION RESPIRATORY (INHALATION) EVERY 6 HOURS PRN
Status: DISCONTINUED | OUTPATIENT
Start: 2023-06-17 | End: 2023-06-17 | Stop reason: HOSPADM

## 2023-06-17 RX ADMIN — DIPHENHYDRAMINE HYDROCHLORIDE 25 MG: 50 INJECTION, SOLUTION INTRAMUSCULAR; INTRAVENOUS at 00:47

## 2023-06-17 RX ADMIN — SUCRALFATE 1 G: 1 TABLET ORAL at 09:46

## 2023-06-17 RX ADMIN — DICYCLOMINE HYDROCHLORIDE 10 MG: 10 CAPSULE ORAL at 13:14

## 2023-06-17 RX ADMIN — ENOXAPARIN SODIUM 30 MG: 100 INJECTION SUBCUTANEOUS at 09:47

## 2023-06-17 RX ADMIN — AMLODIPINE BESYLATE 5 MG: 5 TABLET ORAL at 09:46

## 2023-06-17 RX ADMIN — DICYCLOMINE HYDROCHLORIDE 10 MG: 10 CAPSULE ORAL at 01:07

## 2023-06-17 RX ADMIN — CODEINE SULFATE 30 MG: 30 TABLET ORAL at 13:15

## 2023-06-17 RX ADMIN — PANTOPRAZOLE SODIUM 40 MG: 40 INJECTION, POWDER, FOR SOLUTION INTRAVENOUS at 09:46

## 2023-06-17 RX ADMIN — PROMETHAZINE HYDROCHLORIDE 12.5 MG: 25 INJECTION INTRAMUSCULAR; INTRAVENOUS at 06:23

## 2023-06-17 RX ADMIN — SUCRALFATE 1 G: 1 TABLET ORAL at 01:07

## 2023-06-17 RX ADMIN — ERYTHROMYCIN ETHYLSUCCINATE 400 MG: 400 GRANULE, FOR SUSPENSION ORAL at 01:11

## 2023-06-17 RX ADMIN — HEPARIN 500 UNITS: 100 SYRINGE at 14:09

## 2023-06-17 RX ADMIN — DICYCLOMINE HYDROCHLORIDE 10 MG: 10 CAPSULE ORAL at 09:47

## 2023-06-17 RX ADMIN — SODIUM CHLORIDE, PRESERVATIVE FREE 10 ML: 5 INJECTION INTRAVENOUS at 09:46

## 2023-06-17 RX ADMIN — ERYTHROMYCIN ETHYLSUCCINATE 400 MG: 400 GRANULE, FOR SUSPENSION ORAL at 09:47

## 2023-06-17 RX ADMIN — POLYETHYLENE GLYCOL 3350 17 G: 17 POWDER, FOR SOLUTION ORAL at 09:47

## 2023-06-17 RX ADMIN — ACETAMINOPHEN 650 MG: 325 TABLET ORAL at 13:14

## 2023-06-17 RX ADMIN — PROMETHAZINE HYDROCHLORIDE 12.5 MG: 25 INJECTION INTRAMUSCULAR; INTRAVENOUS at 00:35

## 2023-06-17 ASSESSMENT — PAIN DESCRIPTION - LOCATION
LOCATION: ABDOMEN
LOCATION: ABDOMEN

## 2023-06-17 ASSESSMENT — PAIN SCALES - GENERAL
PAINLEVEL_OUTOF10: 8
PAINLEVEL_OUTOF10: 6
PAINLEVEL_OUTOF10: 6
PAINLEVEL_OUTOF10: 10
PAINLEVEL_OUTOF10: 10

## 2023-06-17 ASSESSMENT — PAIN DESCRIPTION - PAIN TYPE
TYPE: ACUTE PAIN;CHRONIC PAIN
TYPE: ACUTE PAIN;CHRONIC PAIN

## 2023-06-17 ASSESSMENT — PAIN - FUNCTIONAL ASSESSMENT
PAIN_FUNCTIONAL_ASSESSMENT: PREVENTS OR INTERFERES SOME ACTIVE ACTIVITIES AND ADLS
PAIN_FUNCTIONAL_ASSESSMENT: 0-10
PAIN_FUNCTIONAL_ASSESSMENT: PREVENTS OR INTERFERES SOME ACTIVE ACTIVITIES AND ADLS
PAIN_FUNCTIONAL_ASSESSMENT: PREVENTS OR INTERFERES SOME ACTIVE ACTIVITIES AND ADLS

## 2023-06-17 ASSESSMENT — PAIN DESCRIPTION - ORIENTATION
ORIENTATION: OTHER (COMMENT)
ORIENTATION: OTHER (COMMENT)

## 2023-06-17 ASSESSMENT — PAIN DESCRIPTION - DESCRIPTORS
DESCRIPTORS: SHARP;ACHING
DESCRIPTORS: SHARP;ACHING
DESCRIPTORS: SHARP

## 2023-06-17 ASSESSMENT — PAIN DESCRIPTION - FREQUENCY
FREQUENCY: CONTINUOUS
FREQUENCY: CONTINUOUS

## 2023-06-17 ASSESSMENT — PAIN DESCRIPTION - ONSET
ONSET: ON-GOING
ONSET: ON-GOING

## 2023-06-17 NOTE — DISCHARGE SUMMARY
Discharge Summary     Patient Identification:  Isauro Esquivel  : 1990  MRN: [id number]   Account: [de-identified]     Admit date: 6/15/2023  Discharge date: 23   Attending provider: Janae Ann MD        Primary care provider: Angelica Beltre, APRN - CNP     Discharge Diagnoses:   Principal Problem:    Intractable nausea and vomiting  Resolved Problems:    * No resolved hospital problems. *    Intractable nausea & vomiting, likely secondary to gastroparesis. CT A/P unremarkable for any acute processes. Patient sees Dr Jelena Peck and Dr Maggie Lucas outpatient. Hx of PEG tube d/t gastroparesis [id number]. Hx of multiple abdominal surgeries including hernia repairs performed in [facility] in  and , ventral hernia repair x 2 [id number] and [id number], carcinoid tumor removal ?gastroma , hysterectomy & bilateral oophorectomy , robotic laparoscopy of adhesions [id number]. EGD with Dr Maggie Lucas [id number] showed no obstructions. Patient reports only antiemetic she is not allergic to is Phenergan.  -Patient to follow-up with Dr. Braxton Bumpers in the outpatient setting. Patient reports she has appointment with him on . Patient undergo EGD in the outpatient setting.   -Patient be discharged on Reglan  -Continue scheduled benadryl IV until tolerating PO in setting of multiple allergies, including to antiemetics. Patient be discharged on Benadryl every 6 hours.  -Continue sucralfate  -Continue erythromycin  -Continue baclofen  -Avoid narcotics discussed this with patient with her chronic constipation. Leukocytosis, mild, likely reactive. Continue to monitor with daily CBC  Chronic constipation. On linzess.   -Continue home regimen at D/C  -Discussed with patient KUB shows stool throughout and continue use of stool softner  Elevated ALP, chronic.   -Hx of cholecystectomy.   -Continue to monitor with intermittent LFTs. Hx of seizure disorder. Has not followed with neurology for several years.  Last seizure reported

## 2023-06-17 NOTE — PROGRESS NOTES
2345- Patient complaining of belly pain 10/10. Hospitalist notified. GI is following and possible going to scope on Monday depending on nausea/vomiting over the weekend. Patient unhappy she had nothing for abdominal pain and afraid to take anything by mouth for fear of getting sick. Note new order for IM phenergan for nausea prn to alternate with PO.  0039 patient complaining of not being able to sleep and abdominal pain 10/10. Patient offered melatonin and refusing to take it due to fear of throwing up and it won't work anyways. Patient again requesting something for pain, morphine or nubain. Reached out to hospitalist.  0045 Hospitalist at bedside. 0107 Highly encouraged patient with hospitalist at bedside to take her carafate, bentyl, and erythromycin. To update hospitalist on patient condition after administration. 0117 Patient continues to complain of 10/10 abdominal pain, crying, and restless. No emesis noted, dry heaving and burping noted. Patient very upset she cannot have anything IV for pain. 3161 Patient called out complaining she cannot sleep, tearful, continues with dry heaves and asking for something for pain. Pain still 10/10. Patient educated no narcotics are going to be ordered per recommendations of GI regarding gastroparesis. No emesis noted. She is refusing to take the tramadol. She feels \"like something is eating her insides\", \"I want to rip my hair out\". Hospitalist updated. KUB ordred. Patient updated. 5947 Patient called out complaining that \"no one believes me\" and the \"pain is shooting down my legs\". Patient is requesting a general surgeon to see her and possibly take her to surgery to figure out where the pain is coming from. Morning labs drawn and sent to lab.  0429 KUB completed. 0098 Patient still requesting something for pain. Hospitalist notified. Afebrile, restless, tearful. Note new order for codiene and tylenol once. Patient updated.   4813 Patient refusing the codiene and
AVS reviewed with patient. All questions answered. Patient signed signature page and it was placed on chart. Patient discharged to lobby with belongings.
Discussed elevated BP with Dr. Diogo Keating on unit this am. She will review chart and see patient.
Gastroenterology  Progress Note    6/17/2023 7:23 AM  Subjective:   Admit Date: 6/15/2023    Interval History: Patient with abdominal discomfort nausea vomit severe pain has improved pain medication changed from Dilaudid she is not happy about it she is vomiting bile material is thought azithromycin as well as Carafate and PPI. I performed physical exam on her to check abdomen for tenderness in the present resting staff she is not tender while she is not distracted with talking and joking during the abdominal event examinations . Continue supportive care plan to EGD she stay in the hospital for Monday.   Diet: Diet NPO Exceptions are: Sips of Water with Meds    Medications:   Scheduled Meds:    codeine  30 mg Oral Once    And    acetaminophen  650 mg Oral Once    insulin lispro  0-4 Units SubCUTAneous TID WC    insulin lispro  0-4 Units SubCUTAneous Nightly    sucralfate  1 g Oral 4x Daily AC & HS    dicyclomine  10 mg Oral TID    pantoprazole  40 mg IntraVENous BID    erythromycin  400 mg Oral 4x Daily WC    famotidine  20 mg Oral Nightly    amLODIPine  5 mg Oral Daily    ARIPiprazole  10 mg Oral QHS    baclofen  10 mg Oral Daily    busPIRone  5 mg Oral TID    escitalopram  20 mg PEG Tube Daily    [Held by provider] linaclotide  145 mcg Oral QAM AC    [Held by provider] metFORMIN  1,000 mg Oral Daily with breakfast    montelukast  10 mg Oral Nightly    prazosin  1 mg Oral Nightly    propranolol  60 mg Oral Daily    topiramate  100 mg Oral BID    sodium chloride flush  5-40 mL IntraVENous 2 times per day    enoxaparin  30 mg SubCUTAneous Q12H     Continuous Infusions:    dextrose      sodium chloride         CBC:   Recent Labs     06/15/23  1200 06/16/23  0800 06/17/23  0331   WBC 12.0* 9.9 13.1*   HGB 13.4 12.7 13.5    272 306     BMP:    Recent Labs     06/15/23  1200 06/16/23  0800 06/17/23  0331    140 143   K 3.7 3.6 3.8    104 105   CO2 24 24 24   BUN 12 10 9   CREATININE 0.8 0.6 0.7
Pt admitted to 7K3 in a wheelchair. Complaints: nausea and vomiting. IV site free of s/s of infection, but patient complains of burning with medication administration. Patient has mediport in right upper chest. Resident ok with accessing. Note new order. Vital signs obtained. Assessment and data collection initiated. Two nurse skin assessment performed by Clarissa Ramirez and Jaden Hermosillo. Oriented to room. Explained patients right to have family, representative or physician notified of their admission. Patient has Declined for physician to be notified. Patient has Declined for family/representative to be notified. The patient is interested in [facility]. Teressas meds to beds program?:  No    Policies and procedures for  explained. All questions answered with no further questions at this time. Fall prevention and safety brochure discussed with patient. Bed alarm on. Call light in reach.
flush, sodium chloride, polyethylene glycol, promethazine **OR** prochlorperazine, diphenhydrAMINE        Intake/Output Summary (Last 24 hours) at 6/16/2023 2143  Last data filed at 6/16/2023 1654  Gross per 24 hour   Intake 800 ml   Output 1150 ml   Net -350 ml       Exam:  /75   Pulse 69   Temp 98.1 °F (36.7 °C) (Oral)   Resp 16   Ht 5' 4\" (1.626 m)   Wt 296 lb 11.8 oz (134.6 kg)   LMP 07/11/2015   SpO2 96%   BMI 50.94 kg/m²     General: morbidly obese,  female, alert, cooperative, in mild distress, on room air  Eyes:  PERRLA. Nonicteric, no conjunctivitis, EOMs intact. HENT: Normocephalic, atraumatic. Nares normal. Oral mucosa moist.  Hearing grossly intact. Neck: Supple, with full range of motion. No gross JVD appreciated, but exam limited by body habitus. Respiratory:  Normal effort. Clear to auscultation, without rales or wheezes or rhonchi. Cardiovascular: RRR, good S1/S2. No rubs, gallops, or murmurs. Bilateral non-pitting lower extremity edema. Abdomen: Soft, non-distended. No guarding. Bowel sounds present, but hypoactive. Endorses diffuse tenderness to palpation. Musculoskeletal: No joint swelling or tenderness. Normal tone. No abnormal movements. Skin: Warm and dry. No rashes or lesions. Neurologic:  No focal sensory/motor deficits in the upper or lower extremities. Cranial nerves:  grossly non-focal 2-12. Psychiatric: Alert and oriented, normal insight and thought content. Capillary Refill: Brisk,< 3 seconds. Peripheral Pulses: +2 palpable, equal bilaterally.        Labs:   Recent Labs     06/15/23  1200 06/16/23  0800   WBC 12.0* 9.9   HGB 13.4 12.7   HCT 44.5 41.5    272     Recent Labs     06/15/23  1200 06/16/23  0800    140   K 3.7 3.6    104   CO2 24 24   BUN 12 10   CREATININE 0.8 0.6   CALCIUM 9.9 9.3     Recent Labs     06/15/23  1200   AST 17   ALT 16   BILITOT 0.2*   ALKPHOS 147*     No results for input(s): INR in the last 72

## 2023-06-17 NOTE — CONSULTS
Nutrient Delivery: Continue NPO (start diet and ONS when able)  Nutrition Education/Counseling: Education initiated (gastroparesis MNT and reviewed [facility] materials given to pt)  Coordination of Nutrition Care: Continue to monitor while inpatient       Goals:     Goals: by next RD assessment, Initiate PO diet       Nutrition Monitoring and Evaluation:   Behavioral-Environmental Outcomes: None Identified  Food/Nutrient Intake Outcomes: Diet Advancement/Tolerance  Physical Signs/Symptoms Outcomes: Biochemical Data, Fluid Status or Edema, Weight, Skin, Nausea or Vomiting    Discharge Planning:     (continue outpatient nutrition counseling)     Kristen Benjamin MS, RD, LD  Contact: [phone]
as needed for Constipation 2/8/23   AURELIO Montoya CNP   topiramate (TOPAMAX) 100 MG tablet Take 1 tablet by mouth 2 times daily    Historical Provider, MD   metFORMIN HCl 500 MG/5ML SOLN 1,000 mg daily (with breakfast) Take 10 mL via peg tube once daily    Historical Provider, MD   linaclotide (LINZESS) 145 MCG capsule Take 1 capsule by mouth every morning (before breakfast)    Historical Provider, MD   baclofen (LIORESAL) 10 MG tablet Take 1 tablet by mouth daily    Historical Provider, MD   prazosin (MINIPRESS) 1 MG capsule Take 1 capsule by mouth nightly    Historical Provider, MD   ARIPiprazole (ABILIFY) 1 MG/ML SOLN solution Take 10 mLs by mouth nightly 10 mL via peg tube every evening    Historical Provider, MD   busPIRone (BUSPAR) 5 MG tablet Take 1 tablet by mouth 3 times daily    Historical Provider, MD   pantoprazole (PROTONIX) 40 MG tablet Take 1 tablet by mouth daily    Historical Provider, MD   polyethylene glycol (GLYCOLAX) 17 GM/SCOOP powder Dispense 238 Gram Bottle. Use as Directed  Patient taking differently: Take 17 g by mouth daily as needed Dispense 238 Gram Bottle.   Use as Directed 9/21/22   AURELIO Marte CNP   acetaminophen (TYLENOL) 325 MG tablet Take 2 tablets by mouth 4 times daily as needed for Pain or Fever 3/3/22   Genesis Harry MD   montelukast (SINGULAIR) 10 MG tablet Take 1 tablet by mouth nightly    Historical Provider, MD   amLODIPine (NORVASC) 5 MG tablet Take 1 tablet by mouth daily 4/2/20   Aimee Giron MD   escitalopram (LEXAPRO) 5 MG/5ML solution 20 mLs daily Take 20 mL via peg tube once daily    Historical Provider, MD   albuterol (PROVENTIL) (2.5 MG/3ML) 0.083% nebulizer solution Take 3 mLs by nebulization every 6 hours as needed for Wheezing 8/2/18   AURELIO Nieves CNP   Spacer/Aero-Holding Chambers (E-Z SPACER) AISLINN 1 Device by Does not apply route daily 8/10/17   Salo Gautam DO       Current Meds:  Current Facility-Administered

## 2023-06-19 ENCOUNTER — CARE COORDINATION (OUTPATIENT)
Dept: CARE COORDINATION | Age: 33
End: 2023-06-19

## 2023-06-19 NOTE — CARE COORDINATION
Spoke with Mark Deras briefly as pt was just leaving PCP office for hospital f/u. Mark Deras admitted to [facility] for abd pain, n/v.  D/c home on 6/17. Pt reports that PCP did change some of her medications today but unable to review at this time. Pt has been able to keep food and fluids down but continues to experience some abd pain. Pt has GI appt next wk on 6/26. Sleep appt tomorrow at 1pm.   Will attempt to f/u with pt tomorrow morning.

## 2023-06-19 NOTE — CARE COORDINATION
6/19/23, 9:08 AM EDT      Discharged home 6/17/23 with wife; no needs. Follow up with PCP and GI as OP. Patient goals/plan/ treatment preferences discussed by  and . Patient goals/plan/ treatment preferences reviewed with patient/ family. Patient/ family verbalize understanding of discharge plan and are in agreement with goal/plan/treatment preferences. Understanding was demonstrated using the teach back method. AVS provided by RN at time of discharge, which includes all necessary medical information pertaining to the patients current course of illness, treatment, post-discharge goals of care, and treatment preferences.      Services At/After Discharge: None       IMM Letter  IMM Letter given to Patient/Family/Significant other/Guardian/POA/by[de-identified] patient registration  IMM Letter date given[de-identified] 06/15/23  IMM Letter time given[de-identified] 9345

## 2023-06-20 ENCOUNTER — CARE COORDINATION (OUTPATIENT)
Dept: CARE COORDINATION | Age: 33
End: 2023-06-20

## 2023-06-20 ENCOUNTER — OFFICE VISIT (OUTPATIENT)
Dept: PULMONOLOGY | Age: 33
End: 2023-06-20
Payer: MEDICARE

## 2023-06-20 VITALS
HEIGHT: 64 IN | OXYGEN SATURATION: 98 % | BODY MASS INDEX: 50.02 KG/M2 | SYSTOLIC BLOOD PRESSURE: 120 MMHG | WEIGHT: 293 LBS | HEART RATE: 100 BPM | DIASTOLIC BLOOD PRESSURE: 88 MMHG

## 2023-06-20 DIAGNOSIS — R06.83 SNORING: ICD-10-CM

## 2023-06-20 DIAGNOSIS — R06.81 WITNESSED EPISODE OF APNEA: Primary | ICD-10-CM

## 2023-06-20 DIAGNOSIS — G47.19 EXCESSIVE DAYTIME SLEEPINESS: ICD-10-CM

## 2023-06-20 DIAGNOSIS — G47.30 SLEEP APNEA, UNSPECIFIED TYPE: ICD-10-CM

## 2023-06-20 DIAGNOSIS — G47.8 SLEEP PARALYSIS: ICD-10-CM

## 2023-06-20 DIAGNOSIS — I10 ESSENTIAL HYPERTENSION: ICD-10-CM

## 2023-06-20 LAB
EKG ATRIAL RATE: 64 BPM
EKG P AXIS: 22 DEGREES
EKG P-R INTERVAL: 162 MS
EKG Q-T INTERVAL: 388 MS
EKG QRS DURATION: 82 MS
EKG QTC CALCULATION (BAZETT): 400 MS
EKG R AXIS: 8 DEGREES
EKG T AXIS: 21 DEGREES
EKG VENTRICULAR RATE: 64 BPM

## 2023-06-20 PROCEDURE — 3079F DIAST BP 80-89 MM HG: CPT | Performed by: INTERNAL MEDICINE

## 2023-06-20 PROCEDURE — 3074F SYST BP LT 130 MM HG: CPT | Performed by: INTERNAL MEDICINE

## 2023-06-20 PROCEDURE — 99204 OFFICE O/P NEW MOD 45 MIN: CPT | Performed by: INTERNAL MEDICINE

## 2023-06-20 RX ORDER — OXYBUTYNIN CHLORIDE 5 MG/1
5 TABLET, EXTENDED RELEASE ORAL DAILY
COMMUNITY

## 2023-06-20 RX ORDER — CHOLECALCIFEROL (VITAMIN D3) 125 MCG
1 CAPSULE ORAL DAILY
COMMUNITY

## 2023-06-20 RX ORDER — M-VIT,TX,IRON,MINS/CALC/FOLIC 27MG-0.4MG
1 TABLET ORAL DAILY
COMMUNITY

## 2023-06-20 RX ORDER — VARENICLINE TARTRATE 0.5 MG/1
0.5 TABLET, FILM COATED ORAL 2 TIMES DAILY
COMMUNITY

## 2023-06-20 NOTE — CARE COORDINATION
Ambulatory Care Coordination Note  2023    Patient Current Location:  Home: [place]  [facility] [place]     ACM contacted the patient by telephone. Verified name and  with patient as identifiers. Provided introduction to self, and explanation of the ACM role. Challenges to be reviewed by the provider   Additional needs identified to be addressed with provider: No  none               Method of communication with provider: none. ACM: Tricia Primrose, RN      Pt has ate chips earlier today and chicken noodle soup today. Stomach feels full. Encouraged to avoid eating junk food. Encouraged to eat bland diet. Avoid high fat, greasy, spicy foods. Diarrhea has returned. Reports that she is going 3-4x. Diarrhea started 2 days after coming home from hospital.    Drinking Crystal light. Has not had to take Phenergan since d/c reports that she is burping up sulfur like taste. Encouraged to continue following gastroparesis diet. Encouraged to cut food up in small bites and chew food thoroughly. Has appt with GI next wk. Continues to work with Dr. Mireille Adkins in [facility] for bariatric work-up. Pt is waiting to hear from [facility] re: appt with counselor/therapist.   Pt was seen yesterday by her PCP. Has another f/u . Ditropan was added. Also prescribed Chantix. Pt reports that she was on this medication before and tolerated it. Working to try to quit smoking. Seen today by sleep. Home sleep study has been ordered for next mth. Offered patient enrollment in the Remote Patient Monitoring (RPM) program for in-home monitoring: NA.  Plan of care:  Continue close f/u with [place] for bariatric work up  Follow gastroparesis diet  Stay hydrated. F/u with GI next wk  [facility] is supposed to reach out to pt once they have her aligned with counselor/therapist  Avoid junk food  Complete sleep study as scheduled  Notify GI if diarrhea does not improve.    Call
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
## 2023-06-21 RX ORDER — FUROSEMIDE 20 MG/1
TABLET ORAL
Qty: 30 TABLET | Refills: 0 | OUTPATIENT
Start: 2023-06-21

## 2023-06-28 ENCOUNTER — TELEPHONE (OUTPATIENT)
Dept: PHARMACY | Age: 33
End: 2023-06-28

## 2023-07-05 ENCOUNTER — CARE COORDINATION (OUTPATIENT)
Dept: CARE COORDINATION | Age: 33
End: 2023-07-05

## 2023-07-05 RX ORDER — FUROSEMIDE 20 MG/1
TABLET ORAL
Qty: 90 TABLET | Refills: 2 | OUTPATIENT
Start: 2023-07-05

## 2023-07-05 RX ORDER — FUROSEMIDE 20 MG/1
TABLET ORAL
Qty: 30 TABLET | Refills: 0 | OUTPATIENT
Start: 2023-07-05

## 2023-07-05 NOTE — CARE COORDINATION
Ambulatory Care Coordination Note  2023    Patient Current Location:  Home: [address] [place] [address] [address]  [address]     ACM contacted the patient by telephone. Verified name and  with patient as identifiers. Provided introduction to self, and explanation of the ACM role. Challenges to be reviewed by the provider   Additional needs identified to be addressed with provider: No  none               Method of communication with provider: none. ACM: Ricki Carrillo RN       Ramses Ulrich was referred to care coordination by payor for education and assistance in managing chronic conditions and assisting with health care needs. Pt has h/o: CAD, anemia, DM, gastroparesis, obesity. Smoking cessation-continues to work on cessation. Down to 5 cigarettes. Now using nicotrol inhaler. Started using that on . Working with [facility] smoking cessation clinic. GI-had appt on . Started on probiotic. Gastric emptying study ordered. Has not been scheduled at this time. Keeping food and fluids down. Getting nauseated at times but has not had any vomiting. Reports that she had dark stool appx 3 days ago. Encouraged to monitor. Denies bright red blood in stool. Offered patient enrollment in the Remote Patient Monitoring (RPM) program for in-home monitoring: NA.  Still waiting to hear from [facility] re: counselor. Pt has been in touch with them. Plan of care:  Encouraged to f/u with resource center in 2 wks if no call from counseling service  Obtain probiotic at pharmacy as pt has not done so yet. Close monitoring of stool  Small frequent meals, avoid sodas, avoid high fat, greasy foods. Keep diet bland  Continue working with wt [facility] and smoking cessation clinic. General Assessment    Do you have any symptoms that are causing concern?: Yes  Progression since Onset: Gradually Improving  Reported Symptoms: Nausea, Vomiting (Comment: has not had any vomiting.   nausea occurring

## 2023-07-11 RX ORDER — FUROSEMIDE 20 MG/1
TABLET ORAL
Qty: 30 TABLET | Refills: 0 | OUTPATIENT
Start: 2023-07-11

## 2023-07-17 ENCOUNTER — HOSPITAL ENCOUNTER (OUTPATIENT)
Dept: SLEEP CENTER | Age: 33
Discharge: HOME | End: 2023-07-19
Payer: MEDICARE

## 2023-07-17 DIAGNOSIS — R06.83 SNORING: ICD-10-CM

## 2023-07-17 DIAGNOSIS — G47.19 EXCESSIVE DAYTIME SLEEPINESS: ICD-10-CM

## 2023-07-17 DIAGNOSIS — R06.81 WITNESSED EPISODE OF APNEA: ICD-10-CM

## 2023-07-17 DIAGNOSIS — G47.30 SLEEP APNEA, UNSPECIFIED TYPE: ICD-10-CM

## 2023-07-17 DIAGNOSIS — G47.8 SLEEP PARALYSIS: ICD-10-CM

## 2023-07-17 PROCEDURE — 95806 SLEEP STUDY UNATT&RESP EFFT: CPT

## 2023-07-17 NOTE — PROGRESS NOTES
Ramses Ulrich presents today for a HST instruction and demonstration on unit # [id number]. Questions were asked and answers given. She was able to return demonstration and verbalized understanding. The sleep center control room phone number was provided in case questions arise during the study. Informed patient to call 911 in case of an emergency. She states she will return the unit tomorrow before 1000. Title:  Home Sleep Apnea Testing (HSAT)     Approved by:  Billy Luo MD        Approval Date: December, 2021  Next Review: December, 2023       Responsible Party:  Patience Dawley  Institution/Entities Applies to:    [address] Number:  None      Document Type:  Such as Guideline, Policy,   Policy & Procedure, or Procedure, Instructions   Manual:  Policy and Procedures     Section: IV  Policy Start Date: June, 2011       HOME SLEEP APNEA TESTING (HSAT)      PURPOSE: To ensure home sleep apnea testing (HSAT) conducted by the sleep facility adheres to the current AASM practice parameters, clinical practice guidelines, best practice and clinical guidelines in regard to the diagnosis of JOVANY in adults. POLICY:      HSAT is a method of recording certain parameters which will target and measure, minimally, heart rate, oxygen saturation, respiratory airflow, respiratory effort and snoring for the purpose of evaluating a patient for JOVANY. HSAT will be performed in conjunction with a comprehensive sleep evaluation by an appropriately licensed sleep facility medical staff member. All portable monitoring equipment will be FDA-approved and appropriately maintained to ensure patient safety and efficiency of the test.       PROCEDURE:      An order from a licensed sleep physician along with appropriate medical history documenting the indication for HSAT that complies with the AASM practice parameters.     All tests will be performed, and records

## 2023-07-18 ENCOUNTER — TELEPHONE (OUTPATIENT)
Dept: INTERNAL MEDICINE CLINIC | Age: 33
End: 2023-07-18

## 2023-07-18 LAB — STATUS: NORMAL

## 2023-07-18 NOTE — TELEPHONE ENCOUNTER
Outpatient dietitian returning call to patient. She stated she had to cancel her last appt d/t busy moving and going to her wife's appointments. She stated she was doing well and no nutritional/diet concerns at this time. She stated she would callback to get rescheduled.

## 2023-07-20 ENCOUNTER — CARE COORDINATION (OUTPATIENT)
Dept: CARE COORDINATION | Age: 33
End: 2023-07-20

## 2023-07-20 NOTE — CARE COORDINATION
Attempted to reach patient for continued Care Coordination follow up and education. Patient was unavailable at the time of my call, and a generic voicemail message was left asking patient to return my call at [phone].

## 2023-07-22 DIAGNOSIS — G47.33 OSA (OBSTRUCTIVE SLEEP APNEA): Primary | ICD-10-CM

## 2023-07-28 ENCOUNTER — TELEMEDICINE (OUTPATIENT)
Dept: PULMONOLOGY | Age: 33
End: 2023-07-28

## 2023-07-28 ENCOUNTER — CARE COORDINATION (OUTPATIENT)
Dept: CARE COORDINATION | Age: 33
End: 2023-07-28

## 2023-07-28 DIAGNOSIS — G47.33 OSA (OBSTRUCTIVE SLEEP APNEA): Primary | ICD-10-CM

## 2023-07-28 DIAGNOSIS — I10 ESSENTIAL HYPERTENSION: ICD-10-CM

## 2023-07-28 DIAGNOSIS — F17.200 SMOKER: ICD-10-CM

## 2023-07-28 DIAGNOSIS — E66.01 MORBID OBESITY WITH BMI OF 50.0-59.9, ADULT (HCC): ICD-10-CM

## 2023-07-28 NOTE — CARE COORDINATION
Ambulatory Care Coordination Note  2023    Patient Current Location:  Home: [address] [place] [address] [address]  [address]     ACM contacted the patient by telephone. Verified name and  with patient as identifiers. Provided introduction to self, and explanation of the ACM role. Challenges to be reviewed by the provider   Additional needs identified to be addressed with provider: No  none               Method of communication with provider: none. ACM: Rosey Diaz RN    Lu Lamb was [facility] for education and assistance in managing chronic conditions and assisting with health care needs. Pt has h/o: CAD, anemia, DM, gastroparesis, obesity. Smoking cessation- down to 4 cigarettes. Participating in smoking cessation classes through [employer]. Behavioral health-following with counselor and psych. Getting work up for bariatric surgery  Wt 292 #. Walking daily. JOVANY-had recent sleep study. F/u completed today by Sleep medicine. PAP ordered. Following with GI. On probiotic now. Still waiting to get gastric emptying study. Has f/u with GI at end of August.   Nausea occurring at night but not occurring during the day. Has been able to keep food down. Some days not able to. Continue following bland diet. Offered patient enrollment in the Remote Patient Monitoring (RPM) program for in-home monitoring: NA.  Plan of care: Oncology contact info sent via My Chart at pt's request as she is unsure when she needs to f/u with them since she received IV iron infusions. Continue close f/u with GI  Continue close f/u with psych  Follow bland diet. Taken nausea medication PRN. F/u with [facility] re: PAP ordered. General Assessment    Do you have any symptoms that are causing concern?: Yes  Progression since Onset: Unchanged, Gradually Improving  Reported Symptoms:  (Comment: nausea. occurring primarily at night.  keeping food and fluids down. taking nausea medication PRN. )

## 2023-07-28 NOTE — PROGRESS NOTES
Mushroom Extract Complex Swelling    Reglan [Metoclopramide] Itching    Vicodin [Hydrocodone-Acetaminophen] Itching    Vistaril [Hydroxyzine Hcl] Itching    Zofran [Ondansetron] Hives    Adhesive Tape Rash       MEDS  Current Outpatient Medications   Medication Sig Dispense Refill    vitamin A 3 MG (29504 UT) capsule Take 1 capsule by mouth daily      VITAMIN D PO Take by mouth      Multiple Vitamins-Minerals (THERAPEUTIC MULTIVITAMIN-MINERALS) tablet Take 1 tablet by mouth daily      Cholecalciferol (VITAMIN D) 125 MCG (5000 UT) CAPS Take 1 capsule by mouth daily      oxybutynin (DITROPAN-XL) 5 MG extended release tablet Take 1 tablet by mouth daily      varenicline (CHANTIX) 0.5 MG tablet Take 1 tablet by mouth 2 times daily      acetaminophen (TYLENOL) 325 MG tablet Take 2 tablets by mouth every 6 hours as needed for Pain 168 tablet 0    dicyclomine (BENTYL) 10 MG capsule Take 1 capsule by mouth in the morning, at noon, and at bedtime for 14 days 42 capsule 0    metoclopramide (REGLAN) 10 MG tablet Take 1 tablet by mouth 3 times daily (with meals) for 21 days 63 tablet 0    famotidine (PEPCID) 40 MG tablet take 1 tablet by mouth nightly 30 tablet 3    nicotine (NICODERM CQ) 14 MG/24HR Place 1 patch onto the skin every 24 hours OTC      nicotine polacrilex (NICORETTE) 4 MG gum Take 1 each by mouth as needed for Smoking cessation OTC      propranolol (INDERAL LA) 60 MG extended release capsule Take 1 capsule by mouth daily      Incontinence Supply Disposable (DEPEND SILHOUETTE BRIEFS L/XL) MISC Use as needed for urinary and bowel incontinence 5 each 5    docusate sodium (COLACE) 100 MG capsule Take 1 capsule by mouth 2 times daily as needed for Constipation 30 capsule 2    topiramate (TOPAMAX) 100 MG tablet Take 1 tablet by mouth 2 times daily      metFORMIN HCl 500 MG/5ML SOLN 1,000 mg daily (with breakfast) Take 10 mL via peg tube once daily      linaclotide (LINZESS) 145 MCG capsule Take 1 capsule by mouth every

## 2023-07-31 ENCOUNTER — TELEPHONE (OUTPATIENT)
Dept: PULMONOLOGY | Age: 33
End: 2023-07-31

## 2023-07-31 NOTE — TELEPHONE ENCOUNTER
Patient is calling in asking for her pap orders to be faxed to [facility]. She said Wellington Rincon told her it will be a 2 month wait to get a machine. But she talked to [facility] this morning and they have some in stock. Please call her to confirm.

## 2023-08-07 ENCOUNTER — TELEPHONE (OUTPATIENT)
Dept: PULMONOLOGY | Age: 33
End: 2023-08-07

## 2023-08-07 RX ORDER — FUROSEMIDE 20 MG/1
TABLET ORAL
Qty: 90 TABLET | Refills: 2 | OUTPATIENT
Start: 2023-08-07

## 2023-08-07 NOTE — TELEPHONE ENCOUNTER
Pt is calling and states [address] had contacted her and told her they had the cpap machines and then called her back and said they did not have any. She is wanting to know what to do stating it is hard for her to breathe at night.   Please advise pt at [phone]

## 2023-08-07 NOTE — TELEPHONE ENCOUNTER
Called patient and advised that if she is experiencing difficulty breathing / pains in the chest to seek treatment in the ED.  -  Patient advised that all [employer] companies are on back order for PAP machines. -  I told her that I would send this message to you as an FYI.

## 2023-08-16 NOTE — ONCOLOGY
Inserted leaking tube that was protruding from patient into a ostomy bag. This nurse then attached ostomy bag to patient abdomen. Will now be able to document amount and consistency of drainage from tube while keeping patient dry. Patient in favor of this dressing method. Ndc# For Kenalog Only: 1257-1540-78 Ndc# For Kenalog Only: 5397-4651-79

## 2023-08-18 ENCOUNTER — CARE COORDINATION (OUTPATIENT)
Dept: CARE COORDINATION | Age: 33
End: 2023-08-18

## 2023-08-25 ENCOUNTER — CARE COORDINATION (OUTPATIENT)
Dept: CARE COORDINATION | Age: 33
End: 2023-08-25

## 2023-08-30 RX ORDER — FUROSEMIDE 20 MG/1
TABLET ORAL
Qty: 90 TABLET | Refills: 2 | OUTPATIENT
Start: 2023-08-30

## 2023-09-07 ENCOUNTER — TELEPHONE (OUTPATIENT)
Dept: PULMONOLOGY | Age: 33
End: 2023-09-07

## 2023-09-07 NOTE — TELEPHONE ENCOUNTER
Pt called in regarding her [employer] and whether we would continue to except it. I told the pt that as of right now we had not reached an agreement with [employer], I advised the pt to see if she could get her insurance switched to a different company or to request a continuity of care through [employer]. Pt stated that she was not going to switch insurance that she would see if she she can get a continuity of care or she will find a new doctor.

## 2023-09-14 ENCOUNTER — CARE COORDINATION (OUTPATIENT)
Dept: CARE COORDINATION | Age: 33
End: 2023-09-14

## 2023-09-14 NOTE — CARE COORDINATION
Concerns are being addressed by her specialists. No recent hospital admissions and no ED visits in the last few mths. Will graduate from care coordination at this time. General Assessment    Do you have any symptoms that are causing concern?: Yes  Progression since Onset: Gradually Improving  Reported Symptoms: Other (Comment: JOVANY-now has CPAP. following with sleep medicine)           Offered patient enrollment in the Remote Patient Monitoring (RPM) program for in-home monitoring: NA. Lab Results       None                 Goals Addressed                   This Visit's Progress     COMPLETED: Conditions and Symptoms        I will schedule office visits, as directed by my provider. I will keep my appointment or reschedule if I have to cancel. I will notify my provider of any barriers to my plan of care. I will follow my Zone Management tool to seek urgent or emergent care. I will notify my provider of any symptoms that indicate a worsening of my condition. Barriers: overwhelmed by complexity of regimen, stress, and lack of education  Plan for overcoming my barriers: education and support from [facility], PCP, specialists.    Confidence: 8/10  Anticipated Goal Completion Date: 8/11/23                Future Appointments   Date Time Provider [address]   9/15/2023  3:30 PM Trent Brown RCP [facility] MED [facility]   9/26/2023  1:15 PM Francisca Martinez, [address]   10/16/2023  8:00 AM MD DC [facility]   2/27/2024  2:00 PM Johanna Cuevas MD N [facility]

## 2023-09-28 RX ORDER — FUROSEMIDE 20 MG/1
20 TABLET ORAL DAILY
Qty: 90 TABLET | Refills: 1 | OUTPATIENT
Start: 2023-09-28

## 2023-09-29 ENCOUNTER — OFFICE VISIT (OUTPATIENT)
Dept: PULMONOLOGY | Age: 33
End: 2023-09-29
Payer: MEDICARE

## 2023-09-29 VITALS
TEMPERATURE: 96.8 F | HEIGHT: 64 IN | HEART RATE: 82 BPM | DIASTOLIC BLOOD PRESSURE: 90 MMHG | BODY MASS INDEX: 50.02 KG/M2 | SYSTOLIC BLOOD PRESSURE: 132 MMHG | OXYGEN SATURATION: 96 % | WEIGHT: 293 LBS

## 2023-09-29 DIAGNOSIS — G47.09 OTHER INSOMNIA: ICD-10-CM

## 2023-09-29 DIAGNOSIS — E66.01 MORBID OBESITY WITH BMI OF 50.0-59.9, ADULT (HCC): ICD-10-CM

## 2023-09-29 DIAGNOSIS — G47.33 OSA (OBSTRUCTIVE SLEEP APNEA): Primary | ICD-10-CM

## 2023-09-29 PROCEDURE — 3080F DIAST BP >= 90 MM HG: CPT | Performed by: INTERNAL MEDICINE

## 2023-09-29 PROCEDURE — 99213 OFFICE O/P EST LOW 20 MIN: CPT | Performed by: INTERNAL MEDICINE

## 2023-09-29 PROCEDURE — 3075F SYST BP GE 130 - 139MM HG: CPT | Performed by: INTERNAL MEDICINE

## 2023-09-29 RX ORDER — ZOLPIDEM TARTRATE 5 MG/1
5 TABLET ORAL NIGHTLY PRN
Qty: 30 TABLET | Refills: 0 | Status: SHIPPED | OUTPATIENT
Start: 2023-09-29 | End: 2023-10-29

## 2023-09-29 NOTE — PROGRESS NOTES
3/8/2019    REMOVAL OF LEFT PORT AND PLACEMENT OF SINGLE LUMEN MEDIPORT RIGHT SIDE performed by Swati Orozco MD at [address] [employer] N/A 10/8/2019    ROBOTIC DIAGNOSTIC LAPAROSCOPY,  RECURRENT HIATAL HERNIA REPAIR, REVISION FUNDOPLICATION performed by Gucci Elias MD at [address] [facility] N/A 7/15/2022    ROBOTIC LAPAROSCOPY, LYSIS OF ADHESIONS performed by Swati Orozco MD at [address] [facility]  08/12/2016    Right Port Removal, Placement of new Port Left by Dr Jacinto Nava  12/02/2019    Mediport insertion-IR Dr Joshua Nieto (CERVIX NOT REMOVED)  4/8/16    PORT SURGERY N/A 11/11/2019    REMOVAL OF RIGHT MEDIPORT & ATTEMPTED PLACEMENT OF LEFT SINGLE LUMEN MEDIPORT performed by Swati Orozco MD at [address] [facility] Right 11/29/2019    RT INTERNAL JUGULAR SINGLE LUMEN MEDIPORT INSERTION performed by Swati Orozco MD at [address] [facility] EGD [address] <30 MM DIAM Left 8/8/2017    EGD/DIL performed by Tj Ash MD at [facility]    OR EGD [address] <30 MM DIAM N/A 9/26/2017    EGD DIL performed by Tj Ash MD at [facility]    OR EGD [address] <30 MM DIAM Left 12/12/2017    EGD DIL performed by Tj Ash MD at [facility]    OR EGD [address] <30 MM DIAM N/A 2/13/2018    EGD  DILATATION performed by Tj Ash MD at [address] [facility] [address] CTR VAD W/SUBQ PORT UNDER 5 YR Bilateral 1/5/2018    EXCISION OF MEDIPORT LEFT SIDE, INSERTION MEDIPORT RIGHT  IJ performed by Swati Orozco MD at [address] [place] [address] OFFICE/OUTPT [address] N/A 5/15/2018    EGD DILATATION performed by Tj Ash MD at [address] OFFICE/OUTPT VISIT,PROCEDURE ONLY Bilateral 9/10/2018    REMOVAL RT MEDIPORT, INSERTION LEFT performed by Swati Orozco MD at [address] [facility] OFFICE/OUTPT

## 2023-10-09 ENCOUNTER — APPOINTMENT (OUTPATIENT)
Dept: GENERAL RADIOLOGY | Age: 33
DRG: 392 | End: 2023-10-09
Payer: MEDICARE

## 2023-10-09 ENCOUNTER — APPOINTMENT (OUTPATIENT)
Dept: CT IMAGING | Age: 33
DRG: 392 | End: 2023-10-09
Payer: MEDICARE

## 2023-10-09 ENCOUNTER — HOSPITAL ENCOUNTER (INPATIENT)
Age: 33
LOS: 3 days | Discharge: HOME | DRG: 392 | End: 2023-10-13
Attending: STUDENT IN AN ORGANIZED HEALTH CARE EDUCATION/TRAINING PROGRAM
Payer: MEDICARE

## 2023-10-09 DIAGNOSIS — R11.2 NAUSEA AND VOMITING, UNSPECIFIED VOMITING TYPE: ICD-10-CM

## 2023-10-09 DIAGNOSIS — R11.10 INTRACTABLE VOMITING: ICD-10-CM

## 2023-10-09 DIAGNOSIS — E11.9 ENCOUNTER FOR DIABETIC FOOT EXAM (HCC): ICD-10-CM

## 2023-10-09 DIAGNOSIS — G89.29 CHRONIC ABDOMINAL PAIN: Primary | ICD-10-CM

## 2023-10-09 DIAGNOSIS — R10.9 CHRONIC ABDOMINAL PAIN: Primary | ICD-10-CM

## 2023-10-09 DIAGNOSIS — K31.84 GASTROPARESIS: ICD-10-CM

## 2023-10-09 LAB
ALBUMIN SERPL BCG-MCNC: 3.9 G/DL (ref 3.5–5.1)
ALP SERPL-CCNC: 139 U/L (ref 38–126)
ALT SERPL W/O P-5'-P-CCNC: 13 U/L (ref 11–66)
ANION GAP SERPL CALC-SCNC: 14 MEQ/L (ref 8–16)
AST SERPL-CCNC: 14 U/L (ref 5–40)
BACTERIA URNS QL MICRO: ABNORMAL /HPF
BASOPHILS ABSOLUTE: 0.1 THOU/MM3 (ref 0–0.1)
BASOPHILS NFR BLD AUTO: 0.5 %
BILIRUB SERPL-MCNC: < 0.2 MG/DL (ref 0.3–1.2)
BILIRUB UR QL STRIP.AUTO: NEGATIVE
BUN SERPL-MCNC: 15 MG/DL (ref 7–22)
CALCIUM SERPL-MCNC: 9.5 MG/DL (ref 8.5–10.5)
CASTS #/AREA URNS LPF: ABNORMAL /LPF
CASTS 2: ABNORMAL /LPF
CHARACTER UR: CLEAR
CHLORIDE SERPL-SCNC: 106 MEQ/L (ref 98–111)
CO2 SERPL-SCNC: 21 MEQ/L (ref 23–33)
COLOR: YELLOW
CREAT SERPL-MCNC: 0.9 MG/DL (ref 0.4–1.2)
CRYSTALS URNS MICRO: ABNORMAL
DEPRECATED RDW RBC AUTO: 41.6 FL (ref 35–45)
EOSINOPHIL NFR BLD AUTO: 1.4 %
EOSINOPHILS ABSOLUTE: 0.2 THOU/MM3 (ref 0–0.4)
EPITHELIAL CELLS, UA: ABNORMAL /HPF
ERYTHROCYTE [DISTWIDTH] IN BLOOD BY AUTOMATED COUNT: 14.9 % (ref 11.5–14.5)
FLUAV RNA RESP QL NAA+PROBE: NOT DETECTED
FLUBV RNA RESP QL NAA+PROBE: NOT DETECTED
GFR SERPL CREATININE-BSD FRML MDRD: > 60 ML/MIN/1.73M2
GLUCOSE SERPL-MCNC: 99 MG/DL (ref 70–108)
GLUCOSE UR QL STRIP.AUTO: NEGATIVE MG/DL
HCT VFR BLD AUTO: 40.6 % (ref 37–47)
HGB BLD-MCNC: 12.6 GM/DL (ref 12–16)
HGB UR QL STRIP.AUTO: ABNORMAL
IMM GRANULOCYTES # BLD AUTO: 0.04 THOU/MM3 (ref 0–0.07)
IMM GRANULOCYTES NFR BLD AUTO: 0.3 %
KETONES UR QL STRIP.AUTO: NEGATIVE
LIPASE SERPL-CCNC: 31.3 U/L (ref 5.6–51.3)
LYMPHOCYTES ABSOLUTE: 3.3 THOU/MM3 (ref 1–4.8)
LYMPHOCYTES NFR BLD AUTO: 27.4 %
MCH RBC QN AUTO: 24.1 PG (ref 26–33)
MCHC RBC AUTO-ENTMCNC: 31 GM/DL (ref 32.2–35.5)
MCV RBC AUTO: 77.8 FL (ref 81–99)
MISCELLANEOUS 2: ABNORMAL
MONOCYTES ABSOLUTE: 0.7 THOU/MM3 (ref 0.4–1.3)
MONOCYTES NFR BLD AUTO: 5.6 %
NEUTROPHILS NFR BLD AUTO: 64.8 %
NITRITE UR QL STRIP: NEGATIVE
NRBC BLD AUTO-RTO: 0 /100 WBC
NT-PROBNP SERPL IA-MCNC: 157.1 PG/ML (ref 0–124)
OSMOLALITY SERPL CALC.SUM OF ELEC: 282.1 MOSMOL/KG (ref 275–300)
PH UR STRIP.AUTO: 7 [PH] (ref 5–9)
PLATELET # BLD AUTO: 302 THOU/MM3 (ref 130–400)
PMV BLD AUTO: 10 FL (ref 9.4–12.4)
POTASSIUM SERPL-SCNC: 3.6 MEQ/L (ref 3.5–5.2)
PROT SERPL-MCNC: 7 G/DL (ref 6.1–8)
PROT UR STRIP.AUTO-MCNC: NEGATIVE MG/DL
RBC # BLD AUTO: 5.22 MILL/MM3 (ref 4.2–5.4)
RBC URINE: ABNORMAL /HPF
RENAL EPI CELLS #/AREA URNS HPF: ABNORMAL /[HPF]
SARS-COV-2 RNA RESP QL NAA+PROBE: NOT DETECTED
SEGMENTED NEUTROPHILS ABSOLUTE COUNT: 7.7 THOU/MM3 (ref 1.8–7.7)
SODIUM SERPL-SCNC: 141 MEQ/L (ref 135–145)
SP GR UR REFRACT.AUTO: 1 (ref 1–1.03)
TROPONIN, HIGH SENSITIVITY: < 6 NG/L (ref 0–12)
UROBILINOGEN, URINE: 1 EU/DL (ref 0–1)
WBC # BLD AUTO: 11.9 THOU/MM3 (ref 4.8–10.8)
WBC #/AREA URNS HPF: ABNORMAL /HPF
WBC #/AREA URNS HPF: NEGATIVE /[HPF]
YEAST LIKE FUNGI URNS QL MICRO: ABNORMAL

## 2023-10-09 PROCEDURE — 71046 X-RAY EXAM CHEST 2 VIEWS: CPT

## 2023-10-09 PROCEDURE — 93005 ELECTROCARDIOGRAM TRACING: CPT | Performed by: STUDENT IN AN ORGANIZED HEALTH CARE EDUCATION/TRAINING PROGRAM

## 2023-10-09 PROCEDURE — 96374 THER/PROPH/DIAG INJ IV PUSH: CPT

## 2023-10-09 PROCEDURE — 99285 EMERGENCY DEPT VISIT HI MDM: CPT

## 2023-10-09 PROCEDURE — 87636 SARSCOV2 & INF A&B AMP PRB: CPT

## 2023-10-09 PROCEDURE — 83880 ASSAY OF NATRIURETIC PEPTIDE: CPT

## 2023-10-09 PROCEDURE — 84484 ASSAY OF TROPONIN QUANT: CPT

## 2023-10-09 PROCEDURE — 6360000002 HC RX W HCPCS: Performed by: STUDENT IN AN ORGANIZED HEALTH CARE EDUCATION/TRAINING PROGRAM

## 2023-10-09 PROCEDURE — 80307 DRUG TEST PRSMV CHEM ANLYZR: CPT

## 2023-10-09 PROCEDURE — 83690 ASSAY OF LIPASE: CPT

## 2023-10-09 PROCEDURE — 81001 URINALYSIS AUTO W/SCOPE: CPT

## 2023-10-09 PROCEDURE — 80053 COMPREHEN METABOLIC PANEL: CPT

## 2023-10-09 PROCEDURE — 85025 COMPLETE CBC W/AUTO DIFF WBC: CPT

## 2023-10-09 PROCEDURE — 2580000003 HC RX 258: Performed by: STUDENT IN AN ORGANIZED HEALTH CARE EDUCATION/TRAINING PROGRAM

## 2023-10-09 RX ORDER — 0.9 % SODIUM CHLORIDE 0.9 %
1000 INTRAVENOUS SOLUTION INTRAVENOUS ONCE
Status: COMPLETED | OUTPATIENT
Start: 2023-10-09 | End: 2023-10-10

## 2023-10-09 RX ORDER — PROMETHAZINE HYDROCHLORIDE 25 MG/ML
25 INJECTION, SOLUTION INTRAMUSCULAR; INTRAVENOUS ONCE
Status: COMPLETED | OUTPATIENT
Start: 2023-10-10 | End: 2023-10-10

## 2023-10-09 RX ORDER — MORPHINE SULFATE 4 MG/ML
4 INJECTION, SOLUTION INTRAMUSCULAR; INTRAVENOUS ONCE
Status: COMPLETED | OUTPATIENT
Start: 2023-10-09 | End: 2023-10-09

## 2023-10-09 RX ADMIN — SODIUM CHLORIDE 1000 ML: 9 INJECTION, SOLUTION INTRAVENOUS at 22:45

## 2023-10-09 RX ADMIN — MORPHINE SULFATE 4 MG: 4 INJECTION, SOLUTION INTRAMUSCULAR; INTRAVENOUS at 22:46

## 2023-10-09 ASSESSMENT — PAIN SCALES - GENERAL
PAINLEVEL_OUTOF10: 10
PAINLEVEL_OUTOF10: 10
PAINLEVEL_OUTOF10: 7
PAINLEVEL_OUTOF10: 10

## 2023-10-09 ASSESSMENT — PAIN DESCRIPTION - LOCATION
LOCATION: ABDOMEN
LOCATION: ABDOMEN

## 2023-10-09 ASSESSMENT — PAIN - FUNCTIONAL ASSESSMENT
PAIN_FUNCTIONAL_ASSESSMENT: 0-10

## 2023-10-10 ENCOUNTER — APPOINTMENT (OUTPATIENT)
Dept: CT IMAGING | Age: 33
DRG: 392 | End: 2023-10-10
Payer: MEDICARE

## 2023-10-10 PROBLEM — R11.10 INTRACTABLE VOMITING: Status: ACTIVE | Noted: 2023-10-10

## 2023-10-10 LAB
AMPHETAMINES UR QL SCN: NEGATIVE
ANION GAP SERPL CALC-SCNC: 12 MEQ/L (ref 8–16)
BARBITURATES UR QL SCN: NEGATIVE
BENZODIAZ UR QL SCN: NEGATIVE
BUN SERPL-MCNC: 12 MG/DL (ref 7–22)
BZE UR QL SCN: NEGATIVE
CALCIUM SERPL-MCNC: 9.4 MG/DL (ref 8.5–10.5)
CANNABINOIDS UR QL SCN: NEGATIVE
CHLORIDE SERPL-SCNC: 106 MEQ/L (ref 98–111)
CO2 SERPL-SCNC: 24 MEQ/L (ref 23–33)
CREAT SERPL-MCNC: 0.8 MG/DL (ref 0.4–1.2)
DEPRECATED MEAN GLUCOSE BLD GHB EST-ACNC: 123 MG/DL (ref 70–126)
DEPRECATED RDW RBC AUTO: 42.2 FL (ref 35–45)
EKG ATRIAL RATE: 70 BPM
EKG P AXIS: 25 DEGREES
EKG P-R INTERVAL: 148 MS
EKG Q-T INTERVAL: 374 MS
EKG QRS DURATION: 80 MS
EKG QTC CALCULATION (BAZETT): 403 MS
EKG R AXIS: 10 DEGREES
EKG T AXIS: 16 DEGREES
EKG VENTRICULAR RATE: 70 BPM
ERYTHROCYTE [DISTWIDTH] IN BLOOD BY AUTOMATED COUNT: 14.9 % (ref 11.5–14.5)
FENTANYL: NEGATIVE
GFR SERPL CREATININE-BSD FRML MDRD: > 60 ML/MIN/1.73M2
GLUCOSE BLD STRIP.AUTO-MCNC: 70 MG/DL (ref 70–108)
GLUCOSE BLD STRIP.AUTO-MCNC: 83 MG/DL (ref 70–108)
GLUCOSE BLD STRIP.AUTO-MCNC: 85 MG/DL (ref 70–108)
GLUCOSE BLD STRIP.AUTO-MCNC: 85 MG/DL (ref 70–108)
GLUCOSE BLD STRIP.AUTO-MCNC: 87 MG/DL (ref 70–108)
GLUCOSE SERPL-MCNC: 94 MG/DL (ref 70–108)
HBA1C MFR BLD HPLC: 6.1 % (ref 4.4–6.4)
HCT VFR BLD AUTO: 41 % (ref 37–47)
HGB BLD-MCNC: 12.6 GM/DL (ref 12–16)
MAGNESIUM SERPL-MCNC: 2 MG/DL (ref 1.6–2.4)
MCH RBC QN AUTO: 24.4 PG (ref 26–33)
MCHC RBC AUTO-ENTMCNC: 30.7 GM/DL (ref 32.2–35.5)
MCV RBC AUTO: 79.3 FL (ref 81–99)
OPIATES UR QL SCN: NEGATIVE
OSMOLALITY SERPL CALC.SUM OF ELEC: 282.6 MOSMOL/KG (ref 275–300)
OXYCODONE: NEGATIVE
PCP UR QL SCN: NEGATIVE
PLATELET # BLD AUTO: 298 THOU/MM3 (ref 130–400)
PMV BLD AUTO: 9.7 FL (ref 9.4–12.4)
POTASSIUM SERPL-SCNC: 3.7 MEQ/L (ref 3.5–5.2)
RBC # BLD AUTO: 5.17 MILL/MM3 (ref 4.2–5.4)
SODIUM SERPL-SCNC: 142 MEQ/L (ref 135–145)
WBC # BLD AUTO: 11.1 THOU/MM3 (ref 4.8–10.8)

## 2023-10-10 PROCEDURE — 6360000002 HC RX W HCPCS: Performed by: STUDENT IN AN ORGANIZED HEALTH CARE EDUCATION/TRAINING PROGRAM

## 2023-10-10 PROCEDURE — 80048 BASIC METABOLIC PNL TOTAL CA: CPT

## 2023-10-10 PROCEDURE — A4216 STERILE WATER/SALINE, 10 ML: HCPCS

## 2023-10-10 PROCEDURE — 6370000000 HC RX 637 (ALT 250 FOR IP)

## 2023-10-10 PROCEDURE — 85027 COMPLETE CBC AUTOMATED: CPT

## 2023-10-10 PROCEDURE — 2500000003 HC RX 250 WO HCPCS

## 2023-10-10 PROCEDURE — 6360000004 HC RX CONTRAST MEDICATION: Performed by: STUDENT IN AN ORGANIZED HEALTH CARE EDUCATION/TRAINING PROGRAM

## 2023-10-10 PROCEDURE — 36591 DRAW BLOOD OFF VENOUS DEVICE: CPT

## 2023-10-10 PROCEDURE — 99223 1ST HOSP IP/OBS HIGH 75: CPT | Performed by: INTERNAL MEDICINE

## 2023-10-10 PROCEDURE — 83088 ASSAY OF HISTAMINE: CPT

## 2023-10-10 PROCEDURE — 96372 THER/PROPH/DIAG INJ SC/IM: CPT

## 2023-10-10 PROCEDURE — 6370000000 HC RX 637 (ALT 250 FOR IP): Performed by: NURSE PRACTITIONER

## 2023-10-10 PROCEDURE — 94660 CPAP INITIATION&MGMT: CPT

## 2023-10-10 PROCEDURE — 6360000002 HC RX W HCPCS

## 2023-10-10 PROCEDURE — 2700000000 HC OXYGEN THERAPY PER DAY

## 2023-10-10 PROCEDURE — 82948 REAGENT STRIP/BLOOD GLUCOSE: CPT

## 2023-10-10 PROCEDURE — 1200000003 HC TELEMETRY R&B

## 2023-10-10 PROCEDURE — 96375 TX/PRO/DX INJ NEW DRUG ADDON: CPT

## 2023-10-10 PROCEDURE — 2580000003 HC RX 258

## 2023-10-10 PROCEDURE — 82308 ASSAY OF CALCITONIN: CPT

## 2023-10-10 PROCEDURE — 93010 ELECTROCARDIOGRAM REPORT: CPT | Performed by: INTERNAL MEDICINE

## 2023-10-10 PROCEDURE — 86316 IMMUNOASSAY TUMOR OTHER: CPT

## 2023-10-10 PROCEDURE — 2580000003 HC RX 258: Performed by: INTERNAL MEDICINE

## 2023-10-10 PROCEDURE — 74177 CT ABD & PELVIS W/CONTRAST: CPT

## 2023-10-10 PROCEDURE — 83735 ASSAY OF MAGNESIUM: CPT

## 2023-10-10 PROCEDURE — 83036 HEMOGLOBIN GLYCOSYLATED A1C: CPT

## 2023-10-10 PROCEDURE — C9113 INJ PANTOPRAZOLE SODIUM, VIA: HCPCS

## 2023-10-10 RX ORDER — ACETAMINOPHEN 325 MG/1
650 TABLET ORAL EVERY 6 HOURS PRN
Status: DISCONTINUED | OUTPATIENT
Start: 2023-10-10 | End: 2023-10-13 | Stop reason: HOSPADM

## 2023-10-10 RX ORDER — INSULIN LISPRO 100 [IU]/ML
0-4 INJECTION, SOLUTION INTRAVENOUS; SUBCUTANEOUS
Status: DISCONTINUED | OUTPATIENT
Start: 2023-10-10 | End: 2023-10-13 | Stop reason: HOSPADM

## 2023-10-10 RX ORDER — ZOLPIDEM TARTRATE 5 MG/1
5 TABLET ORAL NIGHTLY PRN
Status: DISCONTINUED | OUTPATIENT
Start: 2023-10-10 | End: 2023-10-13 | Stop reason: HOSPADM

## 2023-10-10 RX ORDER — PANTOPRAZOLE SODIUM 40 MG/10ML
40 INJECTION, POWDER, LYOPHILIZED, FOR SOLUTION INTRAVENOUS DAILY
Status: DISCONTINUED | OUTPATIENT
Start: 2023-10-10 | End: 2023-10-13

## 2023-10-10 RX ORDER — MULTIVITAMIN WITH IRON
1 TABLET ORAL DAILY
Status: DISCONTINUED | OUTPATIENT
Start: 2023-10-10 | End: 2023-10-13 | Stop reason: HOSPADM

## 2023-10-10 RX ORDER — ALBUTEROL SULFATE 2.5 MG/3ML
2.5 SOLUTION RESPIRATORY (INHALATION) EVERY 6 HOURS PRN
Status: DISCONTINUED | OUTPATIENT
Start: 2023-10-10 | End: 2023-10-13 | Stop reason: HOSPADM

## 2023-10-10 RX ORDER — OXYBUTYNIN CHLORIDE 5 MG/1
5 TABLET, EXTENDED RELEASE ORAL DAILY
Status: DISCONTINUED | OUTPATIENT
Start: 2023-10-10 | End: 2023-10-10

## 2023-10-10 RX ORDER — BACLOFEN 10 MG/1
10 TABLET ORAL DAILY
Status: DISCONTINUED | OUTPATIENT
Start: 2023-10-10 | End: 2023-10-13 | Stop reason: HOSPADM

## 2023-10-10 RX ORDER — TOPIRAMATE 100 MG/1
100 TABLET, FILM COATED ORAL 2 TIMES DAILY
Status: DISCONTINUED | OUTPATIENT
Start: 2023-10-10 | End: 2023-10-13 | Stop reason: HOSPADM

## 2023-10-10 RX ORDER — METOCLOPRAMIDE HYDROCHLORIDE 5 MG/ML
10 INJECTION INTRAMUSCULAR; INTRAVENOUS EVERY 6 HOURS
Status: DISCONTINUED | OUTPATIENT
Start: 2023-10-10 | End: 2023-10-10

## 2023-10-10 RX ORDER — BUSPIRONE HYDROCHLORIDE 5 MG/1
5 TABLET ORAL 3 TIMES DAILY
Status: DISCONTINUED | OUTPATIENT
Start: 2023-10-10 | End: 2023-10-13 | Stop reason: HOSPADM

## 2023-10-10 RX ORDER — PROPRANOLOL HCL 60 MG
60 CAPSULE, EXTENDED RELEASE 24HR ORAL DAILY
Status: DISCONTINUED | OUTPATIENT
Start: 2023-10-10 | End: 2023-10-10

## 2023-10-10 RX ORDER — IBUPROFEN 600 MG/1
1 TABLET ORAL PRN
Status: DISCONTINUED | OUTPATIENT
Start: 2023-10-10 | End: 2023-10-13 | Stop reason: HOSPADM

## 2023-10-10 RX ORDER — POLYETHYLENE GLYCOL 3350 17 G/17G
17 POWDER, FOR SOLUTION ORAL DAILY PRN
Status: DISCONTINUED | OUTPATIENT
Start: 2023-10-10 | End: 2023-10-13 | Stop reason: HOSPADM

## 2023-10-10 RX ORDER — AMLODIPINE BESYLATE 5 MG/1
5 TABLET ORAL DAILY
Status: DISCONTINUED | OUTPATIENT
Start: 2023-10-10 | End: 2023-10-13 | Stop reason: HOSPADM

## 2023-10-10 RX ORDER — SODIUM CHLORIDE 0.9 % (FLUSH) 0.9 %
5-40 SYRINGE (ML) INJECTION PRN
Status: DISCONTINUED | OUTPATIENT
Start: 2023-10-10 | End: 2023-10-13 | Stop reason: HOSPADM

## 2023-10-10 RX ORDER — ACETAMINOPHEN 650 MG/1
650 SUPPOSITORY RECTAL EVERY 6 HOURS PRN
Status: DISCONTINUED | OUTPATIENT
Start: 2023-10-10 | End: 2023-10-13 | Stop reason: HOSPADM

## 2023-10-10 RX ORDER — SODIUM CHLORIDE 0.9 % (FLUSH) 0.9 %
5-40 SYRINGE (ML) INJECTION EVERY 12 HOURS SCHEDULED
Status: DISCONTINUED | OUTPATIENT
Start: 2023-10-10 | End: 2023-10-13 | Stop reason: HOSPADM

## 2023-10-10 RX ORDER — INSULIN LISPRO 100 [IU]/ML
0-4 INJECTION, SOLUTION INTRAVENOUS; SUBCUTANEOUS NIGHTLY
Status: DISCONTINUED | OUTPATIENT
Start: 2023-10-10 | End: 2023-10-13 | Stop reason: HOSPADM

## 2023-10-10 RX ORDER — DEXTROSE MONOHYDRATE 100 MG/ML
INJECTION, SOLUTION INTRAVENOUS CONTINUOUS PRN
Status: DISCONTINUED | OUTPATIENT
Start: 2023-10-10 | End: 2023-10-13 | Stop reason: HOSPADM

## 2023-10-10 RX ORDER — METOCLOPRAMIDE HYDROCHLORIDE 5 MG/ML
10 INJECTION INTRAMUSCULAR; INTRAVENOUS EVERY 6 HOURS PRN
Status: DISCONTINUED | OUTPATIENT
Start: 2023-10-10 | End: 2023-10-13 | Stop reason: HOSPADM

## 2023-10-10 RX ORDER — SODIUM CHLORIDE 9 MG/ML
INJECTION, SOLUTION INTRAVENOUS CONTINUOUS
Status: DISCONTINUED | OUTPATIENT
Start: 2023-10-10 | End: 2023-10-13 | Stop reason: HOSPADM

## 2023-10-10 RX ORDER — ONDANSETRON 2 MG/ML
4 INJECTION INTRAMUSCULAR; INTRAVENOUS EVERY 6 HOURS PRN
Status: DISCONTINUED | OUTPATIENT
Start: 2023-10-10 | End: 2023-10-11

## 2023-10-10 RX ORDER — OXYBUTYNIN CHLORIDE 10 MG/1
10 TABLET, EXTENDED RELEASE ORAL DAILY
Status: DISCONTINUED | OUTPATIENT
Start: 2023-10-11 | End: 2023-10-13 | Stop reason: HOSPADM

## 2023-10-10 RX ORDER — METOCLOPRAMIDE 10 MG/1
10 TABLET ORAL
Status: DISCONTINUED | OUTPATIENT
Start: 2023-10-10 | End: 2023-10-10

## 2023-10-10 RX ORDER — ONDANSETRON 4 MG/1
4 TABLET, ORALLY DISINTEGRATING ORAL EVERY 8 HOURS PRN
Status: DISCONTINUED | OUTPATIENT
Start: 2023-10-10 | End: 2023-10-11

## 2023-10-10 RX ORDER — ESCITALOPRAM OXALATE 20 MG/1
20 TABLET ORAL DAILY
Status: DISCONTINUED | OUTPATIENT
Start: 2023-10-10 | End: 2023-10-13 | Stop reason: HOSPADM

## 2023-10-10 RX ORDER — VITAMIN B COMPLEX
5000 TABLET ORAL DAILY
Status: DISCONTINUED | OUTPATIENT
Start: 2023-10-10 | End: 2023-10-13 | Stop reason: HOSPADM

## 2023-10-10 RX ORDER — ENOXAPARIN SODIUM 100 MG/ML
30 INJECTION SUBCUTANEOUS 2 TIMES DAILY
Status: DISCONTINUED | OUTPATIENT
Start: 2023-10-10 | End: 2023-10-13 | Stop reason: HOSPADM

## 2023-10-10 RX ORDER — ARIPIPRAZOLE 10 MG/1
10 TABLET ORAL
Status: DISCONTINUED | OUTPATIENT
Start: 2023-10-10 | End: 2023-10-13 | Stop reason: HOSPADM

## 2023-10-10 RX ORDER — SODIUM CHLORIDE 9 MG/ML
INJECTION, SOLUTION INTRAVENOUS PRN
Status: DISCONTINUED | OUTPATIENT
Start: 2023-10-10 | End: 2023-10-13 | Stop reason: HOSPADM

## 2023-10-10 RX ADMIN — SODIUM CHLORIDE: 9 INJECTION, SOLUTION INTRAVENOUS at 05:21

## 2023-10-10 RX ADMIN — ACETAMINOPHEN, ASPIRIN, CAFFEINE 1 TABLET: 250; 65; 250 TABLET, FILM COATED ORAL at 21:34

## 2023-10-10 RX ADMIN — ACETAMINOPHEN 650 MG: 325 TABLET ORAL at 08:20

## 2023-10-10 RX ADMIN — HYDROMORPHONE HYDROCHLORIDE 0.5 MG: 1 INJECTION, SOLUTION INTRAMUSCULAR; INTRAVENOUS; SUBCUTANEOUS at 21:09

## 2023-10-10 RX ADMIN — HYDROMORPHONE HYDROCHLORIDE 0.5 MG: 1 INJECTION, SOLUTION INTRAMUSCULAR; INTRAVENOUS; SUBCUTANEOUS at 11:30

## 2023-10-10 RX ADMIN — HYDROMORPHONE HYDROCHLORIDE 0.5 MG: 1 INJECTION, SOLUTION INTRAMUSCULAR; INTRAVENOUS; SUBCUTANEOUS at 17:46

## 2023-10-10 RX ADMIN — HYDROMORPHONE HYDROCHLORIDE 0.5 MG: 1 INJECTION, SOLUTION INTRAMUSCULAR; INTRAVENOUS; SUBCUTANEOUS at 14:45

## 2023-10-10 RX ADMIN — HYDROMORPHONE HYDROCHLORIDE 0.5 MG: 1 INJECTION, SOLUTION INTRAMUSCULAR; INTRAVENOUS; SUBCUTANEOUS at 05:06

## 2023-10-10 RX ADMIN — AMLODIPINE BESYLATE 5 MG: 5 TABLET ORAL at 08:20

## 2023-10-10 RX ADMIN — IOPAMIDOL 80 ML: 755 INJECTION, SOLUTION INTRAVENOUS at 00:56

## 2023-10-10 RX ADMIN — PANTOPRAZOLE SODIUM 40 MG: 40 INJECTION, POWDER, FOR SOLUTION INTRAVENOUS at 08:23

## 2023-10-10 RX ADMIN — PROMETHAZINE HYDROCHLORIDE 25 MG: 25 INJECTION INTRAMUSCULAR; INTRAVENOUS at 00:44

## 2023-10-10 RX ADMIN — HYDROMORPHONE HYDROCHLORIDE 0.5 MG: 1 INJECTION, SOLUTION INTRAMUSCULAR; INTRAVENOUS; SUBCUTANEOUS at 00:47

## 2023-10-10 RX ADMIN — ACETAMINOPHEN 650 MG: 325 TABLET ORAL at 15:54

## 2023-10-10 RX ADMIN — SODIUM CHLORIDE, PRESERVATIVE FREE 10 ML: 5 INJECTION INTRAVENOUS at 08:30

## 2023-10-10 RX ADMIN — FAMOTIDINE 20 MG: 10 INJECTION INTRAVENOUS at 08:23

## 2023-10-10 RX ADMIN — HYDROMORPHONE HYDROCHLORIDE 0.5 MG: 1 INJECTION, SOLUTION INTRAMUSCULAR; INTRAVENOUS; SUBCUTANEOUS at 08:19

## 2023-10-10 RX ADMIN — SODIUM CHLORIDE: 9 INJECTION, SOLUTION INTRAVENOUS at 22:55

## 2023-10-10 RX ADMIN — ENOXAPARIN SODIUM 30 MG: 100 INJECTION SUBCUTANEOUS at 08:23

## 2023-10-10 ASSESSMENT — PAIN - FUNCTIONAL ASSESSMENT
PAIN_FUNCTIONAL_ASSESSMENT: PREVENTS OR INTERFERES SOME ACTIVE ACTIVITIES AND ADLS
PAIN_FUNCTIONAL_ASSESSMENT: 0-10
PAIN_FUNCTIONAL_ASSESSMENT: 0-10
PAIN_FUNCTIONAL_ASSESSMENT: ACTIVITIES ARE NOT PREVENTED
PAIN_FUNCTIONAL_ASSESSMENT: ACTIVITIES ARE NOT PREVENTED
PAIN_FUNCTIONAL_ASSESSMENT: PREVENTS OR INTERFERES SOME ACTIVE ACTIVITIES AND ADLS
PAIN_FUNCTIONAL_ASSESSMENT: ACTIVITIES ARE NOT PREVENTED
PAIN_FUNCTIONAL_ASSESSMENT: ACTIVITIES ARE NOT PREVENTED
PAIN_FUNCTIONAL_ASSESSMENT: 0-10

## 2023-10-10 ASSESSMENT — PAIN DESCRIPTION - LOCATION
LOCATION: ABDOMEN
LOCATION: ABDOMEN;HEAD
LOCATION: ABDOMEN

## 2023-10-10 ASSESSMENT — PAIN SCALES - GENERAL
PAINLEVEL_OUTOF10: 9
PAINLEVEL_OUTOF10: 10
PAINLEVEL_OUTOF10: 10
PAINLEVEL_OUTOF10: 6
PAINLEVEL_OUTOF10: 8
PAINLEVEL_OUTOF10: 4
PAINLEVEL_OUTOF10: 8
PAINLEVEL_OUTOF10: 3
PAINLEVEL_OUTOF10: 10
PAINLEVEL_OUTOF10: 8
PAINLEVEL_OUTOF10: 9
PAINLEVEL_OUTOF10: 8
PAINLEVEL_OUTOF10: 9

## 2023-10-10 ASSESSMENT — LIFESTYLE VARIABLES
HOW OFTEN DO YOU HAVE A DRINK CONTAINING ALCOHOL: NEVER
HOW MANY STANDARD DRINKS CONTAINING ALCOHOL DO YOU HAVE ON A TYPICAL DAY: PATIENT DOES NOT DRINK

## 2023-10-10 ASSESSMENT — PAIN DESCRIPTION - PAIN TYPE
TYPE: ACUTE PAIN;SURGICAL PAIN
TYPE: ACUTE PAIN;SURGICAL PAIN

## 2023-10-10 ASSESSMENT — PAIN DESCRIPTION - FREQUENCY
FREQUENCY: CONTINUOUS
FREQUENCY: CONTINUOUS

## 2023-10-10 ASSESSMENT — PAIN DESCRIPTION - DESCRIPTORS
DESCRIPTORS: ACHING
DESCRIPTORS: SHARP
DESCRIPTORS: ACHING

## 2023-10-10 ASSESSMENT — PAIN DESCRIPTION - ORIENTATION
ORIENTATION: LOWER;MID
ORIENTATION: MID
ORIENTATION: LOWER;MID
ORIENTATION: LOWER;MID

## 2023-10-10 ASSESSMENT — PAIN DESCRIPTION - ONSET
ONSET: ON-GOING
ONSET: ON-GOING

## 2023-10-10 NOTE — ED NOTES
Pt VS stable. Call light in reach. Pt wanting water at this time. Provider notified.      Brenda Robledo RN  10/10/23 [id number]

## 2023-10-10 NOTE — ACP (ADVANCE CARE PLANNING)
Advance Care Planning     Advance Care Planning Inpatient Note  [facility] Department    Today's Date: 10/10/2023  Unit: [facility]    Received request from IDT Member. Upon review of chart and communication with care team, patient's decision making abilities are not in question. . Patient was/were present in the room during visit. Goals of ACP Conversation:  Discuss advance care planning documents    Health Care Decision Makers:     No healthcare decision makers have been documented. Click here to complete [address] including selection of the Healthcare Decision Maker Relationship (ie \"Primary\")  Summary:      Advance Care Planning Documents (Patient Wishes):       Assessment:  Advanced Directives Consult: Pt does ot want to talk much about it at the time of the visit. She was not feeling so well. She was blessed.                  Electronically signed by Shane Craig, [address] [place] [address] on 10/10/2023 at 2:17 PM

## 2023-10-10 NOTE — CARE COORDINATION
Case Management Assessment  Initial Evaluation    Date/Time of Evaluation: 10/10/2023 12:09 PM  Assessment Completed by: Frances Urban RN    If patient is discharged prior to next notation, then this note serves as note for discharge by case management. Patient Name: Marshall Newton                   YOB: 1990  Diagnosis: Gastroparesis [K31.84]  Chronic abdominal pain [R10.9, G89.29]  Intractable vomiting [R11.10]  Nausea and vomiting, unspecified vomiting type [R11.2]                   Date / Time: 10/9/2023  9:33 PM  Location: [address] [place] [address]     Patient Admission Status: Inpatient   Readmission Risk Low 0-14, Mod 15-19), High > 20: Readmission Risk Score: 20.7    Current PCP: AURELIO Marks CNP  PCP verified by CM? Yes    Chart Reviewed: Yes      History Provided by: Patient  Patient Orientation: Alert and Oriented    Patient Cognition: Alert    Hospitalization in the last 30 days (Readmission):  No    If yes, Readmission Assessment in CM Navigator will be completed. Advance Directives:      Code Status: Full Code   Patient's Primary Decision Maker is: Legal Next of Kin (Has ACP paperwork in room to review)      Discharge Planning:    Patient lives with: Spouse/Significant Other Type of Home: House  Primary Care Giver: Self  Patient Support Systems include: Spouse/Significant Other, Family Members   Current Financial resources: Medicare, Medicaid  Current community resources: None  Current services prior to admission: C-pap ([facility] DME)            Current DME:              Type of Home Care services:  None    ADLS  Prior functional level: Independent in ADLs/IADLs  Current functional level: Independent in ADLs/IADLs    Family can provide assistance at DC: Yes  Would you like Case Management to discuss the discharge plan with any other family members/significant others, and if so, who?  No  Plans to Return to Present Housing: Yes  Other Identified Issues/Barriers to RETURNING to current housing: n/a  Potential Assistance needed at discharge: Home Care            Potential DME:    Patient expects to discharge to: [address] for transportation at discharge: Family    Financial    Payor: JOSEPH MEDICARE / Plan: Alden Cosby / Product Type: *No Product type* /     Does insurance require precert for SNF: Yes    Potential assistance Purchasing Medications: No  Meds-to-Beds request: Yes      [place], [address] [facility]  Phone: [phone] Fax: [phone] [place], [address] , [address]  [address] [facility], [address] [facility]  Phone: [phone] Fax: [phone] - [place], [address] [facility]  [address]  [address] [facility] [address] [address]  Phone: [phone] Fax: [phone]      Notes:    Factors facilitating achievement of predicted outcomes: Family support, Cooperative, and Pleasant    Barriers to discharge: n/a    Additional Case Management Notes: Presented with abdominal pain. Hx gastroparesis. And resected carcinoid tumor. Required TPN in past.  Hospitalist and GI following. IVF. Lovenox. IV pepcid. Pain control. Reglan. Zofran. IV protonix. Procedure:   10/9 CXR: no acute findings  10/9 CT abdomen/pelvis: no acute findings. The Plan for Transition of Care is related to the following treatment goals of Gastroparesis [K31.84]  Chronic abdominal pain [R10.9, G89.29]  Intractable vomiting [R11.10]  Nausea and vomiting, unspecified vomiting type [R11.2]    Patient Goals/Plan/Treatment Preferences: Spoke with Ramya Aguirre, she plans to return home with wife. She request [facility] for nursing at discharge. Post-acute [facility]) provider list was provided to patient. Patient was informed of their freedom to choose [facility] provider.  Discussed and offered to show the patient the relevant [facility] Providers quality and resource use measures on Medicare Compare web site via computer based on patient's goals of care and treatment preferences. Questions regarding selection process were answered. Denied further needs. Transportation/Food Security/Housekeeping Addressed: No issues identified.      Francisco Espino RN  Case Management Department

## 2023-10-10 NOTE — ED TRIAGE NOTES
Pt presents to the ED with C/O abdominal pain. Pt states the pain is all over and not one specific spot. Pt states her cousin and wife stated Norma Cody passed out earlier but does not remember it. \" Last BM was this morning. Pt states she is having nausea, vomiting and diarrhea. Pt states the pain is 10/10. Pt states everytime she eats she throws up. Pt states she had a televisit today with psychiatry today to get cleared for gastric bypass surgery in February of 2024.

## 2023-10-10 NOTE — ED NOTES
ED to inpatient nurses report    Chief Complaint   Patient presents with    Abdominal Pain      Present to ED from home  LOC: alert and orientated to name, place, date  Vital signs   Vitals:    10/09/23 2341 10/10/23 0053 10/10/23 0136 10/10/23 0309   BP: (!) 139/98 (!) 141/90 115/89 (!) 145/96   Pulse: 72 77 76 88   Resp: 18 18 18 18   Temp:       TempSrc:       SpO2: 99% 98% 100% 96%   Weight:       Height:          Oxygen Baseline room air    Current needs required room air Bipap/Cpap No  LDAs:    Mobility: Requires assistance * 1  Pending ED orders: NA  Present condition: stable    C-SSRS Risk of Suicide: No Risk  Swallow Screening  Pass  Preferred Language: Jewels     Electronically signed by Susie Stover RN on 10/10/2023 at 3:42 AM       Susie Stover RN  10/10/23 4403

## 2023-10-10 NOTE — ED NOTES
Provider at bedside discussing POC with patient. Pt talking in whining tone, states \"you think I'm a junkie but for real my stomach hurts and no one is listening\". Pt vitals stable. No new orders at this time.       Alberto Rodriguez RN  10/10/23 9814

## 2023-10-10 NOTE — ED NOTES
Pt refusing Ketamine at this time. Pt states \"I had a reaction to that medications at [facility]\". Provider made aware. Pt chart reviewed thoroughly, and no reports of Ketamine given or adverse reactions. Dr Ami Ortega notified of situation. Medication held at this time.      Naomi Hernandez RN  10/10/23 2410

## 2023-10-10 NOTE — ED PROVIDER NOTES
[facility] EMERGENCY DEPT      EMERGENCY MEDICINE     Pt Name: Marce Adkins  MRN: [id number]  [address] 1990  Date of evaluation: 10/9/2023  Provider: Maurisio Dennis MD    CHIEF COMPLAINT       Chief Complaint   Patient presents with    Abdominal Pain     HISTORY OF PRESENT ILLNESS   Marce Adkins is a pleasant 35 y.o. female who presents to the emergency department for evaluation of abdominal pain. Patient states that this morning she developed generalized abdominal pain with associated nausea and vomiting. She thinks she had an episode of syncope earlier this morning per family but does not remember if she did or not. Patient also reporting some diarrhea. Patient states that this feels worse than her typical gastroparesis. She also reports some shortness of breath. Pain is currently 10 out of 10 in intensity. She denies exacerbating or relieving factors.     PASTMEDICAL HISTORY     Past Medical History:   Diagnosis Date    Acute on chronic diastolic congestive heart failure ([address]) 6/25/2022    JANI (acute kidney injury) ([address]) 7/7/2019    Anemia     Anesthesia     migraines    Anxiety     Asthma     CAD (coronary artery disease)     Depression     Diabetes ([address])     Diet-controlled type 2 diabetes mellitus ([address]) 2016    Epilepsy ([address])     Fibroids     Gastro - esophageal reflux disease     Hypertension     Hypertrophy of tonsil and adenoid 11/4/2017    Malignant carcinoid tumor of other sites ([address]) 5/14/2013    Migraine     PONV (postoperative nausea and vomiting)     Prolonged emergence from general anesthesia     Sickle cell anemia (HCC)     Sickle cell trait ([address])     PT STATES SHE HAS THE TRAIT NOT THE DISEASE    Tumor associated pain     neuroendrocrine tumor, gastroma       Patient Active Problem List   Diagnosis Code    Intractable abdominal pain R10.9    Nausea and vomiting R11.2    Carcinoid tumor D3A.00    Pelvic inflammatory disease N73.9    Intractable nausea and vomiting R11.2    Anemia for 21 days    MONTELUKAST (SINGULAIR) 10 MG TABLET    Take 1 tablet by mouth nightly    MULTIPLE VITAMINS-MINERALS (THERAPEUTIC MULTIVITAMIN-MINERALS) TABLET    Take 1 tablet by mouth daily    NICOTINE (NICODERM CQ) 14 MG/24HR    Place 1 patch onto the skin every 24 hours OTC    NICOTINE POLACRILEX (NICORETTE) 4 MG GUM    Take 1 each by mouth as needed for Smoking cessation OTC    OXYBUTYNIN (DITROPAN-XL) 5 MG EXTENDED RELEASE TABLET    Take 1 tablet by mouth daily    PANTOPRAZOLE (PROTONIX) 40 MG TABLET    Take 1 tablet by mouth daily    PRAZOSIN (MINIPRESS) 1 MG CAPSULE    Take 1 capsule by mouth nightly    PROPRANOLOL (INDERAL LA) 60 MG EXTENDED RELEASE CAPSULE    Take 1 capsule by mouth daily    SPACER/AERO-HOLDING CHAMBERS (E-Z SPACER) AISLINN    1 Device by Does not apply route daily    TOPIRAMATE (TOPAMAX) 100 MG TABLET    Take 1 tablet by mouth 2 times daily    VARENICLINE (CHANTIX) 0.5 MG TABLET    Take 1 tablet by mouth 2 times daily    VITAMIN A 3 MG (80557 UT) CAPSULE    Take 1 capsule by mouth daily    VITAMIN D PO    Take by mouth    ZOLPIDEM (AMBIEN) 5 MG TABLET    Take 1 tablet by mouth nightly as needed for Sleep for up to 30 days. Max Daily Amount: 5 mg       ALLERGIES     is allergic to latex, dicyclomine, ketorolac tromethamine, oxycodone, pcn [penicillins], sulfamethoxazole, trimethoprim, amoxicillin, buspirone, ciprofloxacin, compazine [prochlorperazine maleate], dicyclomine hcl, fentanyl, fioricet [butalbital-apap-caffeine], flexeril [cyclobenzaprine], gabapentin, haldol [haloperidol], hydrochlorothiazide, ibuprofen [ibuprofen], keflex [cephalexin], ketamine, lisinopril, lorazepam, losartan, macrobid [nitrofurantoin monohyd macro], mushroom extract complex, reglan [metoclopramide], vicodin [hydrocodone-acetaminophen], vistaril [hydroxyzine hcl], zofran [ondansetron], and adhesive tape. FAMILY HISTORY     She indicated that her mother is . She indicated that her father is alive.  She [facility] [address]  [address] [place] [address] EMERGENCY DEPT  [address] [address]  [phone]  Go to   If symptoms worsen      MD Scott Vila MD  10/11/23 5045

## 2023-10-10 NOTE — PROGRESS NOTES
Pt admitted to  7K12 from ED. Complaints: Abdominal Pain. IV site free of s/s of infection or infiltration. Vital signs obtained. Assessment and data collection initiated. Two nurse skin assessment performed by Saint Mallick RN and Jamie Pemberton. Oriented to room. Explained patients right to have family, representative or physician notified of their admission. Patient has Requested for physician to be notified. Patient has Declined for family/representative to be notified. The patient is interested in [facility]. [facility] to beds program?:  No    Policies and procedures for  explained. All questions answered with no further questions at this time. Fall prevention and safety discussed with patient. Bed alarm on. Call light in reach.

## 2023-10-10 NOTE — CARE COORDINATION
10/10/23, 1:52 PM EDT    DISCHARGE PLANNING EVALUATION    Patient is requesting home health at discharge, is reviewing list of agencies for preferences.

## 2023-10-10 NOTE — ED NOTES
Pt states her pain in currently a 10/10 at this time. VS stable. call light in reach.      Brenda Robledo RN  10/09/23 6525

## 2023-10-10 NOTE — DISCHARGE INSTRUCTIONS
Follow up with your GI doctor and your surgeon. Continue your home medications prescribed by your doctors. Return to the emergency department for any worsening symptoms including worsening pain, vomiting if you are unable to keep fluids down, or fever.

## 2023-10-11 LAB
ANION GAP SERPL CALC-SCNC: 10 MEQ/L (ref 8–16)
BUN SERPL-MCNC: 9 MG/DL (ref 7–22)
CALCIUM SERPL-MCNC: 9.4 MG/DL (ref 8.5–10.5)
CHLORIDE SERPL-SCNC: 107 MEQ/L (ref 98–111)
CO2 SERPL-SCNC: 24 MEQ/L (ref 23–33)
CREAT SERPL-MCNC: 0.7 MG/DL (ref 0.4–1.2)
DEPRECATED RDW RBC AUTO: 41.9 FL (ref 35–45)
ERYTHROCYTE [DISTWIDTH] IN BLOOD BY AUTOMATED COUNT: 14.8 % (ref 11.5–14.5)
GFR SERPL CREATININE-BSD FRML MDRD: > 60 ML/MIN/1.73M2
GLUCOSE BLD STRIP.AUTO-MCNC: 102 MG/DL (ref 70–108)
GLUCOSE BLD STRIP.AUTO-MCNC: 107 MG/DL (ref 70–108)
GLUCOSE BLD STRIP.AUTO-MCNC: 107 MG/DL (ref 70–108)
GLUCOSE BLD STRIP.AUTO-MCNC: 114 MG/DL (ref 70–108)
GLUCOSE BLD STRIP.AUTO-MCNC: 86 MG/DL (ref 70–108)
GLUCOSE SERPL-MCNC: 97 MG/DL (ref 70–108)
HCT VFR BLD AUTO: 40.3 % (ref 37–47)
HGB BLD-MCNC: 12.5 GM/DL (ref 12–16)
MAGNESIUM SERPL-MCNC: 2 MG/DL (ref 1.6–2.4)
MCH RBC QN AUTO: 24.2 PG (ref 26–33)
MCHC RBC AUTO-ENTMCNC: 31 GM/DL (ref 32.2–35.5)
MCV RBC AUTO: 77.9 FL (ref 81–99)
PLATELET # BLD AUTO: 288 THOU/MM3 (ref 130–400)
PMV BLD AUTO: 9.5 FL (ref 9.4–12.4)
POTASSIUM SERPL-SCNC: 4 MEQ/L (ref 3.5–5.2)
RBC # BLD AUTO: 5.17 MILL/MM3 (ref 4.2–5.4)
SODIUM SERPL-SCNC: 141 MEQ/L (ref 135–145)
WBC # BLD AUTO: 10.2 THOU/MM3 (ref 4.8–10.8)

## 2023-10-11 PROCEDURE — C9113 INJ PANTOPRAZOLE SODIUM, VIA: HCPCS

## 2023-10-11 PROCEDURE — 6360000002 HC RX W HCPCS: Performed by: NURSE PRACTITIONER

## 2023-10-11 PROCEDURE — 82948 REAGENT STRIP/BLOOD GLUCOSE: CPT

## 2023-10-11 PROCEDURE — 80048 BASIC METABOLIC PNL TOTAL CA: CPT

## 2023-10-11 PROCEDURE — 94761 N-INVAS EAR/PLS OXIMETRY MLT: CPT

## 2023-10-11 PROCEDURE — 0DJ08ZZ INSPECTION OF UPPER INTESTINAL TRACT, VIA NATURAL OR ARTIFICIAL OPENING ENDOSCOPIC: ICD-10-PCS | Performed by: INTERNAL MEDICINE

## 2023-10-11 PROCEDURE — 2500000003 HC RX 250 WO HCPCS

## 2023-10-11 PROCEDURE — A4216 STERILE WATER/SALINE, 10 ML: HCPCS

## 2023-10-11 PROCEDURE — 94660 CPAP INITIATION&MGMT: CPT

## 2023-10-11 PROCEDURE — 2580000003 HC RX 258

## 2023-10-11 PROCEDURE — 1200000003 HC TELEMETRY R&B

## 2023-10-11 PROCEDURE — 85027 COMPLETE CBC AUTOMATED: CPT

## 2023-10-11 PROCEDURE — 83735 ASSAY OF MAGNESIUM: CPT

## 2023-10-11 PROCEDURE — 6360000002 HC RX W HCPCS

## 2023-10-11 PROCEDURE — 99232 SBSQ HOSP IP/OBS MODERATE 35: CPT | Performed by: NURSE PRACTITIONER

## 2023-10-11 PROCEDURE — 2700000000 HC OXYGEN THERAPY PER DAY

## 2023-10-11 RX ORDER — PROMETHAZINE HYDROCHLORIDE 25 MG/ML
25 INJECTION, SOLUTION INTRAMUSCULAR; INTRAVENOUS EVERY 6 HOURS PRN
Status: DISCONTINUED | OUTPATIENT
Start: 2023-10-11 | End: 2023-10-13 | Stop reason: HOSPADM

## 2023-10-11 RX ADMIN — HYDROMORPHONE HYDROCHLORIDE 0.5 MG: 1 INJECTION, SOLUTION INTRAMUSCULAR; INTRAVENOUS; SUBCUTANEOUS at 04:44

## 2023-10-11 RX ADMIN — HYDROMORPHONE HYDROCHLORIDE 0.5 MG: 1 INJECTION, SOLUTION INTRAMUSCULAR; INTRAVENOUS; SUBCUTANEOUS at 23:36

## 2023-10-11 RX ADMIN — PANTOPRAZOLE SODIUM 40 MG: 40 INJECTION, POWDER, FOR SOLUTION INTRAVENOUS at 10:45

## 2023-10-11 RX ADMIN — HYDROMORPHONE HYDROCHLORIDE 0.5 MG: 1 INJECTION, SOLUTION INTRAMUSCULAR; INTRAVENOUS; SUBCUTANEOUS at 16:57

## 2023-10-11 RX ADMIN — PROMETHAZINE HYDROCHLORIDE 25 MG: 25 INJECTION INTRAMUSCULAR; INTRAVENOUS at 04:48

## 2023-10-11 RX ADMIN — FAMOTIDINE 20 MG: 10 INJECTION INTRAVENOUS at 20:09

## 2023-10-11 RX ADMIN — HYDROMORPHONE HYDROCHLORIDE 0.5 MG: 1 INJECTION, SOLUTION INTRAMUSCULAR; INTRAVENOUS; SUBCUTANEOUS at 13:57

## 2023-10-11 RX ADMIN — ENOXAPARIN SODIUM 30 MG: 100 INJECTION SUBCUTANEOUS at 20:10

## 2023-10-11 RX ADMIN — HYDROMORPHONE HYDROCHLORIDE 0.5 MG: 1 INJECTION, SOLUTION INTRAMUSCULAR; INTRAVENOUS; SUBCUTANEOUS at 07:41

## 2023-10-11 RX ADMIN — HYDROMORPHONE HYDROCHLORIDE 0.5 MG: 1 INJECTION, SOLUTION INTRAMUSCULAR; INTRAVENOUS; SUBCUTANEOUS at 20:11

## 2023-10-11 RX ADMIN — HYDROMORPHONE HYDROCHLORIDE 0.5 MG: 1 INJECTION, SOLUTION INTRAMUSCULAR; INTRAVENOUS; SUBCUTANEOUS at 10:44

## 2023-10-11 RX ADMIN — SODIUM CHLORIDE, PRESERVATIVE FREE 10 ML: 5 INJECTION INTRAVENOUS at 10:45

## 2023-10-11 RX ADMIN — HYDROMORPHONE HYDROCHLORIDE 0.5 MG: 1 INJECTION, SOLUTION INTRAMUSCULAR; INTRAVENOUS; SUBCUTANEOUS at 00:39

## 2023-10-11 ASSESSMENT — PAIN DESCRIPTION - PAIN TYPE
TYPE: ACUTE PAIN
TYPE: ACUTE PAIN

## 2023-10-11 ASSESSMENT — PAIN - FUNCTIONAL ASSESSMENT
PAIN_FUNCTIONAL_ASSESSMENT: ACTIVITIES ARE NOT PREVENTED

## 2023-10-11 ASSESSMENT — PAIN SCALES - GENERAL
PAINLEVEL_OUTOF10: 7
PAINLEVEL_OUTOF10: 10
PAINLEVEL_OUTOF10: 8
PAINLEVEL_OUTOF10: 10
PAINLEVEL_OUTOF10: 8
PAINLEVEL_OUTOF10: 8
PAINLEVEL_OUTOF10: 7
PAINLEVEL_OUTOF10: 10
PAINLEVEL_OUTOF10: 8
PAINLEVEL_OUTOF10: 6
PAINLEVEL_OUTOF10: 7

## 2023-10-11 ASSESSMENT — PAIN DESCRIPTION - LOCATION
LOCATION: ABDOMEN

## 2023-10-11 ASSESSMENT — PAIN DESCRIPTION - ORIENTATION
ORIENTATION: MID

## 2023-10-11 ASSESSMENT — PAIN DESCRIPTION - ONSET
ONSET: ON-GOING
ONSET: ON-GOING

## 2023-10-11 ASSESSMENT — PAIN DESCRIPTION - DESCRIPTORS
DESCRIPTORS: SHARP
DESCRIPTORS: ACHING;CRAMPING
DESCRIPTORS: SHARP

## 2023-10-11 ASSESSMENT — PAIN DESCRIPTION - FREQUENCY
FREQUENCY: CONTINUOUS
FREQUENCY: CONTINUOUS

## 2023-10-11 NOTE — CARE COORDINATION
DISCHARGE PLANNING EVALUATION  10/11/23, 8:34 AM EDT    Reason for Referral: home health  Mental Status: alert, oriented   Decision Making:  makes own decisions  Family/Social/Home Environment:  patient lives at home with family, manages her own personal care, family assists with housekeeping, meals, transportation  Current Services including food security, transportation and housekeeping:  current with [facility]   Current Equipment: none   Payment Source: Anthem medicare   Concerns or Barriers to Discharge: requests [facility], [facility]  Post-acute [facility] provider list was provided to patient. Patient was informed of their freedom to choose [facility] provider. Discussed and offered to show the patient the relevant [facility] Providers quality and resource use measures on Medicare Compare web site via computer based on patient's goals of care and treatment preferences. Questions regarding selection process were answered. Teach Back Method used with patient regarding care plan and discharge plan  Patient  verbalized understanding of the plan of care and contributed to goal setting. Patient goals, treatment preferences and discharge plan:  spoke with JOSIE [facility] about discharge plan. She plans home with her spouse who helps with her care. She requests [facility], will need new [facility] orders for referral to [facility].       Electronically signed by KUNAL Walker on 10/11/2023 at 8:34 AM

## 2023-10-11 NOTE — FLOWSHEET NOTE
10/11/23 0139   Treatment Team Notification   Reason for Communication Medication concern  (patient requesting phenergan for nausea)   Name of Team Member Notified Ashley Kenny NP   Treatment Team Role Attending Provider   Method of Communication Secure Message   Response See orders   Notification Time 0117     Patient requesting phenergan for nausea since she states that she is allergic to zofran and cannot take zofran for the nausea. This RN messaged Ashley Kenny NP about the medication concern. Phenergan IM ordered and zofran discontinued.

## 2023-10-11 NOTE — FLOWSHEET NOTE
10/10/23 2212   Treatment Team Notification   Reason for Communication Change in status  (blood glucose 70 per chemstick)   Name of Team Member Notified Isaias Bartlett   Treatment Team Role Attending Provider   Method of Communication Secure Message   Response No new orders   Notification Time 2116     Patient blood glucose 70 per chem stick. This RN notified Isaias Bartlett NP. This RN provided the patient with 4oz of apple juice. No new orders at this time.

## 2023-10-11 NOTE — FLOWSHEET NOTE
10/10/23 1928   Treatment Team Notification   Reason for Communication Medication concern  (patient requesting excedrin for headache)   Name of Team Member Notified Bridgett Moran NP   Treatment Team Role Attending Provider   Method of Communication Secure Message   Response See orders   Notification Time 1926     Patient complaining of a headache and is requesting excedrin. This RN messaged Bridgett Moran NP per [address]. Excedrin po ordered for a one time dose.

## 2023-10-11 NOTE — PROGRESS NOTES
Gastroenterology  Progress Note    10/11/2023 4:18 PM  Subjective:   Admit Date: 10/9/2023    Interval History: Patient was scheduled for EGD positive protein. Special cancer treated for tomorrow to evaluate plan for EGD  Diet: ADULT DIET; Full Liquid    Medications:   Scheduled Meds:    sodium chloride flush  5-40 mL IntraVENous 2 times per day    enoxaparin  30 mg SubCUTAneous BID    insulin lispro  0-4 Units SubCUTAneous TID WC    insulin lispro  0-4 Units SubCUTAneous Nightly    amLODIPine  5 mg Oral Daily    ARIPiprazole  10 mg Oral QHS    baclofen  10 mg Oral Daily    busPIRone  5 mg Oral TID    Vitamin D  5,000 Units Oral Daily    escitalopram  20 mg Oral Daily    multivitamin  1 tablet Oral Daily    topiramate  100 mg Oral BID    pantoprazole  40 mg IntraVENous Daily    famotidine (PEPCID) injection  20 mg IntraVENous BID    oxybutynin  10 mg Oral Daily     Continuous Infusions:    sodium chloride      dextrose      sodium chloride 75 mL/hr at 10/10/23 2255       CBC:   Recent Labs     10/09/23  2221 10/10/23  0500 10/11/23  0501   WBC 11.9* 11.1* 10.2   HGB 12.6 12.6 12.5    298 288     BMP:    Recent Labs     10/09/23  2221 10/10/23  0500 10/11/23  0501    142 141   K 3.6 3.7 4.0    106 107   CO2 21* 24 24   BUN 15 12 9   CREATININE 0.9 0.8 0.7   GLUCOSE 99 94 97     Hepatic:   Recent Labs     10/09/23  2221   AST 14   ALT 13   BILITOT <0.2*   ALKPHOS 139*     INR: No results for input(s): \"INR\" in the last 72 hours. Imaging:  Results for orders placed during the hospital encounter of 06/15/23    XR ABDOMEN (KUB) (SINGLE AP VIEW)    Narrative  Abdomen one view    COMPARISON: 06/15/2023 CT abdomen pelvis    FINDINGS:  There are no suspicious dilated gas-filled loops of small or large bowel. There is a mild amount of stool in the ascending colon. The patient is  post cholecystectomy. Impression  Nonobstructive bowel gas pattern.     This document has been electronically signed week(s)    Patient Active Problem List:     Chronic abdominal pain     Nausea and vomiting     Carcinoid tumor     Pelvic inflammatory disease     Intractable nausea and vomiting     Anemia     Diet-controlled type 2 diabetes mellitus ([address])     Esophageal dysphagia     Esophageal stricture     Morbid obesity (HCC)     Migraine equivalent     Mild persistent asthma without complication     Dyslipidemia     Moderate persistent asthma with acute exacerbation     Gastroenteritis     Hematuria     Diarrhea     Diarrhea of presumed infectious origin     Hematochezia     Generalized abdominal pain     Hypertrophy of tonsil and adenoid     Multiple drug allergies     S/P T&A (status post tonsillectomy and adenoidectomy)     Iron deficiency anemia     Poor intravenous access     Abnormal Pap smear of cervix     Abnormal uterine bleeding     Exacerbation of asthma     Chest wall pain     Female pelvic pain     Head ache     Heartburn     Incarcerated ventral hernia     Malignant carcinoid tumor of other sites [facility])     Pyelonephritis     Spigelian hernia     Uterine leiomyoma     Acute kidney injury ([address])     Hypernatremia     Hypokalemia     Volume depletion, gastrointestinal loss     Essential hypertension     Hiatal hernia     S/P Nissen fundoplication (without gastrostomy tube) procedure     Sickle cell anemia (HCC)     Sickle cell disease (HCC)     Callus of heel     Foot pain, bilateral     Atypical chest pain     Tobacco use     History of gastritis     Weight gain     Chronic idiopathic constipation     Acute respiratory failure (HCC)     Stroke-like symptom     Acute on chronic diastolic congestive heart failure (HCC)     Dysphagia     S/P robot-assisted surgical procedure     History of esophagogastroduodenoscopy (EGD)     Port-site hernia     Ventral hernia without obstruction or gangrene     Abdominal pain, epigastric     Tachycardia     Leukocytosis     Complication of gastrostomy tube ([address])     Leaking

## 2023-10-11 NOTE — PLAN OF CARE
Problem: Discharge Planning  Goal: Discharge to home or other facility with appropriate resources  Outcome: Progressing  Flowsheets (Taken 10/10/2023 1928)  Discharge to home or other facility with appropriate resources:   Identify barriers to discharge with patient and caregiver   Arrange for needed discharge resources and transportation as appropriate   Identify discharge learning needs (meds, wound care, etc)   Refer to discharge planning if patient needs post-hospital services based on physician order or complex needs related to functional status, cognitive ability or social support system     Problem: Pain  Goal: Verbalizes/displays adequate comfort level or baseline comfort level  Outcome: Progressing  Flowsheets (Taken 10/11/2023 0142)  Verbalizes/displays adequate comfort level or baseline comfort level:   Encourage patient to monitor pain and request assistance   Assess pain using appropriate pain scale   Administer analgesics based on type and severity of pain and evaluate response   Implement non-pharmacological measures as appropriate and evaluate response     Problem: ABCDS Injury Assessment  Goal: Absence of physical injury  Outcome: Progressing  Flowsheets (Taken 10/11/2023 0142)  Absence of Physical Injury: Implement safety measures based on patient assessment     Problem: Risk for Elopement  Goal: Patient will not exit the unit/facility without proper excort  Outcome: Progressing  Flowsheets (Taken 10/10/2023 1928)  Nursing Interventions for Elopement Risk:   Assist with personal care needs such as toileting, eating, dressing, as needed to reduce the risk of wandering   Collaborate with family members/caregivers to mitigate the elopement risk   Communicate/escalate to charge nurse the risk of elopement   Communicate/escalate to /other team member the risk of elopement   Communicate/escalate to nursing supervisor the risk of elopement   Make sure patient has all necessary personal care

## 2023-10-11 NOTE — CONSULTS
[place] [address]                                  CONSULTATION    PATIENT NAME: Miri Dent                  :        1990  MED REC NO:   [id number]                           ROOM:       0012  ACCOUNT NO:   [de-identified]                           ADMIT DATE: 10/09/2023  PROVIDER:     JANICE Zarco DATE:  10/10/2023    HISTORY OF PRESENT ILLNESS:  The patient known to the practice, seen  with history of nausea, vomit, generalized abdominal discomfort. She is  a 51-year-old female with abdominal discomfort, hernia repair as well as  PEG tube placement in the past.  She was admitted with abdominal  discomfort, more postprandial.  Generalized more specifically in the  epigastric areas, right upper quadrant, exacerbated with meal, not all  the times, sometimes not. Nausea, vomit. Slight difficult to swallow,  heartburn, abdominal discomfort postprandial exacerbated with meal.   Especially spicy food according to her. She lost weight. She was  around 310. She is around 292 at this time. Bowel movement with  intermittent diarrhea. Blood seen in the past also. She has had  multiple surgery, including hiatal hernia surgery. She is followed by [facility]. She is scheduled to have a Karen-en-Y gastric bypass  surgery. She has morbid obesity. She has hernia repair here in the  [facility], did not work according to her. She has PEG tube placed. I have  changed that more that one time. The PEG tub was removed lately by the [facility]. She said she had got a Karen-en-gastric bypass surgery  and is going to be nothing for that. [address] is going to take care of  it in the future. She is going slow not that can be done in the near  future. She has history of gastroparesis. Morbid obesity. She has a  history of carcinoid tumor. She had a surgical resection.  She is not  ______. She ______ managed my nurse practitioner, Jayashree Chacon, on 09/13/2023. Future plan is to do an EGD with possible dilation as well as biopsy. She said she prefers to have those done here, done as an outpatient. Actually she has difficulty with transport and continuation of care. She is morbidly obese. She says she has iron deficiency anemia as well  as sickle cell trait and sickle cell disease combined. PAST MEDICAL HISTORY:  Significant for morbidly obese, history of  congestive heart failure, acute kidney disease in the past, anemia,  anxiety, asthma, coronary artery disease, depression, diabetes type 2  controlled, epilepsy, gastric reflux, dysphagia, PEG tube placement in  the past, claimed that she has malignant carcinoid tumor, migraine,  episodes of nausea and vomiting, prolonged emergence from general  anesthesia, sickle cell anemia claimed, sickle cell trait also she  claimed. PAST SURGICAL HISTORY:  Significant for laparoscopic bilateral  oophorectomy reported, breast reduction surgery, history of  catheter  placement, cholecystectomy laparoscopic, colonoscopy, ileal dilation,  dental surgery, D and C, EGD with dilation, gastrostomy with PEG tube  placement, fundoplication done in the past, endo fundoplication of the  hernia, placement of a port and removal also of port reported,  transvenous port placement, wisdom teeth extraction. The patient reported that she has sickle disease, sickle cell anemia. Reviewed the note of the admitting physicians. It seemed to me that she  has a hemoglobin electrophoresis, shows hemoglobin A about 96%. Hemoglobin A2, hemoglobin F around less than 1% with no evidence of  hemoglobin C or hemoglobin S as the patient claimed and confirmed by  electrophoresis done. She has had visit in 2015 and 2017. What  makes her claim of having sickle cell disease, it was dated at the same  time and was inaccurate.     FAMILY HISTORY:  Colon cancer

## 2023-10-11 NOTE — CARE COORDINATION
10/11/23, 2:14 PM EDT    DISCHARGE ON GOING [address] [place] [address] day: 1  Location: -12/012-A Reason for admit: Gastroparesis [K31.84]  Chronic abdominal pain [R10.9, G89.29]  Intractable vomiting [R11.10]  Nausea and vomiting, unspecified vomiting type [R11.2]   Procedure:   10/9 CXR: no acute findings  10/9 CT A/P: no acute findings. 10/12 Plan for EGD: Dr. Joey Haas     Barriers to Discharge: Hospitalist and GI following. Full Liquid diet. Telemetry. [email or web address]. Patient refusing medications. Pain 7-10/10. IV dilaudid q3h prn. No n/v this shift. PCP: AURELIO Bear - CNP   %  Patient Goals/Plan/Treatment Preferences: Monty Brennan plans to return home with wife. Has Cpap. [facility] (will need orders). SW following.

## 2023-10-12 ENCOUNTER — ANESTHESIA (OUTPATIENT)
Dept: ENDOSCOPY | Age: 33
End: 2023-10-12
Payer: MEDICARE

## 2023-10-12 ENCOUNTER — ANESTHESIA EVENT (OUTPATIENT)
Dept: ENDOSCOPY | Age: 33
End: 2023-10-12
Payer: MEDICARE

## 2023-10-12 LAB
ANION GAP SERPL CALC-SCNC: 9 MEQ/L (ref 8–16)
BUN SERPL-MCNC: 7 MG/DL (ref 7–22)
CALCIT SERPL-MCNC: NORMAL NG/L
CALCIUM SERPL-MCNC: 9.5 MG/DL (ref 8.5–10.5)
CGA SERPL-MCNC: 81 NG/ML (ref 0–103)
CHLORIDE SERPL-SCNC: 108 MEQ/L (ref 98–111)
CO2 SERPL-SCNC: 25 MEQ/L (ref 23–33)
CREAT SERPL-MCNC: 0.7 MG/DL (ref 0.4–1.2)
DEPRECATED RDW RBC AUTO: 41.2 FL (ref 35–45)
ERYTHROCYTE [DISTWIDTH] IN BLOOD BY AUTOMATED COUNT: 14.8 % (ref 11.5–14.5)
GFR SERPL CREATININE-BSD FRML MDRD: > 60 ML/MIN/1.73M2
GLUCOSE BLD STRIP.AUTO-MCNC: 102 MG/DL (ref 70–108)
GLUCOSE BLD STRIP.AUTO-MCNC: 80 MG/DL (ref 70–108)
GLUCOSE BLD STRIP.AUTO-MCNC: 86 MG/DL (ref 70–108)
GLUCOSE BLD STRIP.AUTO-MCNC: 89 MG/DL (ref 70–108)
GLUCOSE SERPL-MCNC: 100 MG/DL (ref 70–108)
HCT VFR BLD AUTO: 39.5 % (ref 37–47)
HGB BLD-MCNC: 12.3 GM/DL (ref 12–16)
MAGNESIUM SERPL-MCNC: 2 MG/DL (ref 1.6–2.4)
MCH RBC QN AUTO: 24.1 PG (ref 26–33)
MCHC RBC AUTO-ENTMCNC: 31.1 GM/DL (ref 32.2–35.5)
MCV RBC AUTO: 77.5 FL (ref 81–99)
PLATELET # BLD AUTO: 261 THOU/MM3 (ref 130–400)
PMV BLD AUTO: 9.4 FL (ref 9.4–12.4)
POTASSIUM SERPL-SCNC: 3.7 MEQ/L (ref 3.5–5.2)
RBC # BLD AUTO: 5.1 MILL/MM3 (ref 4.2–5.4)
SODIUM SERPL-SCNC: 142 MEQ/L (ref 135–145)
WBC # BLD AUTO: 8 THOU/MM3 (ref 4.8–10.8)

## 2023-10-12 PROCEDURE — 2580000003 HC RX 258: Performed by: INTERNAL MEDICINE

## 2023-10-12 PROCEDURE — 6360000002 HC RX W HCPCS: Performed by: NURSE ANESTHETIST, CERTIFIED REGISTERED

## 2023-10-12 PROCEDURE — 80048 BASIC METABOLIC PNL TOTAL CA: CPT

## 2023-10-12 PROCEDURE — 3700000001 HC ADD 15 MINUTES (ANESTHESIA): Performed by: INTERNAL MEDICINE

## 2023-10-12 PROCEDURE — C9113 INJ PANTOPRAZOLE SODIUM, VIA: HCPCS

## 2023-10-12 PROCEDURE — 0DJ08ZZ INSPECTION OF UPPER INTESTINAL TRACT, VIA NATURAL OR ARTIFICIAL OPENING ENDOSCOPIC: ICD-10-PCS | Performed by: INTERNAL MEDICINE

## 2023-10-12 PROCEDURE — 83735 ASSAY OF MAGNESIUM: CPT

## 2023-10-12 PROCEDURE — 6360000002 HC RX W HCPCS: Performed by: NURSE PRACTITIONER

## 2023-10-12 PROCEDURE — 6370000000 HC RX 637 (ALT 250 FOR IP)

## 2023-10-12 PROCEDURE — A4216 STERILE WATER/SALINE, 10 ML: HCPCS

## 2023-10-12 PROCEDURE — 3609017100 HC EGD: Performed by: INTERNAL MEDICINE

## 2023-10-12 PROCEDURE — 85027 COMPLETE CBC AUTOMATED: CPT

## 2023-10-12 PROCEDURE — 6360000002 HC RX W HCPCS

## 2023-10-12 PROCEDURE — 2580000003 HC RX 258

## 2023-10-12 PROCEDURE — 99232 SBSQ HOSP IP/OBS MODERATE 35: CPT | Performed by: NURSE PRACTITIONER

## 2023-10-12 PROCEDURE — 1200000000 HC SEMI PRIVATE

## 2023-10-12 PROCEDURE — 3700000000 HC ANESTHESIA ATTENDED CARE: Performed by: INTERNAL MEDICINE

## 2023-10-12 PROCEDURE — 7100000011 HC PHASE II RECOVERY - ADDTL 15 MIN: Performed by: INTERNAL MEDICINE

## 2023-10-12 PROCEDURE — 7100000010 HC PHASE II RECOVERY - FIRST 15 MIN: Performed by: INTERNAL MEDICINE

## 2023-10-12 PROCEDURE — 2500000003 HC RX 250 WO HCPCS: Performed by: NURSE ANESTHETIST, CERTIFIED REGISTERED

## 2023-10-12 PROCEDURE — 2500000003 HC RX 250 WO HCPCS

## 2023-10-12 PROCEDURE — 82948 REAGENT STRIP/BLOOD GLUCOSE: CPT

## 2023-10-12 RX ORDER — PROPOFOL 10 MG/ML
INJECTION, EMULSION INTRAVENOUS PRN
Status: DISCONTINUED | OUTPATIENT
Start: 2023-10-12 | End: 2023-10-12 | Stop reason: SDUPTHER

## 2023-10-12 RX ORDER — LIDOCAINE HYDROCHLORIDE 20 MG/ML
INJECTION, SOLUTION EPIDURAL; INFILTRATION; INTRACAUDAL; PERINEURAL PRN
Status: DISCONTINUED | OUTPATIENT
Start: 2023-10-12 | End: 2023-10-12 | Stop reason: SDUPTHER

## 2023-10-12 RX ADMIN — HYDROMORPHONE HYDROCHLORIDE 0.5 MG: 1 INJECTION, SOLUTION INTRAMUSCULAR; INTRAVENOUS; SUBCUTANEOUS at 09:53

## 2023-10-12 RX ADMIN — HYDROMORPHONE HYDROCHLORIDE 0.5 MG: 1 INJECTION, SOLUTION INTRAMUSCULAR; INTRAVENOUS; SUBCUTANEOUS at 20:08

## 2023-10-12 RX ADMIN — HYDROMORPHONE HYDROCHLORIDE 0.5 MG: 1 INJECTION, SOLUTION INTRAMUSCULAR; INTRAVENOUS; SUBCUTANEOUS at 02:38

## 2023-10-12 RX ADMIN — PROMETHAZINE HYDROCHLORIDE 25 MG: 25 INJECTION INTRAMUSCULAR; INTRAVENOUS at 01:18

## 2023-10-12 RX ADMIN — HYDROMORPHONE HYDROCHLORIDE 0.5 MG: 1 INJECTION, SOLUTION INTRAMUSCULAR; INTRAVENOUS; SUBCUTANEOUS at 16:47

## 2023-10-12 RX ADMIN — ACETAMINOPHEN 650 MG: 325 TABLET ORAL at 05:15

## 2023-10-12 RX ADMIN — FAMOTIDINE 20 MG: 10 INJECTION INTRAVENOUS at 11:10

## 2023-10-12 RX ADMIN — FAMOTIDINE 20 MG: 10 INJECTION INTRAVENOUS at 20:08

## 2023-10-12 RX ADMIN — PROPOFOL 50 MG: 10 INJECTION, EMULSION INTRAVENOUS at 15:58

## 2023-10-12 RX ADMIN — LIDOCAINE HYDROCHLORIDE 100 MG: 20 INJECTION, SOLUTION EPIDURAL; INFILTRATION; INTRACAUDAL; PERINEURAL at 15:52

## 2023-10-12 RX ADMIN — HYDROMORPHONE HYDROCHLORIDE 0.5 MG: 1 INJECTION, SOLUTION INTRAMUSCULAR; INTRAVENOUS; SUBCUTANEOUS at 23:29

## 2023-10-12 RX ADMIN — SODIUM CHLORIDE: 9 INJECTION, SOLUTION INTRAVENOUS at 17:02

## 2023-10-12 RX ADMIN — TOPIRAMATE 100 MG: 100 TABLET, FILM COATED ORAL at 17:56

## 2023-10-12 RX ADMIN — PROPOFOL 20 MG: 10 INJECTION, EMULSION INTRAVENOUS at 15:54

## 2023-10-12 RX ADMIN — PROPOFOL 30 MG: 10 INJECTION, EMULSION INTRAVENOUS at 16:02

## 2023-10-12 RX ADMIN — HYDROMORPHONE HYDROCHLORIDE 0.5 MG: 1 INJECTION, SOLUTION INTRAMUSCULAR; INTRAVENOUS; SUBCUTANEOUS at 13:28

## 2023-10-12 RX ADMIN — ENOXAPARIN SODIUM 30 MG: 100 INJECTION SUBCUTANEOUS at 20:07

## 2023-10-12 RX ADMIN — HYDROMORPHONE HYDROCHLORIDE 0.5 MG: 1 INJECTION, SOLUTION INTRAMUSCULAR; INTRAVENOUS; SUBCUTANEOUS at 05:39

## 2023-10-12 RX ADMIN — PROPOFOL 80 MG: 10 INJECTION, EMULSION INTRAVENOUS at 15:52

## 2023-10-12 RX ADMIN — PANTOPRAZOLE SODIUM 40 MG: 40 INJECTION, POWDER, FOR SOLUTION INTRAVENOUS at 11:09

## 2023-10-12 RX ADMIN — AMLODIPINE BESYLATE 5 MG: 5 TABLET ORAL at 11:09

## 2023-10-12 RX ADMIN — SODIUM CHLORIDE: 9 INJECTION, SOLUTION INTRAVENOUS at 02:31

## 2023-10-12 ASSESSMENT — PAIN DESCRIPTION - LOCATION
LOCATION: ABDOMEN
LOCATION: HEAD
LOCATION: ABDOMEN

## 2023-10-12 ASSESSMENT — PAIN DESCRIPTION - ONSET
ONSET: ON-GOING

## 2023-10-12 ASSESSMENT — PAIN SCALES - GENERAL
PAINLEVEL_OUTOF10: 9
PAINLEVEL_OUTOF10: 9
PAINLEVEL_OUTOF10: 10
PAINLEVEL_OUTOF10: 3
PAINLEVEL_OUTOF10: 10
PAINLEVEL_OUTOF10: 6
PAINLEVEL_OUTOF10: 9
PAINLEVEL_OUTOF10: 9
PAINLEVEL_OUTOF10: 10
PAINLEVEL_OUTOF10: 0
PAINLEVEL_OUTOF10: 6
PAINLEVEL_OUTOF10: 10
PAINLEVEL_OUTOF10: 9
PAINLEVEL_OUTOF10: 10

## 2023-10-12 ASSESSMENT — PAIN - FUNCTIONAL ASSESSMENT
PAIN_FUNCTIONAL_ASSESSMENT: PREVENTS OR INTERFERES SOME ACTIVE ACTIVITIES AND ADLS
PAIN_FUNCTIONAL_ASSESSMENT: ACTIVITIES ARE NOT PREVENTED
PAIN_FUNCTIONAL_ASSESSMENT: 0-10
PAIN_FUNCTIONAL_ASSESSMENT: ACTIVITIES ARE NOT PREVENTED
PAIN_FUNCTIONAL_ASSESSMENT: ACTIVITIES ARE NOT PREVENTED

## 2023-10-12 ASSESSMENT — PAIN DESCRIPTION - DESCRIPTORS
DESCRIPTORS: ACHING;CRAMPING
DESCRIPTORS: ACHING;STABBING
DESCRIPTORS: ACHING;STABBING
DESCRIPTORS: ACHING
DESCRIPTORS: ACHING;CRAMPING
DESCRIPTORS: ACHING;CRAMPING

## 2023-10-12 ASSESSMENT — PAIN DESCRIPTION - FREQUENCY
FREQUENCY: INTERMITTENT
FREQUENCY: CONTINUOUS
FREQUENCY: INTERMITTENT
FREQUENCY: CONTINUOUS
FREQUENCY: CONTINUOUS

## 2023-10-12 ASSESSMENT — PAIN DESCRIPTION - PAIN TYPE
TYPE: ACUTE PAIN

## 2023-10-12 ASSESSMENT — PAIN DESCRIPTION - ORIENTATION
ORIENTATION: RIGHT;LEFT;MID;LOWER;UPPER
ORIENTATION: LOWER
ORIENTATION: RIGHT;LEFT;MID;LOWER;UPPER
ORIENTATION: MID

## 2023-10-12 NOTE — BRIEF OP NOTE
Brief Postoperative Note      Patient: Melo Bernard  YOB: 1990  MRN: [id number]    Date of Procedure: 10/12/2023    Pre-Op Diagnosis Codes:     * H/O nausea and vomiting [Z87.898]    Post-Op Diagnosis: Significant amount of bile seen in the stomach , scar previous PEG tube placement was seen had difficulty to go to the pylorus due to the stomach anatomy the polyps was 1 open one 1 cm lesion with difficulty no ulcer seen. Procedure(s):  EGD    Surgeon(s):  Kiran Petersen MD    Assistant:  * No surgical staff found *    Anesthesia: Monitor Anesthesia Care    Estimated Blood Loss (mL): none    Complications: None    Specimens:   * No specimens in log *    Implants:  * No implants in log *      Drains:   Gastrostomy/Enterostomy/Jejunostomy Tube Percutaneous Endoscopic Gastrostomy (PEG) LUQ (Active)       Findings:  Significant amount of bile seen in the stomach , scar previous PEG tube placement was seen had difficulty to go to the pylorus due to the stomach anatomy the polyps was 1 open one 1 cm lesion with difficulty no ulcer seen.       Electronically signed by Kiran Petersen MD on 10/12/2023 at 4:13 PM

## 2023-10-12 NOTE — PLAN OF CARE
Problem: Discharge Planning  Goal: Discharge to home or other facility with appropriate resources  10/12/2023 1254 by Kevin Ochoa RN  Outcome: Progressing  Flowsheets  Taken 10/12/2023 1254 by Kevin Ochoa RN  Discharge to home or other facility with appropriate resources:   Identify barriers to discharge with patient and caregiver   Arrange for needed discharge resources and transportation as appropriate    Problem: Pain  Goal: Verbalizes/displays adequate comfort level or baseline comfort level  10/12/2023 1254 by Kevin Ochoa RN  Outcome: Progressing  Flowsheets  Taken 10/12/2023 1254 by Kevin Ochoa RN  Verbalizes/displays adequate comfort level or baseline comfort level:   Encourage patient to monitor pain and request assistance   Assess pain using appropriate pain scale   Administer analgesics based on type and severity of pain and evaluate response    Problem: ABCDS Injury Assessment  Goal: Absence of physical injury  10/12/2023 1254 by Kevin Ochoa RN  Outcome: Progressing  Flowsheets  Taken 10/12/2023 1254 by Kevin Ochoa RN  Absence of Physical Injury: Implement safety measures based on patient assessment    Problem: Risk for Elopement  Goal: Patient will not exit the unit/facility without proper excort  10/12/2023 1254 by Kevin Ochoa RN  Outcome: Progressing  Flowsheets  Taken 10/12/2023 1254 by Kevin Ochoa RN  Nursing Interventions for Elopement Risk:   Assist with personal care needs such as toileting, eating, dressing, as needed to reduce the risk of wandering   Collaborate with family members/caregivers to mitigate the elopement risk  Taken 10/12/2023 0331 by Colton Carrasco RN  Nursing Interventions for Elopement Risk:   Assist with personal care needs such as toileting, eating, dressing, as needed to reduce the risk of wandering   Collaborate with family members/caregivers to mitigate the elopement risk   Collaborate with treatment team for drug withdrawal symptoms treatment   Collaborate with treatment team for nicotine replacement   Communicate/escalate to charge nurse the risk of elopement      Care plan reviewed with patient . Patient  verbalize understanding of the plan of care and contribute to goal setting.

## 2023-10-12 NOTE — PLAN OF CARE
Problem: Discharge Planning  Goal: Discharge to home or other facility with appropriate resources  Outcome: Progressing  Flowsheets (Taken 10/12/2023 0331)  Discharge to home or other facility with appropriate resources:   Identify barriers to discharge with patient and caregiver   Identify discharge learning needs (meds, wound care, etc)   Refer to discharge planning if patient needs post-hospital services based on physician order or complex needs related to functional status, cognitive ability or social support system   Arrange for needed discharge resources and transportation as appropriate     Problem: Pain  Goal: Verbalizes/displays adequate comfort level or baseline comfort level  Outcome: Progressing  Flowsheets (Taken 10/12/2023 0331)  Verbalizes/displays adequate comfort level or baseline comfort level:   Encourage patient to monitor pain and request assistance   Assess pain using appropriate pain scale   Administer analgesics based on type and severity of pain and evaluate response   Implement non-pharmacological measures as appropriate and evaluate response     Problem: ABCDS Injury Assessment  Goal: Absence of physical injury  Outcome: Progressing  Flowsheets (Taken 10/12/2023 0331)  Absence of Physical Injury: Implement safety measures based on patient assessment     Problem: Risk for Elopement  Goal: Patient will not exit the unit/facility without proper excort  Outcome: Progressing  Flowsheets (Taken 10/12/2023 0331)  Nursing Interventions for Elopement Risk:   Assist with personal care needs such as toileting, eating, dressing, as needed to reduce the risk of wandering   Collaborate with family members/caregivers to mitigate the elopement risk   Collaborate with treatment team for drug withdrawal symptoms treatment   Collaborate with treatment team for nicotine replacement   Communicate/escalate to charge nurse the risk of elopement     Problem: Chronic Conditions and Co-morbidities  Goal:

## 2023-10-12 NOTE — ANESTHESIA PRE PROCEDURE
Department of Anesthesiology  Preprocedure Note       Name:  Veronika Garibay   Age:  35 y.o.  :  1990                                          MRN:  [id number]         Date:  10/12/2023      Surgeon: Fartun Arevalo):  Marshall Vogel MD    Procedure: Procedure(s):  EGD    Medications prior to admission:   Prior to Admission medications    Medication Sig Start Date End Date Taking? Authorizing Provider   zolpidem (AMBIEN) 5 MG tablet Take 1 tablet by mouth nightly as needed for Sleep for up to 30 days.  Max Daily Amount: 5 mg 9/29/23 10/29/23  Shilpi Norwood MD   vitamin A 3 MG (41710 UT) capsule Take 1 capsule by mouth daily    Osmar Scott MD   VITAMIN D PO Take by mouth    Osmar Scott MD   Multiple Vitamins-Minerals (THERAPEUTIC MULTIVITAMIN-MINERALS) tablet Take 1 tablet by mouth daily    Osmar Scott MD   Cholecalciferol (VITAMIN D) 125 MCG (5000 UT) CAPS Take 1 capsule by mouth daily    Osmar Scott MD   oxybutynin (DITROPAN-XL) 5 MG extended release tablet Take 2 tablets by mouth daily    Osmar Scott MD   varenicline (CHANTIX) 0.5 MG tablet Take 1 tablet by mouth 2 times daily    Osmar Scott MD   acetaminophen (TYLENOL) 325 MG tablet Take 2 tablets by mouth every 6 hours as needed for Pain 23  Rika Thibodeaux DO   dicyclomine (BENTYL) 10 MG capsule Take 1 capsule by mouth in the morning, at noon, and at bedtime for 14 days 23  Rika Thibodeaux DO   metoclopramide (REGLAN) 10 MG tablet Take 1 tablet by mouth 3 times daily (with meals) for 21 days 23  Rika Thibodeaux DO   famotidine (PEPCID) 40 MG tablet take 1 tablet by mouth nightly 23   Macario Bamberger, APRN - CNP   nicotine (NICODERM CQ) 14 MG/24HR Place 1 patch onto the skin every 24 hours OTC    ProviderOsmar MD   nicotine polacrilex (NICORETTE) 4 MG gum Take 1 each by mouth as needed for Smoking cessation OTC

## 2023-10-12 NOTE — PRE SEDATION
[address]  Sedation/Analgesia History & Physical    Patient: Yefri Abed: 1990  Med Rec#: [id number] Acc#: [id number]   Provider Performing Procedure: Luis Enrique Fonseca MD  Primary Care Physician: Rooney Baumgarten, AURELIO - CNP    PRE-PROCEDURE   Full CODE [x]Yes  DNR-CCA/DNR-CC []Yes   Brief History/Pre-Procedure Diagnosis: N/V and abdominal pain           MEDICAL HISTORY  []CAD/Valve  []Liver Disease  []Lung Disease []Diabetes  []Hypertension []Renal Disease  []Additional information:       has a past medical history of Acute on chronic diastolic congestive heart failure ([address]), JANI (acute kidney injury) ([address]), Anemia, Anesthesia, Anxiety, Asthma, CAD (coronary artery disease), Depression, Diabetes ([address]), Diet-controlled type 2 diabetes mellitus ([address]), Epilepsy ([address]), Fibroids, Gastro - esophageal reflux disease, Hypertension, Hypertrophy of tonsil and adenoid, Malignant carcinoid tumor of other sites [facility]), Migraine, PONV (postoperative nausea and vomiting), Prolonged emergence from general anesthesia, Sickle cell anemia ([address]), Sickle cell trait ([address]), and Tumor associated pain. SURGICAL HISTORY   has a past surgical history that includes hernia repair (2010, 2016); [place] tooth extraction; tumor removal; Breast reduction surgery; myomectomy; Partial hysterectomy (4/8/16); Dilation and curettage of uterus (10/21/2013); Central venous catheter insertion (Right, 12/11/2015); Abdominal exploration surgery; Gastric fundoplication; Endoscopy, colon, diagnostic; other surgical history (08/12/2016); Tunneled venous port placement; Hysterectomy (04/2016); Abdomen surgery (03/23/2017); pr egd balloon dilation esophagus <30 mm diam (Left, 8/8/2017); pr egd balloon dilation esophagus <30 mm diam (N/A, 9/26/2017);  Tonsillectomy and adenoidectomy (N/A, 11/6/2017); pr egd balloon dilation esophagus <30 mm diam (Left, 12/12/2017); pr insj prph ctr vad w/subq port under 5 yr

## 2023-10-13 VITALS
OXYGEN SATURATION: 100 % | DIASTOLIC BLOOD PRESSURE: 89 MMHG | HEIGHT: 64 IN | BODY MASS INDEX: 49.85 KG/M2 | RESPIRATION RATE: 18 BRPM | HEART RATE: 82 BPM | SYSTOLIC BLOOD PRESSURE: 137 MMHG | WEIGHT: 292 LBS | TEMPERATURE: 98.1 F

## 2023-10-13 LAB
ANION GAP SERPL CALC-SCNC: 12 MEQ/L (ref 8–16)
BASOPHILS ABSOLUTE: 0 THOU/MM3 (ref 0–0.1)
BASOPHILS NFR BLD AUTO: 0.3 %
BUN SERPL-MCNC: 13 MG/DL (ref 7–22)
CALCIUM SERPL-MCNC: 9.7 MG/DL (ref 8.5–10.5)
CHLORIDE SERPL-SCNC: 108 MEQ/L (ref 98–111)
CO2 SERPL-SCNC: 23 MEQ/L (ref 23–33)
CREAT SERPL-MCNC: 0.9 MG/DL (ref 0.4–1.2)
DEPRECATED RDW RBC AUTO: 41.9 FL (ref 35–45)
EOSINOPHIL NFR BLD AUTO: 1.8 %
EOSINOPHILS ABSOLUTE: 0.2 THOU/MM3 (ref 0–0.4)
ERYTHROCYTE [DISTWIDTH] IN BLOOD BY AUTOMATED COUNT: 14.6 % (ref 11.5–14.5)
GFR SERPL CREATININE-BSD FRML MDRD: > 60 ML/MIN/1.73M2
GLUCOSE BLD STRIP.AUTO-MCNC: 105 MG/DL (ref 70–108)
GLUCOSE BLD STRIP.AUTO-MCNC: 106 MG/DL (ref 70–108)
GLUCOSE BLD STRIP.AUTO-MCNC: 96 MG/DL (ref 70–108)
GLUCOSE SERPL-MCNC: 106 MG/DL (ref 70–108)
HCT VFR BLD AUTO: 41.2 % (ref 37–47)
HGB BLD-MCNC: 12.7 GM/DL (ref 12–16)
IMM GRANULOCYTES # BLD AUTO: 0.02 THOU/MM3 (ref 0–0.07)
IMM GRANULOCYTES NFR BLD AUTO: 0.2 %
LYMPHOCYTES ABSOLUTE: 2.2 THOU/MM3 (ref 1–4.8)
LYMPHOCYTES NFR BLD AUTO: 25.3 %
MCH RBC QN AUTO: 24.4 PG (ref 26–33)
MCHC RBC AUTO-ENTMCNC: 30.8 GM/DL (ref 32.2–35.5)
MCV RBC AUTO: 79.2 FL (ref 81–99)
MONOCYTES ABSOLUTE: 0.6 THOU/MM3 (ref 0.4–1.3)
MONOCYTES NFR BLD AUTO: 6.6 %
NEUTROPHILS NFR BLD AUTO: 65.8 %
NRBC BLD AUTO-RTO: 0 /100 WBC
PLATELET # BLD AUTO: 263 THOU/MM3 (ref 130–400)
PMV BLD AUTO: 9.5 FL (ref 9.4–12.4)
POTASSIUM SERPL-SCNC: 3.8 MEQ/L (ref 3.5–5.2)
RBC # BLD AUTO: 5.2 MILL/MM3 (ref 4.2–5.4)
REASON FOR REJECTION: NORMAL
REJECTED TEST: NORMAL
SEGMENTED NEUTROPHILS ABSOLUTE COUNT: 5.8 THOU/MM3 (ref 1.8–7.7)
SODIUM SERPL-SCNC: 143 MEQ/L (ref 135–145)
WBC # BLD AUTO: 8.8 THOU/MM3 (ref 4.8–10.8)

## 2023-10-13 PROCEDURE — 6360000002 HC RX W HCPCS

## 2023-10-13 PROCEDURE — 2700000000 HC OXYGEN THERAPY PER DAY

## 2023-10-13 PROCEDURE — 2580000003 HC RX 258

## 2023-10-13 PROCEDURE — 80048 BASIC METABOLIC PNL TOTAL CA: CPT

## 2023-10-13 PROCEDURE — 99239 HOSP IP/OBS DSCHRG MGMT >30: CPT | Performed by: NURSE PRACTITIONER

## 2023-10-13 PROCEDURE — 6370000000 HC RX 637 (ALT 250 FOR IP): Performed by: NURSE PRACTITIONER

## 2023-10-13 PROCEDURE — 36415 COLL VENOUS BLD VENIPUNCTURE: CPT

## 2023-10-13 PROCEDURE — 85025 COMPLETE CBC W/AUTO DIFF WBC: CPT

## 2023-10-13 PROCEDURE — 82948 REAGENT STRIP/BLOOD GLUCOSE: CPT

## 2023-10-13 PROCEDURE — A4216 STERILE WATER/SALINE, 10 ML: HCPCS

## 2023-10-13 PROCEDURE — 2500000003 HC RX 250 WO HCPCS

## 2023-10-13 PROCEDURE — 94660 CPAP INITIATION&MGMT: CPT

## 2023-10-13 PROCEDURE — 2580000003 HC RX 258: Performed by: INTERNAL MEDICINE

## 2023-10-13 PROCEDURE — 6360000002 HC RX W HCPCS: Performed by: NURSE PRACTITIONER

## 2023-10-13 PROCEDURE — C9113 INJ PANTOPRAZOLE SODIUM, VIA: HCPCS

## 2023-10-13 RX ORDER — SUCRALFATE 1 G/1
1 TABLET ORAL
Status: DISCONTINUED | OUTPATIENT
Start: 2023-10-13 | End: 2023-10-13 | Stop reason: HOSPADM

## 2023-10-13 RX ORDER — SUCRALFATE 1 G/1
1 TABLET ORAL
Qty: 120 TABLET | Refills: 0 | Status: SHIPPED | OUTPATIENT
Start: 2023-10-13

## 2023-10-13 RX ORDER — HEPARIN 100 UNIT/ML
500 SYRINGE INTRAVENOUS ONCE
Status: COMPLETED | OUTPATIENT
Start: 2023-10-13 | End: 2023-10-13

## 2023-10-13 RX ORDER — PANTOPRAZOLE SODIUM 40 MG/1
40 TABLET, DELAYED RELEASE ORAL
Status: DISCONTINUED | OUTPATIENT
Start: 2023-10-14 | End: 2023-10-13 | Stop reason: HOSPADM

## 2023-10-13 RX ADMIN — FAMOTIDINE 20 MG: 10 INJECTION INTRAVENOUS at 10:03

## 2023-10-13 RX ADMIN — SODIUM CHLORIDE: 9 INJECTION, SOLUTION INTRAVENOUS at 06:15

## 2023-10-13 RX ADMIN — HYDROMORPHONE HYDROCHLORIDE 0.25 MG: 1 INJECTION, SOLUTION INTRAMUSCULAR; INTRAVENOUS; SUBCUTANEOUS at 13:21

## 2023-10-13 RX ADMIN — HEPARIN 500 UNITS: 100 SYRINGE at 16:36

## 2023-10-13 RX ADMIN — ENOXAPARIN SODIUM 30 MG: 100 INJECTION SUBCUTANEOUS at 10:03

## 2023-10-13 RX ADMIN — HYDROMORPHONE HYDROCHLORIDE 0.5 MG: 1 INJECTION, SOLUTION INTRAMUSCULAR; INTRAVENOUS; SUBCUTANEOUS at 06:13

## 2023-10-13 RX ADMIN — SUCRALFATE 1 G: 1 TABLET ORAL at 13:21

## 2023-10-13 RX ADMIN — HYDROMORPHONE HYDROCHLORIDE 0.5 MG: 1 INJECTION, SOLUTION INTRAMUSCULAR; INTRAVENOUS; SUBCUTANEOUS at 02:40

## 2023-10-13 RX ADMIN — HYDROMORPHONE HYDROCHLORIDE 0.5 MG: 1 INJECTION, SOLUTION INTRAMUSCULAR; INTRAVENOUS; SUBCUTANEOUS at 10:00

## 2023-10-13 RX ADMIN — PANTOPRAZOLE SODIUM 40 MG: 40 INJECTION, POWDER, FOR SOLUTION INTRAVENOUS at 10:03

## 2023-10-13 RX ADMIN — HYDROMORPHONE HYDROCHLORIDE 0.5 MG: 1 INJECTION, SOLUTION INTRAMUSCULAR; INTRAVENOUS; SUBCUTANEOUS at 16:36

## 2023-10-13 ASSESSMENT — PAIN DESCRIPTION - ORIENTATION
ORIENTATION: RIGHT
ORIENTATION: RIGHT;UPPER
ORIENTATION: MID;RIGHT

## 2023-10-13 ASSESSMENT — PAIN DESCRIPTION - PAIN TYPE: TYPE: ACUTE PAIN

## 2023-10-13 ASSESSMENT — PAIN DESCRIPTION - LOCATION
LOCATION: ABDOMEN

## 2023-10-13 ASSESSMENT — PAIN - FUNCTIONAL ASSESSMENT: PAIN_FUNCTIONAL_ASSESSMENT: ACTIVITIES ARE NOT PREVENTED

## 2023-10-13 ASSESSMENT — PAIN SCALES - GENERAL
PAINLEVEL_OUTOF10: 6
PAINLEVEL_OUTOF10: 9
PAINLEVEL_OUTOF10: 6
PAINLEVEL_OUTOF10: 7
PAINLEVEL_OUTOF10: 8
PAINLEVEL_OUTOF10: 8

## 2023-10-13 ASSESSMENT — PAIN DESCRIPTION - DESCRIPTORS
DESCRIPTORS: ACHING
DESCRIPTORS: ACHING
DESCRIPTORS: DISCOMFORT

## 2023-10-13 NOTE — PROGRESS NOTES
Gastroenterology  Progress Note    10/13/2023 1:42 PM  Subjective:   Admit Date: 10/9/2023    Interval History: Patient with long history of GI complain present with nausea vomiting and abdominal discomfort EGD showed mild gastritis nothing really significant anatomy slightly difficult as patient has a multiple gastric surgery in the past.  Clinically doing fine today she is scheduled to have a gastric bypass surgery likely Karen-en-Y in the future at [facility]. Patient should be combination PPI as well as Carafate and to follow-up with GI clinic after discharge for evaluations. ADULT DIET; Dysphagia - Soft and Bite Sized; 5 carb choices (75 gm/meal)    Medications:   Scheduled Meds:    sucralfate  1 g Oral 4x Daily AC & HS    [START ON 10/14/2023] pantoprazole  40 mg Oral QAM AC    heparin (PF)  500 Units IntraCATHeter Once    sodium chloride flush  5-40 mL IntraVENous 2 times per day    enoxaparin  30 mg SubCUTAneous BID    insulin lispro  0-4 Units SubCUTAneous TID WC    insulin lispro  0-4 Units SubCUTAneous Nightly    amLODIPine  5 mg Oral Daily    ARIPiprazole  10 mg Oral QHS    baclofen  10 mg Oral Daily    busPIRone  5 mg Oral TID    Vitamin D  5,000 Units Oral Daily    escitalopram  20 mg Oral Daily    multivitamin  1 tablet Oral Daily    topiramate  100 mg Oral BID    famotidine (PEPCID) injection  20 mg IntraVENous BID    oxybutynin  10 mg Oral Daily     Continuous Infusions:    sodium chloride      dextrose      sodium chloride 75 mL/hr at 10/13/23 0615       CBC:   Recent Labs     10/11/23  0501 10/12/23  0948 10/13/23  0651   WBC 10.2 8.0 8.8   HGB 12.5 12.3 12.7    261 263     BMP:    Recent Labs     10/11/23  0501 10/12/23  0948 10/13/23  0755    142 143   K 4.0 3.7 3.8    108 108   CO2 24 25 23   BUN 9 7 13   CREATININE 0.7 0.7 0.9   GLUCOSE 97 100 106     Hepatic: No results for input(s): \"AST\", \"ALT\", \"ALB\", \"BILITOT\", \"ALKPHOS\" in the last 72 hours.   INR: No PROCEDURE:  10/12/2023     SURGEON:  Luzma Poon MD     INDICATION:  The patient with history of nausea, vomit, abdominal  discomfort, abdominal pain all over, history of gastric surgery in the  past, fundoplication which might resolve, has a PEG tube placement which  has been removed lately. Plan today for EGD to evaluate. ASA CLASSIFICATION:  Please see anesthesia note. ESTIMATED BLOOD LOSS:  None. PROCEDURE PERFORMED:  EGD. DESCRIPTION OF PROCEDURE:  The patient was brought to GI lab. Consent  was obtained. Risks involved with the procedure were explained to the  patient. Informed consent obtained. The patient was monitored during  the procedure with pulse oximetry, blood pressure monitoring, and oxygen  by nasal cannula. Sedation by incremental doses of IV propofol given by  Anesthesia Service to achieve monitored anesthesia care. For ASA  classification and medication given during the procedure, please see  Anesthesia note. A standard video Olympus upper scope was advanced under direct vision  from oral cavity up to the duodenum. The esophagus appeared normal.   Scope advanced to the stomach. CT suggests previous scar from PEG tube  placement. The stomach appeared to be deformed. I had difficult to go  to the duodenum due to deformity from previous surgery  in a certain   Degree  to go to the pylorus, I mean to the duodenum which appears normal.  I  elected to take a biopsy at this time. Scope withdrawn with no  immediate complication. IMPRESSION:  Feature suggested history of PEG tube placement in the  past.  There was slight deformity of the stomach, had difficulty to go  down into the duodenum. PLAN:  1. Resume diet. Advance diet as tolerated. She needs a combination of  Carafate as well as PPI. 2.  Upper GI needs to be done at one time to evaluate _____ as well as  gastric emptying study. Pain management will be accordingly.   3.  I believe, the patient might

## 2023-10-13 NOTE — DISCHARGE SUMMARY
Hospital Medicine Discharge Summary      Patient Identification:   Byron Butler   : 1990  MRN: [id number]   Account: [de-identified]      Patient's PCP: AURELIO Schwartz CNP    Admit Date: 10/9/2023     Discharge Date: 10/13/2023      Admitting Physician: Judy Angeles MD     Discharge Physician: AURELIO Velazquez CNP     Discharge Diagnoses and Hospital Course:    Presented to the ED with N/V and abdominal pain. Intractable acute on chronic abdominal Pain with N / V.  H/o suspected gastroparesis - NM Gastric Emptying Study aborted 23 d/t emesis. H/o chronic abdominal pain. H/o JAYMIE. H/o malignant carcinoid tumor resected 13. H/o hernia repair surgery. She describes the pain as \"crampy. \" Pt was given dilaudid and morphine in the ED which did not help. Does endorse chronic diarrhea and palpitations. Unable to appreciate flushing on exam. NCCTAP -poornima for any acute intra-abdominal pathologies. Pt has been referred to pain management for further evaluation and management in the past. Of note, patient was seen by YVONNE Stubbs, CNP 23 who recommended EGD + Dilation, Reglan, Pepcid, Protonix. EGD planned for 10/16/23 but pt states she couldn't wait. At this time, low suspicion for carcinoid recurrence. Cannot exclude gastroparesis. Continued protonix, pepcid and  reglan. Held Linzess d/t diarrhea. S/P EGD 10/12 Dr. Otto Oakes. EGD showed mild gastritis nothing really significant anatomy slightly difficult as patient has a multiple gastric surgery in the past. GI recs to add Carafate to regimen. Rx given. Compliance reinforced. Keep follow up with [facility]. She is requesting a second opinion here in [facility]. Referral sent to Dr. Zuleika Manuel who has seen her in the past.      Well-controlled NIDDMT2 A1c 6.1% : Stable Takes metformin 1,000 mg daily. Held ALL Non-insulin diabetic agents. Given Low Dose SSI Algorithm. Hypoglycemia protocol. Ilda Can POCT glucose qAC/HS.  BS trend was in goal

## 2023-10-13 NOTE — OP NOTE
[place] [address]                                OPERATIVE REPORT    PATIENT NAME: Marce Adkins                  :        1990  MED REC NO:   [id number]                           ROOM:       0012  ACCOUNT NO:   [de-identified]                           ADMIT DATE: 10/09/2023  PROVIDER:     JANICE Go Nafisaherlinda OF PROCEDURE:  10/12/2023    SURGEON:  Luzma Poon MD    INDICATION:  The patient with history of nausea, vomit, abdominal  discomfort, abdominal pain all over, history of gastric surgery in the  past, fundoplication which might resolve, has a PEG tube placement which  has been removed lately. Plan today for EGD to evaluate. ASA CLASSIFICATION:  Please see anesthesia note. ESTIMATED BLOOD LOSS:  None. PROCEDURE PERFORMED:  EGD. DESCRIPTION OF PROCEDURE:  The patient was brought to GI lab. Consent  was obtained. Risks involved with the procedure were explained to the  patient. Informed consent obtained. The patient was monitored during  the procedure with pulse oximetry, blood pressure monitoring, and oxygen  by nasal cannula. Sedation by incremental doses of IV propofol given by  Anesthesia Service to achieve monitored anesthesia care. For ASA  classification and medication given during the procedure, please see  Anesthesia note. A standard video Olympus upper scope was advanced under direct vision  from oral cavity up to the duodenum. The esophagus appeared normal.   Scope advanced to the stomach. CT suggests previous scar from PEG tube  placement. The stomach appeared to be deformed. I had difficult to go  to the duodenum due to deformity from previous surgery  in a certain   Degree  to go to the pylorus, I mean to the duodenum which appears normal.  I  elected to take a biopsy at this time. Scope withdrawn with no  immediate complication.     IMPRESSION:  Feature suggested history of PEG tube placement in the  past.  There was slight deformity of the stomach, had difficulty to go  down into the duodenum. PLAN:  1. Resume diet. Advance diet as tolerated. She needs a combination of  Carafate as well as PPI. 2.  Upper GI needs to be done at one time to evaluate _____ as well as  gastric emptying study. Pain management will be accordingly. 3.  I believe, the patient might benefit from Karen-en-Y gastric bypass  surgery which she claimed being arranged to be done in the future at  [facility]. I have advised to follow up with these directions  which seemed to be likely successful.           Preston Quintanilla M.D.    D: 10/12/2023 16:25:23       T: 10/12/2023 23:43:13     [email or web address]  Job#: [id number]     Doc#: [id number]    CC:  Rocio Charles CNP

## 2023-10-13 NOTE — PROGRESS NOTES
This RN could not get patient's chest port to draw for 10/13/2023 morning labs. Patient changed to lab draw in epic. Phlebotomy paged and made aware. Per phlebotomy- tech will be up shortly to draw this patient's morning labs (CBC with auto differential and BMP).     Bienvenido Sotomayor RN

## 2023-10-13 NOTE — CARE COORDINATION
10/13/23, 3:32 PM EDT    DISCHARGE PLANNING EVALUATION      Referral completed to [facility], agency will follow at discharge, [address] and orders faxed. [facility] ride home arranged for 4:30 p.m. at patient 's request.     10/13/23, 3:33 PM EDT    Patient goals/plan/ treatment preferences discussed by  and . Patient goals/plan/ treatment preferences reviewed with patient/ family. Patient/ family verbalize understanding of discharge plan and are in agreement with goal/plan/treatment preferences. Understanding was demonstrated using the teach back method. AVS provided by RN at time of discharge, which includes all necessary medical information pertaining to the patients current course of illness, treatment, post-discharge goals of care, and treatment preferences.      Services At/After Discharge: Home Health       IMM Letter  IMM Letter given to Patient/Family/Significant other/Guardian/POA/by[de-identified] Deneen BYRNE CM  IMM Letter date given[de-identified] 10/13/23  IMM Letter time given[de-identified] 7324

## 2023-10-13 NOTE — PROGRESS NOTES
Wound ostomy consulted for \"patient is requesting to be ssen because she has wounds on her feet\". Spoke to primary nurse. States pt has dry and calloused feet and keeps picking at feet. Wondered if there was some sort of cream to place on feet. Informed that the only cream is in the supply room or something to be ordered per pharmacy like urea cream per provider. May want to consider podiatry consult for assessment and treatment recommendations as wound care has limited recommendations and treatment solutions for keratotic skin and callous treatment. Primary nurse states she will reach out to attending.

## 2023-10-13 NOTE — PLAN OF CARE
Problem: Pain  Goal: Verbalizes/displays adequate comfort level or baseline comfort level  10/12/2023 2254 by Kyle Rivera RN  Outcome: Progressing  10/12/2023 1254 by Samson Keller RN  Outcome: Progressing  Flowsheets  Taken 10/12/2023 1254 by Samson Keller RN  Verbalizes/displays adequate comfort level or baseline comfort level:   Encourage patient to monitor pain and request assistance   Assess pain using appropriate pain scale   Administer analgesics based on type and severity of pain and evaluate response  Taken 10/12/2023 0331 by Fransisco Garvey RN  Verbalizes/displays adequate comfort level or baseline comfort level:   Encourage patient to monitor pain and request assistance   Assess pain using appropriate pain scale   Administer analgesics based on type and severity of pain and evaluate response   Implement non-pharmacological measures as appropriate and evaluate response     Problem: ABCDS Injury Assessment  Goal: Absence of physical injury  10/12/2023 2254 by Kyle Rivera RN  Outcome: Progressing  10/12/2023 1254 by Samson Keller RN  Outcome: Progressing  Flowsheets  Taken 10/12/2023 1254 by Samson Keller RN  Absence of Physical Injury: Implement safety measures based on patient assessment  Taken 10/12/2023 0331 by Fransisco Garvey RN  Absence of Physical Injury: Implement safety measures based on patient assessment     Problem: Chronic Conditions and Co-morbidities  Goal: Patient's chronic conditions and co-morbidity symptoms are monitored and maintained or improved  10/12/2023 2254 by Kyle Rivera RN  Outcome: Progressing  10/12/2023 1254 by Samson Keller RN  Outcome: Progressing  Flowsheets  Taken 10/12/2023 1254 by Samson Keller [facility] - Patient's Chronic Conditions and Co-Morbidity Symptoms are Monitored and Maintained or Improved:   Monitor and assess patient's chronic conditions and comorbid symptoms for stability, deterioration, or improvement   Collaborate with

## 2023-10-13 NOTE — ANESTHESIA POSTPROCEDURE EVALUATION
Department of Anesthesiology  Postprocedure Note    Patient: Moreno Cool  MRN: [id number]  YOB: 1990  Date of evaluation: 10/13/2023      Procedure Summary     Date: 10/12/23 Room / Location: [address] [place] [address] / [address] [place] [address]    Anesthesia Start: 9106 Anesthesia Stop: 5033    Procedure: EGD Diagnosis:       H/O nausea and vomiting      (H/O nausea and vomiting [Z87.898])    Surgeons: Ulysses Prader, MD Responsible Provider: Jairo Juan MD    Anesthesia Type: MAC ASA Status: 3          Anesthesia Type: No value filed.     Juan Phase I: Juan Score: 10    Juan Phase II: Juan Score: 10      Anesthesia Post Evaluation    Complications: no

## 2023-10-13 NOTE — CARE COORDINATION
10/13/23, 9:01 AM EDT    DISCHARGE PLANNING EVALUATION    Referral made to [facility], will need [facility] orders to complete referral.

## 2023-10-13 NOTE — PROGRESS NOTES
Discharge summary reviewed, no questions verbalized. Discharge summary page signed. All belongings packed and taken with patient. Port de-accessed. Transport assisted to [facility]. No other concerns.

## 2023-10-14 LAB — HISTAMINE BLD-SCNC: NORMAL NMOL/L

## 2023-10-17 ENCOUNTER — HOSPITAL ENCOUNTER (EMERGENCY)
Age: 33
Discharge: HOME | End: 2023-10-17
Attending: EMERGENCY MEDICINE
Payer: MEDICARE

## 2023-10-17 ENCOUNTER — APPOINTMENT (OUTPATIENT)
Dept: CT IMAGING | Age: 33
End: 2023-10-17
Payer: MEDICARE

## 2023-10-17 VITALS
TEMPERATURE: 97.5 F | DIASTOLIC BLOOD PRESSURE: 86 MMHG | OXYGEN SATURATION: 97 % | SYSTOLIC BLOOD PRESSURE: 117 MMHG | HEART RATE: 97 BPM | RESPIRATION RATE: 16 BRPM | BODY MASS INDEX: 49.85 KG/M2 | WEIGHT: 292 LBS | HEIGHT: 64 IN

## 2023-10-17 DIAGNOSIS — F45.9 SOMATOFORM DISORDER: Primary | ICD-10-CM

## 2023-10-17 DIAGNOSIS — R19.7 DIARRHEA, UNSPECIFIED TYPE: ICD-10-CM

## 2023-10-17 DIAGNOSIS — R19.7 NAUSEA VOMITING AND DIARRHEA: ICD-10-CM

## 2023-10-17 DIAGNOSIS — R11.2 NAUSEA VOMITING AND DIARRHEA: ICD-10-CM

## 2023-10-17 LAB
ANION GAP SERPL CALC-SCNC: 14 MEQ/L (ref 8–16)
B-HCG SERPL QL: NEGATIVE
BACTERIA: ABNORMAL
BASOPHILS ABSOLUTE: 0.1 THOU/MM3 (ref 0–0.1)
BASOPHILS NFR BLD AUTO: 0.4 %
BILIRUB UR QL STRIP: NEGATIVE
BUN SERPL-MCNC: 18 MG/DL (ref 7–22)
CALCIUM SERPL-MCNC: 10.1 MG/DL (ref 8.5–10.5)
CASTS #/AREA URNS LPF: ABNORMAL /LPF
CASTS #/AREA URNS LPF: ABNORMAL /LPF
CHARACTER UR: CLEAR
CHARCOAL URNS QL MICRO: ABNORMAL
CHLORIDE SERPL-SCNC: 104 MEQ/L (ref 98–111)
CO2 SERPL-SCNC: 23 MEQ/L (ref 23–33)
COLOR UR: YELLOW
CREAT SERPL-MCNC: 1 MG/DL (ref 0.4–1.2)
CRYSTALS URNS QL MICRO: ABNORMAL
DEPRECATED RDW RBC AUTO: 41.2 FL (ref 35–45)
EOSINOPHIL NFR BLD AUTO: 1.5 %
EOSINOPHILS ABSOLUTE: 0.2 THOU/MM3 (ref 0–0.4)
EPITHELIAL CELLS, UA: ABNORMAL /HPF
ERYTHROCYTE [DISTWIDTH] IN BLOOD BY AUTOMATED COUNT: 14.9 % (ref 11.5–14.5)
GFR SERPL CREATININE-BSD FRML MDRD: > 60 ML/MIN/1.73M2
GLUCOSE SERPL-MCNC: 112 MG/DL (ref 70–108)
GLUCOSE UR QL STRIP.AUTO: NEGATIVE MG/DL
HCT VFR BLD AUTO: 43.3 % (ref 37–47)
HGB BLD-MCNC: 13.5 GM/DL (ref 12–16)
HGB UR QL STRIP.AUTO: ABNORMAL
IMM GRANULOCYTES # BLD AUTO: 0.05 THOU/MM3 (ref 0–0.07)
IMM GRANULOCYTES NFR BLD AUTO: 0.4 %
KETONES UR QL STRIP.AUTO: ABNORMAL
LACTIC ACID, SEPSIS: 2.1 MMOL/L (ref 0.5–1.9)
LEUKOCYTE ESTERASE UR QL STRIP.AUTO: NEGATIVE
LIPASE SERPL-CCNC: 19.5 U/L (ref 5.6–51.3)
LYMPHOCYTES ABSOLUTE: 3.4 THOU/MM3 (ref 1–4.8)
LYMPHOCYTES NFR BLD AUTO: 25.9 %
MAGNESIUM SERPL-MCNC: 2.1 MG/DL (ref 1.6–2.4)
MCH RBC QN AUTO: 24.2 PG (ref 26–33)
MCHC RBC AUTO-ENTMCNC: 31.2 GM/DL (ref 32.2–35.5)
MCV RBC AUTO: 77.6 FL (ref 81–99)
MONOCYTES ABSOLUTE: 0.9 THOU/MM3 (ref 0.4–1.3)
MONOCYTES NFR BLD AUTO: 6.7 %
NEUTROPHILS NFR BLD AUTO: 65.1 %
NITRITE UR QL STRIP.AUTO: NEGATIVE
NRBC BLD AUTO-RTO: 0 /100 WBC
OSMOLALITY SERPL CALC.SUM OF ELEC: 283.9 MOSMOL/KG (ref 275–300)
PH UR STRIP.AUTO: 5.5 [PH] (ref 5–9)
PLATELET # BLD AUTO: 314 THOU/MM3 (ref 130–400)
PMV BLD AUTO: 9.5 FL (ref 9.4–12.4)
POTASSIUM SERPL-SCNC: 3.3 MEQ/L (ref 3.5–5.2)
PROT UR STRIP.AUTO-MCNC: NEGATIVE MG/DL
RBC # BLD AUTO: 5.58 MILL/MM3 (ref 4.2–5.4)
RBC #/AREA URNS HPF: ABNORMAL /HPF
RENAL EPI CELLS #/AREA URNS HPF: ABNORMAL /[HPF]
SEGMENTED NEUTROPHILS ABSOLUTE COUNT: 8.5 THOU/MM3 (ref 1.8–7.7)
SODIUM SERPL-SCNC: 141 MEQ/L (ref 135–145)
SP GR UR REFRACT.AUTO: 1.03 (ref 1–1.03)
UROBILINOGEN UR QL STRIP.AUTO: 1 EU/DL (ref 0–1)
WBC # BLD AUTO: 13.1 THOU/MM3 (ref 4.8–10.8)
WBC #/AREA URNS HPF: ABNORMAL /HPF
YEAST LIKE FUNGI URNS QL MICRO: ABNORMAL

## 2023-10-17 PROCEDURE — 93005 ELECTROCARDIOGRAM TRACING: CPT | Performed by: STUDENT IN AN ORGANIZED HEALTH CARE EDUCATION/TRAINING PROGRAM

## 2023-10-17 PROCEDURE — 85025 COMPLETE CBC W/AUTO DIFF WBC: CPT

## 2023-10-17 PROCEDURE — 80048 BASIC METABOLIC PNL TOTAL CA: CPT

## 2023-10-17 PROCEDURE — 83735 ASSAY OF MAGNESIUM: CPT

## 2023-10-17 PROCEDURE — 99284 EMERGENCY DEPT VISIT MOD MDM: CPT

## 2023-10-17 PROCEDURE — 74176 CT ABD & PELVIS W/O CONTRAST: CPT

## 2023-10-17 PROCEDURE — 6360000002 HC RX W HCPCS: Performed by: EMERGENCY MEDICINE

## 2023-10-17 PROCEDURE — 96372 THER/PROPH/DIAG INJ SC/IM: CPT

## 2023-10-17 PROCEDURE — 84703 CHORIONIC GONADOTROPIN ASSAY: CPT

## 2023-10-17 PROCEDURE — 36415 COLL VENOUS BLD VENIPUNCTURE: CPT

## 2023-10-17 PROCEDURE — 83605 ASSAY OF LACTIC ACID: CPT

## 2023-10-17 PROCEDURE — 93010 ELECTROCARDIOGRAM REPORT: CPT | Performed by: INTERNAL MEDICINE

## 2023-10-17 PROCEDURE — 2500000003 HC RX 250 WO HCPCS: Performed by: EMERGENCY MEDICINE

## 2023-10-17 PROCEDURE — 81001 URINALYSIS AUTO W/SCOPE: CPT

## 2023-10-17 PROCEDURE — 83690 ASSAY OF LIPASE: CPT

## 2023-10-17 RX ORDER — DIPHENHYDRAMINE HYDROCHLORIDE 50 MG/ML
25 INJECTION INTRAMUSCULAR; INTRAVENOUS ONCE
Status: DISCONTINUED | OUTPATIENT
Start: 2023-10-17 | End: 2023-10-17

## 2023-10-17 RX ORDER — ONDANSETRON 2 MG/ML
4 INJECTION INTRAMUSCULAR; INTRAVENOUS ONCE
Status: DISCONTINUED | OUTPATIENT
Start: 2023-10-17 | End: 2023-10-17 | Stop reason: HOSPADM

## 2023-10-17 RX ORDER — ONDANSETRON 2 MG/ML
4 INJECTION INTRAMUSCULAR; INTRAVENOUS ONCE
Status: DISCONTINUED | OUTPATIENT
Start: 2023-10-17 | End: 2023-10-17

## 2023-10-17 RX ORDER — DIPHENHYDRAMINE HYDROCHLORIDE 50 MG/ML
25 INJECTION INTRAMUSCULAR; INTRAVENOUS ONCE
Status: COMPLETED | OUTPATIENT
Start: 2023-10-17 | End: 2023-10-17

## 2023-10-17 RX ORDER — 0.9 % SODIUM CHLORIDE 0.9 %
1000 INTRAVENOUS SOLUTION INTRAVENOUS ONCE
Status: DISCONTINUED | OUTPATIENT
Start: 2023-10-17 | End: 2023-10-17

## 2023-10-17 RX ADMIN — DIPHENHYDRAMINE HYDROCHLORIDE 25 MG: 50 INJECTION INTRAMUSCULAR; INTRAVENOUS at 02:14

## 2023-10-17 RX ADMIN — HYDROMORPHONE HYDROCHLORIDE 0.25 MG: 1 INJECTION, SOLUTION INTRAMUSCULAR; INTRAVENOUS; SUBCUTANEOUS at 02:12

## 2023-10-17 ASSESSMENT — PAIN SCALES - GENERAL
PAINLEVEL_OUTOF10: 10
PAINLEVEL_OUTOF10: 10
PAINLEVEL_OUTOF10: 9
PAINLEVEL_OUTOF10: 10
PAINLEVEL_OUTOF10: 10

## 2023-10-17 ASSESSMENT — PAIN - FUNCTIONAL ASSESSMENT: PAIN_FUNCTIONAL_ASSESSMENT: 0-10

## 2023-10-17 ASSESSMENT — PAIN DESCRIPTION - LOCATION: LOCATION: ABDOMEN

## 2023-10-17 NOTE — ED TRIAGE NOTES
Pt arrives to ED from home via EMS for c/o abdominal pain with emesis. Pt states she has chronic abdominal issues and has adhesions in her stomach that Dr. Jose Ryan is going to to do surgery to removed. Pt states she also has a doctor at [facility] that is going to do a gastric bypass after Dr. Jose Ryan does his surgery. Pt states this is her \"normal stomach pain but she cannot get it under control tonight\".

## 2023-10-17 NOTE — ED PROVIDER NOTES
[facility] EMERGENCY DEPT      EMERGENCY MEDICINE     Pt Name: Tanya Banda  MRN: [id number]  [facility] 1990  Date of evaluation: 10/17/2023  Provider: Max Muir MD    [address]       Chief Complaint   Patient presents with    Abdominal Pain    Emesis     HISTORY OF PRESENT ILLNESS   Tanya Banda is a pleasant 35 y.o. female who presents to the emergency department from from home, brought in by EMS for evaluation of mental  Patient presents emergency department complaint of at least 2 days of new episode of abdominal pain described as sharp and generalized 10/10 intensity associated with nausea and 2 days of watery diarrhea; patient denies fever no shortness of breath no chest pain no urinary symptoms no hematochezia no melena. Patient has had multiple emergency department, last admission for chronic abdominal pain 10 days ago.   States will have an appointment this week with gastroenterologist/surgery for possible gastric bypass so,     PASTMEDICAL HISTORY     Past Medical History:   Diagnosis Date    Acute on chronic diastolic congestive heart failure ([address]) 6/25/2022    JANI (acute kidney injury) ([address]) 7/7/2019    Anemia     Anesthesia     migraines    Anxiety     Asthma     CAD (coronary artery disease)     Depression     Diabetes ([address])     Diet-controlled type 2 diabetes mellitus ([address]) 2016    Epilepsy ([address])     Fibroids     Gastro - esophageal reflux disease     Hypertension     Hypertrophy of tonsil and adenoid 11/4/2017    Malignant carcinoid tumor of other sites ([address]) 5/14/2013    Migraine     PONV (postoperative nausea and vomiting)     Prolonged emergence from general anesthesia     Sickle cell anemia (HCC)     Sickle cell trait ([address])     PT STATES SHE HAS THE TRAIT NOT THE DISEASE    Tumor associated pain     neuroendrocrine tumor, gastroma       Patient Active Problem List   Diagnosis Code    Chronic abdominal pain R10.9, G89.29    Nausea and vomiting R11.2    Carcinoid

## 2023-10-17 NOTE — ED NOTES
Pt updated on plan of care for ultrasound IV. Voiced no needs. Call light in reach.      Savana Quiroz RN  10/17/23 0480

## 2023-10-17 NOTE — ED NOTES
This nurse attempted to access port 2 times. Port would flush with no blood return. Pt states it is normal for her port to not draw back.      Denny Burkitt, RN  10/17/23 0264

## 2023-10-17 NOTE — ED PROVIDER NOTES

## 2023-10-17 NOTE — ED NOTES
Pt updated on plan of care. Voiced no needs. Call light in reach.      Savana Quiroz RN  10/17/23 8560

## 2023-10-17 NOTE — ED NOTES
Pt states she cannot take Zofran due to allergy. Pt states med causes angioedema. Provider notified.      Ankit Regalado RN  10/17/23 1696

## 2023-10-17 NOTE — ED NOTES
Marlo Alvarez RN unable to obtain peripheral IV access with ultrasound. Provider notified.      Falguni Cheema RN  10/17/23 9632

## 2023-10-18 LAB
EKG ATRIAL RATE: 95 BPM
EKG P AXIS: 54 DEGREES
EKG P-R INTERVAL: 150 MS
EKG Q-T INTERVAL: 356 MS
EKG QRS DURATION: 84 MS
EKG QTC CALCULATION (BAZETT): 447 MS
EKG R AXIS: 11 DEGREES
EKG T AXIS: 17 DEGREES
EKG VENTRICULAR RATE: 95 BPM

## 2023-10-26 ENCOUNTER — TELEPHONE (OUTPATIENT)
Dept: CARDIOLOGY CLINIC | Age: 33
End: 2023-10-26

## 2023-10-26 NOTE — TELEPHONE ENCOUNTER
Pre-Op Risk Assessment    Procedure: Robotic assisted diagnostic laparoscopy (possible open)  Physician: Dr. Shivani Jain  Date of surgery/procedure: 11/03/23    Last Office Visit: 02/08/23  Last Stress: None in Epic  Last Echo: Limited 02/20/23; Complete 06/25/22  Last Cath: 05/31/21  Last Stent: None in Epic  Is patient on blood thinners? No  Hold meds/how many days Okay for surgery?

## 2023-10-27 RX ORDER — DEXAMETHASONE 4 MG/1
2 TABLET ORAL EVERY 4 HOURS PRN
COMMUNITY
Start: 2023-10-07

## 2023-10-27 RX ORDER — SUMATRIPTAN 25 MG/1
1 TABLET, FILM COATED ORAL SEE ADMIN INSTRUCTIONS
COMMUNITY
Start: 2023-09-11

## 2023-10-27 RX ORDER — SPIRONOLACTONE 25 MG/1
25 TABLET ORAL DAILY
COMMUNITY
Start: 2023-10-24

## 2023-10-27 RX ORDER — TRAMADOL HYDROCHLORIDE 50 MG/1
50 TABLET ORAL EVERY 6 HOURS PRN
COMMUNITY
Start: 2023-10-18

## 2023-10-27 NOTE — FLOWSHEET NOTE
Follow all instructions given by your physician  NPO after midnight  Sips of water am of surgery with allowed medications  Bring insurance info and 's license  Wear clean comfortable , loose-fitting clothing  No jewelry or contact lenses to be worn day of surgery  No glue on dentures morning of surgery; you will be asked to remove them for surgery. Case for glasses. Shower the night before and the morning of surgery with a liquid antibacterial soap, dry with new fresh clean towel after each shower, no lotions, creams or powder. Clean sheets and pillowcase on bed night before surgery  Bring medications in original bottles  Bring CPAP/BIPAP machine if you have one ( you may be charged if one is needed in recovery room ) yes is bringing Pacemaker no     Our pharmacy has a Meds to [facility] program where they will deliver any new prescriptions you may have to your room before you leave. Our Pharmacy will clear it through your insurance; for example (same co pay). This enables you to take your new RX as soon as you need when you get home and avoids stop/wait delays on the way home. Please have a form of payment with you and have someone designated as your Pharmacy contact with their phone # as you may not feel well or still be under the influence of anesthesia. Please refer to the SSI-Surgical Site Infection Flyer you hopefully received in the mail-together we can prevent infections; signs and symptoms reviewed. When discharged be sure you understand how to care for your wound and that you have the Dr./office phone # to call if you have any concerns or questions about your wound.      needed at discharge and someone over 25 to stay with you for 24 hours overnight (surgery may be cancelled if you don't have this) wife   Report to [facility] on 2nd floor  If you would become ill prior to surgery, please call the surgeon  May have a visitor with you, we request that you limit to 2 visitors in pre-op area  Masks are

## 2023-10-27 NOTE — TELEPHONE ENCOUNTER
Cardiac studies were unremarkable   Not seen since 2/2023   Defer preoperative risk assessment to PCP

## 2023-11-01 LAB — NSE SERPL-MCNC: NORMAL UG/L

## 2023-11-01 NOTE — PROGRESS NOTES
Spoke with Dr Vera Torres office regarding clearance, states was sent to [address] [place] [address] yesterday.

## 2023-11-02 NOTE — PROGRESS NOTES
Called Dr. Caroline Vazquez office about pre-op clearance. Office stated that pt does not need cardiac clearance per Dr. Candance Barrios review. However patient was going to the [address] [place] [address] clinic per PAT note dated yesterday. Dr. Caroline Vazquez office was not aware of this visit but will check with clinic to see if pt was evaluated. Await response.

## 2023-11-03 ENCOUNTER — ANESTHESIA (OUTPATIENT)
Dept: OPERATING ROOM | Age: 33
End: 2023-11-03
Payer: MEDICARE

## 2023-11-03 ENCOUNTER — ANESTHESIA EVENT (OUTPATIENT)
Dept: OPERATING ROOM | Age: 33
End: 2023-11-03
Payer: MEDICARE

## 2023-11-03 ENCOUNTER — HOSPITAL ENCOUNTER (INPATIENT)
Age: 33
LOS: 2 days | Discharge: HOME | DRG: 337 | End: 2023-11-05
Attending: SURGERY | Admitting: SURGERY
Payer: MEDICARE

## 2023-11-03 PROBLEM — R10.9 ABDOMINAL PAIN: Status: ACTIVE | Noted: 2023-11-03

## 2023-11-03 LAB — GLUCOSE BLD STRIP.AUTO-MCNC: 99 MG/DL (ref 70–108)

## 2023-11-03 PROCEDURE — 3700000001 HC ADD 15 MINUTES (ANESTHESIA): Performed by: SURGERY

## 2023-11-03 PROCEDURE — 6360000002 HC RX W HCPCS

## 2023-11-03 PROCEDURE — 6370000000 HC RX 637 (ALT 250 FOR IP)

## 2023-11-03 PROCEDURE — 6370000000 HC RX 637 (ALT 250 FOR IP): Performed by: SURGERY

## 2023-11-03 PROCEDURE — 2709999900 HC NON-CHARGEABLE SUPPLY: Performed by: SURGERY

## 2023-11-03 PROCEDURE — 2500000003 HC RX 250 WO HCPCS

## 2023-11-03 PROCEDURE — 3600000009 HC SURGERY ROBOT BASE: Performed by: SURGERY

## 2023-11-03 PROCEDURE — 2580000003 HC RX 258: Performed by: SURGERY

## 2023-11-03 PROCEDURE — 82948 REAGENT STRIP/BLOOD GLUCOSE: CPT

## 2023-11-03 PROCEDURE — 6360000002 HC RX W HCPCS: Performed by: SURGERY

## 2023-11-03 PROCEDURE — 1200000000 HC SEMI PRIVATE

## 2023-11-03 PROCEDURE — 7100000011 HC PHASE II RECOVERY - ADDTL 15 MIN: Performed by: SURGERY

## 2023-11-03 PROCEDURE — 3600000019 HC SURGERY ROBOT ADDTL 15MIN: Performed by: SURGERY

## 2023-11-03 PROCEDURE — 2500000003 HC RX 250 WO HCPCS: Performed by: ANESTHESIOLOGY

## 2023-11-03 PROCEDURE — 7100000010 HC PHASE II RECOVERY - FIRST 15 MIN: Performed by: SURGERY

## 2023-11-03 PROCEDURE — 7100000001 HC PACU RECOVERY - ADDTL 15 MIN: Performed by: SURGERY

## 2023-11-03 PROCEDURE — S2900 ROBOTIC SURGICAL SYSTEM: HCPCS | Performed by: SURGERY

## 2023-11-03 PROCEDURE — 3700000000 HC ANESTHESIA ATTENDED CARE: Performed by: SURGERY

## 2023-11-03 PROCEDURE — 7100000000 HC PACU RECOVERY - FIRST 15 MIN: Performed by: SURGERY

## 2023-11-03 RX ORDER — TRAMADOL HYDROCHLORIDE 50 MG/1
50 TABLET ORAL EVERY 6 HOURS PRN
Status: DISCONTINUED | OUTPATIENT
Start: 2023-11-03 | End: 2023-11-05 | Stop reason: HOSPADM

## 2023-11-03 RX ORDER — TOPIRAMATE 100 MG/1
100 TABLET, FILM COATED ORAL 2 TIMES DAILY
Status: DISCONTINUED | OUTPATIENT
Start: 2023-11-03 | End: 2023-11-05 | Stop reason: HOSPADM

## 2023-11-03 RX ORDER — PANTOPRAZOLE SODIUM 40 MG/1
40 TABLET, DELAYED RELEASE ORAL DAILY
Status: DISCONTINUED | OUTPATIENT
Start: 2023-11-03 | End: 2023-11-05 | Stop reason: HOSPADM

## 2023-11-03 RX ORDER — SODIUM CHLORIDE 0.9 % (FLUSH) 0.9 %
5-40 SYRINGE (ML) INJECTION PRN
Status: DISCONTINUED | OUTPATIENT
Start: 2023-11-03 | End: 2023-11-05 | Stop reason: HOSPADM

## 2023-11-03 RX ORDER — SCOLOPAMINE TRANSDERMAL SYSTEM 1 MG/1
PATCH, EXTENDED RELEASE TRANSDERMAL PRN
Status: DISCONTINUED | OUTPATIENT
Start: 2023-11-03 | End: 2023-11-03 | Stop reason: SDUPTHER

## 2023-11-03 RX ORDER — PROMETHAZINE HYDROCHLORIDE 25 MG/1
25 TABLET ORAL EVERY 6 HOURS PRN
Status: DISCONTINUED | OUTPATIENT
Start: 2023-11-03 | End: 2023-11-05 | Stop reason: HOSPADM

## 2023-11-03 RX ORDER — SODIUM CHLORIDE 9 MG/ML
INJECTION, SOLUTION INTRAVENOUS CONTINUOUS
Status: DISCONTINUED | OUTPATIENT
Start: 2023-11-03 | End: 2023-11-03

## 2023-11-03 RX ORDER — ONDANSETRON 4 MG/1
4 TABLET, ORALLY DISINTEGRATING ORAL EVERY 8 HOURS PRN
Status: DISCONTINUED | OUTPATIENT
Start: 2023-11-03 | End: 2023-11-03

## 2023-11-03 RX ORDER — FAMOTIDINE 20 MG/1
40 TABLET, FILM COATED ORAL NIGHTLY
Status: DISCONTINUED | OUTPATIENT
Start: 2023-11-03 | End: 2023-11-05 | Stop reason: HOSPADM

## 2023-11-03 RX ORDER — SUCRALFATE 1 G/1
1 TABLET ORAL
Status: DISCONTINUED | OUTPATIENT
Start: 2023-11-03 | End: 2023-11-05 | Stop reason: HOSPADM

## 2023-11-03 RX ORDER — AMLODIPINE BESYLATE 5 MG/1
5 TABLET ORAL DAILY
Status: DISCONTINUED | OUTPATIENT
Start: 2023-11-04 | End: 2023-11-05 | Stop reason: HOSPADM

## 2023-11-03 RX ORDER — SODIUM CHLORIDE 0.9 % (FLUSH) 0.9 %
5-40 SYRINGE (ML) INJECTION EVERY 12 HOURS SCHEDULED
Status: DISCONTINUED | OUTPATIENT
Start: 2023-11-03 | End: 2023-11-03 | Stop reason: HOSPADM

## 2023-11-03 RX ORDER — METOPROLOL TARTRATE 5 MG/5ML
INJECTION INTRAVENOUS PRN
Status: DISCONTINUED | OUTPATIENT
Start: 2023-11-03 | End: 2023-11-03 | Stop reason: SDUPTHER

## 2023-11-03 RX ORDER — METOCLOPRAMIDE 10 MG/1
10 TABLET ORAL
Status: DISCONTINUED | OUTPATIENT
Start: 2023-11-03 | End: 2023-11-03

## 2023-11-03 RX ORDER — DOCUSATE SODIUM 100 MG/1
100 CAPSULE, LIQUID FILLED ORAL 2 TIMES DAILY PRN
Status: DISCONTINUED | OUTPATIENT
Start: 2023-11-03 | End: 2023-11-05 | Stop reason: HOSPADM

## 2023-11-03 RX ORDER — DEXAMETHASONE SODIUM PHOSPHATE 10 MG/ML
INJECTION, EMULSION INTRAMUSCULAR; INTRAVENOUS PRN
Status: DISCONTINUED | OUTPATIENT
Start: 2023-11-03 | End: 2023-11-03 | Stop reason: SDUPTHER

## 2023-11-03 RX ORDER — SODIUM CHLORIDE 9 MG/ML
INJECTION, SOLUTION INTRAVENOUS PRN
Status: DISCONTINUED | OUTPATIENT
Start: 2023-11-03 | End: 2023-11-05 | Stop reason: HOSPADM

## 2023-11-03 RX ORDER — OXYBUTYNIN CHLORIDE 10 MG/1
10 TABLET, EXTENDED RELEASE ORAL DAILY
Status: DISCONTINUED | OUTPATIENT
Start: 2023-11-03 | End: 2023-11-05 | Stop reason: HOSPADM

## 2023-11-03 RX ORDER — ENOXAPARIN SODIUM 100 MG/ML
30 INJECTION SUBCUTANEOUS EVERY 12 HOURS
Status: DISCONTINUED | OUTPATIENT
Start: 2023-11-04 | End: 2023-11-05 | Stop reason: HOSPADM

## 2023-11-03 RX ORDER — BUSPIRONE HYDROCHLORIDE 5 MG/1
5 TABLET ORAL 3 TIMES DAILY
Status: DISCONTINUED | OUTPATIENT
Start: 2023-11-03 | End: 2023-11-05 | Stop reason: HOSPADM

## 2023-11-03 RX ORDER — ESCITALOPRAM OXALATE 20 MG/1
20 TABLET ORAL DAILY
Status: DISCONTINUED | OUTPATIENT
Start: 2023-11-03 | End: 2023-11-05 | Stop reason: HOSPADM

## 2023-11-03 RX ORDER — SUCCINYLCHOLINE/SOD CL,ISO/PF 200MG/10ML
SYRINGE (ML) INTRAVENOUS PRN
Status: DISCONTINUED | OUTPATIENT
Start: 2023-11-03 | End: 2023-11-03 | Stop reason: SDUPTHER

## 2023-11-03 RX ORDER — SODIUM CHLORIDE 9 MG/ML
INJECTION, SOLUTION INTRAVENOUS CONTINUOUS
Status: DISCONTINUED | OUTPATIENT
Start: 2023-11-03 | End: 2023-11-04

## 2023-11-03 RX ORDER — DICYCLOMINE HYDROCHLORIDE 10 MG/1
10 CAPSULE ORAL 3 TIMES DAILY
Status: DISCONTINUED | OUTPATIENT
Start: 2023-11-03 | End: 2023-11-05 | Stop reason: HOSPADM

## 2023-11-03 RX ORDER — ARIPIPRAZOLE 10 MG/1
10 TABLET ORAL
Status: DISCONTINUED | OUTPATIENT
Start: 2023-11-03 | End: 2023-11-05 | Stop reason: HOSPADM

## 2023-11-03 RX ORDER — PROMETHAZINE HYDROCHLORIDE 25 MG/1
25 TABLET ORAL EVERY 6 HOURS PRN
Status: DISCONTINUED | OUTPATIENT
Start: 2023-11-03 | End: 2023-11-03 | Stop reason: SDUPTHER

## 2023-11-03 RX ORDER — ONDANSETRON 2 MG/ML
4 INJECTION INTRAMUSCULAR; INTRAVENOUS EVERY 6 HOURS PRN
Status: DISCONTINUED | OUTPATIENT
Start: 2023-11-03 | End: 2023-11-03

## 2023-11-03 RX ORDER — MULTIVITAMIN WITH IRON
1 TABLET ORAL DAILY
Status: DISCONTINUED | OUTPATIENT
Start: 2023-11-03 | End: 2023-11-05 | Stop reason: HOSPADM

## 2023-11-03 RX ORDER — SODIUM CHLORIDE 0.9 % (FLUSH) 0.9 %
5-40 SYRINGE (ML) INJECTION EVERY 12 HOURS SCHEDULED
Status: DISCONTINUED | OUTPATIENT
Start: 2023-11-03 | End: 2023-11-05 | Stop reason: HOSPADM

## 2023-11-03 RX ORDER — SODIUM CHLORIDE 0.9 % (FLUSH) 0.9 %
5-40 SYRINGE (ML) INJECTION PRN
Status: DISCONTINUED | OUTPATIENT
Start: 2023-11-03 | End: 2023-11-03 | Stop reason: HOSPADM

## 2023-11-03 RX ORDER — ALBUTEROL SULFATE 90 UG/1
AEROSOL, METERED RESPIRATORY (INHALATION) PRN
Status: DISCONTINUED | OUTPATIENT
Start: 2023-11-03 | End: 2023-11-03 | Stop reason: SDUPTHER

## 2023-11-03 RX ORDER — ROCURONIUM BROMIDE 10 MG/ML
INJECTION, SOLUTION INTRAVENOUS PRN
Status: DISCONTINUED | OUTPATIENT
Start: 2023-11-03 | End: 2023-11-03 | Stop reason: SDUPTHER

## 2023-11-03 RX ORDER — SPIRONOLACTONE 25 MG/1
25 TABLET ORAL DAILY
Status: DISCONTINUED | OUTPATIENT
Start: 2023-11-03 | End: 2023-11-05 | Stop reason: HOSPADM

## 2023-11-03 RX ORDER — ALBUTEROL SULFATE 2.5 MG/3ML
2.5 SOLUTION RESPIRATORY (INHALATION) EVERY 6 HOURS PRN
Status: DISCONTINUED | OUTPATIENT
Start: 2023-11-03 | End: 2023-11-05 | Stop reason: HOSPADM

## 2023-11-03 RX ORDER — PRAZOSIN HYDROCHLORIDE 1 MG/1
1 CAPSULE ORAL NIGHTLY
Status: DISCONTINUED | OUTPATIENT
Start: 2023-11-03 | End: 2023-11-05 | Stop reason: HOSPADM

## 2023-11-03 RX ORDER — PROPRANOLOL HYDROCHLORIDE 80 MG/1
80 CAPSULE, EXTENDED RELEASE ORAL DAILY
Status: DISCONTINUED | OUTPATIENT
Start: 2023-11-03 | End: 2023-11-05 | Stop reason: HOSPADM

## 2023-11-03 RX ORDER — LIDOCAINE HCL/PF 100 MG/5ML
SYRINGE (ML) INJECTION PRN
Status: DISCONTINUED | OUTPATIENT
Start: 2023-11-03 | End: 2023-11-03 | Stop reason: SDUPTHER

## 2023-11-03 RX ORDER — MONTELUKAST SODIUM 10 MG/1
10 TABLET ORAL NIGHTLY
Status: DISCONTINUED | OUTPATIENT
Start: 2023-11-03 | End: 2023-11-05 | Stop reason: HOSPADM

## 2023-11-03 RX ORDER — MIDAZOLAM HYDROCHLORIDE 1 MG/ML
INJECTION INTRAMUSCULAR; INTRAVENOUS PRN
Status: DISCONTINUED | OUTPATIENT
Start: 2023-11-03 | End: 2023-11-03 | Stop reason: SDUPTHER

## 2023-11-03 RX ORDER — BUPIVACAINE HYDROCHLORIDE 5 MG/ML
INJECTION, SOLUTION EPIDURAL; INTRACAUDAL PRN
Status: DISCONTINUED | OUTPATIENT
Start: 2023-11-03 | End: 2023-11-03 | Stop reason: ALTCHOICE

## 2023-11-03 RX ORDER — SODIUM CHLORIDE 9 MG/ML
INJECTION, SOLUTION INTRAVENOUS PRN
Status: DISCONTINUED | OUTPATIENT
Start: 2023-11-03 | End: 2023-11-03 | Stop reason: HOSPADM

## 2023-11-03 RX ORDER — FLUTICASONE PROPIONATE 110 UG/1
2 AEROSOL, METERED RESPIRATORY (INHALATION) EVERY 4 HOURS PRN
Status: DISCONTINUED | OUTPATIENT
Start: 2023-11-03 | End: 2023-11-05 | Stop reason: HOSPADM

## 2023-11-03 RX ORDER — FENTANYL CITRATE 50 UG/ML
INJECTION, SOLUTION INTRAMUSCULAR; INTRAVENOUS PRN
Status: DISCONTINUED | OUTPATIENT
Start: 2023-11-03 | End: 2023-11-03 | Stop reason: SDUPTHER

## 2023-11-03 RX ORDER — ACETAMINOPHEN 325 MG/1
650 TABLET ORAL EVERY 6 HOURS PRN
Status: DISCONTINUED | OUTPATIENT
Start: 2023-11-03 | End: 2023-11-05 | Stop reason: HOSPADM

## 2023-11-03 RX ORDER — DROPERIDOL 2.5 MG/ML
0.62 INJECTION, SOLUTION INTRAMUSCULAR; INTRAVENOUS
Status: DISCONTINUED | OUTPATIENT
Start: 2023-11-03 | End: 2023-11-03 | Stop reason: HOSPADM

## 2023-11-03 RX ORDER — PROPOFOL 10 MG/ML
INJECTION, EMULSION INTRAVENOUS PRN
Status: DISCONTINUED | OUTPATIENT
Start: 2023-11-03 | End: 2023-11-03 | Stop reason: SDUPTHER

## 2023-11-03 RX ADMIN — METOPROLOL TARTRATE 2.5 MG: 5 INJECTION, SOLUTION INTRAVENOUS at 11:27

## 2023-11-03 RX ADMIN — ACETAMINOPHEN 650 MG: 325 TABLET ORAL at 21:09

## 2023-11-03 RX ADMIN — PRAZOSIN HYDROCHLORIDE 1 MG: 1 CAPSULE ORAL at 21:13

## 2023-11-03 RX ADMIN — FENTANYL CITRATE 50 MCG: 50 INJECTION, SOLUTION INTRAMUSCULAR; INTRAVENOUS at 11:21

## 2023-11-03 RX ADMIN — ROCURONIUM BROMIDE 30 MG: 10 INJECTION INTRAVENOUS at 11:24

## 2023-11-03 RX ADMIN — PROPOFOL 60 MG: 10 INJECTION, EMULSION INTRAVENOUS at 11:24

## 2023-11-03 RX ADMIN — ALBUTEROL SULFATE 3 PUFF: 90 AEROSOL, METERED RESPIRATORY (INHALATION) at 11:14

## 2023-11-03 RX ADMIN — SCOPALAMINE 1 PATCH: 1 PATCH, EXTENDED RELEASE TRANSDERMAL at 11:24

## 2023-11-03 RX ADMIN — ROCURONIUM BROMIDE 20 MG: 10 INJECTION INTRAVENOUS at 11:42

## 2023-11-03 RX ADMIN — ROCURONIUM BROMIDE 20 MG: 10 INJECTION INTRAVENOUS at 12:14

## 2023-11-03 RX ADMIN — HYDROMORPHONE HYDROCHLORIDE 0.5 MG: 1 INJECTION, SOLUTION INTRAMUSCULAR; INTRAVENOUS; SUBCUTANEOUS at 12:46

## 2023-11-03 RX ADMIN — HYDROMORPHONE HYDROCHLORIDE 0.5 MG: 1 INJECTION, SOLUTION INTRAMUSCULAR; INTRAVENOUS; SUBCUTANEOUS at 15:46

## 2023-11-03 RX ADMIN — TRAMADOL HYDROCHLORIDE 50 MG: 50 TABLET, COATED ORAL at 13:59

## 2023-11-03 RX ADMIN — SODIUM CHLORIDE: 9 INJECTION, SOLUTION INTRAVENOUS at 11:04

## 2023-11-03 RX ADMIN — Medication 200 MG: at 11:21

## 2023-11-03 RX ADMIN — PROMETHAZINE HYDROCHLORIDE 25 MG: 25 TABLET ORAL at 17:55

## 2023-11-03 RX ADMIN — HYDROMORPHONE HYDROCHLORIDE 0.5 MG: 1 INJECTION, SOLUTION INTRAMUSCULAR; INTRAVENOUS; SUBCUTANEOUS at 21:09

## 2023-11-03 RX ADMIN — Medication 100 MG: at 11:21

## 2023-11-03 RX ADMIN — SODIUM CHLORIDE: 9 INJECTION, SOLUTION INTRAVENOUS at 12:24

## 2023-11-03 RX ADMIN — MIDAZOLAM 2 MG: 1 INJECTION INTRAMUSCULAR; INTRAVENOUS at 11:15

## 2023-11-03 RX ADMIN — PROPOFOL 200 MG: 10 INJECTION, EMULSION INTRAVENOUS at 11:21

## 2023-11-03 RX ADMIN — FAMOTIDINE 40 MG: 20 TABLET ORAL at 21:09

## 2023-11-03 RX ADMIN — FENTANYL CITRATE 50 MCG: 50 INJECTION, SOLUTION INTRAMUSCULAR; INTRAVENOUS at 11:42

## 2023-11-03 RX ADMIN — FENTANYL CITRATE 50 MCG: 50 INJECTION, SOLUTION INTRAMUSCULAR; INTRAVENOUS at 11:24

## 2023-11-03 RX ADMIN — SUGAMMADEX 400 MG: 100 INJECTION, SOLUTION INTRAVENOUS at 12:27

## 2023-11-03 RX ADMIN — DEXAMETHASONE SODIUM PHOSPHATE 10 MG: 10 INJECTION, EMULSION INTRAMUSCULAR; INTRAVENOUS at 11:23

## 2023-11-03 RX ADMIN — HYDROMORPHONE HYDROCHLORIDE 0.5 MG: 1 INJECTION, SOLUTION INTRAMUSCULAR; INTRAVENOUS; SUBCUTANEOUS at 12:51

## 2023-11-03 RX ADMIN — ALBUTEROL SULFATE 6 PUFF: 90 AEROSOL, METERED RESPIRATORY (INHALATION) at 12:21

## 2023-11-03 ASSESSMENT — PAIN DESCRIPTION - LOCATION
LOCATION: ABDOMEN
LOCATION: ABDOMEN;HEAD
LOCATION: ABDOMEN

## 2023-11-03 ASSESSMENT — PAIN SCALES - GENERAL
PAINLEVEL_OUTOF10: 10
PAINLEVEL_OUTOF10: 8
PAINLEVEL_OUTOF10: 9
PAINLEVEL_OUTOF10: 10
PAINLEVEL_OUTOF10: 10
PAINLEVEL_OUTOF10: 8
PAINLEVEL_OUTOF10: 10
PAINLEVEL_OUTOF10: 8
PAINLEVEL_OUTOF10: 5
PAINLEVEL_OUTOF10: 8
PAINLEVEL_OUTOF10: 10
PAINLEVEL_OUTOF10: 6
PAINLEVEL_OUTOF10: 7

## 2023-11-03 ASSESSMENT — PAIN DESCRIPTION - PAIN TYPE
TYPE: SURGICAL PAIN

## 2023-11-03 ASSESSMENT — LIFESTYLE VARIABLES: SMOKING_STATUS: 1

## 2023-11-03 ASSESSMENT — PAIN - FUNCTIONAL ASSESSMENT
PAIN_FUNCTIONAL_ASSESSMENT: ACTIVITIES ARE NOT PREVENTED

## 2023-11-03 ASSESSMENT — PAIN DESCRIPTION - FREQUENCY
FREQUENCY: CONTINUOUS

## 2023-11-03 ASSESSMENT — PAIN DESCRIPTION - DESCRIPTORS
DESCRIPTORS: SORE
DESCRIPTORS: CRAMPING;SHOOTING
DESCRIPTORS: SHARP
DESCRIPTORS: SHARP
DESCRIPTORS: SHOOTING;SHARP

## 2023-11-03 ASSESSMENT — PAIN DESCRIPTION - ONSET
ONSET: ON-GOING

## 2023-11-03 ASSESSMENT — PAIN DESCRIPTION - ORIENTATION
ORIENTATION: MID;LEFT;RIGHT
ORIENTATION: RIGHT;LEFT;MID
ORIENTATION: MID;LOWER
ORIENTATION: LOWER;RIGHT
ORIENTATION: MID

## 2023-11-03 NOTE — H&P
[address]  History and Physical Update      Pt Name: Joseph Washington  MRN: [id number]  YOB: 1990  Date of evaluation: 11/2/2023    [x] I have examined the patient and reviewed the H&P/Consult and there are no changes to the patient or plans. [] I have examined the patient and reviewed the H&P/Consult and have noted the following changes:         Curtis Guzmán MD  Electronically signed 11/2/2023 at 10:29 PM

## 2023-11-03 NOTE — H&P
[place] [address]                       PREOPERATIVE HISTORY AND PHYSICAL    PATIENT NAME: Tanya Banda                  :        1990  MED REC NO:   [id number]                           ROOM:  ACCOUNT NO:   [de-identified]                           ADMIT DATE: 2023  PROVIDER:     Melissa Saxena. Norma López MD    CHIEF COMPLAINT:  Acute-on-chronic abdominal pain. HISTORY OF PRESENT ILLNESS:  The patient is a beyond challenging  66-year-old female who has had multiple abdominal issues since she was a  teenager. She has had multiple abdominal surgeries including Nissen  fundoplication x2. The last hiatal hernia repair per Dr. Nellie Morales with  insertion of G-tube that was eventually removed. She has persistent  nausea and vomiting. She flips back and forth between myself and Dr. Keyonna Curran, and has recently been told by Dr. Keyonna Curran that he saw adhesions  according to her and reportedly did a colonoscopy. She has had lysis of  adhesions in the past.  She actually developed a port site hernia that  had to be repaired with mesh last year, but she is presenting again with  abdominal pain. This is a chronic issue, but does have an acute  component and since her lysis of adhesions, she has had further  surgeries robotically per Dr. Nellie Morales as mentioned. She is being  admitted at this time for robotic-assisted lysis of adhesions. PAST MEDICAL HISTORY:  Positive for hypertension, asthma, depression and  diabetes. SURGERIES:  Are multiple including laparoscopic bilateral oophorectomy  in the remote past and breast reduction. She has had multiple MediPort  placements, cholecystectomy, multiple colonoscopies, and EGD. She has  had a gastric fundoplication x2, hysterectomy, robotic-assisted  laparoscopy, and a ventral hernia repair. SOCIAL HISTORY:  She has a history of alcohol use, none now. She is a  nonsmoker.     FAMILY

## 2023-11-03 NOTE — PROGRESS NOTES
Patient admitted to [facility] room 7 with wife at bedside. Bed in low position side rails up call light in reach. Patient denies questions at this time.

## 2023-11-03 NOTE — PROGRESS NOTES
18 Pt arrives to PACU, alert to voice. Resp easy and unlabored on nasal cannula. 1245 Pt moaning in pain, rates it a 10/10.    1246 0.5 mg of Dilaudid given at this time. 1251 Pt states pain is 9/10, 0.5 mg of Dilaudid given at this time. 1300 Pt states pain is more tolerable a this time, requesting to return to room to see wife. 1310 Pt resting in bed with eyes closed, resp easy and unlabored.  VSS.    1311 Pt meets criteria for discharge from PACU at this time, transported to [facility] in stable condition     1315 Report given to [facility]

## 2023-11-03 NOTE — PROGRESS NOTES
Dr. Lora Fuentes notified regarding elevated BP.  States that patient needs to be seen in the ER to further evaluation

## 2023-11-03 NOTE — BRIEF OP NOTE
Brief Postoperative Note      Patient: Darell Lamb  YOB: 1990  MRN: [id number]    Date of Procedure: 11/3/2023    Pre Op Dx acute on top chronic abd pain    Post-Op Diagnosis: same adhesions       Procedure(s):  Robotic Diagnostic Laparoscopy, LYSIS OF ADHESIONS    Surgeon(s):  Italo Jones MD    Assistant:  First Assistant: Roro Wren RN    Anesthesia: General    Estimated Blood Loss (mL): 20 ml    Complications: none    Specimens:       Implants:        Drains:   [REMOVED] Gastrostomy/Enterostomy/Jejunostomy Tube Percutaneous Endoscopic Gastrostomy (PEG) LUQ (Removed)       Findings: see op note  This procedure was not performed to treat colon cancer through resection      Electronically signed by Italo Jones MD on 11/3/2023 at 12:27 PM

## 2023-11-03 NOTE — PROGRESS NOTES
Pt returned SDS salazar 7. Wife present in room. Call light within room. Pt offered food and drink. Pt awaiting room upstairs for admission. This RN called and notified [address] regarding patient admission, spoke with [facility] RN.

## 2023-11-03 NOTE — ANESTHESIA POSTPROCEDURE EVALUATION
Department of Anesthesiology  Postprocedure Note    Patient: Cheryle Proud  MRN: [id number]  YOB: 1990  Date of evaluation: 11/3/2023      Procedure Summary     Date: 11/03/23 Room / Location: [address] [place] [address] Patient    Anesthesia Start: 6657 Anesthesia Stop: 5345    Procedure: Robotic Diagnostic Laparoscopy, LYSIS OF ADHESIONS (Abdomen) Diagnosis:       Abdominal pain, unspecified abdominal location      Nausea and vomiting, unspecified vomiting type      (Abdominal pain, unspecified abdominal location [R10.9])      (Nausea and vomiting, unspecified vomiting type [R11.2])    Surgeons: Noella Kehr, MD Responsible Provider: Sheron Gray DO    Anesthesia Type: general ASA Status: 3          Anesthesia Type: No value filed.     Juan Phase I: Juan Score: 10    Juan Phase II:        Anesthesia Post Evaluation    Patient location during evaluation: PACU  Patient participation: complete - patient participated  Level of consciousness: awake and alert  Pain score: 3  Airway patency: patent  Nausea & Vomiting: no nausea and no vomiting  Complications: no  Cardiovascular status: hemodynamically stable and blood pressure returned to baseline  Respiratory status: spontaneous ventilation, acceptable and nasal cannula  Hydration status: stable  Pain management: adequate and satisfactory to patient

## 2023-11-03 NOTE — PROGRESS NOTES
[facility] CRNA spoke to patient and they are resuming with surgery

## 2023-11-03 NOTE — ANESTHESIA PRE PROCEDURE
Neuro/Psych:   (+) neuromuscular disease:, headaches: migraine headaches, psychiatric history:            GI/Hepatic/Renal:   (+) hiatal hernia, GERD:, morbid obesity          Endo/Other:    (+) DiabetesType II DM, , .          Pt had no PAT visit       Abdominal:   (+) obese,     Abdomen: soft. Vascular: negative vascular ROS. Other Findings:           Anesthesia Plan      general     ASA 3       Induction: intravenous. MIPS: Postoperative opioids intended and Prophylactic antiemetics administered. Anesthetic plan and risks discussed with patient. Plan discussed with CRNA.                     Sheron Gray DO   11/3/2023

## 2023-11-03 NOTE — PROGRESS NOTES
Patient admitted to room [phone] with friend,  admission orders gone over with patient, including diet order,  pain meds,  resuming home meds, activity order, and room orientation   patient voiced understanding,  no concerns noted at  this time

## 2023-11-04 LAB
BASOPHILS ABSOLUTE: 0 THOU/MM3 (ref 0–0.1)
BASOPHILS NFR BLD AUTO: 0.2 %
DEPRECATED RDW RBC AUTO: 40.4 FL (ref 35–45)
EOSINOPHIL NFR BLD AUTO: 0 %
EOSINOPHILS ABSOLUTE: 0 THOU/MM3 (ref 0–0.4)
ERYTHROCYTE [DISTWIDTH] IN BLOOD BY AUTOMATED COUNT: 14.6 % (ref 11.5–14.5)
HCT VFR BLD AUTO: 40.6 % (ref 37–47)
HGB BLD-MCNC: 12.9 GM/DL (ref 12–16)
IMM GRANULOCYTES # BLD AUTO: 0.09 THOU/MM3 (ref 0–0.07)
IMM GRANULOCYTES NFR BLD AUTO: 0.7 %
LYMPHOCYTES ABSOLUTE: 1.6 THOU/MM3 (ref 1–4.8)
LYMPHOCYTES NFR BLD AUTO: 12 %
MCH RBC QN AUTO: 24.2 PG (ref 26–33)
MCHC RBC AUTO-ENTMCNC: 31.8 GM/DL (ref 32.2–35.5)
MCV RBC AUTO: 76 FL (ref 81–99)
MONOCYTES ABSOLUTE: 0.7 THOU/MM3 (ref 0.4–1.3)
MONOCYTES NFR BLD AUTO: 5.3 %
NEUTROPHILS NFR BLD AUTO: 81.8 %
NRBC BLD AUTO-RTO: 0 /100 WBC
PLATELET # BLD AUTO: 265 THOU/MM3 (ref 130–400)
PMV BLD AUTO: 9.3 FL (ref 9.4–12.4)
RBC # BLD AUTO: 5.34 MILL/MM3 (ref 4.2–5.4)
SEGMENTED NEUTROPHILS ABSOLUTE COUNT: 10.7 THOU/MM3 (ref 1.8–7.7)
WBC # BLD AUTO: 13.1 THOU/MM3 (ref 4.8–10.8)

## 2023-11-04 PROCEDURE — 6360000002 HC RX W HCPCS: Performed by: SURGERY

## 2023-11-04 PROCEDURE — 36415 COLL VENOUS BLD VENIPUNCTURE: CPT

## 2023-11-04 PROCEDURE — 6370000000 HC RX 637 (ALT 250 FOR IP): Performed by: SURGERY

## 2023-11-04 PROCEDURE — 1200000000 HC SEMI PRIVATE

## 2023-11-04 PROCEDURE — 85025 COMPLETE CBC W/AUTO DIFF WBC: CPT

## 2023-11-04 PROCEDURE — 2580000003 HC RX 258: Performed by: SURGERY

## 2023-11-04 RX ADMIN — SODIUM CHLORIDE: 9 INJECTION, SOLUTION INTRAVENOUS at 09:29

## 2023-11-04 RX ADMIN — BUSPIRONE HYDROCHLORIDE 5 MG: 5 TABLET ORAL at 08:09

## 2023-11-04 RX ADMIN — ENOXAPARIN SODIUM 30 MG: 100 INJECTION SUBCUTANEOUS at 18:30

## 2023-11-04 RX ADMIN — SPIRONOLACTONE 25 MG: 25 TABLET ORAL at 08:11

## 2023-11-04 RX ADMIN — AMLODIPINE BESYLATE 5 MG: 5 TABLET ORAL at 08:10

## 2023-11-04 RX ADMIN — ESCITALOPRAM OXALATE 20 MG: 20 TABLET ORAL at 08:11

## 2023-11-04 RX ADMIN — DICYCLOMINE HYDROCHLORIDE 10 MG: 10 CAPSULE ORAL at 14:11

## 2023-11-04 RX ADMIN — OXYBUTYNIN CHLORIDE 10 MG: 10 TABLET, EXTENDED RELEASE ORAL at 08:11

## 2023-11-04 RX ADMIN — HYDROMORPHONE HYDROCHLORIDE 0.5 MG: 1 INJECTION, SOLUTION INTRAMUSCULAR; INTRAVENOUS; SUBCUTANEOUS at 11:46

## 2023-11-04 RX ADMIN — TRAMADOL HYDROCHLORIDE 50 MG: 50 TABLET, COATED ORAL at 14:08

## 2023-11-04 RX ADMIN — PRAZOSIN HYDROCHLORIDE 1 MG: 1 CAPSULE ORAL at 20:10

## 2023-11-04 RX ADMIN — PANTOPRAZOLE SODIUM 40 MG: 40 TABLET, DELAYED RELEASE ORAL at 05:20

## 2023-11-04 RX ADMIN — DICYCLOMINE HYDROCHLORIDE 10 MG: 10 CAPSULE ORAL at 20:10

## 2023-11-04 RX ADMIN — HYDROMORPHONE HYDROCHLORIDE 0.5 MG: 1 INJECTION, SOLUTION INTRAMUSCULAR; INTRAVENOUS; SUBCUTANEOUS at 02:07

## 2023-11-04 RX ADMIN — HYDROMORPHONE HYDROCHLORIDE 0.5 MG: 1 INJECTION, SOLUTION INTRAMUSCULAR; INTRAVENOUS; SUBCUTANEOUS at 08:16

## 2023-11-04 RX ADMIN — DICYCLOMINE HYDROCHLORIDE 10 MG: 10 CAPSULE ORAL at 08:09

## 2023-11-04 RX ADMIN — ENOXAPARIN SODIUM 30 MG: 100 INJECTION SUBCUTANEOUS at 06:33

## 2023-11-04 RX ADMIN — SUCRALFATE 1 G: 1 TABLET ORAL at 20:10

## 2023-11-04 RX ADMIN — BUSPIRONE HYDROCHLORIDE 5 MG: 5 TABLET ORAL at 14:11

## 2023-11-04 RX ADMIN — Medication 1 TABLET: at 08:10

## 2023-11-04 RX ADMIN — HYDROMORPHONE HYDROCHLORIDE 0.5 MG: 1 INJECTION, SOLUTION INTRAMUSCULAR; INTRAVENOUS; SUBCUTANEOUS at 21:08

## 2023-11-04 RX ADMIN — PROPRANOLOL HYDROCHLORIDE 80 MG: 80 CAPSULE, EXTENDED RELEASE ORAL at 08:10

## 2023-11-04 RX ADMIN — TRAMADOL HYDROCHLORIDE 50 MG: 50 TABLET, COATED ORAL at 06:34

## 2023-11-04 RX ADMIN — SUCRALFATE 1 G: 1 TABLET ORAL at 11:07

## 2023-11-04 RX ADMIN — PROMETHAZINE HYDROCHLORIDE 25 MG: 25 TABLET ORAL at 20:10

## 2023-11-04 RX ADMIN — HYDROMORPHONE HYDROCHLORIDE 0.5 MG: 1 INJECTION, SOLUTION INTRAMUSCULAR; INTRAVENOUS; SUBCUTANEOUS at 05:20

## 2023-11-04 RX ADMIN — FAMOTIDINE 40 MG: 20 TABLET ORAL at 20:10

## 2023-11-04 RX ADMIN — HYDROMORPHONE HYDROCHLORIDE 0.5 MG: 1 INJECTION, SOLUTION INTRAMUSCULAR; INTRAVENOUS; SUBCUTANEOUS at 17:58

## 2023-11-04 RX ADMIN — TRAMADOL HYDROCHLORIDE 50 MG: 50 TABLET, COATED ORAL at 20:14

## 2023-11-04 RX ADMIN — TOPIRAMATE 100 MG: 100 TABLET, FILM COATED ORAL at 08:09

## 2023-11-04 RX ADMIN — METFORMIN HYDROCHLORIDE 1000 MG: 500 TABLET ORAL at 08:09

## 2023-11-04 ASSESSMENT — PAIN DESCRIPTION - PAIN TYPE
TYPE: SURGICAL PAIN
TYPE: SURGICAL PAIN

## 2023-11-04 ASSESSMENT — PAIN SCALES - GENERAL
PAINLEVEL_OUTOF10: 7
PAINLEVEL_OUTOF10: 8
PAINLEVEL_OUTOF10: 7
PAINLEVEL_OUTOF10: 10
PAINLEVEL_OUTOF10: 5
PAINLEVEL_OUTOF10: 10
PAINLEVEL_OUTOF10: 10
PAINLEVEL_OUTOF10: 7
PAINLEVEL_OUTOF10: 6
PAINLEVEL_OUTOF10: 8
PAINLEVEL_OUTOF10: 10
PAINLEVEL_OUTOF10: 4
PAINLEVEL_OUTOF10: 10

## 2023-11-04 ASSESSMENT — PAIN DESCRIPTION - DESCRIPTORS
DESCRIPTORS: ACHING

## 2023-11-04 ASSESSMENT — PAIN DESCRIPTION - LOCATION
LOCATION: ABDOMEN
LOCATION: ABDOMEN;BACK
LOCATION: ABDOMEN

## 2023-11-04 ASSESSMENT — PAIN DESCRIPTION - ORIENTATION
ORIENTATION: MID
ORIENTATION: RIGHT
ORIENTATION: LOWER;MID
ORIENTATION: RIGHT
ORIENTATION: MID
ORIENTATION: MID;LOWER

## 2023-11-04 ASSESSMENT — PAIN DESCRIPTION - ONSET
ONSET: ON-GOING
ONSET: ON-GOING

## 2023-11-04 ASSESSMENT — PAIN - FUNCTIONAL ASSESSMENT
PAIN_FUNCTIONAL_ASSESSMENT: ACTIVITIES ARE NOT PREVENTED

## 2023-11-04 ASSESSMENT — PAIN DESCRIPTION - FREQUENCY
FREQUENCY: CONTINUOUS
FREQUENCY: CONTINUOUS

## 2023-11-04 NOTE — CARE COORDINATION
11/04/23 1247   Readmission Assessment   Number of Days since last admission? 8-30 days   Previous Disposition Home with Home Health   Who is being Interviewed Patient   What was the patient's/caregiver's perception as to why they think they needed to return back to the hospital? Other (Comment)  (Elective procedure)   Did you visit your Primary Care Physician after you left the hospital, before you returned this time? Yes   Did you see a specialist, such as Cardiac, Pulmonary, Orthopedic Physician, etc. after you left the hospital? Yes  (Bariatric Counselor)   Who advised the patient to return to the hospital? Physician  (Elective procedure)   Does the patient report anything that got in the way of taking their medications? No   In our efforts to provide the best possible care to you and others like you, can you think of anything that we could have done to help you after you left the hospital the first time, so that you might not have needed to return so soon?  Other (Comment)  (No. Elective procedure)
Goals/Plan/Treatment Preferences: Met with Magalie Coto. Verified PCP and insurance. She is able to afford her medications. Magalie Coto is mostly independent, feels she will need assistance with bathing after her surgery. Current with [facility] for nursing, states she will need new [facility] orders for nursing and aide services at discharge. Primary RN notified. Has DME, listed above. Gets transportation from [address] [place] [address] or insurance. Inquired about potential new [facility] agency- list provided. Patient decided she will continue with [facility]. Plans to return home with wife. If patient is discharged prior to next notation, then this note serves as note for discharge by case management.

## 2023-11-05 VITALS
OXYGEN SATURATION: 98 % | HEART RATE: 103 BPM | BODY MASS INDEX: 49.82 KG/M2 | WEIGHT: 291.8 LBS | TEMPERATURE: 98.5 F | RESPIRATION RATE: 16 BRPM | DIASTOLIC BLOOD PRESSURE: 95 MMHG | SYSTOLIC BLOOD PRESSURE: 157 MMHG | HEIGHT: 64 IN

## 2023-11-05 LAB — C DIFF GDH STL QL: NEGATIVE

## 2023-11-05 PROCEDURE — 6370000000 HC RX 637 (ALT 250 FOR IP): Performed by: SURGERY

## 2023-11-05 PROCEDURE — 8E0W4CZ ROBOTIC ASSISTED PROCEDURE OF TRUNK REGION, PERCUTANEOUS ENDOSCOPIC APPROACH: ICD-10-PCS | Performed by: SURGERY

## 2023-11-05 PROCEDURE — 2580000003 HC RX 258: Performed by: SURGERY

## 2023-11-05 PROCEDURE — 0DNU4ZZ RELEASE OMENTUM, PERCUTANEOUS ENDOSCOPIC APPROACH: ICD-10-PCS | Performed by: SURGERY

## 2023-11-05 PROCEDURE — 6360000002 HC RX W HCPCS: Performed by: SURGERY

## 2023-11-05 PROCEDURE — 87449 NOS EACH ORGANISM AG IA: CPT

## 2023-11-05 RX ORDER — HEPARIN 100 UNIT/ML
500 SYRINGE INTRAVENOUS PRN
Status: DISCONTINUED | OUTPATIENT
Start: 2023-11-05 | End: 2023-11-05 | Stop reason: HOSPADM

## 2023-11-05 RX ADMIN — METFORMIN HYDROCHLORIDE 1000 MG: 500 TABLET ORAL at 09:14

## 2023-11-05 RX ADMIN — SODIUM CHLORIDE, PRESERVATIVE FREE 10 ML: 5 INJECTION INTRAVENOUS at 09:18

## 2023-11-05 RX ADMIN — DICYCLOMINE HYDROCHLORIDE 10 MG: 10 CAPSULE ORAL at 09:13

## 2023-11-05 RX ADMIN — AMLODIPINE BESYLATE 5 MG: 5 TABLET ORAL at 09:14

## 2023-11-05 RX ADMIN — HEPARIN 500 UNITS: 100 SYRINGE at 17:23

## 2023-11-05 RX ADMIN — SUCRALFATE 1 G: 1 TABLET ORAL at 06:45

## 2023-11-05 RX ADMIN — HYDROMORPHONE HYDROCHLORIDE 0.5 MG: 1 INJECTION, SOLUTION INTRAMUSCULAR; INTRAVENOUS; SUBCUTANEOUS at 05:27

## 2023-11-05 RX ADMIN — TOPIRAMATE 100 MG: 100 TABLET, FILM COATED ORAL at 09:14

## 2023-11-05 RX ADMIN — HYDROMORPHONE HYDROCHLORIDE 0.5 MG: 1 INJECTION, SOLUTION INTRAMUSCULAR; INTRAVENOUS; SUBCUTANEOUS at 11:33

## 2023-11-05 RX ADMIN — ENOXAPARIN SODIUM 30 MG: 100 INJECTION SUBCUTANEOUS at 06:45

## 2023-11-05 RX ADMIN — TRAMADOL HYDROCHLORIDE 50 MG: 50 TABLET, COATED ORAL at 01:19

## 2023-11-05 RX ADMIN — PROMETHAZINE HYDROCHLORIDE 25 MG: 25 TABLET ORAL at 02:57

## 2023-11-05 RX ADMIN — HYDROMORPHONE HYDROCHLORIDE 0.5 MG: 1 INJECTION, SOLUTION INTRAMUSCULAR; INTRAVENOUS; SUBCUTANEOUS at 08:28

## 2023-11-05 RX ADMIN — HYDROMORPHONE HYDROCHLORIDE 0.5 MG: 1 INJECTION, SOLUTION INTRAMUSCULAR; INTRAVENOUS; SUBCUTANEOUS at 00:12

## 2023-11-05 RX ADMIN — HYDROMORPHONE HYDROCHLORIDE 0.5 MG: 1 INJECTION, SOLUTION INTRAMUSCULAR; INTRAVENOUS; SUBCUTANEOUS at 02:25

## 2023-11-05 RX ADMIN — PANTOPRAZOLE SODIUM 40 MG: 40 TABLET, DELAYED RELEASE ORAL at 06:45

## 2023-11-05 RX ADMIN — PROPRANOLOL HYDROCHLORIDE 80 MG: 80 CAPSULE, EXTENDED RELEASE ORAL at 09:14

## 2023-11-05 RX ADMIN — HYDROMORPHONE HYDROCHLORIDE 0.5 MG: 1 INJECTION, SOLUTION INTRAMUSCULAR; INTRAVENOUS; SUBCUTANEOUS at 14:51

## 2023-11-05 ASSESSMENT — PAIN DESCRIPTION - DESCRIPTORS
DESCRIPTORS: ACHING

## 2023-11-05 ASSESSMENT — PAIN DESCRIPTION - LOCATION
LOCATION: ABDOMEN

## 2023-11-05 ASSESSMENT — PAIN - FUNCTIONAL ASSESSMENT
PAIN_FUNCTIONAL_ASSESSMENT: ACTIVITIES ARE NOT PREVENTED

## 2023-11-05 ASSESSMENT — PAIN DESCRIPTION - ORIENTATION
ORIENTATION: MID;LOWER
ORIENTATION: MID
ORIENTATION: MID;LOWER
ORIENTATION: MID;LOWER
ORIENTATION: MID
ORIENTATION: MID;LOWER
ORIENTATION: MID
ORIENTATION: MID;LOWER
ORIENTATION: MID;LOWER

## 2023-11-05 ASSESSMENT — PAIN SCALES - GENERAL
PAINLEVEL_OUTOF10: 10
PAINLEVEL_OUTOF10: 9
PAINLEVEL_OUTOF10: 9
PAINLEVEL_OUTOF10: 8
PAINLEVEL_OUTOF10: 8
PAINLEVEL_OUTOF10: 7
PAINLEVEL_OUTOF10: 10
PAINLEVEL_OUTOF10: 6
PAINLEVEL_OUTOF10: 7
PAINLEVEL_OUTOF10: 7
PAINLEVEL_OUTOF10: 10

## 2023-11-05 ASSESSMENT — PAIN SCALES - WONG BAKER
WONGBAKER_NUMERICALRESPONSE: 2

## 2023-11-05 ASSESSMENT — PAIN DESCRIPTION - PAIN TYPE: TYPE: SURGICAL PAIN

## 2023-11-05 ASSESSMENT — PAIN DESCRIPTION - FREQUENCY: FREQUENCY: CONTINUOUS

## 2023-11-05 NOTE — PROGRESS NOTES
Surg POD#2  abd soft BS +  wounds good still taking dilaudid but pt pain chronic  will d/c f/u office

## 2023-11-05 NOTE — DISCHARGE SUMMARY
[place] [address]                               DISCHARGE SUMMARY    PATIENT NAME: Alan Kelly                  :        1990  MED REC NO:   [id number]                           ROOM:       0015  ACCOUNT NO:   [de-identified]                           ADMIT DATE: 2023  PROVIDER:     Danuta Rosales. Kiera Cornejo MD              [facility] DATE:    DATE OF DISCHARGE:  2023    DISCHARGE DIAGNOSIS:  Chronic abdominal pain with adhesions. OPERATION:  Robotic diagnostic laparoscopy with lysis of adhesions. COMPLICATIONS:  None. CONSULTANTS:  None. HOSPITAL COURSE:  The patient is a 49-year-old female patient with  chronic abdominal pain, multiple prior surgeries who again was having  increased abdominal pain, acute on top of her chronic pain. She was  brought into the hospital, underwent a diagnostic laparoscopy with lysis  of adhesions. Postop day 1, she was stable, but she is still having a  lot of pain requiring Dilaudid which is not surprising. She takes  Ultram chronically at home. Postop day 2, she had some diarrhea. Stool  was checked for C. diff, was negative. She is tolerating her diet. Her  bowel sounds are positive. Her abdomen was soft. It was felt she be  discharged home. She will be discharged home to resume all of her home  medications which are multiple. To follow up in my office. She will be  _____ Ultram at home that she has. She is being discharged in stable  condition. [facility], MD    D: 2023 9:26:45       T: 2023 11:24:56     RHYS/JUANJOSE  Job#: [id number]     Doc#: [id number]    CC:

## 2023-11-05 NOTE — PROGRESS NOTES
Reviewed discharge instructions, discharge medications, and follow up appointments with patient stating understanding. CHG soap given to patient at discharge. Instructions given on daily use of CHG to prevent infection. Patient voiced understanding. Patient discharged to home with her wife.

## 2023-11-05 NOTE — PLAN OF CARE
Problem: Discharge Planning  Goal: Discharge to home or other facility with appropriate resources  11/4/2023 0927 by Diamond Waite RN  Outcome: Progressing  Flowsheets (Taken 11/3/2023 2057 by Bossman Ovalle, RN)  Discharge to home or other facility with appropriate resources:   Identify barriers to discharge with patient and caregiver   Arrange for needed discharge resources and transportation as appropriate   Refer to discharge planning if patient needs post-hospital services based on physician order or complex needs related to functional status, cognitive ability or social support system   Identify discharge learning needs (meds, wound care, etc)     Problem: Safety - Adult  Goal: Free from fall injury  11/4/2023 0927 by Diamond Waite RN  Flowsheets (Taken 11/4/2023 0213 by Bossman Ovalle, RN)  Free From Fall Injury: Instruct family/caregiver on patient safety     Problem: Pain  Goal: Verbalizes/displays adequate comfort level or baseline comfort level  11/4/2023 0927 by Diamond Waite RN  Outcome: Progressing  [address] (Taken 11/3/2023 2057 by Bossman Ovalle, RN)  Verbalizes/displays adequate comfort level or baseline comfort level:   Encourage patient to monitor pain and request assistance   Assess pain using appropriate pain scale   Administer analgesics based on type and severity of pain and evaluate response   Implement non-pharmacological measures as appropriate and evaluate response   Consider cultural and social influences on pain and pain management     Problem: Chronic Conditions and Co-morbidities  Goal: Patient's chronic conditions and co-morbidity symptoms are monitored and maintained or improved  Outcome: Progressing  Flowsheets (Taken 11/3/2023 2057 by Bossman Ovalle, RN)  Care Plan - Patient's Chronic Conditions and Co-Morbidity Symptoms are Monitored and Maintained or Improved:   Monitor and assess patient's chronic conditions and comorbid symptoms for stability, deterioration, or
Problem: Discharge Planning  Goal: Discharge to home or other facility with appropriate resources  Outcome: Progressing  Flowsheets (Taken 11/3/2023 2057)  Discharge to home or other facility with appropriate resources:   Identify barriers to discharge with patient and caregiver   Arrange for needed discharge resources and transportation as appropriate   Refer to discharge planning if patient needs post-hospital services based on physician order or complex needs related to functional status, cognitive ability or social support system   Identify discharge learning needs (meds, wound care, etc)     Problem: Safety - Adult  Goal: Free from fall injury  Outcome: Progressing  Flowsheets (Taken 11/4/2023 0213)  Free From Fall Injury: Instruct family/caregiver on patient safety     Problem: Pain  Goal: Verbalizes/displays adequate comfort level or baseline comfort level  Outcome: Progressing  Flowsheets (Taken 11/3/2023 2057)  Verbalizes/displays adequate comfort level or baseline comfort level:   Encourage patient to monitor pain and request assistance   Assess pain using appropriate pain scale   Administer analgesics based on type and severity of pain and evaluate response   Implement non-pharmacological measures as appropriate and evaluate response   Consider cultural and social influences on pain and pain management   Care plan reviewed with patient. Patient verbalizes understanding of the care plan and contributed to goal setting.
multidisciplinary team to address chronic and comorbid conditions and prevent exacerbation or deterioration   Update acute care plan with appropriate goals if chronic or comorbid symptoms are exacerbated and prevent overall improvement and discharge   Care plan reviewed with patient. Patient verbalizes understanding of the care plan and contributed to goal setting.
conditions and prevent exacerbation or deterioration   Update acute care plan with appropriate goals if chronic or comorbid symptoms are exacerbated and prevent overall improvement and discharge   Care plan reviewed with patient. Patient verbalize understanding of the plan of care and contribute to goal setting.

## 2023-11-05 NOTE — PROGRESS NOTES
Patient complaining of continued cramping and abdominal pain. Requesting that I call Dr. Bertha Aguilar and ask about staying another night. He did say it was ok but no more IV narcotics. Patient states that if she is not going to get her pain medications that she will go home. Resource nurse notified of need for deaccessing mediport.

## 2023-11-06 NOTE — OP NOTE
[place] [address]                                OPERATIVE REPORT    PATIENT NAME: Souleymane Pratt                  :        1990  MED REC NO:   [id number]                           ROOM:       0015  ACCOUNT NO:   [de-identified]                           ADMIT DATE: 2023  PROVIDER:     Harvey Esteban. Marce Castro MD    DATE OF PROCEDURE:  2023    PREOPERATIVE DIAGNOSIS:  Persistent abdominal pain, known adhesions. POSTOPERATIVE DIAGNOSIS:  Adhesions. OPERATION PERFORMED:  1. Diagnostic laparoscopy using the robot. 2.  Robotic lysis of adhesions. SURGEON:  Harvey Esteban. MD Marilu    ANESTHESIA:  General.    COMPLICATIONS:  None. BLOOD LOSS:  20 mL. INDICATIONS FOR PROCEDURE:  The patient is a challenging 77-year-old  female who has a long history of chronic abdominal pain, multiple prior  surgeries, who has had onset of an acute exacerbation of chronic  abdominal pain. The patient is here for diagnostic laparoscopy using  the robotic findings. The patient did have omental adhesions and a  little bit of the transverse colon that was taken down from the  epigastric area. The patient did have the stomach still attached up to  the peritoneum after a previous G-tube had been placed. We elected not  to attempt to take that down; otherwise, lower abdomen showed  surprisingly minimal adhesions. All adhesions including some adhesions  to the mesh that was placed in the left lateral abdominal wall was taken  down. No other abnormalities seen. DESCRIPTION OF PROCEDURE:  The patient was brought to the operating  suite, placed supine on the operating room table. After adequate  inhalational anesthesia was administered, the patient's abdomen was  prepped and draped in usual sterile fashion. An incision was made just  below the umbilicus. Veress needle was placed. CO2 was insufflated to  a pressure of 15.

## 2023-11-20 RX ORDER — FUROSEMIDE 20 MG/1
20 TABLET ORAL DAILY
Qty: 30 TABLET | Refills: 0 | OUTPATIENT
Start: 2023-11-20

## 2023-11-22 RX ORDER — FUROSEMIDE 20 MG/1
20 TABLET ORAL DAILY
Qty: 30 TABLET | Refills: 0 | OUTPATIENT
Start: 2023-11-22

## 2023-11-25 ENCOUNTER — HOSPITAL ENCOUNTER (OUTPATIENT)
Age: 33
Setting detail: OBSERVATION
Discharge: HOME | End: 2023-11-27
Attending: EMERGENCY MEDICINE
Payer: MEDICARE

## 2023-11-25 ENCOUNTER — APPOINTMENT (OUTPATIENT)
Dept: CT IMAGING | Age: 33
End: 2023-11-25
Payer: MEDICARE

## 2023-11-25 DIAGNOSIS — R10.9 INTRACTABLE ABDOMINAL PAIN: Primary | ICD-10-CM

## 2023-11-25 DIAGNOSIS — R11.2 NAUSEA AND VOMITING, UNSPECIFIED VOMITING TYPE: ICD-10-CM

## 2023-11-25 LAB
ALBUMIN SERPL BCG-MCNC: 4 G/DL (ref 3.5–5.1)
ALP SERPL-CCNC: 111 U/L (ref 38–126)
ALT SERPL W/O P-5'-P-CCNC: 12 U/L (ref 11–66)
AMPHETAMINES UR QL SCN: NEGATIVE
ANION GAP SERPL CALC-SCNC: 9 MEQ/L (ref 8–16)
AST SERPL-CCNC: 15 U/L (ref 5–40)
B-HCG SERPL QL: NEGATIVE
BACTERIA URNS QL MICRO: ABNORMAL /HPF
BARBITURATES UR QL SCN: NEGATIVE
BASOPHILS ABSOLUTE: 0 THOU/MM3 (ref 0–0.1)
BASOPHILS NFR BLD AUTO: 0.4 %
BENZODIAZ UR QL SCN: NEGATIVE
BILIRUB CONJ SERPL-MCNC: < 0.2 MG/DL (ref 0–0.3)
BILIRUB SERPL-MCNC: 0.3 MG/DL (ref 0.3–1.2)
BILIRUB UR QL STRIP.AUTO: NEGATIVE
BUN SERPL-MCNC: 11 MG/DL (ref 7–22)
BZE UR QL SCN: NEGATIVE
CALCIUM SERPL-MCNC: 9.4 MG/DL (ref 8.5–10.5)
CANNABINOIDS UR QL SCN: NEGATIVE
CASTS #/AREA URNS LPF: ABNORMAL /LPF
CASTS 2: ABNORMAL /LPF
CHARACTER UR: CLEAR
CHLORIDE SERPL-SCNC: 107 MEQ/L (ref 98–111)
CO2 SERPL-SCNC: 26 MEQ/L (ref 23–33)
COLOR: YELLOW
CREAT SERPL-MCNC: 0.7 MG/DL (ref 0.4–1.2)
CRP SERPL-MCNC: 1.13 MG/DL (ref 0–1)
CRYSTALS URNS MICRO: ABNORMAL
DEPRECATED RDW RBC AUTO: 40 FL (ref 35–45)
EOSINOPHIL NFR BLD AUTO: 1.7 %
EOSINOPHILS ABSOLUTE: 0.2 THOU/MM3 (ref 0–0.4)
EPITHELIAL CELLS, UA: ABNORMAL /HPF
ERYTHROCYTE [DISTWIDTH] IN BLOOD BY AUTOMATED COUNT: 14.5 % (ref 11.5–14.5)
FENTANYL: NEGATIVE
FLUAV RNA RESP QL NAA+PROBE: NOT DETECTED
FLUBV RNA RESP QL NAA+PROBE: NOT DETECTED
GFR SERPL CREATININE-BSD FRML MDRD: > 60 ML/MIN/1.73M2
GLUCOSE SERPL-MCNC: 99 MG/DL (ref 70–108)
GLUCOSE UR QL STRIP.AUTO: NEGATIVE MG/DL
HCT VFR BLD AUTO: 40.7 % (ref 37–47)
HGB BLD-MCNC: 12.6 GM/DL (ref 12–16)
HGB UR QL STRIP.AUTO: ABNORMAL
IMM GRANULOCYTES # BLD AUTO: 0.05 THOU/MM3 (ref 0–0.07)
IMM GRANULOCYTES NFR BLD AUTO: 0.5 %
KETONES UR QL STRIP.AUTO: NEGATIVE
LACTATE SERPL-SCNC: 1.4 MMOL/L (ref 0.5–2)
LIPASE SERPL-CCNC: 20.5 U/L (ref 5.6–51.3)
LYMPHOCYTES ABSOLUTE: 2.7 THOU/MM3 (ref 1–4.8)
LYMPHOCYTES NFR BLD AUTO: 24.7 %
MAGNESIUM SERPL-MCNC: 2.5 MG/DL (ref 1.6–2.4)
MCH RBC QN AUTO: 24 PG (ref 26–33)
MCHC RBC AUTO-ENTMCNC: 31 GM/DL (ref 32.2–35.5)
MCV RBC AUTO: 77.4 FL (ref 81–99)
MISCELLANEOUS 2: ABNORMAL
MONOCYTES ABSOLUTE: 0.6 THOU/MM3 (ref 0.4–1.3)
MONOCYTES NFR BLD AUTO: 5.6 %
NEUTROPHILS NFR BLD AUTO: 67.1 %
NITRITE UR QL STRIP: NEGATIVE
NRBC BLD AUTO-RTO: 0 /100 WBC
OPIATES UR QL SCN: NEGATIVE
OSMOLALITY SERPL CALC.SUM OF ELEC: 282.5 MOSMOL/KG (ref 275–300)
OXYCODONE: NEGATIVE
PCP UR QL SCN: NEGATIVE
PH UR STRIP.AUTO: 5.5 [PH] (ref 5–9)
PLATELET # BLD AUTO: 325 THOU/MM3 (ref 130–400)
PMV BLD AUTO: 9.6 FL (ref 9.4–12.4)
POTASSIUM SERPL-SCNC: 3.7 MEQ/L (ref 3.5–5.2)
PROCALCITONIN SERPL IA-MCNC: 0.04 NG/ML (ref 0.01–0.09)
PROT SERPL-MCNC: 7.3 G/DL (ref 6.1–8)
PROT UR STRIP.AUTO-MCNC: NEGATIVE MG/DL
RBC # BLD AUTO: 5.26 MILL/MM3 (ref 4.2–5.4)
RBC URINE: ABNORMAL /HPF
RENAL EPI CELLS #/AREA URNS HPF: ABNORMAL /[HPF]
SARS-COV-2 RNA RESP QL NAA+PROBE: NOT DETECTED
SEGMENTED NEUTROPHILS ABSOLUTE COUNT: 7.4 THOU/MM3 (ref 1.8–7.7)
SODIUM SERPL-SCNC: 142 MEQ/L (ref 135–145)
SP GR UR REFRACT.AUTO: 1.01 (ref 1–1.03)
TROPONIN, HIGH SENSITIVITY: 6 NG/L (ref 0–12)
TSH SERPL DL<=0.005 MIU/L-ACNC: 1.76 UIU/ML (ref 0.4–4.2)
UROBILINOGEN, URINE: 0.2 EU/DL (ref 0–1)
WBC # BLD AUTO: 11 THOU/MM3 (ref 4.8–10.8)
WBC #/AREA URNS HPF: ABNORMAL /HPF
WBC #/AREA URNS HPF: NEGATIVE /[HPF]
YEAST LIKE FUNGI URNS QL MICRO: ABNORMAL

## 2023-11-25 PROCEDURE — 96372 THER/PROPH/DIAG INJ SC/IM: CPT

## 2023-11-25 PROCEDURE — 81001 URINALYSIS AUTO W/SCOPE: CPT

## 2023-11-25 PROCEDURE — 83690 ASSAY OF LIPASE: CPT

## 2023-11-25 PROCEDURE — 6370000000 HC RX 637 (ALT 250 FOR IP): Performed by: STUDENT IN AN ORGANIZED HEALTH CARE EDUCATION/TRAINING PROGRAM

## 2023-11-25 PROCEDURE — 2580000003 HC RX 258

## 2023-11-25 PROCEDURE — 6360000002 HC RX W HCPCS: Performed by: STUDENT IN AN ORGANIZED HEALTH CARE EDUCATION/TRAINING PROGRAM

## 2023-11-25 PROCEDURE — 96361 HYDRATE IV INFUSION ADD-ON: CPT

## 2023-11-25 PROCEDURE — 6360000002 HC RX W HCPCS

## 2023-11-25 PROCEDURE — 2580000003 HC RX 258: Performed by: STUDENT IN AN ORGANIZED HEALTH CARE EDUCATION/TRAINING PROGRAM

## 2023-11-25 PROCEDURE — 99222 1ST HOSP IP/OBS MODERATE 55: CPT | Performed by: INTERNAL MEDICINE

## 2023-11-25 PROCEDURE — 84484 ASSAY OF TROPONIN QUANT: CPT

## 2023-11-25 PROCEDURE — 84443 ASSAY THYROID STIM HORMONE: CPT

## 2023-11-25 PROCEDURE — 96374 THER/PROPH/DIAG INJ IV PUSH: CPT

## 2023-11-25 PROCEDURE — 80053 COMPREHEN METABOLIC PANEL: CPT

## 2023-11-25 PROCEDURE — 93005 ELECTROCARDIOGRAM TRACING: CPT | Performed by: STUDENT IN AN ORGANIZED HEALTH CARE EDUCATION/TRAINING PROGRAM

## 2023-11-25 PROCEDURE — 99285 EMERGENCY DEPT VISIT HI MDM: CPT

## 2023-11-25 PROCEDURE — 84145 PROCALCITONIN (PCT): CPT

## 2023-11-25 PROCEDURE — 84703 CHORIONIC GONADOTROPIN ASSAY: CPT

## 2023-11-25 PROCEDURE — G0378 HOSPITAL OBSERVATION PER HR: HCPCS

## 2023-11-25 PROCEDURE — 87636 SARSCOV2 & INF A&B AMP PRB: CPT

## 2023-11-25 PROCEDURE — 96375 TX/PRO/DX INJ NEW DRUG ADDON: CPT

## 2023-11-25 PROCEDURE — 85025 COMPLETE CBC W/AUTO DIFF WBC: CPT

## 2023-11-25 PROCEDURE — 83605 ASSAY OF LACTIC ACID: CPT

## 2023-11-25 PROCEDURE — 80307 DRUG TEST PRSMV CHEM ANLYZR: CPT

## 2023-11-25 PROCEDURE — 74177 CT ABD & PELVIS W/CONTRAST: CPT

## 2023-11-25 PROCEDURE — 36415 COLL VENOUS BLD VENIPUNCTURE: CPT

## 2023-11-25 PROCEDURE — 86140 C-REACTIVE PROTEIN: CPT

## 2023-11-25 PROCEDURE — 83735 ASSAY OF MAGNESIUM: CPT

## 2023-11-25 PROCEDURE — 82248 BILIRUBIN DIRECT: CPT

## 2023-11-25 PROCEDURE — 6360000004 HC RX CONTRAST MEDICATION

## 2023-11-25 PROCEDURE — 96376 TX/PRO/DX INJ SAME DRUG ADON: CPT

## 2023-11-25 RX ORDER — 0.9 % SODIUM CHLORIDE 0.9 %
1000 INTRAVENOUS SOLUTION INTRAVENOUS ONCE
Status: COMPLETED | OUTPATIENT
Start: 2023-11-25 | End: 2023-11-25

## 2023-11-25 RX ORDER — TOPIRAMATE 100 MG/1
100 TABLET, FILM COATED ORAL 2 TIMES DAILY
Status: DISCONTINUED | OUTPATIENT
Start: 2023-11-25 | End: 2023-11-27 | Stop reason: HOSPADM

## 2023-11-25 RX ORDER — SUMATRIPTAN 50 MG/1
25 TABLET, FILM COATED ORAL SEE ADMIN INSTRUCTIONS
Status: DISCONTINUED | OUTPATIENT
Start: 2023-11-25 | End: 2023-11-25 | Stop reason: SDUPTHER

## 2023-11-25 RX ORDER — FAMOTIDINE 20 MG/1
40 TABLET, FILM COATED ORAL NIGHTLY
Status: DISCONTINUED | OUTPATIENT
Start: 2023-11-25 | End: 2023-11-27 | Stop reason: HOSPADM

## 2023-11-25 RX ORDER — MORPHINE SULFATE 4 MG/ML
4 INJECTION, SOLUTION INTRAMUSCULAR; INTRAVENOUS
Status: DISCONTINUED | OUTPATIENT
Start: 2023-11-25 | End: 2023-11-25

## 2023-11-25 RX ORDER — BUSPIRONE HYDROCHLORIDE 5 MG/1
5 TABLET ORAL 3 TIMES DAILY
Status: DISCONTINUED | OUTPATIENT
Start: 2023-11-25 | End: 2023-11-27 | Stop reason: HOSPADM

## 2023-11-25 RX ORDER — MORPHINE SULFATE 2 MG/ML
2 INJECTION, SOLUTION INTRAMUSCULAR; INTRAVENOUS
Status: DISCONTINUED | OUTPATIENT
Start: 2023-11-25 | End: 2023-11-25

## 2023-11-25 RX ORDER — ACETAMINOPHEN 325 MG/1
650 TABLET ORAL EVERY 6 HOURS PRN
Status: DISCONTINUED | OUTPATIENT
Start: 2023-11-25 | End: 2023-11-27 | Stop reason: HOSPADM

## 2023-11-25 RX ORDER — FLUTICASONE PROPIONATE 110 UG/1
2 AEROSOL, METERED RESPIRATORY (INHALATION)
Status: DISCONTINUED | OUTPATIENT
Start: 2023-11-25 | End: 2023-11-27 | Stop reason: HOSPADM

## 2023-11-25 RX ORDER — DOCUSATE SODIUM 100 MG/1
100 CAPSULE, LIQUID FILLED ORAL 2 TIMES DAILY PRN
Status: DISCONTINUED | OUTPATIENT
Start: 2023-11-25 | End: 2023-11-27 | Stop reason: HOSPADM

## 2023-11-25 RX ORDER — PROMETHAZINE HYDROCHLORIDE 25 MG/1
12.5 TABLET ORAL EVERY 6 HOURS PRN
Status: DISCONTINUED | OUTPATIENT
Start: 2023-11-25 | End: 2023-11-27 | Stop reason: HOSPADM

## 2023-11-25 RX ORDER — SODIUM CHLORIDE 0.9 % (FLUSH) 0.9 %
10 SYRINGE (ML) INJECTION PRN
Status: DISCONTINUED | OUTPATIENT
Start: 2023-11-25 | End: 2023-11-27 | Stop reason: HOSPADM

## 2023-11-25 RX ORDER — BACLOFEN 10 MG/1
10 TABLET ORAL DAILY
Status: DISCONTINUED | OUTPATIENT
Start: 2023-11-26 | End: 2023-11-27 | Stop reason: HOSPADM

## 2023-11-25 RX ORDER — DROPERIDOL 2.5 MG/ML
1.25 INJECTION, SOLUTION INTRAMUSCULAR; INTRAVENOUS ONCE
Status: COMPLETED | OUTPATIENT
Start: 2023-11-25 | End: 2023-11-25

## 2023-11-25 RX ORDER — PROCHLORPERAZINE EDISYLATE 5 MG/ML
10 INJECTION INTRAMUSCULAR; INTRAVENOUS EVERY 6 HOURS PRN
Status: DISCONTINUED | OUTPATIENT
Start: 2023-11-25 | End: 2023-11-27 | Stop reason: HOSPADM

## 2023-11-25 RX ORDER — ACETAMINOPHEN 650 MG/1
650 SUPPOSITORY RECTAL EVERY 6 HOURS PRN
Status: DISCONTINUED | OUTPATIENT
Start: 2023-11-25 | End: 2023-11-27 | Stop reason: HOSPADM

## 2023-11-25 RX ORDER — AMLODIPINE BESYLATE 5 MG/1
5 TABLET ORAL DAILY
Status: DISCONTINUED | OUTPATIENT
Start: 2023-11-26 | End: 2023-11-27 | Stop reason: HOSPADM

## 2023-11-25 RX ORDER — SODIUM CHLORIDE 0.9 % (FLUSH) 0.9 %
5-40 SYRINGE (ML) INJECTION EVERY 12 HOURS SCHEDULED
Status: DISCONTINUED | OUTPATIENT
Start: 2023-11-25 | End: 2023-11-27 | Stop reason: HOSPADM

## 2023-11-25 RX ORDER — METOCLOPRAMIDE 10 MG/1
10 TABLET ORAL
Status: DISCONTINUED | OUTPATIENT
Start: 2023-11-25 | End: 2023-11-25

## 2023-11-25 RX ORDER — ACETAMINOPHEN 325 MG/1
650 TABLET ORAL EVERY 6 HOURS PRN
Status: DISCONTINUED | OUTPATIENT
Start: 2023-11-25 | End: 2023-11-25 | Stop reason: SDUPTHER

## 2023-11-25 RX ORDER — ENOXAPARIN SODIUM 100 MG/ML
30 INJECTION SUBCUTANEOUS 2 TIMES DAILY
Status: DISCONTINUED | OUTPATIENT
Start: 2023-11-25 | End: 2023-11-27 | Stop reason: HOSPADM

## 2023-11-25 RX ORDER — SUMATRIPTAN 50 MG/1
25 TABLET, FILM COATED ORAL 2 TIMES DAILY PRN
Status: DISCONTINUED | OUTPATIENT
Start: 2023-11-25 | End: 2023-11-27 | Stop reason: HOSPADM

## 2023-11-25 RX ORDER — SODIUM CHLORIDE 9 MG/ML
INJECTION, SOLUTION INTRAVENOUS CONTINUOUS
Status: DISCONTINUED | OUTPATIENT
Start: 2023-11-25 | End: 2023-11-27 | Stop reason: HOSPADM

## 2023-11-25 RX ORDER — SODIUM CHLORIDE 9 MG/ML
INJECTION, SOLUTION INTRAVENOUS PRN
Status: DISCONTINUED | OUTPATIENT
Start: 2023-11-25 | End: 2023-11-27 | Stop reason: HOSPADM

## 2023-11-25 RX ORDER — MONTELUKAST SODIUM 10 MG/1
10 TABLET ORAL NIGHTLY
Status: DISCONTINUED | OUTPATIENT
Start: 2023-11-25 | End: 2023-11-27 | Stop reason: HOSPADM

## 2023-11-25 RX ORDER — PROPRANOLOL HYDROCHLORIDE 80 MG/1
80 CAPSULE, EXTENDED RELEASE ORAL DAILY
Status: DISCONTINUED | OUTPATIENT
Start: 2023-11-26 | End: 2023-11-27 | Stop reason: HOSPADM

## 2023-11-25 RX ORDER — PRAZOSIN HYDROCHLORIDE 1 MG/1
1 CAPSULE ORAL NIGHTLY
Status: DISCONTINUED | OUTPATIENT
Start: 2023-11-25 | End: 2023-11-27 | Stop reason: HOSPADM

## 2023-11-25 RX ORDER — MAGNESIUM SULFATE IN WATER 40 MG/ML
2000 INJECTION, SOLUTION INTRAVENOUS PRN
Status: DISCONTINUED | OUTPATIENT
Start: 2023-11-25 | End: 2023-11-27 | Stop reason: HOSPADM

## 2023-11-25 RX ORDER — SUCRALFATE 1 G/1
1 TABLET ORAL
Status: DISCONTINUED | OUTPATIENT
Start: 2023-11-25 | End: 2023-11-27 | Stop reason: HOSPADM

## 2023-11-25 RX ORDER — MORPHINE SULFATE 4 MG/ML
4 INJECTION, SOLUTION INTRAMUSCULAR; INTRAVENOUS ONCE
Status: COMPLETED | OUTPATIENT
Start: 2023-11-25 | End: 2023-11-25

## 2023-11-25 RX ORDER — FLUTICASONE PROPIONATE 110 UG/1
2 AEROSOL, METERED RESPIRATORY (INHALATION) EVERY 4 HOURS PRN
Status: DISCONTINUED | OUTPATIENT
Start: 2023-11-25 | End: 2023-11-25 | Stop reason: CLARIF

## 2023-11-25 RX ORDER — POTASSIUM CHLORIDE 20 MEQ/1
40 TABLET, EXTENDED RELEASE ORAL PRN
Status: DISCONTINUED | OUTPATIENT
Start: 2023-11-25 | End: 2023-11-27 | Stop reason: HOSPADM

## 2023-11-25 RX ORDER — POLYETHYLENE GLYCOL 3350 17 G/17G
17 POWDER, FOR SOLUTION ORAL DAILY PRN
Status: DISCONTINUED | OUTPATIENT
Start: 2023-11-25 | End: 2023-11-27 | Stop reason: HOSPADM

## 2023-11-25 RX ORDER — ALBUTEROL SULFATE 2.5 MG/3ML
2.5 SOLUTION RESPIRATORY (INHALATION) EVERY 6 HOURS PRN
Status: DISCONTINUED | OUTPATIENT
Start: 2023-11-25 | End: 2023-11-27 | Stop reason: HOSPADM

## 2023-11-25 RX ORDER — PANTOPRAZOLE SODIUM 40 MG/1
40 TABLET, DELAYED RELEASE ORAL DAILY
Status: DISCONTINUED | OUTPATIENT
Start: 2023-11-26 | End: 2023-11-26

## 2023-11-25 RX ORDER — DROPERIDOL 2.5 MG/ML
1.25 INJECTION, SOLUTION INTRAMUSCULAR; INTRAVENOUS ONCE
Status: DISCONTINUED | OUTPATIENT
Start: 2023-11-25 | End: 2023-11-25

## 2023-11-25 RX ORDER — POTASSIUM CHLORIDE 7.45 MG/ML
10 INJECTION INTRAVENOUS PRN
Status: DISCONTINUED | OUTPATIENT
Start: 2023-11-25 | End: 2023-11-27 | Stop reason: HOSPADM

## 2023-11-25 RX ORDER — SPIRONOLACTONE 25 MG/1
25 TABLET ORAL DAILY
Status: DISCONTINUED | OUTPATIENT
Start: 2023-11-26 | End: 2023-11-27 | Stop reason: HOSPADM

## 2023-11-25 RX ORDER — DICYCLOMINE HYDROCHLORIDE 10 MG/1
10 CAPSULE ORAL 3 TIMES DAILY
Status: DISCONTINUED | OUTPATIENT
Start: 2023-11-25 | End: 2023-11-25

## 2023-11-25 RX ORDER — ESCITALOPRAM OXALATE 20 MG/1
20 TABLET ORAL DAILY
Status: DISCONTINUED | OUTPATIENT
Start: 2023-11-26 | End: 2023-11-27 | Stop reason: HOSPADM

## 2023-11-25 RX ADMIN — SUCRALFATE 1 G: 1 TABLET ORAL at 20:19

## 2023-11-25 RX ADMIN — HYDROMORPHONE HYDROCHLORIDE 1 MG: 1 INJECTION, SOLUTION INTRAMUSCULAR; INTRAVENOUS; SUBCUTANEOUS at 12:01

## 2023-11-25 RX ADMIN — SODIUM CHLORIDE: 9 INJECTION, SOLUTION INTRAVENOUS at 16:54

## 2023-11-25 RX ADMIN — HYDROMORPHONE HYDROCHLORIDE 0.5 MG: 1 INJECTION, SOLUTION INTRAMUSCULAR; INTRAVENOUS; SUBCUTANEOUS at 23:21

## 2023-11-25 RX ADMIN — IOPAMIDOL 80 ML: 755 INJECTION, SOLUTION INTRAVENOUS at 12:24

## 2023-11-25 RX ADMIN — PRAZOSIN HYDROCHLORIDE 1 MG: 1 CAPSULE ORAL at 20:19

## 2023-11-25 RX ADMIN — HYDROMORPHONE HYDROCHLORIDE 0.5 MG: 1 INJECTION, SOLUTION INTRAMUSCULAR; INTRAVENOUS; SUBCUTANEOUS at 20:14

## 2023-11-25 RX ADMIN — MORPHINE SULFATE 4 MG: 4 INJECTION, SOLUTION INTRAMUSCULAR; INTRAVENOUS at 16:59

## 2023-11-25 RX ADMIN — PROMETHAZINE HYDROCHLORIDE 12.5 MG: 25 TABLET ORAL at 23:51

## 2023-11-25 RX ADMIN — FAMOTIDINE 40 MG: 20 TABLET ORAL at 20:19

## 2023-11-25 RX ADMIN — DROPERIDOL 1.25 MG: 2.5 INJECTION, SOLUTION INTRAMUSCULAR; INTRAVENOUS at 11:11

## 2023-11-25 RX ADMIN — SODIUM CHLORIDE 1000 ML: 9 INJECTION, SOLUTION INTRAVENOUS at 11:10

## 2023-11-25 RX ADMIN — MORPHINE SULFATE 4 MG: 4 INJECTION, SOLUTION INTRAMUSCULAR; INTRAVENOUS at 11:09

## 2023-11-25 RX ADMIN — ENOXAPARIN SODIUM 30 MG: 100 INJECTION SUBCUTANEOUS at 20:19

## 2023-11-25 RX ADMIN — MONTELUKAST SODIUM 10 MG: 10 TABLET ORAL at 20:19

## 2023-11-25 ASSESSMENT — PAIN DESCRIPTION - PAIN TYPE
TYPE: ACUTE PAIN

## 2023-11-25 ASSESSMENT — PAIN SCALES - GENERAL
PAINLEVEL_OUTOF10: 10
PAINLEVEL_OUTOF10: 10
PAINLEVEL_OUTOF10: 8
PAINLEVEL_OUTOF10: 10
PAINLEVEL_OUTOF10: 8
PAINLEVEL_OUTOF10: 10
PAINLEVEL_OUTOF10: 10

## 2023-11-25 ASSESSMENT — ENCOUNTER SYMPTOMS
CONSTIPATION: 0
SHORTNESS OF BREATH: 0
DIARRHEA: 1
BLOOD IN STOOL: 0
WHEEZING: 0
ABDOMINAL DISTENTION: 1
VOMITING: 1
BACK PAIN: 0
COUGH: 0
TROUBLE SWALLOWING: 0
ABDOMINAL PAIN: 1
NAUSEA: 1
EYE PAIN: 0

## 2023-11-25 ASSESSMENT — PAIN DESCRIPTION - ORIENTATION
ORIENTATION: LEFT;UPPER

## 2023-11-25 ASSESSMENT — PAIN DESCRIPTION - LOCATION
LOCATION: ABDOMEN;HEAD
LOCATION: ABDOMEN

## 2023-11-25 ASSESSMENT — PAIN DESCRIPTION - ONSET: ONSET: ON-GOING

## 2023-11-25 ASSESSMENT — PAIN - FUNCTIONAL ASSESSMENT
PAIN_FUNCTIONAL_ASSESSMENT: ACTIVITIES ARE NOT PREVENTED
PAIN_FUNCTIONAL_ASSESSMENT: 0-10
PAIN_FUNCTIONAL_ASSESSMENT: ACTIVITIES ARE NOT PREVENTED
PAIN_FUNCTIONAL_ASSESSMENT: ACTIVITIES ARE NOT PREVENTED

## 2023-11-25 ASSESSMENT — PAIN DESCRIPTION - DESCRIPTORS
DESCRIPTORS: SORE
DESCRIPTORS: SORE
DESCRIPTORS: SHARP

## 2023-11-25 ASSESSMENT — PAIN DESCRIPTION - FREQUENCY: FREQUENCY: CONTINUOUS

## 2023-11-25 NOTE — ED NOTES
In to complete orders, ice pack provided for comfort. Lights off, door closed.       Alondra Reina RN  11/25/23 [id number]

## 2023-11-25 NOTE — ED NOTES
Patient resting on cart with eyes closed and respirations easy.      Parviz Booker RN  11/25/23 3219

## 2023-11-25 NOTE — ED TRIAGE NOTES
Patient to room 20 via [facility] for headache and abdominal pain. Patient reports that she has felt badly for about a week now. Patient states that she had abdominal surgery at the beginning of the month with Dr. Dana Alaniz for adhesions. She reports that she is going to be having gastric bypass surgery as well. Patient states that she has been vomiting a lot. She is not sure if the pain she is having is because she has vomited so much. Patient has many allergies, she takes phenergan at home for nausea. Patient has a port in place, accessed at this time.

## 2023-11-25 NOTE — ED NOTES
Pt transported to 6A16 by cart in stable condition. Called 6A and informed Lizaconor Titus that the patient was on their way to the unit.       Bryce Schmitz, NYDIA  33/64/22 [id number]

## 2023-11-25 NOTE — H&P
(PEPCID) 40 MG tablet take 1 tablet by mouth nightly 6/16/23   AURELIO Webb CNP   propranolol (INDERAL LA) 60 MG extended release capsule Take 80 mg by mouth daily    Osmar Scott MD   Incontinence Supply Disposable (DEPEND SILHOUETTE BRIEFS L/XL) MISC Use as needed for urinary and bowel incontinence 2/9/23   Roxanne Florence MD   docusate sodium (COLACE) 100 MG capsule Take 1 capsule by mouth 2 times daily as needed for Constipation 2/8/23   AURELIO Webb CNP   topiramate (TOPAMAX) 100 MG tablet Take 1 tablet by mouth 2 times daily    Osmar Scott MD   metFORMIN HCl 500 MG/5ML SOLN Take 1,000 mg by mouth daily (with breakfast) Take 10 mL via peg tube once daily recently switched to pill    Osmar Scott MD   linaclotide (LINZESS) 145 MCG capsule Take 1 capsule by mouth every morning (before breakfast)    Osmar Scott MD   baclofen (LIORESAL) 10 MG tablet Take 1 tablet by mouth daily    Osmar Scott MD   prazosin (MINIPRESS) 1 MG capsule Take 1 capsule by mouth nightly    Osmar Scott MD   ARIPiprazole (ABILIFY) 1 MG/ML SOLN solution Take 10 mLs by mouth nightly 10 mL via peg tube every evening switched to pills form    Osmar Scott MD   busPIRone (BUSPAR) 5 MG tablet Take 1 tablet by mouth 3 times daily    Osmar Scott MD   pantoprazole (PROTONIX) 40 MG tablet Take 1 tablet by mouth daily    Osmar Scott MD   montelukast (SINGULAIR) 10 MG tablet Take 1 tablet by mouth nightly    Osmar Scott MD   amLODIPine (NORVASC) 5 MG tablet Take 1 tablet by mouth daily 4/2/20   [place] MD Rita   escitalopram (LEXAPRO) 5 MG/5ML solution Take 20 mLs by mouth daily Take 20 mLchanged to pill recently    Osmar Scott MD   albuterol (PROVENTIL) (2.5 MG/3ML) 0.083% nebulizer solution Take 3 mLs by nebulization every 6 hours as needed for Wheezing 8/2/18   AURELIO Castellano CNP

## 2023-11-26 PROBLEM — G47.33 OSA ON CPAP: Status: ACTIVE | Noted: 2023-11-26

## 2023-11-26 PROBLEM — Z85.9 HISTORY OF MALIGNANT CARCINOID TUMOR: Status: ACTIVE | Noted: 2023-11-26

## 2023-11-26 LAB
ANION GAP SERPL CALC-SCNC: 9 MEQ/L (ref 8–16)
BUN SERPL-MCNC: 10 MG/DL (ref 7–22)
CALCIUM SERPL-MCNC: 9.1 MG/DL (ref 8.5–10.5)
CHLORIDE SERPL-SCNC: 107 MEQ/L (ref 98–111)
CO2 SERPL-SCNC: 26 MEQ/L (ref 23–33)
CREAT SERPL-MCNC: 0.8 MG/DL (ref 0.4–1.2)
DEPRECATED RDW RBC AUTO: 41.1 FL (ref 35–45)
ERYTHROCYTE [DISTWIDTH] IN BLOOD BY AUTOMATED COUNT: 14.6 % (ref 11.5–14.5)
GFR SERPL CREATININE-BSD FRML MDRD: > 60 ML/MIN/1.73M2
GLUCOSE BLD STRIP.AUTO-MCNC: 95 MG/DL (ref 70–108)
GLUCOSE SERPL-MCNC: 116 MG/DL (ref 70–108)
HCT VFR BLD AUTO: 36.5 % (ref 37–47)
HGB BLD-MCNC: 11.2 GM/DL (ref 12–16)
MCH RBC QN AUTO: 24.1 PG (ref 26–33)
MCHC RBC AUTO-ENTMCNC: 30.7 GM/DL (ref 32.2–35.5)
MCV RBC AUTO: 78.7 FL (ref 81–99)
PLATELET # BLD AUTO: 285 THOU/MM3 (ref 130–400)
PMV BLD AUTO: 9.6 FL (ref 9.4–12.4)
POTASSIUM SERPL-SCNC: 3.4 MEQ/L (ref 3.5–5.2)
RBC # BLD AUTO: 4.64 MILL/MM3 (ref 4.2–5.4)
SODIUM SERPL-SCNC: 142 MEQ/L (ref 135–145)
WBC # BLD AUTO: 9.3 THOU/MM3 (ref 4.8–10.8)

## 2023-11-26 PROCEDURE — G0378 HOSPITAL OBSERVATION PER HR: HCPCS

## 2023-11-26 PROCEDURE — 96376 TX/PRO/DX INJ SAME DRUG ADON: CPT

## 2023-11-26 PROCEDURE — 6370000000 HC RX 637 (ALT 250 FOR IP): Performed by: STUDENT IN AN ORGANIZED HEALTH CARE EDUCATION/TRAINING PROGRAM

## 2023-11-26 PROCEDURE — 82948 REAGENT STRIP/BLOOD GLUCOSE: CPT

## 2023-11-26 PROCEDURE — 96361 HYDRATE IV INFUSION ADD-ON: CPT

## 2023-11-26 PROCEDURE — 36415 COLL VENOUS BLD VENIPUNCTURE: CPT

## 2023-11-26 PROCEDURE — 6360000002 HC RX W HCPCS: Performed by: STUDENT IN AN ORGANIZED HEALTH CARE EDUCATION/TRAINING PROGRAM

## 2023-11-26 PROCEDURE — 2580000003 HC RX 258: Performed by: STUDENT IN AN ORGANIZED HEALTH CARE EDUCATION/TRAINING PROGRAM

## 2023-11-26 PROCEDURE — 93010 ELECTROCARDIOGRAM REPORT: CPT | Performed by: NUCLEAR MEDICINE

## 2023-11-26 PROCEDURE — 2700000000 HC OXYGEN THERAPY PER DAY

## 2023-11-26 PROCEDURE — 96372 THER/PROPH/DIAG INJ SC/IM: CPT

## 2023-11-26 PROCEDURE — 94640 AIRWAY INHALATION TREATMENT: CPT

## 2023-11-26 PROCEDURE — 85027 COMPLETE CBC AUTOMATED: CPT

## 2023-11-26 PROCEDURE — 99233 SBSQ HOSP IP/OBS HIGH 50: CPT | Performed by: INTERNAL MEDICINE

## 2023-11-26 PROCEDURE — 80048 BASIC METABOLIC PNL TOTAL CA: CPT

## 2023-11-26 RX ORDER — PANTOPRAZOLE SODIUM 40 MG/1
40 TABLET, DELAYED RELEASE ORAL
Status: DISCONTINUED | OUTPATIENT
Start: 2023-11-27 | End: 2023-11-27 | Stop reason: HOSPADM

## 2023-11-26 RX ADMIN — FAMOTIDINE 40 MG: 20 TABLET ORAL at 20:22

## 2023-11-26 RX ADMIN — SUCRALFATE 1 G: 1 TABLET ORAL at 20:22

## 2023-11-26 RX ADMIN — SUCRALFATE 1 G: 1 TABLET ORAL at 05:26

## 2023-11-26 RX ADMIN — PRAZOSIN HYDROCHLORIDE 1 MG: 1 CAPSULE ORAL at 20:22

## 2023-11-26 RX ADMIN — BUSPIRONE HYDROCHLORIDE 5 MG: 5 TABLET ORAL at 14:51

## 2023-11-26 RX ADMIN — HYDROMORPHONE HYDROCHLORIDE 0.5 MG: 1 INJECTION, SOLUTION INTRAMUSCULAR; INTRAVENOUS; SUBCUTANEOUS at 23:57

## 2023-11-26 RX ADMIN — PANTOPRAZOLE SODIUM 40 MG: 40 TABLET, DELAYED RELEASE ORAL at 05:26

## 2023-11-26 RX ADMIN — HYDROMORPHONE HYDROCHLORIDE 0.5 MG: 1 INJECTION, SOLUTION INTRAMUSCULAR; INTRAVENOUS; SUBCUTANEOUS at 05:27

## 2023-11-26 RX ADMIN — HYDROMORPHONE HYDROCHLORIDE 0.5 MG: 1 INJECTION, SOLUTION INTRAMUSCULAR; INTRAVENOUS; SUBCUTANEOUS at 11:40

## 2023-11-26 RX ADMIN — FLUTICASONE PROPIONATE 2 PUFF: 110 AEROSOL, METERED RESPIRATORY (INHALATION) at 19:48

## 2023-11-26 RX ADMIN — SUCRALFATE 1 G: 1 TABLET ORAL at 11:40

## 2023-11-26 RX ADMIN — SODIUM CHLORIDE: 9 INJECTION, SOLUTION INTRAVENOUS at 11:43

## 2023-11-26 RX ADMIN — PROMETHAZINE HYDROCHLORIDE 12.5 MG: 25 TABLET ORAL at 09:03

## 2023-11-26 RX ADMIN — HYDROMORPHONE HYDROCHLORIDE 0.5 MG: 1 INJECTION, SOLUTION INTRAMUSCULAR; INTRAVENOUS; SUBCUTANEOUS at 20:56

## 2023-11-26 RX ADMIN — AMLODIPINE BESYLATE 5 MG: 5 TABLET ORAL at 09:00

## 2023-11-26 RX ADMIN — ENOXAPARIN SODIUM 30 MG: 100 INJECTION SUBCUTANEOUS at 09:03

## 2023-11-26 RX ADMIN — ENOXAPARIN SODIUM 30 MG: 100 INJECTION SUBCUTANEOUS at 20:21

## 2023-11-26 RX ADMIN — HYDROMORPHONE HYDROCHLORIDE 0.5 MG: 1 INJECTION, SOLUTION INTRAMUSCULAR; INTRAVENOUS; SUBCUTANEOUS at 08:39

## 2023-11-26 RX ADMIN — SUCRALFATE 1 G: 1 TABLET ORAL at 17:46

## 2023-11-26 RX ADMIN — HYDROMORPHONE HYDROCHLORIDE 0.5 MG: 1 INJECTION, SOLUTION INTRAMUSCULAR; INTRAVENOUS; SUBCUTANEOUS at 14:50

## 2023-11-26 RX ADMIN — PROPRANOLOL HYDROCHLORIDE 80 MG: 80 CAPSULE, EXTENDED RELEASE ORAL at 08:59

## 2023-11-26 RX ADMIN — BUSPIRONE HYDROCHLORIDE 5 MG: 5 TABLET ORAL at 20:56

## 2023-11-26 RX ADMIN — BUSPIRONE HYDROCHLORIDE 5 MG: 5 TABLET ORAL at 08:59

## 2023-11-26 RX ADMIN — TOPIRAMATE 100 MG: 100 TABLET, FILM COATED ORAL at 20:22

## 2023-11-26 RX ADMIN — POTASSIUM CHLORIDE 40 MEQ: 1500 TABLET, EXTENDED RELEASE ORAL at 18:50

## 2023-11-26 RX ADMIN — TOPIRAMATE 100 MG: 100 TABLET, FILM COATED ORAL at 09:00

## 2023-11-26 RX ADMIN — MONTELUKAST SODIUM 10 MG: 10 TABLET ORAL at 20:22

## 2023-11-26 RX ADMIN — SPIRONOLACTONE 25 MG: 25 TABLET ORAL at 09:00

## 2023-11-26 RX ADMIN — ESCITALOPRAM OXALATE 20 MG: 20 TABLET ORAL at 08:59

## 2023-11-26 RX ADMIN — HYDROMORPHONE HYDROCHLORIDE 0.5 MG: 1 INJECTION, SOLUTION INTRAMUSCULAR; INTRAVENOUS; SUBCUTANEOUS at 17:46

## 2023-11-26 RX ADMIN — HYDROMORPHONE HYDROCHLORIDE 0.5 MG: 1 INJECTION, SOLUTION INTRAMUSCULAR; INTRAVENOUS; SUBCUTANEOUS at 02:21

## 2023-11-26 RX ADMIN — BACLOFEN 10 MG: 10 TABLET ORAL at 08:59

## 2023-11-26 RX ADMIN — FLUTICASONE PROPIONATE 2 PUFF: 110 AEROSOL, METERED RESPIRATORY (INHALATION) at 09:19

## 2023-11-26 ASSESSMENT — PAIN DESCRIPTION - DESCRIPTORS
DESCRIPTORS: BURNING;SHARP;STABBING
DESCRIPTORS: ACHING;SHARP
DESCRIPTORS: ACHING

## 2023-11-26 ASSESSMENT — PAIN SCALES - GENERAL
PAINLEVEL_OUTOF10: 10
PAINLEVEL_OUTOF10: 5
PAINLEVEL_OUTOF10: 8
PAINLEVEL_OUTOF10: 10
PAINLEVEL_OUTOF10: 5
PAINLEVEL_OUTOF10: 9
PAINLEVEL_OUTOF10: 7
PAINLEVEL_OUTOF10: 10
PAINLEVEL_OUTOF10: 5
PAINLEVEL_OUTOF10: 8
PAINLEVEL_OUTOF10: 10
PAINLEVEL_OUTOF10: 5
PAINLEVEL_OUTOF10: 8
PAINLEVEL_OUTOF10: 9
PAINLEVEL_OUTOF10: 10

## 2023-11-26 ASSESSMENT — PAIN DESCRIPTION - ORIENTATION
ORIENTATION: RIGHT;LEFT;MID
ORIENTATION: MID;RIGHT;LEFT

## 2023-11-26 ASSESSMENT — PAIN DESCRIPTION - PAIN TYPE: TYPE: ACUTE PAIN

## 2023-11-26 ASSESSMENT — PAIN - FUNCTIONAL ASSESSMENT
PAIN_FUNCTIONAL_ASSESSMENT: ACTIVITIES ARE NOT PREVENTED

## 2023-11-26 ASSESSMENT — PAIN DESCRIPTION - LOCATION
LOCATION: ABDOMEN

## 2023-11-26 ASSESSMENT — PAIN DESCRIPTION - FREQUENCY: FREQUENCY: CONTINUOUS

## 2023-11-26 ASSESSMENT — PAIN DESCRIPTION - ONSET: ONSET: ON-GOING

## 2023-11-26 NOTE — CONSULTS
Pain 6/17/23 11/3/23  Salli Holiday, DO   dicyclomine (BENTYL) 10 MG capsule Take 1 capsule by mouth in the morning, at noon, and at bedtime for 14 days 6/17/23 11/3/23  Salli Holiday, DO   metoclopramide (REGLAN) 10 MG tablet Take 1 tablet by mouth 3 times daily (with meals) for 21 days 6/17/23 11/3/23  Salli Holiday, DO   famotidine (PEPCID) 40 MG tablet take 1 tablet by mouth nightly 6/16/23   AURELIO Horne CNP   propranolol (INDERAL LA) 60 MG extended release capsule Take 80 mg by mouth daily    Osmar Scott MD   Incontinence Supply Disposable (DEPEND SILHOUETTE BRIEFS L/XL) MISC Use as needed for urinary and bowel incontinence 2/9/23   John Florence MD   docusate sodium (COLACE) 100 MG capsule Take 1 capsule by mouth 2 times daily as needed for Constipation  Patient not taking: Reported on 11/25/2023 2/8/23   AURELIO Horne CNP   topiramate (TOPAMAX) 100 MG tablet Take 1 tablet by mouth 2 times daily    Osmar Scott MD   metFORMIN HCl 500 MG/5ML SOLN Take 1,000 mg by mouth daily (with breakfast) Take 10 mL via peg tube once daily recently switched to pill    Osmar Scott MD   linaclotide (LINZESS) 145 MCG capsule Take 1 capsule by mouth every morning (before breakfast)    Osmar Scott MD   baclofen (LIORESAL) 10 MG tablet Take 1 tablet by mouth daily    Osmar Scott MD   prazosin (MINIPRESS) 1 MG capsule Take 1 capsule by mouth nightly    Osmar Scott MD   ARIPiprazole (ABILIFY) 1 MG/ML SOLN solution Take 10 mLs by mouth nightly 10 mL via peg tube every evening switched to pills form    Osmar Scott MD   busPIRone (BUSPAR) 5 MG tablet Take 1 tablet by mouth 3 times daily    Osmar Scott MD   pantoprazole (PROTONIX) 40 MG tablet Take 1 tablet by mouth daily    Osmar Scott MD   montelukast (SINGULAIR) 10 MG tablet Take 1 tablet by mouth nightly    Osmar Scott MD

## 2023-11-26 NOTE — PLAN OF CARE
Problem: Discharge Planning  Goal: Discharge to home or other facility with appropriate resources  Outcome: Progressing  Flowsheets  Taken 11/25/2023 2136 by Jewell Mcnally RN  Discharge to home or other facility with appropriate resources:   Identify barriers to discharge with patient and caregiver   Arrange for needed discharge resources and transportation as appropriate   Identify discharge learning needs (meds, wound care, etc)    Problem: Pain  Goal: Verbalizes/displays adequate comfort level or baseline comfort level  Outcome: Progressing  Flowsheets  Taken 11/25/2023 2136 by Jewell Mcnally RN  Verbalizes/displays adequate comfort level or baseline comfort level:   Encourage patient to monitor pain and request assistance   Assess pain using appropriate pain scale   Administer analgesics based on type and severity of pain and evaluate response   Implement non-pharmacological measures as appropriate and evaluate response    Problem: Safety - Adult  Goal: Free from fall injury  Outcome: Progressing  Flowsheets (Taken 11/25/2023 2136)  Free From Fall Injury: Instruct family/caregiver on patient safety     Problem: ABCDS Injury Assessment  Goal: Absence of physical injury  Outcome: Progressing  Flowsheets (Taken 11/25/2023 2136)  Absence of Physical Injury: Implement safety measures based on patient assessment     Problem: Skin/Tissue Integrity - Adult  Goal: Skin integrity remains intact  Outcome: Progressing  Flowsheets (Taken 11/25/2023 2136)  Skin Integrity Remains Intact:   Monitor for areas of redness and/or skin breakdown   Assess vascular access sites hourly     Problem: Gastrointestinal - Adult  Goal: Minimal or absence of nausea and vomiting  Outcome: Progressing  Flowsheets (Taken 11/25/2023 2136)  Minimal or absence of nausea and vomiting:   Administer IV fluids as ordered to ensure adequate hydration   Provide nonpharmacologic comfort measures as appropriate   Administer ordered antiemetic medications as

## 2023-11-26 NOTE — CARE COORDINATION
11/26/23 1336   Service Assessment   Patient Orientation Alert and Oriented   Cognition Alert   History Provided By Patient   Primary Caregiver Self   Support Systems Spouse/Significant Other   Patient's Healthcare Decision Maker is: Named in [address]   PCP Verified by CM Yes   Last Visit to PCP Within last 3 months   Prior Functional Level Independent in ADLs/IADLs   Current Functional Level Independent in ADLs/IADLs   Can patient return to prior living arrangement Yes   Ability to make needs known: Good   Family able to assist with home care needs: Yes   Would you like for me to discuss the discharge plan with any other family members/significant others, and if so, who? No   Financial Resources Medicaid; Medicare   Community Resources ECF/Home Care  (current [facility] [address])   CM/SW Referral Other (see comment)  ( for [facility] [address])   Social/Functional History   Bathroom Shower/Tub Shower chair without back   [address] chair   Active  No   Patient's  Info family   Discharge Planning   Type of Residence Apartment   Living Arrangements Spouse/Significant Other   Current Services Prior To Admission Home Care  [facility] [address])   Current DME Prior to [address]  (resume [facility])   DME Ordered? No   Potential Assistance Purchasing Medications No   Type of Home Care Services Nursing Services   Patient expects to be discharged to: [address] Discharge   Confirm Follow Up Transport Self     Patient Goals/Plan/Treatment Preferences: Met with [facility]. She currently lives at home with her wife. She is current with [facility]. Plan is to return home at discharge and resume . SW consult in place. If patient is discharged prior to next notation, then this note serves as note for discharge by case management.

## 2023-11-26 NOTE — CARE COORDINATION
11/26/23 1344   Readmission Assessment   Number of Days since last admission? 8-30 days   Previous Disposition Home with Home Health   Who is being Interviewed Patient   What was the patient's/caregiver's perception as to why they think they needed to return back to the hospital? Other (Comment)  (Increased abdominal pain, nausea)   Did you visit your Primary Care Physician after you left the hospital, before you returned this time? Yes   Did you see a specialist, such as Cardiac, Pulmonary, Orthopedic Physician, etc. after you left the hospital? Yes  (General Surgeon)   Who advised the patient to return to the hospital? Self-referral   Does the patient report anything that got in the way of taking their medications? No   In our efforts to provide the best possible care to you and others like you, can you think of anything that we could have done to help you after you left the hospital the first time, so that you might not have needed to return so soon?  Other (Comment)  (\" I think so\")

## 2023-11-27 VITALS
DIASTOLIC BLOOD PRESSURE: 93 MMHG | TEMPERATURE: 97.7 F | SYSTOLIC BLOOD PRESSURE: 148 MMHG | WEIGHT: 293 LBS | HEIGHT: 64 IN | HEART RATE: 62 BPM | OXYGEN SATURATION: 95 % | RESPIRATION RATE: 18 BRPM | BODY MASS INDEX: 50.02 KG/M2

## 2023-11-27 PROBLEM — R10.10 PAIN OF UPPER ABDOMEN: Status: ACTIVE | Noted: 2023-01-13

## 2023-11-27 PROBLEM — D57.1 SICKLE CELL ANEMIA (HCC): Status: RESOLVED | Noted: 2020-01-08 | Resolved: 2023-11-27

## 2023-11-27 PROBLEM — D57.3 SICKLE CELL TRAIT (HCC): Status: ACTIVE | Noted: 2023-11-27

## 2023-11-27 LAB
ANION GAP SERPL CALC-SCNC: 8 MEQ/L (ref 8–16)
BUN SERPL-MCNC: 10 MG/DL (ref 7–22)
CALCIUM SERPL-MCNC: 9.3 MG/DL (ref 8.5–10.5)
CHLORIDE SERPL-SCNC: 104 MEQ/L (ref 98–111)
CO2 SERPL-SCNC: 27 MEQ/L (ref 23–33)
CREAT SERPL-MCNC: 0.6 MG/DL (ref 0.4–1.2)
DEPRECATED RDW RBC AUTO: 40.4 FL (ref 35–45)
ERYTHROCYTE [DISTWIDTH] IN BLOOD BY AUTOMATED COUNT: 14.3 % (ref 11.5–14.5)
GFR SERPL CREATININE-BSD FRML MDRD: > 60 ML/MIN/1.73M2
GLUCOSE BLD STRIP.AUTO-MCNC: 89 MG/DL (ref 70–108)
GLUCOSE SERPL-MCNC: 95 MG/DL (ref 70–108)
HCT VFR BLD AUTO: 37.8 % (ref 37–47)
HGB BLD-MCNC: 11.6 GM/DL (ref 12–16)
MCH RBC QN AUTO: 24 PG (ref 26–33)
MCHC RBC AUTO-ENTMCNC: 30.7 GM/DL (ref 32.2–35.5)
MCV RBC AUTO: 78.3 FL (ref 81–99)
PLATELET # BLD AUTO: 244 THOU/MM3 (ref 130–400)
PMV BLD AUTO: 9.7 FL (ref 9.4–12.4)
POTASSIUM SERPL-SCNC: 3.7 MEQ/L (ref 3.5–5.2)
RBC # BLD AUTO: 4.83 MILL/MM3 (ref 4.2–5.4)
SODIUM SERPL-SCNC: 139 MEQ/L (ref 135–145)
WBC # BLD AUTO: 8.7 THOU/MM3 (ref 4.8–10.8)

## 2023-11-27 PROCEDURE — 96361 HYDRATE IV INFUSION ADD-ON: CPT

## 2023-11-27 PROCEDURE — 80048 BASIC METABOLIC PNL TOTAL CA: CPT

## 2023-11-27 PROCEDURE — 82948 REAGENT STRIP/BLOOD GLUCOSE: CPT

## 2023-11-27 PROCEDURE — 2580000003 HC RX 258: Performed by: STUDENT IN AN ORGANIZED HEALTH CARE EDUCATION/TRAINING PROGRAM

## 2023-11-27 PROCEDURE — 94640 AIRWAY INHALATION TREATMENT: CPT

## 2023-11-27 PROCEDURE — G0378 HOSPITAL OBSERVATION PER HR: HCPCS

## 2023-11-27 PROCEDURE — 85027 COMPLETE CBC AUTOMATED: CPT

## 2023-11-27 PROCEDURE — 36415 COLL VENOUS BLD VENIPUNCTURE: CPT

## 2023-11-27 PROCEDURE — 6360000002 HC RX W HCPCS: Performed by: INTERNAL MEDICINE

## 2023-11-27 PROCEDURE — 6370000000 HC RX 637 (ALT 250 FOR IP): Performed by: STUDENT IN AN ORGANIZED HEALTH CARE EDUCATION/TRAINING PROGRAM

## 2023-11-27 PROCEDURE — 2500000003 HC RX 250 WO HCPCS

## 2023-11-27 PROCEDURE — 6360000002 HC RX W HCPCS: Performed by: STUDENT IN AN ORGANIZED HEALTH CARE EDUCATION/TRAINING PROGRAM

## 2023-11-27 PROCEDURE — 96372 THER/PROPH/DIAG INJ SC/IM: CPT

## 2023-11-27 PROCEDURE — 6370000000 HC RX 637 (ALT 250 FOR IP): Performed by: NURSE PRACTITIONER

## 2023-11-27 PROCEDURE — 99239 HOSP IP/OBS DSCHRG MGMT >30: CPT | Performed by: INTERNAL MEDICINE

## 2023-11-27 PROCEDURE — 94760 N-INVAS EAR/PLS OXIMETRY 1: CPT

## 2023-11-27 PROCEDURE — 6360000002 HC RX W HCPCS

## 2023-11-27 PROCEDURE — 96376 TX/PRO/DX INJ SAME DRUG ADON: CPT

## 2023-11-27 PROCEDURE — 6370000000 HC RX 637 (ALT 250 FOR IP)

## 2023-11-27 RX ORDER — HEPARIN 100 UNIT/ML
500 SYRINGE INTRAVENOUS PRN
Status: DISCONTINUED | OUTPATIENT
Start: 2023-11-27 | End: 2023-11-27 | Stop reason: HOSPADM

## 2023-11-27 RX ORDER — DIPHENHYDRAMINE HCL 25 MG
25 TABLET ORAL ONCE
Status: COMPLETED | OUTPATIENT
Start: 2023-11-27 | End: 2023-11-27

## 2023-11-27 RX ORDER — METFORMIN HYDROCHLORIDE 500 MG/5ML
1000 SOLUTION ORAL
Qty: 300 ML | Refills: 0
Start: 2023-11-27

## 2023-11-27 RX ORDER — PANTOPRAZOLE SODIUM 40 MG/1
40 TABLET, DELAYED RELEASE ORAL
Qty: 60 TABLET | Refills: 0 | Status: SHIPPED | OUTPATIENT
Start: 2023-11-27 | End: 2023-12-27

## 2023-11-27 RX ADMIN — BUSPIRONE HYDROCHLORIDE 5 MG: 5 TABLET ORAL at 09:35

## 2023-11-27 RX ADMIN — PANTOPRAZOLE SODIUM 40 MG: 40 TABLET, DELAYED RELEASE ORAL at 06:07

## 2023-11-27 RX ADMIN — DIPHENHYDRAMINE HYDROCHLORIDE 25 MG: 25 TABLET ORAL at 01:34

## 2023-11-27 RX ADMIN — HEPARIN 500 UNITS: 100 SYRINGE at 14:45

## 2023-11-27 RX ADMIN — ESCITALOPRAM OXALATE 20 MG: 20 TABLET ORAL at 09:35

## 2023-11-27 RX ADMIN — HYDROMORPHONE HYDROCHLORIDE 1 MG: 1 INJECTION, SOLUTION INTRAMUSCULAR; INTRAVENOUS; SUBCUTANEOUS at 09:35

## 2023-11-27 RX ADMIN — HYDROMORPHONE HYDROCHLORIDE 1 MG: 1 INJECTION, SOLUTION INTRAMUSCULAR; INTRAVENOUS; SUBCUTANEOUS at 03:04

## 2023-11-27 RX ADMIN — SUCRALFATE 1 G: 1 TABLET ORAL at 06:07

## 2023-11-27 RX ADMIN — BACLOFEN 10 MG: 10 TABLET ORAL at 09:34

## 2023-11-27 RX ADMIN — SUMATRIPTAN SUCCINATE 25 MG: 50 TABLET ORAL at 05:19

## 2023-11-27 RX ADMIN — FLUTICASONE PROPIONATE 2 PUFF: 110 AEROSOL, METERED RESPIRATORY (INHALATION) at 09:00

## 2023-11-27 RX ADMIN — HYDROMORPHONE HYDROCHLORIDE 1 MG: 1 INJECTION, SOLUTION INTRAMUSCULAR; INTRAVENOUS; SUBCUTANEOUS at 06:10

## 2023-11-27 RX ADMIN — SPIRONOLACTONE 25 MG: 25 TABLET ORAL at 09:35

## 2023-11-27 RX ADMIN — AMLODIPINE BESYLATE 5 MG: 5 TABLET ORAL at 09:34

## 2023-11-27 RX ADMIN — PROMETHAZINE HYDROCHLORIDE 12.5 MG: 25 TABLET ORAL at 01:36

## 2023-11-27 RX ADMIN — BUSPIRONE HYDROCHLORIDE 5 MG: 5 TABLET ORAL at 14:59

## 2023-11-27 RX ADMIN — ENOXAPARIN SODIUM 30 MG: 100 INJECTION SUBCUTANEOUS at 09:35

## 2023-11-27 RX ADMIN — SUCRALFATE 1 G: 1 TABLET ORAL at 09:35

## 2023-11-27 RX ADMIN — TOPIRAMATE 100 MG: 100 TABLET, FILM COATED ORAL at 09:35

## 2023-11-27 RX ADMIN — PROPRANOLOL HYDROCHLORIDE 80 MG: 80 CAPSULE, EXTENDED RELEASE ORAL at 09:35

## 2023-11-27 RX ADMIN — SODIUM CHLORIDE: 9 INJECTION, SOLUTION INTRAVENOUS at 11:32

## 2023-11-27 ASSESSMENT — PAIN DESCRIPTION - PAIN TYPE: TYPE: ACUTE PAIN

## 2023-11-27 ASSESSMENT — PAIN DESCRIPTION - FREQUENCY: FREQUENCY: CONTINUOUS

## 2023-11-27 ASSESSMENT — PAIN SCALES - GENERAL
PAINLEVEL_OUTOF10: 8
PAINLEVEL_OUTOF10: 3
PAINLEVEL_OUTOF10: 3
PAINLEVEL_OUTOF10: 8
PAINLEVEL_OUTOF10: 7
PAINLEVEL_OUTOF10: 8
PAINLEVEL_OUTOF10: 0
PAINLEVEL_OUTOF10: 7

## 2023-11-27 ASSESSMENT — PAIN DESCRIPTION - LOCATION: LOCATION: ABDOMEN

## 2023-11-27 ASSESSMENT — PAIN DESCRIPTION - DESCRIPTORS: DESCRIPTORS: STABBING;SHARP

## 2023-11-27 ASSESSMENT — PAIN DESCRIPTION - ONSET: ONSET: ON-GOING

## 2023-11-27 ASSESSMENT — PAIN - FUNCTIONAL ASSESSMENT: PAIN_FUNCTIONAL_ASSESSMENT: PREVENTS OR INTERFERES SOME ACTIVE ACTIVITIES AND ADLS

## 2023-11-27 ASSESSMENT — PAIN DESCRIPTION - ORIENTATION: ORIENTATION: MID

## 2023-11-27 NOTE — CARE COORDINATION
11/27/23, 1:26 PM EST    DISCHARGE PLANNING EVALUATION    Patient is from home with family, current with [facility]. Spoke with [facility], confirmed current services, agency plans to continue to follow at discharge.

## 2023-11-27 NOTE — PLAN OF CARE
Problem: Respiratory - Adult  Goal: Clear lung sounds  Description: Clear lung sounds  Outcome: Progressing  Note: Pt had no questions on purpose or side effects of medication   Will continue with treatments to help improve aeration t/o lungs    Pt mutually agreed upon goals

## 2023-11-27 NOTE — PROGRESS NOTES
Patient admitted and oriented to 6A16 from ED. Admitting orders and care board reviewed. Bed alarm placed on.
Went over d/c instructions follow up appointments and medications with pt. Answered all questions at this time and pt stated understanding. Pt to be discharged home via [facility] cab.
evaluation. Per GI consult 11/26/2023:Increase PPI to BID. Continue Carafate 1 gm ACHS. Reported intolerance to Reglan. Increase diet as tolerated. Discussed gastroparesis diet at length with patient. Recommend low fat/low residue diet and small frequent meals. Recommended against narcotic analgesics for abdominal pain. Per heme-onc note on 4/17/23 patient has sickle cell trait and iron deficiency anemia. States    Assessment:    Principal Problem:    Intractable abdominal pain  Active Problems:    Pain of upper abdomen    Microcytic anemia    Dyslipidemia    Asthma    Essential hypertension    S/P Nissen fundoplication (without gastrostomy tube) procedure    History of gastritis    Gastroparesis    CAD (coronary artery disease)    History of malignant carcinoid tumor    JOVANY on CPAP    Sickle cell trait ([address])    Epilepsy ([address])  Resolved Problems:    * No resolved hospital problems. *  On Topamax for epilepsy  H/o malignant carcinoid tumor resected 05/14/13. Hb A1c 6.1  EGD 4/20/23: Normal esophagus. Gastrostomy present characterized by healthy appearing mucosa. Erythematous mucosa in the antrum. Biopsied. Normal examined duodenum. Robotic lysis of adhesions 11/5/23  Status post cholecystectomy, hysterectomy and surgery involving the gastroesophageal junction per CT abdomen 11/25/23. Plan:    Per GI discontinue dilaudid   Monitor blood pressure  Consulted GI. Advance diet.   Low fat/low residue diet   CPAP order of 12 hs   Continuous Infusions:    sodium chloride      sodium chloride 50 mL/hr at 11/27/23 1132     pantoprazole, 40 mg, BID AC  amLODIPine, 5 mg, Daily  baclofen, 10 mg, Daily  busPIRone, 5 mg, TID  escitalopram, 20 mg, Daily  famotidine, 40 mg, Nightly  linaclotide, 145 mcg, QAM AC  montelukast, 10 mg, Nightly  prazosin, 1 mg, Nightly  propranolol, 80 mg, Daily  spironolactone, 25 mg, Daily  sucralfate, 1 g, 4x Daily AC & HS  topiramate, 100 mg, BID  sodium chloride flush, 5-40 mL, 2 times
AM    COLORU YELLOW 11/25/2023 10:30 AM    PHUR 5.5 11/25/2023 10:30 AM    LABCAST NONE SEEN 10/17/2023 01:19 AM    LABCAST NONE SEEN 10/17/2023 01:19 AM    WBCUA 0-2 11/25/2023 10:30 AM    WBCUA 0-5 12/23/2018 10:43 AM    RBCUA 0-2 11/25/2023 10:30 AM    MUCUS NONE SEEN 03/16/2023 05:23 AM    YEAST NONE SEEN 11/25/2023 10:30 AM    BACTERIA NONE SEEN 11/25/2023 10:30 AM    CLARITYU clear 05/16/2017 12:00 AM    SPECGRAV 1.030 10/17/2023 01:19 AM    LEUKOCYTESUR NEGATIVE 11/25/2023 10:30 AM    UROBILINOGEN 0.2 11/25/2023 10:30 AM    BILIRUBINUR NEGATIVE 11/25/2023 10:30 AM    BILIRUBINUR neg 05/16/2017 12:00 AM    BILIRUBINUR NEGATIVE 05/19/2012 05:30 PM    GLUCOSEU NEGATIVE 11/25/2023 10:30 AM    AMORPHOUS URATES 03/16/2023 05:23 AM       No results for input(s): \"INR\" in the last 72 hours. Lab Results   Component Value Date    DDIMER 634.00 (H) 06/24/2022   No results found for: \"BNP\"  troponinsNo results found for: \"TROPONINI\"      D Dimer: No results for input(s): \"DDIMER\" in the last 72 hours. Troponin T No results for input(s): \"TROPONINT\" in the last 72 hours. ProBNP   No results found for requested labs within last 30 days. ProBNP Invalid input(s): \"PRO-BNP\"  Lactic acid:Invalid input(s): \"LACTIC ACID\"      PT/INR: No results for input(s): \"PROTIME\", \"INR\" in the last 72 hours. APTT: No results for input(s): \"APTT\" in the last 72 hours.       ESR:   Lab Results   Component Value Date    SEDRATE 20 11/10/2021     CRP:   Lab Results   Component Value Date    CRP 1.13 (H) 11/25/2023     Folate and B12:   Lab Results   Component Value Date    CHJBWDEA58 142 05/02/2023   ,   Lab Results   Component Value Date    FOLATE 8.4 05/02/2023     Thyroid Studies:   Lab Results   Component Value Date    TSH 1.760 11/25/2023     D-Dimer:   Lab Results   Component Value Date    DDIMER 634.00 (H) 06/24/2022       Lactic acid: No results found for: \"LACT\"     Troponin T   Recent Labs     11/25/23  1026   TROPHS 6

## 2023-11-27 NOTE — CARE COORDINATION
11/27/23, 2:27 PM EST    DISCHARGE PLANNING EVALUATION    Notified [facility] of discharge today, agency will continue to follow. Patient is requesting [facility] ride home, scheduled for 3:15 today    11/27/23, 2:42 PM EST    Patient goals/plan/ treatment preferences discussed by  and . Patient goals/plan/ treatment preferences reviewed with patient/ family. Patient/ family verbalize understanding of discharge plan and are in agreement with goal/plan/treatment preferences. Understanding was demonstrated using the teach back method. AVS provided by RN at time of discharge, which includes all necessary medical information pertaining to the patients current course of illness, treatment, post-discharge goals of care, and treatment preferences.      Services At/After Discharge: Home Health       IMM Letter  Observation Status Letter date given[de-identified] 11/25/23  Observation Status Letter time given[de-identified] 1838  Observation Status Letter given to Patient/Family/Significant other/Guardian/POA/by[de-identified] Pt Access

## 2023-11-27 NOTE — PLAN OF CARE
Problem: Discharge Planning  Goal: Discharge to home or other facility with appropriate resources  Outcome: Progressing  Flowsheets (Taken 11/26/2023 2018)  Discharge to home or other facility with appropriate resources:   Identify barriers to discharge with patient and caregiver   Arrange for needed discharge resources and transportation as appropriate   Identify discharge learning needs (meds, wound care, etc)     Problem: Pain  Goal: Verbalizes/displays adequate comfort level or baseline comfort level  Outcome: Progressing  Flowsheets (Taken 11/26/2023 2018)  Verbalizes/displays adequate comfort level or baseline comfort level:   Encourage patient to monitor pain and request assistance   Assess pain using appropriate pain scale   Implement non-pharmacological measures as appropriate and evaluate response   Administer analgesics based on type and severity of pain and evaluate response   Consider cultural and social influences on pain and pain management     Problem: Safety - Adult  Goal: Free from fall injury  Outcome: Progressing  Flowsheets (Taken 11/27/2023 0017)  Free From Fall Injury: Instruct family/caregiver on patient safety     Problem: ABCDS Injury Assessment  Goal: Absence of physical injury  Outcome: Progressing  Flowsheets (Taken 11/27/2023 0017)  Absence of Physical Injury: Implement safety measures based on patient assessment     Problem: Skin/Tissue Integrity - Adult  Goal: Skin integrity remains intact  Outcome: Progressing  Flowsheets  Taken 11/27/2023 0013  Skin Integrity Remains Intact:   Monitor for areas of redness and/or skin breakdown   Assess vascular access sites hourly  Taken 11/26/2023 2018  Skin Integrity Remains Intact:   Monitor for areas of redness and/or skin breakdown   Assess vascular access sites hourly     Problem: Skin/Tissue Integrity - Adult  Goal: Incisions, wounds, or drain sites healing without S/S of infection  Outcome: Progressing  Flowsheets  Taken 11/27/2023

## 2023-11-27 NOTE — DISCHARGE SUMMARY
10/12/2023); and laparoscopy (N/A, 11/3/2023). ALLERGIES     is allergic to latex, dicyclomine, fioricet [butalbital-apap-caffeine], gabapentin, ibuprofen [ibuprofen], ketorolac tromethamine, oxycodone, pcn [penicillins], sulfamethoxazole, trimethoprim, losartan, mushroom extract complex, zofran [ondansetron], compazine [prochlorperazine], adhesive tape, amoxicillin, buspirone, ciprofloxacin, compazine [prochlorperazine maleate], fentanyl, flexeril [cyclobenzaprine], haldol [haloperidol], hydrochlorothiazide, keflex [cephalexin], ketamine, lisinopril, lorazepam, macrobid [nitrofurantoin monohyd macro], reglan [metoclopramide], vicodin [hydrocodone-acetaminophen], and vistaril [hydroxyzine hcl]. SOCIAL HISTORY      reports that she has been smoking cigarettes. She started smoking about 7 years ago. She has a 6.00 pack-year smoking history. She has never used smokeless tobacco. She reports that she does not currently use alcohol. She reports that she does not use drugs. FAMILY HISTORY     She indicated that her mother is . She indicated that her father is alive. She indicated that both of her sisters are alive. She indicated that both of her brothers are alive. She indicated that her maternal grandmother is . She indicated that her maternal grandfather is . She indicated that her paternal grandmother is alive. She indicated that her paternal grandfather is . She indicated that her paternal uncle is . She indicated that the status of her neg hx is unknown. She indicated that her cousin is . family history includes Cancer in her mother; Colon Cancer in her cousin; Depression in her maternal grandmother; Diabetes in her maternal grandmother; Heart Attack in her paternal uncle;  Heart Disease in her father, maternal grandmother, mother, and sister; High Blood Pressure in her father and mother; Kenith Lace in her mother; Sickle Cell Trait in her brother, brother,

## 2023-11-29 ENCOUNTER — TELEPHONE (OUTPATIENT)
Dept: CARDIOLOGY CLINIC | Age: 33
End: 2023-11-29

## 2023-11-29 ENCOUNTER — PATIENT MESSAGE (OUTPATIENT)
Dept: CARDIOLOGY CLINIC | Age: 33
End: 2023-11-29

## 2023-11-29 LAB
EKG ATRIAL RATE: 62 BPM
EKG P AXIS: 19 DEGREES
EKG P-R INTERVAL: 160 MS
EKG Q-T INTERVAL: 400 MS
EKG QRS DURATION: 90 MS
EKG QTC CALCULATION (BAZETT): 406 MS
EKG R AXIS: 12 DEGREES
EKG T AXIS: 16 DEGREES
EKG VENTRICULAR RATE: 62 BPM

## 2023-11-29 NOTE — TELEPHONE ENCOUNTER
From: Marce Adkins  To: Dr. Miroslava Traylor: 11/29/2023 4:40 AM EST  Subject: Question regarding EKG 12-LEAD    Is this good or bad

## 2023-11-29 NOTE — TELEPHONE ENCOUNTER
Please see My Chart Message:   She has an EKG in ER 11/25/23 that she would like for you to review.      [address] [facility] Clinical Staff (supporting Kriss Dumas MD) 3 hours ago (4:40 AM)       Is this EKG good or bad

## 2023-12-04 ENCOUNTER — OFFICE VISIT (OUTPATIENT)
Dept: PULMONOLOGY | Age: 33
End: 2023-12-04
Payer: MEDICARE

## 2023-12-04 VITALS
BODY MASS INDEX: 49.58 KG/M2 | DIASTOLIC BLOOD PRESSURE: 86 MMHG | SYSTOLIC BLOOD PRESSURE: 138 MMHG | OXYGEN SATURATION: 98 % | HEART RATE: 94 BPM | WEIGHT: 290.4 LBS | TEMPERATURE: 97.8 F | HEIGHT: 64 IN

## 2023-12-04 DIAGNOSIS — E66.01 MORBID OBESITY WITH BMI OF 45.0-49.9, ADULT (HCC): ICD-10-CM

## 2023-12-04 DIAGNOSIS — G47.33 OSA ON CPAP: Primary | ICD-10-CM

## 2023-12-04 PROCEDURE — 3075F SYST BP GE 130 - 139MM HG: CPT | Performed by: INTERNAL MEDICINE

## 2023-12-04 PROCEDURE — 99213 OFFICE O/P EST LOW 20 MIN: CPT | Performed by: INTERNAL MEDICINE

## 2023-12-04 PROCEDURE — 3079F DIAST BP 80-89 MM HG: CPT | Performed by: INTERNAL MEDICINE

## 2023-12-04 NOTE — PROGRESS NOTES
traMADol (ULTRAM) 50 MG tablet Take 1 tablet by mouth every 6 hours as needed for Pain. sucralfate (CARAFATE) 1 GM tablet Take 1 tablet by mouth 4 times daily (before meals and nightly) 120 tablet 0    vitamin A 3 MG (70288 UT) capsule Take 1 capsule by mouth daily      Multiple Vitamins-Minerals (THERAPEUTIC MULTIVITAMIN-MINERALS) tablet Take 1 tablet by mouth daily      Cholecalciferol (VITAMIN D) 125 MCG (5000 UT) CAPS Take 1 capsule by mouth daily      famotidine (PEPCID) 40 MG tablet take 1 tablet by mouth nightly 30 tablet 3    propranolol (INDERAL LA) 60 MG extended release capsule Take 80 mg by mouth daily      Incontinence Supply Disposable (DEPEND SILHOUETTE BRIEFS L/XL) MISC Use as needed for urinary and bowel incontinence 5 each 5    topiramate (TOPAMAX) 100 MG tablet Take 1 tablet by mouth 2 times daily      linaclotide (LINZESS) 145 MCG capsule Take 1 capsule by mouth every morning (before breakfast)      baclofen (LIORESAL) 10 MG tablet Take 1 tablet by mouth daily      prazosin (MINIPRESS) 1 MG capsule Take 1 capsule by mouth nightly      busPIRone (BUSPAR) 5 MG tablet Take 1 tablet by mouth 3 times daily      montelukast (SINGULAIR) 10 MG tablet Take 1 tablet by mouth nightly      amLODIPine (NORVASC) 5 MG tablet Take 1 tablet by mouth daily 30 tablet 3    escitalopram (LEXAPRO) 5 MG/5ML solution Take 20 mLs by mouth daily Take 20 mLchanged to pill recently      albuterol (PROVENTIL) (2.5 MG/3ML) 0.083% nebulizer solution Take 3 mLs by nebulization every 6 hours as needed for Wheezing 90 each 0    Spacer/Aero-Holding Chambers (E-Z SPACER) AISLINN 1 Device by Does not apply route daily 1 Device 0     No current facility-administered medications for this visit.      Family History   Problem Relation Age of Onset    Cancer Mother     High Blood Pressure Mother     Heart Disease Mother         MI    Liver Cancer Mother     Sickle Cell Trait Father     High Blood Pressure Father     Heart Disease Father

## 2023-12-05 ENCOUNTER — TELEPHONE (OUTPATIENT)
Dept: PULMONOLOGY | Age: 33
End: 2023-12-05

## 2023-12-05 DIAGNOSIS — G47.33 OSA (OBSTRUCTIVE SLEEP APNEA): Primary | ICD-10-CM

## 2023-12-05 NOTE — TELEPHONE ENCOUNTER
Patient calling in wanting to know if you place the ENT referral yet. I do not see it in the chart could you please place internal referral to [facility] ENT thanks!

## 2023-12-07 ENCOUNTER — APPOINTMENT (OUTPATIENT)
Dept: CT IMAGING | Age: 33
End: 2023-12-07
Payer: MEDICARE

## 2023-12-07 ENCOUNTER — HOSPITAL ENCOUNTER (EMERGENCY)
Age: 33
Discharge: HOME | End: 2023-12-08
Attending: EMERGENCY MEDICINE
Payer: MEDICARE

## 2023-12-07 ENCOUNTER — APPOINTMENT (OUTPATIENT)
Dept: GENERAL RADIOLOGY | Age: 33
End: 2023-12-07
Payer: MEDICARE

## 2023-12-07 DIAGNOSIS — G89.29 CHRONIC ABDOMINAL PAIN: Primary | ICD-10-CM

## 2023-12-07 DIAGNOSIS — R10.9 CHRONIC ABDOMINAL PAIN: Primary | ICD-10-CM

## 2023-12-07 LAB
ALBUMIN SERPL BCG-MCNC: 4.2 G/DL (ref 3.5–5.1)
ALP SERPL-CCNC: 122 U/L (ref 38–126)
ALT SERPL W/O P-5'-P-CCNC: 14 U/L (ref 11–66)
ANION GAP SERPL CALC-SCNC: 12 MEQ/L (ref 8–16)
AST SERPL-CCNC: 14 U/L (ref 5–40)
B-HCG SERPL QL: NEGATIVE
BACTERIA URNS QL MICRO: ABNORMAL /HPF
BASOPHILS ABSOLUTE: 0 THOU/MM3 (ref 0–0.1)
BASOPHILS NFR BLD AUTO: 0.4 %
BILIRUB SERPL-MCNC: 0.2 MG/DL (ref 0.3–1.2)
BILIRUB UR QL STRIP.AUTO: NEGATIVE
BUN SERPL-MCNC: 15 MG/DL (ref 7–22)
CALCIUM SERPL-MCNC: 9.6 MG/DL (ref 8.5–10.5)
CASTS #/AREA URNS LPF: ABNORMAL /LPF
CASTS 2: ABNORMAL /LPF
CHARACTER UR: CLEAR
CHLORIDE SERPL-SCNC: 102 MEQ/L (ref 98–111)
CO2 SERPL-SCNC: 23 MEQ/L (ref 23–33)
COLOR: YELLOW
CREAT SERPL-MCNC: 0.8 MG/DL (ref 0.4–1.2)
CRYSTALS URNS MICRO: ABNORMAL
DEPRECATED RDW RBC AUTO: 40.4 FL (ref 35–45)
EOSINOPHIL NFR BLD AUTO: 0.8 %
EOSINOPHILS ABSOLUTE: 0.1 THOU/MM3 (ref 0–0.4)
EPITHELIAL CELLS, UA: ABNORMAL /HPF
ERYTHROCYTE [DISTWIDTH] IN BLOOD BY AUTOMATED COUNT: 14.6 % (ref 11.5–14.5)
GFR SERPL CREATININE-BSD FRML MDRD: > 60 ML/MIN/1.73M2
GLUCOSE SERPL-MCNC: 120 MG/DL (ref 70–108)
GLUCOSE UR QL STRIP.AUTO: NEGATIVE MG/DL
HCT VFR BLD AUTO: 39.8 % (ref 37–47)
HGB BLD-MCNC: 12.4 GM/DL (ref 12–16)
HGB UR QL STRIP.AUTO: ABNORMAL
IMM GRANULOCYTES # BLD AUTO: 0.04 THOU/MM3 (ref 0–0.07)
IMM GRANULOCYTES NFR BLD AUTO: 0.3 %
KETONES UR QL STRIP.AUTO: NEGATIVE
LIPASE SERPL-CCNC: 26.5 U/L (ref 5.6–51.3)
LYMPHOCYTES ABSOLUTE: 2.6 THOU/MM3 (ref 1–4.8)
LYMPHOCYTES NFR BLD AUTO: 21.4 %
MAGNESIUM SERPL-MCNC: 2 MG/DL (ref 1.6–2.4)
MCH RBC QN AUTO: 24 PG (ref 26–33)
MCHC RBC AUTO-ENTMCNC: 31.2 GM/DL (ref 32.2–35.5)
MCV RBC AUTO: 77.1 FL (ref 81–99)
MISCELLANEOUS 2: ABNORMAL
MONOCYTES ABSOLUTE: 0.7 THOU/MM3 (ref 0.4–1.3)
MONOCYTES NFR BLD AUTO: 6.1 %
NEUTROPHILS NFR BLD AUTO: 71 %
NITRITE UR QL STRIP: NEGATIVE
NRBC BLD AUTO-RTO: 0 /100 WBC
OSMOLALITY SERPL CALC.SUM OF ELEC: 275.8 MOSMOL/KG (ref 275–300)
PH UR STRIP.AUTO: 6.5 [PH] (ref 5–9)
PLATELET # BLD AUTO: 292 THOU/MM3 (ref 130–400)
PMV BLD AUTO: 9.6 FL (ref 9.4–12.4)
POTASSIUM SERPL-SCNC: 3.9 MEQ/L (ref 3.5–5.2)
PROT SERPL-MCNC: 7.5 G/DL (ref 6.1–8)
PROT UR STRIP.AUTO-MCNC: NEGATIVE MG/DL
RBC # BLD AUTO: 5.16 MILL/MM3 (ref 4.2–5.4)
RBC URINE: ABNORMAL /HPF
RENAL EPI CELLS #/AREA URNS HPF: ABNORMAL /[HPF]
SEGMENTED NEUTROPHILS ABSOLUTE COUNT: 8.5 THOU/MM3 (ref 1.8–7.7)
SODIUM SERPL-SCNC: 137 MEQ/L (ref 135–145)
SP GR UR REFRACT.AUTO: 1.02 (ref 1–1.03)
TROPONIN, HIGH SENSITIVITY: < 6 NG/L (ref 0–12)
UROBILINOGEN, URINE: 1 EU/DL (ref 0–1)
WBC # BLD AUTO: 12 THOU/MM3 (ref 4.8–10.8)
WBC #/AREA URNS HPF: ABNORMAL /HPF
WBC #/AREA URNS HPF: NEGATIVE /[HPF]
YEAST LIKE FUNGI URNS QL MICRO: ABNORMAL

## 2023-12-07 PROCEDURE — 71045 X-RAY EXAM CHEST 1 VIEW: CPT

## 2023-12-07 PROCEDURE — 6360000004 HC RX CONTRAST MEDICATION: Performed by: EMERGENCY MEDICINE

## 2023-12-07 PROCEDURE — 96372 THER/PROPH/DIAG INJ SC/IM: CPT

## 2023-12-07 PROCEDURE — 6360000002 HC RX W HCPCS

## 2023-12-07 PROCEDURE — 99285 EMERGENCY DEPT VISIT HI MDM: CPT

## 2023-12-07 PROCEDURE — 93005 ELECTROCARDIOGRAM TRACING: CPT

## 2023-12-07 PROCEDURE — 84703 CHORIONIC GONADOTROPIN ASSAY: CPT

## 2023-12-07 PROCEDURE — 84484 ASSAY OF TROPONIN QUANT: CPT

## 2023-12-07 PROCEDURE — 74177 CT ABD & PELVIS W/CONTRAST: CPT

## 2023-12-07 PROCEDURE — 81001 URINALYSIS AUTO W/SCOPE: CPT

## 2023-12-07 PROCEDURE — 83735 ASSAY OF MAGNESIUM: CPT

## 2023-12-07 PROCEDURE — 83690 ASSAY OF LIPASE: CPT

## 2023-12-07 PROCEDURE — 2580000003 HC RX 258

## 2023-12-07 PROCEDURE — 36415 COLL VENOUS BLD VENIPUNCTURE: CPT

## 2023-12-07 PROCEDURE — 80053 COMPREHEN METABOLIC PANEL: CPT

## 2023-12-07 PROCEDURE — 96361 HYDRATE IV INFUSION ADD-ON: CPT

## 2023-12-07 PROCEDURE — 85025 COMPLETE CBC W/AUTO DIFF WBC: CPT

## 2023-12-07 RX ORDER — PROMETHAZINE HYDROCHLORIDE 25 MG/ML
6.25 INJECTION, SOLUTION INTRAMUSCULAR; INTRAVENOUS ONCE
Status: COMPLETED | OUTPATIENT
Start: 2023-12-07 | End: 2023-12-07

## 2023-12-07 RX ORDER — 0.9 % SODIUM CHLORIDE 0.9 %
1000 INTRAVENOUS SOLUTION INTRAVENOUS ONCE
Status: COMPLETED | OUTPATIENT
Start: 2023-12-07 | End: 2023-12-08

## 2023-12-07 RX ADMIN — PROMETHAZINE HYDROCHLORIDE 6.25 MG: 25 INJECTION INTRAMUSCULAR; INTRAVENOUS at 22:38

## 2023-12-07 RX ADMIN — SODIUM CHLORIDE 1000 ML: 9 INJECTION, SOLUTION INTRAVENOUS at 21:57

## 2023-12-07 RX ADMIN — IOPAMIDOL 80 ML: 755 INJECTION, SOLUTION INTRAVENOUS at 22:43

## 2023-12-07 ASSESSMENT — PAIN SCALES - GENERAL
PAINLEVEL_OUTOF10: 10

## 2023-12-07 ASSESSMENT — PAIN - FUNCTIONAL ASSESSMENT
PAIN_FUNCTIONAL_ASSESSMENT: 0-10

## 2023-12-07 ASSESSMENT — PAIN DESCRIPTION - LOCATION: LOCATION: ABDOMEN

## 2023-12-07 ASSESSMENT — PAIN DESCRIPTION - DESCRIPTORS: DESCRIPTORS: SHARP

## 2023-12-08 VITALS
SYSTOLIC BLOOD PRESSURE: 152 MMHG | RESPIRATION RATE: 16 BRPM | HEART RATE: 77 BPM | OXYGEN SATURATION: 98 % | WEIGHT: 290 LBS | DIASTOLIC BLOOD PRESSURE: 97 MMHG | TEMPERATURE: 97.9 F | BODY MASS INDEX: 49.51 KG/M2 | HEIGHT: 64 IN

## 2023-12-08 PROCEDURE — 6360000002 HC RX W HCPCS

## 2023-12-08 PROCEDURE — 96376 TX/PRO/DX INJ SAME DRUG ADON: CPT

## 2023-12-08 PROCEDURE — 96374 THER/PROPH/DIAG INJ IV PUSH: CPT

## 2023-12-08 PROCEDURE — 96361 HYDRATE IV INFUSION ADD-ON: CPT

## 2023-12-08 RX ORDER — DROPERIDOL 2.5 MG/ML
1.25 INJECTION, SOLUTION INTRAMUSCULAR; INTRAVENOUS ONCE
Status: DISCONTINUED | OUTPATIENT
Start: 2023-12-08 | End: 2023-12-08 | Stop reason: HOSPADM

## 2023-12-08 RX ORDER — MORPHINE SULFATE 4 MG/ML
4 INJECTION, SOLUTION INTRAMUSCULAR; INTRAVENOUS ONCE
Status: COMPLETED | OUTPATIENT
Start: 2023-12-08 | End: 2023-12-08

## 2023-12-08 RX ADMIN — MORPHINE SULFATE 4 MG: 4 INJECTION, SOLUTION INTRAMUSCULAR; INTRAVENOUS at 00:11

## 2023-12-08 RX ADMIN — MORPHINE SULFATE 4 MG: 4 INJECTION, SOLUTION INTRAMUSCULAR; INTRAVENOUS at 01:18

## 2023-12-08 ASSESSMENT — PAIN SCALES - GENERAL
PAINLEVEL_OUTOF10: 9
PAINLEVEL_OUTOF10: 9
PAINLEVEL_OUTOF10: 10
PAINLEVEL_OUTOF10: 10

## 2023-12-08 ASSESSMENT — PAIN - FUNCTIONAL ASSESSMENT
PAIN_FUNCTIONAL_ASSESSMENT: 0-10

## 2023-12-08 ASSESSMENT — PAIN DESCRIPTION - LOCATION: LOCATION: ABDOMEN

## 2023-12-08 NOTE — ED TRIAGE NOTES
Patient presents to the ED with C/O emesis and diarrhea. Patient states she started vomiting and had diarrhea yesterday. Patient states today she noticed blood in her vomit. Patient states she is having abdominal pain rating it 10/10. Patient has a long GI history. Patient sees Dr. Trung Denny for her GI complications. Patient denies any chills or fevers. Patient states she tried taking extra strength Tylenol and tramadol today but they did not agree with her stomach and that's when she called the squad. ABO

## 2023-12-08 NOTE — ED NOTES
Patient refused droperidol at this time. Patient states \"Im not taking that. That makes me jump out of my skin. No way\". Patient provider notified at this time. Patient resting on cot talking on phone. Patient denies any needs at this time.      Brooklynn Feliciano RN  12/08/23 [id number]

## 2023-12-08 NOTE — DISCHARGE INSTRUCTIONS
You were seen and evaluated emergency department today for abdominal pain and emesis. Your CT scan showed stable findings with slight increase in size of liver abnormality. Follow-up with your gastroenterologist for further evaluation Dr. Vincent Bailey as well as Dr. Hernandez Yo for consideration of potential gastric tube. Return to nearest emergency department for acute or worrisome changes.

## 2023-12-09 LAB
EKG ATRIAL RATE: 78 BPM
EKG P AXIS: 37 DEGREES
EKG P-R INTERVAL: 146 MS
EKG Q-T INTERVAL: 354 MS
EKG QRS DURATION: 80 MS
EKG QTC CALCULATION (BAZETT): 403 MS
EKG R AXIS: 0 DEGREES
EKG T AXIS: 12 DEGREES
EKG VENTRICULAR RATE: 78 BPM

## 2023-12-09 PROCEDURE — 93010 ELECTROCARDIOGRAM REPORT: CPT | Performed by: INTERNAL MEDICINE

## 2024-01-09 ENCOUNTER — OFFICE VISIT (OUTPATIENT)
Dept: INTERNAL MEDICINE CLINIC | Age: 34
End: 2024-01-09
Payer: MEDICAID

## 2024-01-09 VITALS — HEIGHT: 64 IN | BODY MASS INDEX: 50.02 KG/M2 | WEIGHT: 293 LBS

## 2024-01-09 DIAGNOSIS — K90.9 INTESTINAL MALABSORPTION, UNSPECIFIED TYPE: Primary | ICD-10-CM

## 2024-01-09 DIAGNOSIS — Z93.1 G TUBE FEEDINGS (HCC): ICD-10-CM

## 2024-01-09 DIAGNOSIS — Z71.3 DIETARY COUNSELING AND SURVEILLANCE: ICD-10-CM

## 2024-01-09 DIAGNOSIS — K31.84 GASTROPARESIS: ICD-10-CM

## 2024-01-09 PROCEDURE — 97803 MED NUTRITION INDIV SUBSEQ: CPT | Performed by: DIETITIAN, REGISTERED

## 2024-01-09 PROCEDURE — NBSRV NON-BILLABLE SERVICE: Performed by: DIETITIAN, REGISTERED

## 2024-01-09 NOTE — PROGRESS NOTES
[facility]  [facility]  [address] [place] [address]  [phone] (phone)  [phone] (fax)    Patient Name: Samantha Nichols. Date of Birth: [id number]. MRN: [id number]      Assessment: Patient is a 33 y.o. female seen for follow-up MNT visit for Intestinal malabsorption and Gastroparesis (GP) with PEG tube feedings.     -Nutritionally relevant labs:   Lab Results   Component Value Date/Time    LABA1C 6.1 10/10/2023 05:00 AM    LABA1C 5.8 01/25/2023 05:25 AM    LABA1C 5.8 06/24/2022 08:10 PM    GLUCOSE 91 12/20/2023 03:42 AM    GLUCOSE 120 (H) 12/07/2023 10:02 PM    GLUCOSE 78 12/23/2018 11:30 AM    GLUCOSE 78 03/26/2018 02:41 PM    CHOL 172 06/25/2022 04:07 AM    HDL 41 06/25/2022 04:07 AM    LDLCALC 111 06/25/2022 04:07 AM    TRIG 92 01/14/2023 05:47 AM     Pt states following GI at [facility] and has upcoming appointments this month - at one appt having a gastric emptying test done.  -Blood sugar trends: sometimes checking BS.    BS this morning 160    Sometimes her Vital formula comes up when doing a bolus.  Taking nausea medication before each bolus and this is helping.  Flushing PEG tube with 60 ml water before and after each feeding.  Pt is working around her appts so she does not skip bolus feedings during the day.    + BM daily with miralax.  Pt was told to avoid fiber supplement powders.  60 ml before and after start and stop of night-time feeding.  Start nocturnal feeding @ 5 pm and stops at 11 am- total 16 hour run time.    [facility]  Either Leg crunches and ropes at [employer] - 1-2x/week.    No po intake to speak of - tried small amount of thinned down mashed potatoes.    -Impression of Dietary Intake:  tube feeding dependent.    Current Outpatient Medications on File Prior to Visit   Medication Sig Dispense Refill    docusate sodium (COLACE) 100 MG capsule       RA NICOTINE POLACRILEX 2 MG

## 2024-01-09 NOTE — PATIENT INSTRUCTIONS
Ok to try 55 ml/hr x 13 hours at night = 3 cartons/night.    Flush additional water each day - You need extra water.  Can flush this water in btw feedings.  You need an additional 16 ounces water/day.  - 8oz = 240 ml     Great job Samantha, you are doing well with your Tube feeding!!

## 2024-01-10 ENCOUNTER — HOSPITAL ENCOUNTER (INPATIENT)
Age: 34
LOS: 3 days | Discharge: HOME | End: 2024-01-13
Attending: EMERGENCY MEDICINE | Admitting: STUDENT IN AN ORGANIZED HEALTH CARE EDUCATION/TRAINING PROGRAM
Payer: MEDICARE

## 2024-01-10 ENCOUNTER — APPOINTMENT (OUTPATIENT)
Dept: CT IMAGING | Age: 34
End: 2024-01-10
Payer: MEDICARE

## 2024-01-10 DIAGNOSIS — R10.84 GENERALIZED ABDOMINAL PAIN: Primary | ICD-10-CM

## 2024-01-10 DIAGNOSIS — K94.23 MIGRATION OF PERCUTANEOUS ENDOSCOPIC GASTROSTOMY (PEG) TUBE (HCC): ICD-10-CM

## 2024-01-10 PROBLEM — F17.200 TOBACCO DEPENDENCE SYNDROME: Status: ACTIVE | Noted: 2021-05-05

## 2024-01-10 PROBLEM — K21.9 GASTROESOPHAGEAL REFLUX DISEASE WITHOUT ESOPHAGITIS: Status: ACTIVE | Noted: 2021-01-12

## 2024-01-10 LAB
ALBUMIN SERPL BCG-MCNC: 4 G/DL (ref 3.5–5.1)
ALP SERPL-CCNC: 123 U/L (ref 38–126)
ALT SERPL W/O P-5'-P-CCNC: 15 U/L (ref 11–66)
ANION GAP SERPL CALC-SCNC: 10 MEQ/L (ref 8–16)
AST SERPL-CCNC: 19 U/L (ref 5–40)
BACTERIA URNS QL MICRO: ABNORMAL /HPF
BASOPHILS ABSOLUTE: 0 THOU/MM3 (ref 0–0.1)
BASOPHILS NFR BLD AUTO: 0.3 %
BILIRUB CONJ SERPL-MCNC: < 0.2 MG/DL (ref 0–0.3)
BILIRUB SERPL-MCNC: 0.2 MG/DL (ref 0.3–1.2)
BILIRUB UR QL STRIP.AUTO: NEGATIVE
BUN SERPL-MCNC: 13 MG/DL (ref 7–22)
CALCIUM SERPL-MCNC: 9.3 MG/DL (ref 8.5–10.5)
CASTS #/AREA URNS LPF: ABNORMAL /LPF
CASTS 2: ABNORMAL /LPF
CHARACTER UR: CLEAR
CHLORIDE SERPL-SCNC: 102 MEQ/L (ref 98–111)
CO2 SERPL-SCNC: 26 MEQ/L (ref 23–33)
COLOR: YELLOW
CREAT SERPL-MCNC: 0.7 MG/DL (ref 0.4–1.2)
CRYSTALS URNS MICRO: ABNORMAL
DEPRECATED RDW RBC AUTO: 40.9 FL (ref 35–45)
EOSINOPHIL NFR BLD AUTO: 0.9 %
EOSINOPHILS ABSOLUTE: 0.1 THOU/MM3 (ref 0–0.4)
EPITHELIAL CELLS, UA: ABNORMAL /HPF
ERYTHROCYTE [DISTWIDTH] IN BLOOD BY AUTOMATED COUNT: 14.4 % (ref 11.5–14.5)
GFR SERPL CREATININE-BSD FRML MDRD: > 60 ML/MIN/1.73M2
GLUCOSE SERPL-MCNC: 78 MG/DL (ref 70–108)
GLUCOSE UR QL STRIP.AUTO: NEGATIVE MG/DL
HCT VFR BLD AUTO: 42.2 % (ref 37–47)
HGB BLD-MCNC: 12.8 GM/DL (ref 12–16)
HGB UR QL STRIP.AUTO: ABNORMAL
IMM GRANULOCYTES # BLD AUTO: 0.08 THOU/MM3 (ref 0–0.07)
IMM GRANULOCYTES NFR BLD AUTO: 0.6 %
KETONES UR QL STRIP.AUTO: NEGATIVE
LACTATE SERPL-SCNC: 1 MMOL/L (ref 0.5–2)
LIPASE SERPL-CCNC: 18.6 U/L (ref 5.6–51.3)
LYMPHOCYTES ABSOLUTE: 2.6 THOU/MM3 (ref 1–4.8)
LYMPHOCYTES NFR BLD AUTO: 18.2 %
MCH RBC QN AUTO: 23.9 PG (ref 26–33)
MCHC RBC AUTO-ENTMCNC: 30.3 GM/DL (ref 32.2–35.5)
MCV RBC AUTO: 78.9 FL (ref 81–99)
MISCELLANEOUS 2: ABNORMAL
MONOCYTES ABSOLUTE: 1.1 THOU/MM3 (ref 0.4–1.3)
MONOCYTES NFR BLD AUTO: 7.9 %
NEUTROPHILS NFR BLD AUTO: 72.1 %
NITRITE UR QL STRIP: NEGATIVE
NRBC BLD AUTO-RTO: 0 /100 WBC
OSMOLALITY SERPL CALC.SUM OF ELEC: 274.7 MOSMOL/KG (ref 275–300)
PH UR STRIP.AUTO: 5.5 [PH] (ref 5–9)
PLATELET # BLD AUTO: 332 THOU/MM3 (ref 130–400)
PMV BLD AUTO: 9.5 FL (ref 9.4–12.4)
POTASSIUM SERPL-SCNC: 3.7 MEQ/L (ref 3.5–5.2)
PROCALCITONIN SERPL IA-MCNC: 0.04 NG/ML (ref 0.01–0.09)
PROT SERPL-MCNC: 8.1 G/DL (ref 6.1–8)
PROT UR STRIP.AUTO-MCNC: NEGATIVE MG/DL
RBC # BLD AUTO: 5.35 MILL/MM3 (ref 4.2–5.4)
RBC URINE: ABNORMAL /HPF
RENAL EPI CELLS #/AREA URNS HPF: ABNORMAL /[HPF]
SEGMENTED NEUTROPHILS ABSOLUTE COUNT: 10.2 THOU/MM3 (ref 1.8–7.7)
SODIUM SERPL-SCNC: 138 MEQ/L (ref 135–145)
SP GR UR REFRACT.AUTO: 1.01 (ref 1–1.03)
UROBILINOGEN, URINE: 0.2 EU/DL (ref 0–1)
WBC # BLD AUTO: 14.1 THOU/MM3 (ref 4.8–10.8)
WBC #/AREA URNS HPF: ABNORMAL /HPF
WBC #/AREA URNS HPF: NEGATIVE /[HPF]
YEAST LIKE FUNGI URNS QL MICRO: ABNORMAL

## 2024-01-10 PROCEDURE — 99223 1ST HOSP IP/OBS HIGH 75: CPT | Performed by: PHYSICIAN ASSISTANT

## 2024-01-10 PROCEDURE — 6360000002 HC RX W HCPCS: Performed by: PHYSICIAN ASSISTANT

## 2024-01-10 PROCEDURE — 80053 COMPREHEN METABOLIC PANEL: CPT

## 2024-01-10 PROCEDURE — 2580000003 HC RX 258: Performed by: EMERGENCY MEDICINE

## 2024-01-10 PROCEDURE — 74176 CT ABD & PELVIS W/O CONTRAST: CPT

## 2024-01-10 PROCEDURE — 82248 BILIRUBIN DIRECT: CPT

## 2024-01-10 PROCEDURE — 6360000002 HC RX W HCPCS

## 2024-01-10 PROCEDURE — 96374 THER/PROPH/DIAG INJ IV PUSH: CPT

## 2024-01-10 PROCEDURE — 1200000003 HC TELEMETRY R&B

## 2024-01-10 PROCEDURE — 83605 ASSAY OF LACTIC ACID: CPT

## 2024-01-10 PROCEDURE — 81001 URINALYSIS AUTO W/SCOPE: CPT

## 2024-01-10 PROCEDURE — 6360000002 HC RX W HCPCS: Performed by: EMERGENCY MEDICINE

## 2024-01-10 PROCEDURE — 83690 ASSAY OF LIPASE: CPT

## 2024-01-10 PROCEDURE — 85025 COMPLETE CBC W/AUTO DIFF WBC: CPT

## 2024-01-10 PROCEDURE — 84145 PROCALCITONIN (PCT): CPT

## 2024-01-10 PROCEDURE — 36415 COLL VENOUS BLD VENIPUNCTURE: CPT

## 2024-01-10 PROCEDURE — 99285 EMERGENCY DEPT VISIT HI MDM: CPT

## 2024-01-10 RX ORDER — SODIUM CHLORIDE 9 MG/ML
INJECTION, SOLUTION INTRAVENOUS PRN
Status: DISCONTINUED | OUTPATIENT
Start: 2024-01-10 | End: 2024-01-13 | Stop reason: HOSPADM

## 2024-01-10 RX ORDER — POTASSIUM CHLORIDE 7.45 MG/ML
10 INJECTION INTRAVENOUS PRN
Status: DISCONTINUED | OUTPATIENT
Start: 2024-01-10 | End: 2024-01-13 | Stop reason: HOSPADM

## 2024-01-10 RX ORDER — SODIUM CHLORIDE 9 MG/ML
INJECTION, SOLUTION INTRAVENOUS CONTINUOUS
Status: DISCONTINUED | OUTPATIENT
Start: 2024-01-10 | End: 2024-01-13 | Stop reason: HOSPADM

## 2024-01-10 RX ORDER — ACETAMINOPHEN 650 MG/1
650 SUPPOSITORY RECTAL EVERY 6 HOURS PRN
Status: DISCONTINUED | OUTPATIENT
Start: 2024-01-10 | End: 2024-01-13 | Stop reason: HOSPADM

## 2024-01-10 RX ORDER — ACETAMINOPHEN 325 MG/1
650 TABLET ORAL EVERY 6 HOURS PRN
Status: DISCONTINUED | OUTPATIENT
Start: 2024-01-10 | End: 2024-01-13 | Stop reason: HOSPADM

## 2024-01-10 RX ORDER — ALBUTEROL SULFATE 2.5 MG/3ML
2.5 SOLUTION RESPIRATORY (INHALATION) EVERY 4 HOURS PRN
Status: DISCONTINUED | OUTPATIENT
Start: 2024-01-10 | End: 2024-01-13

## 2024-01-10 RX ORDER — METRONIDAZOLE 500 MG/100ML
500 INJECTION, SOLUTION INTRAVENOUS EVERY 8 HOURS
Status: DISCONTINUED | OUTPATIENT
Start: 2024-01-11 | End: 2024-01-13 | Stop reason: HOSPADM

## 2024-01-10 RX ORDER — SODIUM CHLORIDE 0.9 % (FLUSH) 0.9 %
5-40 SYRINGE (ML) INJECTION PRN
Status: DISCONTINUED | OUTPATIENT
Start: 2024-01-10 | End: 2024-01-13 | Stop reason: HOSPADM

## 2024-01-10 RX ORDER — SODIUM CHLORIDE 9 MG/ML
INJECTION, SOLUTION INTRAVENOUS CONTINUOUS
Status: ACTIVE | OUTPATIENT
Start: 2024-01-10 | End: 2024-01-12

## 2024-01-10 RX ORDER — MAGNESIUM SULFATE IN WATER 40 MG/ML
2000 INJECTION, SOLUTION INTRAVENOUS PRN
Status: DISCONTINUED | OUTPATIENT
Start: 2024-01-10 | End: 2024-01-13 | Stop reason: HOSPADM

## 2024-01-10 RX ORDER — PROMETHAZINE HYDROCHLORIDE 25 MG/ML
12.5 INJECTION, SOLUTION INTRAMUSCULAR; INTRAVENOUS EVERY 6 HOURS PRN
Status: DISCONTINUED | OUTPATIENT
Start: 2024-01-10 | End: 2024-01-13 | Stop reason: HOSPADM

## 2024-01-10 RX ORDER — POTASSIUM CHLORIDE 20 MEQ/1
40 TABLET, EXTENDED RELEASE ORAL PRN
Status: DISCONTINUED | OUTPATIENT
Start: 2024-01-10 | End: 2024-01-13 | Stop reason: HOSPADM

## 2024-01-10 RX ORDER — ENOXAPARIN SODIUM 100 MG/ML
30 INJECTION SUBCUTANEOUS 2 TIMES DAILY
Status: DISCONTINUED | OUTPATIENT
Start: 2024-01-11 | End: 2024-01-13 | Stop reason: HOSPADM

## 2024-01-10 RX ORDER — SODIUM CHLORIDE 0.9 % (FLUSH) 0.9 %
5-40 SYRINGE (ML) INJECTION EVERY 12 HOURS SCHEDULED
Status: DISCONTINUED | OUTPATIENT
Start: 2024-01-10 | End: 2024-01-13 | Stop reason: HOSPADM

## 2024-01-10 RX ORDER — FLUTICASONE PROPIONATE 110 UG/1
2 AEROSOL, METERED RESPIRATORY (INHALATION) EVERY 4 HOURS PRN
Status: DISCONTINUED | OUTPATIENT
Start: 2024-01-10 | End: 2024-01-13 | Stop reason: HOSPADM

## 2024-01-10 RX ADMIN — METRONIDAZOLE 500 MG: 500 INJECTION, SOLUTION INTRAVENOUS at 23:26

## 2024-01-10 RX ADMIN — HYDROMORPHONE HYDROCHLORIDE 0.5 MG: 1 INJECTION, SOLUTION INTRAMUSCULAR; INTRAVENOUS; SUBCUTANEOUS at 21:32

## 2024-01-10 RX ADMIN — SODIUM CHLORIDE: 9 INJECTION, SOLUTION INTRAVENOUS at 19:27

## 2024-01-10 RX ADMIN — HYDROMORPHONE HYDROCHLORIDE 0.5 MG: 1 INJECTION, SOLUTION INTRAMUSCULAR; INTRAVENOUS; SUBCUTANEOUS at 19:59

## 2024-01-10 ASSESSMENT — PAIN DESCRIPTION - PAIN TYPE: TYPE: ACUTE PAIN

## 2024-01-10 ASSESSMENT — ENCOUNTER SYMPTOMS
ABDOMINAL PAIN: 1
BACK PAIN: 1
RESPIRATORY NEGATIVE: 1
ALLERGIC/IMMUNOLOGIC NEGATIVE: 1
EYES NEGATIVE: 1

## 2024-01-10 ASSESSMENT — PAIN DESCRIPTION - DESCRIPTORS: DESCRIPTORS: STABBING

## 2024-01-10 ASSESSMENT — PAIN - FUNCTIONAL ASSESSMENT
PAIN_FUNCTIONAL_ASSESSMENT: 0-10
PAIN_FUNCTIONAL_ASSESSMENT: 0-10

## 2024-01-10 ASSESSMENT — PAIN SCALES - GENERAL
PAINLEVEL_OUTOF10: 10
PAINLEVEL_OUTOF10: 8
PAINLEVEL_OUTOF10: 10

## 2024-01-10 ASSESSMENT — PAIN DESCRIPTION - LOCATION: LOCATION: BACK;RIB CAGE

## 2024-01-10 ASSESSMENT — PAIN DESCRIPTION - ORIENTATION: ORIENTATION: RIGHT

## 2024-01-10 NOTE — ED NOTES
Pt to the ED via EMS. Patient presents with complaints of right sides rib pain that radiates to her back. Patient appears to be falling asleep during triage and patient pain assessed, patient states that it is 10/10 after 10mg of Morphine given by EMS. Patient educated the importance of an accurate pain scale to avoid side effects of falling asleep and oxygen saturation decreasing. Patient is alert and oriented x 4. Respirations are regular and unlabored. Patient provided blanket. Call light within reach.

## 2024-01-11 LAB
ANION GAP SERPL CALC-SCNC: 10 MEQ/L (ref 8–16)
BASOPHILS ABSOLUTE: 0.1 THOU/MM3 (ref 0–0.1)
BASOPHILS NFR BLD AUTO: 0.4 %
BUN SERPL-MCNC: 11 MG/DL (ref 7–22)
CALCIUM SERPL-MCNC: 9 MG/DL (ref 8.5–10.5)
CHLORIDE SERPL-SCNC: 103 MEQ/L (ref 98–111)
CO2 SERPL-SCNC: 25 MEQ/L (ref 23–33)
CREAT SERPL-MCNC: 0.6 MG/DL (ref 0.4–1.2)
DEPRECATED RDW RBC AUTO: 41.3 FL (ref 35–45)
EOSINOPHIL NFR BLD AUTO: 0.9 %
EOSINOPHILS ABSOLUTE: 0.1 THOU/MM3 (ref 0–0.4)
ERYTHROCYTE [DISTWIDTH] IN BLOOD BY AUTOMATED COUNT: 14.6 % (ref 11.5–14.5)
GFR SERPL CREATININE-BSD FRML MDRD: > 60 ML/MIN/1.73M2
GLUCOSE SERPL-MCNC: 92 MG/DL (ref 70–108)
HCT VFR BLD AUTO: 39.7 % (ref 37–47)
HGB BLD-MCNC: 12.1 GM/DL (ref 12–16)
IMM GRANULOCYTES # BLD AUTO: 0.05 THOU/MM3 (ref 0–0.07)
IMM GRANULOCYTES NFR BLD AUTO: 0.4 %
LYMPHOCYTES ABSOLUTE: 2.3 THOU/MM3 (ref 1–4.8)
LYMPHOCYTES NFR BLD AUTO: 18 %
MCH RBC QN AUTO: 24.1 PG (ref 26–33)
MCHC RBC AUTO-ENTMCNC: 30.5 GM/DL (ref 32.2–35.5)
MCV RBC AUTO: 79.1 FL (ref 81–99)
MONOCYTES ABSOLUTE: 0.9 THOU/MM3 (ref 0.4–1.3)
MONOCYTES NFR BLD AUTO: 6.7 %
NEUTROPHILS NFR BLD AUTO: 73.6 %
NRBC BLD AUTO-RTO: 0 /100 WBC
OSMOLALITY SERPL CALC.SUM OF ELEC: 274.7 MOSMOL/KG (ref 275–300)
PLATELET # BLD AUTO: 295 THOU/MM3 (ref 130–400)
PMV BLD AUTO: 9.5 FL (ref 9.4–12.4)
POTASSIUM SERPL-SCNC: 4.2 MEQ/L (ref 3.5–5.2)
RBC # BLD AUTO: 5.02 MILL/MM3 (ref 4.2–5.4)
SEGMENTED NEUTROPHILS ABSOLUTE COUNT: 9.3 THOU/MM3 (ref 1.8–7.7)
SODIUM SERPL-SCNC: 138 MEQ/L (ref 135–145)
WBC # BLD AUTO: 12.7 THOU/MM3 (ref 4.8–10.8)

## 2024-01-11 PROCEDURE — 6360000002 HC RX W HCPCS: Performed by: PHYSICIAN ASSISTANT

## 2024-01-11 PROCEDURE — 6360000002 HC RX W HCPCS: Performed by: INTERNAL MEDICINE

## 2024-01-11 PROCEDURE — 1200000003 HC TELEMETRY R&B

## 2024-01-11 PROCEDURE — 85025 COMPLETE CBC W/AUTO DIFF WBC: CPT

## 2024-01-11 PROCEDURE — 80048 BASIC METABOLIC PNL TOTAL CA: CPT

## 2024-01-11 PROCEDURE — 2580000003 HC RX 258: Performed by: PHYSICIAN ASSISTANT

## 2024-01-11 PROCEDURE — 36415 COLL VENOUS BLD VENIPUNCTURE: CPT

## 2024-01-11 PROCEDURE — 99232 SBSQ HOSP IP/OBS MODERATE 35: CPT | Performed by: INTERNAL MEDICINE

## 2024-01-11 PROCEDURE — 0DP6XUZ REMOVAL OF FEEDING DEVICE FROM STOMACH, EXTERNAL APPROACH: ICD-10-PCS | Performed by: SURGERY

## 2024-01-11 RX ADMIN — SODIUM CHLORIDE: 9 INJECTION, SOLUTION INTRAVENOUS at 21:38

## 2024-01-11 RX ADMIN — HYDROMORPHONE HYDROCHLORIDE 1 MG: 1 INJECTION, SOLUTION INTRAMUSCULAR; INTRAVENOUS; SUBCUTANEOUS at 23:16

## 2024-01-11 RX ADMIN — METRONIDAZOLE 500 MG: 500 INJECTION, SOLUTION INTRAVENOUS at 16:09

## 2024-01-11 RX ADMIN — HYDROMORPHONE HYDROCHLORIDE 1 MG: 1 INJECTION, SOLUTION INTRAMUSCULAR; INTRAVENOUS; SUBCUTANEOUS at 19:37

## 2024-01-11 RX ADMIN — HYDROMORPHONE HYDROCHLORIDE 0.5 MG: 1 INJECTION, SOLUTION INTRAMUSCULAR; INTRAVENOUS; SUBCUTANEOUS at 06:53

## 2024-01-11 RX ADMIN — HYDROMORPHONE HYDROCHLORIDE 1 MG: 1 INJECTION, SOLUTION INTRAMUSCULAR; INTRAVENOUS; SUBCUTANEOUS at 16:07

## 2024-01-11 RX ADMIN — HYDROMORPHONE HYDROCHLORIDE 1 MG: 1 INJECTION, SOLUTION INTRAMUSCULAR; INTRAVENOUS; SUBCUTANEOUS at 12:25

## 2024-01-11 RX ADMIN — ENOXAPARIN SODIUM 30 MG: 100 INJECTION SUBCUTANEOUS at 21:25

## 2024-01-11 RX ADMIN — HYDROMORPHONE HYDROCHLORIDE 1 MG: 1 INJECTION, SOLUTION INTRAMUSCULAR; INTRAVENOUS; SUBCUTANEOUS at 09:17

## 2024-01-11 RX ADMIN — METRONIDAZOLE 500 MG: 500 INJECTION, SOLUTION INTRAVENOUS at 09:24

## 2024-01-11 RX ADMIN — HYDROMORPHONE HYDROCHLORIDE 0.5 MG: 1 INJECTION, SOLUTION INTRAMUSCULAR; INTRAVENOUS; SUBCUTANEOUS at 03:46

## 2024-01-11 RX ADMIN — HYDROMORPHONE HYDROCHLORIDE 0.5 MG: 1 INJECTION, SOLUTION INTRAMUSCULAR; INTRAVENOUS; SUBCUTANEOUS at 00:36

## 2024-01-11 RX ADMIN — METRONIDAZOLE 500 MG: 500 INJECTION, SOLUTION INTRAVENOUS at 23:37

## 2024-01-11 RX ADMIN — CEFTRIAXONE SODIUM 2000 MG: 2 INJECTION, POWDER, FOR SOLUTION INTRAMUSCULAR; INTRAVENOUS at 00:40

## 2024-01-11 RX ADMIN — SODIUM CHLORIDE, PRESERVATIVE FREE 10 ML: 5 INJECTION INTRAVENOUS at 21:28

## 2024-01-11 RX ADMIN — SODIUM CHLORIDE: 9 INJECTION, SOLUTION INTRAVENOUS at 00:41

## 2024-01-11 RX ADMIN — ENOXAPARIN SODIUM 30 MG: 100 INJECTION SUBCUTANEOUS at 09:17

## 2024-01-11 RX ADMIN — PROMETHAZINE HYDROCHLORIDE 12.5 MG: 25 INJECTION INTRAMUSCULAR; INTRAVENOUS at 23:18

## 2024-01-11 RX ADMIN — PROMETHAZINE HYDROCHLORIDE 12.5 MG: 25 INJECTION INTRAMUSCULAR; INTRAVENOUS at 15:09

## 2024-01-11 ASSESSMENT — PAIN DESCRIPTION - LOCATION
LOCATION: ABDOMEN

## 2024-01-11 ASSESSMENT — PAIN SCALES - GENERAL
PAINLEVEL_OUTOF10: 10
PAINLEVEL_OUTOF10: 9
PAINLEVEL_OUTOF10: 10
PAINLEVEL_OUTOF10: 9
PAINLEVEL_OUTOF10: 9
PAINLEVEL_OUTOF10: 10
PAINLEVEL_OUTOF10: 8
PAINLEVEL_OUTOF10: 10

## 2024-01-11 ASSESSMENT — PAIN DESCRIPTION - DESCRIPTORS
DESCRIPTORS: ACHING
DESCRIPTORS: ACHING

## 2024-01-11 ASSESSMENT — PAIN - FUNCTIONAL ASSESSMENT: PAIN_FUNCTIONAL_ASSESSMENT: ACTIVITIES ARE NOT PREVENTED

## 2024-01-11 ASSESSMENT — PAIN DESCRIPTION - ORIENTATION: ORIENTATION: MID

## 2024-01-11 NOTE — ED NOTES
This RN assuming care at this time. Port accessed. VS assessed. RR regular and unlabored. Pt appears to be in severe pain. Provider to be notified.

## 2024-01-11 NOTE — ED NOTES
Patient transported to [facility] on cart and in stable condition. Contacted floor before transport, and spoke with Christine.

## 2024-01-11 NOTE — PROGRESS NOTES
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   Empiric ceftriaxone and metronidazole. ?Tube feed into the peritoneal space.   Monitor  Esophageal stricture/gastroparesis/GERD   Follows with GI and general surgery   Recent PEG placed and removal  JOVANY on CPAP   Patient prefers home unit, pending arrival.  Epilepsy  Hold home med topiramate while NPO  Essential hypertension   Hold home meds while NPO  Diabetes   Hold home meds while NPO    Chief Complaint:   Chief Complaint   Patient presents with    Back Pain    Rib Injury       Hospital Course: Per HPI:  \"Patient has been having problems with her PEG tube since it was placed 12/18/2023.  Patient is now having pain just below her ribs radiating to her back.  Patient has been using her PEG tube for water, medications, and tube feed for additional nutrition.  Patient has a history of chronic abdominal pain, gastroparesis and esophageal stricture.  While in the ED patient had CT of abdomen pelvis and the PEG tube is thought to be in the anterior

## 2024-01-11 NOTE — ED PROVIDER NOTES
[facility] EMERGENCY DEPT      EMERGENCY MEDICINE     Room # 23/023A    Pt Name: Samantha Nichols  MRN: [id number]  Birthdate 1990  Date of evaluation: 1/10/2024  Provider: Lee Walsh MD    CHIEF COMPLAINT       Chief Complaint   Patient presents with    Back Pain    Rib Injury     HISTORY OF PRESENT ILLNESS   Samantha Nichols is a pleasant 33 y.o. female who presents to the emergency department from from home, by EMS for evaluation of back pain since yesterday.  Patient says he was lying down when she started to have back pain and having frequency of urination and blood in the urine.  The patient did not have any nausea no vomiting however the pain goes from the back going to the anterior abdominal area.  Patient had a chronic abdominal pain/gastroparesis and currently had a PEG tube which was placed by Dr. Michel 12/18/2023.  Patient had a ER visit on 12/20/2023, CT scan was done without abnormalities in the PEG tube.  The patient states that she been using the PEG tube for water, medications and also nutrition she had placed 1 bottle of vitals at 11 AM and again at 2 PM today.  Patient denies any fever no chills no shortness of breath or chest pain..    PASTMEDICAL HISTORY     Past Medical History:   Diagnosis Date    Acute on chronic diastolic congestive heart failure (HCC) 06/25/2022    JANI (acute kidney injury) ([facility]) 07/07/2019    Anemia     Anesthesia     migraines    Anxiety     Asthma     CAD (coronary artery disease)     Depression     Diabetes ([facility])     Diet-controlled type 2 diabetes mellitus ([facility]) 2016    Dyslipidemia 11/14/2016    Epilepsy ([facility])     last siezure is 2 years ago    Epilepsy ([facility])     Fibroids     Gastro - esophageal reflux disease     Gastroparesis     History of esophagogastroduodenoscopy (EGD) 08/13/2022    Hypertension     Hypertrophy of tonsil and adenoid 11/04/2017    Malignant carcinoid tumor of other sites ([facility]) 05/14/2013    Migraine     PONV (postoperative  (*)     MCHC 30.3 (*)     Segs Absolute 10.2 (*)     Immature Grans (Abs) 0.08 (*)     All other components within normal limits   HEPATIC FUNCTION PANEL - Abnormal; Notable for the following components:    Total Bilirubin 0.2 (*)     Total Protein 8.1 (*)     All other components within normal limits   URINE WITH REFLEXED MICRO - Abnormal; Notable for the following components:    Blood, Urine TRACE (*)     All other components within normal limits   OSMOLALITY - Abnormal; Notable for the following components:    Osmolality Calc 274.7 (*)     All other components within normal limits   BASIC METABOLIC PANEL   LIPASE   ANION GAP   GLOMERULAR FILTRATION RATE, ESTIMATED   LACTIC ACID   PROCALCITONIN       (Any cultures that may have been sent were not resulted at the time of this patient visit)    MEDICAL DECISION MAKING / ED COURSE:     1) Number and Complexity of Problems            Problem List This Visit:         Chief Complaint   Patient presents with    Back Pain    Rib Injury            Differential Diagnosis includes (but not limited to):  Back pain abdominal pain, chronic abdominal pain, history of PEG tube, gastroparesis        Diagnoses Considered but I have low suspicion of:                Pertinent Comorbid Conditions:    Gastroparesis    2)  Data Reviewed (none if left blank)          My Independent interpretations:       Imaging: CT scan of the abdomen and pelvis interpreted by the radiologist    Labs:      CBC showed WBC of 14.1 CMP is within normal limits lipase is normal.  Urinalysis is normal                 Decision Rules/Clinical Scores utilized:  None                  Controlled Substance Monitoring:                   External Documentation Reviewed:         Previous patient encounter documents & history available on EMR was reviewed previous ER visit             See Formal Diagnostic Results above for the lab and radiology tests and orders.    3)  Treatment and Disposition:         ED Medications

## 2024-01-11 NOTE — H&P
Hospitalist - History & Physical      Patient: Samantha THOMAS [place]    Unit/Bed:23/023A  YOB: 1990  MRN: [id number]   Acct: [id number]   PCP: Giovanna Gibson APRN - ANISA      Assessment and Plan:        PEG tube migration:   Per CT scan in the anterior peritoneal space anterior to the ventral hernia mesh with no free air or free fluid collection  Patient states she infused two bottles of tube feed today prior to arrival  General surgery consult - Dr. Michel will see in AM  NPO  Serial abdominal exams  Repeat labs in a.m.  Empiric ceftriaxone and metronidazole with history of infusing tube feed into the peritoneal space although not supported by imaging  Esophageal stricture/gastroparesis/GERD:   Follows with GI and general surgery and has recently had PEG placed for nutrition  JOVANY on CPAP:   Home unit when available  Epilepsy:   Currently on topiramate 100mg BID - held while NPO  Essential hypertension:   Continue home medications      CC:  back pain and rib pain, problems with PEG tube    HPI: Patient has been having problems with her PEG tube since it was placed 12/18/2023.  Patient is now having pain just below her ribs radiating to her back.  Patient has been using her PEG tube for water, medications, and tube feed for additional nutrition.  Patient has a history of chronic abdominal pain, gastroparesis and esophageal stricture.  While in the ED patient had CT of abdomen pelvis and the PEG tube is thought to be in the anterior peritoneal space.  There is no free air or fluid collection however the patient reports she did infused 2 bottles of tube feed prior to arrival.  The patient is experiencing exquisite pain around the area of the PEG tube.  Patient to be admitted for further evaluation and treatment of PEG tube migration.    ROS: Review of Systems   Constitutional:  Positive for activity change. Negative for fever.   HENT: Negative.     Eyes: Negative.    Respiratory: Negative.      Cardiovascular: Negative.    Gastrointestinal:  Positive for abdominal pain.   Endocrine: Negative.    Genitourinary: Negative.    Musculoskeletal:  Positive for back pain.   Skin: Negative.    Allergic/Immunologic: Negative.    Neurological: Negative.    Hematological: Negative.    Psychiatric/Behavioral: Negative.       PMH:    Past Medical History:   Diagnosis Date    Acute on chronic diastolic congestive heart failure ([facility]) 06/25/2022    JANI (acute kidney injury) ([facility]) 07/07/2019    Anemia     Anesthesia     migraines    Anxiety     Asthma     CAD (coronary artery disease)     Depression     Diabetes ([facility])     Diet-controlled type 2 diabetes mellitus ([facility]) 2016    Dyslipidemia 11/14/2016    Epilepsy ([facility])     last siezure is 2 years ago    Epilepsy ([facility])     Fibroids     Gastro - esophageal reflux disease     Gastroparesis     History of esophagogastroduodenoscopy (EGD) 08/13/2022    Hypertension     Hypertrophy of tonsil and adenoid 11/04/2017    Malignant carcinoid tumor of other sites ([facility]) 05/14/2013    Migraine     PONV (postoperative nausea and vomiting)     Prolonged emergence from general anesthesia     Sickle cell anemia ([facility])     Sickle cell trait ([facility])     PT STATES SHE HAS THE TRAIT NOT THE DISEASE    Tumor associated pain     neuroendrocrine tumor, gastroma     SHX:    Social History     Socioeconomic History    Marital status:      Spouse name: Fely Nichols    Number of children: 0    Years of education: 12    Highest education level: Not on file   Occupational History    Not on file   Tobacco Use    Smoking status: Every Day     Current packs/day: 0.50     Average packs/day: 0.5 packs/day for 18.1 years (9.0 ttl pk-yrs)     Types: Cigarettes     Start date: 12/18/2005    Smokeless tobacco: Never    Tobacco comments:     .5 PPD 6/20/23     1 cig per day 12/4/23   Vaping Use    Vaping Use: Never used   Substance and Sexual Activity    Alcohol use: Not Currently     Comment: no    Drug use:

## 2024-01-11 NOTE — ED NOTES
Patient resting in bed. Respirations easy and unlabored. No distress noted. Call light within reach.

## 2024-01-11 NOTE — PROGRESS NOTES
Pharmacist Review and Automatic Dose Adjustment of Prophylactic Enoxaparin         The reviewing pharmacist has made an adjustment to the ordered enoxaparin dose or converted to UFH per the approved [facility] protocol and table as identified below.        Samantha Nichols is a 33 y.o. female.     Recent Labs     01/10/24  1828   CREATININE 0.7       Estimated Creatinine Clearance: 157 mL/min (based on SCr of 0.7 mg/dL).    Recent Labs     01/10/24  1828   HGB 12.8   HCT 42.2        No results for input(s): \"INR\" in the last 72 hours.    Height:   Ht Readings from Last 1 Encounters:   01/09/24 1.626 m (5' 4\")     Weight:  Wt Readings from Last 1 Encounters:   01/09/24 135.9 kg (299 lb 9.6 oz)               Plan: Based upon the patient's weight and renal function    Ordered: Enoxaparin 40mg SUBQ Daily    Changed/converted to    New Order: Enoxaparin 30mg SUBQ BID      Thank you,  Leonora Villanueva [facility]  1/10/2024, 10:05 PM

## 2024-01-11 NOTE — ED NOTES
Pt ambulated to ED restroom with stand-by staff to void. Pt voiced concerns regarding \"what is taking them so long to clean my room\". Pt notified that housekeeping was pulled away from her room and another pt's room to assist with ICU bed cleaning. Pt stated \"I don't even care. I'm in so much pain in this bed. Nobody cares. I'm about to just go home. As big as this hospital is, they need to do better.\" Writer offered pt a guest chair and/or wheelchair with extra pillow for support, and offered to dangle feet on cot. Pt declined all offers, repeating previous phrases. Pt conversing on phone with family member regarding leaving AMA. Family member advised pt not to leave due to receiving ABX for infection and displaced PEG tube. Writer offered to alert provider of possible leaving AMA. Pt voiced desire to stay at this ED. Writer reconnected vitals. Pt resting on cot, denied blankets, continued dislike for this hospital. Kavon BYRNE notified.    0500: Pt activated call light. Kay Villalobos RN to pt room.

## 2024-01-11 NOTE — ED NOTES
ED to inpatient nurses report      Chief Complaint:  Chief Complaint   Patient presents with    Back Pain    Rib Injury     Present to ED from: home    MOA:     LOC: alert and orientated to name, place, date  Mobility: Requires assistance * 1  Oxygen Baseline: room air    Current needs required: none     Code Status:   Full Code    What abnormal results were found and what did you give/do to treat them? Peg tube not in correct place.   Any procedures or intervention occur? ATB, pain meds      Mental Status:  Level of Consciousness: Alert (0)    Psych Assessment:        Vitals:  Patient Vitals for the past 24 hrs:   BP Temp Temp src Pulse Resp SpO2   01/11/24 0505 137/73 -- -- (!) 106 19 96 %   01/11/24 0331 114/77 -- -- (!) 114 12 96 %   01/11/24 0330 114/77 -- -- (!) 123 21 97 %   01/11/24 0237 103/70 -- -- (!) 108 18 96 %   01/11/24 0137 119/78 -- -- (!) 117 24 97 %   01/11/24 0036 (!) 151/102 -- -- (!) 104 20 96 %   01/10/24 2306 -- -- -- -- -- 93 %   01/10/24 2303 (!) 142/100 -- -- (!) 101 21 --   01/10/24 2138 (!) 126/98 -- -- 97 16 97 %   01/10/24 2005 -- -- -- 97 16 96 %   01/10/24 2004 (!) 144/90 -- -- -- -- --   01/10/24 1932 (!) 134/98 -- -- -- -- --   01/10/24 1929 -- 97.7 °F (36.5 °C) -- (!) 108 18 94 %   01/10/24 1654 (!) 145/97 98.3 °F (36.8 °C) Oral (!) 109 16 95 %   01/10/24 1653 -- -- -- (!) 107 -- 95 %        LDAs:      Ambulatory Status:  No data recorded    Diagnosis:  DISPOSITION Admitted 01/10/2024 09:22:56 PM   Final diagnoses:   Generalized abdominal pain   Displaced percutaneous endoscopic gastrostomy (PEG) tube (HCC)        Consults:  IP CONSULT TO GENERAL SURGERY     Pain Score:  Pain Assessment  Pain Assessment: 0-10  Pain Level: 9  Pain Location: Abdomen  Pain Orientation: Right  Pain Descriptors: Stabbing  Pain Type: Acute pain    C-SSRS:        Sepsis Screening:  Sepsis Risk Score: 7.31    Sunset Fall Risk:       Swallow Screening        Preferred Language:   English      ALLERGIES    UPPER GASTROINTESTINAL ENDOSCOPY N/A 12/2/2020    EGD DILATION BALLOON performed by Frida Elaine MD at [facility]    UPPER GASTROINTESTINAL ENDOSCOPY Left 12/2/2020    EGD BIOPSY performed by Frida Elaine MD at [facility]    UPPER GASTROINTESTINAL ENDOSCOPY Left 1/19/2022    EGD performed by Iron Michel MD at [facility]    UPPER GASTROINTESTINAL ENDOSCOPY Left 1/19/2022    EGD BIOPSY performed by Iron Michel MD at [facility]    UPPER GASTROINTESTINAL ENDOSCOPY N/A 7/15/2022    EGD WITH DILATION AND BIOPSY performed by Iron Michel MD at [facility] OR    UPPER GASTROINTESTINAL ENDOSCOPY N/A 1/24/2023    EGD ESOPHAGOGASTRODUODENOSCOPY performed by Frida Elaine MD at [facility]    UPPER GASTROINTESTINAL ENDOSCOPY N/A 5/24/2023    EGD performed by Iron Michel MD at [facility]    UPPER GASTROINTESTINAL ENDOSCOPY N/A 10/12/2023    EGD performed by Frida Elaine MD at [facility] ENDOSCOPY    VENTRAL HERNIA REPAIR N/A 8/22/2022    OPEN LEFT ABDOMINAL PORT SITE HERNIA REPAIR WITH MESH performed by Iron Michel MD at [facility] OR    WISDOM TOOTH EXTRACTION         PAST MEDICAL HISTORY       Past Medical History:   Diagnosis Date    Acute on chronic diastolic congestive heart failure (HCC) 06/25/2022    JANI (acute kidney injury) (HCC) 07/07/2019    Anemia     Anesthesia     migraines    Anxiety     Asthma     CAD (coronary artery disease)     Depression     Diabetes (HCC)     Diet-controlled type 2 diabetes mellitus (HCC) 2016    Dyslipidemia 11/14/2016    Epilepsy (HCC)     last siezure is 2 years ago    Epilepsy (HCC)     Fibroids     Gastro - esophageal reflux disease     Gastroparesis     History of esophagogastroduodenoscopy (EGD) 08/13/2022    Hypertension     Hypertrophy of tonsil and adenoid 11/04/2017    Malignant carcinoid tumor of other sites ([facility]) 05/14/2013    Migraine     PONV (postoperative nausea and vomiting)     Prolonged emergence from general anesthesia     Sickle

## 2024-01-11 NOTE — ED NOTES
MD Argentina at [facility]    UPPER GASTROINTESTINAL ENDOSCOPY N/A 12/2/2020    EGD DILATION BALLOON performed by Frida Elaine MD at [facility]    UPPER GASTROINTESTINAL ENDOSCOPY Left 12/2/2020    EGD BIOPSY performed by Frida Elaine MD at [facility]    UPPER GASTROINTESTINAL ENDOSCOPY Left 1/19/2022    EGD performed by Iron Michel MD at [facility]    UPPER GASTROINTESTINAL ENDOSCOPY Left 1/19/2022    EGD BIOPSY performed by Iron Michel MD at [facility]    UPPER GASTROINTESTINAL ENDOSCOPY N/A 7/15/2022    EGD WITH DILATION AND BIOPSY performed by Iron Michel MD at [facility] OR    UPPER GASTROINTESTINAL ENDOSCOPY N/A 1/24/2023    EGD ESOPHAGOGASTRODUODENOSCOPY performed by Frida Elaine MD at [facility]    UPPER GASTROINTESTINAL ENDOSCOPY N/A 5/24/2023    EGD performed by Iron Michel MD at [facility]    UPPER GASTROINTESTINAL ENDOSCOPY N/A 10/12/2023    EGD performed by Frida Elaine MD at [facility] ENDOSCOPY    VENTRAL HERNIA REPAIR N/A 8/22/2022    OPEN LEFT ABDOMINAL PORT SITE HERNIA REPAIR WITH MESH performed by Iron Michel MD at [facility] OR    WISDOM TOOTH EXTRACTION         PAST MEDICAL HISTORY       Past Medical History:   Diagnosis Date    Acute on chronic diastolic congestive heart failure (HCC) 06/25/2022    JANI (acute kidney injury) ([facility]) 07/07/2019    Anemia     Anesthesia     migraines    Anxiety     Asthma     CAD (coronary artery disease)     Depression     Diabetes (HCC)     Diet-controlled type 2 diabetes mellitus (HCC) 2016    Dyslipidemia 11/14/2016    Epilepsy (HCC)     last siezure is 2 years ago    Epilepsy (HCC)     Fibroids     Gastro - esophageal reflux disease     Gastroparesis     History of esophagogastroduodenoscopy (EGD) 08/13/2022    Hypertension     Hypertrophy of tonsil and adenoid 11/04/2017    Malignant carcinoid tumor of other sites ([facility]) 05/14/2013    Migraine     PONV (postoperative nausea and vomiting)     Prolonged emergence from  general anesthesia     Sickle cell anemia (HCC)     Sickle cell trait (HCC)     PT STATES SHE HAS THE TRAIT NOT THE DISEASE    Tumor associated pain     neuroendrocrine tumor, gastroma           Electronically signed by Sergey May RN on 1/10/2024 at 9:45 PM

## 2024-01-11 NOTE — ED NOTES
Patient requesting more pain medications. Inpatient provider notified. Patient resting in bed. Respirations easy and unlabored. No distress noted. Call light within reach.

## 2024-01-11 NOTE — ED NOTES
Patient assisted to restroom and returned to bed at this time. Patient resting in bed. Respirations easy and unlabored. No distress noted. Call light within reach.

## 2024-01-11 NOTE — ED NOTES
This RN in to assess VS. Pt resting high fowlers in ED cot. RR regular and unlabored. Call light in reach.

## 2024-01-11 NOTE — ED NOTES
Pt ambulated to ED restroom to void. Pt stated she has \"medicine due at 3:36\". Writer to notify primary RN of pain medication administration request. Pt returned to bed, call light in reach. No further needs voiced.

## 2024-01-11 NOTE — PROGRESS NOTES
Pt admitted to  5K23 via in a wheelchair from ED.  Complaints: rib and back pain.    IV normal saline infusing into the subclavian right, condition patent and no redness at a rate of 75 mls/ hour with about 900 mls in the bag still. IV site free of s/s of infection or infiltration. Vital signs obtained. Assessment and data collection initiated. Two nurse skin assessment performed by Tyler Gregorio RN and Gema Solis RN. Oriented to room. Policies and procedures for  explained. All questions answered with no further questions at this time. Fall prevention and safety brochure discussed with patient.  Bed alarm on. Call light in reach.Oriented to room.   Tyler Gregorio, RN, RN 1/11/2024 7:04 AM

## 2024-01-12 LAB
ANION GAP SERPL CALC-SCNC: 10 MEQ/L (ref 8–16)
BASOPHILS ABSOLUTE: 0 THOU/MM3 (ref 0–0.1)
BASOPHILS NFR BLD AUTO: 0.6 %
BUN SERPL-MCNC: 14 MG/DL (ref 7–22)
CALCIUM SERPL-MCNC: 9.3 MG/DL (ref 8.5–10.5)
CHLORIDE SERPL-SCNC: 107 MEQ/L (ref 98–111)
CO2 SERPL-SCNC: 25 MEQ/L (ref 23–33)
CREAT SERPL-MCNC: 0.8 MG/DL (ref 0.4–1.2)
DEPRECATED RDW RBC AUTO: 41.8 FL (ref 35–45)
EOSINOPHIL NFR BLD AUTO: 2.8 %
EOSINOPHILS ABSOLUTE: 0.2 THOU/MM3 (ref 0–0.4)
ERYTHROCYTE [DISTWIDTH] IN BLOOD BY AUTOMATED COUNT: 14.5 % (ref 11.5–14.5)
GFR SERPL CREATININE-BSD FRML MDRD: > 60 ML/MIN/1.73M2
GLUCOSE SERPL-MCNC: 114 MG/DL (ref 70–108)
HCT VFR BLD AUTO: 39.1 % (ref 37–47)
HGB BLD-MCNC: 11.8 GM/DL (ref 12–16)
IMM GRANULOCYTES # BLD AUTO: 0.04 THOU/MM3 (ref 0–0.07)
IMM GRANULOCYTES NFR BLD AUTO: 0.5 %
LYMPHOCYTES ABSOLUTE: 1.9 THOU/MM3 (ref 1–4.8)
LYMPHOCYTES NFR BLD AUTO: 24.4 %
MCH RBC QN AUTO: 24.1 PG (ref 26–33)
MCHC RBC AUTO-ENTMCNC: 30.2 GM/DL (ref 32.2–35.5)
MCV RBC AUTO: 79.8 FL (ref 81–99)
MONOCYTES ABSOLUTE: 0.6 THOU/MM3 (ref 0.4–1.3)
MONOCYTES NFR BLD AUTO: 8.1 %
NEUTROPHILS NFR BLD AUTO: 63.6 %
NRBC BLD AUTO-RTO: 0 /100 WBC
PLATELET # BLD AUTO: 275 THOU/MM3 (ref 130–400)
PMV BLD AUTO: 9.4 FL (ref 9.4–12.4)
POTASSIUM SERPL-SCNC: 4.3 MEQ/L (ref 3.5–5.2)
RBC # BLD AUTO: 4.9 MILL/MM3 (ref 4.2–5.4)
SEGMENTED NEUTROPHILS ABSOLUTE COUNT: 5 THOU/MM3 (ref 1.8–7.7)
SODIUM SERPL-SCNC: 142 MEQ/L (ref 135–145)
WBC # BLD AUTO: 7.9 THOU/MM3 (ref 4.8–10.8)

## 2024-01-12 PROCEDURE — 6360000002 HC RX W HCPCS: Performed by: INTERNAL MEDICINE

## 2024-01-12 PROCEDURE — 85025 COMPLETE CBC W/AUTO DIFF WBC: CPT

## 2024-01-12 PROCEDURE — 2580000003 HC RX 258: Performed by: PHYSICIAN ASSISTANT

## 2024-01-12 PROCEDURE — 6360000002 HC RX W HCPCS: Performed by: PHYSICIAN ASSISTANT

## 2024-01-12 PROCEDURE — 6360000002 HC RX W HCPCS

## 2024-01-12 PROCEDURE — 80048 BASIC METABOLIC PNL TOTAL CA: CPT

## 2024-01-12 PROCEDURE — 36415 COLL VENOUS BLD VENIPUNCTURE: CPT

## 2024-01-12 PROCEDURE — 99232 SBSQ HOSP IP/OBS MODERATE 35: CPT | Performed by: INTERNAL MEDICINE

## 2024-01-12 PROCEDURE — 1200000003 HC TELEMETRY R&B

## 2024-01-12 RX ORDER — DIPHENHYDRAMINE HYDROCHLORIDE 50 MG/ML
25 INJECTION INTRAMUSCULAR; INTRAVENOUS EVERY 6 HOURS PRN
Status: DISCONTINUED | OUTPATIENT
Start: 2024-01-12 | End: 2024-01-13 | Stop reason: HOSPADM

## 2024-01-12 RX ADMIN — SODIUM CHLORIDE: 9 INJECTION, SOLUTION INTRAVENOUS at 15:59

## 2024-01-12 RX ADMIN — HYDROMORPHONE HYDROCHLORIDE 1 MG: 1 INJECTION, SOLUTION INTRAMUSCULAR; INTRAVENOUS; SUBCUTANEOUS at 02:33

## 2024-01-12 RX ADMIN — HYDROMORPHONE HYDROCHLORIDE 1 MG: 1 INJECTION, SOLUTION INTRAMUSCULAR; INTRAVENOUS; SUBCUTANEOUS at 14:51

## 2024-01-12 RX ADMIN — ENOXAPARIN SODIUM 30 MG: 100 INJECTION SUBCUTANEOUS at 09:06

## 2024-01-12 RX ADMIN — SODIUM CHLORIDE, PRESERVATIVE FREE 10 ML: 5 INJECTION INTRAVENOUS at 08:58

## 2024-01-12 RX ADMIN — METRONIDAZOLE 500 MG: 500 INJECTION, SOLUTION INTRAVENOUS at 18:12

## 2024-01-12 RX ADMIN — HYDROMORPHONE HYDROCHLORIDE 1 MG: 1 INJECTION, SOLUTION INTRAMUSCULAR; INTRAVENOUS; SUBCUTANEOUS at 11:50

## 2024-01-12 RX ADMIN — DIPHENHYDRAMINE HYDROCHLORIDE 25 MG: 50 INJECTION INTRAMUSCULAR; INTRAVENOUS at 18:14

## 2024-01-12 RX ADMIN — SODIUM CHLORIDE, PRESERVATIVE FREE 10 ML: 5 INJECTION INTRAVENOUS at 20:12

## 2024-01-12 RX ADMIN — HYDROMORPHONE HYDROCHLORIDE 1 MG: 1 INJECTION, SOLUTION INTRAMUSCULAR; INTRAVENOUS; SUBCUTANEOUS at 08:57

## 2024-01-12 RX ADMIN — HYDROMORPHONE HYDROCHLORIDE 1 MG: 1 INJECTION, SOLUTION INTRAMUSCULAR; INTRAVENOUS; SUBCUTANEOUS at 18:12

## 2024-01-12 RX ADMIN — METRONIDAZOLE 500 MG: 500 INJECTION, SOLUTION INTRAVENOUS at 10:19

## 2024-01-12 RX ADMIN — DIPHENHYDRAMINE HYDROCHLORIDE 25 MG: 50 INJECTION INTRAMUSCULAR; INTRAVENOUS at 23:39

## 2024-01-12 RX ADMIN — HYDROMORPHONE HYDROCHLORIDE 1 MG: 1 INJECTION, SOLUTION INTRAMUSCULAR; INTRAVENOUS; SUBCUTANEOUS at 20:53

## 2024-01-12 RX ADMIN — PROMETHAZINE HYDROCHLORIDE 12.5 MG: 25 INJECTION INTRAMUSCULAR; INTRAVENOUS at 05:17

## 2024-01-12 RX ADMIN — HYDROMORPHONE HYDROCHLORIDE 1 MG: 1 INJECTION, SOLUTION INTRAMUSCULAR; INTRAVENOUS; SUBCUTANEOUS at 05:22

## 2024-01-12 RX ADMIN — HYDROMORPHONE HYDROCHLORIDE 1 MG: 1 INJECTION, SOLUTION INTRAMUSCULAR; INTRAVENOUS; SUBCUTANEOUS at 23:47

## 2024-01-12 RX ADMIN — ENOXAPARIN SODIUM 30 MG: 100 INJECTION SUBCUTANEOUS at 20:12

## 2024-01-12 RX ADMIN — CEFTRIAXONE SODIUM 2000 MG: 2 INJECTION, POWDER, FOR SOLUTION INTRAMUSCULAR; INTRAVENOUS at 00:50

## 2024-01-12 RX ADMIN — METRONIDAZOLE 500 MG: 500 INJECTION, SOLUTION INTRAVENOUS at 23:41

## 2024-01-12 RX ADMIN — PROMETHAZINE HYDROCHLORIDE 12.5 MG: 25 INJECTION INTRAMUSCULAR; INTRAVENOUS at 20:11

## 2024-01-12 ASSESSMENT — PAIN DESCRIPTION - DESCRIPTORS
DESCRIPTORS: ACHING
DESCRIPTORS: ACHING;DISCOMFORT
DESCRIPTORS: ACHING

## 2024-01-12 ASSESSMENT — PAIN DESCRIPTION - LOCATION
LOCATION: ABDOMEN;RIB CAGE
LOCATION: ABDOMEN
LOCATION: ABDOMEN;RIB CAGE
LOCATION: ABDOMEN

## 2024-01-12 ASSESSMENT — PAIN SCALES - GENERAL
PAINLEVEL_OUTOF10: 10
PAINLEVEL_OUTOF10: 9
PAINLEVEL_OUTOF10: 10

## 2024-01-12 ASSESSMENT — PAIN - FUNCTIONAL ASSESSMENT
PAIN_FUNCTIONAL_ASSESSMENT: ACTIVITIES ARE NOT PREVENTED

## 2024-01-12 ASSESSMENT — PAIN DESCRIPTION - ORIENTATION
ORIENTATION: RIGHT;LEFT;UPPER
ORIENTATION: RIGHT;LEFT
ORIENTATION: MID

## 2024-01-12 ASSESSMENT — PAIN DESCRIPTION - PAIN TYPE: TYPE: ACUTE PAIN

## 2024-01-12 NOTE — PROGRESS NOTES
[facility]   Dr. Iron Michel MD  Daily Progress Note  Pt Name: Samantha Nichols  Medical Record Number: [id number]  Date of Birth 1990   Today's Date: 1/12/2024    HD#2    CHIEF COMPLAINTPEG DISLODGEMENT    SUBJECTIVE  Patient SAME C/O  CAN,T EAT    OBJECTIVE  CURRENT VITALS /83   Pulse 86   Temp 98.1 °F (36.7 °C) (Oral)   Resp 16   Ht 1.626 m (5' 4\")   Wt 135 kg (297 lb 9.9 oz)   LMP 07/11/2015   SpO2 99%   BMI 51.09 kg/m²   LUNGS: Lungs clear   ABDOMEN: SOFT peg SITE GOOD  WOUNDS: AS ABOVE  24 HR INTAKE/OUTPUT :   Intake/Output Summary (Last 24 hours) at 1/12/2024 0950  Last data filed at 1/12/2024 0858  Gross per 24 hour   Intake 2179.31 ml   Output 600 ml   Net 1579.31 ml     DRAIN/TUBE OUTPUT :      LABS  CBC :   Lab Results   Component Value Date/Time    WBC 12.7 01/11/2024 07:08 AM    HGB 12.1 01/11/2024 07:08 AM    HCT 39.7 01/11/2024 07:08 AM     01/11/2024 07:08 AM     BMP:   Lab Results   Component Value Date/Time     01/11/2024 07:08 AM    K 4.2 01/11/2024 07:08 AM     01/11/2024 07:08 AM    CO2 25 01/11/2024 07:08 AM    BUN 11 01/11/2024 07:08 AM    CREATININE 0.6 01/11/2024 07:08 AM    MG 2.1 12/20/2023 03:42 AM    PHOS 4.6 02/02/2023 10:00 AM       ASSESSMENT  1. Discussed with CNP at [facility] yesterday pt may have gastric paresis but states she was chencho TF  my recommendation is to f/u with GI  at [facility]  I have nothing else to offer      PLAN  1. Discussed with pt      Iron Michel MD  Electronically signed 1/12/2024 at 9:50 AM

## 2024-01-12 NOTE — PLAN OF CARE
Problem: Discharge Planning  Goal: Discharge to home or other facility with appropriate resources  1/12/2024 1118 by Carmen Carballo LSW  Outcome: Progressing   SW consult received. See SW note 1/12/24.

## 2024-01-12 NOTE — PROGRESS NOTES
This RN entered patients room to administer patients scheduled antibiotic. Upon entering room, patient states she vomited in the toilet. This RN looked in the toilet and the \"vomit\" appeared to be spit from the patient. This RN asked patient was she was vomiting when she refused to eat her food because she stated the \"fries were like bricks\". Patient stated she tried to eat the cheese burger but it wasn't good and then she \"threw up\"/ Patient then stated she also threw up on her tray, this RN looked at patients food tray and also saw what appeared to be spit. It also looked to be partially chewed food that was spit out onto the plate.     This RN asked patient if she was nauseous and patient stated \"no, I can wait on nausea meds, I just want pain meds, my rib cage is killing me\". This RN stated if patient truly just threw up then she should take nausea medication. Patient states she didn't think she was available for nausea medications yet because she took them earlier. This RN notified patient that she had not taken any nausea medications during my shift. Patient states she thought she took them because she said she was nauseous earlier today. This RN stated that patient had never mentioned anything about nausea to this RN. Patient has asked this RN for toast twice and for large cups of apple juice twice during this shift and never once stated she was nauseous.     Patient now agreeable to take nausea medications, This RN requested medications from pharmacy and will administer once medications are sent to the floor.

## 2024-01-12 NOTE — PLAN OF CARE
Problem: Pain  Goal: Verbalizes/displays adequate comfort level or baseline comfort level  Outcome: Progressing  Flowsheets (Taken 1/11/2024 2115)  Verbalizes/displays adequate comfort level or baseline comfort level:   Encourage patient to monitor pain and request assistance   Assess pain using appropriate pain scale   Administer analgesics based on type and severity of pain and evaluate response   Implement non-pharmacological measures as appropriate and evaluate response    Problem: Chronic Conditions and Co-morbidities  Goal: Patient's chronic conditions and co-morbidity symptoms are monitored and maintained or improved  Outcome: Progressing  Flowsheets (Taken 1/11/2024 2115)  Care Plan - Patient's Chronic Conditions and Co-Morbidity Symptoms are Monitored and Maintained or Improved: Monitor and assess patient's chronic conditions and comorbid symptoms for stability, deterioration, or improvement    Problem: Safety - Adult  Goal: Free from fall injury  Outcome: Progressing  Fall assessment completed. Patient using call light appropriately to call for assistance with ambulation to bathroom.  Personal items within reach. Patient is also compliant with use of non-skid slippers.  Placed bed in low position.    Problem: Skin/Tissue Integrity - Adult  Goal: Incisions, wounds, or drain sites healing without S/S of infection  Outcome: Progressing  Flowsheets (Taken 1/11/2024 2115)  Incisions, Wounds, or Drain Sites Healing Without Sign and Symptoms of Infection:   ADMISSION and DAILY: Assess and document risk factors for pressure ulcer development   TWICE DAILY: Assess and document skin integrity   TWICE DAILY: Assess and document dressing/incision, wound bed, drain sites and surrounding tissue   Implement wound care per orders    Problem: Gastrointestinal - Adult  Goal: Minimal or absence of nausea and vomiting  Outcome: Progressing  Flowsheets (Taken 1/11/2024 2115)  Minimal or absence of nausea and vomiting:

## 2024-01-12 NOTE — PROGRESS NOTES
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   Continue empiric ceftriaxone and metronidazole for now. ?Tube feed into the peritoneal space.   Follow-up with [facility]   Monitor  Esophageal stricture/gastroparesis/GERD   Follows with GI and general surgery   Recent PEG placed and removal   If having difficulty keeping food down, start with eating a small portion at a time of gentle food, such as BRAT diet - banana, rice, applesauce, toast.   Follow-up OP with [facility]  JOVANY on CPAP   Patient prefers home unit, pending arrival.  Epilepsy  Hold home med topiramate while NPO  Essential hypertension   Hold home meds while NPO  Diabetes  Hold home meds while NPO  Morbid Obesity   BMI 51   Importance of healthy diet and exercise. Current health makes this difficult.    Consult to inpatient home health services.    Chief Complaint:   Chief Complaint   Patient presents with    Back Pain    Rib Injury       Hospital Course: Per HPI:  \"Patient has been having problems with her PEG tube since it was placed 12/18/2023.   apple juice and only a sip of pepsi. There was what appeared to be an empty can of pepsi along with a freshly opened can on the tray table. Patient reports that she has vomited many times today, after every time she eats or drinks anything. Patient reports sometimes she makes it to the bathroom, uses the trash can when she can't. There was no evidence of vomit in trash can, per RN there has been no evidence of vomit today or last night.    Review of Symptoms negative unless otherwise noted    Medications:  Reviewed    Infusion Medications    sodium chloride Stopped (01/11/24 0041)    sodium chloride      sodium chloride 75 mL/hr at 01/12/24 1559     Scheduled Medications    sodium chloride flush  5-40 mL IntraVENous 2 times per day    enoxaparin  30 mg SubCUTAneous BID    cefTRIAXone (ROCEPHIN) IV  2,000 mg IntraVENous Q24H    metroNIDAZOLE  500 mg IntraVENous Q8H     PRN Meds: diphenhydrAMINE, HYDROmorphone, albuterol, fluticasone, sodium chloride flush, sodium chloride, potassium chloride **OR** potassium alternative oral replacement **OR** potassium chloride, magnesium sulfate, acetaminophen **OR** acetaminophen, promethazine      Intake/Output Summary (Last 24 hours) at 1/12/2024 1747  Last data filed at 1/12/2024 1446  Gross per 24 hour   Intake 1970 ml   Output 600 ml   Net 1370 ml       Diet:  ADULT DIET; Regular    Exam:  BP (!) 137/90   Pulse 76   Temp 97.3 °F (36.3 °C) (Oral)   Resp 18   Ht 1.626 m (5' 4\")   Wt 135 kg (297 lb 9.9 oz)   LMP 07/11/2015   SpO2 99%   BMI 51.09 kg/m²     Physical Exam  Vitals and nursing note reviewed.   Constitutional:       General: She is not in acute distress.     Appearance: She is morbidly obese. She is not ill-appearing.   HENT:      Head: Normocephalic and atraumatic.   Eyes:      Pupils: Pupils are equal.      Right eye: Pupil is round.      Left eye: Pupil is round.   Cardiovascular:      Rate and Rhythm: Normal rate and regular rhythm.      Pulses: Normal

## 2024-01-12 NOTE — PROCEDURES
[address] [address]                                 PROCEDURE NOTE    PATIENT NAME: TREVOR ARZATE                  :        1990  MED REC NO:   [id number]                           ROOM:       0023  ACCOUNT NO:   [id number]                           ADMIT DATE: 01/10/2024  PROVIDER:     Iron Castillo MD    DATE OF PROCEDURE:  2024    PREOPERATIVE DIAGNOSIS:  Dislodged PEG tube.    POSTOPERATIVE DIAGNOSIS:  Dislodged PEG tube.    OPERATION PERFORMED:  Removal of PEG tube.    SURGEON:  Iron Castillo MD    ANESTHESIA:  None.    COMPLICATIONS:  None.    BLOOD LOSS:  1 mL.    INDICATION FOR PROCEDURE:  The patient is a 33-year-old very challenging  female who has had a PEG tube placed by me on 2023.  The patient  came into the emergency room last night with some abdominal pain and  back pain.  CT scan showed the PEG tube to be outside the lumen of the  stomach.  We are going to remove the PEG tube.    DESCRIPTION OF PROCEDURE:  The patient was kept supine on hospital bed.   The PEG tube site looked good.  It was grabbed and with some general  pulling, it pulled out through the tract and was removed.  There was  some gastric contents coming out through the opening initially.  A gauze  was covered.  The patient tolerated the procedure well.        IRON CASTILLO MD    D: 2024 12:50:16       T: 2024 23:29:32     [email or web address]  Job#: [id number]     Doc#: [id number]    CC:

## 2024-01-13 VITALS
WEIGHT: 293 LBS | OXYGEN SATURATION: 99 % | SYSTOLIC BLOOD PRESSURE: 137 MMHG | HEART RATE: 88 BPM | HEIGHT: 64 IN | BODY MASS INDEX: 50.02 KG/M2 | DIASTOLIC BLOOD PRESSURE: 85 MMHG | TEMPERATURE: 97.7 F | RESPIRATION RATE: 18 BRPM

## 2024-01-13 LAB
ANION GAP SERPL CALC-SCNC: 11 MEQ/L (ref 8–16)
BASOPHILS ABSOLUTE: 0 THOU/MM3 (ref 0–0.1)
BASOPHILS NFR BLD AUTO: 0.3 %
BUN SERPL-MCNC: 12 MG/DL (ref 7–22)
CALCIUM SERPL-MCNC: 9.9 MG/DL (ref 8.5–10.5)
CHLORIDE SERPL-SCNC: 103 MEQ/L (ref 98–111)
CO2 SERPL-SCNC: 25 MEQ/L (ref 23–33)
CREAT SERPL-MCNC: 0.6 MG/DL (ref 0.4–1.2)
DEPRECATED RDW RBC AUTO: 39.9 FL (ref 35–45)
EOSINOPHIL NFR BLD AUTO: 2.7 %
EOSINOPHILS ABSOLUTE: 0.3 THOU/MM3 (ref 0–0.4)
ERYTHROCYTE [DISTWIDTH] IN BLOOD BY AUTOMATED COUNT: 14.4 % (ref 11.5–14.5)
GFR SERPL CREATININE-BSD FRML MDRD: > 60 ML/MIN/1.73M2
GLUCOSE SERPL-MCNC: 95 MG/DL (ref 70–108)
HCT VFR BLD AUTO: 38.7 % (ref 37–47)
HGB BLD-MCNC: 12 GM/DL (ref 12–16)
IMM GRANULOCYTES # BLD AUTO: 0.03 THOU/MM3 (ref 0–0.07)
IMM GRANULOCYTES NFR BLD AUTO: 0.3 %
LYMPHOCYTES ABSOLUTE: 2.2 THOU/MM3 (ref 1–4.8)
LYMPHOCYTES NFR BLD AUTO: 22.9 %
MCH RBC QN AUTO: 24.1 PG (ref 26–33)
MCHC RBC AUTO-ENTMCNC: 31 GM/DL (ref 32.2–35.5)
MCV RBC AUTO: 77.9 FL (ref 81–99)
MONOCYTES ABSOLUTE: 0.8 THOU/MM3 (ref 0.4–1.3)
MONOCYTES NFR BLD AUTO: 8.3 %
NEUTROPHILS NFR BLD AUTO: 65.5 %
NRBC BLD AUTO-RTO: 0 /100 WBC
PLATELET # BLD AUTO: 304 THOU/MM3 (ref 130–400)
PMV BLD AUTO: 9.5 FL (ref 9.4–12.4)
POTASSIUM SERPL-SCNC: 4.3 MEQ/L (ref 3.5–5.2)
RBC # BLD AUTO: 4.97 MILL/MM3 (ref 4.2–5.4)
SEGMENTED NEUTROPHILS ABSOLUTE COUNT: 6.2 THOU/MM3 (ref 1.8–7.7)
SODIUM SERPL-SCNC: 139 MEQ/L (ref 135–145)
WBC # BLD AUTO: 9.5 THOU/MM3 (ref 4.8–10.8)

## 2024-01-13 PROCEDURE — 36415 COLL VENOUS BLD VENIPUNCTURE: CPT

## 2024-01-13 PROCEDURE — 6360000002 HC RX W HCPCS: Performed by: PHYSICIAN ASSISTANT

## 2024-01-13 PROCEDURE — 6360000002 HC RX W HCPCS: Performed by: INTERNAL MEDICINE

## 2024-01-13 PROCEDURE — 6360000002 HC RX W HCPCS

## 2024-01-13 PROCEDURE — 80048 BASIC METABOLIC PNL TOTAL CA: CPT

## 2024-01-13 PROCEDURE — 85025 COMPLETE CBC W/AUTO DIFF WBC: CPT

## 2024-01-13 PROCEDURE — 2580000003 HC RX 258: Performed by: PHYSICIAN ASSISTANT

## 2024-01-13 PROCEDURE — 2580000003 HC RX 258: Performed by: EMERGENCY MEDICINE

## 2024-01-13 RX ORDER — ALBUTEROL SULFATE 2.5 MG/3ML
2.5 SOLUTION RESPIRATORY (INHALATION) EVERY 4 HOURS PRN
Status: DISCONTINUED | OUTPATIENT
Start: 2024-01-13 | End: 2024-01-13 | Stop reason: HOSPADM

## 2024-01-13 RX ORDER — AZITHROMYCIN 250 MG/1
250 TABLET, FILM COATED ORAL SEE ADMIN INSTRUCTIONS
Qty: 6 TABLET | Refills: 0 | Status: SHIPPED | OUTPATIENT
Start: 2024-01-13 | End: 2024-01-18

## 2024-01-13 RX ORDER — HEPARIN 100 UNIT/ML
500 SYRINGE INTRAVENOUS PRN
Status: DISCONTINUED | OUTPATIENT
Start: 2024-01-13 | End: 2024-01-13 | Stop reason: HOSPADM

## 2024-01-13 RX ORDER — BENZONATATE 200 MG/1
200 CAPSULE ORAL 3 TIMES DAILY PRN
Qty: 30 CAPSULE | Refills: 0 | Status: SHIPPED | OUTPATIENT
Start: 2024-01-13 | End: 2024-01-23

## 2024-01-13 RX ADMIN — HYDROMORPHONE HYDROCHLORIDE 1 MG: 1 INJECTION, SOLUTION INTRAMUSCULAR; INTRAVENOUS; SUBCUTANEOUS at 09:09

## 2024-01-13 RX ADMIN — CEFTRIAXONE SODIUM 2000 MG: 2 INJECTION, POWDER, FOR SOLUTION INTRAMUSCULAR; INTRAVENOUS at 00:49

## 2024-01-13 RX ADMIN — HYDROMORPHONE HYDROCHLORIDE 1 MG: 1 INJECTION, SOLUTION INTRAMUSCULAR; INTRAVENOUS; SUBCUTANEOUS at 15:25

## 2024-01-13 RX ADMIN — ENOXAPARIN SODIUM 30 MG: 100 INJECTION SUBCUTANEOUS at 09:09

## 2024-01-13 RX ADMIN — DIPHENHYDRAMINE HYDROCHLORIDE 25 MG: 50 INJECTION INTRAMUSCULAR; INTRAVENOUS at 09:09

## 2024-01-13 RX ADMIN — HYDROMORPHONE HYDROCHLORIDE 1 MG: 1 INJECTION, SOLUTION INTRAMUSCULAR; INTRAVENOUS; SUBCUTANEOUS at 05:52

## 2024-01-13 RX ADMIN — HEPARIN 500 UNITS: 100 SYRINGE at 16:11

## 2024-01-13 RX ADMIN — METRONIDAZOLE 500 MG: 500 INJECTION, SOLUTION INTRAVENOUS at 09:09

## 2024-01-13 RX ADMIN — SODIUM CHLORIDE: 9 INJECTION, SOLUTION INTRAVENOUS at 09:06

## 2024-01-13 RX ADMIN — SODIUM CHLORIDE, PRESERVATIVE FREE 10 ML: 5 INJECTION INTRAVENOUS at 09:09

## 2024-01-13 RX ADMIN — HYDROMORPHONE HYDROCHLORIDE 1 MG: 1 INJECTION, SOLUTION INTRAMUSCULAR; INTRAVENOUS; SUBCUTANEOUS at 02:53

## 2024-01-13 RX ADMIN — HYDROMORPHONE HYDROCHLORIDE 1 MG: 1 INJECTION, SOLUTION INTRAMUSCULAR; INTRAVENOUS; SUBCUTANEOUS at 01:35

## 2024-01-13 ASSESSMENT — PAIN - FUNCTIONAL ASSESSMENT
PAIN_FUNCTIONAL_ASSESSMENT: PREVENTS OR INTERFERES SOME ACTIVE ACTIVITIES AND ADLS
PAIN_FUNCTIONAL_ASSESSMENT: ACTIVITIES ARE NOT PREVENTED

## 2024-01-13 ASSESSMENT — PAIN SCALES - GENERAL
PAINLEVEL_OUTOF10: 10
PAINLEVEL_OUTOF10: 9

## 2024-01-13 ASSESSMENT — PAIN DESCRIPTION - LOCATION
LOCATION: ABDOMEN

## 2024-01-13 ASSESSMENT — PAIN DESCRIPTION - ORIENTATION
ORIENTATION: MID

## 2024-01-13 ASSESSMENT — PAIN DESCRIPTION - DESCRIPTORS
DESCRIPTORS: ACHING
DESCRIPTORS: ACHING

## 2024-01-13 NOTE — PROGRESS NOTES
Patient had a good appetite the whole night. She asked for 9pcs of toast and 6 small cups of apple juice. She even asked this RN if we have a snack machine downstairs. But last night, she told this RN that she vomited and showed her \"vomit\" to this RN. But the vomit appeared to be like a chewed food that she just spit.

## 2024-01-13 NOTE — PLAN OF CARE
Problem: Pain  Goal: Verbalizes/displays adequate comfort level or baseline comfort level  1/13/2024 1535 by Donaldo Jacome, RN  Outcome: Adequate for Discharge     Problem: Chronic Conditions and Co-morbidities  Goal: Patient's chronic conditions and co-morbidity symptoms are monitored and maintained or improved  1/13/2024 1535 by Donaldo Jacome, RN  Outcome: Adequate for Discharge     Problem: Risk for Elopement  Goal: Patient will not exit the unit/facility without proper excort  1/13/2024 1535 by Donaldo Jacome, RN  Outcome: Adequate for Discharge     Problem: Safety - Adult  Goal: Free from fall injury  1/13/2024 1535 by Donaldo Jacome, RN  Outcome: Adequate for Discharge     Problem: ABCDS Injury Assessment  Goal: Absence of physical injury  1/13/2024 1535 by Donaldo Jacome, RN  Outcome: Adequate for Discharge     Problem: Skin/Tissue Integrity - Adult  Goal: Incisions, wounds, or drain sites healing without S/S of infection  1/13/2024 1535 by Donaldo Jacome, RN  Outcome: Adequate for Discharge     Problem: Gastrointestinal - Adult  Goal: Minimal or absence of nausea and vomiting  1/13/2024 1535 by Donaldo Jacome, RN  Outcome: Adequate for Discharge     Problem: Discharge Planning  Goal: Discharge to home or other facility with appropriate resources  1/13/2024 1535 by Donaldo Jacome, RN  Outcome: Adequate for Discharge   Care plan reviewed with patient.  Patient verbalize understanding of the plan of care and contribute to goal setting.

## 2024-01-13 NOTE — PLAN OF CARE
Problem: Pain  Goal: Verbalizes/displays adequate comfort level or baseline comfort level  Outcome: Progressing  Verbalizes/displays adequate comfort level or baseline comfort level:   Encourage patient to monitor pain and request assistance   Assess pain using appropriate pain scale   Administer analgesics based on type and severity of pain and evaluate response   Implement non-pharmacological measures as appropriate and evaluate response     Problem: Chronic Conditions and Co-morbidities  Goal: Patient's chronic conditions and co-morbidity symptoms are monitored and maintained or improved  Outcome: Progressing  Care Plan - Patient's Chronic Conditions and Co-Morbidity Symptoms are Monitored and Maintained or Improved: Monitor and assess patient's chronic conditions and comorbid symptoms for stability, deterioration, or improvement     Problem: Safety - Adult  Goal: Free from fall injury  Outcome: Progressing  Fall assessment completed. Patient using call light appropriately to call for assistance with ambulation to bathroom.  Personal items within reach. Patient is also compliant with use of non-skid slippers.  Placed bed in low position.     Problem: Skin/Tissue Integrity - Adult  Goal: Incisions, wounds, or drain sites healing without S/S of infection  Outcome: Progressing  Incisions, Wounds, or Drain Sites Healing Without Sign and Symptoms of Infection:   ADMISSION and DAILY: Assess and document risk factors for pressure ulcer development   TWICE DAILY: Assess and document skin integrity   TWICE DAILY: Assess and document dressing/incision, wound bed, drain sites and surrounding tissue   Implement wound care per orders     Problem: Gastrointestinal - Adult  Goal: Minimal or absence of nausea and vomiting  Outcome: Progressing  Minimal or absence of nausea and vomiting:   Administer ordered antiemetic medications as needed   Provide nonpharmacologic comfort measures as appropriate   Administer IV fluids as  ordered to ensure adequate hydration     Problem: Chronic Conditions and Co-morbidities  Goal: Patient's chronic conditions and co-morbidity symptoms are monitored and maintained or improved  Outcome: Progressing  Care Plan - Patient's Chronic Conditions and Co-Morbidity Symptoms are Monitored and Maintained or Improved: Monitor and assess patient's chronic conditions and comorbid symptoms for stability, deterioration, or improvement     Problem: Discharge Planning  Goal: Discharge to home or other facility with appropriate resources  Outcome: Progressing  Discharge to home or other facility with appropriate resources: Identify barriers to discharge with patient and caregiver     Care plan reviewed with patient's family.  Patient's family verbalize understanding of the plan of care and contribute to goal setting.

## 2024-01-13 NOTE — PROGRESS NOTES
Discharge education and instructions provided. Patient verbalized understanding at this time. No questions asked. IV access removed. All personal belongings, AVS and new medications present with patient. Transport to main lobby via wheelchair.

## 2024-01-13 NOTE — PLAN OF CARE
Problem: Pain  Goal: Verbalizes/displays adequate comfort level or baseline comfort level  1/13/2024 1020 by Donaldo Jacome, RN  Outcome: Progressing  Flowsheets (Taken 1/13/2024 1020)  Verbalizes/displays adequate comfort level or baseline comfort level:   Encourage patient to monitor pain and request assistance   Administer analgesics based on type and severity of pain and evaluate response   Notify Licensed Independent Practitioner if interventions unsuccessful or patient reports new pain   Implement non-pharmacological measures as appropriate and evaluate response   Assess pain using appropriate pain scale     Problem: Risk for Elopement  Goal: Patient will not exit the unit/facility without proper excort  1/13/2024 1020 by Donaldo Jacome, RN  Outcome: Progressing  Flowsheets (Taken 1/13/2024 1020)  Nursing Interventions for Elopement Risk: Assist with personal care needs such as toileting, eating, dressing, as needed to reduce the risk of wandering     Problem: Safety - Adult  Goal: Free from fall injury  1/13/2024 1020 by Donaldo Jacome, RN  Outcome: Progressing  Flowsheets (Taken 1/13/2024 1020)  Free From Fall Injury: Instruct family/caregiver on patient safety     Problem: ABCDS Injury Assessment  Goal: Absence of physical injury  1/13/2024 1020 by Donaldo Jacome, RN  Outcome: Progressing  Flowsheets (Taken 1/13/2024 1020)  Absence of Physical Injury: Implement safety measures based on patient assessment     Problem: Skin/Tissue Integrity - Adult  Goal: Incisions, wounds, or drain sites healing without S/S of infection  1/13/2024 1020 by Donaldo Jacome, RN  Outcome: Progressing  Flowsheets (Taken 1/13/2024 1020)  Incisions, Wounds, or Drain Sites Healing Without Sign and Symptoms of Infection: ADMISSION and DAILY: Assess and document risk factors for pressure ulcer development     Problem: Gastrointestinal - Adult  Goal: Minimal or absence of nausea and vomiting  1/13/2024 1020 by Donaldo Jacome,  RN  Outcome: Progressing  Flowsheets (Taken 1/13/2024 1020)  Minimal or absence of nausea and vomiting: Administer IV fluids as ordered to ensure adequate hydration     Problem: Discharge Planning  Goal: Discharge to home or other facility with appropriate resources  1/13/2024 1020 by Donaldo Jacome, RN  Outcome: Progressing  Flowsheets (Taken 1/13/2024 1020)  Discharge to home or other facility with appropriate resources:   Identify barriers to discharge with patient and caregiver   Identify discharge learning needs (meds, wound care, etc)   Refer to discharge planning if patient needs post-hospital services based on physician order or complex needs related to functional status, cognitive ability or social support system   Arrange for needed discharge resources and transportation as appropriate   Care plan reviewed with patient.  Patient verbalize understanding of the plan of care and contribute to goal setting.

## 2024-01-13 NOTE — PROGRESS NOTES
and mashed potatoes for lunch. Patient reports having drank apple juice and only a sip of pepsi. There was what appeared to be an empty can of pepsi along with a freshly opened can on the tray table. Patient reports that she has vomited many times today, after every time she eats or drinks anything. Patient reports sometimes she makes it to the bathroom, uses the trash can when she can't. There was no evidence of vomit in trash can, per RN there has been no evidence of vomit today or last night.    Review of Symptoms negative unless otherwise noted    Medications:  Reviewed    Infusion Medications    sodium chloride Stopped (01/11/24 0041)    sodium chloride       Scheduled Medications    sodium chloride flush  5-40 mL IntraVENous 2 times per day    enoxaparin  30 mg SubCUTAneous BID    cefTRIAXone (ROCEPHIN) IV  2,000 mg IntraVENous Q24H    metroNIDAZOLE  500 mg IntraVENous Q8H     PRN Meds: diphenhydrAMINE, HYDROmorphone, albuterol, fluticasone, sodium chloride flush, sodium chloride, potassium chloride **OR** potassium alternative oral replacement **OR** potassium chloride, magnesium sulfate, acetaminophen **OR** acetaminophen, promethazine      Intake/Output Summary (Last 24 hours) at 1/13/2024 0722  Last data filed at 1/13/2024 0545  Gross per 24 hour   Intake 5611.82 ml   Output 1500 ml   Net 4111.82 ml         Diet:  ADULT DIET; Regular    Exam:  BP (!) 142/98   Pulse 70   Temp 98.1 °F (36.7 °C) (Oral)   Resp 18   Ht 1.626 m (5' 4\")   Wt 135 kg (297 lb 9.9 oz)   LMP 07/11/2015   SpO2 99%   BMI 51.09 kg/m²   ***  Physical Exam  Vitals and nursing note reviewed.   Constitutional:       General: She is not in acute distress.     Appearance: She is morbidly obese. She is not ill-appearing.   HENT:      Head: Normocephalic and atraumatic.   Eyes:      Pupils: Pupils are equal.      Right eye: Pupil is round.      Left eye: Pupil is round.   Cardiovascular:      Rate and Rhythm: Normal rate and regular  normal.         Judgment: Judgment normal.           Labs: For convenience and continuity at follow-up the following most recent labs are provided:      CBC:    Lab Results   Component Value Date/Time    WBC 9.5 01/13/2024 06:29 AM    HGB 12.0 01/13/2024 06:29 AM    HCT 38.7 01/13/2024 06:29 AM     01/13/2024 06:29 AM       Renal:    Lab Results   Component Value Date/Time     01/13/2024 06:29 AM    K 4.3 01/13/2024 06:29 AM    K 4.2 01/11/2024 07:08 AM     01/13/2024 06:29 AM    CO2 25 01/13/2024 06:29 AM    BUN 12 01/13/2024 06:29 AM    CREATININE 0.6 01/13/2024 06:29 AM    CALCIUM 9.9 01/13/2024 06:29 AM    PHOS 4.6 02/02/2023 10:00 AM         Significant Diagnostic Studies    Radiology:   CT ABDOMEN PELVIS WO CONTRAST Additional Contrast? None   Final Result   The gastrostomy tube is not in appropriate position. It is external to the    stomach within the anterior peritoneal space anterior to surgical mesh    from prior ventral hernia repair. There is mild adjacent edema but no free    intraperitoneal air or fluid collection.      This document has been electronically signed by: Leigh Ann Hull MD on    01/10/2024 06:29 PM      All CTs at this facility use dose modulation techniques and iterative    reconstructions, and/or weight-based dosing   when appropriate to reduce radiation to a low as reasonably achievable.             Consults:     IP CONSULT TO GENERAL SURGERY  IP CONSULT TO SOCIAL WORK  IP CONSULT TO HOME CARE NEEDS    Disposition:    [x] Home       [] TCU       [] Rehab       [] Psych       [] SNF       [] Long Term Care Facility       [] Other-    Condition at Discharge: Stable    Code Status:  Prior     Patient Instructions:    Discharge lab work: N/A  Activity: activity as tolerated  Diet: No diet orders on file      Follow-up visits:   CM STR [facility]/[place] [address]  [phone]        Giovanna Gibson, APRN - CNP  [address]

## 2024-01-15 ENCOUNTER — CARE COORDINATION (OUTPATIENT)
Dept: CASE MANAGEMENT | Age: 34
End: 2024-01-15

## 2024-01-15 DIAGNOSIS — K94.20 COMPLICATION OF GASTROSTOMY TUBE (HCC): Primary | ICD-10-CM

## 2024-01-15 NOTE — CARE COORDINATION
1/11/24, 3:12 PM EST    DISCHARGE PLANNING EVALUATION     consult for \"current with [facility]\". Call to [facility], left a voicemail for a call back to verify services.   
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 It is external to the stomach within the anterior peritoneal space anterior to surgical mesh from prior ventral hernia repair. There is mild adjacent edema but no free   intraperitoneal air or fluid collection.  1/11 Removal of PEG    Barriers to Discharge: Hospitalist and General Surgery following. PEG removed yesterday. Okay to eat Regular Diet per GS. No plans to replace PEG at this time. Pt to f/u with GI at [facility]. IVF. IV rocephin q24h. IV dilaudid prn. IV flagyl q8h. IM phenergan prn.     PCP: Giovanna Gibson APRN - CNP  Readmission Risk Score: 27%  Patient Goals/Plan/Treatment Preferences: Samantha plans to return home w/ her wife. Current with [facility]-  following. Current with [facility] for [facility]- call to [place] and notified that patients PEG was removed and will not be replaced prior to discharge. Samantha gets transportation through [facility] or her insurance. Yesterday she expressed need for a walker, denies needs for today.                
1/12/24, 7:06 AM EST    DISCHARGE PLANNING EVALUATION    Message from Catherine with [facility], they are current with pt for nursing.     11:17 AM EST  SW met with Samantha this morning, she confirms she would like to continue with [facility] at discharge, pt asked to have aide services added to help with bathing. Pt reports she does have a shower chair in her shower. SW did let her know she would let provider know he request to have aide services with home health.   
1/15/24, 8:04 AM EST    Patient goals/plan/ treatment preferences discussed by  and .  Patient goals/plan/ treatment preferences reviewed with patient/ family.  Patient/ family verbalize understanding of discharge plan and are in agreement with goal/plan/treatment preferences.  Understanding was demonstrated using the teach back method.  AVS provided by RN at time of discharge, which includes all necessary medical information pertaining to the patients current course of illness, treatment, post-discharge goals of care, and treatment preferences.     Services At/After Discharge: Home Health       IMM Letter  IMM Letter given to Patient/Family/Significant other/Guardian/POA/by:: Deneen BYRNE CM  IMM Letter date given:: 01/12/24  IMM Letter time given:: 1115    Patient discharged over the weekend.  Voicemail left to update [facility] of discharge and new order that was placed.       
bedside)  Plans to Return to Present Housing: Yes  Other Identified Issues/Barriers to RETURNING to current housing: none   Potential Assistance needed at discharge: Home Care            Potential DME:    Patient expects to discharge to: House  Plan for transportation at discharge: Family    Financial    Payor: MEDICAID [place] / Plan: MEDICAID [facility] DEPT OF JOB / Product Type: *No Product type* /     Does insurance require precert for SNF: No    Potential assistance Purchasing Medications: No  Meds-to-Beds request: Yes      Kolby Gordon [facility] [address]  - P [phone] - F [phone]  [address] [facility] [address]  Phone: [phone] Fax: [phone]    [facility] [address] - P [phone] - F [phone]  [address], [address]  [facility] [address]  Phone: [phone] Fax: [phone]    [id number] [place], [place] - [address] - P [phone] - F [phone]  [address]  [facility] [address]  Phone: [phone] Fax: [phone]    [facility] - [facility] [address] - P [phone] - F [phone]  [address]  [place] [address]  Phone: [phone] Fax: [phone]      Notes:    Factors facilitating achievement of predicted outcomes: Family support, Motivated, Cooperative, and Pleasant    Barriers to discharge: Medical complications and Medication managment    Additional Case Management Notes: To ED via EMS with c/o right sided rib pain that radiates to her back.Hospitalist following. General surgery to see for migration of PEG. NPO. Telemetry. IVF. IV rocephin q24h. Lovenox. IV dilaudid prn. IV flagyl q8h. Room air. Afebrile. HR 100s-120s. WBC 12.7 (14.1) Trace blood in urine.     Procedure:   1/10 CT A/P WO: The gastrostomy tube is not in appropriate position. It is external to the stomach within the anterior peritoneal space

## 2024-01-15 NOTE — CARE COORDINATION
Care Transitions Initial Follow Up Call    Call within 2 business days of discharge: Yes    Patient Current Location:  Home: [address] [place] [address] [facility] [address]    Care Transition Nurse contacted the patient by telephone to perform post hospital discharge assessment. Verified name and  with patient as identifiers. Provided introduction to self, and explanation of the Care Transition Nurse role.     Patient: Samantha Nichols Patient : 1990   MRN: [id number]  Reason for Admission: Back pain, Rib injury, Abdominal pain  Discharge Date: 24 RARS: Readmission Risk Score: 26.9      Last Discharge Facility       Date Complaint Diagnosis Description Type Department Provider    1/10/24 Back Pain; Rib Injury Generalized abdominal pain ... ED to Hosp-Admission (Discharged) (ADMITTED) [facility] Shad Landeros MD; Nico ...            Was this an external facility discharge? No Discharge Facility: [facility]    Challenges to be reviewed by the provider   Additional needs identified to be addressed with provider: No  none               Method of communication with provider: none.    Contacted pt for initial care transition follow up.  Samantha states she is feeling \"about the same\".  Abdominal pain/discomfort still present.  Reports PEG tube removed and there is discomfort at site.  Reports she is monitoring the site closely.  Reviewed s/s of infection.  She states she noticed drainage earlier but none now.  Denies having any abnormal bleeding.  Cough still present but has gotten \"better\".  No c/o chest pain/discomfort but does admit to rib pressure/discomfort.  Denies shortness of breath.  May become lightheaded on occasion which lasts for a few seconds to minutes.  No syncopal or near syncopal episodes.  Pt only able to eat very little.  Discussed trying BRAT diet and importance of staying hydrated.  Encouraged small portions at a time.  She continues to have nausea/vomiting at times.  Phenergan as needed.

## 2024-01-17 NOTE — DISCHARGE SUMMARY
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      Hospital Course: Per HPI:  \"Patient has been having problems with her PEG tube since it was placed 2023.  Patient is now having pain just below her ribs radiating to her back.  Patient has been using her PEG tube for water, medications, and tube feed for additional nutrition.  Patient has a history of chronic abdominal pain, gastroparesis and esophageal stricture.  While in the ED patient had CT of abdomen pelvis and the PEG tube is thought to be in the anterior peritoneal space.  There is no free air or fluid collection however the patient reports she did infused 2 bottles of tube feed prior to arrival.  The patient is experiencing exquisite pain around the area of the PEG tube.  Patient to be admitted for further evaluation and treatment of PEG tube migration.\"    Peg tube migration   CT scan PEG tube in the anterior peritoneal space anterior to the ventral hernia mesh with no intraperitoneal free air or fluid collection   Patient reports infusing 2 bottles of tube feed prior to arrival at the ED   General surgery consult, appreciated,

## 2024-01-18 ENCOUNTER — CARE COORDINATION (OUTPATIENT)
Dept: CASE MANAGEMENT | Age: 34
End: 2024-01-18

## 2024-01-18 NOTE — CARE COORDINATION
Provider Department [place]   2/15/2024 11:00 AM Charlie Weiss MD N ENT [facility] [place]   2/27/2024  2:00 PM Ramona You MD N [facility] Heart [facility] [place]   3/14/2024  1:30 PM Sully Ramos RD, LD [facility] Physic [facility] [place]     External follow up appointment(s): [facility] General Surgery on 1/19/24    Care Transition Nurse reviewed medical action plan with patient and discussed any barriers to care and/or understanding of plan of care after discharge. Discussed appropriate site of care based on symptoms and resources available to patient including: PCP  Specialist  When to call 911. The patient agrees to contact the PCP office for questions related to their healthcare.     Advance Care Planning:   On file .     Patients top risk factors for readmission: medical condition-DM, abdominal pain, migration of PEG tube, gastroparesis, GERD, esophageal stricture, epilepsy, multiple health system providers, and polypharmacy  Interventions to address risk factors: Scheduled appointment with PCP-next week  and Scheduled appointment with Specialist-[facility] general surgery appt on 1/19/24    Offered patient enrollment in the Remote Patient Monitoring (RPM) program for in-home monitoring: Patient is not eligible for RPM program.     Care Transition Nurse provided contact information for future needs. Plan for follow-up call in 5-7 days based on severity of symptoms and risk factors.  Plan for next call: symptom management-abdominal pain, nausea/vomiting, bowels, plan for another PEG?    Em Hua RN

## 2024-01-23 ENCOUNTER — APPOINTMENT (OUTPATIENT)
Dept: CT IMAGING | Age: 34
End: 2024-01-23
Payer: MEDICARE

## 2024-01-23 ENCOUNTER — HOSPITAL ENCOUNTER (EMERGENCY)
Age: 34
Discharge: HOME | End: 2024-01-24
Attending: EMERGENCY MEDICINE
Payer: MEDICARE

## 2024-01-23 ENCOUNTER — CARE COORDINATION (OUTPATIENT)
Dept: CASE MANAGEMENT | Age: 34
End: 2024-01-23

## 2024-01-23 DIAGNOSIS — R10.9 ABDOMINAL PAIN OF UNKNOWN ETIOLOGY: Primary | ICD-10-CM

## 2024-01-23 LAB
BASOPHILS ABSOLUTE: 0.1 THOU/MM3 (ref 0–0.1)
BASOPHILS NFR BLD AUTO: 0.4 %
DEPRECATED RDW RBC AUTO: 39.8 FL (ref 35–45)
EOSINOPHIL NFR BLD AUTO: 1.5 %
EOSINOPHILS ABSOLUTE: 0.2 THOU/MM3 (ref 0–0.4)
ERYTHROCYTE [DISTWIDTH] IN BLOOD BY AUTOMATED COUNT: 14.6 % (ref 11.5–14.5)
HCT VFR BLD AUTO: 41.2 % (ref 37–47)
HGB BLD-MCNC: 12.9 GM/DL (ref 12–16)
IMM GRANULOCYTES # BLD AUTO: 0.07 THOU/MM3 (ref 0–0.07)
IMM GRANULOCYTES NFR BLD AUTO: 0.5 %
LYMPHOCYTES ABSOLUTE: 3.1 THOU/MM3 (ref 1–4.8)
LYMPHOCYTES NFR BLD AUTO: 22.5 %
MCH RBC QN AUTO: 24.1 PG (ref 26–33)
MCHC RBC AUTO-ENTMCNC: 31.3 GM/DL (ref 32.2–35.5)
MCV RBC AUTO: 76.9 FL (ref 81–99)
MONOCYTES ABSOLUTE: 0.8 THOU/MM3 (ref 0.4–1.3)
MONOCYTES NFR BLD AUTO: 5.6 %
NEUTROPHILS NFR BLD AUTO: 69.5 %
NRBC BLD AUTO-RTO: 0 /100 WBC
PLATELET # BLD AUTO: 298 THOU/MM3 (ref 130–400)
PMV BLD AUTO: 9.3 FL (ref 9.4–12.4)
RBC # BLD AUTO: 5.36 MILL/MM3 (ref 4.2–5.4)
SEGMENTED NEUTROPHILS ABSOLUTE COUNT: 9.5 THOU/MM3 (ref 1.8–7.7)
WBC # BLD AUTO: 13.7 THOU/MM3 (ref 4.8–10.8)

## 2024-01-23 PROCEDURE — 6360000002 HC RX W HCPCS: Performed by: EMERGENCY MEDICINE

## 2024-01-23 PROCEDURE — 83605 ASSAY OF LACTIC ACID: CPT

## 2024-01-23 PROCEDURE — 84703 CHORIONIC GONADOTROPIN ASSAY: CPT

## 2024-01-23 PROCEDURE — 36415 COLL VENOUS BLD VENIPUNCTURE: CPT

## 2024-01-23 PROCEDURE — 85025 COMPLETE CBC W/AUTO DIFF WBC: CPT

## 2024-01-23 PROCEDURE — 83690 ASSAY OF LIPASE: CPT

## 2024-01-23 PROCEDURE — 80053 COMPREHEN METABOLIC PANEL: CPT

## 2024-01-23 PROCEDURE — 96374 THER/PROPH/DIAG INJ IV PUSH: CPT

## 2024-01-23 PROCEDURE — 99285 EMERGENCY DEPT VISIT HI MDM: CPT

## 2024-01-23 PROCEDURE — 2580000003 HC RX 258: Performed by: EMERGENCY MEDICINE

## 2024-01-23 RX ORDER — MORPHINE SULFATE 4 MG/ML
4 INJECTION, SOLUTION INTRAMUSCULAR; INTRAVENOUS ONCE
Status: COMPLETED | OUTPATIENT
Start: 2024-01-23 | End: 2024-01-23

## 2024-01-23 RX ORDER — 0.9 % SODIUM CHLORIDE 0.9 %
1000 INTRAVENOUS SOLUTION INTRAVENOUS ONCE
Status: COMPLETED | OUTPATIENT
Start: 2024-01-23 | End: 2024-01-24

## 2024-01-23 RX ADMIN — MORPHINE SULFATE 4 MG: 4 INJECTION, SOLUTION INTRAMUSCULAR; INTRAVENOUS at 23:47

## 2024-01-23 RX ADMIN — SODIUM CHLORIDE 1000 ML: 9 INJECTION, SOLUTION INTRAVENOUS at 23:44

## 2024-01-23 ASSESSMENT — PAIN SCALES - GENERAL
PAINLEVEL_OUTOF10: 10
PAINLEVEL_OUTOF10: 10

## 2024-01-23 ASSESSMENT — PAIN DESCRIPTION - LOCATION
LOCATION: ABDOMEN
LOCATION: ABDOMEN

## 2024-01-23 ASSESSMENT — PAIN - FUNCTIONAL ASSESSMENT
PAIN_FUNCTIONAL_ASSESSMENT: 0-10
PAIN_FUNCTIONAL_ASSESSMENT: 0-10

## 2024-01-23 NOTE — CARE COORDINATION
Care Transitions Follow Up Call    Patient Current Location:  Home: [facility] [address]  [place] [address]    Care Transition Nurse contacted the patient by telephone to follow up after admission on 1/10/24.  Verified name and  with patient as identifiers.    Patient: Samantha Nichols  Patient : 1990   MRN: [id number]  Reason for Admission: back, rib, abd pain  Discharge Date: 24 RARS: Readmission Risk Score: 26.9      Needs to be reviewed by the provider   Additional needs identified to be addressed with provider: No  none             Method of communication with provider: none.    CTN call to Samantha today and she c/o rib pain. Taking Tramadol w/ little relief.  Denies n/v/d, sob, abd pain, dizziness, chills, malaise.  Says she thinks she gets fevers at night but doesn't check w/ thermometer.  Completed Erythromycin as ordered.  [facility] Gen Surgery HFU 24-> PEG tube to be reinserted 3/1/24. To stay on Liquid diet.  BLOOD SUGAR=120  Last BM 2 days ago. Taking stool softener and has Miralax if needed.  [facility] called her and are coming this afternoon.  Declined assistance on scheduling PCP HFU.  Eating, drinking & sleeping ok. Denies problems w/ urination. No other concerns voiced at this time. Will continue to follow.      Addressed changes since last contact:  home health care-[facility]  medications-Tramadol for pain  Discussed follow-up appointments. If no appointment was previously scheduled, appointment scheduling offered: Yes.   Is follow up appointment scheduled within 7 days of discharge? Yes.    Follow Up  Future Appointments   Date Time Provider Department Center   2/15/2024 11:00 AM Charlie Weiss MD N ENT [facility] - [place]   2024  2:00 PM Ramona You MD N [facility] Heart [facility] [place]   3/14/2024  1:30 PM Sully Ramos RD, LD [facility] Physic [facility] [place]     External follow up appointment(s): [facility] 3/1/24 PEG tube placement    Care Transition Nurse reviewed discharge instructions, medical action plan, and

## 2024-01-24 ENCOUNTER — APPOINTMENT (OUTPATIENT)
Dept: CT IMAGING | Age: 34
End: 2024-01-24
Payer: MEDICARE

## 2024-01-24 ENCOUNTER — CARE COORDINATION (OUTPATIENT)
Dept: CASE MANAGEMENT | Age: 34
End: 2024-01-24

## 2024-01-24 VITALS
SYSTOLIC BLOOD PRESSURE: 138 MMHG | HEART RATE: 91 BPM | HEIGHT: 64 IN | BODY MASS INDEX: 50.02 KG/M2 | WEIGHT: 293 LBS | DIASTOLIC BLOOD PRESSURE: 78 MMHG | OXYGEN SATURATION: 96 % | RESPIRATION RATE: 20 BRPM | TEMPERATURE: 97.6 F

## 2024-01-24 LAB
ALBUMIN SERPL BCG-MCNC: 4.2 G/DL (ref 3.5–5.1)
ALP SERPL-CCNC: 132 U/L (ref 38–126)
ALT SERPL W/O P-5'-P-CCNC: 15 U/L (ref 11–66)
ANION GAP SERPL CALC-SCNC: 12 MEQ/L (ref 8–16)
AST SERPL-CCNC: 17 U/L (ref 5–40)
B-HCG SERPL QL: NEGATIVE
BILIRUB SERPL-MCNC: < 0.2 MG/DL (ref 0.3–1.2)
BUN SERPL-MCNC: 16 MG/DL (ref 7–22)
CALCIUM SERPL-MCNC: 10.2 MG/DL (ref 8.5–10.5)
CHLORIDE SERPL-SCNC: 102 MEQ/L (ref 98–111)
CO2 SERPL-SCNC: 25 MEQ/L (ref 23–33)
CREAT SERPL-MCNC: 0.7 MG/DL (ref 0.4–1.2)
GFR SERPL CREATININE-BSD FRML MDRD: > 60 ML/MIN/1.73M2
GLUCOSE SERPL-MCNC: 100 MG/DL (ref 70–108)
LACTIC ACID, SEPSIS: 1.3 MMOL/L (ref 0.5–1.9)
LIPASE SERPL-CCNC: 28.7 U/L (ref 5.6–51.3)
OSMOLALITY SERPL CALC.SUM OF ELEC: 278.8 MOSMOL/KG (ref 275–300)
POTASSIUM SERPL-SCNC: 3.7 MEQ/L (ref 3.5–5.2)
PROT SERPL-MCNC: 8 G/DL (ref 6.1–8)
SODIUM SERPL-SCNC: 139 MEQ/L (ref 135–145)

## 2024-01-24 PROCEDURE — 74177 CT ABD & PELVIS W/CONTRAST: CPT

## 2024-01-24 PROCEDURE — 6360000004 HC RX CONTRAST MEDICATION: Performed by: EMERGENCY MEDICINE

## 2024-01-24 PROCEDURE — 96376 TX/PRO/DX INJ SAME DRUG ADON: CPT

## 2024-01-24 PROCEDURE — 6360000002 HC RX W HCPCS: Performed by: EMERGENCY MEDICINE

## 2024-01-24 RX ORDER — MORPHINE SULFATE 4 MG/ML
4 INJECTION, SOLUTION INTRAMUSCULAR; INTRAVENOUS ONCE
Status: COMPLETED | OUTPATIENT
Start: 2024-01-24 | End: 2024-01-24

## 2024-01-24 RX ADMIN — IOPAMIDOL 80 ML: 755 INJECTION, SOLUTION INTRAVENOUS at 00:30

## 2024-01-24 RX ADMIN — MORPHINE SULFATE 4 MG: 4 INJECTION, SOLUTION INTRAMUSCULAR; INTRAVENOUS at 02:19

## 2024-01-24 ASSESSMENT — PAIN SCALES - GENERAL
PAINLEVEL_OUTOF10: 10
PAINLEVEL_OUTOF10: 9
PAINLEVEL_OUTOF10: 10

## 2024-01-24 ASSESSMENT — PAIN - FUNCTIONAL ASSESSMENT
PAIN_FUNCTIONAL_ASSESSMENT: 0-10
PAIN_FUNCTIONAL_ASSESSMENT: 0-10

## 2024-01-24 NOTE — ED PROVIDER NOTES
of unknown etiology          DISPOSITION/PLAN   Discharged    DISCHARGE MEDICATIONS:  New Prescriptions    No medications on file       (Please note that portions of this note were completed with a voice recognition program.  Efforts were made to edit the dictations but occasionally words are mis-transcribed.)    DO Rosalba Frazier Lawrence, DO  01/24/24 0221

## 2024-01-24 NOTE — DISCHARGE INSTRUCTIONS
Will need to follow-up with your established surgeon on an outpatient basis.  Return to ER for any new symptoms or concerns

## 2024-01-24 NOTE — ED NOTES
Patient resting on cot. VS stable. Call light in reach. Patient denies any needs at this time. Patient covered up with blanket. Lights dimmed for comfort.

## 2024-01-24 NOTE — CARE COORDINATION
SAMPSON call to Samantha today and she says she is fine and asked that we call her back tomorrow because she is w/ her family. Will call tomorrow.

## 2024-01-24 NOTE — ED TRIAGE NOTES
Patient presents to the ED from home via EMS with C/O abdominal pain. Patient states her peg tube got disloged on January 10th and got an infection. Patient states she was admitted for a few days. Patient states she was having pain and took a tramadol 2 hours ago and had no relief with it.

## 2024-01-25 ENCOUNTER — CARE COORDINATION (OUTPATIENT)
Dept: CASE MANAGEMENT | Age: 34
End: 2024-01-25

## 2024-01-25 NOTE — CARE COORDINATION
[facility] [place]   3/14/2024  1:30 PM Sully Ramos RD, LD [facility] Physic [facility]       Care Transition Nurse reviewed medical action plan with patient and discussed any barriers to care and/or understanding of plan of care after discharge. Discussed appropriate site of care based on symptoms and resources available to patient including: PCP  Specialist  When to call 911. The patient agrees to contact the PCP office for questions related to their healthcare.     Patients top risk factors for readmission: DM, abdominal pain, migration of PEG tube, gastroparesis, GERD, esophageal stricture, epilepsy, multiple health system providers, and polypharmacy  Interventions to address risk factors: Education of patient/family/caregiver/guardian to support self-management-     Offered patient enrollment in the Remote Patient Monitoring (RPM) program for in-home monitoring: Patient is not eligible for RPM program.     Care Transitions Subsequent and Final Call    Subsequent and Final Calls  Do you have any ongoing symptoms?: Yes  Patient-reported symptoms: Abdominal Pain, Nausea  Have your medications changed?: No  Do you have any questions related to your medications?: No  Do you currently have any active services?: Yes  Are you currently active with any services?: Home Health  Do you have any needs or concerns that I can assist you with?: No  Care Transitions Interventions   Home Care Waiver: Completed        Transportation Support: Declined      DME Assistance: Declined   Disease Specific Clinic: Completed      Disease Association: Completed      Other Interventions:             Care Transition Nurse provided contact information for future needs. Plan for follow-up call in 5-7 days based on severity of symptoms and risk factors.  Plan for next call: symptom management-abdominal pain, nausea/vomiting, bowels, FBS, any new or worsening symptoms    Em Hua RN

## 2024-02-01 ENCOUNTER — CARE COORDINATION (OUTPATIENT)
Dept: CARE COORDINATION | Age: 34
End: 2024-02-01

## 2024-02-01 NOTE — CARE COORDINATION
Care Transitions Follow Up Call    Patient Current Location:  Home: [facility] [address]  [place] [address]    LPN Care Coordinator contacted the patient by telephone to follow up after admission on 1/10-.  Verified name and  with patient as identifiers.    Patient: Samantha Nichols  Patient : 1990   MRN: <[id number]>  Reason for Admission: Back pain, Rib injury, Abdominal pain; ED on 24 for abdominal pain  Discharge Date: 24 RARS: Readmission Risk Score: 26.9      Needs to be reviewed by the provider   Additional needs identified to be addressed with provider: No  none             Method of communication with provider: none.    LPN CC spoke with patient. States she is doing OK. She is still experiencing a fair amount of N/V. Some pain, but it is improving. Some cramping. Patient states she is to go to [place]  for PEG tube surgery. Family to transport. BG 80s-100s. Appetite poor. Denies problems with BM. HHC active. States her wife is in the hospital sick as well. Denies medication changes. Denies needs at this time. Notes she will be inpatient at [place].   Latricia Barrios, NYDIA CC  Care Transitions  [phone]    Addressed changes since last contact:  none  Discussed follow-up appointments. If no appointment was previously scheduled, appointment scheduling offered: Yes.   Is follow up appointment scheduled within 7 days of discharge? Yes.    Follow Up  Future Appointments   Date Time Provider Department Center   2/15/2024 11:00 AM Charlie Weiss MD N ENT [facility]   2024  2:00 PM Ramona You MD N SRPX Heart P - [place]   3/14/2024  1:30 PM Sully Ramos RD, LD SRPX Physic [facility] [place]     External follow up appointment(s): NA    LPN Care Coordinator reviewed medical action plan and red flags with patient and discussed any barriers to care and/or understanding of plan of care after discharge. Discussed appropriate site of care based on symptoms and resources available to patient

## 2024-02-12 ENCOUNTER — CARE COORDINATION (OUTPATIENT)
Dept: CASE MANAGEMENT | Age: 34
End: 2024-02-12

## 2024-02-12 NOTE — CARE COORDINATION
Care Transitions Initial Follow Up Call    Call within 2 business days of discharge: Yes    Patient Current Location:  Home: [facility] [address] [facility] [address]    Care Transition Nurse contacted the patient by telephone to perform post hospital discharge assessment. Verified name and  with patient as identifiers. Provided introduction to self, and explanation of the Care Transition Nurse role.     Patient: Samantha Nichols Patient : 1990   MRN: [id number]  Reason for Admission: Gastroparesis  Discharge Date: 24 RARS: Readmission Risk Score: 26.9      Last Discharge Facility       Date Complaint Diagnosis Description Type Department Provider    24 Abdominal Pain Abdominal pain of unknown etiology ED (DISCHARGE) [facility] Jr Lara, DO            Was this an external facility discharge? Yes, 2024  Discharge Facility: [facility]    Challenges to be reviewed by the provider   Additional needs identified to be addressed with provider: No  none               Method of communication with provider: none.    Contacted pt regarding her discharge from [facility] on 24.  Was admitted for gastroparesis.  Samantha states she is still doing about the same as when she went in.  States she was under the impression that the PEG tube was going to be inserted.  She states PEG tube was was not placed.  She mentioned they are still monitoring whether or not she has an infection.  States she still has her procedure for the PEG tube which was previously scheduled on 3/1/24.  Reports having any EDG while at [facility].  Pt still not able to eat very much.  C/O nausea/vomiting but prescribed liquid Phenergan.  Pt also taking the Oxycodone for her abdominal pain.  She is crushing medication and taking with applesauce.  According to [facility] notes, Started on Mirtazapine during her stay Discussed with the GI, no need for PEG tube, patient was discharged to follow-up with GI clinic as outpatient. She tolerated her meals before

## 2024-02-13 DIAGNOSIS — K31.84 GASTROPARESIS: Primary | ICD-10-CM

## 2024-02-13 RX ORDER — TRAMADOL HYDROCHLORIDE 50 MG/1
50 TABLET ORAL EVERY 6 HOURS PRN
Qty: 21 TABLET | Refills: 3 | Status: SHIPPED | OUTPATIENT
Start: 2024-02-13 | End: 2024-02-20

## 2024-02-15 ENCOUNTER — CARE COORDINATION (OUTPATIENT)
Dept: CASE MANAGEMENT | Age: 34
End: 2024-02-15

## 2024-02-15 NOTE — CARE COORDINATION
will not be able to resume services.  Natalia states pt will have to look for an outside agency.  She states pt will be discharged from their services.      Called pt back.  Informed her of information above.  She is requesting a list of home health agencies in the area.  CTN will send a My Chart message to pt.  Once received pt will reach out to her GI provider to see if they will follow and if not, will have to wait until seeing new PCP provider.  She does not have any questions, concerns or other needs at this time.  No further outreach.  Pt has non [facility] PCP and follows with GI at [facility].      Addressed changes since last contact:  none  Discussed follow-up appointments. If no appointment was previously scheduled, appointment scheduling offered: Yes.   Is follow up appointment scheduled within 7 days of discharge? No.    Follow Up  Future Appointments   Date Time Provider Department Center   2/27/2024  2:00 PM Ramona You MD N SRPX Heart [facility] [place]   3/14/2024  1:30 PM Sully Ramos RD, LD SRPX Physic P - [place]   6/18/2024  1:30 PM Charlie Weiss MD N ENT [facility]     External follow up appointment(s): GI follow up in March (pt did not know exact date of appt    Care Transition Nurse reviewed medical action plan and red flags with patient and discussed any barriers to care and/or understanding of plan of care after discharge. Discussed appropriate site of care based on symptoms and resources available to patient including: PCP  Specialist  When to call 911. The patient agrees to contact the PCP office for questions related to their healthcare.     Patients top risk factors for readmission: medical condition-DM, Abdominal pain, Migration of PEG tube, gastroparesis, GERD, esophageal stricture, epilepsy  Interventions to address risk factors: Scheduled appointment with PCP-per pt she has an appt with a new provider but unsure of the date and Scheduled appointment with Specialist-GI follow up with [facility] in March

## 2024-02-19 ENCOUNTER — APPOINTMENT (OUTPATIENT)
Dept: CT IMAGING | Age: 34
End: 2024-02-19
Payer: MEDICARE

## 2024-02-19 ENCOUNTER — HOSPITAL ENCOUNTER (EMERGENCY)
Age: 34
Discharge: HOME | End: 2024-02-20
Attending: EMERGENCY MEDICINE
Payer: MEDICARE

## 2024-02-19 DIAGNOSIS — R11.0 NAUSEA: ICD-10-CM

## 2024-02-19 DIAGNOSIS — R10.84 GENERALIZED ABDOMINAL PAIN: Primary | ICD-10-CM

## 2024-02-19 PROCEDURE — 80053 COMPREHEN METABOLIC PANEL: CPT

## 2024-02-19 PROCEDURE — 96372 THER/PROPH/DIAG INJ SC/IM: CPT

## 2024-02-19 PROCEDURE — 99285 EMERGENCY DEPT VISIT HI MDM: CPT

## 2024-02-19 PROCEDURE — 36415 COLL VENOUS BLD VENIPUNCTURE: CPT

## 2024-02-19 PROCEDURE — 84703 CHORIONIC GONADOTROPIN ASSAY: CPT

## 2024-02-19 PROCEDURE — 96374 THER/PROPH/DIAG INJ IV PUSH: CPT

## 2024-02-19 PROCEDURE — 85025 COMPLETE CBC W/AUTO DIFF WBC: CPT

## 2024-02-19 PROCEDURE — 83690 ASSAY OF LIPASE: CPT

## 2024-02-19 RX ORDER — DIPHENHYDRAMINE HYDROCHLORIDE 50 MG/ML
12.5 INJECTION INTRAMUSCULAR; INTRAVENOUS ONCE
Status: DISCONTINUED | OUTPATIENT
Start: 2024-02-20 | End: 2024-02-20 | Stop reason: HOSPADM

## 2024-02-19 RX ORDER — METOCLOPRAMIDE HYDROCHLORIDE 5 MG/ML
10 INJECTION INTRAMUSCULAR; INTRAVENOUS ONCE
Status: DISCONTINUED | OUTPATIENT
Start: 2024-02-20 | End: 2024-02-20 | Stop reason: HOSPADM

## 2024-02-19 RX ORDER — PANTOPRAZOLE SODIUM 40 MG/10ML
40 INJECTION, POWDER, LYOPHILIZED, FOR SOLUTION INTRAVENOUS ONCE
Status: COMPLETED | OUTPATIENT
Start: 2024-02-20 | End: 2024-02-20

## 2024-02-19 RX ORDER — 0.9 % SODIUM CHLORIDE 0.9 %
1000 INTRAVENOUS SOLUTION INTRAVENOUS ONCE
Status: COMPLETED | OUTPATIENT
Start: 2024-02-20 | End: 2024-02-20

## 2024-02-19 ASSESSMENT — PAIN DESCRIPTION - LOCATION: LOCATION: ABDOMEN

## 2024-02-19 ASSESSMENT — PAIN SCALES - GENERAL: PAINLEVEL_OUTOF10: 10

## 2024-02-19 ASSESSMENT — PAIN DESCRIPTION - ORIENTATION: ORIENTATION: MID

## 2024-02-19 ASSESSMENT — PAIN - FUNCTIONAL ASSESSMENT: PAIN_FUNCTIONAL_ASSESSMENT: 0-10

## 2024-02-20 ENCOUNTER — APPOINTMENT (OUTPATIENT)
Dept: CT IMAGING | Age: 34
End: 2024-02-20
Payer: MEDICARE

## 2024-02-20 VITALS
DIASTOLIC BLOOD PRESSURE: 109 MMHG | WEIGHT: 291 LBS | HEART RATE: 98 BPM | OXYGEN SATURATION: 97 % | HEIGHT: 64 IN | RESPIRATION RATE: 18 BRPM | SYSTOLIC BLOOD PRESSURE: 137 MMHG | BODY MASS INDEX: 49.68 KG/M2 | TEMPERATURE: 98.6 F

## 2024-02-20 LAB
ALBUMIN SERPL BCG-MCNC: 4.2 G/DL (ref 3.5–5.1)
ALP SERPL-CCNC: 125 U/L (ref 38–126)
ALT SERPL W/O P-5'-P-CCNC: 14 U/L (ref 11–66)
ANION GAP SERPL CALC-SCNC: 11 MEQ/L (ref 8–16)
AST SERPL-CCNC: 18 U/L (ref 5–40)
B-HCG SERPL QL: NEGATIVE
BASOPHILS ABSOLUTE: 0.1 THOU/MM3 (ref 0–0.1)
BASOPHILS NFR BLD AUTO: 0.4 %
BILIRUB SERPL-MCNC: < 0.2 MG/DL (ref 0.3–1.2)
BUN SERPL-MCNC: 12 MG/DL (ref 7–22)
CALCIUM SERPL-MCNC: 9.6 MG/DL (ref 8.5–10.5)
CHLORIDE SERPL-SCNC: 104 MEQ/L (ref 98–111)
CO2 SERPL-SCNC: 25 MEQ/L (ref 23–33)
CREAT SERPL-MCNC: 0.7 MG/DL (ref 0.4–1.2)
DEPRECATED RDW RBC AUTO: 41.7 FL (ref 35–45)
EOSINOPHIL NFR BLD AUTO: 1.2 %
EOSINOPHILS ABSOLUTE: 0.2 THOU/MM3 (ref 0–0.4)
ERYTHROCYTE [DISTWIDTH] IN BLOOD BY AUTOMATED COUNT: 14.9 % (ref 11.5–14.5)
GFR SERPL CREATININE-BSD FRML MDRD: > 60 ML/MIN/1.73M2
GLUCOSE SERPL-MCNC: 106 MG/DL (ref 70–108)
HCT VFR BLD AUTO: 42 % (ref 37–47)
HGB BLD-MCNC: 12.9 GM/DL (ref 12–16)
IMM GRANULOCYTES # BLD AUTO: 0.06 THOU/MM3 (ref 0–0.07)
IMM GRANULOCYTES NFR BLD AUTO: 0.4 %
LIPASE SERPL-CCNC: 22.5 U/L (ref 5.6–51.3)
LYMPHOCYTES ABSOLUTE: 2.8 THOU/MM3 (ref 1–4.8)
LYMPHOCYTES NFR BLD AUTO: 20.5 %
MCH RBC QN AUTO: 23.9 PG (ref 26–33)
MCHC RBC AUTO-ENTMCNC: 30.7 GM/DL (ref 32.2–35.5)
MCV RBC AUTO: 77.9 FL (ref 81–99)
MONOCYTES ABSOLUTE: 0.7 THOU/MM3 (ref 0.4–1.3)
MONOCYTES NFR BLD AUTO: 5.2 %
NEUTROPHILS NFR BLD AUTO: 72.3 %
NRBC BLD AUTO-RTO: 0 /100 WBC
OSMOLALITY SERPL CALC.SUM OF ELEC: 279.6 MOSMOL/KG (ref 275–300)
PLATELET # BLD AUTO: 304 THOU/MM3 (ref 130–400)
PMV BLD AUTO: 9.6 FL (ref 9.4–12.4)
POTASSIUM SERPL-SCNC: 3.8 MEQ/L (ref 3.5–5.2)
PROT SERPL-MCNC: 7.8 G/DL (ref 6.1–8)
RBC # BLD AUTO: 5.39 MILL/MM3 (ref 4.2–5.4)
SEGMENTED NEUTROPHILS ABSOLUTE COUNT: 10 THOU/MM3 (ref 1.8–7.7)
SODIUM SERPL-SCNC: 140 MEQ/L (ref 135–145)
WBC # BLD AUTO: 13.8 THOU/MM3 (ref 4.8–10.8)

## 2024-02-20 PROCEDURE — 2580000003 HC RX 258: Performed by: EMERGENCY MEDICINE

## 2024-02-20 PROCEDURE — 6360000004 HC RX CONTRAST MEDICATION: Performed by: EMERGENCY MEDICINE

## 2024-02-20 PROCEDURE — 96372 THER/PROPH/DIAG INJ SC/IM: CPT

## 2024-02-20 PROCEDURE — 74177 CT ABD & PELVIS W/CONTRAST: CPT

## 2024-02-20 PROCEDURE — C9113 INJ PANTOPRAZOLE SODIUM, VIA: HCPCS | Performed by: EMERGENCY MEDICINE

## 2024-02-20 PROCEDURE — 6360000002 HC RX W HCPCS: Performed by: EMERGENCY MEDICINE

## 2024-02-20 PROCEDURE — 96374 THER/PROPH/DIAG INJ IV PUSH: CPT

## 2024-02-20 PROCEDURE — 6370000000 HC RX 637 (ALT 250 FOR IP): Performed by: EMERGENCY MEDICINE

## 2024-02-20 RX ORDER — ONDANSETRON 2 MG/ML
4 INJECTION INTRAMUSCULAR; INTRAVENOUS ONCE
Status: DISCONTINUED | OUTPATIENT
Start: 2024-02-20 | End: 2024-02-20 | Stop reason: HOSPADM

## 2024-02-20 RX ORDER — PROMETHAZINE HYDROCHLORIDE 25 MG/ML
6.25 INJECTION, SOLUTION INTRAMUSCULAR; INTRAVENOUS ONCE
Status: COMPLETED | OUTPATIENT
Start: 2024-02-20 | End: 2024-02-20

## 2024-02-20 RX ORDER — ACETAMINOPHEN 500 MG
1000 TABLET ORAL
Status: COMPLETED | OUTPATIENT
Start: 2024-02-20 | End: 2024-02-20

## 2024-02-20 RX ADMIN — PANTOPRAZOLE SODIUM 40 MG: 40 INJECTION, POWDER, FOR SOLUTION INTRAVENOUS at 00:03

## 2024-02-20 RX ADMIN — PROMETHAZINE HYDROCHLORIDE 6.25 MG: 25 INJECTION INTRAMUSCULAR; INTRAVENOUS at 02:13

## 2024-02-20 RX ADMIN — SODIUM CHLORIDE 1000 ML: 9 INJECTION, SOLUTION INTRAVENOUS at 00:03

## 2024-02-20 RX ADMIN — ACETAMINOPHEN 1000 MG: 500 TABLET ORAL at 00:23

## 2024-02-20 RX ADMIN — IOPAMIDOL 80 ML: 755 INJECTION, SOLUTION INTRAVENOUS at 00:42

## 2024-02-20 NOTE — DISCHARGE INSTRUCTIONS
You were seen here today for abdominal pain and nausea.  Make sure to follow-up with your GI doctor within next 24 to 48 hours. Return here or to nearest emergency department if develop worsening of your symptoms.

## 2024-02-20 NOTE — ED NOTES
Pt in bed, eyes open, respirations even and unlabored. Pt medicated per MAR, pt refusing reglan stating she is allergic she get itchy blisters in her mouth and she is scared her throat will swell.

## 2024-02-20 NOTE — ED PROVIDER NOTES
[facility] EMERGENCY DEPT      EMERGENCY MEDICINE     Pt Name: Samantha Nichols  MRN: [id number]  Birthdate 1990  Date of evaluation: 2/19/2024  Provider: Caleb Schuler DO  Supervising Physician: Naa Boone MD    CHIEF COMPLAINT       Chief Complaint   Patient presents with    Abdominal Pain     HISTORY OF PRESENT ILLNESS   Samantha Nichols is a 33 y.o. female with a history of seizures, CHF, CAD, DM, sickle cell, carcinoid tumor, CAD, Nissen takedown, gastroparesis who presents to the emergency department from home for evaluation of worsening diffuse abdominal pain that started yesterday while she was doing chores around the house.  Patient states she has had pain like this before, similar pain to when she was admitted at [facility].  Patient was at [facility] from February 5 of February 11 for her gastroparesis and her intractable pain.  Patient did call her GI doctor today who gave her some recommendations on some medications today.  Patient states she has not been taking his medications as she has not been tolerating p.o. over the past 2 days.  She reports 3-4 episodes of watery vomiting and diarrhea each day.  Patient has fever, dysuria, hematuria.    PASTMEDICAL HISTORY     Past Medical History:   Diagnosis Date    Acute on chronic diastolic congestive heart failure ([facility]) 06/25/2022    JANI (acute kidney injury) ([facility]) 07/07/2019    Anemia     Anesthesia     migraines    Anxiety     Asthma     CAD (coronary artery disease)     Depression     Diabetes ([facility])     Diet-controlled type 2 diabetes mellitus ([facility]) 2016    Dyslipidemia 11/14/2016    Epilepsy ([facility])     last siezure is 2 years ago    Epilepsy ([facility])     Fibroids     Gastro - esophageal reflux disease     Gastroparesis     History of esophagogastroduodenoscopy (EGD) 08/13/2022    Hypertension     Hypertrophy of tonsil and adenoid 11/04/2017    Malignant carcinoid tumor of other sites ([facility]) 05/14/2013    Migraine     PONV (postoperative nausea and vomiting)

## 2024-02-20 NOTE — ED NOTES
Pt wants to leave AMA, provider notified and at bedside. Pt refusing all medications for nausea stating, \"I am allergic what don't you guys get about that? No one is reading my chart.\" RN explained that on pt chart allergy shows that it is low with itching. Pt acknowledges that. RN also notes that that is why the benadryl was ordered. Pt states, \"she was called a complex patient and she does not want to be known as that, I want names to all the people here who are trying to kill me.\" Pt then asks, \"if I leave AMA can I get a cab?\" RN answers no. Pt then states, \"I will take the tylenol even though it will do nothing and no one here is addressing my pain.\"

## 2024-02-20 NOTE — ED TRIAGE NOTES
Pt presents to ED for complaints of abdominal pain that began yesterday. Pt had a lap band 2 years ago and it was taken out last year. Pt states she was just hospitalized at [facility] for this abdominal pain and was discharged the 11th. Today, pt states the pain is unbearable and she has been nauseous, vomiting, and diarrhea. Pt states she is eating or drinking okay. Pt denies chest pain, shortness of breath. Pt abdomen appears slightly distended, hard on palpation.

## 2024-03-04 RX ORDER — FUROSEMIDE 20 MG/1
20 TABLET ORAL DAILY
Qty: 90 TABLET | Refills: 0 | OUTPATIENT
Start: 2024-03-04

## 2024-03-06 ENCOUNTER — HOSPITAL ENCOUNTER (OUTPATIENT)
Dept: INTERVENTIONAL RADIOLOGY/VASCULAR | Age: 34
Discharge: HOME | End: 2024-03-06
Payer: MEDICARE

## 2024-03-06 DIAGNOSIS — Z95.828 PORT-A-CATH IN PLACE: ICD-10-CM

## 2024-03-06 PROCEDURE — 36598 INJ W/FLUOR EVAL CV DEVICE: CPT

## 2024-03-06 PROCEDURE — 2709999900 IR FLUOROSCOPY LESS THAN 1 HOUR

## 2024-03-06 NOTE — PROGRESS NOTES
1148 Pt in specials radiology for medi-port dye study. Discussed procedure with pt and pt verbalizes understanding.  1158 This RN unable to access port. Dr Cheng informed.  1202 Dr Cheng to speak to pt.  1209 Dr Cheng unable to access port. Band-aid applied to site. No redness or bleeding noted.  1215 Pt discharged ambulatory with steady gait.

## 2024-03-13 ENCOUNTER — HOSPITAL ENCOUNTER (OUTPATIENT)
Dept: INTERVENTIONAL RADIOLOGY/VASCULAR | Age: 34
Discharge: HOME | End: 2024-03-13
Payer: MEDICARE

## 2024-03-13 VITALS
RESPIRATION RATE: 18 BRPM | OXYGEN SATURATION: 100 % | DIASTOLIC BLOOD PRESSURE: 93 MMHG | BODY MASS INDEX: 49.44 KG/M2 | TEMPERATURE: 97.1 F | WEIGHT: 288 LBS | HEART RATE: 78 BPM | SYSTOLIC BLOOD PRESSURE: 142 MMHG

## 2024-03-13 DIAGNOSIS — Z78.9 POOR INTRAVENOUS ACCESS: ICD-10-CM

## 2024-03-13 LAB
DEPRECATED RDW RBC AUTO: 43.2 FL (ref 35–45)
ERYTHROCYTE [DISTWIDTH] IN BLOOD BY AUTOMATED COUNT: 15.4 % (ref 11.5–14.5)
HCT VFR BLD AUTO: 43.9 % (ref 37–47)
HGB BLD-MCNC: 13.5 GM/DL (ref 12–16)
MCH RBC QN AUTO: 23.9 PG (ref 26–33)
MCHC RBC AUTO-ENTMCNC: 30.8 GM/DL (ref 32.2–35.5)
MCV RBC AUTO: 77.7 FL (ref 81–99)
PLATELET # BLD AUTO: 295 THOU/MM3 (ref 130–400)
PMV BLD AUTO: 9.5 FL (ref 9.4–12.4)
RBC # BLD AUTO: 5.65 MILL/MM3 (ref 4.2–5.4)
WBC # BLD AUTO: 10 THOU/MM3 (ref 4.8–10.8)

## 2024-03-13 PROCEDURE — 77001 FLUOROGUIDE FOR VEIN DEVICE: CPT

## 2024-03-13 PROCEDURE — 2500000003 HC RX 250 WO HCPCS: Performed by: RADIOLOGY

## 2024-03-13 PROCEDURE — 6360000004 HC RX CONTRAST MEDICATION: Performed by: RADIOLOGY

## 2024-03-13 PROCEDURE — 85027 COMPLETE CBC AUTOMATED: CPT

## 2024-03-13 PROCEDURE — C1788 PORT, INDWELLING, IMP: HCPCS

## 2024-03-13 PROCEDURE — 36590 REMOVAL TUNNELED CV CATH: CPT

## 2024-03-13 PROCEDURE — 2580000003 HC RX 258: Performed by: RADIOLOGY

## 2024-03-13 PROCEDURE — 76937 US GUIDE VASCULAR ACCESS: CPT

## 2024-03-13 PROCEDURE — 6360000002 HC RX W HCPCS: Performed by: RADIOLOGY

## 2024-03-13 PROCEDURE — 36561 INSERT TUNNELED CV CATH: CPT

## 2024-03-13 PROCEDURE — 96374 THER/PROPH/DIAG INJ IV PUSH: CPT

## 2024-03-13 RX ORDER — MIDAZOLAM HYDROCHLORIDE 1 MG/ML
INJECTION INTRAMUSCULAR; INTRAVENOUS PRN
Status: COMPLETED | OUTPATIENT
Start: 2024-03-13 | End: 2024-03-13

## 2024-03-13 RX ORDER — LIDOCAINE HYDROCHLORIDE 20 MG/ML
INJECTION, SOLUTION EPIDURAL; INFILTRATION; INTRACAUDAL; PERINEURAL PRN
Status: COMPLETED | OUTPATIENT
Start: 2024-03-13 | End: 2024-03-13

## 2024-03-13 RX ORDER — HEPARIN SODIUM (PORCINE) LOCK FLUSH IV SOLN 100 UNIT/ML 100 UNIT/ML
500 SOLUTION INTRAVENOUS ONCE
Status: DISCONTINUED | OUTPATIENT
Start: 2024-03-13 | End: 2024-03-14 | Stop reason: HOSPADM

## 2024-03-13 RX ORDER — SODIUM CHLORIDE 450 MG/100ML
INJECTION, SOLUTION INTRAVENOUS CONTINUOUS
Status: DISCONTINUED | OUTPATIENT
Start: 2024-03-13 | End: 2024-03-14 | Stop reason: HOSPADM

## 2024-03-13 RX ORDER — HEPARIN 100 UNIT/ML
SYRINGE INTRAVENOUS PRN
Status: COMPLETED | OUTPATIENT
Start: 2024-03-13 | End: 2024-03-13

## 2024-03-13 RX ADMIN — MIDAZOLAM 0.5 MG: 1 INJECTION INTRAMUSCULAR; INTRAVENOUS at 12:39

## 2024-03-13 RX ADMIN — HYDROMORPHONE HYDROCHLORIDE 0.5 MG: 1 INJECTION, SOLUTION INTRAMUSCULAR; INTRAVENOUS; SUBCUTANEOUS at 12:55

## 2024-03-13 RX ADMIN — HYDROMORPHONE HYDROCHLORIDE 0.5 MG: 1 INJECTION, SOLUTION INTRAMUSCULAR; INTRAVENOUS; SUBCUTANEOUS at 13:58

## 2024-03-13 RX ADMIN — HEPARIN 500 UNITS: 100 SYRINGE at 12:43

## 2024-03-13 RX ADMIN — HYDROMORPHONE HYDROCHLORIDE 0.5 MG: 1 INJECTION, SOLUTION INTRAMUSCULAR; INTRAVENOUS; SUBCUTANEOUS at 12:26

## 2024-03-13 RX ADMIN — IOPAMIDOL 5 ML: 612 INJECTION, SOLUTION INTRAVENOUS at 13:00

## 2024-03-13 RX ADMIN — LIDOCAINE HYDROCHLORIDE 18 ML: 20 INJECTION, SOLUTION EPIDURAL; INFILTRATION; INTRACAUDAL; PERINEURAL at 13:00

## 2024-03-13 RX ADMIN — HYDROMORPHONE HYDROCHLORIDE 1 MG: 1 INJECTION, SOLUTION INTRAMUSCULAR; INTRAVENOUS; SUBCUTANEOUS at 11:46

## 2024-03-13 RX ADMIN — Medication 3000 MG: at 10:32

## 2024-03-13 RX ADMIN — MIDAZOLAM 1 MG: 1 INJECTION INTRAMUSCULAR; INTRAVENOUS at 12:08

## 2024-03-13 RX ADMIN — MIDAZOLAM 0.5 MG: 1 INJECTION INTRAMUSCULAR; INTRAVENOUS at 12:26

## 2024-03-13 RX ADMIN — MIDAZOLAM 1 MG: 1 INJECTION INTRAMUSCULAR; INTRAVENOUS at 11:46

## 2024-03-13 RX ADMIN — SODIUM CHLORIDE: 4.5 INJECTION, SOLUTION INTRAVENOUS at 09:59

## 2024-03-13 RX ADMIN — HYDROMORPHONE HYDROCHLORIDE 1 MG: 1 INJECTION, SOLUTION INTRAMUSCULAR; INTRAVENOUS; SUBCUTANEOUS at 12:08

## 2024-03-13 RX ADMIN — HYDROMORPHONE HYDROCHLORIDE 0.5 MG: 1 INJECTION, SOLUTION INTRAMUSCULAR; INTRAVENOUS; SUBCUTANEOUS at 12:39

## 2024-03-13 ASSESSMENT — PAIN DESCRIPTION - LOCATION: LOCATION: CHEST

## 2024-03-13 ASSESSMENT — PAIN SCALES - GENERAL
PAINLEVEL_OUTOF10: 9
PAINLEVEL_OUTOF10: 9

## 2024-03-13 NOTE — H&P
Formulation and discussion of sedation / procedure plans, risks, benefits, side effects and alternatives with patient and/or responsible adult completed.    History and Physical reviewed and unchanged.    Electronically signed by Larry Matthew MD on 3/13/24 at 11:42 AM EDT

## 2024-03-13 NOTE — H&P
[facility]  Sedation/Analgesia History & Physical    Pt Name: Samantha Nichols  MRN: [id number]  YOB: 1990  Provider Performing Procedure: Larry Matthew MD, MD  Primary Care Physician: Giovanna Gibson, AURELIO Jones CNP    Formulation and discussion of sedation / procedure plans, risks, benefits, side effects and alternatives with patient and/or responsible adult completed.    PRE-PROCEDURE   DNR-CCA/DNR-CC []Yes [x]No  Brief History/Pre-Procedure Diagnosis: iron deficiency            MEDICAL HISTORY  []CAD/Valve  []Liver Disease  []Lung Disease []Diabetes  []Hypertension []Renal Disease  []Additional information:       has a past medical history of Acute on chronic diastolic congestive heart failure (HCC), JANI (acute kidney injury) (HCC), Anemia, Anesthesia, Anxiety, Asthma, CAD (coronary artery disease), Depression, Diabetes (HCC), Diet-controlled type 2 diabetes mellitus (HCC), Dyslipidemia, Epilepsy (HCC), Epilepsy (HCC), Fibroids, Gastro - esophageal reflux disease, Gastroparesis, History of esophagogastroduodenoscopy (EGD), Hypertension, Hypertrophy of tonsil and adenoid, Malignant carcinoid tumor of other sites (HCC), Migraine, PONV (postoperative nausea and vomiting), Prolonged emergence from general anesthesia, Sickle cell anemia (HCC), Sickle cell trait (HCC), and Tumor associated pain.    SURGICAL HISTORY   has a past surgical history that includes hernia repair (2010, 2016); [place] tooth extraction; tumor removal; Breast reduction surgery; myomectomy; Partial hysterectomy (4/8/16); Dilation and curettage of uterus (10/21/2013); Central venous catheter insertion (Right, 12/11/2015); Abdominal exploration surgery; Gastric fundoplication; Endoscopy, colon, diagnostic; other surgical history (08/12/2016); Tunneled venous port placement; Hysterectomy (04/2016); Abdomen surgery (03/23/2017); pr egd balloon dilation esophagus <30 mm diam (Left, 8/8/2017); pr egd balloon dilation

## 2024-03-13 NOTE — PROGRESS NOTES
Pharmacy Pre-Op Antibiotic Dose Adjustment    Samanthaady Nichols is a 33 y.o. female scheduled for surgery. Pharmacy will adjust the following pre-op antibiotic per P&T approved policy: cefazolin    Weight:  Wt Readings from Last 1 Encounters:   03/13/24 130.6 kg (288 lb)     Body mass index is 49.44 kg/m².    Plan:   Pre-op antibiotic adjustment: increase cefazolin from 2 g to 3 g

## 2024-03-13 NOTE — PROGRESS NOTES
0900 Patient ambulatory to [facility] for Mediport removal and insert. Denies any blood thinners confirms NPO and will have a  for discharge. PT RIGHTS AND RESPONSIBILITIES OFFERED TO PT.      1315 Patient back to room post procedure. Dressing to chest dry and intact. Patient reports pain 9/10 to her chest from the port. Patient asking for pain medication and food, also wants 2 pepsi's.     1330 box lunch and 2 pepsi's given to patient. Dressing to chest dry and intact. Patient continues to report pain 9/10. Offered patient something PO for pain. Patient requesting something IV for her pain. As the PO pills make her nauseated. Called DR. Matthew and updated about patient having pain.     1345 Patient in bed. Dressing to right chest dry and intact. Patient requesting a third pepsi, informed patient I can't give her another pepsi offered love mist or water. Patient refusing.     1355 Dilaudid given to patient for pain.     1410 Patient calling out that she has a migraine and needs another pepsi. Informed patient unable to give her another pepsi. Offered water. Patient declined.       1420 Patient medical transport here to take patient. Assisted patient into wheelchair.     1425 Patient discharged in wheelchair to [facility]. AVS reviewed with patient. Verbalizes understanding.       _M___ Safety:       (Environmental)  [place] to environment  Ensure ID band is correct and in place/ allergy band as needed  Assess for fall risk  Initiate fall precautions as applicable (fall band, side rails, etc.)  Call light within reach  Bed in low position/ wheels locked    _M___ Pain:       Assess pain level and characteristics  Administer analgesics as ordered  Assess effectiveness of pain management and report to MD as needed    _M___ Knowledge Deficit:  Assess baseline knowledge  Provide teaching at level of understanding  Provide teaching via preferred learning method  Evaluate teaching effectiveness    _M___ Hemodynamic/Respiratory

## 2024-03-13 NOTE — DISCHARGE INSTRUCTIONS
accessories, stove).  Do not drink any alcoholic beverages for a minimum of 24 hours.  Do not make important personal, legal or business decisions for 24 hours.  You may experience dizziness or lightheadedness.  Move slowly and carefully, do not make sudden position changes.  Drink extra amounts of fluids today.  Increase your diet as tolerated (unless you have received specific instructions from your doctor).  If you feel nauseated, continue with liquids until nausea is gone  Notify your physician if you have not urinated within 8 hours after the procedure.  Resume your medications unless otherwise instructed.  contact your physician if you have any questions or concerns.  I have been informed and understand how to care for myself/the patient at home.  i know who to call if Ihave question or concerns.       The adult responsible for my discharge care is:      You have received the sedation/analgesia medications/s:      IF YOU REPORT TO AN EMERGENCY ROOM, DOCTOR'S OFFICE OR HOSPITAL WITHIN 24 HRS AFTER PROCEDURE, BRING THIS SHEET WITH YOU AND GIVE IT THE PHYSICIAN OR NURSE ATTENDING YOU.

## 2024-03-13 NOTE — OP NOTE
Department of Radiology  Post Procedure Progress Note      Pre-Procedure Diagnosis:  iron deficiency    Procedure Performed:  mediport removal and insertion     Anesthesia: local / versed and dilaudid     Findings: successful    Immediate Complications:  None    Estimated Blood Loss: minimal    SEE DICTATED PROCEDURE NOTE FOR COMPLETE DETAILS.    Larry Matthew MD   3/13/2024 12:50 PM

## 2024-03-13 NOTE — PROGRESS NOTES
1126 Patient received in IR for remove old mediport and place a new mediport.   1129 This procedure has been fully reviewed with the patient and written informed consent has been obtained.  1145 Dr Matthew speaking with patient and assessing port site with fluoroscopy.   1151 Dr Matthew successfully accessed patient's port.   1153 Dr Matthew able to draw blood off of port. Contrast injected through port and no fibrin sheath noted. Patient states that she would still like to proceed with replacing the mediport.   1205 Procedure started with Dr. Matthew.   1248 Procedure completed; patient tolerated well. Suture, dermabond, steri strips, guaze, and op-site to each incision; no bleeding noted.  1254 Pt crying stating \"I'm in so much pain.\" Pt asked Dr Matthew for more pain medication and he agreed. See EMAR.   1305 Patient on cart; comfort ensured. Report called to Silvia BYRNE in OPN.   1306 Patient taken to OPN via transport.

## 2024-03-21 ENCOUNTER — TELEMEDICINE (OUTPATIENT)
Dept: INTERNAL MEDICINE CLINIC | Age: 34
End: 2024-03-21

## 2024-03-21 DIAGNOSIS — K90.9 INTESTINAL MALABSORPTION, UNSPECIFIED TYPE: Primary | ICD-10-CM

## 2024-03-21 DIAGNOSIS — K31.84 GASTROPARESIS: ICD-10-CM

## 2024-03-21 PROCEDURE — NBSRV NON-BILLABLE SERVICE: Performed by: DIETITIAN, REGISTERED

## 2024-03-21 NOTE — PROGRESS NOTES
[facility] Professional Services  [facility]  [address] [place] [address]  [phone] (phone)  [phone] (fax)    Patient Name: Samantha Nichols. Date of Birth: [id number]. MRN: [id number]      Assessment: Patient is a 33 y.o. female seen for follow-up MNT visit for Intestinal malabsorption and Gastroparesis (GP) with PEG tube feedings.      TELEHEALTH EVALUATION -- Audio   Services were provided through telephone discussion to substitute for in-person clinic visit. Patient located at their individual home and provider in office.     -Nutritionally relevant labs:   Lab Results   Component Value Date/Time    LABA1C 6.1 10/10/2023 05:00 AM    LABA1C 5.8 01/25/2023 05:25 AM    LABA1C 5.8 06/24/2022 08:10 PM    GLUCOSE 106 02/19/2024 11:47 PM    GLUCOSE 100 01/23/2024 11:40 PM    GLUCOSE 78 12/23/2018 11:30 AM    GLUCOSE 78 03/26/2018 02:41 PM    CHOL 172 06/25/2022 04:07 AM    HDL 41 06/25/2022 04:07 AM    LDLCALC 111 06/25/2022 04:07 AM    TRIG 92 01/14/2023 05:47 AM     Pt provided recent hx of current condition.  Pt stated she was in bed.  - PEG tube feedings were working then experiencing back pain in Jan 2024 and resulted in PEG bumped out of place so Dr Michel pulled this out.    - She was clearing up an infection then to follow with [facility].  - the [facility] performed tests and her scans came back normal and so they did not replace PEG.  - Pt cannot keep anything down - vomiting all foods.  She can keep fluids down for the most part.  - Pt states has a follow up appt with Dr Michel April 9th.    Current Outpatient Medications on File Prior to Visit   Medication Sig Dispense Refill    docusate sodium (COLACE) 100 MG capsule       RA NICOTINE POLACRILEX 2 MG lozenge take 1 LOZENGE if needed for SMOKING CESSATION MAX OF 20 PER DAY (Patient not taking: Reported on 1/11/2024)      promethazine (PHENERGAN) 6.25 MG/5ML syrup take 20 MILLILITERS EVERY 6 HOURS

## 2024-03-28 ENCOUNTER — APPOINTMENT (OUTPATIENT)
Dept: CT IMAGING | Age: 34
DRG: 908 | End: 2024-03-28
Payer: MEDICARE

## 2024-03-28 ENCOUNTER — APPOINTMENT (OUTPATIENT)
Dept: INTERVENTIONAL RADIOLOGY/VASCULAR | Age: 34
DRG: 908 | End: 2024-03-28
Payer: MEDICARE

## 2024-03-28 ENCOUNTER — APPOINTMENT (OUTPATIENT)
Dept: GENERAL RADIOLOGY | Age: 34
DRG: 908 | End: 2024-03-28
Payer: MEDICARE

## 2024-03-28 ENCOUNTER — HOSPITAL ENCOUNTER (INPATIENT)
Age: 34
LOS: 3 days | Discharge: HOME | DRG: 908 | End: 2024-03-31
Attending: INTERNAL MEDICINE | Admitting: INTERNAL MEDICINE
Payer: MEDICARE

## 2024-03-28 DIAGNOSIS — L02.213 CHEST WALL ABSCESS: ICD-10-CM

## 2024-03-28 DIAGNOSIS — R07.89 CHEST WALL PAIN: ICD-10-CM

## 2024-03-28 DIAGNOSIS — T80.212A LOCAL INFECTION DUE TO PORT-A-CATH, INITIAL ENCOUNTER: Primary | ICD-10-CM

## 2024-03-28 LAB
ANION GAP SERPL CALC-SCNC: 12 MEQ/L (ref 8–16)
BASOPHILS ABSOLUTE: 0 THOU/MM3 (ref 0–0.1)
BASOPHILS NFR BLD AUTO: 0.4 %
BUN SERPL-MCNC: 12 MG/DL (ref 7–22)
CALCIUM SERPL-MCNC: 9.1 MG/DL (ref 8.5–10.5)
CHLORIDE SERPL-SCNC: 102 MEQ/L (ref 98–111)
CO2 SERPL-SCNC: 23 MEQ/L (ref 23–33)
CREAT SERPL-MCNC: 0.7 MG/DL (ref 0.4–1.2)
DEPRECATED RDW RBC AUTO: 41.6 FL (ref 35–45)
EOSINOPHIL NFR BLD AUTO: 1.7 %
EOSINOPHILS ABSOLUTE: 0.2 THOU/MM3 (ref 0–0.4)
ERYTHROCYTE [DISTWIDTH] IN BLOOD BY AUTOMATED COUNT: 15.2 % (ref 11.5–14.5)
GFR SERPL CREATININE-BSD FRML MDRD: > 90 ML/MIN/1.73M2
GLUCOSE BLD STRIP.AUTO-MCNC: 106 MG/DL (ref 70–108)
GLUCOSE BLD STRIP.AUTO-MCNC: 119 MG/DL (ref 70–108)
GLUCOSE BLD STRIP.AUTO-MCNC: 71 MG/DL (ref 70–108)
GLUCOSE BLD STRIP.AUTO-MCNC: 76 MG/DL (ref 70–108)
GLUCOSE SERPL-MCNC: 96 MG/DL (ref 70–108)
HCT VFR BLD AUTO: 40.5 % (ref 37–47)
HGB BLD-MCNC: 12.8 GM/DL (ref 12–16)
IMM GRANULOCYTES # BLD AUTO: 0.04 THOU/MM3 (ref 0–0.07)
IMM GRANULOCYTES NFR BLD AUTO: 0.4 %
LACTATE SERPL-SCNC: 0.8 MMOL/L (ref 0.5–2)
LYMPHOCYTES ABSOLUTE: 2.9 THOU/MM3 (ref 1–4.8)
LYMPHOCYTES NFR BLD AUTO: 25.8 %
MCH RBC QN AUTO: 24.2 PG (ref 26–33)
MCHC RBC AUTO-ENTMCNC: 31.6 GM/DL (ref 32.2–35.5)
MCV RBC AUTO: 76.4 FL (ref 81–99)
MONOCYTES ABSOLUTE: 0.7 THOU/MM3 (ref 0.4–1.3)
MONOCYTES NFR BLD AUTO: 6.5 %
NEUTROPHILS NFR BLD AUTO: 65.2 %
NRBC BLD AUTO-RTO: 0 /100 WBC
OSMOLALITY SERPL CALC.SUM OF ELEC: 273.4 MOSMOL/KG (ref 275–300)
PLATELET # BLD AUTO: 289 THOU/MM3 (ref 130–400)
PMV BLD AUTO: 9.9 FL (ref 9.4–12.4)
POTASSIUM SERPL-SCNC: 4 MEQ/L (ref 3.5–5.2)
PROCALCITONIN SERPL IA-MCNC: < 0.02 NG/ML (ref 0.01–0.09)
RBC # BLD AUTO: 5.3 MILL/MM3 (ref 4.2–5.4)
SEGMENTED NEUTROPHILS ABSOLUTE COUNT: 7.4 THOU/MM3 (ref 1.8–7.7)
SODIUM SERPL-SCNC: 137 MEQ/L (ref 135–145)
TROPONIN, HIGH SENSITIVITY: < 6 NG/L (ref 0–12)
WBC # BLD AUTO: 11.3 THOU/MM3 (ref 4.8–10.8)

## 2024-03-28 PROCEDURE — 87040 BLOOD CULTURE FOR BACTERIA: CPT

## 2024-03-28 PROCEDURE — 36415 COLL VENOUS BLD VENIPUNCTURE: CPT

## 2024-03-28 PROCEDURE — 80048 BASIC METABOLIC PNL TOTAL CA: CPT

## 2024-03-28 PROCEDURE — 6360000002 HC RX W HCPCS: Performed by: STUDENT IN AN ORGANIZED HEALTH CARE EDUCATION/TRAINING PROGRAM

## 2024-03-28 PROCEDURE — 93005 ELECTROCARDIOGRAM TRACING: CPT | Performed by: INTERNAL MEDICINE

## 2024-03-28 PROCEDURE — 77012 CT SCAN FOR NEEDLE BIOPSY: CPT

## 2024-03-28 PROCEDURE — 96366 THER/PROPH/DIAG IV INF ADDON: CPT

## 2024-03-28 PROCEDURE — 6360000002 HC RX W HCPCS: Performed by: INTERNAL MEDICINE

## 2024-03-28 PROCEDURE — 96376 TX/PRO/DX INJ SAME DRUG ADON: CPT

## 2024-03-28 PROCEDURE — 85025 COMPLETE CBC W/AUTO DIFF WBC: CPT

## 2024-03-28 PROCEDURE — 2580000003 HC RX 258: Performed by: NURSE PRACTITIONER

## 2024-03-28 PROCEDURE — 99285 EMERGENCY DEPT VISIT HI MDM: CPT

## 2024-03-28 PROCEDURE — 84484 ASSAY OF TROPONIN QUANT: CPT

## 2024-03-28 PROCEDURE — 6370000000 HC RX 637 (ALT 250 FOR IP): Performed by: NURSE PRACTITIONER

## 2024-03-28 PROCEDURE — 93010 ELECTROCARDIOGRAM REPORT: CPT | Performed by: INTERNAL MEDICINE

## 2024-03-28 PROCEDURE — 6360000002 HC RX W HCPCS: Performed by: PHYSICIAN ASSISTANT

## 2024-03-28 PROCEDURE — 6370000000 HC RX 637 (ALT 250 FOR IP): Performed by: STUDENT IN AN ORGANIZED HEALTH CARE EDUCATION/TRAINING PROGRAM

## 2024-03-28 PROCEDURE — 84145 PROCALCITONIN (PCT): CPT

## 2024-03-28 PROCEDURE — 96375 TX/PRO/DX INJ NEW DRUG ADDON: CPT

## 2024-03-28 PROCEDURE — 71046 X-RAY EXAM CHEST 2 VIEWS: CPT

## 2024-03-28 PROCEDURE — 6360000002 HC RX W HCPCS: Performed by: RADIOLOGY

## 2024-03-28 PROCEDURE — 82948 REAGENT STRIP/BLOOD GLUCOSE: CPT

## 2024-03-28 PROCEDURE — 1200000000 HC SEMI PRIVATE

## 2024-03-28 PROCEDURE — 96367 TX/PROPH/DG ADDL SEQ IV INF: CPT

## 2024-03-28 PROCEDURE — 96365 THER/PROPH/DIAG IV INF INIT: CPT

## 2024-03-28 PROCEDURE — 2580000003 HC RX 258: Performed by: INTERNAL MEDICINE

## 2024-03-28 PROCEDURE — 0JC60ZZ EXTIRPATION OF MATTER FROM CHEST SUBCUTANEOUS TISSUE AND FASCIA, OPEN APPROACH: ICD-10-PCS | Performed by: RADIOLOGY

## 2024-03-28 PROCEDURE — 99223 1ST HOSP IP/OBS HIGH 75: CPT | Performed by: INTERNAL MEDICINE

## 2024-03-28 PROCEDURE — 83605 ASSAY OF LACTIC ACID: CPT

## 2024-03-28 PROCEDURE — 71250 CT THORAX DX C-: CPT

## 2024-03-28 PROCEDURE — 6360000002 HC RX W HCPCS: Performed by: NURSE PRACTITIONER

## 2024-03-28 PROCEDURE — 2580000003 HC RX 258: Performed by: STUDENT IN AN ORGANIZED HEALTH CARE EDUCATION/TRAINING PROGRAM

## 2024-03-28 RX ORDER — PRAZOSIN HYDROCHLORIDE 1 MG/1
1 CAPSULE ORAL NIGHTLY
Status: DISCONTINUED | OUTPATIENT
Start: 2024-03-28 | End: 2024-03-31 | Stop reason: HOSPADM

## 2024-03-28 RX ORDER — MORPHINE SULFATE 10 MG/ML
6 INJECTION, SOLUTION INTRAMUSCULAR; INTRAVENOUS ONCE
Status: COMPLETED | OUTPATIENT
Start: 2024-03-28 | End: 2024-03-28

## 2024-03-28 RX ORDER — MONTELUKAST SODIUM 10 MG/1
10 TABLET ORAL NIGHTLY
Status: DISCONTINUED | OUTPATIENT
Start: 2024-03-28 | End: 2024-03-31 | Stop reason: HOSPADM

## 2024-03-28 RX ORDER — MORPHINE SULFATE 2 MG/ML
2 INJECTION, SOLUTION INTRAMUSCULAR; INTRAVENOUS
Status: DISCONTINUED | OUTPATIENT
Start: 2024-03-28 | End: 2024-03-28

## 2024-03-28 RX ORDER — SODIUM CHLORIDE 0.9 % (FLUSH) 0.9 %
10 SYRINGE (ML) INJECTION PRN
Status: DISCONTINUED | OUTPATIENT
Start: 2024-03-28 | End: 2024-03-31 | Stop reason: HOSPADM

## 2024-03-28 RX ORDER — FAMOTIDINE 20 MG/1
40 TABLET, FILM COATED ORAL NIGHTLY
Status: DISCONTINUED | OUTPATIENT
Start: 2024-03-28 | End: 2024-03-31 | Stop reason: HOSPADM

## 2024-03-28 RX ORDER — MORPHINE SULFATE 2 MG/ML
1 INJECTION, SOLUTION INTRAMUSCULAR; INTRAVENOUS
Status: DISCONTINUED | OUTPATIENT
Start: 2024-03-28 | End: 2024-03-28

## 2024-03-28 RX ORDER — SUMATRIPTAN 50 MG/1
25 TABLET, FILM COATED ORAL SEE ADMIN INSTRUCTIONS
Status: DISCONTINUED | OUTPATIENT
Start: 2024-03-28 | End: 2024-03-31 | Stop reason: HOSPADM

## 2024-03-28 RX ORDER — TRAMADOL HYDROCHLORIDE 50 MG/1
50 TABLET ORAL ONCE
Status: COMPLETED | OUTPATIENT
Start: 2024-03-28 | End: 2024-03-28

## 2024-03-28 RX ORDER — MIDAZOLAM HYDROCHLORIDE 1 MG/ML
INJECTION INTRAMUSCULAR; INTRAVENOUS PRN
Status: COMPLETED | OUTPATIENT
Start: 2024-03-28 | End: 2024-03-28

## 2024-03-28 RX ORDER — MIRTAZAPINE 15 MG/1
15 TABLET, FILM COATED ORAL NIGHTLY
COMMUNITY
Start: 2024-02-11 | End: 2024-04-11

## 2024-03-28 RX ORDER — DEXTROSE AND SODIUM CHLORIDE 5; .45 G/100ML; G/100ML
INJECTION, SOLUTION INTRAVENOUS CONTINUOUS
Status: DISCONTINUED | OUTPATIENT
Start: 2024-03-28 | End: 2024-03-28

## 2024-03-28 RX ORDER — BACLOFEN 10 MG/1
10 TABLET ORAL DAILY
Status: DISCONTINUED | OUTPATIENT
Start: 2024-03-28 | End: 2024-03-31 | Stop reason: HOSPADM

## 2024-03-28 RX ORDER — SPIRONOLACTONE 25 MG/1
25 TABLET ORAL DAILY
Status: DISCONTINUED | OUTPATIENT
Start: 2024-03-28 | End: 2024-03-31 | Stop reason: HOSPADM

## 2024-03-28 RX ORDER — ALBUTEROL SULFATE 2.5 MG/3ML
2.5 SOLUTION RESPIRATORY (INHALATION) EVERY 6 HOURS PRN
Status: DISCONTINUED | OUTPATIENT
Start: 2024-03-28 | End: 2024-03-31 | Stop reason: HOSPADM

## 2024-03-28 RX ORDER — ARIPIPRAZOLE 10 MG/1
10 TABLET ORAL DAILY
Status: DISCONTINUED | OUTPATIENT
Start: 2024-03-28 | End: 2024-03-31 | Stop reason: HOSPADM

## 2024-03-28 RX ORDER — PANTOPRAZOLE SODIUM 40 MG/1
40 TABLET, DELAYED RELEASE ORAL
Status: DISCONTINUED | OUTPATIENT
Start: 2024-03-29 | End: 2024-03-31 | Stop reason: HOSPADM

## 2024-03-28 RX ORDER — MAGNESIUM SULFATE IN WATER 40 MG/ML
2000 INJECTION, SOLUTION INTRAVENOUS PRN
Status: DISCONTINUED | OUTPATIENT
Start: 2024-03-28 | End: 2024-03-31 | Stop reason: HOSPADM

## 2024-03-28 RX ORDER — MORPHINE SULFATE 2 MG/ML
1 INJECTION, SOLUTION INTRAMUSCULAR; INTRAVENOUS EVERY 6 HOURS PRN
Status: DISCONTINUED | OUTPATIENT
Start: 2024-03-28 | End: 2024-03-28

## 2024-03-28 RX ORDER — MORPHINE SULFATE 2 MG/ML
1 INJECTION, SOLUTION INTRAMUSCULAR; INTRAVENOUS EVERY 4 HOURS PRN
Status: DISCONTINUED | OUTPATIENT
Start: 2024-03-28 | End: 2024-03-28

## 2024-03-28 RX ORDER — PROMETHAZINE HYDROCHLORIDE 6.25 MG/5ML
12.5 SYRUP ORAL EVERY 8 HOURS PRN
Status: DISCONTINUED | OUTPATIENT
Start: 2024-03-28 | End: 2024-03-31 | Stop reason: HOSPADM

## 2024-03-28 RX ORDER — SODIUM CHLORIDE 0.9 % (FLUSH) 0.9 %
5-40 SYRINGE (ML) INJECTION EVERY 12 HOURS SCHEDULED
Status: DISCONTINUED | OUTPATIENT
Start: 2024-03-28 | End: 2024-03-31 | Stop reason: HOSPADM

## 2024-03-28 RX ORDER — SODIUM CHLORIDE 9 MG/ML
INJECTION, SOLUTION INTRAVENOUS PRN
Status: DISCONTINUED | OUTPATIENT
Start: 2024-03-28 | End: 2024-03-31 | Stop reason: HOSPADM

## 2024-03-28 RX ORDER — AMLODIPINE BESYLATE 5 MG/1
5 TABLET ORAL DAILY
Status: DISCONTINUED | OUTPATIENT
Start: 2024-03-28 | End: 2024-03-31 | Stop reason: HOSPADM

## 2024-03-28 RX ORDER — DOCUSATE SODIUM 100 MG/1
100 CAPSULE, LIQUID FILLED ORAL DAILY
Status: DISCONTINUED | OUTPATIENT
Start: 2024-03-28 | End: 2024-03-31 | Stop reason: HOSPADM

## 2024-03-28 RX ORDER — PROMETHAZINE HYDROCHLORIDE 25 MG/1
12.5 TABLET ORAL EVERY 6 HOURS PRN
Status: DISCONTINUED | OUTPATIENT
Start: 2024-03-28 | End: 2024-03-31 | Stop reason: HOSPADM

## 2024-03-28 RX ORDER — BUSPIRONE HYDROCHLORIDE 5 MG/1
5 TABLET ORAL 3 TIMES DAILY
Status: DISCONTINUED | OUTPATIENT
Start: 2024-03-28 | End: 2024-03-31 | Stop reason: HOSPADM

## 2024-03-28 RX ORDER — HYDROMORPHONE HYDROCHLORIDE 2 MG/1
1 TABLET ORAL EVERY 4 HOURS PRN
Status: DISCONTINUED | OUTPATIENT
Start: 2024-03-28 | End: 2024-03-28

## 2024-03-28 RX ORDER — INSULIN LISPRO 100 [IU]/ML
0-4 INJECTION, SOLUTION INTRAVENOUS; SUBCUTANEOUS
Status: DISCONTINUED | OUTPATIENT
Start: 2024-03-28 | End: 2024-03-31 | Stop reason: HOSPADM

## 2024-03-28 RX ORDER — POLYETHYLENE GLYCOL 3350 17 G/17G
17 POWDER, FOR SOLUTION ORAL DAILY PRN
Status: DISCONTINUED | OUTPATIENT
Start: 2024-03-28 | End: 2024-03-31 | Stop reason: HOSPADM

## 2024-03-28 RX ORDER — PROPRANOLOL HYDROCHLORIDE 80 MG/1
80 CAPSULE, EXTENDED RELEASE ORAL DAILY
Status: DISCONTINUED | OUTPATIENT
Start: 2024-03-28 | End: 2024-03-31 | Stop reason: HOSPADM

## 2024-03-28 RX ORDER — MORPHINE SULFATE 4 MG/ML
4 INJECTION, SOLUTION INTRAMUSCULAR; INTRAVENOUS ONCE
Status: COMPLETED | OUTPATIENT
Start: 2024-03-28 | End: 2024-03-28

## 2024-03-28 RX ORDER — POTASSIUM CHLORIDE 20 MEQ/1
40 TABLET, EXTENDED RELEASE ORAL PRN
Status: DISCONTINUED | OUTPATIENT
Start: 2024-03-28 | End: 2024-03-31 | Stop reason: HOSPADM

## 2024-03-28 RX ORDER — TOPIRAMATE 100 MG/1
100 TABLET, FILM COATED ORAL 2 TIMES DAILY
Status: DISCONTINUED | OUTPATIENT
Start: 2024-03-28 | End: 2024-03-31 | Stop reason: HOSPADM

## 2024-03-28 RX ORDER — MORPHINE SULFATE 4 MG/ML
4 INJECTION, SOLUTION INTRAMUSCULAR; INTRAVENOUS
Status: DISCONTINUED | OUTPATIENT
Start: 2024-03-28 | End: 2024-03-28

## 2024-03-28 RX ORDER — ESOMEPRAZOLE MAGNESIUM 20 MG/1
20 GRANULE, DELAYED RELEASE ORAL 2 TIMES DAILY
COMMUNITY
Start: 2024-03-05 | End: 2024-07-03

## 2024-03-28 RX ORDER — METRONIDAZOLE 500 MG/100ML
500 INJECTION, SOLUTION INTRAVENOUS EVERY 8 HOURS
Status: DISCONTINUED | OUTPATIENT
Start: 2024-03-28 | End: 2024-03-28

## 2024-03-28 RX ORDER — DIPHENHYDRAMINE HYDROCHLORIDE 50 MG/ML
50 INJECTION INTRAMUSCULAR; INTRAVENOUS ONCE
Status: COMPLETED | OUTPATIENT
Start: 2024-03-28 | End: 2024-03-28

## 2024-03-28 RX ORDER — SUCRALFATE 1 G/1
1 TABLET ORAL
Status: DISCONTINUED | OUTPATIENT
Start: 2024-03-28 | End: 2024-03-31 | Stop reason: HOSPADM

## 2024-03-28 RX ORDER — POTASSIUM CHLORIDE 7.45 MG/ML
10 INJECTION INTRAVENOUS PRN
Status: DISCONTINUED | OUTPATIENT
Start: 2024-03-28 | End: 2024-03-31 | Stop reason: HOSPADM

## 2024-03-28 RX ORDER — ENOXAPARIN SODIUM 100 MG/ML
30 INJECTION SUBCUTANEOUS 2 TIMES DAILY
Status: DISCONTINUED | OUTPATIENT
Start: 2024-03-28 | End: 2024-03-28

## 2024-03-28 RX ORDER — FLUTICASONE PROPIONATE 110 UG/1
2 AEROSOL, METERED RESPIRATORY (INHALATION) EVERY 4 HOURS PRN
Status: DISCONTINUED | OUTPATIENT
Start: 2024-03-28 | End: 2024-03-31 | Stop reason: HOSPADM

## 2024-03-28 RX ORDER — INSULIN LISPRO 100 [IU]/ML
0-4 INJECTION, SOLUTION INTRAVENOUS; SUBCUTANEOUS NIGHTLY
Status: DISCONTINUED | OUTPATIENT
Start: 2024-03-28 | End: 2024-03-31 | Stop reason: HOSPADM

## 2024-03-28 RX ORDER — ESCITALOPRAM OXALATE 20 MG/1
20 TABLET ORAL DAILY
Status: DISCONTINUED | OUTPATIENT
Start: 2024-03-28 | End: 2024-03-31 | Stop reason: HOSPADM

## 2024-03-28 RX ADMIN — MORPHINE SULFATE 2 MG: 2 INJECTION, SOLUTION INTRAMUSCULAR; INTRAVENOUS at 12:54

## 2024-03-28 RX ADMIN — MIDAZOLAM 1 MG: 1 INJECTION INTRAMUSCULAR; INTRAVENOUS at 14:25

## 2024-03-28 RX ADMIN — HYDROMORPHONE HYDROCHLORIDE 1 MG: 1 INJECTION, SOLUTION INTRAMUSCULAR; INTRAVENOUS; SUBCUTANEOUS at 14:25

## 2024-03-28 RX ADMIN — PRAZOSIN HYDROCHLORIDE 1 MG: 1 CAPSULE ORAL at 21:00

## 2024-03-28 RX ADMIN — DEXTROSE AND SODIUM CHLORIDE: 5; 450 INJECTION, SOLUTION INTRAVENOUS at 13:19

## 2024-03-28 RX ADMIN — FAMOTIDINE 40 MG: 20 TABLET, FILM COATED ORAL at 20:58

## 2024-03-28 RX ADMIN — BUSPIRONE HYDROCHLORIDE 5 MG: 5 TABLET ORAL at 21:00

## 2024-03-28 RX ADMIN — MONTELUKAST SODIUM 10 MG: 10 TABLET ORAL at 21:00

## 2024-03-28 RX ADMIN — VANCOMYCIN HYDROCHLORIDE 1500 MG: 5 INJECTION, POWDER, LYOPHILIZED, FOR SOLUTION INTRAVENOUS at 18:47

## 2024-03-28 RX ADMIN — MORPHINE SULFATE 4 MG: 4 INJECTION, SOLUTION INTRAMUSCULAR; INTRAVENOUS at 10:45

## 2024-03-28 RX ADMIN — TRAMADOL HYDROCHLORIDE 50 MG: 50 TABLET ORAL at 03:06

## 2024-03-28 RX ADMIN — HYDROMORPHONE HYDROCHLORIDE 0.25 MG: 1 INJECTION, SOLUTION INTRAMUSCULAR; INTRAVENOUS; SUBCUTANEOUS at 20:55

## 2024-03-28 RX ADMIN — DIPHENHYDRAMINE HYDROCHLORIDE 50 MG: 50 INJECTION, SOLUTION INTRAMUSCULAR; INTRAVENOUS at 04:11

## 2024-03-28 RX ADMIN — AMLODIPINE BESYLATE 5 MG: 5 TABLET ORAL at 16:45

## 2024-03-28 RX ADMIN — MORPHINE SULFATE 6 MG: 10 INJECTION, SOLUTION INTRAMUSCULAR; INTRAVENOUS at 04:15

## 2024-03-28 RX ADMIN — SUCRALFATE 1 G: 1 TABLET ORAL at 21:00

## 2024-03-28 RX ADMIN — SODIUM CHLORIDE, PRESERVATIVE FREE 10 ML: 5 INJECTION INTRAVENOUS at 21:01

## 2024-03-28 RX ADMIN — TOPIRAMATE 100 MG: 100 TABLET, FILM COATED ORAL at 21:01

## 2024-03-28 RX ADMIN — MORPHINE SULFATE 2 MG: 2 INJECTION, SOLUTION INTRAMUSCULAR; INTRAVENOUS at 15:18

## 2024-03-28 RX ADMIN — MORPHINE SULFATE 1 MG: 2 INJECTION, SOLUTION INTRAMUSCULAR; INTRAVENOUS at 17:28

## 2024-03-28 RX ADMIN — CEFEPIME 2000 MG: 2 INJECTION, POWDER, FOR SOLUTION INTRAVENOUS at 12:14

## 2024-03-28 RX ADMIN — SODIUM CHLORIDE, PRESERVATIVE FREE 10 ML: 5 INJECTION INTRAVENOUS at 10:45

## 2024-03-28 RX ADMIN — CEFEPIME 2000 MG: 2 INJECTION, POWDER, FOR SOLUTION INTRAVENOUS at 21:08

## 2024-03-28 RX ADMIN — CEFTRIAXONE SODIUM 2000 MG: 2 INJECTION, POWDER, FOR SOLUTION INTRAMUSCULAR; INTRAVENOUS at 04:36

## 2024-03-28 RX ADMIN — VANCOMYCIN HYDROCHLORIDE 2000 MG: 1 INJECTION, POWDER, LYOPHILIZED, FOR SOLUTION INTRAVENOUS at 05:07

## 2024-03-28 RX ADMIN — MORPHINE SULFATE 4 MG: 4 INJECTION, SOLUTION INTRAMUSCULAR; INTRAVENOUS at 07:13

## 2024-03-28 ASSESSMENT — PAIN - FUNCTIONAL ASSESSMENT
PAIN_FUNCTIONAL_ASSESSMENT: 0-10
PAIN_FUNCTIONAL_ASSESSMENT: ACTIVITIES ARE NOT PREVENTED
PAIN_FUNCTIONAL_ASSESSMENT: 0-10

## 2024-03-28 ASSESSMENT — PAIN SCALES - GENERAL
PAINLEVEL_OUTOF10: 10
PAINLEVEL_OUTOF10: 9
PAINLEVEL_OUTOF10: 10
PAINLEVEL_OUTOF10: 7
PAINLEVEL_OUTOF10: 7
PAINLEVEL_OUTOF10: 9
PAINLEVEL_OUTOF10: 10

## 2024-03-28 ASSESSMENT — PAIN SCALES - WONG BAKER: WONGBAKER_NUMERICALRESPONSE: NO HURT

## 2024-03-28 ASSESSMENT — PAIN DESCRIPTION - LOCATION
LOCATION: CHEST

## 2024-03-28 ASSESSMENT — PAIN DESCRIPTION - ORIENTATION
ORIENTATION: RIGHT;LEFT
ORIENTATION: MID
ORIENTATION: LEFT
ORIENTATION: LEFT

## 2024-03-28 ASSESSMENT — PAIN DESCRIPTION - DESCRIPTORS
DESCRIPTORS: ACHING;DISCOMFORT
DESCRIPTORS: BURNING
DESCRIPTORS: BURNING;SHARP
DESCRIPTORS: ACHING
DESCRIPTORS: THROBBING

## 2024-03-28 ASSESSMENT — PAIN DESCRIPTION - FREQUENCY: FREQUENCY: CONTINUOUS

## 2024-03-28 ASSESSMENT — PAIN DESCRIPTION - ONSET: ONSET: ON-GOING

## 2024-03-28 ASSESSMENT — PAIN DESCRIPTION - PAIN TYPE: TYPE: ACUTE PAIN;SURGICAL PAIN

## 2024-03-28 NOTE — PROGRESS NOTES
1345 Pt arrived to CT for foreign body removal. Procedure explained using teach back method. Pt states understanding.  1400 Dr Cheng into assess patient and explain procedure.  1409 This procedure has been fully reviewed with the patient and written informed consent has been obtained.   1413 Patient assisted to table in supine position. Monitor attached to patient. Comfort ensured.  1419 Pre-procedure images obtained.  1430 Procedure begins.  1440 Procedure complete.   1441 Post-procedure images obtained.  1446 Suture, dermabond, steri strips, guaze and sorba view applid to incision.   1448 Monitor removed. Patient assisted to bed in semi fowlers position. Comfort ensured.  1452 Patient transported to 8B in stable condition. Report called to Jim BYRNE on 8B.

## 2024-03-28 NOTE — H&P
radiopaque density which measures approximately 10 x 5 mm and is seen on axial image 15 and coronal image 21. In association with this is an approximately 2.3 cm collection of fluid and an associated gas bubble. This cylindrical density is thought to represent a detached component of the Port-A-Cath, and the reservoir portion of the Port-A-Cath is suspected to have moved medially when correlated with multiple prior chest x-rays. No evidence of a pneumothorax or pneumomediastinum. A tiny nodule in the left upper lobe measures approximately 2-3 mm on axial image 16. No focal infiltrate or consolidation. No pleural effusion or lymphadenopathy. Cardiac size is within normal limits. Thoracic aorta is normal in caliber without evidence of an aneurysm. Cholecystectomy clips are noted. Bone windows demonstrate no acute abnormalities.     1. The reservoir portion of the Port-A-Cath is suspected to have moved medially when correlated with multiple prior chest x-ray studies. Separate from this, in the right anterior subcutaneous tissues of the chest is a 10 x 5 mm radiopaque density with an associated 2.3 cm collection of fluid, and an associated gas bubble. This radiopaque density is suspected to represent a detached component of the Port-A-Cath. Regarding the associated collection of fluid and an associated gas bubble, an infectious etiology is suspected. 2. Consultation with a surgeon is recommended to determine appropriate management. 3. There is a tiny 2-3 mm left upper lobe pulmonary nodule. This can be followed up according to the revised Fleischner criteria. 4. Additional findings are detailed above. This document has been electronically signed by: Miguel Angel Ross M.D. on 03/28/2024 03:50 AM All CTs at this facility use dose modulation techniques and iterative reconstructions, and/or weight-based dosing when appropriate to reduce radiation to a low as reasonably achievable.    XR CHEST (2 VW)    Result Date:

## 2024-03-28 NOTE — ED TRIAGE NOTES
Patient presents to ED from home with complaints of chest pain and incision pain. Patient states Mediport placed for Sickle cell diagnosis around two weeks ago and has had \"chest pain\" ever since. Patient states chest pain is all over and states the pain is constant. Patient states she feels like the Mediport is \"sticking out further than it was\". Patient states port was placed by Dr. Matthew and was unable to contact him for guidance. Patient denies any puss or oozing from site, states pain is a 10/10 at this time. Patient also states that she has increasing pain around the site where her old port had been removed. Patient is alert and oriented, ambulatory, and VSS at this time.

## 2024-03-28 NOTE — PROGRESS NOTES
1728: Patient given pain medication per patient request. Patient voiced dietary concerns to this RN again- patient does not want food offered here and wants to order food. This RN notified patient that she was unable at this time to run downstairs to  food. This RN notified patient that I or another staff member could try to go down when available. PCA in room at this time during offer. This RN offered patient something for headache due to patient complaint but patient stated caffeine would fix her headache. This RN offered patient a diet pop, but patient denied. This RN reached out to physician in regards to patient request. Patient requested this RN not come back into room. Notified charge of situation.

## 2024-03-28 NOTE — ED PROVIDER NOTES
Addendum: Care was assumed at 6 AM.  I did consult interventional radiology about this case.  It looks like there was a retained foreign body and possible abscess.  It was going to be likely 4 hours prior to getting the patient to the suite.  He did recommend potentially admitting the patient.  I did admit the patient.  Case was discussed with hospital service.    Final Diagnosis:   Infected port       Tadeo Rooney PA  03/28/24 3150

## 2024-03-28 NOTE — ED NOTES
Bedside report given to Yenni BYRNE. Patient resting on cot. Call light in reach.  
Patient VS stable. Patient telemetry in place. Patient stating \"my chest is hurting its a 10.\" At this time. Will notify provider. Call light in reach. Lights dimmed for comfort.   
Patient ambulated to bathroom at this time.   
Patient complaining of chest pain at this time. Provider notified.  
Patient medicated per MAR. Patient going to CT at this time.   
Patient port de-accessed at this time.   
Patient resting in bed. Respirations easy and unlabored. No distress noted. Call light within reach.  
Patient resting in bed. Respirations easy and unlabored. No distress noted. Call light within reach.    
Patient resting on cot. Patient given warm blanket. VS stable. Call light in reach. IV medications running at this time.   
Patient resting on cot. VS obtained. Telemetry in place. Lights dimmed for comfort. Patient covered with blanket. Call light in reach. Patient denies needs at this time.   
Patient returned from CT.  
Patient states her pain is 9/10 at this time. Provider notified. VS stable. Warm blanket given. Lights dimmed for comfort. Call light in reach.   
Pt medicated at this time. Pt states her chest pain is a 10/10 however pt also appeared to be falling asleep. VSS. Call light in reach. No distress noted. RR even and unlabored.   
Pt medicated per MAR. Pt states her chest pain is a \"10\" however pt appears to fall asleep   
Pt resting on cot. No distress noted. Call light in reach.   
Pt transported to 8B by cart in stable condition.   Called 8B and informed Kerry that the patient was on their way to the unit.   
Report received from Susie BYRNE.  
AAA (abdominal aortic aneurysm)    Anxiety    Brain aneurysm    CVA (cerebral vascular accident)    Depression    DM (diabetes mellitus)    HLD (hyperlipidemia)    HTN (hypertension)    Hypothyroid    Panic disorder    Vertigo
UPPER GASTROINTESTINAL ENDOSCOPY N/A 5/24/2023    EGD performed by Iron Michel MD at [facility]    UPPER GASTROINTESTINAL ENDOSCOPY N/A 10/12/2023    EGD performed by Frida Elaine MD at [facility] ENDOS[facility]    VENTRAL HERNIA REPAIR N/A 8/22/2022    OPEN LEFT ABDOMINAL PORT SITE HERNIA REPAIR WITH MESH performed by Iron Michel MD at [facility] OR    WISDOM TOOTH EXTRACTION         PAST MEDICAL HISTORY       Past Medical History:   Diagnosis Date    Acute on chronic diastolic congestive heart failure ([facility]) 06/25/2022    JANI (acute kidney injury) ([facility]) 07/07/2019    Anemia     Anesthesia     migraines    Anxiety     Asthma     CAD (coronary artery disease)     Depression     Diabetes ([facility])     Diet-controlled type 2 diabetes mellitus ([facility]) 2016    Dyslipidemia 11/14/2016    Epilepsy ([facility])     last siezure is 2 years ago    Epilepsy ([facility])     Fibroids     Gastro - esophageal reflux disease     Gastroparesis     History of esophagogastroduodenoscopy (EGD) 08/13/2022    Hypertension     Hypertrophy of tonsil and adenoid 11/04/2017    Malignant carcinoid tumor of other sites ([facility]) 05/14/2013    Migraine     PONV (postoperative nausea and vomiting)     Prolonged emergence from general anesthesia     Sickle cell anemia (HCC)     Sickle cell trait ([facility])     PT STATES SHE HAS THE TRAIT NOT THE DISEASE    Tumor associated pain     neuroendrocrine tumor, gastroma           Electronically signed by Yenni Swan RN on 3/28/2024 at 9:10 AM

## 2024-03-28 NOTE — CONSULTS
morphine **OR** morphine  Allergies:   ALLERGIES:    Latex, Dicyclomine, Fioricet [butalbital-apap-caffeine], Gabapentin, Ibuprofen [ibuprofen], Ketorolac tromethamine, Oxycodone, Pcn [penicillins], Sulfamethoxazole, Trimethoprim, Losartan, Mushroom extract complex, Zofran [ondansetron], Compazine [prochlorperazine], Adhesive tape, Amoxicillin, Buspirone, Ciprofloxacin, Compazine [prochlorperazine maleate], Fentanyl, Flexeril [cyclobenzaprine], Haldol [haloperidol], Hydrochlorothiazide, Keflex [cephalexin], Ketamine, Lisinopril, Lorazepam, Macrobid [nitrofurantoin monohyd macro], Reglan [metoclopramide], Vicodin [hydrocodone-acetaminophen], and Vistaril [hydroxyzine hcl]        SOCIAL HISTORY:     TOBACCO:   reports that she has been smoking cigarettes. She started smoking about 18 years ago. She has a 9.1 pack-year smoking history. She has never used smokeless tobacco.     ETOH:   reports that she does not currently use alcohol.  Patient currently lives with family        FAMILY HISTORY:         Problem Relation Age of Onset    Cancer Mother     High Blood Pressure Mother     Heart Disease Mother         MI    Liver Cancer Mother     Sickle Cell Trait Father     High Blood Pressure Father     Heart Disease Father     Sickle Cell Trait Sister     Heart Disease Sister     Sickle Cell Trait Sister     Sickle Cell Trait Brother     Sickle Cell Trait Brother     Heart Attack Paternal Uncle     Diabetes Maternal Grandmother     Depression Maternal Grandmother     Heart Disease Maternal Grandmother         CHF    Colon Cancer Cousin     Stroke Neg Hx     Esophageal Cancer Neg Hx     Rectal Cancer Neg Hx     Stomach Cancer Neg Hx        REVIEW OF SYSTEMS:     Constitutional: +fever, no night sweats, no fatigue, no weight loss.  Head: no head ache , no head injury, no migranes.  Eye: no eye discharge, blurring of vision, no double vision,no eye pain.  Ears: no hearing difficulty, no tinnitus  Mouth/throat: no ulceration,

## 2024-03-28 NOTE — PROGRESS NOTES
[facility]   Pharmacy Pharmacokinetic Monitoring Service - Vancomycin     Samantha Nichols is a 33 y.o. female starting on vancomycin therapy for Mediport infection. Pharmacy consulted by Dr. Olmos for monitoring and adjustment.    Target Concentration: Goal AUC/ORLANDO 400-600 mg*hr/L    Additional Antimicrobials: cefepime + flagyl    Pertinent Laboratory Values:   Wt Readings from Last 1 Encounters:   03/28/24 130.6 kg (288 lb)     Temp Readings from Last 1 Encounters:   03/28/24 98 °F (36.7 °C) (Oral)     Estimated Creatinine Clearance: 154 mL/min (based on SCr of 0.7 mg/dL).  Recent Labs     03/28/24  0218   CREATININE 0.7   BUN 12   WBC 11.3*     Pertinent Cultures:  Date Source Results   3/28/24 BCx2 ngtd   MRSA Nasal Swab: N/A. Non-respiratory infection.    Plan:  Dosing recommendations based on Bayesian software  Patient received vancomycin 2000mg x 1 3/28/24 @ 0507, will start vancomycin 1500mg q12h  Anticipated AUC of 529 and trough concentration of 13.3 at steady state  Renal labs as indicated   Vancomycin concentration ordered for 3/29/24 @ 0600   Pharmacy will continue to monitor patient and adjust therapy as indicated    Thank you for the consult,  Didi YO.Ph., BCPS., 3/28/2024,10:50 AM

## 2024-03-28 NOTE — PROGRESS NOTES
Patient arrived to floor at 1452- diet request sent to physician for patient. Patient stated to this RN she wanted to leave the floor to get money off of her card to get food- this RN notified patient that she is getting IV pain medication and she wouldn't be able to leave at this time.   Patient stated to PCA that she \" had a headache because she couldn't have caffeine and leave the floor to get it.\" Patient also stated that \" RN was messing with my sugar because she won't let me leave the floor to get food.\" Diet order placed for patient and dietary in room to receive patient order- Patient stated the food is not good here and she needed to order something else. This RN entered patient room and notified patient that she is on a diabetic diet and that this RN is unable to give her a regular pop per patient request. This RN did message physician about patient's dietary concerns. No change at this time.

## 2024-03-28 NOTE — PROGRESS NOTES
Pharmacy Note - Renal Dosing and Extended Infusion Beta-Lactam Adjustment    Cefepime 2000mg Q12h for treatment of Skin and soft tissue infection. Per [facility] Renal Dose Adjustment Policy and Extended Infusion Beta-Lactam Policy, cefepime will be changed to 2000mg load followed by 2000mg Q8h extended infusion    Estimated Creatinine Clearance: Estimated Creatinine Clearance: 154 mL/min (based on SCr of 0.7 mg/dL).    BMI: Body mass index is 49.44 kg/m².    Please call with any questions.    Thank you,    Jeff Kline, RP

## 2024-03-28 NOTE — ED PROVIDER NOTES
[facility] EMERGENCY DEPT      EMERGENCY MEDICINE     Pt Name: Samantha Nichols  MRN: [id number]  Birthdate 1990  Date of evaluation: 3/28/2024  Provider: AURELIO Sandhu CNP    CHIEF COMPLAINT       Chief Complaint   Patient presents with    Post-op Problem    Chest Pain     HISTORY OF PRESENT ILLNESS   Samantha Nichols is a pleasant 33 y.o. female who presents to the emergency department from home with c/o pain where her port was placed a couple of weeks ago.  Patient states she has had pain since it started but it has gotten worse over the last few days.  No fever      History is obtained from:  patient  PASTMEDICAL HISTORY     Past Medical History:   Diagnosis Date    Acute on chronic diastolic congestive heart failure ([facility]) 06/25/2022    JANI (acute kidney injury) ([facility]) 07/07/2019    Anemia     Anesthesia     migraines    Anxiety     Asthma     CAD (coronary artery disease)     Depression     Diabetes ([facility])     Diet-controlled type 2 diabetes mellitus ([facility]) 2016    Dyslipidemia 11/14/2016    Epilepsy ([facility])     last siezure is 2 years ago    Epilepsy ([facility])     Fibroids     Gastro - esophageal reflux disease     Gastroparesis     History of esophagogastroduodenoscopy (EGD) 08/13/2022    Hypertension     Hypertrophy of tonsil and adenoid 11/04/2017    Malignant carcinoid tumor of other sites ([facility]) 05/14/2013    Migraine     PONV (postoperative nausea and vomiting)     Prolonged emergence from general anesthesia     Sickle cell anemia (HCC)     Sickle cell trait (HCC)     PT STATES SHE HAS THE TRAIT NOT THE DISEASE    Tumor associated pain     neuroendrocrine tumor, gastroma       Patient Active Problem List   Diagnosis Code    Chronic abdominal pain R10.9, G89.29    Nausea and vomiting R11.2    Carcinoid tumor D3A.00    Pelvic inflammatory disease N73.9    Intractable nausea and vomiting R11.2    Microcytic anemia D50.9    Diet-controlled type 2 diabetes mellitus (HCC) E11.9    Esophageal dysphagia

## 2024-03-29 LAB
ALBUMIN SERPL BCG-MCNC: 3.9 G/DL (ref 3.5–5.1)
ALP SERPL-CCNC: 134 U/L (ref 38–126)
ALT SERPL W/O P-5'-P-CCNC: 14 U/L (ref 11–66)
ANION GAP SERPL CALC-SCNC: 16 MEQ/L (ref 8–16)
AST SERPL-CCNC: 15 U/L (ref 5–40)
BASOPHILS ABSOLUTE: 0 THOU/MM3 (ref 0–0.1)
BASOPHILS NFR BLD AUTO: 0.4 %
BILIRUB SERPL-MCNC: < 0.2 MG/DL (ref 0.3–1.2)
BUN SERPL-MCNC: 15 MG/DL (ref 7–22)
CALCIUM SERPL-MCNC: 9.3 MG/DL (ref 8.5–10.5)
CHLORIDE SERPL-SCNC: 105 MEQ/L (ref 98–111)
CO2 SERPL-SCNC: 21 MEQ/L (ref 23–33)
CREAT SERPL-MCNC: 0.8 MG/DL (ref 0.4–1.2)
DEPRECATED MEAN GLUCOSE BLD GHB EST-ACNC: 120 MG/DL (ref 70–126)
DEPRECATED RDW RBC AUTO: 42.8 FL (ref 35–45)
EOSINOPHIL NFR BLD AUTO: 1.7 %
EOSINOPHILS ABSOLUTE: 0.1 THOU/MM3 (ref 0–0.4)
ERYTHROCYTE [DISTWIDTH] IN BLOOD BY AUTOMATED COUNT: 15.5 % (ref 11.5–14.5)
GFR SERPL CREATININE-BSD FRML MDRD: > 90 ML/MIN/1.73M2
GLUCOSE BLD STRIP.AUTO-MCNC: 100 MG/DL (ref 70–108)
GLUCOSE BLD STRIP.AUTO-MCNC: 105 MG/DL (ref 70–108)
GLUCOSE BLD STRIP.AUTO-MCNC: 108 MG/DL (ref 70–108)
GLUCOSE BLD STRIP.AUTO-MCNC: 109 MG/DL (ref 70–108)
GLUCOSE SERPL-MCNC: 106 MG/DL (ref 70–108)
HBA1C MFR BLD HPLC: 6 % (ref 4.4–6.4)
HCT VFR BLD AUTO: 42.1 % (ref 37–47)
HGB BLD-MCNC: 13.2 GM/DL (ref 12–16)
IMM GRANULOCYTES # BLD AUTO: 0.03 THOU/MM3 (ref 0–0.07)
IMM GRANULOCYTES NFR BLD AUTO: 0.4 %
LYMPHOCYTES ABSOLUTE: 2 THOU/MM3 (ref 1–4.8)
LYMPHOCYTES NFR BLD AUTO: 24.3 %
MCH RBC QN AUTO: 24.2 PG (ref 26–33)
MCHC RBC AUTO-ENTMCNC: 31.4 GM/DL (ref 32.2–35.5)
MCV RBC AUTO: 77.2 FL (ref 81–99)
MONOCYTES ABSOLUTE: 0.5 THOU/MM3 (ref 0.4–1.3)
MONOCYTES NFR BLD AUTO: 6.5 %
NEUTROPHILS NFR BLD AUTO: 66.7 %
NRBC BLD AUTO-RTO: 0 /100 WBC
PLATELET # BLD AUTO: 275 THOU/MM3 (ref 130–400)
PMV BLD AUTO: 10.3 FL (ref 9.4–12.4)
POTASSIUM SERPL-SCNC: 4.3 MEQ/L (ref 3.5–5.2)
PROT SERPL-MCNC: 8 G/DL (ref 6.1–8)
RBC # BLD AUTO: 5.45 MILL/MM3 (ref 4.2–5.4)
SEGMENTED NEUTROPHILS ABSOLUTE COUNT: 5.6 THOU/MM3 (ref 1.8–7.7)
SODIUM SERPL-SCNC: 142 MEQ/L (ref 135–145)
VANCOMYCIN SERPL-MCNC: 7.7 UG/ML (ref 0.1–39.9)
WBC # BLD AUTO: 8.4 THOU/MM3 (ref 4.8–10.8)

## 2024-03-29 PROCEDURE — 83036 HEMOGLOBIN GLYCOSYLATED A1C: CPT

## 2024-03-29 PROCEDURE — 80202 ASSAY OF VANCOMYCIN: CPT

## 2024-03-29 PROCEDURE — 85025 COMPLETE CBC W/AUTO DIFF WBC: CPT

## 2024-03-29 PROCEDURE — 6360000002 HC RX W HCPCS: Performed by: STUDENT IN AN ORGANIZED HEALTH CARE EDUCATION/TRAINING PROGRAM

## 2024-03-29 PROCEDURE — 36415 COLL VENOUS BLD VENIPUNCTURE: CPT

## 2024-03-29 PROCEDURE — 82948 REAGENT STRIP/BLOOD GLUCOSE: CPT

## 2024-03-29 PROCEDURE — 6360000002 HC RX W HCPCS: Performed by: PHYSICIAN ASSISTANT

## 2024-03-29 PROCEDURE — 2580000003 HC RX 258: Performed by: STUDENT IN AN ORGANIZED HEALTH CARE EDUCATION/TRAINING PROGRAM

## 2024-03-29 PROCEDURE — 6370000000 HC RX 637 (ALT 250 FOR IP): Performed by: NURSE PRACTITIONER

## 2024-03-29 PROCEDURE — 6360000002 HC RX W HCPCS: Performed by: PHARMACIST

## 2024-03-29 PROCEDURE — 80053 COMPREHEN METABOLIC PANEL: CPT

## 2024-03-29 PROCEDURE — 6370000000 HC RX 637 (ALT 250 FOR IP): Performed by: STUDENT IN AN ORGANIZED HEALTH CARE EDUCATION/TRAINING PROGRAM

## 2024-03-29 PROCEDURE — 6360000002 HC RX W HCPCS: Performed by: NURSE PRACTITIONER

## 2024-03-29 PROCEDURE — 87040 BLOOD CULTURE FOR BACTERIA: CPT

## 2024-03-29 PROCEDURE — 99233 SBSQ HOSP IP/OBS HIGH 50: CPT | Performed by: PHYSICIAN ASSISTANT

## 2024-03-29 PROCEDURE — 1200000000 HC SEMI PRIVATE

## 2024-03-29 RX ORDER — DIPHENHYDRAMINE HYDROCHLORIDE 50 MG/ML
25 INJECTION INTRAMUSCULAR; INTRAVENOUS EVERY 6 HOURS PRN
Status: DISCONTINUED | OUTPATIENT
Start: 2024-03-29 | End: 2024-03-31 | Stop reason: HOSPADM

## 2024-03-29 RX ORDER — OXYCODONE HYDROCHLORIDE 5 MG/1
10 TABLET ORAL EVERY 4 HOURS PRN
Status: DISCONTINUED | OUTPATIENT
Start: 2024-03-29 | End: 2024-03-31 | Stop reason: HOSPADM

## 2024-03-29 RX ORDER — OXYCODONE HYDROCHLORIDE 5 MG/1
5 TABLET ORAL EVERY 4 HOURS PRN
Status: DISCONTINUED | OUTPATIENT
Start: 2024-03-29 | End: 2024-03-31 | Stop reason: HOSPADM

## 2024-03-29 RX ORDER — OXYCODONE HYDROCHLORIDE 5 MG/1
2.5 TABLET ORAL EVERY 4 HOURS PRN
Status: DISCONTINUED | OUTPATIENT
Start: 2024-03-29 | End: 2024-03-29

## 2024-03-29 RX ORDER — OXYCODONE HYDROCHLORIDE 5 MG/1
5 TABLET ORAL EVERY 4 HOURS PRN
Status: DISCONTINUED | OUTPATIENT
Start: 2024-03-29 | End: 2024-03-29

## 2024-03-29 RX ORDER — OXYCODONE HYDROCHLORIDE 5 MG/1
10 TABLET ORAL EVERY 4 HOURS PRN
Status: DISCONTINUED | OUTPATIENT
Start: 2024-03-29 | End: 2024-03-29

## 2024-03-29 RX ADMIN — DIPHENHYDRAMINE HYDROCHLORIDE 25 MG: 50 INJECTION INTRAMUSCULAR; INTRAVENOUS at 14:34

## 2024-03-29 RX ADMIN — MONTELUKAST SODIUM 10 MG: 10 TABLET ORAL at 21:44

## 2024-03-29 RX ADMIN — HYDROMORPHONE HYDROCHLORIDE 0.25 MG: 1 INJECTION, SOLUTION INTRAMUSCULAR; INTRAVENOUS; SUBCUTANEOUS at 18:33

## 2024-03-29 RX ADMIN — AMLODIPINE BESYLATE 5 MG: 5 TABLET ORAL at 08:38

## 2024-03-29 RX ADMIN — PANTOPRAZOLE SODIUM 40 MG: 40 TABLET, DELAYED RELEASE ORAL at 06:50

## 2024-03-29 RX ADMIN — OXYCODONE 5 MG: 5 TABLET ORAL at 04:24

## 2024-03-29 RX ADMIN — CEFEPIME 2000 MG: 2 INJECTION, POWDER, FOR SOLUTION INTRAVENOUS at 10:37

## 2024-03-29 RX ADMIN — OXYCODONE 5 MG: 5 TABLET ORAL at 08:40

## 2024-03-29 RX ADMIN — SUCRALFATE 1 G: 1 TABLET ORAL at 06:50

## 2024-03-29 RX ADMIN — CEFEPIME 2000 MG: 2 INJECTION, POWDER, FOR SOLUTION INTRAVENOUS at 21:44

## 2024-03-29 RX ADMIN — SODIUM CHLORIDE, PRESERVATIVE FREE 10 ML: 5 INJECTION INTRAVENOUS at 21:44

## 2024-03-29 RX ADMIN — HYDROMORPHONE HYDROCHLORIDE 0.25 MG: 1 INJECTION, SOLUTION INTRAMUSCULAR; INTRAVENOUS; SUBCUTANEOUS at 01:06

## 2024-03-29 RX ADMIN — BACLOFEN 10 MG: 10 TABLET ORAL at 08:38

## 2024-03-29 RX ADMIN — SUCRALFATE 1 G: 1 TABLET ORAL at 21:43

## 2024-03-29 RX ADMIN — HYDROMORPHONE HYDROCHLORIDE 0.25 MG: 1 INJECTION, SOLUTION INTRAMUSCULAR; INTRAVENOUS; SUBCUTANEOUS at 23:00

## 2024-03-29 RX ADMIN — TOPIRAMATE 100 MG: 100 TABLET, FILM COATED ORAL at 21:43

## 2024-03-29 RX ADMIN — HYDROMORPHONE HYDROCHLORIDE 0.25 MG: 1 INJECTION, SOLUTION INTRAMUSCULAR; INTRAVENOUS; SUBCUTANEOUS at 14:34

## 2024-03-29 RX ADMIN — TOPIRAMATE 100 MG: 100 TABLET, FILM COATED ORAL at 08:38

## 2024-03-29 RX ADMIN — DIPHENHYDRAMINE HYDROCHLORIDE 25 MG: 50 INJECTION INTRAMUSCULAR; INTRAVENOUS at 23:00

## 2024-03-29 RX ADMIN — PROPRANOLOL HYDROCHLORIDE 80 MG: 80 CAPSULE, EXTENDED RELEASE ORAL at 08:38

## 2024-03-29 RX ADMIN — ESCITALOPRAM OXALATE 20 MG: 20 TABLET ORAL at 08:40

## 2024-03-29 RX ADMIN — ARIPIPRAZOLE 10 MG: 10 TABLET ORAL at 08:38

## 2024-03-29 RX ADMIN — SPIRONOLACTONE 25 MG: 25 TABLET ORAL at 08:38

## 2024-03-29 RX ADMIN — PRAZOSIN HYDROCHLORIDE 1 MG: 1 CAPSULE ORAL at 21:44

## 2024-03-29 RX ADMIN — DOCUSATE SODIUM 100 MG: 100 CAPSULE, LIQUID FILLED ORAL at 08:38

## 2024-03-29 RX ADMIN — CEFEPIME 2000 MG: 2 INJECTION, POWDER, FOR SOLUTION INTRAVENOUS at 03:00

## 2024-03-29 RX ADMIN — SUCRALFATE 1 G: 1 TABLET ORAL at 10:48

## 2024-03-29 RX ADMIN — HYDROMORPHONE HYDROCHLORIDE 0.5 MG: 1 INJECTION, SOLUTION INTRAMUSCULAR; INTRAVENOUS; SUBCUTANEOUS at 02:55

## 2024-03-29 RX ADMIN — HYDROMORPHONE HYDROCHLORIDE 0.25 MG: 1 INJECTION, SOLUTION INTRAMUSCULAR; INTRAVENOUS; SUBCUTANEOUS at 10:34

## 2024-03-29 RX ADMIN — Medication 1250 MG: at 16:48

## 2024-03-29 RX ADMIN — HYDROMORPHONE HYDROCHLORIDE 0.25 MG: 1 INJECTION, SOLUTION INTRAMUSCULAR; INTRAVENOUS; SUBCUTANEOUS at 06:51

## 2024-03-29 RX ADMIN — VANCOMYCIN HYDROCHLORIDE 1500 MG: 5 INJECTION, POWDER, LYOPHILIZED, FOR SOLUTION INTRAVENOUS at 06:56

## 2024-03-29 RX ADMIN — FAMOTIDINE 40 MG: 20 TABLET, FILM COATED ORAL at 21:44

## 2024-03-29 RX ADMIN — BUSPIRONE HYDROCHLORIDE 5 MG: 5 TABLET ORAL at 08:37

## 2024-03-29 RX ADMIN — BUSPIRONE HYDROCHLORIDE 5 MG: 5 TABLET ORAL at 14:34

## 2024-03-29 RX ADMIN — BUSPIRONE HYDROCHLORIDE 5 MG: 5 TABLET ORAL at 21:44

## 2024-03-29 ASSESSMENT — PAIN SCALES - GENERAL
PAINLEVEL_OUTOF10: 9
PAINLEVEL_OUTOF10: 8
PAINLEVEL_OUTOF10: 10
PAINLEVEL_OUTOF10: 10
PAINLEVEL_OUTOF10: 8

## 2024-03-29 ASSESSMENT — PAIN - FUNCTIONAL ASSESSMENT
PAIN_FUNCTIONAL_ASSESSMENT: ACTIVITIES ARE NOT PREVENTED

## 2024-03-29 ASSESSMENT — PAIN DESCRIPTION - DESCRIPTORS
DESCRIPTORS: ACHING;BURNING;SORE
DESCRIPTORS: ACHING;BURNING;SORE
DESCRIPTORS: ACHING;SORE;BURNING
DESCRIPTORS: ACHING;SORE
DESCRIPTORS: ACHING;SORE;BURNING

## 2024-03-29 ASSESSMENT — PAIN DESCRIPTION - ORIENTATION
ORIENTATION: LEFT

## 2024-03-29 ASSESSMENT — PAIN SCALES - WONG BAKER
WONGBAKER_NUMERICALRESPONSE: NO HURT
WONGBAKER_NUMERICALRESPONSE: HURTS A LITTLE BIT
WONGBAKER_NUMERICALRESPONSE: HURTS LITTLE MORE
WONGBAKER_NUMERICALRESPONSE: NO HURT

## 2024-03-29 ASSESSMENT — PAIN DESCRIPTION - LOCATION
LOCATION: CHEST

## 2024-03-29 NOTE — PROGRESS NOTES
Physician Progress Note      PATIENT:               TREVOR ARZATE  [facility] #:                  [id number]  :                       1990  ADMIT DATE:       3/28/2024 12:46 AM  DISCH DATE:  RESPONDING  PROVIDER #:        Susie GAONA          QUERY TEXT:    Susie,    Patient admitted with BMI 49. If possible, please document in progress notes   and discharge summary if you are evaluating and /or treating any of the   following:    The medical record reflects the following:  Risk Factors: BMI 49  Clinical Indicators: BMI 49  Treatment: assistance with ADLs    Margot Stephenson BSN, RN    ?Specificity of obesity and morbid obesity should be reported based on   physician documentation, as there are several published classifications and   definitions?  MS-DRG Training Guide. CDC:   https://www.cdc.gov/obesity/basics/adult-defining.html. WHO:   https://www.who.int/news-room/fact-sheets/detail/obesity-and-overweight. NIH:   https://www.nhlbi.nih.gov/health/educational/lose_wt/BMI/bmi_dis.htm  Options provided:  -- Morbid obesity  -- No Morbid obesity  -- Other - I will add my own diagnosis  -- Disagree - Not applicable / Not valid  -- Disagree - Clinically unable to determine / Unknown  -- Refer to Clinical Documentation Reviewer    PROVIDER RESPONSE TEXT:    This patient has morbid obesity.    Query created by: Fanny Stephenson on 3/29/2024 8:35 AM      Electronically signed by:  Susie GAONA 3/29/2024 9:52 AM

## 2024-03-29 NOTE — PROGRESS NOTES
Hospitalist Progress Note      Patient:  Samantha Nichols    Unit/Bed:8B-37/037-A  YOB: 1990  MRN: [id number]   Acct: [id number]   PCP: Giovanna Gibson APRN - CNP  Date of Admission: 3/28/2024    Assessment/Plan:    Chest Pain: ID & IR consulted. FB removed by IR on 3/28. Mediport was accessed and 2 blood cultures were drawn from Mediport, will monitor for growth. Continue IV ABX. CBC in the AM.   Microcytic anemia: Secondary to thalassemia and sickle cell disease.  Chronic iron infusion.  Follow-up with hematologist as outpatient  Sickle cell disease: Required chronic iron infusion.  Mediport has been removed and replaced 2 weeks ago for chronic iron infusion  Thalassemia  Hypertension: Resume Norvasc, Minipress, propranolol, spironolactone  BASSEM: BuSpar, Lexapro, Abilify   Chronic back pain: Baclofen  GERD: Carafate, Protonix, Pepcid  Seasonal allergy: Singular  Migraine headaches: Imitrex, Topamax  Constipation Colace    Case discussed with ID.     Chief Complaint: Chest Pain     Initial H and P:-    Initial H&P \"Samantha Nichols is a 33 y.o., , female who  has a past medical history of Acute on chronic diastolic congestive heart failure ([facility]), JANI (acute kidney injury) ([facility]), Anemia, Anesthesia, Anxiety, Asthma, CAD (coronary artery disease), Depression, Diabetes ([facility]), Diet-controlled type 2 diabetes mellitus ([facility]), Dyslipidemia, Epilepsy ([facility]), Epilepsy (HCC), Fibroids, Gastro - esophageal reflux disease, Gastroparesis, History of esophagogastroduodenoscopy (EGD), Hypertension, Hypertrophy of tonsil and adenoid, Malignant carcinoid tumor of other sites ([facility]), Migraine, PONV (postoperative nausea and vomiting), Prolonged emergence from general anesthesia, Sickle cell anemia (HCC), Sickle cell trait (HCC), and Tumor associated pain. that is hospitalized for chest wall pain concerning for abscess see.  Past

## 2024-03-29 NOTE — PROGRESS NOTES
[facility]   Pharmacy Pharmacokinetic Monitoring Service - Vancomycin    Indication: mediport infection  Target Concentration: Goal AUC/ORLANDO 400-600 mg*hr/L  Day of Therapy: 2  Additional Antimicrobials: cefepime    Pertinent Laboratory Values:   Wt Readings from Last 1 Encounters:   03/28/24 130.6 kg (288 lb)     Temp Readings from Last 1 Encounters:   03/29/24 98.7 °F (37.1 °C) (Oral)     Estimated Creatinine Clearance: 134 mL/min (based on SCr of 0.8 mg/dL).  Recent Labs     03/28/24  0218 03/29/24  0603   CREATININE 0.7 0.8   BUN 12 15   WBC 11.3* 8.4     Recent vancomycin administrations                     vancomycin (VANCOCIN) 1500 mg in sodium chloride 0.9 % 250 mL IVPB (mg) 1,500 mg New Bag 03/29/24 0656     1,500 mg New Bag 03/28/24 1847    vancomycin (VANCOCIN) 2,000 mg in sodium chloride 0.9 % 500 mL IVPB (mg) 2,000 mg New Bag 03/28/24 0507                    Assessment:  Date/Time Current Dose Concentration Timing of Concentration (h) AUC C trough,ss   3/29/24 @ 0603 1500mg q12h 7.7  11H 16 M 443 7.5   Note: Serum concentrations collected for AUC dosing may appear elevated if collected in close proximity to the dose administered, this is not necessarily an indication of toxicity    Plan:  Current dosing regimen is sub-therapeutic trough  Change dose to vancomycin 1250mg q8h  Repeat vancomycin concentration ordered for 3/30/24 @ 1200   Pharmacy will continue to monitor patient and adjust therapy as indicated    Thank you for the consult,  Didi MALAVEPh., BCPS., 3/29/2024,8:30 AM

## 2024-03-29 NOTE — CARE COORDINATION
Case Management Assessment  Initial Evaluation    Date/Time of Evaluation: 3/29/2024 2:01 PM  Assessment Completed by: Kiara Chavez RN    If patient is discharged prior to next notation, then this note serves as note for discharge by case management.    Patient Name: Samantha Nichols                   YOB: 1990  Diagnosis: Chest wall pain [R07.89]  Chest wall abscess [L02.213]  Local infection due to Port-A-Cath, initial encounter [T80.212A]                   Date / Time: 3/28/2024 12:46 AM  [facility]     Patient Admission Status: Inpatient   Readmission Risk Low 0-14, Mod 15-19), High > 20: Readmission Risk Score: 28.3    Current PCP: Giovanna Gibson APRN - CNP  PCP verified by CM? Yes    Chart Reviewed: Yes      History Provided by: Patient  Patient Orientation: Alert and Oriented    Patient Cognition: Alert    Hospitalization in the last 30 days (Readmission):  No    If yes, Readmission Assessment in CM Navigator will be completed.    Advance Directives:      Code Status: Full Code   Patient's Primary Decision Maker is: Named in Scanned ACP Document      Discharge Planning:    Patient lives with: Spouse/Significant Other Type of Home: House  Primary Care Giver: Self  Patient Support Systems include: Spouse/Significant Other, Family Members   Current Financial resources: Medicare, Medicaid  Current community resources: Transportation  Current services prior to admission: Transportation            Current DME:              Type of Home Care services:  None    ADLS  Prior functional level: Independent in ADLs/IADLs  Current functional level: Independent in ADLs/IADLs    Family can provide assistance at DC: Yes  Would you like Case Management to discuss the discharge plan with any other family members/significant others, and if so, who? No  Plans to Return to Present Housing: Yes  Other Identified Issues/Barriers to RETURNING to current housing: No  Potential Assistance needed at

## 2024-03-29 NOTE — PROGRESS NOTES
Patient educated on how to use incentive spirometer. Patient verbalized understanding and demonstrated proper use. Emphasized importance and usage of device, with coughing and deep breathing daily while awake.

## 2024-03-29 NOTE — PROGRESS NOTES
Progress note: Infectious diseases    Patient - Samantha Nichols,  Age - 33 y.o.    - 1990      Room Number - 8B-37/037-A   N -  [id number]   [facility] # - [id number]  Date of Admission -  3/28/2024 12:46 AM    SUBJECTIVE:   Patient denies any overnight events. Ongoing complaint of chest pain at [facility] site rated at 9/10  Denies any fever, chills, shortness of breath, abdominal pain, and urinary symptoms.  OBJECTIVE   VITALS    height is 1.626 m (5' 4\") and weight is 130.6 kg (288 lb). Her oral temperature is 98.7 °F (37.1 °C). Her blood pressure is 145/79 (abnormal) and her pulse is 86. Her respiration is 16 and oxygen saturation is 94%.       Wt Readings from Last 3 Encounters:   24 130.6 kg (288 lb)   24 130.6 kg (288 lb)   24 132 kg (291 lb)       I/O (24 Hours)    Intake/Output Summary (Last 24 hours) at 3/29/2024 0807  Last data filed at 3/29/2024 0600  Gross per 24 hour   Intake 120 ml   Output 0 ml   Net 120 ml       General Appearance  Awake, alert, oriented,  not  In acute distress  HEENT - normocephalic, atraumatic, pink conjunctiva,  anicteric sclera  Neck - Supple, no mass  Lungs -  Bilateral good air entry, no rhonchi, no wheeze  Cardiovascular - Heart sounds are normal.  Regular rate and rhythm without murmur, gallop or rub.  CHEST: has a Mediport over right chest wall, tenderness to touch  Abdomen - soft, not distended, nontender,   Neurologic -AAox3  Skin - No bruising or bleeding  Extremities - No edema, no cyanosis, clubbing     MEDICATIONS:      sodium chloride flush  5-40 mL IntraVENous 2 times per day    amLODIPine  5 mg Oral Daily    ARIPiprazole  10 mg Oral Daily    baclofen  10 mg Oral Daily    busPIRone  5 mg Oral TID    docusate sodium  100 mg Oral Daily    escitalopram  20 mg Oral Daily    famotidine  40 mg Oral Nightly    montelukast  10 mg Oral Nightly    pantoprazole  40

## 2024-03-30 LAB
DEPRECATED RDW RBC AUTO: 43.4 FL (ref 35–45)
ERYTHROCYTE [DISTWIDTH] IN BLOOD BY AUTOMATED COUNT: 15.3 % (ref 11.5–14.5)
GLUCOSE BLD STRIP.AUTO-MCNC: 106 MG/DL (ref 70–108)
GLUCOSE BLD STRIP.AUTO-MCNC: 109 MG/DL (ref 70–108)
GLUCOSE BLD STRIP.AUTO-MCNC: 111 MG/DL (ref 70–108)
GLUCOSE BLD STRIP.AUTO-MCNC: 81 MG/DL (ref 70–108)
HCT VFR BLD AUTO: 40.6 % (ref 37–47)
HGB BLD-MCNC: 12.4 GM/DL (ref 12–16)
MCH RBC QN AUTO: 23.9 PG (ref 26–33)
MCHC RBC AUTO-ENTMCNC: 30.5 GM/DL (ref 32.2–35.5)
MCV RBC AUTO: 78.2 FL (ref 81–99)
PLATELET # BLD AUTO: 253 THOU/MM3 (ref 130–400)
PMV BLD AUTO: 9.7 FL (ref 9.4–12.4)
RBC # BLD AUTO: 5.19 MILL/MM3 (ref 4.2–5.4)
VANCOMYCIN SERPL-MCNC: 31.6 UG/ML (ref 0.1–39.9)
WBC # BLD AUTO: 8.7 THOU/MM3 (ref 4.8–10.8)

## 2024-03-30 PROCEDURE — 6360000002 HC RX W HCPCS: Performed by: STUDENT IN AN ORGANIZED HEALTH CARE EDUCATION/TRAINING PROGRAM

## 2024-03-30 PROCEDURE — 80202 ASSAY OF VANCOMYCIN: CPT

## 2024-03-30 PROCEDURE — 6360000002 HC RX W HCPCS: Performed by: PHYSICIAN ASSISTANT

## 2024-03-30 PROCEDURE — 6370000000 HC RX 637 (ALT 250 FOR IP): Performed by: STUDENT IN AN ORGANIZED HEALTH CARE EDUCATION/TRAINING PROGRAM

## 2024-03-30 PROCEDURE — 99232 SBSQ HOSP IP/OBS MODERATE 35: CPT | Performed by: PHYSICIAN ASSISTANT

## 2024-03-30 PROCEDURE — 6360000002 HC RX W HCPCS: Performed by: NURSE PRACTITIONER

## 2024-03-30 PROCEDURE — 6370000000 HC RX 637 (ALT 250 FOR IP): Performed by: PHYSICIAN ASSISTANT

## 2024-03-30 PROCEDURE — 1200000000 HC SEMI PRIVATE

## 2024-03-30 PROCEDURE — 82948 REAGENT STRIP/BLOOD GLUCOSE: CPT

## 2024-03-30 PROCEDURE — 2580000003 HC RX 258: Performed by: PHARMACIST

## 2024-03-30 PROCEDURE — 2580000003 HC RX 258: Performed by: STUDENT IN AN ORGANIZED HEALTH CARE EDUCATION/TRAINING PROGRAM

## 2024-03-30 PROCEDURE — 6360000002 HC RX W HCPCS: Performed by: PHARMACIST

## 2024-03-30 PROCEDURE — 36415 COLL VENOUS BLD VENIPUNCTURE: CPT

## 2024-03-30 PROCEDURE — 85027 COMPLETE CBC AUTOMATED: CPT

## 2024-03-30 RX ADMIN — Medication 1250 MG: at 10:30

## 2024-03-30 RX ADMIN — BUSPIRONE HYDROCHLORIDE 5 MG: 5 TABLET ORAL at 09:28

## 2024-03-30 RX ADMIN — SPIRONOLACTONE 25 MG: 25 TABLET ORAL at 09:28

## 2024-03-30 RX ADMIN — FAMOTIDINE 40 MG: 20 TABLET, FILM COATED ORAL at 22:00

## 2024-03-30 RX ADMIN — MONTELUKAST SODIUM 10 MG: 10 TABLET ORAL at 22:11

## 2024-03-30 RX ADMIN — SUCRALFATE 1 G: 1 TABLET ORAL at 16:48

## 2024-03-30 RX ADMIN — SODIUM CHLORIDE, PRESERVATIVE FREE 10 ML: 5 INJECTION INTRAVENOUS at 09:29

## 2024-03-30 RX ADMIN — DIPHENHYDRAMINE HYDROCHLORIDE 25 MG: 50 INJECTION INTRAMUSCULAR; INTRAVENOUS at 04:53

## 2024-03-30 RX ADMIN — ARIPIPRAZOLE 10 MG: 10 TABLET ORAL at 09:29

## 2024-03-30 RX ADMIN — BUSPIRONE HYDROCHLORIDE 5 MG: 5 TABLET ORAL at 22:00

## 2024-03-30 RX ADMIN — HYDROMORPHONE HYDROCHLORIDE 0.25 MG: 1 INJECTION, SOLUTION INTRAMUSCULAR; INTRAVENOUS; SUBCUTANEOUS at 14:10

## 2024-03-30 RX ADMIN — ESCITALOPRAM OXALATE 20 MG: 20 TABLET ORAL at 09:28

## 2024-03-30 RX ADMIN — SUCRALFATE 1 G: 1 TABLET ORAL at 12:47

## 2024-03-30 RX ADMIN — BUSPIRONE HYDROCHLORIDE 5 MG: 5 TABLET ORAL at 12:47

## 2024-03-30 RX ADMIN — SUCRALFATE 1 G: 1 TABLET ORAL at 22:10

## 2024-03-30 RX ADMIN — CEFEPIME 2000 MG: 2 INJECTION, POWDER, FOR SOLUTION INTRAVENOUS at 03:36

## 2024-03-30 RX ADMIN — PROPRANOLOL HYDROCHLORIDE 80 MG: 80 CAPSULE, EXTENDED RELEASE ORAL at 09:28

## 2024-03-30 RX ADMIN — SUCRALFATE 1 G: 1 TABLET ORAL at 04:55

## 2024-03-30 RX ADMIN — DIPHENHYDRAMINE HYDROCHLORIDE 25 MG: 50 INJECTION INTRAMUSCULAR; INTRAVENOUS at 18:26

## 2024-03-30 RX ADMIN — PANTOPRAZOLE SODIUM 40 MG: 40 TABLET, DELAYED RELEASE ORAL at 04:54

## 2024-03-30 RX ADMIN — BACLOFEN 10 MG: 10 TABLET ORAL at 09:28

## 2024-03-30 RX ADMIN — HYDROMORPHONE HYDROCHLORIDE 0.25 MG: 1 INJECTION, SOLUTION INTRAMUSCULAR; INTRAVENOUS; SUBCUTANEOUS at 03:36

## 2024-03-30 RX ADMIN — PRAZOSIN HYDROCHLORIDE 1 MG: 1 CAPSULE ORAL at 22:11

## 2024-03-30 RX ADMIN — CEFEPIME 2000 MG: 2 INJECTION, POWDER, FOR SOLUTION INTRAVENOUS at 20:00

## 2024-03-30 RX ADMIN — PROMETHAZINE HYDROCHLORIDE 12.5 MG: 25 TABLET ORAL at 18:22

## 2024-03-30 RX ADMIN — AMLODIPINE BESYLATE 5 MG: 5 TABLET ORAL at 09:28

## 2024-03-30 RX ADMIN — CEFEPIME 2000 MG: 2 INJECTION, POWDER, FOR SOLUTION INTRAVENOUS at 12:44

## 2024-03-30 RX ADMIN — TOPIRAMATE 100 MG: 100 TABLET, FILM COATED ORAL at 09:28

## 2024-03-30 RX ADMIN — HYDROMORPHONE HYDROCHLORIDE 0.25 MG: 1 INJECTION, SOLUTION INTRAMUSCULAR; INTRAVENOUS; SUBCUTANEOUS at 09:28

## 2024-03-30 RX ADMIN — TOPIRAMATE 100 MG: 100 TABLET, FILM COATED ORAL at 22:10

## 2024-03-30 RX ADMIN — Medication 1250 MG: at 17:15

## 2024-03-30 RX ADMIN — OXYCODONE 5 MG: 5 TABLET ORAL at 23:41

## 2024-03-30 RX ADMIN — HYDROMORPHONE HYDROCHLORIDE 0.25 MG: 1 INJECTION, SOLUTION INTRAMUSCULAR; INTRAVENOUS; SUBCUTANEOUS at 22:24

## 2024-03-30 RX ADMIN — Medication 1250 MG: at 03:41

## 2024-03-30 RX ADMIN — HYDROMORPHONE HYDROCHLORIDE 0.25 MG: 1 INJECTION, SOLUTION INTRAMUSCULAR; INTRAVENOUS; SUBCUTANEOUS at 18:27

## 2024-03-30 ASSESSMENT — PAIN DESCRIPTION - DESCRIPTORS
DESCRIPTORS: ACHING
DESCRIPTORS: SHARP
DESCRIPTORS: ACHING;SHARP
DESCRIPTORS: ACHING

## 2024-03-30 ASSESSMENT — PAIN DESCRIPTION - ORIENTATION
ORIENTATION: RIGHT
ORIENTATION: ANTERIOR
ORIENTATION: ANTERIOR
ORIENTATION: MID
ORIENTATION: ANTERIOR
ORIENTATION: RIGHT

## 2024-03-30 ASSESSMENT — PAIN DESCRIPTION - PAIN TYPE
TYPE: CHRONIC PAIN

## 2024-03-30 ASSESSMENT — PAIN SCALES - GENERAL
PAINLEVEL_OUTOF10: 10
PAINLEVEL_OUTOF10: 5
PAINLEVEL_OUTOF10: 8
PAINLEVEL_OUTOF10: 6
PAINLEVEL_OUTOF10: 5
PAINLEVEL_OUTOF10: 9
PAINLEVEL_OUTOF10: 5
PAINLEVEL_OUTOF10: 9
PAINLEVEL_OUTOF10: 5
PAINLEVEL_OUTOF10: 9
PAINLEVEL_OUTOF10: 9
PAINLEVEL_OUTOF10: 10

## 2024-03-30 ASSESSMENT — PAIN DESCRIPTION - FREQUENCY
FREQUENCY: CONTINUOUS

## 2024-03-30 ASSESSMENT — PAIN DESCRIPTION - LOCATION
LOCATION: OTHER (COMMENT)
LOCATION: CHEST
LOCATION: OTHER (COMMENT)
LOCATION: CHEST

## 2024-03-30 ASSESSMENT — PAIN - FUNCTIONAL ASSESSMENT
PAIN_FUNCTIONAL_ASSESSMENT: ACTIVITIES ARE NOT PREVENTED

## 2024-03-30 NOTE — PROGRESS NOTES
Hospitalist Progress Note      Patient:  Samantha Nichols    Unit/Bed:8B-37/037-A  YOB: 1990  MRN: [id number]   Acct: [id number]   PCP: Giovanna Gibson APRN - CNP  Date of Admission: 3/28/2024    Assessment/Plan:    Chest Pain: ID & IR consulted. FB removed by IR on 3/28. Mediport was accessed and 2 blood cultures were drawn from Mediport, will monitor for growth- BC not reported out yet today. Continue IV ABX. CBC pending.   Microcytic anemia: Secondary to thalassemia and sickle cell disease.  Chronic iron infusion.  Follow-up with hematologist as outpatient  Sickle cell disease: Required chronic iron infusion.  Mediport has been removed and replaced 2 weeks ago for chronic iron infusion  Thalassemia  Hypertension: Resume Norvasc, Minipress, propranolol, spironolactone  BASSEM: BuSpar, Lexapro, Abilify   Chronic back pain: Baclofen  GERD: Carafate, Protonix, Pepcid  Seasonal allergy: Singular  Migraine headaches: Imitrex, Topamax  Constipation Colace    Chief Complaint: Chest Pain     Initial H and P:-    Initial H&P \"Samantha Nichols is a 33 y.o., , female who  has a past medical history of Acute on chronic diastolic congestive heart failure ([facility]), JANI (acute kidney injury) ([facility]), Anemia, Anesthesia, Anxiety, Asthma, CAD (coronary artery disease), Depression, Diabetes ([facility]), Diet-controlled type 2 diabetes mellitus (HCC), Dyslipidemia, Epilepsy ([facility]), Epilepsy (HCC), Fibroids, Gastro - esophageal reflux disease, Gastroparesis, History of esophagogastroduodenoscopy (EGD), Hypertension, Hypertrophy of tonsil and adenoid, Malignant carcinoid tumor of other sites ([facility]), Migraine, PONV (postoperative nausea and vomiting), Prolonged emergence from general anesthesia, Sickle cell anemia (HCC), Sickle cell trait (HCC), and Tumor associated pain. that is hospitalized for chest wall pain concerning for abscess see.

## 2024-03-30 NOTE — PROGRESS NOTES
Progress note: Infectious diseases    Patient - Samantha Nichols,  Age - 33 y.o.    - 1990      Room Number - 8B-37/037-A   N -  [id number]   [facility] # - [id number]  Date of Admission -  3/28/2024 12:46 AM    SUBJECTIVE:   No new issues.no fever. Culture from the port is negative  OBJECTIVE   VITALS    height is 1.626 m (5' 4\") and weight is 130.6 kg (288 lb). Her oral temperature is 97.7 °F (36.5 °C). Her blood pressure is 131/88 and her pulse is 75. Her respiration is 16 and oxygen saturation is 98%.       Wt Readings from Last 3 Encounters:   24 130.6 kg (288 lb)   24 130.6 kg (288 lb)   24 132 kg (291 lb)       I/O (24 Hours)    Intake/Output Summary (Last 24 hours) at 3/30/2024 1457  Last data filed at 3/30/2024 1126  Gross per 24 hour   Intake 1080 ml   Output 1000 ml   Net 80 ml         General Appearance  Awake, alert, oriented,  not  In acute distress  HEENT - normocephalic, atraumatic, pink conjunctiva,  anicteric sclera  Neck - Supple, no mass  Lungs -  Bilateral good air entry, no rhonchi, no wheeze  Cardiovascular - Heart sounds are normal.  Regular rate and rhythm without murmur, gallop or rub.  CHEST: has a Mediport over right chest wall, no drainage . The port is accessed functioning well tenderness to touch  Abdomen - soft, not distended, nontender,   Neurologic -AAox3  Skin - No bruising or bleeding  Extremities - No edema, no cyanosis, clubbing     MEDICATIONS:      vancomycin  1,250 mg IntraVENous Q8H    sodium chloride flush  5-40 mL IntraVENous 2 times per day    amLODIPine  5 mg Oral Daily    ARIPiprazole  10 mg Oral Daily    baclofen  10 mg Oral Daily    busPIRone  5 mg Oral TID    docusate sodium  100 mg Oral Daily    escitalopram  20 mg Oral Daily    famotidine  40 mg Oral Nightly    montelukast  10 mg Oral Nightly    pantoprazole  40 mg Oral QAM AC    prazosin  1 mg Oral

## 2024-03-30 NOTE — PLAN OF CARE
Problem: Discharge Planning  Goal: Discharge to home or other facility with appropriate resources  Outcome: Progressing     Problem: Pain  Goal: Verbalizes/displays adequate comfort level or baseline comfort level  Outcome: Progressing     Problem: ABCDS Injury Assessment  Goal: Absence of physical injury  Outcome: Progressing     Problem: Chronic Conditions and Co-morbidities  Goal: Patient's chronic conditions and co-morbidity symptoms are monitored and maintained or improved  Outcome: Progressing     Problem: Safety - Adult  Goal: Free from fall injury  Outcome: Progressing

## 2024-03-30 NOTE — PROGRESS NOTES
[facility]   Pharmacy Pharmacokinetic Monitoring Service - Vancomycin    Indication: mediport infection  Target Concentration: Goal AUC/[place] 400-600 mg*hr/L  Day of Therapy: 3  Additional Antimicrobials: cefepime    Pertinent Laboratory Values:   Wt Readings from Last 1 Encounters:   03/28/24 130.6 kg (288 lb)     Temp Readings from Last 1 Encounters:   03/30/24 97.7 °F (36.5 °C) (Oral)     Estimated Creatinine Clearance: 134 mL/min (based on SCr of 0.8 mg/dL).  Recent Labs     03/28/24  0218 03/29/24  0603 03/30/24  1225   CREATININE 0.7 0.8  --    BUN 12 15  --    WBC 11.3* 8.4 8.7       Recent vancomycin administrations                     vancomycin (VANCOCIN) 1250 mg in sodium chloride 0.9% 250 mL IVPB (mg) 1,250 mg New Bag 03/30/24 1030     1,250 mg New Bag  0341     1,250 mg New Bag 03/29/24 1648    vancomycin (VANCOCIN) 1500 mg in sodium chloride 0.9 % 250 mL IVPB (mg) 1,500 mg New Bag 03/29/24 0656     1,500 mg New Bag 03/28/24 1847    vancomycin (VANCOCIN) 2,000 mg in sodium chloride 0.9 % 500 mL IVPB (mg) 2,000 mg New Bag 03/28/24 0507                    Assessment:  Date/Time Current Dose Concentration Timing of Concentration (h) AUC C trough,ss   03/30/2024 1250 mg IV Q8H 31.6 1 h 55 min 532 12.8   Note: Serum concentrations collected for AUC dosing may appear elevated if collected in close proximity to the dose administered, this is not necessarily an indication of toxicity    Plan:  Current dosing regimen is therapeutic  Continue current dose of vancomycin 1250 mg IV Q8H  Repeat vancomycin concentration not ordered at this time.  Will monitor closely due to frequency of administration.  Pharmacy will continue to monitor patient and adjust therapy as indicated    Thank you for the consult,    Belinda Richmond, PharmD, [facility]  3/30/2024  1:41 PM

## 2024-03-31 VITALS
DIASTOLIC BLOOD PRESSURE: 96 MMHG | SYSTOLIC BLOOD PRESSURE: 130 MMHG | WEIGHT: 288 LBS | RESPIRATION RATE: 18 BRPM | OXYGEN SATURATION: 97 % | HEART RATE: 87 BPM | HEIGHT: 64 IN | TEMPERATURE: 97.7 F | BODY MASS INDEX: 49.17 KG/M2

## 2024-03-31 LAB — GLUCOSE BLD STRIP.AUTO-MCNC: 93 MG/DL (ref 70–108)

## 2024-03-31 PROCEDURE — 6360000002 HC RX W HCPCS: Performed by: PHYSICIAN ASSISTANT

## 2024-03-31 PROCEDURE — 99239 HOSP IP/OBS DSCHRG MGMT >30: CPT | Performed by: PHYSICIAN ASSISTANT

## 2024-03-31 PROCEDURE — 82948 REAGENT STRIP/BLOOD GLUCOSE: CPT

## 2024-03-31 PROCEDURE — 6360000002 HC RX W HCPCS: Performed by: STUDENT IN AN ORGANIZED HEALTH CARE EDUCATION/TRAINING PROGRAM

## 2024-03-31 PROCEDURE — 6360000002 HC RX W HCPCS: Performed by: NURSE PRACTITIONER

## 2024-03-31 PROCEDURE — 6370000000 HC RX 637 (ALT 250 FOR IP): Performed by: STUDENT IN AN ORGANIZED HEALTH CARE EDUCATION/TRAINING PROGRAM

## 2024-03-31 PROCEDURE — 2580000003 HC RX 258: Performed by: STUDENT IN AN ORGANIZED HEALTH CARE EDUCATION/TRAINING PROGRAM

## 2024-03-31 PROCEDURE — 6370000000 HC RX 637 (ALT 250 FOR IP): Performed by: PHYSICIAN ASSISTANT

## 2024-03-31 PROCEDURE — 6360000002 HC RX W HCPCS: Performed by: PHARMACIST

## 2024-03-31 RX ORDER — OXYCODONE HYDROCHLORIDE 5 MG/1
2.5 TABLET ORAL EVERY 8 HOURS PRN
Qty: 5 TABLET | Refills: 0 | Status: SHIPPED | OUTPATIENT
Start: 2024-03-31 | End: 2024-04-03

## 2024-03-31 RX ORDER — HEPARIN 100 UNIT/ML
500 SYRINGE INTRAVENOUS PRN
Status: DISCONTINUED | OUTPATIENT
Start: 2024-03-31 | End: 2024-03-31 | Stop reason: HOSPADM

## 2024-03-31 RX ADMIN — PROPRANOLOL HYDROCHLORIDE 80 MG: 80 CAPSULE, EXTENDED RELEASE ORAL at 08:21

## 2024-03-31 RX ADMIN — BACLOFEN 10 MG: 10 TABLET ORAL at 08:21

## 2024-03-31 RX ADMIN — Medication 1250 MG: at 01:02

## 2024-03-31 RX ADMIN — DIPHENHYDRAMINE HYDROCHLORIDE 25 MG: 50 INJECTION INTRAMUSCULAR; INTRAVENOUS at 00:58

## 2024-03-31 RX ADMIN — SODIUM CHLORIDE, PRESERVATIVE FREE 10 ML: 5 INJECTION INTRAVENOUS at 02:46

## 2024-03-31 RX ADMIN — SODIUM CHLORIDE, PRESERVATIVE FREE 10 ML: 5 INJECTION INTRAVENOUS at 02:50

## 2024-03-31 RX ADMIN — BUSPIRONE HYDROCHLORIDE 5 MG: 5 TABLET ORAL at 08:24

## 2024-03-31 RX ADMIN — HEPARIN 500 UNITS: 100 SYRINGE at 10:02

## 2024-03-31 RX ADMIN — HYDROMORPHONE HYDROCHLORIDE 0.25 MG: 1 INJECTION, SOLUTION INTRAMUSCULAR; INTRAVENOUS; SUBCUTANEOUS at 02:43

## 2024-03-31 RX ADMIN — SPIRONOLACTONE 25 MG: 25 TABLET ORAL at 08:21

## 2024-03-31 RX ADMIN — ARIPIPRAZOLE 10 MG: 10 TABLET ORAL at 08:21

## 2024-03-31 RX ADMIN — AMLODIPINE BESYLATE 5 MG: 5 TABLET ORAL at 08:24

## 2024-03-31 RX ADMIN — SODIUM CHLORIDE, PRESERVATIVE FREE 10 ML: 5 INJECTION INTRAVENOUS at 08:21

## 2024-03-31 RX ADMIN — ESCITALOPRAM OXALATE 20 MG: 20 TABLET ORAL at 08:21

## 2024-03-31 RX ADMIN — TOPIRAMATE 100 MG: 100 TABLET, FILM COATED ORAL at 08:21

## 2024-03-31 RX ADMIN — PROMETHAZINE HYDROCHLORIDE 12.5 MG: 25 TABLET ORAL at 00:58

## 2024-03-31 RX ADMIN — OXYCODONE 10 MG: 5 TABLET ORAL at 08:22

## 2024-03-31 RX ADMIN — PANTOPRAZOLE SODIUM 40 MG: 40 TABLET, DELAYED RELEASE ORAL at 08:27

## 2024-03-31 RX ADMIN — CEFEPIME 2000 MG: 2 INJECTION, POWDER, FOR SOLUTION INTRAVENOUS at 02:50

## 2024-03-31 ASSESSMENT — PAIN SCALES - GENERAL
PAINLEVEL_OUTOF10: 8
PAINLEVEL_OUTOF10: 8
PAINLEVEL_OUTOF10: 10

## 2024-03-31 ASSESSMENT — PAIN DESCRIPTION - PAIN TYPE
TYPE: CHRONIC PAIN
TYPE: CHRONIC PAIN

## 2024-03-31 ASSESSMENT — PAIN DESCRIPTION - DESCRIPTORS
DESCRIPTORS: ACHING
DESCRIPTORS: ACHING

## 2024-03-31 ASSESSMENT — PAIN DESCRIPTION - FREQUENCY
FREQUENCY: CONTINUOUS
FREQUENCY: CONTINUOUS

## 2024-03-31 ASSESSMENT — PAIN DESCRIPTION - ORIENTATION
ORIENTATION: ANTERIOR
ORIENTATION: ANTERIOR

## 2024-03-31 NOTE — PROGRESS NOTES
Pt given discharge instructions, states understanding. Pt awake, alert, vss  at time of discharge. Pt discharged home via [facility] per pt request.

## 2024-03-31 NOTE — PROGRESS NOTES
De-accessed mediport per order. Flushed, aspirated mediport without difficulties, blood return noted. Heparin instilled, see emar. De-accessed mediport, sterile  dressing applied per protocol, tolerated well. Voices no concerns at this time

## 2024-04-01 ENCOUNTER — CARE COORDINATION (OUTPATIENT)
Dept: CASE MANAGEMENT | Age: 34
End: 2024-04-01

## 2024-04-01 NOTE — CARE COORDINATION
Care Transitions Initial Follow Up Call    Call within 2 business days of discharge: Yes    Patient Current Location:  Home: [address] [place], [place] [address] [facility] [id number]    Care Transition Nurse contacted the patient by telephone to perform post hospital discharge assessment. Verified name and  with patient as identifiers. Provided introduction to self, and explanation of the Care Transition Nurse role.     Patient: Samantha Nichols Patient : 1990   MRN: [id number]  Reason for Admission: Chest pain, Post op problem  Discharge Date: 3/31/24 RARS: Readmission Risk Score: 27.1      Last Discharge Facility       Date Complaint Diagnosis Description Type Department Provider    3/28/24 Post-op Problem; Chest Pain Local infection due to Port-A-Cath, initial encounter ... ED to Hosp-Admission (Discharged) (ADMITTED) HÉCTOR BOOB Susie Asher PA; Colby Jackson...            Was this an external facility discharge? No Discharge Facility: [facility]    Challenges to be reviewed by the provider   Additional needs identified to be addressed with provider: Yes  medications-Per pt, prescribed Oxycodone which causes itching.  Was previously taking Tramadol which was prescribed by Dr. Michel.  Pt to call Dr. Michel's office to request Tramadol instead.  Pt has an appt with provider on 24.                 Method of communication with provider: phone-pt will contact office    Contacted pt for initial care transition follow up.  Samantha states she is \"okay\".  Continues to have pain in chest area.  Reports swelling still noted around port site.  Received Vancomycin while inpatient.  Has not picked up Oxycodone from the pharmacy but plans to  on Wednesday.  States she told the staff that the Oxycodone causes side effects-itching.  States she was previously taking Tramadol which was prescribed by Dr. Michel but states she was told they would only prescribe the Oxycodone.  Advised to discuss with provider.  Pt to

## 2024-04-02 LAB
BACTERIA BLD AEROBE CULT: NORMAL
BACTERIA BLD AEROBE CULT: NORMAL

## 2024-04-02 NOTE — DISCHARGE SUMMARY
Hospitalist Discharge Note      Patient:  Samantha Nichols    Unit/Bed:8B-37/037-A  YOB: 1990  MRN: [id number]   Acct: [id number]     PCP: Giovanna Gibson APRN - CNP  Date of Admission: 3/28/2024      Discharge date: 3/31/2024 11:32 AM    Chief Complaint on presentation :-  Chest Pain     Discharge Assessment and Plan:-   Chest Pain: ID & IR consulted. FB removed by IR on 3/28. Mediport was accessed and 2 blood cultures were drawn from Mediport, monitored for growth- BC showed NGTD. Mediport was not infected, ID okay with DC and keeping mediport.   Microcytic anemia: Secondary to thalassemia and sickle cell disease.  Chronic iron infusion.  Follow-up with hematologist as outpatient  Sickle cell disease: Required chronic iron infusion.  Mediport has been removed and replaced 2 weeks ago for chronic iron infusion  Thalassemia  Hypertension: Resume Norvasc, Minipress, propranolol, spironolactone  BASSEM: BuSpar, Lexapro, Abilify   Chronic back pain: Baclofen  GERD: Carafate, Protonix, Pepcid  Seasonal allergy: Singular  Migraine headaches: Imitrex, Topamax  Constipation Colace    Initial H and P and Hospital course:-  Initial H&P \"Samantha Nichols is a 33 y.o., , female who  has a past medical history of Acute on chronic diastolic congestive heart failure (HCC), JANI (acute kidney injury) ([facility]), Anemia, Anesthesia, Anxiety, Asthma, CAD (coronary artery disease), Depression, Diabetes (HCC), Diet-controlled type 2 diabetes mellitus (HCC), Dyslipidemia, Epilepsy (HCC), Epilepsy (HCC), Fibroids, Gastro - esophageal reflux disease, Gastroparesis, History of esophagogastroduodenoscopy (EGD), Hypertension, Hypertrophy of tonsil and adenoid, Malignant carcinoid tumor of other sites (HCC), Migraine, PONV (postoperative nausea and vomiting), Prolonged emergence from general anesthesia, Sickle cell anemia (HCC), Sickle cell trait (HCC), and Tumor associated pain. that is

## 2024-04-03 LAB
BACTERIA BLD AEROBE CULT: NORMAL
BACTERIA BLD AEROBE CULT: NORMAL

## 2024-04-04 ENCOUNTER — CARE COORDINATION (OUTPATIENT)
Dept: CASE MANAGEMENT | Age: 34
End: 2024-04-04

## 2024-04-04 LAB
EKG ATRIAL RATE: 77 BPM
EKG P AXIS: 47 DEGREES
EKG P-R INTERVAL: 148 MS
EKG Q-T INTERVAL: 362 MS
EKG QRS DURATION: 82 MS
EKG QTC CALCULATION (BAZETT): 409 MS
EKG R AXIS: 24 DEGREES
EKG T AXIS: 22 DEGREES
EKG VENTRICULAR RATE: 77 BPM

## 2024-04-04 NOTE — CARE COORDINATION
Care Transitions Follow Up Call    Patient Current Location:  [place]    Care Transition Nurse contacted the patient by telephone to follow up after admission on 3/28/24.  Verified name and  with patient as identifiers.    Patient: Samantha Nichols  Patient : 1990   MRN: [id number]  Reason for Admission: Chest pain, Post op problem   Discharge Date: 3/31/24 RARS: Readmission Risk Score: 27.1      Needs to be reviewed by the provider   Additional needs identified to be addressed with provider: No  none             Method of communication with provider: none.    Contacted pt for care transition follow up.  Spoke very briefly with pt who was out grocery shopping.  Samantha states she is hanging in there.  States she is doing okay while she is out.  Confirmed contacting Dr. Michel's office.  Has appt with him on 24 and will discuss pain medication then.  Also reports she has an appt with Giovanna Gibson, PCP tomorrow.  No questions or concerns at this time.      Addressed changes since last contact:  none  Discussed follow-up appointments. If no appointment was previously scheduled, appointment scheduling offered: Yes.   Is follow up appointment scheduled within 7 days of discharge? Yes.    Follow Up  Future Appointments   Date Time Provider Department Center   2024 11:30 AM Sully Ramos RD, LD [facility] Physic [facility] [place]   2024  4:00 PM Ramona You MD N [facility] Heart [facility] [place]   2024 11:00 AM Charlie Weiss MD N ENT [facility] [place]   2024 10:00 AM Shauna Herron MD [facility]ic [facility] [place]     External follow up appointment(s): has appt with current PCP LUCIEN Garza tomorrow, 24; appt with Dr. Michel on 24    Patients top risk factors for readmission: medical condition-Chest pain, DM, Abdominal pain, gastroparesis, GERD, Esophageal stricture and polypharmacy  Interventions to address risk factors: Scheduled appointment with PCP-has appt on 24    Offered

## 2024-04-10 ENCOUNTER — CARE COORDINATION (OUTPATIENT)
Dept: CASE MANAGEMENT | Age: 34
End: 2024-04-10

## 2024-04-10 NOTE — CARE COORDINATION
Care Transitions Follow Up Call    Patient Current Location:  [place]    Care Transition Nurse contacted the patient by telephone to follow up after admission on 3/28/24.  Verified name and  with patient as identifiers.    Patient: Samantha Nichols  Patient : 1990   MRN: [id number]  Reason for Admission: Chest pain, Post op problem   Discharge Date: 3/31/24 RARS: Readmission Risk Score: 27.1      Needs to be reviewed by the provider   Additional needs identified to be addressed with provider: No  none             Method of communication with provider: none.    Contacted pt for care transition follow up.  Spoke briefly with pt who was at the hospital with her spouse Fely who was having a procedure.  Samantha states she is still hanging in there.  She reports she had appt with Dr. Michel yesterday, 24.  States she was told to continue to the Tramadol therefore she is not taking the Oxycodone.  Reports Tramadol does help with the pain and takes the edge off.  She continues to have pain which she states is being caused by the adhesions.  Reports she has a tele-visit follow up with [facility] general surgeon Dr. Duckworth on 4/15/24.  She reports she continues to have the chest pain but \"dealing with it\".  Denies having any jaw or neck pain or pain radiating down her arm.  Reports port site looks \"okay\" but .  She is aware of s/s and when to contact providers.  No questions or needs at this time.      Addressed changes since last contact:   Taking Tramadol instead of the Oxycodone  Discussed follow-up appointments. If no appointment was previously scheduled, appointment scheduling offered: Yes.   Is follow up appointment scheduled within 7 days of discharge? Yes.    Follow Up  Future Appointments   Date Time Provider Department Center   2024 11:30 AM Sully Ramos RD, LD [facility] Physic [facility] [place]   2024  4:00 PM Ramona You MD N [facility] Heart [facility] [place]   2024 11:00 AM Charlie Weiss

## 2024-04-16 RX ORDER — SODIUM CHLORIDE 9 MG/ML
25 INJECTION, SOLUTION INTRAVENOUS PRN
OUTPATIENT
Start: 2024-04-16

## 2024-04-16 RX ORDER — HEPARIN 100 UNIT/ML
500 SYRINGE INTRAVENOUS PRN
OUTPATIENT
Start: 2024-04-16

## 2024-04-16 RX ORDER — SODIUM CHLORIDE 0.9 % (FLUSH) 0.9 %
5-40 SYRINGE (ML) INJECTION PRN
OUTPATIENT
Start: 2024-04-16

## 2024-04-18 ENCOUNTER — CARE COORDINATION (OUTPATIENT)
Dept: CASE MANAGEMENT | Age: 34
End: 2024-04-18

## 2024-04-18 ENCOUNTER — TELEPHONE (OUTPATIENT)
Dept: CARDIOLOGY CLINIC | Age: 34
End: 2024-04-18

## 2024-04-18 NOTE — CARE COORDINATION
Care Transitions Note    Follow Up Call      Patient Current Location:  Home: [facility] [place] [address]    Care Transition Nurse contacted the patient by telephone. Verified name and  as identifiers.    Additional needs identified to be addressed with provider   No needs identified                 Method of communication with provider: none.    Care Summary Note: Contacted pt for care transition follow up.  Spoke very briefly with pt.  Samantha states she is doing about the same.  Confirmed having her tele-visit with Dr. Duckworth.  States [facility] did not want to proceed with procedure.  She informed CTN that Dr. Michel will be performing the surgery next Friday, 24.  Pt getting ready to go to her gynecology appt in a couple of minutes.  No questions or needs at this time.      Addressed changes since last contact:  Pt will be having surgery on 24       Advance Care Planning:   Does patient have an Advance Directive:  on file .    Medication Review:  No changes since last call.     Remote Patient Monitoring:  Offered patient enrollment in the Remote Patient Monitoring (RPM) program for in-home monitoring: Patient is not eligible for RPM program.    Assessments:  Care Transitions Subsequent and Final Call    Subsequent and Final Calls  Do you have any ongoing symptoms?: Yes  Patient-reported symptoms: Pain  Have your medications changed?: No  Do you have any questions related to your medications?: No  Do you currently have any active services?: No  Are you currently active with any services?: Home Health  Do you have any needs or concerns that I can assist you with?: No  Care Transitions Interventions   Home Care Waiver: Completed        Transportation Support: Declined      DME Assistance: Declined   Disease Specific Clinic: Completed      Disease Association: Completed      Other Interventions:              Follow Up Appointment:   Upcoming procedure with Dr. Michel on 24  Future

## 2024-04-18 NOTE — TELEPHONE ENCOUNTER
Pre op Risk Assessment    Procedure robotic assisted lysis of adhesions for abdominal pain   Physician Dr Michel  Date of surgery/procedure 4/26/24    Last OV 2/8/23  Last Stress   Last Echo 6/25/22  Last Cath 5/31/21  Last Stent   Is patient on blood thinners none   Hold Meds/how many days      Dr You pt sx is 4/26/*24  You have No office before then, please advise

## 2024-04-18 NOTE — PROGRESS NOTES
PAT Call Date: Attempted 4/19   Surgery Date: 4/26    Surgeon: Yossi   Surgery: lysis of adhesions    Any Isolation Precautions? No      Type of Isolation Precaution: Not Applicable   Is patient from a nursing home? No  Name of Nursing Home:   Any equipment assist needed for moving patient? No   Type of Equipment: Not Applicable  Patient last weight: 288 lb  12/18/23 PEG TUBE PLACEMENT   Hard Copy on Chart  In EPIC Pending/Notes   Consent -   Within 30 days; signed, dated & timed by patient and physician  In tracker   [] On Arrival     [] Blood      [] DNR   H&P -   Within 30 days    [] Physician To Do     Clearance -      []Medical     [x]Cardiac MELHEM     [] Pulmonary    Orders -   Signed and Dated      [] Physician To Do    Labs -   Within 3 months   3/30  3/29  [x] CBC -OK   [x] CMP-OK   [x] GFR-OK   [] INR    [] PTT    [] Urine    [] Liver Enzymes    [] MRSA Nasal      Others:     Radiology Studies -   Within 1 year  3/28  [x] Chest X-Ray-ok   [] MRI    [] CT    [] Vascular   [] US     Pulmonary -     [x] JOVANY   [] CPAP     Cardiac Workup -   Stress Test, Echo, Cath within 18 months  3/28      2/20  [x] EKG -ok     [] Cath                 [] Stress Test                      [x] Echo/KEYANA 58%   [] CABG   [] Holter Monitor      [] Pacemaker/ICD        Brand:        Where does patient have checked:         Last check:         Rep Notified:      HAS MEDIPORT

## 2024-04-19 NOTE — PROGRESS NOTES
PAT call attempted to both home and mobile numbers, patient unavailable, and unable to leave a message on either phone line.

## 2024-04-22 NOTE — TELEPHONE ENCOUNTER
Chart was reviewed  She has had prior extensive cardiac work up  Echo 6/2022 revealed an EF of 60-65%  LHC 5/31/2021 revealed no significant CAD  No further cardiac studies are indicated from my standpoint, has low cardiac risk

## 2024-04-25 ENCOUNTER — CARE COORDINATION (OUTPATIENT)
Dept: CASE MANAGEMENT | Age: 34
End: 2024-04-25

## 2024-04-25 NOTE — CARE COORDINATION
Care Transitions Note    Follow Up Call      Attempted to reach patient for transitions of care follow up.  Unable to reach patient.      Outreach Attempts:   Not available to talk right now    Care Summary Note: Attempted to contact pt for care transition follow up.  Samantha states she is at a doctor's office and she will call CTN back.      Follow Up Appointment:   Future Appointments         Provider Specialty Dept Phone    4/30/2024 11:30 AM Sully Ramos RD, LD Internal Medicine [phone]    5/22/2024 4:00 PM Ramona You MD Cardiology [phone]    5/23/2024 10:00 AM STR EXAM ROOM 10 IP Unit [phone]    6/18/2024 11:00 AM Charlie Weiss MD Otolaryngology [phone]    7/23/2024 10:00 AM Shauna Herron MD Internal Medicine [phone]            Plan for follow-up call in 2-5 days based on severity of symptoms and risk factors. Plan for next call: symptom management-any new or worsening symptoms     Em Hua RN

## 2024-04-25 NOTE — CARE COORDINATION
Care Transitions Note    Follow Up Call      Patient Current Location:  [place]    Care Transition Nurse contacted the patient by telephone. Verified name and  as identifiers.    Additional needs identified to be addressed with provider   No needs identified                 Method of communication with provider: none.    Care Summary Note: Received incoming call from pt.  Left message stating she is returning the call.    Called pt back for care transition follow up.  Samantha states she is still hanging in there.  States she is getting ready for her surgery tomorrow, 24.  Pt having robotic lysis of adhesions procedure by Dr. Michel.  States she will be in the hospital for a couple of days.  Pt states she is not really having any chest pain/discomfort.  Abdominal pain still present and states more than likely from the adhesions.  She is eating and drinking fluids but continues to have nausea at times.  No vomiting.  She is moving her bowels which have been loose.  She does not have any questions or needs at this time.      Addressed changes since last contact:  Pt having surgery tomorrow, 24-robotic lysis of adhesions       Advance Care Planning:   Does patient have an Advance Directive:  on file .    Medication Review:  No changes since last call.     Remote Patient Monitoring:  Offered patient enrollment in the Remote Patient Monitoring (RPM) program for in-home monitoring: Patient is not eligible for RPM program because: clinical team licensure.    Assessments:  Care Transitions Subsequent and Final Call    Subsequent and Final Calls  Do you have any ongoing symptoms?: Yes  Patient-reported symptoms: Abdominal Pain, Nausea, Other  Have your medications changed?: No  Do you have any questions related to your medications?: No  Do you currently have any active services?: No  Are you currently active with any services?: Home Health  Do you have any needs or concerns that I can assist you with?: No  Care

## 2024-04-26 ENCOUNTER — HOSPITAL ENCOUNTER (EMERGENCY)
Age: 34
Discharge: HOME | End: 2024-04-26
Attending: EMERGENCY MEDICINE
Payer: MEDICARE

## 2024-04-26 ENCOUNTER — HOSPITAL ENCOUNTER (OUTPATIENT)
Age: 34
Setting detail: OUTPATIENT SURGERY
Discharge: HOME | End: 2024-04-26
Attending: SURGERY | Admitting: SURGERY
Payer: MEDICARE

## 2024-04-26 VITALS
OXYGEN SATURATION: 99 % | TEMPERATURE: 96.8 F | BODY MASS INDEX: 50.02 KG/M2 | DIASTOLIC BLOOD PRESSURE: 116 MMHG | SYSTOLIC BLOOD PRESSURE: 162 MMHG | HEART RATE: 84 BPM | RESPIRATION RATE: 18 BRPM | WEIGHT: 293 LBS | HEIGHT: 64 IN

## 2024-04-26 VITALS
DIASTOLIC BLOOD PRESSURE: 115 MMHG | RESPIRATION RATE: 16 BRPM | HEART RATE: 87 BPM | TEMPERATURE: 97.9 F | SYSTOLIC BLOOD PRESSURE: 160 MMHG | OXYGEN SATURATION: 96 %

## 2024-04-26 DIAGNOSIS — R10.84 GENERALIZED ABDOMINAL PAIN: Primary | ICD-10-CM

## 2024-04-26 LAB
ALBUMIN SERPL BCG-MCNC: 4.2 G/DL (ref 3.5–5.1)
ALP SERPL-CCNC: 122 U/L (ref 38–126)
ALT SERPL W/O P-5'-P-CCNC: 12 U/L (ref 11–66)
ANION GAP SERPL CALC-SCNC: 14 MEQ/L (ref 8–16)
AST SERPL-CCNC: 19 U/L (ref 5–40)
BASOPHILS ABSOLUTE: 0.1 THOU/MM3 (ref 0–0.1)
BASOPHILS NFR BLD AUTO: 0.5 %
BILIRUB SERPL-MCNC: 0.2 MG/DL (ref 0.3–1.2)
BUN SERPL-MCNC: 12 MG/DL (ref 7–22)
CALCIUM SERPL-MCNC: 10 MG/DL (ref 8.5–10.5)
CHLORIDE SERPL-SCNC: 103 MEQ/L (ref 98–111)
CO2 SERPL-SCNC: 21 MEQ/L (ref 23–33)
CREAT SERPL-MCNC: 0.8 MG/DL (ref 0.4–1.2)
DEPRECATED RDW RBC AUTO: 43.8 FL (ref 35–45)
EOSINOPHIL NFR BLD AUTO: 1.3 %
EOSINOPHILS ABSOLUTE: 0.1 THOU/MM3 (ref 0–0.4)
ERYTHROCYTE [DISTWIDTH] IN BLOOD BY AUTOMATED COUNT: 15.5 % (ref 11.5–14.5)
GFR SERPL CREATININE-BSD FRML MDRD: > 90 ML/MIN/1.73M2
GLUCOSE BLD STRIP.AUTO-MCNC: 102 MG/DL (ref 70–108)
GLUCOSE SERPL-MCNC: 95 MG/DL (ref 70–108)
HCT VFR BLD AUTO: 45.4 % (ref 37–47)
HGB BLD-MCNC: 13.6 GM/DL (ref 12–16)
IMM GRANULOCYTES # BLD AUTO: 0.04 THOU/MM3 (ref 0–0.07)
IMM GRANULOCYTES NFR BLD AUTO: 0.4 %
LIPASE SERPL-CCNC: 19 U/L (ref 5.6–51.3)
LYMPHOCYTES ABSOLUTE: 2.6 THOU/MM3 (ref 1–4.8)
LYMPHOCYTES NFR BLD AUTO: 24.5 %
MCH RBC QN AUTO: 23.9 PG (ref 26–33)
MCHC RBC AUTO-ENTMCNC: 30 GM/DL (ref 32.2–35.5)
MCV RBC AUTO: 79.8 FL (ref 81–99)
MONOCYTES ABSOLUTE: 0.5 THOU/MM3 (ref 0.4–1.3)
MONOCYTES NFR BLD AUTO: 4.5 %
NEUTROPHILS NFR BLD AUTO: 68.8 %
NRBC BLD AUTO-RTO: 0 /100 WBC
OSMOLALITY SERPL CALC.SUM OF ELEC: 275.2 MOSMOL/KG (ref 275–300)
PLATELET # BLD AUTO: 186 THOU/MM3 (ref 130–400)
PLATELET BLD QL SMEAR: ADEQUATE
PMV BLD AUTO: 10.3 FL (ref 9.4–12.4)
POTASSIUM SERPL-SCNC: 4 MEQ/L (ref 3.5–5.2)
PROT SERPL-MCNC: 8.1 G/DL (ref 6.1–8)
RBC # BLD AUTO: 5.69 MILL/MM3 (ref 4.2–5.4)
SCAN OF BLOOD SMEAR: NORMAL
SEGMENTED NEUTROPHILS ABSOLUTE COUNT: 7.3 THOU/MM3 (ref 1.8–7.7)
SODIUM SERPL-SCNC: 138 MEQ/L (ref 135–145)
WBC # BLD AUTO: 10.6 THOU/MM3 (ref 4.8–10.8)

## 2024-04-26 PROCEDURE — 80053 COMPREHEN METABOLIC PANEL: CPT

## 2024-04-26 PROCEDURE — 85025 COMPLETE CBC W/AUTO DIFF WBC: CPT

## 2024-04-26 PROCEDURE — 6360000002 HC RX W HCPCS: Performed by: EMERGENCY MEDICINE

## 2024-04-26 PROCEDURE — 82948 REAGENT STRIP/BLOOD GLUCOSE: CPT

## 2024-04-26 PROCEDURE — 36415 COLL VENOUS BLD VENIPUNCTURE: CPT

## 2024-04-26 PROCEDURE — 83690 ASSAY OF LIPASE: CPT

## 2024-04-26 RX ORDER — MORPHINE SULFATE 4 MG/ML
4 INJECTION, SOLUTION INTRAMUSCULAR; INTRAVENOUS
Status: COMPLETED | OUTPATIENT
Start: 2024-04-26 | End: 2024-04-26

## 2024-04-26 RX ORDER — CLINDAMYCIN PHOSPHATE 900 MG/50ML
900 INJECTION, SOLUTION INTRAVENOUS
Status: DISCONTINUED | OUTPATIENT
Start: 2024-04-26 | End: 2024-04-26 | Stop reason: HOSPADM

## 2024-04-26 RX ADMIN — MORPHINE SULFATE 4 MG: 4 INJECTION, SOLUTION INTRAMUSCULAR; INTRAVENOUS at 13:06

## 2024-04-26 ASSESSMENT — PAIN SCALES - GENERAL
PAINLEVEL_OUTOF10: 10
PAINLEVEL_OUTOF10: 10

## 2024-04-26 ASSESSMENT — PAIN - FUNCTIONAL ASSESSMENT
PAIN_FUNCTIONAL_ASSESSMENT: 0-10
PAIN_FUNCTIONAL_ASSESSMENT: PREVENTS OR INTERFERES SOME ACTIVE ACTIVITIES AND ADLS
PAIN_FUNCTIONAL_ASSESSMENT: 0-10

## 2024-04-26 ASSESSMENT — PAIN DESCRIPTION - DESCRIPTORS: DESCRIPTORS: ACHING

## 2024-04-26 NOTE — ED PROVIDER NOTES
anemia (HCC)     Sickle cell trait (HCC)     PT STATES SHE HAS THE TRAIT NOT THE DISEASE    Tumor associated pain     neuroendrocrine tumor, gastroma       Patient Active Problem List   Diagnosis Code    Chronic abdominal pain R10.9, G89.29    Nausea and vomiting R11.2    Carcinoid tumor D3A.00    Pelvic inflammatory disease N73.9    Intractable nausea and vomiting R11.2    Microcytic anemia D50.9    Diet-controlled type 2 diabetes mellitus (HCC) E11.9    Esophageal dysphagia R13.19    Esophageal stricture K22.2    Morbid obesity ([facility]) E66.01    Migraine equivalent G43.109    Mild persistent asthma without complication J45.30    Dyslipidemia E78.5    Moderate persistent asthma with acute exacerbation J45.41    Gastroenteritis K52.9    Hematuria R31.9    Diarrhea R19.7    Diarrhea of presumed infectious origin R19.7    Hematochezia K92.1    Generalized abdominal pain R10.84    Hypertrophy of tonsil and adenoid J35.3    Multiple drug allergies Z88.9    S/P T&A (status post tonsillectomy and adenoidectomy) Z90.89    Iron deficiency anemia, unspecified D50.9    Poor intravenous access Z78.9    Abnormal Pap smear of cervix R87.619    Abnormal uterine bleeding N93.9    Asthma J45.909    Other chest pain R07.89    Female pelvic pain R10.2    Head ache R51.9    Heartburn R12    Incarcerated ventral hernia K43.6    Malignant carcinoid tumor of other sites (HCC) C7A.098    Pyelonephritis N12    Spigelian hernia K43.9    Uterine leiomyoma D25.9    Acute kidney injury (HCC) N17.9    Hypernatremia E87.0    Hypokalemia E87.6    Volume depletion, gastrointestinal loss E86.9    Essential hypertension I10    Hiatal hernia K44.9    S/P Nissen fundoplication (without gastrostomy tube) procedure Z98.890    Sickle cell disease (HCC) D57.1    Callus of heel L84    Foot pain, bilateral M79.671, M79.672    Atypical chest pain R07.89    Tobacco use Z72.0    History of gastritis Z87.19    Weight gain R63.5    Chronic idiopathic constipation  Differential Diagnosis includes (but not limited to):  Intraabdominal adhesions, chronic abdominal pain, gastroparesis, JANI, liver failure, carcinoid tumor, tension headache, migraine headache            2)  Treatment and Disposition         ED Reassessment:  See ED course         Shared Decision-Making was performed and disposition discussed with the        Patient/Family and questions answered          Social determinants of health impacting treatment or disposition:  Chronic pain Pt         Code Status:  Full      Summary of Patient Presentation:      Medical Decision Making  33-year-old well-appearing female with multiple comorbidities who presents for evaluation after being turned away for her elective surgery because her blood pressure is elevated.  Systolic blood pressure here has been around 150s and 160s mostly.  The rest of her vitals are within normal limits.  Patient is very comfortable appearing, resting comfortably in bed pain on her phone.  Abdomen soft with some tenderness but no peritoneal signs.  Patient is still passing gas and having loose bowel movements, low suspicion for small bowel obstruction.  Lab work unremarkable.  See ED course.  Patient unable to see her PCP for more than 2 months, desiring a change so I scheduled her for an appointment.    Amount and/or Complexity of Data Reviewed  Labs: ordered.    /  ED Course as of 04/26/24 1430   Fri Apr 26, 2024   1146 Repeat systolic blood pressure was 157 [AC]   1239 From progress note today: Patient cancelled due to elevated blood pressure per Dr. Leon. Advised to follow-up with family physician ASAP or report to ER to be seen for BP [AC]   1239 Reportedly, her BP was only taken once at the surgery center [AC]   1251 I spoke to Dr Michel who said he would be unable to do the surgery today, that she would have to call back and reschedule [AC]   1335 Pt scheduled for IM res clinic next Thursday at 2:30 [AC]      ED Course User Index  [AC]

## 2024-04-26 NOTE — PROGRESS NOTES
Patient cancelled due to elevated blood pressure per Dr. Leon. Advised to follow-up with family physician ASAP or report to ER to be seen for BP.

## 2024-04-26 NOTE — DISCHARGE INSTRUCTIONS
If given narcotics (opiates) during this Emergency Department visit, please do not drink, drive or operate any machinery for at least 4 - 6 hours.    Avoid eating any spicy food, milk type products or drinks that have caffeine in it.  Take all medications as prescribed.  For pain use ibuprofen (Motrin) or acetaminophen (Tylenol), unless prescribed medications that have acetaminophen in it.  You can take over the counter acetaminophen tablets (1 - 2 tablets of the 500-mg strength every 6 hours) or ibuprofen tablets (2 tablets every 4 hours).    PLEASE RETURN TO THE EMERGENCY DEPARTMENT IMMEDIATELY for worsening symptoms, or if you develop any concerning symptoms such as: high fever not relieved by acetaminophen (Tylenol) and/or ibuprofen (Motrin), chills, shortness of breath, chest pain, persistent nausea and/or vomiting, numbness, weakness or tingling in the arms or legs or change in color of the extremities, changes in mental status, persistent headache, blurry vision.    Return within 8 - 12 hours if you have any of the following: worsening of pain in your abdomen, no food sounds good to you, you continue to vomit, pain goes to your back, have pain in the abdomen when going over a bump in the car or when you jump up and down, develop vaginal bleeding or discharge, inability to urinate, unable to follow up with your physician, or other any other care or concern.

## 2024-04-26 NOTE — ED NOTES
Presents to ER from surgery center due to HTN. Pt reports BP too high for procedure today. She reports she took all home medications she was told to take prior to surgery. She report she believes her pressure is high from pain. She reports adhesions which were to be removed today are causing her 10/10 pain. Pt encouraged to follow up with PCP. Pt reports tramadol not working for her pain.

## 2024-04-26 NOTE — ED NOTES
Medicated per MAR. Voiced that she is concerned about follow up with pcp not being until July and how she will control her pain at home. Provider made aware.

## 2024-04-26 NOTE — H&P
[facility]  History and Physical Update      Pt Name: Samantha Nichols  MRN: [id number]  YOB: 1990  Date of evaluation: 4/25/2024    [x] I have examined the patient and reviewed the H&P/Consult and there are no changes to the patient or plans.    [] I have examined the patient and reviewed the H&P/Consult and have noted the following changes:        Iron Michel MD  Electronically signed 4/25/2024 at 9:05 PM

## 2024-04-27 SDOH — ECONOMIC STABILITY: INCOME INSECURITY: HOW HARD IS IT FOR YOU TO PAY FOR THE VERY BASICS LIKE FOOD, HOUSING, MEDICAL CARE, AND HEATING?: NOT HARD AT ALL

## 2024-04-27 SDOH — ECONOMIC STABILITY: FOOD INSECURITY: WITHIN THE PAST 12 MONTHS, THE FOOD YOU BOUGHT JUST DIDN'T LAST AND YOU DIDN'T HAVE MONEY TO GET MORE.: NEVER TRUE

## 2024-04-27 SDOH — ECONOMIC STABILITY: FOOD INSECURITY: WITHIN THE PAST 12 MONTHS, YOU WORRIED THAT YOUR FOOD WOULD RUN OUT BEFORE YOU GOT MONEY TO BUY MORE.: NEVER TRUE

## 2024-04-27 SDOH — ECONOMIC STABILITY: TRANSPORTATION INSECURITY
IN THE PAST 12 MONTHS, HAS LACK OF TRANSPORTATION KEPT YOU FROM MEETINGS, WORK, OR FROM GETTING THINGS NEEDED FOR DAILY LIVING?: YES

## 2024-04-28 ENCOUNTER — APPOINTMENT (OUTPATIENT)
Dept: GENERAL RADIOLOGY | Age: 34
End: 2024-04-28
Payer: MEDICARE

## 2024-04-28 ENCOUNTER — HOSPITAL ENCOUNTER (EMERGENCY)
Age: 34
Discharge: HOME | End: 2024-04-28
Attending: EMERGENCY MEDICINE
Payer: MEDICARE

## 2024-04-28 ENCOUNTER — APPOINTMENT (OUTPATIENT)
Dept: CT IMAGING | Age: 34
End: 2024-04-28
Payer: MEDICARE

## 2024-04-28 VITALS
WEIGHT: 267 LBS | HEIGHT: 64 IN | RESPIRATION RATE: 16 BRPM | HEART RATE: 73 BPM | SYSTOLIC BLOOD PRESSURE: 137 MMHG | BODY MASS INDEX: 45.58 KG/M2 | TEMPERATURE: 98 F | OXYGEN SATURATION: 98 % | DIASTOLIC BLOOD PRESSURE: 94 MMHG

## 2024-04-28 DIAGNOSIS — R10.9 ABDOMINAL PAIN, UNSPECIFIED ABDOMINAL LOCATION: Primary | ICD-10-CM

## 2024-04-28 LAB
ALBUMIN SERPL BCG-MCNC: 4.2 G/DL (ref 3.5–5.1)
ALP SERPL-CCNC: 112 U/L (ref 38–126)
ALT SERPL W/O P-5'-P-CCNC: 15 U/L (ref 11–66)
AMMONIA PLAS-MCNC: 19 UMOL/L (ref 11–60)
AMPHETAMINES UR QL SCN: NEGATIVE
ANION GAP SERPL CALC-SCNC: 12 MEQ/L (ref 8–16)
AST SERPL-CCNC: 15 U/L (ref 5–40)
BACTERIA URNS QL MICRO: ABNORMAL /HPF
BARBITURATES UR QL SCN: NEGATIVE
BASOPHILS ABSOLUTE: 0.1 THOU/MM3 (ref 0–0.1)
BASOPHILS NFR BLD AUTO: 0.5 %
BENZODIAZ UR QL SCN: NEGATIVE
BILIRUB CONJ SERPL-MCNC: < 0.2 MG/DL (ref 0–0.3)
BILIRUB SERPL-MCNC: 0.2 MG/DL (ref 0.3–1.2)
BILIRUB UR QL STRIP.AUTO: NEGATIVE
BUN SERPL-MCNC: 15 MG/DL (ref 7–22)
BZE UR QL SCN: NEGATIVE
CALCIUM SERPL-MCNC: 9.8 MG/DL (ref 8.5–10.5)
CANNABINOIDS UR QL SCN: NEGATIVE
CASTS #/AREA URNS LPF: ABNORMAL /LPF
CASTS 2: ABNORMAL /LPF
CHARACTER UR: CLEAR
CHLORIDE SERPL-SCNC: 102 MEQ/L (ref 98–111)
CO2 SERPL-SCNC: 25 MEQ/L (ref 23–33)
COLOR: YELLOW
CREAT SERPL-MCNC: 0.8 MG/DL (ref 0.4–1.2)
CRYSTALS URNS MICRO: ABNORMAL
DEPRECATED RDW RBC AUTO: 41.7 FL (ref 35–45)
EKG ATRIAL RATE: 82 BPM
EKG P AXIS: 30 DEGREES
EKG P-R INTERVAL: 156 MS
EKG Q-T INTERVAL: 358 MS
EKG QRS DURATION: 80 MS
EKG QTC CALCULATION (BAZETT): 418 MS
EKG R AXIS: 21 DEGREES
EKG T AXIS: 15 DEGREES
EKG VENTRICULAR RATE: 82 BPM
EOSINOPHIL NFR BLD AUTO: 1.5 %
EOSINOPHILS ABSOLUTE: 0.2 THOU/MM3 (ref 0–0.4)
EPITHELIAL CELLS, UA: ABNORMAL /HPF
ERYTHROCYTE [DISTWIDTH] IN BLOOD BY AUTOMATED COUNT: 15.4 % (ref 11.5–14.5)
ETHANOL SERPL-MCNC: < 0.01 %
FENTANYL: NEGATIVE
GFR SERPL CREATININE-BSD FRML MDRD: > 90 ML/MIN/1.73M2
GLUCOSE BLD STRIP.AUTO-MCNC: 108 MG/DL (ref 70–108)
GLUCOSE SERPL-MCNC: 104 MG/DL (ref 70–108)
GLUCOSE UR QL STRIP.AUTO: NEGATIVE MG/DL
HCT VFR BLD AUTO: 41 % (ref 37–47)
HGB BLD-MCNC: 13.3 GM/DL (ref 12–16)
HGB UR QL STRIP.AUTO: ABNORMAL
IMM GRANULOCYTES # BLD AUTO: 0.04 THOU/MM3 (ref 0–0.07)
IMM GRANULOCYTES NFR BLD AUTO: 0.3 %
KETONES UR QL STRIP.AUTO: NEGATIVE
LYMPHOCYTES ABSOLUTE: 3.5 THOU/MM3 (ref 1–4.8)
LYMPHOCYTES NFR BLD AUTO: 30.5 %
MCH RBC QN AUTO: 24.3 PG (ref 26–33)
MCHC RBC AUTO-ENTMCNC: 32.4 GM/DL (ref 32.2–35.5)
MCV RBC AUTO: 74.8 FL (ref 81–99)
MISCELLANEOUS 2: ABNORMAL
MONOCYTES ABSOLUTE: 0.7 THOU/MM3 (ref 0.4–1.3)
MONOCYTES NFR BLD AUTO: 6.3 %
NEUTROPHILS NFR BLD AUTO: 60.9 %
NITRITE UR QL STRIP: NEGATIVE
NRBC BLD AUTO-RTO: 0 /100 WBC
OPIATES UR QL SCN: NEGATIVE
OSMOLALITY SERPL CALC.SUM OF ELEC: 278.7 MOSMOL/KG (ref 275–300)
OXYCODONE: NEGATIVE
PCP UR QL SCN: NEGATIVE
PH UR STRIP.AUTO: 6.5 [PH] (ref 5–9)
PLATELET # BLD AUTO: 298 THOU/MM3 (ref 130–400)
PMV BLD AUTO: 9.7 FL (ref 9.4–12.4)
POTASSIUM SERPL-SCNC: 3.8 MEQ/L (ref 3.5–5.2)
PROT SERPL-MCNC: 8 G/DL (ref 6.1–8)
PROT UR STRIP.AUTO-MCNC: NEGATIVE MG/DL
RBC # BLD AUTO: 5.48 MILL/MM3 (ref 4.2–5.4)
RBC URINE: ABNORMAL /HPF
RENAL EPI CELLS #/AREA URNS HPF: ABNORMAL /[HPF]
SEGMENTED NEUTROPHILS ABSOLUTE COUNT: 7 THOU/MM3 (ref 1.8–7.7)
SODIUM SERPL-SCNC: 139 MEQ/L (ref 135–145)
SP GR UR REFRACT.AUTO: 1.02 (ref 1–1.03)
TROPONIN, HIGH SENSITIVITY: < 6 NG/L (ref 0–12)
UROBILINOGEN, URINE: 1 EU/DL (ref 0–1)
WBC # BLD AUTO: 11.5 THOU/MM3 (ref 4.8–10.8)
WBC #/AREA URNS HPF: ABNORMAL /HPF
WBC #/AREA URNS HPF: NEGATIVE /[HPF]
YEAST LIKE FUNGI URNS QL MICRO: ABNORMAL

## 2024-04-28 PROCEDURE — 84484 ASSAY OF TROPONIN QUANT: CPT

## 2024-04-28 PROCEDURE — 96375 TX/PRO/DX INJ NEW DRUG ADDON: CPT

## 2024-04-28 PROCEDURE — 74177 CT ABD & PELVIS W/CONTRAST: CPT

## 2024-04-28 PROCEDURE — 71045 X-RAY EXAM CHEST 1 VIEW: CPT

## 2024-04-28 PROCEDURE — 70450 CT HEAD/BRAIN W/O DYE: CPT

## 2024-04-28 PROCEDURE — 96361 HYDRATE IV INFUSION ADD-ON: CPT

## 2024-04-28 PROCEDURE — 6360000004 HC RX CONTRAST MEDICATION: Performed by: EMERGENCY MEDICINE

## 2024-04-28 PROCEDURE — 6360000002 HC RX W HCPCS

## 2024-04-28 PROCEDURE — 82077 ASSAY SPEC XCP UR&BREATH IA: CPT

## 2024-04-28 PROCEDURE — 93010 ELECTROCARDIOGRAM REPORT: CPT | Performed by: NUCLEAR MEDICINE

## 2024-04-28 PROCEDURE — 82140 ASSAY OF AMMONIA: CPT

## 2024-04-28 PROCEDURE — 2580000003 HC RX 258

## 2024-04-28 PROCEDURE — 80048 BASIC METABOLIC PNL TOTAL CA: CPT

## 2024-04-28 PROCEDURE — 81001 URINALYSIS AUTO W/SCOPE: CPT

## 2024-04-28 PROCEDURE — 85025 COMPLETE CBC W/AUTO DIFF WBC: CPT

## 2024-04-28 PROCEDURE — 80307 DRUG TEST PRSMV CHEM ANLYZR: CPT

## 2024-04-28 PROCEDURE — 96374 THER/PROPH/DIAG INJ IV PUSH: CPT

## 2024-04-28 PROCEDURE — 36415 COLL VENOUS BLD VENIPUNCTURE: CPT

## 2024-04-28 PROCEDURE — 93005 ELECTROCARDIOGRAM TRACING: CPT

## 2024-04-28 PROCEDURE — 80076 HEPATIC FUNCTION PANEL: CPT

## 2024-04-28 PROCEDURE — 82948 REAGENT STRIP/BLOOD GLUCOSE: CPT

## 2024-04-28 PROCEDURE — 99285 EMERGENCY DEPT VISIT HI MDM: CPT

## 2024-04-28 RX ORDER — MORPHINE SULFATE 4 MG/ML
4 INJECTION, SOLUTION INTRAMUSCULAR; INTRAVENOUS ONCE
Status: DISCONTINUED | OUTPATIENT
Start: 2024-04-28 | End: 2024-04-28

## 2024-04-28 RX ORDER — SODIUM CHLORIDE, SODIUM LACTATE, POTASSIUM CHLORIDE, AND CALCIUM CHLORIDE .6; .31; .03; .02 G/100ML; G/100ML; G/100ML; G/100ML
1000 INJECTION, SOLUTION INTRAVENOUS ONCE
Status: COMPLETED | OUTPATIENT
Start: 2024-04-28 | End: 2024-04-28

## 2024-04-28 RX ORDER — NALOXONE HYDROCHLORIDE 0.4 MG/ML
0.4 INJECTION, SOLUTION INTRAMUSCULAR; INTRAVENOUS; SUBCUTANEOUS ONCE
Status: COMPLETED | OUTPATIENT
Start: 2024-04-28 | End: 2024-04-28

## 2024-04-28 RX ORDER — HEPARIN 100 UNIT/ML
300 SYRINGE INTRAVENOUS ONCE
Status: COMPLETED | OUTPATIENT
Start: 2024-04-28 | End: 2024-04-28

## 2024-04-28 RX ORDER — MORPHINE SULFATE 4 MG/ML
4 INJECTION, SOLUTION INTRAMUSCULAR; INTRAVENOUS ONCE
Status: COMPLETED | OUTPATIENT
Start: 2024-04-28 | End: 2024-04-28

## 2024-04-28 RX ADMIN — MORPHINE SULFATE 4 MG: 4 INJECTION, SOLUTION INTRAMUSCULAR; INTRAVENOUS at 04:21

## 2024-04-28 RX ADMIN — NALXONE HYDROCHLORIDE 0.4 MG: 0.4 INJECTION INTRAMUSCULAR; INTRAVENOUS; SUBCUTANEOUS at 03:59

## 2024-04-28 RX ADMIN — SODIUM CHLORIDE, POTASSIUM CHLORIDE, SODIUM LACTATE AND CALCIUM CHLORIDE 1000 ML: 600; 310; 30; 20 INJECTION, SOLUTION INTRAVENOUS at 04:19

## 2024-04-28 RX ADMIN — IOPAMIDOL 80 ML: 755 INJECTION, SOLUTION INTRAVENOUS at 05:02

## 2024-04-28 RX ADMIN — HEPARIN 300 UNITS: 100 SYRINGE at 06:13

## 2024-04-28 ASSESSMENT — PAIN - FUNCTIONAL ASSESSMENT
PAIN_FUNCTIONAL_ASSESSMENT: 0-10
PAIN_FUNCTIONAL_ASSESSMENT: WONG-BAKER FACES

## 2024-04-28 ASSESSMENT — PAIN DESCRIPTION - LOCATION: LOCATION: ABDOMEN;GENERALIZED

## 2024-04-28 ASSESSMENT — PAIN SCALES - GENERAL
PAINLEVEL_OUTOF10: 10
PAINLEVEL_OUTOF10: 10

## 2024-04-28 ASSESSMENT — PAIN SCALES - WONG BAKER: WONGBAKER_NUMERICALRESPONSE: NO HURT

## 2024-04-28 NOTE — ED NOTES
Pt update, states that she wants hospitalist called because her pain is out of control. Provider at bedside discussing discharge plan with patient and that she needs to follow up with PCP for non-emergent follow up. Pt reassured that she is safe for discharge.

## 2024-04-28 NOTE — ED PROVIDER NOTES
DISPOSITION/PLAN   DISPOSITION Decision To Discharge 04/28/2024 05:56:14 AM      OUTPATIENT FOLLOW UP THE PATIENT:  [facility] EMERGENCY DEPT  [address] [address]  [phone]        Giovanna Gibson, APRN - [facility]  [address] [facility] [address]  [phone]            Tadeo Perez MD    This transcription was electronically signed. Parts of this transcriptions may have been dictated by use of voice recognition software and electronically transcribed, and parts may have been transcribed with the assistance of an ED scribe. The transcription may contain errors not detected in proofreading.  Please refer to my supervising physician's documentation if my documentation differs.    Electronically Signed: Tadeo Perez MD, 04/28/24, 5:57 AM

## 2024-04-28 NOTE — ED TRIAGE NOTES
Pt presents to the ED by EMS from home with c/c chest pain and generalized abdominal pain. Pt reported to have abdominal pain scheduled soon but postponed d/t hypertension. Pt hard to arouse on arrival. Vitals documented. EKG completed on arrival.

## 2024-04-28 NOTE — DISCHARGE INSTRUCTIONS
No abnormalities identified on your CT scan your laboratory workup is all within normal limits.  It is possible the pain you are experiencing is from your adhesions.  You will need to speak with your surgeon Dr. Michel to schedule a new appointment for the adhesions surgery you are originally scheduled to have.  Continue taking your home medications as prescribed.  Return the emergency room if any new or worsening symptoms.

## 2024-04-29 ENCOUNTER — CARE COORDINATION (OUTPATIENT)
Dept: CASE MANAGEMENT | Age: 34
End: 2024-04-29

## 2024-04-29 NOTE — CARE COORDINATION
Care Transitions Note    Follow Up Call      Patient Current Location:  Home: [address] [place] [address]    Care Transition Nurse contacted the patient by telephone. Verified name and  as identifiers.    Additional needs identified to be addressed with provider   High priority: BP has been elevated.  Procedure on 24 was canceled d/t high BP.  Went to ED on 24 and 24.  BP was elevated.  No changes to medications.  BP this AM was still elevated 197/127.  Pt rechecked BP during our call, BP was 169/116 with HR 78.  Per pt, PCP cannot get her in until 2 months.  She [facility] with [facility].  CTN to call PCP office for appt today.                   Method of communication with provider: phone.    Care Summary Note: Received incoming call from pt.  Left message stating that her procedure was canceled d/t having high BP.  Would like to talk to CTN about something.      CTN called pt back.  Samantha states her procedure was canceled d/t having high BP.  She confirmed she is taking the Spironolactone, Propanolol and Amlodipine.  Reports her BP this morning was 197/127 with pulse 71.  When returning home from the ED yesterday her BP was 169/108 wit pulse 86.  She reports having a headache yesterday.  Denies having a headache or chest pain/discomfort today.  States she is only having stiffness in her upper body.  She rechecked her BP during our call.  BP was 169/116 with pulse 78.  She continues to have the abdominal pain that she's been having from the adhesions.  She reports she contacted PCP office but was told she cannot get in for 2 months.  She has an appt at the [facility] on 24.  Advised that she return to the ED because BP is still very elevated.  She informed CTN that she doesn't really want to go d/t being told that they cannot do anything or change her medications.  We discussed importance of returning to the ED to be evaluated.  CTN will attempt to contact PCP

## 2024-04-29 NOTE — CARE COORDINATION
Received incoming call from pt.  Left message she was dismissed from the hospital again.  She can be reached at [phone].    Called pt back.  Samantha states she went to [facility] ED this time and was discharged.  No changes to her medications.  States she was told to follow up with her doctor.  She did confirm that Ankita, the nurse from her PCP office did call her back and told her to go to the ED.  She will be following up with PCP tomorrow at 2:15.  We discussed s/s and when to report to the ED.  She will continue to monitor her BP and symptoms.  No questions or needs at this time.  She will call CTN tomorrow after her appt with PCP to give update.      Em Hua RN

## 2024-04-30 ENCOUNTER — TELEMEDICINE (OUTPATIENT)
Dept: INTERNAL MEDICINE CLINIC | Age: 34
End: 2024-04-30
Payer: MEDICARE

## 2024-04-30 DIAGNOSIS — K31.84 GASTROPARESIS: ICD-10-CM

## 2024-04-30 DIAGNOSIS — K90.9 INTESTINAL MALABSORPTION, UNSPECIFIED TYPE: Primary | ICD-10-CM

## 2024-04-30 PROCEDURE — NBSRV NON-BILLABLE SERVICE: Performed by: DIETITIAN, REGISTERED

## 2024-04-30 PROCEDURE — 97803 MED NUTRITION INDIV SUBSEQ: CPT | Performed by: DIETITIAN, REGISTERED

## 2024-04-30 NOTE — PROGRESS NOTES
[facility]  [facility]  [address] [place] [address]  [phone] (phone)  [phone] (fax)    Patient Name: Samantha Nichols. Date of Birth: [id number]. MRN: [id number]      Assessment: Patient is a 33 y.o. female seen for follow-up MNT visit for Gastroparesis & Intestinal Malabsorption Syndrome.     -Nutritionally relevant labs:   Lab Results   Component Value Date/Time    LABA1C 6.0 03/29/2024 06:03 AM    LABA1C 6.1 10/10/2023 05:00 AM    LABA1C 5.8 01/25/2023 05:25 AM    GLUCOSE 104 04/28/2024 04:12 AM    GLUCOSE 95 04/26/2024 12:19 PM    GLUCOSE 78 12/23/2018 11:30 AM    GLUCOSE 78 03/26/2018 02:41 PM    CHOL 172 06/25/2022 04:07 AM    HDL 41 06/25/2022 04:07 AM    LDLCALC 111 06/25/2022 04:07 AM    TRIG 92 01/14/2023 05:47 AM     Pt states the [facility] dietitian to make contact with this RD re: tube feeding start up again and going through a new supplier for the [employer].  Patient wanting Dr Michel to place feeding tube again when he addresses GI adhesions.   Pt states working on getting B/P down so she can have Dr Michel address adhesions and then at the same time to put in PEG tube.  Pt following with her provider at 1230 pm today to address her high BP.    Changes in food intake:  Juicing now but still hard to keep down.  Diet recall: soups (mostly broth-based, blended chicken noodle soup), juiced apple, prune juice, berry juice. Also tries to drink 3 chocolate or vanilla Equate Original or Plus shakes each day (breakfast, noon, dinner) but unable to do every day.   Trying to eat/drink several times per day, but can be hard.   No tube feeds since the beginning of Jan.     Pt states she tolerated the Vital1.5 formula the best when with PEG tube feedings.    Current Outpatient Medications on File Prior to Visit   Medication Sig Dispense Refill    esomeprazole Magnesium (NEXIUM) 20 MG PACK Take 1 packet by mouth 2 times daily      docusate

## 2024-05-01 ENCOUNTER — CARE COORDINATION (OUTPATIENT)
Dept: CASE MANAGEMENT | Age: 34
End: 2024-05-01

## 2024-05-01 NOTE — CARE COORDINATION
Care Transitions Note    Follow Up Call      Patient Current Location:  Home: [address] [place] [address]    Care Transition Nurse contacted the patient by telephone. Verified name and  as identifiers.    Additional needs identified to be addressed with provider   No needs identified                 Method of communication with provider: none.    Care Summary Note: Contacted pt for care transition follow up.  Samantha states she is about the same.  Confirmed following up with PCP yesterday.  Reports PCP was going to initially increase her BP medications but then decided pt will need to follow up with cardiology.  She has a cardiology appt tomorrow, 24 as well as with the residency clinic.  She states she plans to keep the appt with the residency clinic.  She also reports that she is having drainage around her port site.  PCP was going to start her on Doxycycline and Diflucan but checking with doctor first.  Pt states the office will send it to her pharmacy if they want her to go ahead and take abx.  Pt denies having any c/o chest pain/discomfort, swelling.  Reports she does not feel short of breath but does admit to becoming \"winded\" with exertion at times.  She also reports that she spoke with RD from [facility] and [facility].  Both RD told her that she may need PEG tube placed again.  She is aware of when to contact providers.  No questions or needs at this time.      Addressed changes since last contact:  Education: continue to monitor BP and symptoms       Advance Care Planning:   Does patient have an Advance Directive:  on file .    Medication Review:  May be starting on doxycycline, diflucan but waiting on provider to speak with doctor.  States office will send to pharmacy if she is to start medications.     Remote Patient Monitoring:  Offered patient enrollment in the Remote Patient Monitoring (RPM) program for in-home monitoring: To discuss during next outreach    Assessments:  Care Transitions Subsequent

## 2024-05-02 ENCOUNTER — OFFICE VISIT (OUTPATIENT)
Dept: INTERNAL MEDICINE CLINIC | Age: 34
End: 2024-05-02
Payer: MEDICARE

## 2024-05-02 ENCOUNTER — TELEPHONE (OUTPATIENT)
Dept: CARDIOLOGY CLINIC | Age: 34
End: 2024-05-02

## 2024-05-02 ENCOUNTER — OFFICE VISIT (OUTPATIENT)
Dept: CARDIOLOGY CLINIC | Age: 34
End: 2024-05-02
Payer: MEDICARE

## 2024-05-02 VITALS
HEIGHT: 64 IN | HEART RATE: 94 BPM | DIASTOLIC BLOOD PRESSURE: 78 MMHG | OXYGEN SATURATION: 98 % | WEIGHT: 293 LBS | SYSTOLIC BLOOD PRESSURE: 132 MMHG | BODY MASS INDEX: 50.02 KG/M2

## 2024-05-02 VITALS
BODY MASS INDEX: 50.02 KG/M2 | HEART RATE: 78 BPM | HEIGHT: 64 IN | WEIGHT: 293 LBS | DIASTOLIC BLOOD PRESSURE: 88 MMHG | SYSTOLIC BLOOD PRESSURE: 142 MMHG

## 2024-05-02 DIAGNOSIS — Z01.818 PREOPERATIVE CLEARANCE: Primary | ICD-10-CM

## 2024-05-02 DIAGNOSIS — R10.9 CHRONIC ABDOMINAL PAIN: ICD-10-CM

## 2024-05-02 DIAGNOSIS — I50.33 ACUTE ON CHRONIC DIASTOLIC CONGESTIVE HEART FAILURE (HCC): ICD-10-CM

## 2024-05-02 DIAGNOSIS — K59.04 CHRONIC IDIOPATHIC CONSTIPATION: Primary | ICD-10-CM

## 2024-05-02 DIAGNOSIS — G89.29 CHRONIC ABDOMINAL PAIN: ICD-10-CM

## 2024-05-02 DIAGNOSIS — I10 ESSENTIAL HYPERTENSION: ICD-10-CM

## 2024-05-02 PROCEDURE — 3078F DIAST BP <80 MM HG: CPT

## 2024-05-02 PROCEDURE — 3075F SYST BP GE 130 - 139MM HG: CPT

## 2024-05-02 PROCEDURE — 99214 OFFICE O/P EST MOD 30 MIN: CPT | Performed by: INTERNAL MEDICINE

## 2024-05-02 PROCEDURE — 3077F SYST BP >= 140 MM HG: CPT | Performed by: INTERNAL MEDICINE

## 2024-05-02 PROCEDURE — 3079F DIAST BP 80-89 MM HG: CPT | Performed by: INTERNAL MEDICINE

## 2024-05-02 PROCEDURE — 99213 OFFICE O/P EST LOW 20 MIN: CPT

## 2024-05-02 RX ORDER — FLUCONAZOLE 150 MG/1
TABLET ORAL
COMMUNITY
Start: 2024-05-01 | End: 2024-05-08

## 2024-05-02 RX ORDER — AMLODIPINE BESYLATE 10 MG/1
10 TABLET ORAL DAILY
Qty: 30 TABLET | Refills: 3 | Status: SHIPPED | OUTPATIENT
Start: 2024-05-02

## 2024-05-02 RX ORDER — LACTULOSE 10 G/15ML
SOLUTION ORAL
Qty: 240 ML | Refills: 0 | Status: SHIPPED | OUTPATIENT
Start: 2024-05-02 | End: 2024-05-07

## 2024-05-02 ASSESSMENT — PATIENT HEALTH QUESTIONNAIRE - PHQ9
3. TROUBLE FALLING OR STAYING ASLEEP: NOT AT ALL
SUM OF ALL RESPONSES TO PHQ QUESTIONS 1-9: 1
7. TROUBLE CONCENTRATING ON THINGS, SUCH AS READING THE NEWSPAPER OR WATCHING TELEVISION: NOT AT ALL
5. POOR APPETITE OR OVEREATING: SEVERAL DAYS
8. MOVING OR SPEAKING SO SLOWLY THAT OTHER PEOPLE COULD HAVE NOTICED. OR THE OPPOSITE, BEING SO FIGETY OR RESTLESS THAT YOU HAVE BEEN MOVING AROUND A LOT MORE THAN USUAL: NOT AT ALL
SUM OF ALL RESPONSES TO PHQ QUESTIONS 1-9: 1
1. LITTLE INTEREST OR PLEASURE IN DOING THINGS: NOT AT ALL
SUM OF ALL RESPONSES TO PHQ QUESTIONS 1-9: 1
10. IF YOU CHECKED OFF ANY PROBLEMS, HOW DIFFICULT HAVE THESE PROBLEMS MADE IT FOR YOU TO DO YOUR WORK, TAKE CARE OF THINGS AT HOME, OR GET ALONG WITH OTHER PEOPLE: NOT DIFFICULT AT ALL
6. FEELING BAD ABOUT YOURSELF - OR THAT YOU ARE A FAILURE OR HAVE LET YOURSELF OR YOUR FAMILY DOWN: NOT AT ALL
SUM OF ALL RESPONSES TO PHQ9 QUESTIONS 1 & 2: 0
4. FEELING TIRED OR HAVING LITTLE ENERGY: NOT AT ALL
SUM OF ALL RESPONSES TO PHQ QUESTIONS 1-9: 1
9. THOUGHTS THAT YOU WOULD BE BETTER OFF DEAD, OR OF HURTING YOURSELF: NOT AT ALL
2. FEELING DOWN, DEPRESSED OR HOPELESS: NOT AT ALL

## 2024-05-02 NOTE — PATIENT INSTRUCTIONS
Take lactulose 30 CC by mouth every 6 hours until having bowel movement. Then stop. Do not take more than 4 doses in one day.    Avoid sodium.

## 2024-05-02 NOTE — PROGRESS NOTES
Here for a follow up for high BP. Also needing clearance for a hernia repair with Dr. Michel scheduled for TBD.  PCP upped norvasc to 10 mg and aldactone to 50 mg. Patient has not started it because she wanted to check with Dr. You first     Went to ER for high blood pressure. States they narcaned her for her blood pressure. She is very upset how she was treated in the ER     EKG completed 4/28/24    C/o chest pain, headaches, fatigue, sob, palpitations and swelling in hands   States she does not feel like herself and she wakes up sweating

## 2024-05-02 NOTE — PATIENT INSTRUCTIONS
Your assistants today were NYDIA Rodriguez and BERT Avendano  Your provider today was   Phone number: [phone]

## 2024-05-02 NOTE — PROGRESS NOTES
[facility] [place] [facility]  [facility] CARDIOLOGY  [address]  [address]  [facility] [address]  Dept: [phone]  Dept Fax: [phone]  Loc: [phone]    Visit Date: 5/2/2024    Ms. Nichols is a 33 y.o. female  who presented for:  Preoperative cardiac risk assessment   Hypertension   HPI:   HPI   Samantha Nichols is a pleasant 33 year old female patient who  has a past medical history of Acute on chronic diastolic congestive heart failure (HCC), JANI (acute kidney injury) (HCC), Anemia, Anesthesia, Anxiety, Asthma, CAD (coronary artery disease), Depression, Diabetes (HCC), Diet-controlled type 2 diabetes mellitus (HCC), Dyslipidemia, Epilepsy (HCC), Epilepsy (HCC), Fibroids, Gastro - esophageal reflux disease, Gastroparesis, History of esophagogastroduodenoscopy (EGD), Hypertension, Hypertrophy of tonsil and adenoid, Malignant carcinoid tumor of other sites (HCC), Migraine, PONV (postoperative nausea and vomiting), Prolonged emergence from general anesthesia, Sickle cell anemia (HCC), Sickle cell trait (HCC), and Tumor associated pain. She has significant family history for premature CAD. She quit smoking 4/2024. Patient has chronic h/o chest pains.  Echo 2/2023 revealed a preserved EF. On 5/2021, she was seen in ER for chest pain, abnormal EKG. STEMI alert was activated, LHC 5/31/2021 revealed no significant CAD. Patient is scheduled for hernia surgery, preoperative cardiac risk assessment was requested. Apparently, her surgery was delayed due to uncontrolled HTN. She was seen by her PCP, medications were adjusted. BP today is 142/88. She did not follow changes recommended bby her PCP yet. The patient has chronic atypical chest pains, reproducible.     Current Outpatient Medications:     esomeprazole Magnesium (NEXIUM) 20 MG PACK, Take 1 packet by mouth 2 times daily, Disp: , Rfl:     docusate sodium (COLACE) 100 MG capsule, , Disp: , Rfl:     promethazine (PHENERGAN) 6.25

## 2024-05-02 NOTE — PROGRESS NOTES
Patient Name: Samantha Nichols  YOB: 1990  MRN: [id number]  Office visit date: 5/2/2024    Chief Complaint: Follow-up (Abdominal pain, chest pain)    Assessment/Plan:  1. Chronic idiopathic constipation  -     lactulose (CHRONULAC) 10 GM/15ML solution; Take as directed by the doctor., Disp-240 mL, R-0Normal  2. Chronic abdominal pain  3. Essential hypertension      Return in about 2 weeks (around 5/16/2024).    Suspect the diarrhea is overflow diarrhea 2/2 constipation.  The abdominal pain will start lactulose every 6 hours until she has a bowel movement.  Recommend continuing with adhesiolysis plan with Dr. Michel.    She was seen by Dr. Win in the cardiology office today for preoperative clearance.  He cleared her for the adhesiolysis.  He also increased her amlodipine.  Blood pressure acceptable at 132/78 today.    Subjective/Objective:    HPI:     Samantha Nichols is a 33 y.o. female who presents for a new patient visit. She is here for evaluation of ED Follow-up (Abdominal pain, chest pain)    Mrs. Nichols is a 33-year-old female with a quite complicated past medical history that includes sickle cell trait, Nissen fundoplication, gastroparesis, GJ tube, adhesiolysis, iron infusion with Port-A-Cath, pyelonephritis, uterine leiomyoma, epilepsy, GERD, JOVANY, asthma, HTN, chronic diastolic CHF presenting as an emergency department follow-up from 4/28 where she had abdominal pain.  She has had chronic abdominal pain and was scheduled for adhesiolysis with Dr. Michel 4/26. This was canceled due to systolic blood pressure >180.  As described, her history includes gastroparesis and chronic constipation for which she follows with GI as an outpatient.  She reports that she was referred to [place] for further GI evaluation.  On review of prior imaging seems as though she is had chronic retained stool and gas.     Today she is noting diffuse, constant, sharp abdominal pain.  No aggravating or

## 2024-05-02 NOTE — TELEPHONE ENCOUNTER
Pt states Dr franco told her today to take 25 mg of aldactone, so she was asking for a Rx for the 25   Med list still states 50 mg     Dr Franco ok to write for 25 mg?

## 2024-05-03 ENCOUNTER — CARE COORDINATION (OUTPATIENT)
Dept: CASE MANAGEMENT | Age: 34
End: 2024-05-03

## 2024-05-03 NOTE — PATIENT INSTRUCTIONS
program:  Phone: 1-295.728.8560    [employer]Z Home Delivered Meals (From [place]):  What they offer: Meals for seniors, disabled, recovering from an injury or illness on [place] and [facility]. Both privately and through homecare services like [employer] and [facility].   Phone: 1-848.187.5109  Website: https://simplyez.OneRoomRate.com    Other Food Resources:  MOW (Meals on Wheels) Contact Area Agency on Aging   Phone: 833.431.5108    [facility]- Nutrition Program   What they offer: Nutrition education and nutritious foods for Pregnant women, women who just had a baby, breastfeeding moms, infants and children up to the age of 5.   Phone: 492.372.2314   Website: https://www.[employer]inobo.[employer]iver[employer]    [facility]- Provides Meals  What they offer: Provide food boxes to those in need monthly.  Phone: 336.248.2410  Website: https://[facility].[employer]iver[employer] Technology/ministries/[facility]    [employer]-Food/ Clothing   What they offer:  Provides a 3-day supply of food for individuals once a month.   Phone: 539.745.7178  Website: https://Fleep.org/author/limacup1    [employer]-Meal Site   Phone: [phone]    [facility] Senior Luncheon Café- Meal Site  Phone: 1-360.286.6178    [employer]:  What they offer: Provides continental breakfast and hot lunch   Phone: 768.506.1910  Website: https://ZappRx.[employer]     [employer]:  What they offer: Pantry and other services   Phone: 567.666.5897  Website: https://[place].salvationEncompass Health [facility].org/[facility]/    [employer] Food [employer]:  What they offer: Food/groceries.  Contact: [address] [place] [address]  Helpful Info: Open after the 10th of the month on Monday, Wednesday, and Friday from 1:00pm to 3:00pm.     [employer]:  What they offer: Food Pantry   When: Wed and Fri 1p-4p   Phone: [phone]    [facility] Food Bank:  What they offer:  Provides commodities to surrounding pantries and soup

## 2024-05-03 NOTE — CARE COORDINATION
Care Transitions Note    Follow Up Call      Patient Current Location:  [place]    Care Transition Nurse contacted the patient by telephone. Verified name and  as identifiers.    Additional needs identified to be addressed with provider   No needs identified                 Method of communication with provider: none.    Care Summary Note: Contacted pt for care transition follow up.  Samantha states she is doing good.  She is out walking currently.  Reports BP has improved since seeing cardiologist.  BP today was 117/95 with pulse 95.  Reports cardiologist wanted her to continue the Spironolactone 25 mg daily and NOT increase to 50 mg per her PCP-Giovanna Gibson.  Reports the Amlodipine was increased to 10 mg daily.  No c/o chest pain/discomfort, shortness of breath, swelling.  Abdominal pain and nausea still present.  States \"about the same\".  She informed CTN that her surgeon does not want to put the PEG tube back in and will have to go through [facility].  She states Dr. Michel still plans to move forward with the robotic lysis adhesion procedure.  She is waiting on the office to schedule the procedure.  Pt reports her port site looks fine.  She confirmed that she started on the Doxycycline and Diflucan which was prescribed by her PCP.  She is aware of when to contact providers.  She does not have any questions or concerns at this time.  CTN will make one more outreach.      Plan of care updates since last contact:  Review of patient management of conditions/medications: medication changes       Advance Care Planning:   Does patient have an Advance Directive:  on file .    Medication Review:  Medications changed since last call, reviewed today.     Remote Patient Monitoring:  Offered patient enrollment in the Remote Patient Monitoring (RPM) program for in-home monitoring: will discuss during next outreach.    Assessments:  Care Transitions Subsequent and Final Call    Subsequent and Final Calls  Do you have any

## 2024-05-06 ENCOUNTER — TELEPHONE (OUTPATIENT)
Dept: INTERNAL MEDICINE CLINIC | Age: 34
End: 2024-05-06

## 2024-05-06 NOTE — TELEPHONE ENCOUNTER
Outpatient dietitian callback to patient.  Samantha stated that Dr Michel does not want to place a PEG tube and Radha Rojas, CNP, [place] GI provider, is not recommending PEG placement either.  Patient stated that Dr Elaine's office will not place a PEG either.  Explained best effort in following Gastroparesis guidelines and patient confirmed she still has the written guidelines and suggestions provided to her from prior nutrition consultation visits.  Also stated that this RD will send nausea and vomiting nutrition therapy guidelines to her home address.  Pt appreciative and stated she will keep this RD updated.

## 2024-05-07 ENCOUNTER — TELEPHONE (OUTPATIENT)
Dept: INTERNAL MEDICINE CLINIC | Age: 34
End: 2024-05-07

## 2024-05-07 DIAGNOSIS — K59.04 CHRONIC IDIOPATHIC CONSTIPATION: Primary | ICD-10-CM

## 2024-05-07 RX ORDER — LACTULOSE 10 G/15ML
10 SOLUTION ORAL EVERY 6 HOURS PRN
Qty: 237 ML | Refills: 0 | Status: ON HOLD | OUTPATIENT
Start: 2024-05-07 | End: 2024-05-12 | Stop reason: HOSPADM

## 2024-05-07 NOTE — TELEPHONE ENCOUNTER
Pharmacy called regarding Lactulose. The pharmacy needs a new prescription sent in. It needs to have specific directions, it cannot say take as directed by Doctor. Please send new rx

## 2024-05-08 ENCOUNTER — CARE COORDINATION (OUTPATIENT)
Dept: CASE MANAGEMENT | Age: 34
End: 2024-05-08

## 2024-05-08 NOTE — CARE COORDINATION
Directive:  on file .    Medication Review:  No changes since last call.     Remote Patient Monitoring:  Offered patient enrollment in the Remote Patient Monitoring (RPM) program for in-home monitoring: Not enrolled, pt having procedure on Friday, 5/10/24    Assessments:  Care Transitions Subsequent and Final Call    Subsequent and Final Calls  Do you have any ongoing symptoms?: Yes  Patient-reported symptoms: Abdominal Pain, Other, Nausea  Have your medications changed?: No  Do you have any questions related to your medications?: No  Do you currently have any active services?: No  Are you currently active with any services?: Home Health  Do you have any needs or concerns that I can assist you with?: No  Care Transitions Interventions   Home Care Waiver: Completed        Transportation Support: Declined      DME Assistance: Declined   Disease Specific Clinic: Completed      Disease Association: Completed      Other Interventions:              Follow Up Appointment:   Reviewed upcoming appointment(s).  Future Appointments         Provider Specialty Dept Phone    5/16/2024 1:40 PM Justine Maldonado DO Internal Medicine [phone]    5/23/2024 10:00 AM [facility] EXAM ROOM 10 IP Unit [phone]    6/10/2024 10:30 AM Sully Ramos RD, LD Internal Medicine [phone]    6/18/2024 11:00 AM Charlie Weiss MD Otolaryngology [phone]    7/23/2024 10:00 AM Shauna Herron MD Internal Medicine [phone]    5/13/2025 10:00 AM Ramona You MD Cardiology [phone]            Care Transition Nurse provided contact information.  No further follow-up call indicated based on severity of symptoms and risk factors.  Plan for next call: referral to [facility].  Sending message to Radha Herron for referral.     Em Hua, CATY

## 2024-05-09 ENCOUNTER — HOSPITAL ENCOUNTER (EMERGENCY)
Age: 34
Discharge: HOME | End: 2024-05-09
Payer: MEDICARE

## 2024-05-09 ENCOUNTER — TELEPHONE (OUTPATIENT)
Dept: CARDIOLOGY CLINIC | Age: 34
End: 2024-05-09

## 2024-05-09 VITALS
HEART RATE: 82 BPM | OXYGEN SATURATION: 98 % | TEMPERATURE: 98 F | SYSTOLIC BLOOD PRESSURE: 130 MMHG | RESPIRATION RATE: 18 BRPM | DIASTOLIC BLOOD PRESSURE: 89 MMHG

## 2024-05-09 DIAGNOSIS — Z01.31 ENCOUNTER FOR EXAMINATION OF BLOOD PRESSURE WITH ABNORMAL FINDINGS: ICD-10-CM

## 2024-05-09 DIAGNOSIS — Z01.30 BLOOD PRESSURE CHECK: Primary | ICD-10-CM

## 2024-05-09 PROCEDURE — 99213 OFFICE O/P EST LOW 20 MIN: CPT

## 2024-05-09 ASSESSMENT — PAIN - FUNCTIONAL ASSESSMENT: PAIN_FUNCTIONAL_ASSESSMENT: NONE - DENIES PAIN

## 2024-05-09 NOTE — TELEPHONE ENCOUNTER
----- Message from Samantha Nichols sent at 5/9/2024 11:57 AM EDT -----  Regarding: Hello  Contact: [phone]  I checked my blood pressure again and this was the results I started eating berries and dark chocolate and drinking apple juice and it lowered it alot but am gonna go to urgent care to make sure everything is okay.

## 2024-05-09 NOTE — ED NOTES
States her blood pressure has been high and low all day. Concerned because she has surgery scheduled for tomorrow. States she already had this surgery rescheduled d/t her blood pressure being elevated. Her bp medication was increased and has been working well until now.     Monica Yao RN  05/09/24 [id number]

## 2024-05-09 NOTE — TELEPHONE ENCOUNTER
Patient called back about this. She went to the urgent care today and was told her BP is fine and was 130/89. She said she went and got herself a new BP cuff but she's not really sure how to use it. She asked about stopping by the office tomorrow prior to her surgery to have her BP checked. I spoke with BB and per our conversation, patient was told she could stop by and we would do our best to accommodate. Also, per BB, pt was advised to try to relax, and that it could be because she is nervous. Patient agreed and states that she will try to relax. She seemed to feel better about things after our conversation and will stop by prior to surgery.

## 2024-05-09 NOTE — TELEPHONE ENCOUNTER
Pt called in states her bp is going up today it was 183/122 then later it was 161/101, pt is using a wrist cuff and is not sure how old it is, I tried  to get her to go somewhere and get another reading, but she has no way to go. Pt is feeling fine with no symptoms but is having surgery tomorrow and she is afraid they wont do it if its that high.

## 2024-05-09 NOTE — DISCHARGE INSTRUCTIONS
Continue medications as prescribed.   Keep BP log at home and bring to appointments.  Tylenol / Ibuprofen as needed for fever and or pain.  Follow up with PCP today, keep 5/23 appt.   To ER with elevated / uncontrolled BP, headaches, nausea, vomiting, visual changes.

## 2024-05-09 NOTE — TELEPHONE ENCOUNTER
Another My Chart Message:     Samantha MULTANI [facility] Clinical Staff (supporting Ramona You MD)14 minutes ago (1:30 PM)       It's a good reading.   Attachments   [id number].jpg (IN MEDIA)

## 2024-05-09 NOTE — ED PROVIDER NOTES
[facility] URGENT CARE  Urgent Care Encounter       CHIEF COMPLAINT       Chief Complaint   Patient presents with    Hypertension       Nurses Notes reviewed and I agree except as noted in the HPI.  HISTORY OF PRESENT ILLNESS   Samantha Nichols is a 33 y.o. female who presents with concerns of elevated blood pressure readings. Patient reports upcoming surgery that has already been rescheduled once due to elevated blood pressure, medications were increased and since then has been working well.     HPI    REVIEW OF SYSTEMS     Review of Systems   Respiratory:  Negative for shortness of breath.    Cardiovascular:  Negative for chest pain and palpitations.   Gastrointestinal:  Negative for abdominal pain and vomiting.   Neurological:  Negative for dizziness, weakness and headaches.       PAST MEDICAL HISTORY         Diagnosis Date    Acute on chronic diastolic congestive heart failure ([facility]) 06/25/2022    JANI (acute kidney injury) ([facility]) 07/07/2019    Anemia     Anesthesia     migraines    Anxiety     Asthma     CAD (coronary artery disease)     Depression     Diabetes ([facility])     Diet-controlled type 2 diabetes mellitus ([facility]) 2016    Dyslipidemia 11/14/2016    Epilepsy ([facility])     last siezure is 2 years ago    Epilepsy ([facility])     Fibroids     Gastro - esophageal reflux disease     Gastroparesis     History of esophagogastroduodenoscopy (EGD) 08/13/2022    Hypertension     Hypertrophy of tonsil and adenoid 11/04/2017    Malignant carcinoid tumor of other sites ([facility]) 05/14/2013    Migraine     PONV (postoperative nausea and vomiting)     Prolonged emergence from general anesthesia     Sickle cell anemia ([facility])     Sickle cell trait ([facility])     PT STATES SHE HAS THE TRAIT NOT THE DISEASE    Tumor associated pain     neuroendrocrine tumor, gastroma       SURGICALHISTORY     Patient  has a past surgical history that includes hernia repair (2010, 2016); [place] tooth extraction; tumor removal; Breast reduction surgery;   Take 1 capsule by mouth every morning (before breakfast)    ONETOUCH VERIO STRIP    USE TO check BLOOD SUGAR ONCE DAILY    OXYBUTYNIN (DITROPAN-XL) 10 MG EXTENDED RELEASE TABLET    Take 1 tablet by mouth daily    PRAZOSIN (MINIPRESS) 1 MG CAPSULE    Take 1 capsule by mouth nightly    PRENATAL MV & MIN W/FA-DHA (ONE A DAY PRENATAL) 0.4-25 MG CHEW    CHEW AND SWALLOW ONE GUMMY ONCE DAILY    PROMETHAZINE (PHENERGAN) 6.25 MG/5ML SYRUP    take 20 MILLILITERS EVERY 6 HOURS AS NEEDED    PRUCALOPRIDE SUCCINATE (MOTEGRITY) 2 MG TABS    Take 2 mg by mouth every morning (before breakfast)    ROSUVASTATIN (CRESTOR) 10 MG TABLET    Take 1 tablet by mouth daily    SPACER/AERO-HOLDING CHAMBERS (E-Z SPACER) AISLINN    1 Device by Does not apply route daily    SPIRONOLACTONE (ALDACTONE) 25 MG TABLET    Take 1 tablet by mouth daily Pt states havne't started yet    SUCRALFATE (CARAFATE) 1 GM TABLET    Take 1 tablet by mouth 4 times daily (before meals and nightly)    SUMATRIPTAN (IMITREX) 25 MG TABLET    Take 1 tablet by mouth See Admin Instructions Take 1 tablet on onset of migraine may repeat after 2 hours if migraine returns don't exceed 200 mg daily    TOPIRAMATE (TOPAMAX) 100 MG TABLET    Take 1 tablet by mouth 2 times daily    TRAMADOL (ULTRAM) 50 MG TABLET    Take 1 tablet by mouth every 8 hours as needed for Pain.    VITAMIN A 3 MG (59011 UT) CAPSULE    Take 1 capsule by mouth daily    VITAMIN B-12 (CYANOCOBALAMIN) 500 MCG TABLET    Take 1 tablet by mouth daily       ALLERGIES     Patient is is allergic to latex, dicyclomine, fioricet [butalbital-apap-caffeine], gabapentin, ibuprofen [ibuprofen], ketorolac tromethamine, oxycodone, pcn [penicillins], sulfamethoxazole, trimethoprim, losartan, mushroom extract complex, zofran [ondansetron], adhesive tape, amoxicillin, ciprofloxacin, compazine [prochlorperazine maleate], compazine [prochlorperazine], fentanyl, flexeril [cyclobenzaprine], haldol [haloperidol], hydrochlorothiazide,

## 2024-05-10 ENCOUNTER — ANESTHESIA (OUTPATIENT)
Dept: OPERATING ROOM | Age: 34
End: 2024-05-10
Payer: MEDICARE

## 2024-05-10 ENCOUNTER — ANESTHESIA EVENT (OUTPATIENT)
Dept: OPERATING ROOM | Age: 34
End: 2024-05-10
Payer: MEDICARE

## 2024-05-10 ENCOUNTER — HOSPITAL ENCOUNTER (INPATIENT)
Age: 34
LOS: 2 days | Discharge: HOME | DRG: 336 | End: 2024-05-12
Attending: SURGERY | Admitting: SURGERY
Payer: MEDICARE

## 2024-05-10 PROBLEM — R10.9 ABDOMINAL PAIN: Status: ACTIVE | Noted: 2024-05-10

## 2024-05-10 LAB — GLUCOSE BLD STRIP.AUTO-MCNC: 103 MG/DL (ref 70–108)

## 2024-05-10 PROCEDURE — 94640 AIRWAY INHALATION TREATMENT: CPT

## 2024-05-10 PROCEDURE — 3700000000 HC ANESTHESIA ATTENDED CARE: Performed by: SURGERY

## 2024-05-10 PROCEDURE — 2580000003 HC RX 258

## 2024-05-10 PROCEDURE — 2709999900 HC NON-CHARGEABLE SUPPLY: Performed by: SURGERY

## 2024-05-10 PROCEDURE — 0DNU4ZZ RELEASE OMENTUM, PERCUTANEOUS ENDOSCOPIC APPROACH: ICD-10-PCS | Performed by: SURGERY

## 2024-05-10 PROCEDURE — 3700000001 HC ADD 15 MINUTES (ANESTHESIA): Performed by: SURGERY

## 2024-05-10 PROCEDURE — 6360000002 HC RX W HCPCS

## 2024-05-10 PROCEDURE — 7100000001 HC PACU RECOVERY - ADDTL 15 MIN: Performed by: SURGERY

## 2024-05-10 PROCEDURE — 3600000019 HC SURGERY ROBOT ADDTL 15MIN: Performed by: SURGERY

## 2024-05-10 PROCEDURE — 6370000000 HC RX 637 (ALT 250 FOR IP)

## 2024-05-10 PROCEDURE — 82948 REAGENT STRIP/BLOOD GLUCOSE: CPT

## 2024-05-10 PROCEDURE — 3600000009 HC SURGERY ROBOT BASE: Performed by: SURGERY

## 2024-05-10 PROCEDURE — 7100000000 HC PACU RECOVERY - FIRST 15 MIN: Performed by: SURGERY

## 2024-05-10 PROCEDURE — 6360000002 HC RX W HCPCS: Performed by: SURGERY

## 2024-05-10 PROCEDURE — 8E0W4CZ ROBOTIC ASSISTED PROCEDURE OF TRUNK REGION, PERCUTANEOUS ENDOSCOPIC APPROACH: ICD-10-PCS | Performed by: SURGERY

## 2024-05-10 PROCEDURE — 2500000003 HC RX 250 WO HCPCS

## 2024-05-10 PROCEDURE — 1200000000 HC SEMI PRIVATE

## 2024-05-10 PROCEDURE — 2580000003 HC RX 258: Performed by: SURGERY

## 2024-05-10 PROCEDURE — 6370000000 HC RX 637 (ALT 250 FOR IP): Performed by: SURGERY

## 2024-05-10 PROCEDURE — S2900 ROBOTIC SURGICAL SYSTEM: HCPCS | Performed by: SURGERY

## 2024-05-10 RX ORDER — NALOXONE HYDROCHLORIDE 0.4 MG/ML
INJECTION, SOLUTION INTRAMUSCULAR; INTRAVENOUS; SUBCUTANEOUS PRN
Status: DISCONTINUED | OUTPATIENT
Start: 2024-05-10 | End: 2024-05-10 | Stop reason: HOSPADM

## 2024-05-10 RX ORDER — PROMETHAZINE HYDROCHLORIDE 25 MG/1
25 TABLET ORAL EVERY 6 HOURS PRN
Status: DISCONTINUED | OUTPATIENT
Start: 2024-05-10 | End: 2024-05-12 | Stop reason: HOSPADM

## 2024-05-10 RX ORDER — MONTELUKAST SODIUM 10 MG/1
10 TABLET ORAL NIGHTLY
Status: DISCONTINUED | OUTPATIENT
Start: 2024-05-10 | End: 2024-05-12 | Stop reason: HOSPADM

## 2024-05-10 RX ORDER — ESTRADIOL 1 MG/1
0.5 TABLET ORAL DAILY
Status: DISCONTINUED | OUTPATIENT
Start: 2024-05-10 | End: 2024-05-12 | Stop reason: HOSPADM

## 2024-05-10 RX ORDER — ENOXAPARIN SODIUM 100 MG/ML
30 INJECTION SUBCUTANEOUS EVERY 12 HOURS
Status: DISCONTINUED | OUTPATIENT
Start: 2024-05-11 | End: 2024-05-12 | Stop reason: HOSPADM

## 2024-05-10 RX ORDER — ALBUTEROL SULFATE 90 UG/1
AEROSOL, METERED RESPIRATORY (INHALATION) PRN
Status: DISCONTINUED | OUTPATIENT
Start: 2024-05-10 | End: 2024-05-10 | Stop reason: SDUPTHER

## 2024-05-10 RX ORDER — DOCUSATE SODIUM 100 MG/1
100 CAPSULE, LIQUID FILLED ORAL 2 TIMES DAILY PRN
Status: DISCONTINUED | OUTPATIENT
Start: 2024-05-10 | End: 2024-05-12 | Stop reason: HOSPADM

## 2024-05-10 RX ORDER — HYDROMORPHONE HYDROCHLORIDE 2 MG/ML
INJECTION, SOLUTION INTRAMUSCULAR; INTRAVENOUS; SUBCUTANEOUS PRN
Status: DISCONTINUED | OUTPATIENT
Start: 2024-05-10 | End: 2024-05-10 | Stop reason: SDUPTHER

## 2024-05-10 RX ORDER — FLUTICASONE PROPIONATE 110 UG/1
2 AEROSOL, METERED RESPIRATORY (INHALATION)
Status: DISCONTINUED | OUTPATIENT
Start: 2024-05-10 | End: 2024-05-12 | Stop reason: HOSPADM

## 2024-05-10 RX ORDER — TRAMADOL HYDROCHLORIDE 50 MG/1
50 TABLET ORAL EVERY 8 HOURS PRN
Status: DISCONTINUED | OUTPATIENT
Start: 2024-05-10 | End: 2024-05-12 | Stop reason: HOSPADM

## 2024-05-10 RX ORDER — SODIUM CHLORIDE 0.9 % (FLUSH) 0.9 %
5-40 SYRINGE (ML) INJECTION EVERY 12 HOURS SCHEDULED
Status: DISCONTINUED | OUTPATIENT
Start: 2024-05-10 | End: 2024-05-10 | Stop reason: HOSPADM

## 2024-05-10 RX ORDER — PROPOFOL 10 MG/ML
INJECTION, EMULSION INTRAVENOUS PRN
Status: DISCONTINUED | OUTPATIENT
Start: 2024-05-10 | End: 2024-05-10 | Stop reason: SDUPTHER

## 2024-05-10 RX ORDER — ONDANSETRON 2 MG/ML
4 INJECTION INTRAMUSCULAR; INTRAVENOUS EVERY 6 HOURS PRN
Status: DISCONTINUED | OUTPATIENT
Start: 2024-05-10 | End: 2024-05-10

## 2024-05-10 RX ORDER — SODIUM CHLORIDE 9 MG/ML
INJECTION, SOLUTION INTRAVENOUS PRN
Status: DISCONTINUED | OUTPATIENT
Start: 2024-05-10 | End: 2024-05-10 | Stop reason: HOSPADM

## 2024-05-10 RX ORDER — SODIUM CHLORIDE 0.9 % (FLUSH) 0.9 %
5-40 SYRINGE (ML) INJECTION EVERY 12 HOURS SCHEDULED
Status: DISCONTINUED | OUTPATIENT
Start: 2024-05-10 | End: 2024-05-12 | Stop reason: HOSPADM

## 2024-05-10 RX ORDER — SODIUM CHLORIDE 9 MG/ML
INJECTION, SOLUTION INTRAVENOUS CONTINUOUS
Status: DISCONTINUED | OUTPATIENT
Start: 2024-05-10 | End: 2024-05-12 | Stop reason: HOSPADM

## 2024-05-10 RX ORDER — ESCITALOPRAM OXALATE 20 MG/1
20 TABLET ORAL DAILY
Status: DISCONTINUED | OUTPATIENT
Start: 2024-05-10 | End: 2024-05-12 | Stop reason: HOSPADM

## 2024-05-10 RX ORDER — SODIUM CHLORIDE 9 MG/ML
INJECTION, SOLUTION INTRAVENOUS CONTINUOUS PRN
Status: DISCONTINUED | OUTPATIENT
Start: 2024-05-10 | End: 2024-05-10 | Stop reason: SDUPTHER

## 2024-05-10 RX ORDER — DEXAMETHASONE SODIUM PHOSPHATE 10 MG/ML
INJECTION, EMULSION INTRAMUSCULAR; INTRAVENOUS PRN
Status: DISCONTINUED | OUTPATIENT
Start: 2024-05-10 | End: 2024-05-10 | Stop reason: SDUPTHER

## 2024-05-10 RX ORDER — TOPIRAMATE 100 MG/1
100 TABLET, FILM COATED ORAL 2 TIMES DAILY
Status: DISCONTINUED | OUTPATIENT
Start: 2024-05-10 | End: 2024-05-12 | Stop reason: HOSPADM

## 2024-05-10 RX ORDER — FLUTICASONE PROPIONATE 110 UG/1
2 AEROSOL, METERED RESPIRATORY (INHALATION) EVERY 4 HOURS PRN
Status: DISCONTINUED | OUTPATIENT
Start: 2024-05-10 | End: 2024-05-10 | Stop reason: CLARIF

## 2024-05-10 RX ORDER — SODIUM CHLORIDE 0.9 % (FLUSH) 0.9 %
5-40 SYRINGE (ML) INJECTION PRN
Status: DISCONTINUED | OUTPATIENT
Start: 2024-05-10 | End: 2024-05-12 | Stop reason: HOSPADM

## 2024-05-10 RX ORDER — ROCURONIUM BROMIDE 10 MG/ML
INJECTION, SOLUTION INTRAVENOUS PRN
Status: DISCONTINUED | OUTPATIENT
Start: 2024-05-10 | End: 2024-05-10 | Stop reason: SDUPTHER

## 2024-05-10 RX ORDER — BUSPIRONE HYDROCHLORIDE 5 MG/1
5 TABLET ORAL 3 TIMES DAILY
Status: DISCONTINUED | OUTPATIENT
Start: 2024-05-10 | End: 2024-05-12 | Stop reason: HOSPADM

## 2024-05-10 RX ORDER — FAMOTIDINE 20 MG/1
40 TABLET, FILM COATED ORAL NIGHTLY
Status: DISCONTINUED | OUTPATIENT
Start: 2024-05-10 | End: 2024-05-12 | Stop reason: HOSPADM

## 2024-05-10 RX ORDER — ONDANSETRON 4 MG/1
4 TABLET, ORALLY DISINTEGRATING ORAL EVERY 8 HOURS PRN
Status: DISCONTINUED | OUTPATIENT
Start: 2024-05-10 | End: 2024-05-10

## 2024-05-10 RX ORDER — METFORMIN HYDROCHLORIDE 500 MG/1
500 TABLET, EXTENDED RELEASE ORAL
Status: DISCONTINUED | OUTPATIENT
Start: 2024-05-11 | End: 2024-05-10 | Stop reason: CLARIF

## 2024-05-10 RX ORDER — LIDOCAINE HCL/PF 100 MG/5ML
SYRINGE (ML) INJECTION PRN
Status: DISCONTINUED | OUTPATIENT
Start: 2024-05-10 | End: 2024-05-10 | Stop reason: SDUPTHER

## 2024-05-10 RX ORDER — SODIUM CHLORIDE 9 MG/ML
INJECTION, SOLUTION INTRAVENOUS PRN
Status: DISCONTINUED | OUTPATIENT
Start: 2024-05-10 | End: 2024-05-12 | Stop reason: HOSPADM

## 2024-05-10 RX ORDER — ALBUTEROL SULFATE 2.5 MG/3ML
2.5 SOLUTION RESPIRATORY (INHALATION) EVERY 6 HOURS PRN
Status: DISCONTINUED | OUTPATIENT
Start: 2024-05-10 | End: 2024-05-12 | Stop reason: HOSPADM

## 2024-05-10 RX ORDER — PRAZOSIN HYDROCHLORIDE 1 MG/1
1 CAPSULE ORAL NIGHTLY
Status: DISCONTINUED | OUTPATIENT
Start: 2024-05-10 | End: 2024-05-12 | Stop reason: HOSPADM

## 2024-05-10 RX ORDER — ONDANSETRON 2 MG/ML
INJECTION INTRAMUSCULAR; INTRAVENOUS PRN
Status: DISCONTINUED | OUTPATIENT
Start: 2024-05-10 | End: 2024-05-10 | Stop reason: SDUPTHER

## 2024-05-10 RX ORDER — SUCCINYLCHOLINE/SOD CL,ISO/PF 200MG/10ML
SYRINGE (ML) INTRAVENOUS PRN
Status: DISCONTINUED | OUTPATIENT
Start: 2024-05-10 | End: 2024-05-10 | Stop reason: SDUPTHER

## 2024-05-10 RX ORDER — BUPIVACAINE HYDROCHLORIDE AND EPINEPHRINE 5; 5 MG/ML; UG/ML
INJECTION, SOLUTION EPIDURAL; INTRACAUDAL; PERINEURAL PRN
Status: DISCONTINUED | OUTPATIENT
Start: 2024-05-10 | End: 2024-05-10 | Stop reason: ALTCHOICE

## 2024-05-10 RX ORDER — FENTANYL CITRATE 50 UG/ML
INJECTION, SOLUTION INTRAMUSCULAR; INTRAVENOUS PRN
Status: DISCONTINUED | OUTPATIENT
Start: 2024-05-10 | End: 2024-05-10 | Stop reason: SDUPTHER

## 2024-05-10 RX ORDER — SUCRALFATE 1 G/1
1 TABLET ORAL
Status: DISCONTINUED | OUTPATIENT
Start: 2024-05-10 | End: 2024-05-12 | Stop reason: HOSPADM

## 2024-05-10 RX ORDER — SPIRONOLACTONE 25 MG/1
25 TABLET ORAL DAILY
Status: DISCONTINUED | OUTPATIENT
Start: 2024-05-11 | End: 2024-05-12 | Stop reason: HOSPADM

## 2024-05-10 RX ORDER — AMLODIPINE BESYLATE 10 MG/1
10 TABLET ORAL DAILY
Status: DISCONTINUED | OUTPATIENT
Start: 2024-05-10 | End: 2024-05-12 | Stop reason: HOSPADM

## 2024-05-10 RX ORDER — MIDAZOLAM HYDROCHLORIDE 1 MG/ML
INJECTION INTRAMUSCULAR; INTRAVENOUS PRN
Status: DISCONTINUED | OUTPATIENT
Start: 2024-05-10 | End: 2024-05-10 | Stop reason: SDUPTHER

## 2024-05-10 RX ORDER — BUPIVACAINE HYDROCHLORIDE 5 MG/ML
INJECTION, SOLUTION PERINEURAL PRN
Status: DISCONTINUED | OUTPATIENT
Start: 2024-05-10 | End: 2024-05-10 | Stop reason: ALTCHOICE

## 2024-05-10 RX ORDER — LANSOPRAZOLE 30 MG/1
30 TABLET, ORALLY DISINTEGRATING, DELAYED RELEASE ORAL
Status: DISCONTINUED | OUTPATIENT
Start: 2024-05-11 | End: 2024-05-12 | Stop reason: HOSPADM

## 2024-05-10 RX ORDER — METFORMIN HYDROCHLORIDE 500 MG/1
500 TABLET, EXTENDED RELEASE ORAL 2 TIMES DAILY WITH MEALS
Status: DISCONTINUED | OUTPATIENT
Start: 2024-05-10 | End: 2024-05-12 | Stop reason: HOSPADM

## 2024-05-10 RX ORDER — FENTANYL CITRATE 50 UG/ML
25 INJECTION, SOLUTION INTRAMUSCULAR; INTRAVENOUS EVERY 5 MIN PRN
Status: DISCONTINUED | OUTPATIENT
Start: 2024-05-10 | End: 2024-05-10 | Stop reason: HOSPADM

## 2024-05-10 RX ORDER — SODIUM CHLORIDE 0.9 % (FLUSH) 0.9 %
5-40 SYRINGE (ML) INJECTION PRN
Status: DISCONTINUED | OUTPATIENT
Start: 2024-05-10 | End: 2024-05-10 | Stop reason: HOSPADM

## 2024-05-10 RX ORDER — FENTANYL CITRATE 50 UG/ML
50 INJECTION, SOLUTION INTRAMUSCULAR; INTRAVENOUS EVERY 5 MIN PRN
Status: DISCONTINUED | OUTPATIENT
Start: 2024-05-10 | End: 2024-05-10 | Stop reason: HOSPADM

## 2024-05-10 RX ADMIN — FENTANYL CITRATE 50 MCG: 50 INJECTION, SOLUTION INTRAMUSCULAR; INTRAVENOUS at 11:55

## 2024-05-10 RX ADMIN — FLUTICASONE PROPIONATE 2 PUFF: 110 AEROSOL, METERED RESPIRATORY (INHALATION) at 19:52

## 2024-05-10 RX ADMIN — HYDROMORPHONE HYDROCHLORIDE 0.5 MG: 1 INJECTION, SOLUTION INTRAMUSCULAR; INTRAVENOUS; SUBCUTANEOUS at 16:28

## 2024-05-10 RX ADMIN — SODIUM CHLORIDE: 9 INJECTION, SOLUTION INTRAVENOUS at 10:49

## 2024-05-10 RX ADMIN — FENTANYL CITRATE 50 MCG: 50 INJECTION, SOLUTION INTRAMUSCULAR; INTRAVENOUS at 10:53

## 2024-05-10 RX ADMIN — ONDANSETRON 4 MG: 2 INJECTION INTRAMUSCULAR; INTRAVENOUS at 11:05

## 2024-05-10 RX ADMIN — Medication 100 MG: at 10:53

## 2024-05-10 RX ADMIN — HYDROMORPHONE HYDROCHLORIDE 0.5 MG: 1 INJECTION, SOLUTION INTRAMUSCULAR; INTRAVENOUS; SUBCUTANEOUS at 13:33

## 2024-05-10 RX ADMIN — ALBUTEROL SULFATE 2 PUFF: 90 AEROSOL, METERED RESPIRATORY (INHALATION) at 10:49

## 2024-05-10 RX ADMIN — ROCURONIUM BROMIDE 10 MG: 10 INJECTION INTRAVENOUS at 10:53

## 2024-05-10 RX ADMIN — Medication 180 MG: at 10:53

## 2024-05-10 RX ADMIN — ROCURONIUM BROMIDE 30 MG: 10 INJECTION INTRAVENOUS at 11:05

## 2024-05-10 RX ADMIN — HYDROMORPHONE HYDROCHLORIDE 0.5 MG: 1 INJECTION, SOLUTION INTRAMUSCULAR; INTRAVENOUS; SUBCUTANEOUS at 19:37

## 2024-05-10 RX ADMIN — SUGAMMADEX 400 MG: 100 INJECTION, SOLUTION INTRAVENOUS at 12:03

## 2024-05-10 RX ADMIN — SODIUM CHLORIDE: 9 INJECTION, SOLUTION INTRAVENOUS at 19:38

## 2024-05-10 RX ADMIN — ROCURONIUM BROMIDE 10 MG: 10 INJECTION INTRAVENOUS at 11:32

## 2024-05-10 RX ADMIN — PROMETHAZINE HYDROCHLORIDE 25 MG: 25 TABLET ORAL at 16:28

## 2024-05-10 RX ADMIN — DEXAMETHASONE SODIUM PHOSPHATE 4 MG: 10 INJECTION, EMULSION INTRAMUSCULAR; INTRAVENOUS at 11:04

## 2024-05-10 RX ADMIN — HYDROMORPHONE HYDROCHLORIDE 1 MG: 2 INJECTION INTRAMUSCULAR; INTRAVENOUS; SUBCUTANEOUS at 11:58

## 2024-05-10 RX ADMIN — MIDAZOLAM 2 MG: 1 INJECTION INTRAMUSCULAR; INTRAVENOUS at 10:49

## 2024-05-10 RX ADMIN — HYDROMORPHONE HYDROCHLORIDE 1 MG: 2 INJECTION INTRAMUSCULAR; INTRAVENOUS; SUBCUTANEOUS at 12:06

## 2024-05-10 RX ADMIN — HYDROMORPHONE HYDROCHLORIDE 0.5 MG: 1 INJECTION, SOLUTION INTRAMUSCULAR; INTRAVENOUS; SUBCUTANEOUS at 21:49

## 2024-05-10 RX ADMIN — FAMOTIDINE 40 MG: 20 TABLET, FILM COATED ORAL at 20:59

## 2024-05-10 RX ADMIN — PROPOFOL 150 MG: 10 INJECTION, EMULSION INTRAVENOUS at 10:53

## 2024-05-10 RX ADMIN — HYDROMORPHONE HYDROCHLORIDE 0.5 MG: 1 INJECTION, SOLUTION INTRAMUSCULAR; INTRAVENOUS; SUBCUTANEOUS at 23:53

## 2024-05-10 ASSESSMENT — PAIN DESCRIPTION - LOCATION
LOCATION: ABDOMEN

## 2024-05-10 ASSESSMENT — PAIN SCALES - GENERAL
PAINLEVEL_OUTOF10: 10
PAINLEVEL_OUTOF10: 7
PAINLEVEL_OUTOF10: 7
PAINLEVEL_OUTOF10: 9
PAINLEVEL_OUTOF10: 10
PAINLEVEL_OUTOF10: 7

## 2024-05-10 ASSESSMENT — PAIN DESCRIPTION - DESCRIPTORS
DESCRIPTORS: ACHING;CRAMPING;DISCOMFORT
DESCRIPTORS: SHARP

## 2024-05-10 ASSESSMENT — PAIN DESCRIPTION - PAIN TYPE
TYPE: ACUTE PAIN
TYPE: CHRONIC PAIN
TYPE: ACUTE PAIN

## 2024-05-10 ASSESSMENT — PAIN DESCRIPTION - ORIENTATION
ORIENTATION: RIGHT;LEFT;LOWER
ORIENTATION: RIGHT
ORIENTATION: RIGHT;LOWER
ORIENTATION: RIGHT

## 2024-05-10 ASSESSMENT — PAIN - FUNCTIONAL ASSESSMENT
PAIN_FUNCTIONAL_ASSESSMENT: ACTIVITIES ARE NOT PREVENTED

## 2024-05-10 NOTE — ANESTHESIA POSTPROCEDURE EVALUATION
Department of Anesthesiology  Postprocedure Note    Patient: Samantha Nichols  MRN: [id number]  YOB: 1990  Date of evaluation: 5/10/2024    Procedure Summary       Date: 05/10/24 Room / Location: [facility] OR 07 / [facility] OR    Anesthesia Start: 1049 Anesthesia Stop: 1210    Procedure: ROBOTIC DIAGNOSTIC LAPAROSCOPY, LYSIS OF ADHESIONS Diagnosis:       Generalized abdominal pain      (Generalized abdominal pain [R10.84])    Surgeons: Iron Michel MD Responsible Provider: Erick Cortés DO    Anesthesia Type: general ASA Status: 3            Anesthesia Type: No value filed.    Juan Phase I: Juan Score: 8    Juan Phase II:      Anesthesia Post Evaluation    Patient location during evaluation: PACU  Patient participation: complete - patient participated  Level of consciousness: awake and alert  Pain score: 3  Airway patency: patent  Nausea & Vomiting: no nausea and no vomiting  Cardiovascular status: hemodynamically stable and blood pressure returned to baseline  Respiratory status: spontaneous ventilation, acceptable and nasal cannula  Hydration status: stable  Pain management: adequate and satisfactory to patient    No notable events documented.

## 2024-05-10 NOTE — H&P
[facility]  History and Physical Update      Pt Name: Samantha Nichols  MRN: [id number]  YOB: 1990  Date of evaluation: 5/10/2024    [x] I have examined the patient and reviewed the H&P/Consult and there are no changes to the patient or plans.    [] I have examined the patient and reviewed the H&P/Consult and have noted the following changes:        Iron Michel MD  Electronically signed 5/10/2024 at 8:18 AM

## 2024-05-10 NOTE — OP NOTE
[address] [address]                            OPERATIVE REPORT      PATIENT NAME: TREVOR ARZATE            : 1990  MED REC NO: [id number]                       ROOM: [facility]  ACCOUNT NO: [id number]                       ADMIT DATE: 05/10/2024  PROVIDER: Iron Michel MD      DATE OF PROCEDURE:  05/10/2024    SURGEON:  Iron Michel MD    PREOPERATIVE DIAGNOSIS:  Acute on chronic abdominal pain.    POSTOPERATIVE DIAGNOSIS:  Adhesions.    OPERATION:    1. Diagnostic laparoscopy with robot.  2. Robotic lysis of adhesions.    ANESTHESIA:  General.    COMPLICATIONS:  None.    BLOOD LOSS:  10 mL.    INDICATIONS FOR PROCEDURE:  The patient is a 33-year-old female with multiple abdominal surgeries, who has chronic abdominal pain, but has had some increased pain.  She has known adhesions.  She is brought to surgery for diagnostic laparoscopy.    FINDINGS:  The lower abdomen from the umbilicus down showed no evidence of any adhesions.  All of bowel was normal.  Upper abdomen did show transverse colon along with some omentum attached up to the peritoneum, and this was all freed up.  She has had a previous open G-tube placed.  The stomach was attached up to the abdominal wall, but I elected to leave that as is.  Otherwise, all the other adhesions were lysed including some adhesions of omentum that were attached to a mesh that is in a port site in the left abdominal wall.  Otherwise, no other abnormalities.    DESCRIPTION OF PROCEDURE:  The patient was brought to the operating suite and placed supine on the operating room table.  After adequate inhalational anesthesia was administered, the patient's abdomen was prepped and draped in usual sterile fashion.  I went laterally in the right abdominal wall, made an incision through a previously placed port site, placed an Optiview into the abdominal cavity.  We then insufflated air and

## 2024-05-10 NOTE — PROGRESS NOTES
1208: Pt arrives to pacu, pt yelling out and c/o pain. Pt moving around in bed and unable to follow commands. On room air, respirations easy and unlabored. VSS  1220: Pt c/o shoulder pain, massaged shoulder per request and placed ice on it.   1225: Pt sleeping at this time, no signs of distress  1230: Report given to Krystyna on 6A  1245: Pt transported to 6A in stable condition. VSS

## 2024-05-10 NOTE — ANESTHESIA PRE PROCEDURE
Department of Anesthesiology  Preprocedure Note       Name:  Samantha Nichols   Age:  33 y.o.  :  1990                                          MRN:  [id number]         Date:  5/10/2024      Surgeon: Surgeon(s):  Iron Michel MD    Procedure: Procedure(s):  ROBOTIC LYSIS OF ADHESIONS    Medications prior to admission:   Prior to Admission medications    Medication Sig Start Date End Date Taking? Authorizing Provider   escitalopram (LEXAPRO) 20 MG tablet Take 1 tablet by mouth daily 24   Osmar Scott MD MOUNJARO 2.5 MG/0.5ML SOPN SC injection Inject 0.5 mLs into the skin once a week 24   Osmar Scott MD   estradiol (ESTRACE) 0.5 MG tablet Take 1 tablet by mouth daily 24   Osmar Scott MD   rosuvastatin (CRESTOR) 10 MG tablet Take 1 tablet by mouth daily 24   Osmar Scott MD   traMADol (ULTRAM) 50 MG tablet Take 1 tablet by mouth every 8 hours as needed for Pain. 24   Osmar Scott MD   lactulose (CHRONULAC) 10 GM/15ML solution Take 15 mLs by mouth every 6 hours as needed (take every 6 hours until bowel movement. then once daily as needed. Do not take more than 4 doses in one day.)  Patient not taking: Reported on 5/10/2024 5/7/24   Juan Dominguez,    amLODIPine (NORVASC) 10 MG tablet Take 1 tablet by mouth daily 24   Ramona You MD   esomeprazole Magnesium (NEXIUM) 20 MG PACK Take 1 packet by mouth 2 times daily  Patient not taking: Reported on 5/10/2024 3/5/24 7/3/24  Osmar Scott MD   docusate sodium (COLACE) 100 MG capsule Take 1 capsule by mouth 2 times daily as needed 1/3/24   Osmar Scott MD   promethazine (PHENERGAN) 6.25 MG/5ML syrup take 20 MILLILITERS EVERY 6 HOURS AS NEEDED 23   Osmar Scott MD   omeprazole (PRILOSEC) 40 MG delayed release capsule Take 1 capsule by mouth every morning (before breakfast) 24   Floresita Diaz, APRN - CNP   Prucalopride Succinate (MOTEGRITY) 2

## 2024-05-10 NOTE — BRIEF OP NOTE
Brief Postoperative Note      Patient: Samantha Nichols  YOB: 1990  MRN: [id number]    Date of Procedure: 5/10/2024    Pre-Op Diagnosis Codes:     * Generalized abdominal pain [R10.84]/Adhesions    Post-Op Diagnosis: same       Procedure(s):  ROBOTIC DIAGNOSTIC LAPAROSCOPY, LYSIS OF ADHESIONS    Surgeon(s):  Iron Michel MD    Assistant:      Anesthesia: General    Estimated Blood Loss (mL): 10 ml    Complications: none    Specimens:   none    Implants:        Drains: none    Findings:  Infection Present At Time Of Surgery (PATOS) (choose all levels that have infection present):  No infection present  Other Findings: see op note  This procedure was not performed to treat colon cancer through resection    Electronically signed by Iron Michel MD on 5/10/2024 at 12:02 PM

## 2024-05-10 NOTE — PROGRESS NOTES
Patient admitted to [facility] room 15 with no family at bedside. Bed in low position side rails up call light in reach. Patient denies questions at this time.

## 2024-05-10 NOTE — H&P
[address] [address]                            PREOPERATIVE H&P      PATIENT NAME: TREVOR ARZATE            : 1990  MED REC NO: [id number]                       ROOM: [facility]                                               (General) POOL                                               RM    ACCOUNT NO: [id number]                       ADMIT DATE: 05/10/2024  PROVIDER: Iron Michel MD      CHIEF COMPLAINT:  Recurrent chronic abdominal pain.    HISTORY OF PRESENT ILLNESS:  The patient is a very challenging and complicated 33-year-old female who has had multiple abdominal surgeries and multiple issues from chronic pain.  She has had a Nissen fundoplication x2 with the last repair per Dr. Florence with insertion of a G-tube that eventually was removed.  She has had persistent nausea and vomiting.  She has seen both myself and Dr. Elaine.  She has had a PEG tubes placed for supplemental feeding which apparently then gets removed over at [facility].  She has been over there, being evaluated for gastric bypass in the past.  However, they do not feel that is apparently the right route to go, but she has a history of chronic abdominal pain and adhesions.  Her last robotic laparoscopy was in November, where she did have omental adhesions we took down, which she is having again recurrence of the pain.  Again, a lot of this is chronic.  She also had a PEG tube placed in December by me that then was subsequently removed at [facility].  She is being admitted at this time for repeat robotic laparoscopy.    PAST MEDICAL HISTORY:  Positive for hypertension, asthma, depression, chronic reflux disease, and diabetes.    PAST SURGICAL HISTORY:  Includes laparoscopic bilateral oophorectomy.  She has had bilateral breast reduction.  She has had multiple Mediports placed.  She has had a cholecystectomy, colonoscopies, and EGD.  She has had the gastric

## 2024-05-11 LAB
GLUCOSE BLD STRIP.AUTO-MCNC: 162 MG/DL (ref 70–108)
GLUCOSE BLD STRIP.AUTO-MCNC: 192 MG/DL (ref 70–108)

## 2024-05-11 PROCEDURE — 94640 AIRWAY INHALATION TREATMENT: CPT

## 2024-05-11 PROCEDURE — 1200000000 HC SEMI PRIVATE

## 2024-05-11 PROCEDURE — 82948 REAGENT STRIP/BLOOD GLUCOSE: CPT

## 2024-05-11 PROCEDURE — 2580000003 HC RX 258: Performed by: SURGERY

## 2024-05-11 PROCEDURE — 94760 N-INVAS EAR/PLS OXIMETRY 1: CPT

## 2024-05-11 PROCEDURE — 6360000002 HC RX W HCPCS: Performed by: SURGERY

## 2024-05-11 PROCEDURE — 6370000000 HC RX 637 (ALT 250 FOR IP): Performed by: SURGERY

## 2024-05-11 RX ADMIN — HYDROMORPHONE HYDROCHLORIDE 0.5 MG: 1 INJECTION, SOLUTION INTRAMUSCULAR; INTRAVENOUS; SUBCUTANEOUS at 06:02

## 2024-05-11 RX ADMIN — HYDROMORPHONE HYDROCHLORIDE 0.5 MG: 1 INJECTION, SOLUTION INTRAMUSCULAR; INTRAVENOUS; SUBCUTANEOUS at 12:37

## 2024-05-11 RX ADMIN — BUSPIRONE HYDROCHLORIDE 5 MG: 5 TABLET ORAL at 14:38

## 2024-05-11 RX ADMIN — BUSPIRONE HYDROCHLORIDE 5 MG: 5 TABLET ORAL at 08:26

## 2024-05-11 RX ADMIN — HYDROMORPHONE HYDROCHLORIDE 0.5 MG: 1 INJECTION, SOLUTION INTRAMUSCULAR; INTRAVENOUS; SUBCUTANEOUS at 16:41

## 2024-05-11 RX ADMIN — FAMOTIDINE 40 MG: 20 TABLET, FILM COATED ORAL at 20:49

## 2024-05-11 RX ADMIN — ESCITALOPRAM OXALATE 20 MG: 20 TABLET ORAL at 12:35

## 2024-05-11 RX ADMIN — SODIUM CHLORIDE: 9 INJECTION, SOLUTION INTRAVENOUS at 16:03

## 2024-05-11 RX ADMIN — HYDROMORPHONE HYDROCHLORIDE 0.5 MG: 1 INJECTION, SOLUTION INTRAMUSCULAR; INTRAVENOUS; SUBCUTANEOUS at 01:53

## 2024-05-11 RX ADMIN — PROMETHAZINE HYDROCHLORIDE 25 MG: 25 TABLET ORAL at 16:40

## 2024-05-11 RX ADMIN — HYDROMORPHONE HYDROCHLORIDE 0.5 MG: 1 INJECTION, SOLUTION INTRAMUSCULAR; INTRAVENOUS; SUBCUTANEOUS at 04:00

## 2024-05-11 RX ADMIN — METFORMIN HYDROCHLORIDE 500 MG: 500 TABLET, EXTENDED RELEASE ORAL at 16:25

## 2024-05-11 RX ADMIN — HYDROMORPHONE HYDROCHLORIDE 0.5 MG: 1 INJECTION, SOLUTION INTRAMUSCULAR; INTRAVENOUS; SUBCUTANEOUS at 20:49

## 2024-05-11 RX ADMIN — FLUTICASONE PROPIONATE 2 PUFF: 110 AEROSOL, METERED RESPIRATORY (INHALATION) at 22:11

## 2024-05-11 RX ADMIN — HYDROMORPHONE HYDROCHLORIDE 0.5 MG: 1 INJECTION, SOLUTION INTRAMUSCULAR; INTRAVENOUS; SUBCUTANEOUS at 14:39

## 2024-05-11 RX ADMIN — SPIRONOLACTONE 25 MG: 25 TABLET ORAL at 08:26

## 2024-05-11 RX ADMIN — ESTRADIOL 0.5 MG: 1 TABLET ORAL at 12:35

## 2024-05-11 RX ADMIN — HYDROMORPHONE HYDROCHLORIDE 0.5 MG: 1 INJECTION, SOLUTION INTRAMUSCULAR; INTRAVENOUS; SUBCUTANEOUS at 22:52

## 2024-05-11 RX ADMIN — SUCRALFATE 1 G: 1 TABLET ORAL at 12:35

## 2024-05-11 RX ADMIN — AMLODIPINE BESYLATE 10 MG: 10 TABLET ORAL at 08:26

## 2024-05-11 RX ADMIN — SODIUM CHLORIDE: 9 INJECTION, SOLUTION INTRAVENOUS at 01:52

## 2024-05-11 RX ADMIN — FLUTICASONE PROPIONATE 2 PUFF: 110 AEROSOL, METERED RESPIRATORY (INHALATION) at 09:16

## 2024-05-11 RX ADMIN — BUSPIRONE HYDROCHLORIDE 5 MG: 5 TABLET ORAL at 20:49

## 2024-05-11 RX ADMIN — HYDROMORPHONE HYDROCHLORIDE 0.5 MG: 1 INJECTION, SOLUTION INTRAMUSCULAR; INTRAVENOUS; SUBCUTANEOUS at 10:21

## 2024-05-11 RX ADMIN — SUCRALFATE 1 G: 1 TABLET ORAL at 16:25

## 2024-05-11 RX ADMIN — HYDROMORPHONE HYDROCHLORIDE 0.5 MG: 1 INJECTION, SOLUTION INTRAMUSCULAR; INTRAVENOUS; SUBCUTANEOUS at 18:41

## 2024-05-11 RX ADMIN — METFORMIN HYDROCHLORIDE 500 MG: 500 TABLET, EXTENDED RELEASE ORAL at 08:26

## 2024-05-11 RX ADMIN — ENOXAPARIN SODIUM 30 MG: 100 INJECTION SUBCUTANEOUS at 20:48

## 2024-05-11 RX ADMIN — LANSOPRAZOLE 30 MG: 30 TABLET, ORALLY DISINTEGRATING, DELAYED RELEASE ORAL at 05:35

## 2024-05-11 RX ADMIN — PROMETHAZINE HYDROCHLORIDE 25 MG: 25 TABLET ORAL at 10:38

## 2024-05-11 RX ADMIN — HYDROMORPHONE HYDROCHLORIDE 0.5 MG: 1 INJECTION, SOLUTION INTRAMUSCULAR; INTRAVENOUS; SUBCUTANEOUS at 08:16

## 2024-05-11 RX ADMIN — ENOXAPARIN SODIUM 30 MG: 100 INJECTION SUBCUTANEOUS at 08:21

## 2024-05-11 RX ADMIN — TOPIRAMATE 100 MG: 100 TABLET, FILM COATED ORAL at 12:34

## 2024-05-11 ASSESSMENT — PAIN DESCRIPTION - LOCATION
LOCATION: ABDOMEN

## 2024-05-11 ASSESSMENT — PAIN DESCRIPTION - ORIENTATION
ORIENTATION: RIGHT

## 2024-05-11 ASSESSMENT — PAIN SCALES - GENERAL
PAINLEVEL_OUTOF10: 7
PAINLEVEL_OUTOF10: 6
PAINLEVEL_OUTOF10: 10
PAINLEVEL_OUTOF10: 4
PAINLEVEL_OUTOF10: 8
PAINLEVEL_OUTOF10: 10
PAINLEVEL_OUTOF10: 8
PAINLEVEL_OUTOF10: 10
PAINLEVEL_OUTOF10: 7
PAINLEVEL_OUTOF10: 7
PAINLEVEL_OUTOF10: 10
PAINLEVEL_OUTOF10: 7
PAINLEVEL_OUTOF10: 7
PAINLEVEL_OUTOF10: 9
PAINLEVEL_OUTOF10: 7
PAINLEVEL_OUTOF10: 5
PAINLEVEL_OUTOF10: 7
PAINLEVEL_OUTOF10: 7
PAINLEVEL_OUTOF10: 9
PAINLEVEL_OUTOF10: 7

## 2024-05-11 ASSESSMENT — PAIN - FUNCTIONAL ASSESSMENT
PAIN_FUNCTIONAL_ASSESSMENT: ACTIVITIES ARE NOT PREVENTED

## 2024-05-11 ASSESSMENT — PAIN DESCRIPTION - PAIN TYPE
TYPE: ACUTE PAIN
TYPE: SURGICAL PAIN

## 2024-05-11 ASSESSMENT — PAIN SCALES - WONG BAKER
WONGBAKER_NUMERICALRESPONSE: NO HURT
WONGBAKER_NUMERICALRESPONSE: NO HURT

## 2024-05-11 ASSESSMENT — PAIN DESCRIPTION - DESCRIPTORS
DESCRIPTORS: SHARP
DESCRIPTORS: SHARP;STABBING
DESCRIPTORS: BURNING;SHARP
DESCRIPTORS: SHARP

## 2024-05-11 ASSESSMENT — PAIN DESCRIPTION - ONSET: ONSET: ON-GOING

## 2024-05-11 ASSESSMENT — PAIN DESCRIPTION - FREQUENCY: FREQUENCY: INTERMITTENT

## 2024-05-11 NOTE — PLAN OF CARE
Problem: Discharge Planning  Goal: Discharge to home or other facility with appropriate resources  Outcome: Progressing  Flowsheets (Taken 5/10/2024 1936)  Discharge to home or other facility with appropriate resources:   Arrange for needed discharge resources and transportation as appropriate   Identify barriers to discharge with patient and caregiver   Identify discharge learning needs (meds, wound care, etc)   Refer to discharge planning if patient needs post-hospital services based on physician order or complex needs related to functional status, cognitive ability or social support system     Problem: Pain  Goal: Verbalizes/displays adequate comfort level or baseline comfort level  Outcome: Progressing  Flowsheets (Taken 5/11/2024 0100)  Verbalizes/displays adequate comfort level or baseline comfort level:   Encourage patient to monitor pain and request assistance   Administer analgesics based on type and severity of pain and evaluate response   Assess pain using appropriate pain scale   Implement non-pharmacological measures as appropriate and evaluate response     Problem: Safety - Adult  Goal: Free from fall injury  Outcome: Progressing  Flowsheets (Taken 5/11/2024 0100)  Free From Fall Injury: Instruct family/caregiver on patient safety     Problem: Chronic Conditions and Co-morbidities  Goal: Patient's chronic conditions and co-morbidity symptoms are monitored and maintained or improved  Outcome: Progressing  Flowsheets (Taken 5/10/2024 1936)  Care Plan - Patient's Chronic Conditions and Co-Morbidity Symptoms are Monitored and Maintained or Improved:   Collaborate with multidisciplinary team to address chronic and comorbid conditions and prevent exacerbation or deterioration   Update acute care plan with appropriate goals if chronic or comorbid symptoms are exacerbated and prevent overall improvement and discharge   Monitor and assess patient's chronic conditions and comorbid symptoms for stability,

## 2024-05-11 NOTE — PROGRESS NOTES
Surg POD#1  abd soft discussed OR findings with pt having a lot pain yet  will cont pain management home tomorrow

## 2024-05-11 NOTE — PLAN OF CARE
Problem: Discharge Planning  Goal: Discharge to home or other facility with appropriate resources  5/11/2024 1343 by Claritza Ramirez RN  Outcome: Progressing  Flowsheets (Taken 5/11/2024 0826)  Discharge to home or other facility with appropriate resources:   Identify barriers to discharge with patient and caregiver   Arrange for needed discharge resources and transportation as appropriate   Identify discharge learning needs (meds, wound care, etc)   Refer to discharge planning if patient needs post-hospital services based on physician order or complex needs related to functional status, cognitive ability or social support system     Problem: Pain  Goal: Verbalizes/displays adequate comfort level or baseline comfort level  5/11/2024 1343 by Claritza Ramirez, RN  Outcome: Progressing  Flowsheets (Taken 5/11/2024 0816)  Verbalizes/displays adequate comfort level or baseline comfort level:   Encourage patient to monitor pain and request assistance   Assess pain using appropriate pain scale   Administer analgesics based on type and severity of pain and evaluate response   Implement non-pharmacological measures as appropriate and evaluate response   Consider cultural and social influences on pain and pain management   Notify Licensed Independent Practitioner if interventions unsuccessful or patient reports new pain     Problem: Safety - Adult  Goal: Free from fall injury  5/11/2024 1343 by Claritza Ramirez, RN  Outcome: Progressing  Flowsheets (Taken 5/11/2024 0100 by Madeline Ferro, RN)  Free From Fall Injury: Instruct family/caregiver on patient safety     Problem: Chronic Conditions and Co-morbidities  Goal: Patient's chronic conditions and co-morbidity symptoms are monitored and maintained or improved  5/11/2024 1343 by Claritza Ramirez, RN  Outcome: Progressing  Flowsheets (Taken 5/11/2024 0826)  Care Plan - Patient's Chronic Conditions and Co-Morbidity Symptoms are Monitored and Maintained or

## 2024-05-11 NOTE — CARE COORDINATION
treatment preferences, and shares the quality data associated with the providers?  Yes     Patient Goals/Plan/Treatment Preferences: Met w/ Samantha. Verified insurance and PCP. Current PCP listed as Giovanna Gibson is incorrect- patient [facility] and [facility]. Updated on Chart. Neither Samantha or her wife drive, most transport is from Insurance or [facility]. Further transportation resources and resources for [facility] provided. Samantha plans to return home w/ her wife who is blind and unable to assist with needs. Requesting [facility] for nursing for medication concerns and vitals monitoring. She would like to participate in RPM program. VM with referral left for Catherine at [facility].    If patient is discharged prior to next notation, then this note serves as note for discharge by case management.

## 2024-05-12 VITALS
DIASTOLIC BLOOD PRESSURE: 90 MMHG | RESPIRATION RATE: 16 BRPM | SYSTOLIC BLOOD PRESSURE: 135 MMHG | HEART RATE: 82 BPM | WEIGHT: 293 LBS | HEIGHT: 64 IN | OXYGEN SATURATION: 99 % | BODY MASS INDEX: 50.02 KG/M2 | TEMPERATURE: 98.1 F

## 2024-05-12 PROCEDURE — 6370000000 HC RX 637 (ALT 250 FOR IP): Performed by: SURGERY

## 2024-05-12 PROCEDURE — 2580000003 HC RX 258: Performed by: SURGERY

## 2024-05-12 PROCEDURE — 94640 AIRWAY INHALATION TREATMENT: CPT

## 2024-05-12 PROCEDURE — 6360000002 HC RX W HCPCS: Performed by: SURGERY

## 2024-05-12 PROCEDURE — 94761 N-INVAS EAR/PLS OXIMETRY MLT: CPT

## 2024-05-12 RX ADMIN — SPIRONOLACTONE 25 MG: 25 TABLET ORAL at 09:06

## 2024-05-12 RX ADMIN — HYDROMORPHONE HYDROCHLORIDE 0.5 MG: 1 INJECTION, SOLUTION INTRAMUSCULAR; INTRAVENOUS; SUBCUTANEOUS at 13:33

## 2024-05-12 RX ADMIN — SUCRALFATE 1 G: 1 TABLET ORAL at 07:32

## 2024-05-12 RX ADMIN — HYDROMORPHONE HYDROCHLORIDE 0.5 MG: 1 INJECTION, SOLUTION INTRAMUSCULAR; INTRAVENOUS; SUBCUTANEOUS at 07:31

## 2024-05-12 RX ADMIN — FLUTICASONE PROPIONATE 2 PUFF: 110 AEROSOL, METERED RESPIRATORY (INHALATION) at 17:42

## 2024-05-12 RX ADMIN — PROMETHAZINE HYDROCHLORIDE 25 MG: 25 TABLET ORAL at 01:04

## 2024-05-12 RX ADMIN — PROMETHAZINE HYDROCHLORIDE 25 MG: 25 TABLET ORAL at 07:32

## 2024-05-12 RX ADMIN — HYDROMORPHONE HYDROCHLORIDE 0.5 MG: 1 INJECTION, SOLUTION INTRAMUSCULAR; INTRAVENOUS; SUBCUTANEOUS at 05:05

## 2024-05-12 RX ADMIN — AMLODIPINE BESYLATE 10 MG: 10 TABLET ORAL at 09:06

## 2024-05-12 RX ADMIN — LANSOPRAZOLE 30 MG: 30 TABLET, ORALLY DISINTEGRATING, DELAYED RELEASE ORAL at 07:32

## 2024-05-12 RX ADMIN — SUCRALFATE 1 G: 1 TABLET ORAL at 10:45

## 2024-05-12 RX ADMIN — PROMETHAZINE HYDROCHLORIDE 25 MG: 25 TABLET ORAL at 13:39

## 2024-05-12 RX ADMIN — SODIUM CHLORIDE: 9 INJECTION, SOLUTION INTRAVENOUS at 03:00

## 2024-05-12 RX ADMIN — HYDROMORPHONE HYDROCHLORIDE 0.5 MG: 1 INJECTION, SOLUTION INTRAMUSCULAR; INTRAVENOUS; SUBCUTANEOUS at 03:00

## 2024-05-12 RX ADMIN — SUCRALFATE 1 G: 1 TABLET ORAL at 17:15

## 2024-05-12 RX ADMIN — METFORMIN HYDROCHLORIDE 500 MG: 500 TABLET, EXTENDED RELEASE ORAL at 09:06

## 2024-05-12 RX ADMIN — TOPIRAMATE 100 MG: 100 TABLET, FILM COATED ORAL at 09:06

## 2024-05-12 RX ADMIN — ESTRADIOL 0.5 MG: 1 TABLET ORAL at 09:06

## 2024-05-12 RX ADMIN — ESCITALOPRAM OXALATE 20 MG: 20 TABLET ORAL at 09:06

## 2024-05-12 RX ADMIN — FLUTICASONE PROPIONATE 2 PUFF: 110 AEROSOL, METERED RESPIRATORY (INHALATION) at 08:28

## 2024-05-12 RX ADMIN — HYDROMORPHONE HYDROCHLORIDE 0.5 MG: 1 INJECTION, SOLUTION INTRAMUSCULAR; INTRAVENOUS; SUBCUTANEOUS at 11:32

## 2024-05-12 RX ADMIN — METFORMIN HYDROCHLORIDE 500 MG: 500 TABLET, EXTENDED RELEASE ORAL at 17:15

## 2024-05-12 RX ADMIN — HYDROMORPHONE HYDROCHLORIDE 0.5 MG: 1 INJECTION, SOLUTION INTRAMUSCULAR; INTRAVENOUS; SUBCUTANEOUS at 15:34

## 2024-05-12 RX ADMIN — HYDROMORPHONE HYDROCHLORIDE 0.5 MG: 1 INJECTION, SOLUTION INTRAMUSCULAR; INTRAVENOUS; SUBCUTANEOUS at 00:54

## 2024-05-12 RX ADMIN — BUSPIRONE HYDROCHLORIDE 5 MG: 5 TABLET ORAL at 09:06

## 2024-05-12 RX ADMIN — HYDROMORPHONE HYDROCHLORIDE 0.5 MG: 1 INJECTION, SOLUTION INTRAMUSCULAR; INTRAVENOUS; SUBCUTANEOUS at 17:35

## 2024-05-12 RX ADMIN — HYDROMORPHONE HYDROCHLORIDE 0.5 MG: 1 INJECTION, SOLUTION INTRAMUSCULAR; INTRAVENOUS; SUBCUTANEOUS at 09:33

## 2024-05-12 RX ADMIN — ENOXAPARIN SODIUM 30 MG: 100 INJECTION SUBCUTANEOUS at 09:07

## 2024-05-12 RX ADMIN — BUSPIRONE HYDROCHLORIDE 5 MG: 5 TABLET ORAL at 13:57

## 2024-05-12 ASSESSMENT — PAIN SCALES - WONG BAKER
WONGBAKER_NUMERICALRESPONSE: HURTS A LITTLE BIT
WONGBAKER_NUMERICALRESPONSE: NO HURT
WONGBAKER_NUMERICALRESPONSE: HURTS A LITTLE BIT
WONGBAKER_NUMERICALRESPONSE: NO HURT
WONGBAKER_NUMERICALRESPONSE: HURTS A LITTLE BIT
WONGBAKER_NUMERICALRESPONSE: NO HURT
WONGBAKER_NUMERICALRESPONSE: HURTS A LITTLE BIT
WONGBAKER_NUMERICALRESPONSE: NO HURT
WONGBAKER_NUMERICALRESPONSE: HURTS A LITTLE BIT
WONGBAKER_NUMERICALRESPONSE: NO HURT
WONGBAKER_NUMERICALRESPONSE: NO HURT

## 2024-05-12 ASSESSMENT — PAIN DESCRIPTION - DESCRIPTORS
DESCRIPTORS: SHARP
DESCRIPTORS: BURNING
DESCRIPTORS: ACHING
DESCRIPTORS: SHARP
DESCRIPTORS: ACHING
DESCRIPTORS: ACHING
DESCRIPTORS: SHARP
DESCRIPTORS: SHARP
DESCRIPTORS: ACHING

## 2024-05-12 ASSESSMENT — PAIN DESCRIPTION - ONSET
ONSET: ON-GOING
ONSET: ON-GOING

## 2024-05-12 ASSESSMENT — PAIN - FUNCTIONAL ASSESSMENT
PAIN_FUNCTIONAL_ASSESSMENT: ACTIVITIES ARE NOT PREVENTED

## 2024-05-12 ASSESSMENT — PAIN DESCRIPTION - PAIN TYPE
TYPE: SURGICAL PAIN

## 2024-05-12 ASSESSMENT — PAIN SCALES - GENERAL
PAINLEVEL_OUTOF10: 7
PAINLEVEL_OUTOF10: 4
PAINLEVEL_OUTOF10: 9
PAINLEVEL_OUTOF10: 7
PAINLEVEL_OUTOF10: 8
PAINLEVEL_OUTOF10: 9
PAINLEVEL_OUTOF10: 9
PAINLEVEL_OUTOF10: 7
PAINLEVEL_OUTOF10: 9
PAINLEVEL_OUTOF10: 7
PAINLEVEL_OUTOF10: 6
PAINLEVEL_OUTOF10: 4

## 2024-05-12 ASSESSMENT — PAIN DESCRIPTION - ORIENTATION
ORIENTATION: MID;RIGHT
ORIENTATION: MID;RIGHT
ORIENTATION: RIGHT
ORIENTATION: MID;RIGHT
ORIENTATION: RIGHT
ORIENTATION: MID;RIGHT
ORIENTATION: RIGHT
ORIENTATION: MID;RIGHT
ORIENTATION: RIGHT

## 2024-05-12 ASSESSMENT — PAIN DESCRIPTION - LOCATION
LOCATION: ABDOMEN

## 2024-05-12 ASSESSMENT — PAIN DESCRIPTION - FREQUENCY
FREQUENCY: INTERMITTENT
FREQUENCY: INTERMITTENT

## 2024-05-12 NOTE — DISCHARGE SUMMARY
[address] [address]                            DISCHARGE SUMMARY      PATIENT NAME: TREVOR ARZATE            : 1990  MED REC NO: [id number]                       ROOM: [facility]  ACCOUNT NO: [id number]                       ADMIT DATE: 05/10/2024  PROVIDER: Iron Castillo MD      DISCHARGE DIAGNOSES:  Abdominal pain, adhesions.    OPERATIONS PERFORMED:  Robotic-assisted lysis of adhesions.    CONSULTATIONS:  None.    HOSPITAL COURSE:  The patient is a 33-year-old female who has had multiple prior abdominal surgeries, who has chronic abdominal pain, but had increased pain and was taken to surgery for robotic lysis of adhesions.  This was performed.  Postop day 1, she was having too much pain to go home, but postop day 2, today, she was feeling better, it was felt she could be discharged home.  She will be discharged home on all of her home medications and will be taking tramadol for pain.  She is being discharged in stable condition to follow up in my office.          IRON CASTILLO MD      D:  2024 10:20:17     T:  2024 10:26:11     CLEMENT/SHIRA  Job #:  [id number]     Doc#:  [id number]

## 2024-05-12 NOTE — PROGRESS NOTES
Reviewed discharge instructions and follow up appointments. Reviewed use of chlorhexidine to clean incisions until completely healed. Patient discharged to home by [facility] due to no transportation. Patient stated understanding.

## 2024-05-12 NOTE — PLAN OF CARE
Problem: Discharge Planning  Goal: Discharge to home or other facility with appropriate resources  5/12/2024 1028 by Katia Medina, RN  Outcome: Progressing  Flowsheets (Taken 5/11/2024 1926 by Madeline Ferro, RN)  Discharge to home or other facility with appropriate resources:   Identify barriers to discharge with patient and caregiver   Arrange for needed discharge resources and transportation as appropriate   Identify discharge learning needs (meds, wound care, etc)   Refer to discharge planning if patient needs post-hospital services based on physician order or complex needs related to functional status, cognitive ability or social support system  5/11/2024 2046 by Madeline Ferro, RN  Outcome: Progressing  Flowsheets (Taken 5/11/2024 1926)  Discharge to home or other facility with appropriate resources:   Identify barriers to discharge with patient and caregiver   Arrange for needed discharge resources and transportation as appropriate   Identify discharge learning needs (meds, wound care, etc)   Refer to discharge planning if patient needs post-hospital services based on physician order or complex needs related to functional status, cognitive ability or social support system     Problem: Pain  Goal: Verbalizes/displays adequate comfort level or baseline comfort level  5/12/2024 1028 by Katia Medina, RN  Outcome: Progressing  Flowsheets (Taken 5/11/2024 0816 by Claritza Ramirez, RN)  Verbalizes/displays adequate comfort level or baseline comfort level:   Encourage patient to monitor pain and request assistance   Assess pain using appropriate pain scale   Administer analgesics based on type and severity of pain and evaluate response   Implement non-pharmacological measures as appropriate and evaluate response   Consider cultural and social influences on pain and pain management   Notify Licensed Independent Practitioner if interventions unsuccessful or patient reports new pain  5/11/2024 2046 by

## 2024-05-12 NOTE — PLAN OF CARE
Problem: Discharge Planning  Goal: Discharge to home or other facility with appropriate resources  5/11/2024 2046 by Madeline Ferro, RN  Outcome: Progressing  Flowsheets (Taken 5/11/2024 1926)  Discharge to home or other facility with appropriate resources:   Identify barriers to discharge with patient and caregiver   Arrange for needed discharge resources and transportation as appropriate   Identify discharge learning needs (meds, wound care, etc)   Refer to discharge planning if patient needs post-hospital services based on physician order or complex needs related to functional status, cognitive ability or social support system  5/11/2024 1343 by Claritza Ramirez, RN  Outcome: Progressing  Flowsheets (Taken 5/11/2024 0826)  Discharge to home or other facility with appropriate resources:   Identify barriers to discharge with patient and caregiver   Arrange for needed discharge resources and transportation as appropriate   Identify discharge learning needs (meds, wound care, etc)   Refer to discharge planning if patient needs post-hospital services based on physician order or complex needs related to functional status, cognitive ability or social support system     Problem: Pain  Goal: Verbalizes/displays adequate comfort level or baseline comfort level  5/11/2024 2046 by Madeline Ferro, RN  Outcome: Progressing  Flowsheets (Taken 5/11/2024 0816 by Claritza Ramirez, RN)  Verbalizes/displays adequate comfort level or baseline comfort level:   Encourage patient to monitor pain and request assistance   Assess pain using appropriate pain scale   Administer analgesics based on type and severity of pain and evaluate response   Implement non-pharmacological measures as appropriate and evaluate response   Consider cultural and social influences on pain and pain management   Notify Licensed Independent Practitioner if interventions unsuccessful or patient reports new pain  5/11/2024 1343 by Claritza Ramirez

## 2024-05-12 NOTE — DISCHARGE INSTRUCTIONS
Please keep follow up appointment. Use chlorhexidine soap for one month to clean surgical incision areas to help prevent post operative infection.

## 2024-05-13 ENCOUNTER — CARE COORDINATION (OUTPATIENT)
Dept: CASE MANAGEMENT | Age: 34
End: 2024-05-13

## 2024-05-13 DIAGNOSIS — Z98.890 S/P ROBOT-ASSISTED SURGICAL PROCEDURE: Primary | ICD-10-CM

## 2024-05-13 PROCEDURE — 1111F DSCHRG MED/CURRENT MED MERGE: CPT

## 2024-05-13 ASSESSMENT — ENCOUNTER SYMPTOMS
ABDOMINAL PAIN: 0
VOMITING: 0

## 2024-05-13 NOTE — TELEPHONE ENCOUNTER
Pt came to office for BP check on 05-10-24 prior to scheduled procedure that day.   BP in office was within normal range- Dr. Butler notified  and noted reading.

## 2024-05-13 NOTE — CARE COORDINATION
business day.      Em Hua RN      
Medicine [phone]    5/13/2025 10:00 AM Ramona You MD Cardiology [phone]            Care Transition Nurse provided contact information.  Plan for follow-up call in 2-5 days based on severity of symptoms and risk factors.  Plan for next call: symptom management-s/s of infection at incision site, incisional pain, BP, HR, nausea/vomiting, urinary and bowels, any new or worsening symptoms      Em Hua RN

## 2024-05-15 ENCOUNTER — CARE COORDINATION (OUTPATIENT)
Dept: CASE MANAGEMENT | Age: 34
End: 2024-05-15

## 2024-05-15 NOTE — CARE COORDINATION
Care Transitions Note    Follow Up Call      Attempted to reach patient for transitions of care follow up.  Unable to reach patient.      Outreach Attempts:   HIPAA compliant voicemail left for patient.     Care Summary Note: 1st attempt to contact pt for care transition follow up.  Unable to reach pt.  Left message with contact information and request for call back.      Follow Up Appointment:   Future Appointments         Provider Specialty Dept Phone    5/23/2024 10:00 AM STR EXAM ROOM 10 IP Unit [phone]    6/6/2024 1:30 PM Juan Dominguez DO Internal Medicine [phone]    6/10/2024 10:30 AM Sully Ramos RD, LD Internal Medicine [phone]    6/18/2024 11:00 AM Charlie Weiss MD Otolaryngology [phone]    7/23/2024 10:00 AM Shauna Herron MD Internal Medicine [phone]    5/13/2025 10:00 AM Ramona You MD Cardiology [phone]            Plan for follow-up call in 2-5 days based on severity of symptoms and risk factors. Plan for next call:  symptom management-s/s of infection at incision site, incisional pain, BP, HR, nausea/vomiting, urinary and bowels, any new or worsening symptoms      Em Hua RN

## 2024-05-15 NOTE — CARE COORDINATION
Care Transitions Note    Follow Up Call      Patient Current Location:  Home: [address] [place] [address]    Care Transition Nurse contacted the patient by telephone. Verified name and  as identifiers.    Additional needs identified to be addressed with provider   No needs identified                 Method of communication with provider: none.    Care Summary Note: Received incoming call from pt.  Left message stating she is returning the call.      Called pt back for care transition follow up.  Samantha states it's going.  Reports she is having pain from the procedure.  Taking tramadol which she states is hit or miss.  Pain medication helps at times but at other times it does not .  Per pt, incision sites (4 incisions) look good.  Denies having any s/s of infection.  Pt urinating w/o issues and moving her bowels.  She informed CTN that she received a call from [facility] doctor that they would like to place PEG back in.  Pt unable to get to [facility] at this time.  States she typically uses [employer] for transportation but was told that they are only currently transporting cancer pts.  She is also inquiring about the service dog.  CTN will send a message to NEGIN Fisher.  She continues to monitor her BP and pulse.  /95 with pulse 85.  She is enrolled in RPM.  Informed her that HRS has been behind in shipping out the kits.  Pt canceled her appt with Dr. Maldonado.  She rescheduled her appt to 24 with PCP.  She also has a follow up with Dr. Michel on 24.  She is aware of when to contact providers.  No questions or needs at this time.      Sent staff message to TRISHA Duran in regards to transportation assistance as well as assistance with getting a service dog.      Plan of care updates since last contact:  Education: monitor s/s, BP, HR       Advance Care Planning:   Does patient have an Advance Directive:  on file .    Medication Review:  No changes since last call.     Remote Patient Monitoring:  Offered

## 2024-05-21 ENCOUNTER — CARE COORDINATION (OUTPATIENT)
Dept: CASE MANAGEMENT | Age: 34
End: 2024-05-21

## 2024-05-21 NOTE — CARE COORDINATION
Remote Patient Monitoring Welcome Note  Date/Time:  2024 3:23 PM  Patient Current Location: Home: [address] [place] [address]  Verified patients name and  as identifiers.  Completed and confirmed the following:   Emergency Contact: Fely, spouse [phone]  [x] Patient received all RPM equipment (tablet, scale, blood pressure device and cuff, and pulse oximeter)  Cuff Size: large (13.8\"-19.68\")    Weight Scale:  regular (<330lbs)                    [x] Instructed patient keep box for use when returning equipment                                                          [x] Reviewed Patient Welcome Letter with patient    [x]  Reviewed Consent Form  Copy of consent form in chart.                 [x] Reviewed expectations for patient and care team  Monitoring hours M-F 9-4pm  It is important to take your vitals every day, even on the weekends,to keep your care team aware of how you are doing every day of the week.  Completing monitoring by 12pm on  so that alerts can be responded to in the same day  Patient weighs self at same time every day (or after urinating and waking up)  Take blood pressure 1-2 hrs after medications   RPM team may have different phone area code (including [place], [place], [place] or [place])                              [x] Instructed patient to keep scale on flat surface                                                         [x] Instructed patient to keep tablet plugged in at all times                         [x] Instructed how to contact IT support ([phone])  [x] Provided Remote Patient Monitoring care  information                All questions answered at this time.      Note Created at: 2024 03:34 PM       Care Transitions Note    Follow Up Call      Patient Current Location:  Home: [address] [place] [address]    Care Transition Nurse contacted the patient by telephone. Verified name and  as identifiers.    Additional needs identified to be

## 2024-05-22 ENCOUNTER — CARE COORDINATION (OUTPATIENT)
Dept: CASE MANAGEMENT | Age: 34
End: 2024-05-22

## 2024-05-22 NOTE — CARE COORDINATION
Care Transitions Note    Follow Up Call      Patient Current Location:  Home: [address] [place] [address]    Care Transition Nurse contacted the patient by telephone. Verified name and  as identifiers.    Additional needs identified to be addressed with provider   No needs identified                 Method of communication with provider: none.    Care Summary Note: contacted pt for care transition follow up.  Pt states she will call CTN back.      Received incoming call from pt.  Samantha reports that she is still not feeling well.  Just had a call with her psychiatrist.  Reports she feels hot all the time.  Becomes diaphoretic especially when sleeping.  Denies having a fever.  Reports shortness of breath which occurs off and on.  Continues to have pain around her port-a-cath site.  Reports her 4 incisions from her procedure look \"fine\".  Denies s/s of infection at those sites.  She c/o having a headache recently.  BP today was 112/89 with pulse of 109.  SpO2 95% on RA.  Weight 221 lbs.  Pt confirmed she is weighing herself around the same time and w/o clothing.  Encouraged to go to WIC or .  She will ask her cousin to take her.  She is aware of when to report to the ED.  If she does not go to WIC or  this evening, advised to call PCP office to see if they can get her in tomorrow.  She verbalized understanding.  No other questions or needs at this time.      Plan of care updates since last contact:  Education: monitor s/s, option for WIC or UC       Advance Care Planning:   Does patient have an Advance Directive:  on file .    Medication Review:  No changes since last call.     Remote Patient Monitoring:  Offered patient enrollment in the Remote Patient Monitoring (RPM) program for in-home monitoring: Yes, patient enrolled; current status is activated and monitoring.    Assessments:  Care Transitions Subsequent and Final Call    Subsequent and Final Calls  Do you have any ongoing symptoms?:

## 2024-05-23 ENCOUNTER — CARE COORDINATION (OUTPATIENT)
Dept: CASE MANAGEMENT | Age: 34
End: 2024-05-23

## 2024-05-23 NOTE — CARE COORDINATION
Received incoming call from pt.  Left message stating she is just returning the call.     CTN called pt back.  Samantha confirmed she got the message and booked her transportation for next week's appt on 5/30/24 at 3:45 with Floresita Diaz.  She does not have any other questions or concerns at this time.  We again discussed when to report to the ED or option for WIC/UC.  She verbalized understanding.      Em Hua RN

## 2024-05-23 NOTE — CARE COORDINATION
Care Transitions Note    Follow Up Call      Patient Current Location:  Home: [address] [place] [address]    Care Transition Nurse contacted the patient by telephone. Verified name and  as identifiers.    Additional needs identified to be addressed with provider   Pt continues to lose weight.  Weight noted as of yesterday was 221 lbs.  Weight today is 185 lbs.  States she checked with 2 different scales and both were the same weight. Continues to have nausea/vomiting.  Unable to eat very much before becoming sick to her stomach.  She plans to try Ensure.  She is also having night sweats.  BP today was 125/100 with pulse of 115.  Abdominal pain is not as bad as it was prior to procedure.  Reports only having incisional pain.  States incisions look fine.  Denies s/s of infection.  Pt has an appt with Dr. Michel on 24 and with PCP on 24.  Will request sooner appt with PCP if possible.                     Method of communication with provider: chart routing and phone.    Care Summary Note: Contacted pt for care transition follow up.  Samantha states she is doing about the same.  Abdominal pain is not as bad as it was prior to procedure.  Reports only having incisional pain.  States incisions look fine.  Denies s/s of infection.  Continues to have night sweats.  She denies fever or chills. BP today was 125/100 with pulse of 115.  Pt continues to lose weight.  Weight noted as of yesterday was 221 lbs.  Weight today is 185 lbs.  States she checked with 2 different scales and both were the same weight.  Continues to have nausea/vomiting.  Unable to eat very much before becoming sick to her stomach.  She plans to try Ensure.  Pt has a follow up with Dr. Michel on 24.  She also has a follow up with PCP on 24.  She is agreeable to be seen sooner by PCP if possible.  She needs at least 3 days notice to set up her transportation.  CTN will call pt back if able to get a sooner appt.

## 2024-05-24 ENCOUNTER — CARE COORDINATION (OUTPATIENT)
Dept: CARE COORDINATION | Age: 34
End: 2024-05-24

## 2024-05-24 NOTE — CARE COORDINATION
Remote Alert Monitoring Note      Date/Time:  2024 8:44 AM  Patient Current Location: Home: [address] [place] [address]    LPN contacted patient by telephone regarding red alert received for weight increase (229.5lb). Verified patients name and  as identifiers.    Rpm alert to be reviewed by the provider                         Background:  CHF, High Blood-Pressure     Refer to 911 immediately if:  Patient unresponsive or unable to provide history  Change in cognition or sudden confusion  Patient unable to respond in complete sentences  Intense chest pain/tightness  Any concern for any clinical emergency  Red Alert: Provider response time of 1 hr required for any red alert requiring intervention  Yellow Alert: Provider response time of 3hr required for any escalated yellow alert        Weight Scale Triage  Was your weight obtained upon rising/waking today? yes   Was your weight obtained after voiding and/or use of the bathroom today? yes   Did you weigh yourself in the same amount of clothing today, compared to how you typically do? yes   Was the scale bumped or moved prior to today's weight? yes   Is your scale on a flat/hard surface? yes   Did you obtain your weight with shoes on? no   If yes, is this something you normally do during your daily weights? yes   Were you standing up straight on the scale today? yes   Were you leaning on anything while obtaining your weight today? no     Have you taken your medications as instructed by your doctor today? Yes   Weight Triage  Are you weighing any different than you did yesterday? (time of day, clothes and shoes on or off, etc)? yes   Do you have any shortness of breath? no   Do you have any swelling in your hands of feet? Mild BLE          Clinical Interventions: Reviewed and followed up on alerts and treatments-. Pt is in no apparent distress, speaking in full sentences.  Patient has been on RPM for 9 days and weight have been fluctuating by 20-30lb

## 2024-05-29 ENCOUNTER — CARE COORDINATION (OUTPATIENT)
Dept: CASE MANAGEMENT | Age: 34
End: 2024-05-29

## 2024-05-29 ENCOUNTER — CARE COORDINATION (OUTPATIENT)
Dept: CARE COORDINATION | Age: 34
End: 2024-05-29

## 2024-05-29 NOTE — CARE COORDINATION
Remote Alert Monitoring Note      Date/Time:  2024 8:44 AM  Patient Current Location: Home: [address] [place] [address]    LPN contacted patient by telephone regarding red alert received for weight increase (235lb). 20lb increase overnight. Verified patients name and  as identifiers.    Rpm alert to be reviewed by the provider    Red Alert Weight changes                     Background: CHF, High Blood-Pressure     Refer to 911 immediately if:  Patient unresponsive or unable to provide history  Change in cognition or sudden confusion  Patient unable to respond in complete sentences  Intense chest pain/tightness  Any concern for any clinical emergency  Red Alert: Provider response time of 1 hr required for any red alert requiring intervention  Yellow Alert: Provider response time of 3hr required for any escalated yellow alert        Weight Scale Triage  Was your weight obtained upon rising/waking today? yes   Was your weight obtained after voiding and/or use of the bathroom today? yes   Did you weigh yourself in the same amount of clothing today, compared to how you typically do? yes   Was the scale bumped or moved prior to today's weight? no   Is your scale on a flat/hard surface? yes   Did you obtain your weight with shoes on? no   If yes, is this something you normally do during your daily weights? yes   Were you standing up straight on the scale today? yes   Were you leaning on anything while obtaining your weight today? no       Weight Triage  Are you weighing any different than you did yesterday? (time of day, clothes and shoes on or off, etc)? no   Do you have any shortness of breath? no   Do you have any swelling in your hands of feet? no   Are you having any other health concerns or issues? no       Clinical Interventions: Reviewed and followed up on alerts and treatments-. Pt is asymptomatic and in no apparent distress, speaking in full sentences.  Patients weight continues to fluctuate

## 2024-05-29 NOTE — CARE COORDINATION
Care Transitions Note    Follow Up Call      Patient Current Location:  Home: [address] [place] [address]    Care Transition Nurse contacted the patient by telephone. Verified name and  as identifiers.    Additional needs identified to be addressed with provider   Pt's weight continues to fluctuate between 15-20 lbs daily.  She is currently enrolled in RPM and monitoring her weight.  Due to the inaccuracies of her weight, would you recommend discontinuing weight monitoring?  If so, please let me know so I can let the RPM team know to discontinue the weight monitoring.                   Method of communication with provider: chart routing.    Care Summary Note: Contacted pt for care transition follow up.  Samantha states she is still hanging in there.  Had follow up with Dr. Michel yesterday.  Reports incisions are healed on the outside but still healing on the inside.  Incisional pain manageable.  Continues to have nausea but no vomiting.  She cannot drink Ensure d/t being lactose intolerant.  She has a follow up with the [facility] dietician on 6/10/24 and plans to discuss other options with her.  She is eating and drinking her fluids but not eating very much.  Pt having \"puffiness\" every now and then.  Has noticed a little swelling in her neck area.  Weight continues to fluctuate between 15-20 lbs daily.  Due to daily inaccuracies, nurse would like CTN to consider discontinuing weight monitoring and request order from PCP.  CTN will verify with PCP on recommendation.  Pt states she moved her scale to a flat surface after being told to do so by the nurse.  She keeps scale in one place.  She informed CTN that she ate something before weighing herself d/t her blood sugars being low.  Pt has an appt with GI tomorrow, 24 and a port flush on 24.  CTN will make another outreach on Friday.  Pt aware of when to contact providers.  No questions or needs at this time.      Plan of care updates since last

## 2024-05-31 ENCOUNTER — CARE COORDINATION (OUTPATIENT)
Dept: CASE MANAGEMENT | Age: 34
End: 2024-05-31

## 2024-05-31 ENCOUNTER — HOSPITAL ENCOUNTER (OUTPATIENT)
Dept: NURSING | Age: 34
Discharge: HOME | End: 2024-05-31
Payer: MEDICARE

## 2024-05-31 VITALS
HEART RATE: 76 BPM | OXYGEN SATURATION: 96 % | SYSTOLIC BLOOD PRESSURE: 137 MMHG | RESPIRATION RATE: 16 BRPM | DIASTOLIC BLOOD PRESSURE: 88 MMHG | TEMPERATURE: 96.5 F

## 2024-05-31 VITALS
SYSTOLIC BLOOD PRESSURE: 120 MMHG | DIASTOLIC BLOOD PRESSURE: 83 MMHG | BODY MASS INDEX: 31.34 KG/M2 | OXYGEN SATURATION: 97 % | WEIGHT: 182.6 LBS | HEART RATE: 82 BPM

## 2024-05-31 DIAGNOSIS — D50.8 OTHER IRON DEFICIENCY ANEMIA: Primary | ICD-10-CM

## 2024-05-31 PROCEDURE — 99212 OFFICE O/P EST SF 10 MIN: CPT

## 2024-05-31 PROCEDURE — 2580000003 HC RX 258: Performed by: SURGERY

## 2024-05-31 PROCEDURE — 6360000002 HC RX W HCPCS: Performed by: SURGERY

## 2024-05-31 RX ORDER — SODIUM CHLORIDE 0.9 % (FLUSH) 0.9 %
5-40 SYRINGE (ML) INJECTION PRN
OUTPATIENT
Start: 2024-05-31

## 2024-05-31 RX ORDER — SODIUM CHLORIDE 0.9 % (FLUSH) 0.9 %
5-40 SYRINGE (ML) INJECTION PRN
Status: DISCONTINUED | OUTPATIENT
Start: 2024-05-31 | End: 2024-06-01 | Stop reason: HOSPADM

## 2024-05-31 RX ORDER — HEPARIN 100 UNIT/ML
500 SYRINGE INTRAVENOUS PRN
Status: DISCONTINUED | OUTPATIENT
Start: 2024-05-31 | End: 2024-06-01 | Stop reason: HOSPADM

## 2024-05-31 RX ORDER — HEPARIN 100 UNIT/ML
500 SYRINGE INTRAVENOUS PRN
OUTPATIENT
Start: 2024-05-31

## 2024-05-31 RX ORDER — SODIUM CHLORIDE 9 MG/ML
25 INJECTION, SOLUTION INTRAVENOUS PRN
OUTPATIENT
Start: 2024-05-31

## 2024-05-31 RX ADMIN — SODIUM CHLORIDE, PRESERVATIVE FREE 10 ML: 5 INJECTION INTRAVENOUS at 11:59

## 2024-05-31 RX ADMIN — HEPARIN 500 UNITS: 100 SYRINGE at 12:00

## 2024-05-31 ASSESSMENT — PAIN - FUNCTIONAL ASSESSMENT: PAIN_FUNCTIONAL_ASSESSMENT: NONE - DENIES PAIN

## 2024-05-31 NOTE — DISCHARGE INSTRUCTIONS
Next appointment is scheduled for June 28 at 12:00    Outpatient nursing [phone]    ACTIVITY:  Continue usual care with your doctor. Call your doctor immediately if any severe problems or go to the nearest emergency room.      I have been treated and hereby acknowledge receiving this instruction sheet.

## 2024-05-31 NOTE — PROGRESS NOTES
1147 Patient arrived to [facility] ambulatory for port flush.  Oriented to room and call light  PT RIGHTS AND RESPONSIBILITIES OFFERED TO PT.    1159 Port accessed using sterile technique.  Blood return noted.      1200 port flushed and heparin flushed.  Port de-accessed and dressing placed.  Discharge instructions given and explained and she denies questions.  Discharged ambulatory     __M__ Safety:       (Environmental)  Dale to environment  Ensure ID band is correct and in place/ allergy band as needed  Assess for fall risk  Initiate fall precautions as applicable (fall band, side rails, etc.)  Call light within reach  Bed in low position/ wheels locked    _M___ Pain:       Assess pain level and characteristics  Administer analgesics as ordered  Assess effectiveness of pain management and report to MD as needed    M___ Knowledge Deficit:  Assess baseline knowledge  Provide teaching at level of understanding  Provide teaching via preferred learning method  Evaluate teaching effectiveness    __M__ Hemodynamic/Respiratory Status:       (Pre and Post Procedure Monitoring)  Assess/Monitor vital signs and LOC  Assess Baseline SpO2 prior to any sedation  Obtain weight/height  Assess vital signs/ LOC until patient meets discharge criteria  Monitor procedure site and notify MD of any issues    __M__ Infection-Risk of Central Venous Catheter:  Monitor for infection signs and symptoms (catheter site redness, temperature elevation, etc)  Assess for infection risks  Educate regarding infection prevention  Manage central venous catheter (flushes/ dressing changes per protocol)

## 2024-05-31 NOTE — CARE COORDINATION
Care Transitions Note    Follow Up Call      Patient Current Location:  Home: [address] [place] [address]    Care Transition Nurse contacted the patient by telephone. Verified name and  as identifiers.    Additional needs identified to be addressed with provider   No needs identified                 Method of communication with provider: none.    Care Summary Note: Contacted pt for care transition follow up.  Samantha states she is still doing \"about the same, hanging in there\".  Abdominal pain still present.  Nausea is also still present.  She is taking the antiemetic as needed.  She reports having one episode of vomiting today.  Still not tolerating all of her oral intake.  States some days are better than others, just depends on the day.  Had appt with GI yesterday.  Was started on Erythromycin base 250 mg TID daily (before meals).  She confirmed she picked medications up today.  She is considering on going to [facility] as long as she has transportation.  Weight at her GI office visit was 294 lbs.  HRS dashboard weight for today was 182.6 lbs, /83, pulse 82, SpO2 97%.  Pt getting ready to rest.  She is aware of when to contact her providers.  No questions or needs at this time.      Plan of care updates since last contact:  Review of patient management of conditions/medications: new medications       Advance Care Planning:   Does patient have an Advance Directive:  on file .    Medication Review:  Medications changed since last call, reviewed today.     Remote Patient Monitoring:  Offered patient enrollment in the Remote Patient Monitoring (RPM) program for in-home monitoring: Yes, patient enrolled; current status is activated and monitoring.    Assessments:  Care Transitions Subsequent and Final Call    Subsequent and Final Calls  Do you have any ongoing symptoms?: Yes  Patient-reported symptoms: Nausea, Abdominal Pain, Other  Have your medications changed?: Yes  Patient Reports: was prescribed

## 2024-06-03 ENCOUNTER — HOSPITAL ENCOUNTER (EMERGENCY)
Age: 34
Discharge: HOME | End: 2024-06-03
Attending: INTERNAL MEDICINE
Payer: MEDICARE

## 2024-06-03 ENCOUNTER — APPOINTMENT (OUTPATIENT)
Dept: CT IMAGING | Age: 34
End: 2024-06-03
Payer: MEDICARE

## 2024-06-03 VITALS
TEMPERATURE: 98.3 F | OXYGEN SATURATION: 98 % | DIASTOLIC BLOOD PRESSURE: 87 MMHG | RESPIRATION RATE: 17 BRPM | HEART RATE: 76 BPM | SYSTOLIC BLOOD PRESSURE: 122 MMHG

## 2024-06-03 DIAGNOSIS — R10.31 RIGHT LOWER QUADRANT ABDOMINAL PAIN: Primary | ICD-10-CM

## 2024-06-03 LAB
ALBUMIN SERPL BCG-MCNC: 4.1 G/DL (ref 3.5–5.1)
ALP SERPL-CCNC: 126 U/L (ref 38–126)
ALT SERPL W/O P-5'-P-CCNC: 16 U/L (ref 11–66)
ANION GAP SERPL CALC-SCNC: 14 MEQ/L (ref 8–16)
AST SERPL-CCNC: 15 U/L (ref 5–40)
BASOPHILS ABSOLUTE: 0 THOU/MM3 (ref 0–0.1)
BASOPHILS NFR BLD AUTO: 0.4 %
BILIRUB SERPL-MCNC: < 0.2 MG/DL (ref 0.3–1.2)
BILIRUB UR QL STRIP: NEGATIVE
BUN SERPL-MCNC: 14 MG/DL (ref 7–22)
CALCIUM SERPL-MCNC: 9.8 MG/DL (ref 8.5–10.5)
CHARACTER UR: CLEAR
CHLORIDE SERPL-SCNC: 105 MEQ/L (ref 98–111)
CO2 SERPL-SCNC: 23 MEQ/L (ref 23–33)
COLOR UR: YELLOW
CREAT SERPL-MCNC: 0.7 MG/DL (ref 0.4–1.2)
DEPRECATED RDW RBC AUTO: 43.1 FL (ref 35–45)
EOSINOPHIL NFR BLD AUTO: 2.4 %
EOSINOPHILS ABSOLUTE: 0.2 THOU/MM3 (ref 0–0.4)
ERYTHROCYTE [DISTWIDTH] IN BLOOD BY AUTOMATED COUNT: 15.5 % (ref 11.5–14.5)
GFR SERPL CREATININE-BSD FRML MDRD: > 90 ML/MIN/1.73M2
GLUCOSE SERPL-MCNC: 97 MG/DL (ref 70–108)
GLUCOSE UR QL STRIP.AUTO: NEGATIVE MG/DL
HCT VFR BLD AUTO: 42 % (ref 37–47)
HGB BLD-MCNC: 13.1 GM/DL (ref 12–16)
HGB UR QL STRIP.AUTO: NEGATIVE
IMM GRANULOCYTES # BLD AUTO: 0.04 THOU/MM3 (ref 0–0.07)
IMM GRANULOCYTES NFR BLD AUTO: 0.4 %
KETONES UR QL STRIP.AUTO: NEGATIVE
LACTATE SERPL-SCNC: 1.7 MMOL/L (ref 0.5–2)
LEUKOCYTE ESTERASE UR QL STRIP.AUTO: NEGATIVE
LIPASE SERPL-CCNC: 22.9 U/L (ref 5.6–51.3)
LYMPHOCYTES ABSOLUTE: 2.2 THOU/MM3 (ref 1–4.8)
LYMPHOCYTES NFR BLD AUTO: 23.8 %
MCH RBC QN AUTO: 24.1 PG (ref 26–33)
MCHC RBC AUTO-ENTMCNC: 31.2 GM/DL (ref 32.2–35.5)
MCV RBC AUTO: 77.3 FL (ref 81–99)
MONOCYTES ABSOLUTE: 0.5 THOU/MM3 (ref 0.4–1.3)
MONOCYTES NFR BLD AUTO: 5.6 %
NEUTROPHILS ABSOLUTE: 6.3 THOU/MM3 (ref 1.8–7.7)
NEUTROPHILS NFR BLD AUTO: 67.4 %
NITRITE UR QL STRIP.AUTO: NEGATIVE
NRBC BLD AUTO-RTO: 0 /100 WBC
OSMOLALITY SERPL CALC.SUM OF ELEC: 283.5 MOSMOL/KG (ref 275–300)
PH UR STRIP.AUTO: 6.5 [PH] (ref 5–9)
PLATELET # BLD AUTO: 301 THOU/MM3 (ref 130–400)
PMV BLD AUTO: 9.8 FL (ref 9.4–12.4)
POTASSIUM SERPL-SCNC: 4.6 MEQ/L (ref 3.5–5.2)
PROT SERPL-MCNC: 7.7 G/DL (ref 6.1–8)
PROT UR STRIP.AUTO-MCNC: NEGATIVE MG/DL
RBC # BLD AUTO: 5.43 MILL/MM3 (ref 4.2–5.4)
SODIUM SERPL-SCNC: 142 MEQ/L (ref 135–145)
SP GR UR REFRACT.AUTO: 1.01 (ref 1–1.03)
UROBILINOGEN UR QL STRIP.AUTO: 0.2 EU/DL (ref 0–1)
WBC # BLD AUTO: 9.4 THOU/MM3 (ref 4.8–10.8)

## 2024-06-03 PROCEDURE — 83690 ASSAY OF LIPASE: CPT

## 2024-06-03 PROCEDURE — 36415 COLL VENOUS BLD VENIPUNCTURE: CPT

## 2024-06-03 PROCEDURE — 96374 THER/PROPH/DIAG INJ IV PUSH: CPT

## 2024-06-03 PROCEDURE — 80053 COMPREHEN METABOLIC PANEL: CPT

## 2024-06-03 PROCEDURE — 96376 TX/PRO/DX INJ SAME DRUG ADON: CPT

## 2024-06-03 PROCEDURE — 81003 URINALYSIS AUTO W/O SCOPE: CPT

## 2024-06-03 PROCEDURE — 85025 COMPLETE CBC W/AUTO DIFF WBC: CPT

## 2024-06-03 PROCEDURE — 74176 CT ABD & PELVIS W/O CONTRAST: CPT

## 2024-06-03 PROCEDURE — 83605 ASSAY OF LACTIC ACID: CPT

## 2024-06-03 PROCEDURE — 99284 EMERGENCY DEPT VISIT MOD MDM: CPT

## 2024-06-03 PROCEDURE — 6360000002 HC RX W HCPCS: Performed by: INTERNAL MEDICINE

## 2024-06-03 RX ORDER — MORPHINE SULFATE 2 MG/ML
2 INJECTION, SOLUTION INTRAMUSCULAR; INTRAVENOUS ONCE
Status: COMPLETED | OUTPATIENT
Start: 2024-06-03 | End: 2024-06-03

## 2024-06-03 RX ORDER — 0.9 % SODIUM CHLORIDE 0.9 %
1000 INTRAVENOUS SOLUTION INTRAVENOUS ONCE
Status: DISCONTINUED | OUTPATIENT
Start: 2024-06-03 | End: 2024-06-03 | Stop reason: HOSPADM

## 2024-06-03 RX ADMIN — MORPHINE SULFATE 2 MG: 2 INJECTION, SOLUTION INTRAMUSCULAR; INTRAVENOUS at 14:12

## 2024-06-03 RX ADMIN — MORPHINE SULFATE 2 MG: 2 INJECTION, SOLUTION INTRAMUSCULAR; INTRAVENOUS at 11:31

## 2024-06-03 ASSESSMENT — PAIN SCALES - GENERAL
PAINLEVEL_OUTOF10: 4
PAINLEVEL_OUTOF10: 9

## 2024-06-03 NOTE — DISCHARGE INSTRUCTIONS
Patient is advised to come back to the emergency department if patient's pain starts getting worse, patient started having fever and chills.  Patient also needs to come back if patient starts having diarrhea nausea and vomiting

## 2024-06-03 NOTE — ED PROVIDER NOTES
eMERGENCY dEPARTMENT eNCOUnter      CHIEF COMPLAINT    Chief Complaint   Patient presents with    Wound Check       HPI    Samantha Nichols is a 33 y.o. female who presents to the emergency department because of right lower quadrant abdominal pain.  Patient had a surgery done about 4 weeks ago by Dr. Michel for adhesion lysis.  Patient does have some nausea, there is no vomiting.  Patient has some diarrhea.  There is no fever or chills.  There is no dysuria frequency.  Patient does not have any vaginal discharge.    PAST MEDICAL HISTORY    Past Medical History:   Diagnosis Date    Acute on chronic diastolic congestive heart failure (HCC) 06/25/2022    JANI (acute kidney injury) ([facility]) 07/07/2019    Anemia     Anesthesia     migraines    Anxiety     Asthma     CAD (coronary artery disease)     Depression     Diabetes ([facility])     Diet-controlled type 2 diabetes mellitus ([facility]) 2016    Dyslipidemia 11/14/2016    Epilepsy ([facility])     last siezure is 2 years ago    Epilepsy ([facility])     Fibroids     Gastro - esophageal reflux disease     Gastroparesis     History of esophagogastroduodenoscopy (EGD) 08/13/2022    Hypertension     Hypertrophy of tonsil and adenoid 11/04/2017    Malignant carcinoid tumor of other sites ([facility]) 05/14/2013    Migraine     PONV (postoperative nausea and vomiting)     Prolonged emergence from general anesthesia     Sickle cell anemia (HCC)     Sickle cell trait (HCC)     PT STATES SHE HAS THE TRAIT NOT THE DISEASE    Tumor associated pain     neuroendrocrine tumor, gastroma       SURGICAL HISTORY    Past Surgical History:   Procedure Laterality Date    ABDOMEN SURGERY  03/23/2017    Laparoscopic , Bi lat oopherectomy Dr Hinojosa    ABDOMINAL EXPLORATION SURGERY      x2    BREAST REDUCTION SURGERY      CENTRAL VENOUS CATHETER Right 12/11/2015    right subclavian single lumen mediport insertion--Dr. Michel    CHOLECYSTECTOMY, LAPAROSCOPIC N/A 7/11/2019    CHOLECYSTECTOMY LAPAROSCOPIC performed by

## 2024-06-03 NOTE — ED TRIAGE NOTES
Pt to ED with a wound check. Pt states that she had adhesion removal a few weeks ago and one of the surgical sites is not healing. Pt states that puss is coming from the area. There is a surgical site about 3 cm that appears raw. No active bleeding noted.

## 2024-06-04 ENCOUNTER — TELEPHONE (OUTPATIENT)
Dept: INTERNAL MEDICINE CLINIC | Age: 34
End: 2024-06-04

## 2024-06-04 ENCOUNTER — CARE COORDINATION (OUTPATIENT)
Dept: CASE MANAGEMENT | Age: 34
End: 2024-06-04

## 2024-06-04 NOTE — CARE COORDINATION
Care Transitions Note    Follow Up Call      Patient Current Location:  Home: [address] [place] [address]    Care Transition Nurse contacted the patient by telephone. Verified name and  as identifiers.    Additional needs identified to be addressed with provider   Pt went to ED yesterday, 6/3/24 for \"puss\" coming out of her incisions.  Pt contacted Dr. Michel's office to let them know her s/s and will be seeing him on 24.  Also has an appt with PCP on 24.                   Method of communication with provider: none.    Care Summary Note: Received incoming call from pt.  Left message to call her back.      Contacted pt for care transition follow up and ED visit on 6/3/24.  Samantha states she is feeling good today but then states she is doing okay, it's been a roller coaster.  Went to ED yesterday to have them assess her incisions.  Reports having \"puss\" coming from 2 of the incisions that are not healing.  States it is hard around those sites and having soreness as well.  No fever or chills currently but does report having one episode of fever but unsure of when it occurred.  Per pt, she was told by the ED to follow up with Dr. Michel.  She contacted his office and told them about her s/s and was scheduled to see him on Thursday, 24 at 11.  She also has an appt with PCP on 24.  All other symptoms are \"about the same\".  Informed pt of PCP response regarding weighing herself-  She can take weights at the same time every morning prior to breakfast before dressing for the day. If causing her significant distress it can be can stopped. Thanks.   She verbalized understanding.  Will continue to monitor her incisions.  Advised to contact providers or report to the ED if worsens before her appts.  No other questions or needs at this time.      Plan of care updates since last contact:  Education: continue to monitor incisions and s/s.  Contact provider or report to ED if worsens before

## 2024-06-05 ENCOUNTER — CARE COORDINATION (OUTPATIENT)
Dept: CARE COORDINATION | Age: 34
End: 2024-06-05

## 2024-06-05 NOTE — CARE COORDINATION
Remote Alert Monitoring Note      Date/Time:  2024 10:01 AM  Patient Current Location: Home: [address] [place] [address]    LPN contacted patient by telephone regarding red alert received for weight increase (186.6lb). Verified patients name and  as identifiers.    Rpm alert to be reviewed by the provider                         Background: CHF, High Blood-Pressure     Refer to 911 immediately if:  Patient unresponsive or unable to provide history  Change in cognition or sudden confusion  Patient unable to respond in complete sentences  Intense chest pain/tightness  Any concern for any clinical emergency  Red Alert: Provider response time of 1 hr required for any red alert requiring intervention  Yellow Alert: Provider response time of 3hr required for any escalated yellow alert            Weight Triage  Are you weighing any different than you did yesterday? (time of day, clothes and shoes on or off, etc)? no   Do you have any shortness of breath? no   Do you have any swelling in your hands of feet? no          Clinical Interventions: Reviewed and followed up on alerts and treatments-. Pt is asymptomatic and in no apparent distress, speaking in full sentences.  Patient continues to weigh every morning the same way as directed.  Weights continue to have extreme fluctuations.  Discussed with ACM.  Will monitor weights at upcoming appts and compare.      Signs and symptoms of CHF discussed with patient, such as feeling more tired than normal, feeling short of breath, coughing that increases when lying down, sudden weight gain, swelling of the feet, legs or belly.  Patient verbalized understanding to notify physician office if these symptoms occur.    Plan/Follow Up: Will continue to review, monitor and address alerts with follow up based on severity of symptoms and risk factors.    NYDIA Ramey [facility]/ CTN/ Remote Patient monitoring  [phone]

## 2024-06-06 ENCOUNTER — OFFICE VISIT (OUTPATIENT)
Dept: INTERNAL MEDICINE CLINIC | Age: 34
End: 2024-06-06
Payer: MEDICARE

## 2024-06-06 VITALS
HEIGHT: 64 IN | BODY MASS INDEX: 50.02 KG/M2 | HEART RATE: 88 BPM | WEIGHT: 293 LBS | DIASTOLIC BLOOD PRESSURE: 88 MMHG | OXYGEN SATURATION: 98 % | SYSTOLIC BLOOD PRESSURE: 130 MMHG

## 2024-06-06 DIAGNOSIS — E11.9 DIABETES MELLITUS TYPE 2, NONINSULIN DEPENDENT (HCC): Primary | ICD-10-CM

## 2024-06-06 DIAGNOSIS — I10 ESSENTIAL HYPERTENSION: ICD-10-CM

## 2024-06-06 DIAGNOSIS — Z86.19 HISTORY OF VARICELLA AS A CHILD: ICD-10-CM

## 2024-06-06 PROCEDURE — G0009 ADMIN PNEUMOCOCCAL VACCINE: HCPCS | Performed by: INTERNAL MEDICINE

## 2024-06-06 PROCEDURE — 90732 PPSV23 VACC 2 YRS+ SUBQ/IM: CPT | Performed by: INTERNAL MEDICINE

## 2024-06-06 PROCEDURE — 3075F SYST BP GE 130 - 139MM HG: CPT

## 2024-06-06 PROCEDURE — 3044F HG A1C LEVEL LT 7.0%: CPT

## 2024-06-06 PROCEDURE — 3079F DIAST BP 80-89 MM HG: CPT

## 2024-06-06 PROCEDURE — 99214 OFFICE O/P EST MOD 30 MIN: CPT

## 2024-06-06 RX ORDER — PROPRANOLOL HYDROCHLORIDE 80 MG/1
80 TABLET ORAL 2 TIMES DAILY
COMMUNITY
Start: 2024-05-13

## 2024-06-06 RX ORDER — TIRZEPATIDE 5 MG/.5ML
5 INJECTION, SOLUTION SUBCUTANEOUS WEEKLY
Qty: 1 ADJUSTABLE DOSE PRE-FILLED PEN SYRINGE | Refills: 4 | Status: SHIPPED | OUTPATIENT
Start: 2024-06-06

## 2024-06-06 RX ORDER — ESOMEPRAZOLE MAGNESIUM 20 MG/1
20 GRANULE, DELAYED RELEASE ORAL
COMMUNITY
Start: 2024-05-30 | End: 2024-09-27

## 2024-06-06 ASSESSMENT — ENCOUNTER SYMPTOMS
WHEEZING: 0
VOMITING: 0
ABDOMINAL PAIN: 0
SHORTNESS OF BREATH: 0
NAUSEA: 0

## 2024-06-06 NOTE — PROGRESS NOTES
Patient Name: Samantha Nichols  YOB: 1990  MRN: [id number]  Office visit date: 6/6/2024    Chief Complaint: Chronic idiopathic constipation, Hypertension, and Abdominal Pain    Assessment/Plan:  1. Diabetes mellitus type 2, noninsulin dependent (HCC)  -      DIABETES FOOT EXAM  -     Tirzepatide (MOUNJARO) 5 MG/0.5ML SOPN SC injection; Inject 0.5 mLs into the skin once a week, Disp-1 Adjustable Dose Pre-filled Pen Syringe, R-4Normal  2. Essential hypertension  -     Microalbumin / Creatinine Urine Ratio; Future  -     Lipid Panel; Future  3. Body mass index (BMI) 50.0-59.9, adult ([facility])  4. History of varicella as a child  -     Varicella Zoster Antibody, IgG; Future      Return in about 5 weeks (around 7/11/2024).    NIDDM2: most recent A1c 3/29/2024 6.0. She is on metformin 500 mg daily. She has one dose of Mounjaro 2.5 left. Will advance to Mounjaro 5.0. continue metformin 500 daily.     HTN: her blood pressure is well controlled on current regimen. She follows with Dr. You, cardiology.     Anxiety/depression: She is on abilify, buspar, lexapro. She is requesting that I complete paperwork for emotional support dog. She notes that this has been helping her anxiety and depression. She follows with psychiatry for this, and I discussed her that the provider managing her anxiety and depression would be better suited for managing such requests.     Completed diabetic foot exam. Given pneumococcal vaccine today. Counseled to complete diabetic foot exam. Ordered lipid panel. Microalbumin/creat ratio.     Will schedule annual medicare wellness visit.    Subjective/Objective:    HPI:     Samantha Nichols is a 33 y.o. female who presents for an established patient visit. She is here for evaluation of Chronic idiopathic constipation, Hypertension, and Abdominal Pain    She presents today to further establish care after abdominal surgery. She notes that she has been doing well in terms of abdominal pain

## 2024-06-06 NOTE — PROGRESS NOTES
After obtaining consent, and per orders of Dr. Dominguez, injection of Prevnar 23 given in Left deltoid by Tanvi Pacheco LPN. Patient instructed to remain in clinic for 20 minutes afterwards, and to report any adverse reaction to me immediately. Patient tolerated well.

## 2024-06-06 NOTE — PATIENT INSTRUCTIONS
Continue mounjaro.   Get urinalysis.   Get lipid panel  Schedule exam with optometry for diabetic eye exam  Check varicella titer.     Completed

## 2024-06-07 ENCOUNTER — CARE COORDINATION (OUTPATIENT)
Dept: CASE MANAGEMENT | Age: 34
End: 2024-06-07

## 2024-06-07 ENCOUNTER — CARE COORDINATION (OUTPATIENT)
Dept: CARE COORDINATION | Age: 34
End: 2024-06-07

## 2024-06-07 LAB
CREATININE URINE: 220.6 MG/DL
MICROALBUMIN/CREAT 24H UR: 19.4 MG/DL
MICROALBUMIN/CREAT UR-RTO: 8.8 MG/G

## 2024-06-07 NOTE — CARE COORDINATION
Care Transitions Note  Follow Up Call     Patient Current Location:  Home: [address] [place] [address]    Care Transition Nurse contacted the patient by telephone. Verified name and  as identifiers.    Additional needs identified to be addressed with provider   No needs identified                 Method of communication with provider: none.    Care Summary Note: Contacted pt for care transition follow up.  Samantha states that she is still hanging in there.  Had follow up with Dr. Michel yesterday.  Assessed incision and was started on Bactrim DS.  She will be taking Abx three times a day.  She also had an appt with PCP yesterday.  Reports that appt went well.  She states she was told to restart Mounjaro and it had low risk of causing gastroparesis.  She will continue to monitor.  Continues to have loose stools.  Continues to have nausea.  States she has a virtual visit with [facility] GI on 24.  Pt weight is still inconsistent.  Weight at PCP appt was 294 lbs 12.8 oz.  HRS dashboard weight was 179.8 lbs as of yesterday.  Will discontinue weight for right now.  Pt is still interested in speaking with TRISHA Fisher regarding  therapy dog and transportation.  No questions or needs at this time.      Sent message to TAWNY Deleon to let her know that CTN will be handing pt off on 24.      Sent message to TRISHA Duran to assist with transportation and therapy dog.      Plan of care updates since last contact:  Review of patient management of conditions/medications: Started on Bactrim DS by Dr. Michel       Advance Care Planning:   Does patient have an Advance Directive:  on file .    Medication Review:  Medications changed since last call, reviewed today.     Remote Patient Monitoring:  Offered patient enrollment in the Remote Patient Monitoring (RPM) program for in-home monitoring: Yes, patient enrolled; current status is activated and monitoring.    Assessments:  Care Transitions Subsequent

## 2024-06-07 NOTE — CARE COORDINATION
NEGIN called pt after referral fro CATY. Em RIVERA.  Pt interested in having an \"emotional support dog.\"  Introduced self and my role.  Pt unsure if she wants and Emotional Support dog or a Service dog. Pt has a cousin that trains SD and wonders if she should get it trained. Informed pt that a trained dog is what she needs. Informed pt that I will check into this and call her back. Answered questions pt questions. Pt voiced understanding.

## 2024-06-10 ENCOUNTER — OFFICE VISIT (OUTPATIENT)
Dept: INTERNAL MEDICINE CLINIC | Age: 34
End: 2024-06-10
Payer: MEDICARE

## 2024-06-10 VITALS — HEIGHT: 64 IN | BODY MASS INDEX: 49.58 KG/M2 | WEIGHT: 290.4 LBS

## 2024-06-10 DIAGNOSIS — K90.9 ABNORMAL INTESTINAL ABSORPTION: ICD-10-CM

## 2024-06-10 DIAGNOSIS — K90.9 MALABSORPTION SYNDROME: Primary | ICD-10-CM

## 2024-06-10 PROCEDURE — 97803 MED NUTRITION INDIV SUBSEQ: CPT | Performed by: DIETITIAN, REGISTERED

## 2024-06-10 PROCEDURE — NBSRV NON-BILLABLE SERVICE: Performed by: DIETITIAN, REGISTERED

## 2024-06-10 NOTE — PROGRESS NOTES
[facility]  [facility]  [address] [place] [address]  [phone] (phone)  [phone] (fax)    Patient Name: Samantha Nichols. Date of Birth: [id number]. MRN: [id number]      Assessment: Patient is a 33 y.o. female seen for follow-up MNT visit for   K90.9 (ICD-10-CM) - Malabsorption syndrome   K90.9 (ICD-10-CM) - Abnormal intestinal absorption   Gastroparesis.     -Nutritionally relevant labs:   Lab Results   Component Value Date/Time    LABA1C 6.0 03/29/2024 06:03 AM    LABA1C 6.1 10/10/2023 05:00 AM    LABA1C 5.8 01/25/2023 05:25 AM    GLUCOSE 97 06/03/2024 11:23 AM    GLUCOSE 104 04/28/2024 04:12 AM    GLUCOSE 78 12/23/2018 11:30 AM    GLUCOSE 78 03/26/2018 02:41 PM    CHOL 172 06/25/2022 04:07 AM    HDL 41 06/25/2022 04:07 AM    TRIG 92 01/14/2023 05:47 AM     Nutrition Notes from 4/30/24 visit:  Juicing now but still hard to keep down.  Diet recall: soups (mostly broth-based, blended chicken noodle soup), juiced apple, prune juice, berry juice. Also tries to drink 3 chocolate or vanilla Equate Original or Plus shakes each day (breakfast, noon, dinner) but unable to do every day.   Trying to eat/drink several times per day, but can be hard.   No tube feeds since the beginning of Jan.      Pt states she tolerated the Vital1.5 formula the best when with PEG tube feedings.    Today's Visit:  Med: Mounjaro 5 mg weekly.  Pt states she is doing online visits with [facility] provider and dietitian.  Dinner last night: [employer] chicken & salad.     Current Outpatient Medications on File Prior to Visit   Medication Sig Dispense Refill    propranolol (INDERAL) 80 MG tablet Take 1 tablet by mouth 2 times daily      esomeprazole Magnesium (NEXIUM) 20 MG PACK 1 packet 2 times daily (before meals)      Tirzepatide (MOUNJARO) 5 MG/0.5ML SOPN SC injection Inject 0.5 mLs into the skin once a week 1 Adjustable Dose Pre-filled Pen Syringe 4    famotidine

## 2024-06-10 NOTE — PATIENT INSTRUCTIONS
Follow [place] diet guidelines brandy the 1st day or 2 after mounjaro.    Eat small meals and Snacks like the sample menus given.    Drink only 4 oz Equate plus at a time.  Next time get Equate original  - Better yet - try Equate equivalent to Glucerna or Boost Glucose control.    Bring a 2-4 week food log to next dietitian appt.

## 2024-06-11 DIAGNOSIS — G89.18 POST-OPERATIVE PAIN: Primary | ICD-10-CM

## 2024-06-11 RX ORDER — TRAMADOL HYDROCHLORIDE 50 MG/1
50 TABLET ORAL EVERY 6 HOURS PRN
Qty: 24 TABLET | Refills: 1 | Status: SHIPPED | OUTPATIENT
Start: 2024-06-11 | End: 2024-06-18

## 2024-06-12 ENCOUNTER — CARE COORDINATION (OUTPATIENT)
Dept: CASE MANAGEMENT | Age: 34
End: 2024-06-12

## 2024-06-12 NOTE — CARE COORDINATION
Care Transitions Note  Follow Up Call     Attempted to reach patient for transitions of care follow up.  Unable to reach patient.      Outreach Attempts:   HIPAA compliant voicemail left for patient.     Care Summary Note: 1st attempt to contact pt for care transition follow up.  Unable to reach pt.  Left message with contact information and request for call back.      Follow Up Appointment:   Future Appointments         Provider Specialty Dept Phone    6/18/2024 11:00 AM Charlie Weiss MD Otolaryngology [phone]    6/28/2024 12:00 PM STR MINOR ROOM 8 IP Unit [phone]    7/11/2024 1:00 PM Shad Skinner DO Internal Medicine [phone]    7/23/2024 1:00 PM Sully Ramos RD, LD Internal Medicine [phone]    9/12/2024 1:20 PM Juan Dominguez, DO Internal Medicine [phone]    5/13/2025 10:00 AM Ramona You MD Cardiology [phone]            Plan for follow-up on next business day.  based on severity of symptoms and risk factors. Plan for next call: symptom management-any new or worsening symptoms, did SW contact pt?, did she complete abx?, hand off to ACM to follow for RPM      Em Hua RN

## 2024-06-13 ENCOUNTER — CARE COORDINATION (OUTPATIENT)
Dept: CASE MANAGEMENT | Age: 34
End: 2024-06-13

## 2024-06-13 NOTE — CARE COORDINATION
Care Transitions Note  Final Call     Patient Current Location:  Home: [address] [place] [address]    Care Transition Nurse contacted the patient by telephone. Verified name and  as identifiers.    Patient graduated from the Care Transitions program on 24.  Patient/family progressing towards self management. .      Advance Care Planning:   Does patient have an Advance Directive:  on file .    Handoff:   RPM and condition management for CHF, HTN.     Care Summary Note: Received incoming call from pt.  Left message stating she is returning the call.      Called pt back for final care transition follow up.  Samantha states she is doing okay.  Reports that the incision that was healing now has a \"boil\" on it.  She has not contacted her surgeon but will call Dr. Michel's office.  Continues to take Abx which was started last week.  Not having much incisional pain.  Denies having any abdominal pain today but usually comes and goes.  Taking Tramadol as needed.  Nausea is getting better.  No vomiting.  Bowels are moving.  May have diarrhea at times depending on what she eats.  Tolerating her intake but each day is different and some days unable to tolerate her intake.  Pt had appt with RD on 6/10/24.  Was given handouts on what to eat for gastroparesis.  Confirmed she plans to keep her phone visit with [facility] on 24.  Reports she is coming down with a cold.  Thinks it may be her allergies and weather.  Pt is taking Singulair at night.  Reports sore throat and feeling nasally started this morning.  Will continue to monitor symptoms.  She is aware of when to contact her providers.  Reports she may have chest pain at times and may become short of breath at times depending on the activity she is doing.  Per HRS dashboard-/86 with pulse of 100, SpO2 96%.  No longer monitoring weight d/t inconsistency.  Monitor swelling, SOB, CP.  Informed her of final call and hand off to TAWNY Deleon.  No

## 2024-06-17 ENCOUNTER — CARE COORDINATION (OUTPATIENT)
Dept: CARE COORDINATION | Age: 34
End: 2024-06-17

## 2024-06-17 NOTE — CARE COORDINATION
Ambulatory Care Coordination Note     6/17/2024 11:33 AM     patient outreach attempt by this ACM today to offer care management services. ACM was unable to reach the patient by telephone today;  no answer     ACM: Rosibel Nguyen, RN     Care Summary Note: pt referred to care coordination by CTN.  Initial outreach attempt made.     PCP/Specialist follow up:   Future Appointments         Provider Specialty Dept Phone    6/18/2024 11:00 AM Charlie Weiss MD Otolaryngology [phone]    6/28/2024 12:00 PM STR MINOR ROOM 8 IP Unit [phone]    7/11/2024 1:00 PM Shad Skinner DO Internal Medicine [phone]    7/23/2024 1:00 PM Sully Ramos RD, LD Internal Medicine [phone]    9/12/2024 1:20 PM Juan Dominguez, DO Internal Medicine [phone]    5/13/2025 10:00 AM Ramona You MD Cardiology [phone]            Follow Up:   Plan for next AC outreach in approximately 1 week to complete:  - outreach attempt to offer care management services.

## 2024-06-18 ENCOUNTER — OFFICE VISIT (OUTPATIENT)
Dept: ENT CLINIC | Age: 34
End: 2024-06-18
Payer: MEDICARE

## 2024-06-18 VITALS
OXYGEN SATURATION: 100 % | BODY MASS INDEX: 49.92 KG/M2 | DIASTOLIC BLOOD PRESSURE: 82 MMHG | WEIGHT: 292.4 LBS | SYSTOLIC BLOOD PRESSURE: 120 MMHG | TEMPERATURE: 97 F | HEIGHT: 64 IN | HEART RATE: 69 BPM

## 2024-06-18 DIAGNOSIS — R06.89 DIFFICULTY BREATHING: ICD-10-CM

## 2024-06-18 DIAGNOSIS — H91.91 HEARING LOSS OF RIGHT EAR, UNSPECIFIED HEARING LOSS TYPE: ICD-10-CM

## 2024-06-18 DIAGNOSIS — G47.33 OSA TREATED WITH BIPAP: Primary | ICD-10-CM

## 2024-06-18 DIAGNOSIS — Z91.09 ENVIRONMENTAL ALLERGIES: ICD-10-CM

## 2024-06-18 DIAGNOSIS — Z78.9 DIFFICULTY WITH BIPAP USE: ICD-10-CM

## 2024-06-18 PROCEDURE — 99204 OFFICE O/P NEW MOD 45 MIN: CPT | Performed by: OTOLARYNGOLOGY

## 2024-06-18 PROCEDURE — 3074F SYST BP LT 130 MM HG: CPT | Performed by: OTOLARYNGOLOGY

## 2024-06-18 PROCEDURE — 3079F DIAST BP 80-89 MM HG: CPT | Performed by: OTOLARYNGOLOGY

## 2024-06-18 RX ORDER — CLINDAMYCIN HYDROCHLORIDE 150 MG/1
CAPSULE ORAL
COMMUNITY
Start: 2024-06-17 | End: 2024-07-07

## 2024-06-18 RX ORDER — ACETAMINOPHEN AND CODEINE PHOSPHATE 300; 30 MG/1; MG/1
TABLET ORAL
COMMUNITY
Start: 2024-06-17

## 2024-06-18 NOTE — PROGRESS NOTES
[facility] [place] [facility]  [facility] EAR, NOSE AND THROAT  [address]  [address]  [facility] [address]  Dept: [phone]  Dept Fax: [phone]  Loc: [phone]    Samantha Nichols is a 34 y.o. female who was referred by Popeye Leach,* for:  Chief Complaint   Patient presents with    New Patient     New patient, JOVANY (obstructive sleep apnea), nasal obstruction,   .    HPI:     Samantha Nichols is a 34 y.o. female who presents today for airway evaluation.  She has failed CPAP and BiPAP.  She states that when she lies down her throat just closes off.  She reports no difficulty breathing through her nose but she does have allergies.    History:     Allergies   Allergen Reactions    Latex Itching and Swelling     Swelling lips    Dicyclomine Rash    Fioricet [Butalbital-Apap-Caffeine] Swelling     Of the mouth, tongue    Gabapentin Swelling     tongue    Ibuprofen [Ibuprofen] Other (See Comments)     Mouth swelling and ulcers    Ketorolac Tromethamine Anaphylaxis     Throat swelling    Oxycodone Itching    Pcn [Penicillins] Anaphylaxis    Sulfamethoxazole Hives    Trimethoprim Hives    Losartan      Elevated pulse    Mushroom Extract Complex Swelling    Zofran [Ondansetron] Hives     Patient has had this here sporadically after the allergy was reported    Adhesive Tape Rash    Amoxicillin Hives    Ciprofloxacin Hives and Swelling    Compazine [Prochlorperazine Maleate] Itching    Compazine [Prochlorperazine] Other (See Comments)     Patient states that it made her \"feel funny and loopy\".    Fentanyl Other (See Comments)     Pt states that her \"lips start busting out.\" Blisters    Flexeril [Cyclobenzaprine] Hives    Haldol [Haloperidol] Itching     Blisters on vagina    Hydrochlorothiazide Itching    Keflex [Cephalexin] Itching    Ketamine Swelling     hallucinations    Lisinopril Itching    Lorazepam Hives    Macrobid [Nitrofurantoin Monohyd Macro] Itching    Reglan

## 2024-06-20 ENCOUNTER — CARE COORDINATION (OUTPATIENT)
Dept: CARE COORDINATION | Age: 34
End: 2024-06-20

## 2024-06-20 NOTE — CARE COORDINATION
Ambulatory Care Coordination Note     2024 4:24 PM     Patient Current Location:  Home: [address]  [place] [address]     This patient was received as a referral from Ambulatory Care Manager .    ACM contacted the patient by telephone. Verified name and  with patient as identifiers. Provided introduction to self, and explanation of the ACM role.   Patient accepted care management services at this time.          ACM: Rosibel Nguyen RN     Challenges to be reviewed by the provider   Additional needs identified to be addressed with provider No  none               Method of communication with provider: none.    Care Summary Note: Samantha was referred to care coordination by CTN following recent utilization.  Pt has h/o: CHF, DM, HTN, gastroparesis, chronic constipation.   Spoke with Samantha today.   DM: A1C 6.0.  pt is on Mounjaro, Glucophage XR. Pt misplaced glucometer.  Encouraged to look for it.  Notes placed in appt desk as pt is interested in seeing if she can get a CGM.   Following with GI at [facility]-had telemedicine appt earlier this wk. Pt states that she is going to be referred to new GI.   On Carafate.  Drinking Ensure BID.  Had tube feed at one time.  Reports that she still has days where she is nauseated most of day and not able to eat much but also has days where she feels like eating and can keep food down.   Following with dietician.   Migraines-on Imitrex, Topamax  Had appt with ENT this wk. Pt not able to tolerate CPAP/BIPAP. CT was ordered.   Pt follows with psychology at [facility].  Notes placed in appt desk as pt interested in seeing psychiatry at [facility].  Pt was weighing with RPM but wts fluctuating drastic amt from day to day consistently.  Questioning accuracy of readings. Upon review of OV wts it appears pt has been between 292-301# since .  Pt has own set of scales.  Encouraged to weigh and keep log of readings.  Will attempt to review with next outreach.   Pt has mild edema to feet. No

## 2024-06-25 ENCOUNTER — TELEPHONE (OUTPATIENT)
Dept: CARDIOLOGY CLINIC | Age: 34
End: 2024-06-25

## 2024-06-25 NOTE — TELEPHONE ENCOUNTER
Pre op Risk Assessment    Procedure dental work  Physician [facility] dental  Date of surgery/procedure tbd    Last OV 5-2-24  Last Stress none in epic  Last Echo 2-20-23  Last Cath 5-31-21  Last Stent none  Is patient on blood thinners no    Fax [phone]

## 2024-06-28 ENCOUNTER — CARE COORDINATION (OUTPATIENT)
Dept: CARE COORDINATION | Age: 34
End: 2024-06-28

## 2024-06-28 ENCOUNTER — HOSPITAL ENCOUNTER (OUTPATIENT)
Dept: NURSING | Age: 34
Discharge: HOME | End: 2024-06-28
Payer: MEDICARE

## 2024-06-28 ENCOUNTER — HOSPITAL ENCOUNTER (OUTPATIENT)
Age: 34
Discharge: HOME | End: 2024-06-28
Payer: MEDICARE

## 2024-06-28 VITALS
RESPIRATION RATE: 18 BRPM | WEIGHT: 281 LBS | SYSTOLIC BLOOD PRESSURE: 123 MMHG | HEART RATE: 76 BPM | BODY MASS INDEX: 48.23 KG/M2 | DIASTOLIC BLOOD PRESSURE: 61 MMHG | OXYGEN SATURATION: 99 % | TEMPERATURE: 98 F

## 2024-06-28 DIAGNOSIS — D50.8 OTHER IRON DEFICIENCY ANEMIA: Primary | ICD-10-CM

## 2024-06-28 DIAGNOSIS — I10 ESSENTIAL HYPERTENSION: ICD-10-CM

## 2024-06-28 LAB
ALBUMIN SERPL BCG-MCNC: 3.9 G/DL (ref 3.5–5.1)
ALP SERPL-CCNC: 101 U/L (ref 38–126)
ALT SERPL W/O P-5'-P-CCNC: 14 U/L (ref 11–66)
ANION GAP SERPL CALC-SCNC: 12 MEQ/L (ref 8–16)
AST SERPL-CCNC: 18 U/L (ref 5–40)
BASOPHILS ABSOLUTE: 0 THOU/MM3 (ref 0–0.1)
BASOPHILS NFR BLD AUTO: 0.5 %
BILIRUB SERPL-MCNC: 0.3 MG/DL (ref 0.3–1.2)
BUN SERPL-MCNC: 10 MG/DL (ref 7–22)
CALCIUM SERPL-MCNC: 9.2 MG/DL (ref 8.5–10.5)
CHLORIDE SERPL-SCNC: 103 MEQ/L (ref 98–111)
CHOLEST SERPL-MCNC: 153 MG/DL (ref 100–199)
CO2 SERPL-SCNC: 25 MEQ/L (ref 23–33)
CREAT SERPL-MCNC: 0.9 MG/DL (ref 0.4–1.2)
CREAT UR-MCNC: 447 MG/DL
DEPRECATED RDW RBC AUTO: 43.1 FL (ref 35–45)
EOSINOPHIL NFR BLD AUTO: 1.8 %
EOSINOPHILS ABSOLUTE: 0.1 THOU/MM3 (ref 0–0.4)
ERYTHROCYTE [DISTWIDTH] IN BLOOD BY AUTOMATED COUNT: 15.7 % (ref 11.5–14.5)
GFR SERPL CREATININE-BSD FRML MDRD: 86 ML/MIN/1.73M2
GLUCOSE SERPL-MCNC: 109 MG/DL (ref 70–108)
HCT VFR BLD AUTO: 39.4 % (ref 37–47)
HDLC SERPL-MCNC: 36 MG/DL
HGB BLD-MCNC: 12.2 GM/DL (ref 12–16)
IMM GRANULOCYTES # BLD AUTO: 0.03 THOU/MM3 (ref 0–0.07)
IMM GRANULOCYTES NFR BLD AUTO: 0.4 %
LDLC SERPL CALC-MCNC: 98 MG/DL
LYMPHOCYTES ABSOLUTE: 2.3 THOU/MM3 (ref 1–4.8)
LYMPHOCYTES NFR BLD AUTO: 29.7 %
MCH RBC QN AUTO: 23.6 PG (ref 26–33)
MCHC RBC AUTO-ENTMCNC: 31 GM/DL (ref 32.2–35.5)
MCV RBC AUTO: 76.4 FL (ref 81–99)
MICROALBUMIN UR-MCNC: 2.68 MG/DL
MICROALBUMIN/CREAT RATIO PNL UR: 6 MG/G (ref 0–30)
MONOCYTES ABSOLUTE: 0.5 THOU/MM3 (ref 0.4–1.3)
MONOCYTES NFR BLD AUTO: 6.5 %
NEUTROPHILS ABSOLUTE: 4.8 THOU/MM3 (ref 1.8–7.7)
NEUTROPHILS NFR BLD AUTO: 61.1 %
NRBC BLD AUTO-RTO: 0 /100 WBC
PLATELET # BLD AUTO: 283 THOU/MM3 (ref 130–400)
PMV BLD AUTO: 9.9 FL (ref 9.4–12.4)
POTASSIUM SERPL-SCNC: 3.2 MEQ/L (ref 3.5–5.2)
PROT SERPL-MCNC: 7.1 G/DL (ref 6.1–8)
RBC # BLD AUTO: 5.16 MILL/MM3 (ref 4.2–5.4)
SODIUM SERPL-SCNC: 140 MEQ/L (ref 135–145)
TRIGL SERPL-MCNC: 97 MG/DL (ref 0–199)
TSH SERPL DL<=0.005 MIU/L-ACNC: 0.72 UIU/ML (ref 0.4–4.2)
WBC # BLD AUTO: 7.9 THOU/MM3 (ref 4.8–10.8)

## 2024-06-28 PROCEDURE — 36591 DRAW BLOOD OFF VENOUS DEVICE: CPT

## 2024-06-28 PROCEDURE — 6360000002 HC RX W HCPCS: Performed by: SURGERY

## 2024-06-28 PROCEDURE — 85025 COMPLETE CBC W/AUTO DIFF WBC: CPT

## 2024-06-28 PROCEDURE — 82043 UR ALBUMIN QUANTITATIVE: CPT

## 2024-06-28 PROCEDURE — 86787 VARICELLA-ZOSTER ANTIBODY: CPT

## 2024-06-28 PROCEDURE — 84436 ASSAY OF TOTAL THYROXINE: CPT

## 2024-06-28 PROCEDURE — 80061 LIPID PANEL: CPT

## 2024-06-28 PROCEDURE — 80053 COMPREHEN METABOLIC PANEL: CPT

## 2024-06-28 PROCEDURE — 99212 OFFICE O/P EST SF 10 MIN: CPT

## 2024-06-28 PROCEDURE — 2580000003 HC RX 258: Performed by: SURGERY

## 2024-06-28 PROCEDURE — 84443 ASSAY THYROID STIM HORMONE: CPT

## 2024-06-28 RX ORDER — HEPARIN 100 UNIT/ML
500 SYRINGE INTRAVENOUS PRN
Status: DISCONTINUED | OUTPATIENT
Start: 2024-06-28 | End: 2024-06-29 | Stop reason: HOSPADM

## 2024-06-28 RX ORDER — SODIUM CHLORIDE 0.9 % (FLUSH) 0.9 %
5-40 SYRINGE (ML) INJECTION PRN
Status: DISCONTINUED | OUTPATIENT
Start: 2024-06-28 | End: 2024-06-29 | Stop reason: HOSPADM

## 2024-06-28 RX ORDER — HEPARIN 100 UNIT/ML
500 SYRINGE INTRAVENOUS PRN
OUTPATIENT
Start: 2024-06-28

## 2024-06-28 RX ORDER — SODIUM CHLORIDE 0.9 % (FLUSH) 0.9 %
5-40 SYRINGE (ML) INJECTION PRN
OUTPATIENT
Start: 2024-06-28

## 2024-06-28 RX ORDER — SODIUM CHLORIDE 9 MG/ML
25 INJECTION, SOLUTION INTRAVENOUS PRN
OUTPATIENT
Start: 2024-06-28

## 2024-06-28 RX ADMIN — HEPARIN 500 UNITS: 100 SYRINGE at 12:20

## 2024-06-28 RX ADMIN — SODIUM CHLORIDE, PRESERVATIVE FREE 10 ML: 5 INJECTION INTRAVENOUS at 12:20

## 2024-06-28 ASSESSMENT — PAIN - FUNCTIONAL ASSESSMENT: PAIN_FUNCTIONAL_ASSESSMENT: NONE - DENIES PAIN

## 2024-06-28 NOTE — PROGRESS NOTES
Patient admitted to room C, vitals are stable. Patient offered rights and responsibilities.     __m__ Safety:       (Environmental)  [place] to environment  Ensure ID band is correct and in place/ allergy band as needed  Assess for fall risk  Initiate fall precautions as applicable (fall band, side rails, etc.)  Call light within reach  Bed in low position/ wheels locked    __m__ Pain:       Assess pain level and characteristics  Administer analgesics as ordered  Assess effectiveness of pain management and report to MD as needed    _m___ Knowledge Deficit:  Assess baseline knowledge  Provide teaching at level of understanding  Provide teaching via preferred learning method  Evaluate teaching effectiveness    _m___ Hemodynamic/Respiratory Status:       (Pre and Post Procedure Monitoring)  Assess/Monitor vital signs and LOC  Assess Baseline SpO2 prior to any sedation  Obtain weight/height  Assess vital signs/ LOC until patient meets discharge criteria  Monitor procedure site and notify MD of any issues    __m__ Infection-Risk of Central Venous Catheter:  Monitor for infection signs and symptoms (catheter site redness, temperature elevation, etc)  Assess for infection risks  Educate regarding infection prevention  Manage central venous catheter (flushes/ dressing changes per protocol)

## 2024-06-28 NOTE — CARE COORDINATION
Attempted to reach patient for continued Care Coordination follow up and education.  Patient was unavailable at the time of my call, and line states \"user is unavailable at this time.\"

## 2024-06-28 NOTE — DISCHARGE INSTRUCTIONS
ACTIVITY:  Continue usual care with your doctor. Call your doctor immediately if any severe problems or go to the nearest emergency room.  Next Mediport flush and lab draw is scheduled for Friday July 26th at 12:00 pm.   Please call if you have any questions [phone].     I have been treated and hereby acknowledge receiving this instruction sheet.

## 2024-06-29 LAB — T4 SERPL-MCNC: 9.4 UG/DL (ref 4.5–11.7)

## 2024-07-01 LAB — VZV IGG SER QL IA: 0.11

## 2024-07-05 ENCOUNTER — CARE COORDINATION (OUTPATIENT)
Dept: CARE COORDINATION | Age: 34
End: 2024-07-05

## 2024-07-05 NOTE — CARE COORDINATION
Ambulatory Care Coordination Note     7/5/2024 1:47 PM     Patient outreach attempt by this ACM today to perform care management follow up . ACM was unable to reach the patient by telephone today; left voice message requesting a return phone call to this ACM.     ACM: Rosibel Nguyen RN     Care Summary Note: Samantha was referred to care coordination by CTN following recent utilization.  Pt has h/o: CHF, DM, HTN, gastroparesis, chronic constipation.       PCP/Specialist follow up:   Future Appointments         Provider Specialty Dept Phone    7/11/2024 1:30 PM Branden Rooney, DO Internal Medicine [phone]    7/23/2024 1:00 PM Sully Ramos RD, LD Internal Medicine [phone]    7/26/2024 12:00 PM STR MINOR ROOM 8 IP Unit [phone]    9/12/2024 1:20 PM Juan Dominguez DO Internal Medicine [phone]    5/13/2025 10:00 AM Ramona You MD Cardiology [phone]            Follow Up:   Plan for next ACM outreach in approximately 1 week to complete:  - CC Protocol assessments  - disease specific assessments  - goal progression  - education .

## 2024-07-05 NOTE — CARE COORDINATION
Ambulatory Care Coordination Note  2024    Patient Current Location:  Home: [address] [place] [address]     ACM contacted the patient by telephone. Verified name and  with patient as identifiers. Provided introduction to self, and explanation of the ACM role.     Challenges to be reviewed by the provider   Additional needs identified to be addressed with provider: No  none               Method of communication with provider: none.    ACM: Rosibel Nguyen RN    Samantha was referred to care coordination by CTN following recent utilization.  Pt has h/o: CHF, DM, HTN, gastroparesis, chronic constipation.   Received call back from Samantha.  She reports that she is doing well at present time.   Had recent lab work completed.  Pt's other provider (Giovanna at [address] [place] [address]) addressed potassium level.  Pt reports that she started her on oral potassium.  Attempted to obtain dose but pt was unable to provide it as she was not near her  medications.  Advised to provide update to IM provider at appt next wk.   Has f/u with resident on   Pt monitoring wts.  Reports wts have been stable. Current wt per pt is 294#.    Has dental procedure coming up in near future.   Following with GI at [facility]-had telemedicine appt earlier this wk. Pt states that she is going to be referred to new GI.   On Carafate.  Drinking Ensure BID.  Had tube feed at one time.  Reports that she has been keeping food down most days. Reports bowels are moving.   Following with dietician.   Migraines-on Imitrex, Topamax    Offered patient enrollment in the Remote Patient Monitoring (RPM) program for in-home monitoring: Yes, patient enrolled; current status is activated and monitoring.  122/95-91-96% RA  Lab Results       None            Care Coordination Interventions    Referral from Primary Care Provider: No  Suggested Interventions and Community Resources          Goals Addressed                   This Visit's Progress     Conditions and

## 2024-07-07 ENCOUNTER — HOSPITAL ENCOUNTER (EMERGENCY)
Age: 34
Discharge: HOME | End: 2024-07-07
Payer: MEDICARE

## 2024-07-07 VITALS
DIASTOLIC BLOOD PRESSURE: 110 MMHG | OXYGEN SATURATION: 98 % | WEIGHT: 292 LBS | SYSTOLIC BLOOD PRESSURE: 165 MMHG | HEIGHT: 64 IN | BODY MASS INDEX: 49.85 KG/M2 | TEMPERATURE: 98.6 F | HEART RATE: 84 BPM | RESPIRATION RATE: 17 BRPM

## 2024-07-07 DIAGNOSIS — K04.7 DENTAL INFECTION: Primary | ICD-10-CM

## 2024-07-07 DIAGNOSIS — R22.0 FACIAL SWELLING: ICD-10-CM

## 2024-07-07 DIAGNOSIS — K08.89 PAIN, DENTAL: ICD-10-CM

## 2024-07-07 LAB
ANION GAP SERPL CALC-SCNC: 13 MEQ/L (ref 8–16)
BASOPHILS ABSOLUTE: 0 THOU/MM3 (ref 0–0.1)
BASOPHILS NFR BLD AUTO: 0.3 %
BUN SERPL-MCNC: 11 MG/DL (ref 7–22)
CALCIUM SERPL-MCNC: 9.6 MG/DL (ref 8.5–10.5)
CHLORIDE SERPL-SCNC: 104 MEQ/L (ref 98–111)
CO2 SERPL-SCNC: 22 MEQ/L (ref 23–33)
CREAT SERPL-MCNC: 0.7 MG/DL (ref 0.4–1.2)
DEPRECATED RDW RBC AUTO: 44.3 FL (ref 35–45)
EOSINOPHIL NFR BLD AUTO: 1.5 %
EOSINOPHILS ABSOLUTE: 0.1 THOU/MM3 (ref 0–0.4)
ERYTHROCYTE [DISTWIDTH] IN BLOOD BY AUTOMATED COUNT: 17 % (ref 11.5–14.5)
GFR SERPL CREATININE-BSD FRML MDRD: > 90 ML/MIN/1.73M2
GLUCOSE SERPL-MCNC: 92 MG/DL (ref 70–108)
HCT VFR BLD AUTO: 45.4 % (ref 37–47)
HGB BLD-MCNC: 13.9 GM/DL (ref 12–16)
IMM GRANULOCYTES # BLD AUTO: 0.03 THOU/MM3 (ref 0–0.07)
IMM GRANULOCYTES NFR BLD AUTO: 0.3 %
LYMPHOCYTES ABSOLUTE: 2.7 THOU/MM3 (ref 1–4.8)
LYMPHOCYTES NFR BLD AUTO: 27.9 %
MCH RBC QN AUTO: 23.6 PG (ref 26–33)
MCHC RBC AUTO-ENTMCNC: 30.6 GM/DL (ref 32.2–35.5)
MCV RBC AUTO: 76.9 FL (ref 81–99)
MONOCYTES ABSOLUTE: 0.5 THOU/MM3 (ref 0.4–1.3)
MONOCYTES NFR BLD AUTO: 5.6 %
NEUTROPHILS ABSOLUTE: 6.1 THOU/MM3 (ref 1.8–7.7)
NEUTROPHILS NFR BLD AUTO: 64.4 %
NRBC BLD AUTO-RTO: 0 /100 WBC
PLATELET # BLD AUTO: 308 THOU/MM3 (ref 130–400)
PMV BLD AUTO: 9.4 FL (ref 9.4–12.4)
POTASSIUM SERPL-SCNC: 4.1 MEQ/L (ref 3.5–5.2)
RBC # BLD AUTO: 5.9 MILL/MM3 (ref 4.2–5.4)
SODIUM SERPL-SCNC: 139 MEQ/L (ref 135–145)
WBC # BLD AUTO: 9.5 THOU/MM3 (ref 4.8–10.8)

## 2024-07-07 PROCEDURE — 96375 TX/PRO/DX INJ NEW DRUG ADDON: CPT

## 2024-07-07 PROCEDURE — 99214 OFFICE O/P EST MOD 30 MIN: CPT

## 2024-07-07 PROCEDURE — 36415 COLL VENOUS BLD VENIPUNCTURE: CPT

## 2024-07-07 PROCEDURE — 85025 COMPLETE CBC W/AUTO DIFF WBC: CPT

## 2024-07-07 PROCEDURE — 6360000002 HC RX W HCPCS: Performed by: PHYSICIAN ASSISTANT

## 2024-07-07 PROCEDURE — 96365 THER/PROPH/DIAG IV INF INIT: CPT

## 2024-07-07 PROCEDURE — 99284 EMERGENCY DEPT VISIT MOD MDM: CPT

## 2024-07-07 PROCEDURE — 99215 OFFICE O/P EST HI 40 MIN: CPT

## 2024-07-07 PROCEDURE — 80048 BASIC METABOLIC PNL TOTAL CA: CPT

## 2024-07-07 PROCEDURE — 6370000000 HC RX 637 (ALT 250 FOR IP): Performed by: PHYSICIAN ASSISTANT

## 2024-07-07 RX ORDER — MORPHINE SULFATE 4 MG/ML
4 INJECTION, SOLUTION INTRAMUSCULAR; INTRAVENOUS ONCE
Status: COMPLETED | OUTPATIENT
Start: 2024-07-07 | End: 2024-07-07

## 2024-07-07 RX ORDER — CLINDAMYCIN HYDROCHLORIDE 150 MG/1
450 CAPSULE ORAL 3 TIMES DAILY
Qty: 90 CAPSULE | Refills: 0 | Status: SHIPPED | OUTPATIENT
Start: 2024-07-07 | End: 2024-07-17

## 2024-07-07 RX ORDER — CLINDAMYCIN HYDROCHLORIDE 150 MG/1
450 CAPSULE ORAL 3 TIMES DAILY
Qty: 90 CAPSULE | Refills: 0 | Status: SHIPPED | OUTPATIENT
Start: 2024-07-07 | End: 2024-07-07

## 2024-07-07 RX ORDER — ACETAMINOPHEN 500 MG
500 TABLET ORAL 4 TIMES DAILY PRN
Qty: 60 TABLET | Refills: 0 | Status: SHIPPED | OUTPATIENT
Start: 2024-07-07

## 2024-07-07 RX ORDER — ACETAMINOPHEN 325 MG/1
650 TABLET ORAL ONCE
Status: COMPLETED | OUTPATIENT
Start: 2024-07-07 | End: 2024-07-07

## 2024-07-07 RX ORDER — CLINDAMYCIN PHOSPHATE 900 MG/50ML
900 INJECTION, SOLUTION INTRAVENOUS ONCE
Status: COMPLETED | OUTPATIENT
Start: 2024-07-07 | End: 2024-07-07

## 2024-07-07 RX ORDER — ACETAMINOPHEN 500 MG
500 TABLET ORAL 4 TIMES DAILY PRN
Qty: 60 TABLET | Refills: 0 | Status: SHIPPED | OUTPATIENT
Start: 2024-07-07 | End: 2024-07-07

## 2024-07-07 RX ADMIN — MORPHINE SULFATE 4 MG: 4 INJECTION, SOLUTION INTRAMUSCULAR; INTRAVENOUS at 12:22

## 2024-07-07 RX ADMIN — CLINDAMYCIN PHOSPHATE 900 MG: 900 INJECTION, SOLUTION INTRAVENOUS at 12:24

## 2024-07-07 RX ADMIN — ACETAMINOPHEN 650 MG: 325 TABLET ORAL at 13:36

## 2024-07-07 ASSESSMENT — PAIN - FUNCTIONAL ASSESSMENT
PAIN_FUNCTIONAL_ASSESSMENT: 0-10
PAIN_FUNCTIONAL_ASSESSMENT: 0-10

## 2024-07-07 ASSESSMENT — PAIN DESCRIPTION - ORIENTATION: ORIENTATION: LEFT;LOWER;POSTERIOR

## 2024-07-07 ASSESSMENT — PAIN SCALES - GENERAL
PAINLEVEL_OUTOF10: 10
PAINLEVEL_OUTOF10: 10

## 2024-07-07 ASSESSMENT — PAIN DESCRIPTION - LOCATION: LOCATION: TEETH

## 2024-07-07 NOTE — ED NOTES
Presents to ED via [facility] Hankins from  with complaints of worsening dental pain from an abscess on her mouth.

## 2024-07-07 NOTE — ED NOTES
Presents with c/o left lower dental/jaw pain x 2 weeks.  St is waiting for a root canal but is having difficulty getting an appointment.  St is unable to eat or drink and has difficulty talking.  Slight selling noted to left side of face.     Analia Bone, RN  07/07/24 2410

## 2024-07-07 NOTE — ED TRIAGE NOTES
Pt crying out and refusing to be discharged. Pt requesting more morphine or dilaudid to help with the pain. Provider notified and no new orders placed. Manuel Villalobos RN brought to bedside to discuss options with pt. Pt tearful, but agreed to discharge plan. Cab called for transport.

## 2024-07-07 NOTE — ED PROVIDER NOTES
esophageal reflux disease     Gastroparesis     History of esophagogastroduodenoscopy (EGD) 08/13/2022    Hypertension     Hypertrophy of tonsil and adenoid 11/04/2017    Malignant carcinoid tumor of other sites ([facility]) 05/14/2013    Migraine     PONV (postoperative nausea and vomiting)     Prolonged emergence from general anesthesia     Sickle cell anemia ([facility])     Sickle cell trait ([facility])     PT STATES SHE HAS THE TRAIT NOT THE DISEASE    Tumor associated pain     neuroendrocrine tumor, gastroma       Patient Active Problem List   Diagnosis Code    Chronic abdominal pain R10.9, G89.29    Nausea and vomiting R11.2    Carcinoid tumor D3A.00    Pelvic inflammatory disease N73.9    Intractable nausea and vomiting R11.2    Microcytic anemia D50.9    Diet-controlled type 2 diabetes mellitus ([facility]) E11.9    Esophageal dysphagia R13.19    Esophageal stricture K22.2    Morbid obesity ([facility]) E66.01    Migraine equivalent G43.109    Mild persistent asthma without complication J45.30    Dyslipidemia E78.5    Moderate persistent asthma with acute exacerbation J45.41    Gastroenteritis K52.9    Hematuria R31.9    Diarrhea R19.7    Diarrhea of presumed infectious origin R19.7    Hematochezia K92.1    Generalized abdominal pain R10.84    Hypertrophy of tonsil and adenoid J35.3    Multiple drug allergies Z88.9    S/P T&A (status post tonsillectomy and adenoidectomy) Z90.89    Iron deficiency anemia, unspecified D50.9    Poor intravenous access Z78.9    Abnormal Pap smear of cervix R87.619    Abnormal uterine bleeding N93.9    Asthma J45.909    Other chest pain R07.89    Female pelvic pain R10.2    Head ache R51.9    Heartburn R12    Incarcerated ventral hernia K43.6    Malignant carcinoid tumor of other sites ([facility]) C7A.098    Pyelonephritis N12    Spigelian hernia K43.9    Uterine leiomyoma D25.9    Acute kidney injury ([facility]) N17.9    Hypernatremia E87.0    Hypokalemia E87.6    Volume depletion, gastrointestinal loss E86.9    Essential 
  Temp: 98.6 °F (37 °C)   TempSrc: Oral   SpO2: 99%   Weight: 132.5 kg (292 lb)   Height: 1.626 m (5' 4\")       Medications - No data to display  PROCEDURES:  None  FINAL IMPRESSION      1. Pain, dental    2. Facial swelling        DISPOSITION/PLAN   DISPOSITION Decision To Transfer 07/07/2024 10:58:12 AM    Exam consistent with dental pain and facial swelling.  Patient is very tearful upon exam and is having a difficult time talking due to pain.  Discussed with patient I would be willing to give her an antibiotic injection and she may try to follow-up with her dentist in the morning although patient declines reporting she needs to go to the emergency room.  Patient reports she would like to go by squad due to not having transportation.  RUSL[facility] LAZO was contacted for patient transport.  Attempted to call report to the emergency room although was unsuccessful.    PATIENT REFERRED TO:  No follow-up provider specified.  DISCHARGE MEDICATIONS:  New Prescriptions    No medications on file     Current Discharge Medication List          AURELIO Hendrickson CNP, Alecksa N, APRN - CNP  07/07/24 1101

## 2024-07-07 NOTE — DISCHARGE INSTRUCTIONS
All take the antibiotics as directed.  Follow-up with your regular physician on the 11th as planned.  Use over-the-counter acetaminophen in addition to your tramadol for pain control.  Take the antibiotics as directed until gone unless otherwise instructed    Discharge warning    Please remember that examination and testing performed in the emergency department is not a comprehensive evaluation of all medical conditions and does not replace the need to follow up with your primary care provider.  In the emergency department, we are only able to evaluate your symptoms in the current condition, but symptoms may change or worsen.  Although you are felt safe to be discharged today, if your symptoms persist or change, you need to be re-evaluated by your regular/primary care doctor as soon as possible.  If you are unable to make appointment with your regular doctor, please come back to the ER to be re-evaluated.    Call the dentist for recheck as soon as possible

## 2024-07-08 ENCOUNTER — CARE COORDINATION (OUTPATIENT)
Dept: CARE COORDINATION | Age: 34
End: 2024-07-08

## 2024-07-08 ENCOUNTER — TELEPHONE (OUTPATIENT)
Dept: INTERNAL MEDICINE CLINIC | Age: 34
End: 2024-07-08

## 2024-07-08 SDOH — HEALTH STABILITY: MENTAL HEALTH
STRESS IS WHEN SOMEONE FEELS TENSE, NERVOUS, ANXIOUS, OR CAN'T SLEEP AT NIGHT BECAUSE THEIR MIND IS TROUBLED. HOW STRESSED ARE YOU?: RATHER MUCH

## 2024-07-08 SDOH — HEALTH STABILITY: PHYSICAL HEALTH: ON AVERAGE, HOW MANY MINUTES DO YOU ENGAGE IN EXERCISE AT THIS LEVEL?: 0 MIN

## 2024-07-08 SDOH — ECONOMIC STABILITY: HOUSING INSECURITY: IN THE LAST 12 MONTHS, HOW MANY PLACES HAVE YOU LIVED?: 1

## 2024-07-08 SDOH — ECONOMIC STABILITY: TRANSPORTATION INSECURITY
IN THE PAST 12 MONTHS, HAS THE LACK OF TRANSPORTATION KEPT YOU FROM MEDICAL APPOINTMENTS OR FROM GETTING MEDICATIONS?: NO

## 2024-07-08 SDOH — ECONOMIC STABILITY: INCOME INSECURITY: HOW HARD IS IT FOR YOU TO PAY FOR THE VERY BASICS LIKE FOOD, HOUSING, MEDICAL CARE, AND HEATING?: NOT VERY HARD

## 2024-07-08 SDOH — SOCIAL STABILITY: SOCIAL NETWORK: HOW OFTEN DO YOU GET TOGETHER WITH FRIENDS OR RELATIVES?: MORE THAN THREE TIMES A WEEK

## 2024-07-08 SDOH — ECONOMIC STABILITY: INCOME INSECURITY: IN THE LAST 12 MONTHS, WAS THERE A TIME WHEN YOU WERE NOT ABLE TO PAY THE MORTGAGE OR RENT ON TIME?: NO

## 2024-07-08 SDOH — ECONOMIC STABILITY: TRANSPORTATION INSECURITY
IN THE PAST 12 MONTHS, HAS LACK OF TRANSPORTATION KEPT YOU FROM MEETINGS, WORK, OR FROM GETTING THINGS NEEDED FOR DAILY LIVING?: NO

## 2024-07-08 SDOH — SOCIAL STABILITY: SOCIAL NETWORK
IN A TYPICAL WEEK, HOW MANY TIMES DO YOU TALK ON THE PHONE WITH FAMILY, FRIENDS, OR NEIGHBORS?: MORE THAN THREE TIMES A WEEK

## 2024-07-08 SDOH — HEALTH STABILITY: PHYSICAL HEALTH: ON AVERAGE, HOW MANY DAYS PER WEEK DO YOU ENGAGE IN MODERATE TO STRENUOUS EXERCISE (LIKE A BRISK WALK)?: 0 DAYS

## 2024-07-08 NOTE — CARE COORDINATION
Ambulatory Care Coordination  ED Follow up Call    Reason for ED visit:  dental pain, infection   Status:     not changed    Did you call your PCP prior to going to the ED?  No  pt tried getting in touch with dentist    Did you receive a discharge instructions from the Emergency Room? Yes  Review of Instructions:     Understands what to report/when to return?:  Yes   Understands discharge instructions?:  Yes   Following discharge instructions?:  Yes   If not why? N/a    Are there any new complaints of pain? No  New Pain Meds? No    Constipation prophylaxis needed?  N/A    If you have a wound is the dressing clean, dry, and intact? N/A  Understands wound care regimen? N/A    Are there any other complaints/concerns that you wish to tell your provider?   no    FU appts/Provider:    Future Appointments   Date Time Provider Department Center   7/11/2024  1:30 PM Branden Rooney, DO [facility] [place]   7/23/2024  1:00 PM Sully Ramos RD, LD [facility] [place]   7/26/2024 12:00 PM [facility] ROOM 8 STR OP NURS [place] HOD   9/12/2024  1:20 PM Juan Dominguez, DO [facility] [place]   5/13/2025 10:00 AM Ramona You MD N [facility] [place]           New Medications?:   Yes Clindamycin     Medication Reconciliation by phone - No  Understands Medications?  Yes  Taking Medications? Yes  Can you swallow your pills?  Yes    Any further needs in the home i.e. Equipment?  No    Link to services in community?:  No   Which services:  n/a  Pt reports that atb is being delivered by pharmacy today.  Strongly encouraged to start taking as soon as it arrives. Pt frustrated.   Pt has not been able to reach [facility]. This ACM attempted to reach their office.  Received recording that they are unable to answer phone at this time.  No option to leave message.  I advised pt to continue calling them.  Also suggested pt check with [facility] to see if she can get established there.

## 2024-07-08 NOTE — CARE COORDINATION
Remote Alert Monitoring Note      Date/Time:  2024 11:39 AM  Patient Current Location: Home: [address] [place] [address]    LPN contacted patient by telephone regarding red alert received for blood pressure reading (122/102). Verified patients name and  as identifiers.    Rpm alert to be reviewed by the provider                         Background:  CHF, High Blood-Pressure     Refer to 911 immediately if:  Patient unresponsive or unable to provide history  Change in cognition or sudden confusion  Patient unable to respond in complete sentences  Intense chest pain/tightness  Any concern for any clinical emergency  Red Alert: Provider response time of 1 hr required for any red alert requiring intervention  Yellow Alert: Provider response time of 3hr required for any escalated yellow alert        BP Triage  Are you having any Chest Pain? no   Are you having any Shortness of Breath? no   Do you have a headache or have any vision changes? no   Are you having any numbness or tingling? no   Are you having any other health concerns or issues? Dental pain       Clinical Interventions: Reviewed and followed up on alerts and treatments-. Patient is struggling with dental pain and was seen in the ER yesterday.  She was started on Clindamycin and is taking Motrin/Tylenol for the pain until she can  her Tramadol tomorrow.  She feels BP is elevated due to pain.  She was able to recheck while on the call and got a normal reading of 133/99.  She is attempting to reach dentist for f/u.        Plan/Follow Up: Will continue to review, monitor and address alerts with follow up based on severity of symptoms and risk factors.    NYDIA Ramey [facility]/ CTN/ Remote Patient monitoring  [phone]

## 2024-07-11 ENCOUNTER — HOSPITAL ENCOUNTER (EMERGENCY)
Age: 34
Discharge: HOME | End: 2024-07-11
Attending: EMERGENCY MEDICINE
Payer: MEDICARE

## 2024-07-11 ENCOUNTER — APPOINTMENT (OUTPATIENT)
Dept: GENERAL RADIOLOGY | Age: 34
End: 2024-07-11
Payer: MEDICARE

## 2024-07-11 ENCOUNTER — CARE COORDINATION (OUTPATIENT)
Dept: CARE COORDINATION | Age: 34
End: 2024-07-11

## 2024-07-11 ENCOUNTER — APPOINTMENT (OUTPATIENT)
Dept: CT IMAGING | Age: 34
End: 2024-07-11
Payer: MEDICARE

## 2024-07-11 VITALS
SYSTOLIC BLOOD PRESSURE: 144 MMHG | RESPIRATION RATE: 18 BRPM | DIASTOLIC BLOOD PRESSURE: 89 MMHG | BODY MASS INDEX: 49.85 KG/M2 | OXYGEN SATURATION: 99 % | TEMPERATURE: 97.9 F | WEIGHT: 292 LBS | HEIGHT: 64 IN | HEART RATE: 85 BPM

## 2024-07-11 DIAGNOSIS — R07.9 CHEST PAIN, UNSPECIFIED TYPE: Primary | ICD-10-CM

## 2024-07-11 DIAGNOSIS — K04.7 DENTAL ABSCESS: ICD-10-CM

## 2024-07-11 LAB
ANION GAP SERPL CALC-SCNC: 16 MEQ/L (ref 8–16)
BASOPHILS ABSOLUTE: 0 THOU/MM3 (ref 0–0.1)
BASOPHILS NFR BLD AUTO: 0.5 %
BUN SERPL-MCNC: 11 MG/DL (ref 7–22)
CALCIUM SERPL-MCNC: 9.3 MG/DL (ref 8.5–10.5)
CHLORIDE SERPL-SCNC: 105 MEQ/L (ref 98–111)
CO2 SERPL-SCNC: 21 MEQ/L (ref 23–33)
CREAT SERPL-MCNC: 0.7 MG/DL (ref 0.4–1.2)
DEPRECATED RDW RBC AUTO: 43.9 FL (ref 35–45)
EKG ATRIAL RATE: 71 BPM
EKG P AXIS: 34 DEGREES
EKG P-R INTERVAL: 152 MS
EKG Q-T INTERVAL: 376 MS
EKG QRS DURATION: 86 MS
EKG QTC CALCULATION (BAZETT): 408 MS
EKG R AXIS: 23 DEGREES
EKG T AXIS: 25 DEGREES
EKG VENTRICULAR RATE: 71 BPM
EOSINOPHIL NFR BLD AUTO: 1.5 %
EOSINOPHILS ABSOLUTE: 0.1 THOU/MM3 (ref 0–0.4)
ERYTHROCYTE [DISTWIDTH] IN BLOOD BY AUTOMATED COUNT: 16.1 % (ref 11.5–14.5)
GFR SERPL CREATININE-BSD FRML MDRD: > 90 ML/MIN/1.73M2
GLUCOSE SERPL-MCNC: 100 MG/DL (ref 70–108)
HCT VFR BLD AUTO: 42.9 % (ref 37–47)
HGB BLD-MCNC: 13.4 GM/DL (ref 12–16)
IMM GRANULOCYTES # BLD AUTO: 0.02 THOU/MM3 (ref 0–0.07)
IMM GRANULOCYTES NFR BLD AUTO: 0.3 %
LYMPHOCYTES ABSOLUTE: 2.7 THOU/MM3 (ref 1–4.8)
LYMPHOCYTES NFR BLD AUTO: 34.1 %
MAGNESIUM SERPL-MCNC: 2 MG/DL (ref 1.6–2.4)
MCH RBC QN AUTO: 23.9 PG (ref 26–33)
MCHC RBC AUTO-ENTMCNC: 31.2 GM/DL (ref 32.2–35.5)
MCV RBC AUTO: 76.5 FL (ref 81–99)
MONOCYTES ABSOLUTE: 0.6 THOU/MM3 (ref 0.4–1.3)
MONOCYTES NFR BLD AUTO: 7.5 %
NEUTROPHILS ABSOLUTE: 4.4 THOU/MM3 (ref 1.8–7.7)
NEUTROPHILS NFR BLD AUTO: 56.1 %
NRBC BLD AUTO-RTO: 0 /100 WBC
NT-PROBNP SERPL IA-MCNC: < 36 PG/ML (ref 0–124)
OSMOLALITY SERPL CALC.SUM OF ELEC: 282.6 MOSMOL/KG (ref 275–300)
PLATELET # BLD AUTO: 318 THOU/MM3 (ref 130–400)
PMV BLD AUTO: 9.6 FL (ref 9.4–12.4)
POTASSIUM SERPL-SCNC: 3.5 MEQ/L (ref 3.5–5.2)
RBC # BLD AUTO: 5.61 MILL/MM3 (ref 4.2–5.4)
SODIUM SERPL-SCNC: 142 MEQ/L (ref 135–145)
TROPONIN, HIGH SENSITIVITY: < 6 NG/L (ref 0–12)
WBC # BLD AUTO: 7.8 THOU/MM3 (ref 4.8–10.8)

## 2024-07-11 PROCEDURE — 6360000004 HC RX CONTRAST MEDICATION: Performed by: EMERGENCY MEDICINE

## 2024-07-11 PROCEDURE — 84484 ASSAY OF TROPONIN QUANT: CPT

## 2024-07-11 PROCEDURE — 85025 COMPLETE CBC W/AUTO DIFF WBC: CPT

## 2024-07-11 PROCEDURE — 83735 ASSAY OF MAGNESIUM: CPT

## 2024-07-11 PROCEDURE — 80048 BASIC METABOLIC PNL TOTAL CA: CPT

## 2024-07-11 PROCEDURE — 6360000002 HC RX W HCPCS: Performed by: EMERGENCY MEDICINE

## 2024-07-11 PROCEDURE — 96374 THER/PROPH/DIAG INJ IV PUSH: CPT

## 2024-07-11 PROCEDURE — 99285 EMERGENCY DEPT VISIT HI MDM: CPT

## 2024-07-11 PROCEDURE — 71045 X-RAY EXAM CHEST 1 VIEW: CPT

## 2024-07-11 PROCEDURE — 96376 TX/PRO/DX INJ SAME DRUG ADON: CPT

## 2024-07-11 PROCEDURE — 83880 ASSAY OF NATRIURETIC PEPTIDE: CPT

## 2024-07-11 PROCEDURE — 93005 ELECTROCARDIOGRAM TRACING: CPT | Performed by: EMERGENCY MEDICINE

## 2024-07-11 PROCEDURE — 71275 CT ANGIOGRAPHY CHEST: CPT

## 2024-07-11 PROCEDURE — 93010 ELECTROCARDIOGRAM REPORT: CPT | Performed by: INTERNAL MEDICINE

## 2024-07-11 PROCEDURE — 36415 COLL VENOUS BLD VENIPUNCTURE: CPT

## 2024-07-11 RX ORDER — MORPHINE SULFATE 2 MG/ML
2 INJECTION, SOLUTION INTRAMUSCULAR; INTRAVENOUS ONCE
Status: COMPLETED | OUTPATIENT
Start: 2024-07-11 | End: 2024-07-11

## 2024-07-11 RX ADMIN — MORPHINE SULFATE 2 MG: 2 INJECTION, SOLUTION INTRAMUSCULAR; INTRAVENOUS at 15:56

## 2024-07-11 RX ADMIN — MORPHINE SULFATE 2 MG: 2 INJECTION, SOLUTION INTRAMUSCULAR; INTRAVENOUS at 14:34

## 2024-07-11 RX ADMIN — IOPAMIDOL 80 ML: 755 INJECTION, SOLUTION INTRAVENOUS at 14:51

## 2024-07-11 ASSESSMENT — PAIN - FUNCTIONAL ASSESSMENT
PAIN_FUNCTIONAL_ASSESSMENT: 0-10
PAIN_FUNCTIONAL_ASSESSMENT: 0-10

## 2024-07-11 ASSESSMENT — PAIN SCALES - GENERAL
PAINLEVEL_OUTOF10: 10
PAINLEVEL_OUTOF10: 7

## 2024-07-11 NOTE — CARE COORDINATION
Date/Time:  7/11/2024 12:13 PM    Update: Updated Bp's received and continue to elevate, 140/102 and 143/112.  Patient complains of new onset chest pains and is tearful at this time.  Writer advised ER and patient became upset stating she doesn't want to go there.  Her spouse did  te phone [facility]

## 2024-07-11 NOTE — CARE COORDINATION
Remote Alert Monitoring Note      Date/Time:  2024 11:19 AM  Patient Current Location: Home: [address] [place] [address]    LPN contacted patient by telephone regarding red alert received for blood pressure reading (144/101). Verified patients name and  as identifiers.    Rpm alert to be reviewed by the provider    FYI   Date/Time:  2024 12:13 PM    Update: Updated Bp's received and continue to elevate, 140/102 and 143/112.  Patient complains of new onset chest pains and is tearful at this time.  Writer advised ER and patient became upset stating she doesn't want to go there.  Her spouse did  the phone and was agreeable to call squad for patient.                     Background: CHF, High Blood-Pressure     Refer to 911 immediately if:  Patient unresponsive or unable to provide history  Change in cognition or sudden confusion  Patient unable to respond in complete sentences  Intense chest pain/tightness  Any concern for any clinical emergency  Red Alert: Provider response time of 1 hr required for any red alert requiring intervention  Yellow Alert: Provider response time of 3hr required for any escalated yellow alert        BP Triage  Are you having any Chest Pain? no   Are you having any Shortness of Breath? no   Do you have a headache or have any vision changes? no   Are you having any numbness or tingling? no   Are you having any other health concerns or issues? no       Clinical Interventions: Reviewed and followed up on alerts and treatments-. Pt is asymptomatic and in no apparent distress, speaking in full sentences.  Patient is agreeable to recheck BP in a few minutes.  Will await update.      Plan/Follow Up: Will continue to review, monitor and address alerts with follow up based on severity of symptoms and risk factors.    Racheal Zapata LPN  [facility]/ CTN/ Remote Patient monitoring  [phone]

## 2024-07-11 NOTE — ED TRIAGE NOTES
Pt presents to the ED from home via EMS with c/o chest pain and dental pain. States she has an abscess on a tooth that needs removed on the left side of her face. Reports pain in her faces but states she is also having pain in her right shoulder and right side of her chest. EMS reports pt received 4 baby aspirin and 1 nitro pta. Pt denies any relief. EKG complete. Arrived with 20 G IV in place by EMS

## 2024-07-11 NOTE — CARE COORDINATION
Remote Patient Monitoring Note      Date/Time:  7/11/2024 9:43 AM    LPN attempted to contact patient by telephone regarding red alert received for blood pressure reading (144/101).     Background: CHF, High Blood-Pressure      Clinical Interventions:  HIPAA compliant message left on  requesting a return call.    Plan/Follow Up: Will continue to review, monitor and address alerts with follow up based on severity of symptoms and risk factors.       NYDIA Ramey [facility]/ CTN/ Remote Patient Monitoring  [phone]

## 2024-07-11 NOTE — DISCHARGE INSTRUCTIONS
You were seen today in the emergency department for chest pain.  We did not identify a life-threatening cause of your chest pain.  Please follow-up with your primary doctor to discuss further investigation.  You may also want to see the surgeon that placed your port since this is where you are feeling pain.  If your symptoms get worse, return to the ER.    Continue taking clindamycin as directed.  You should see a dentist as soon as possible.  Information for local dentist is attached.

## 2024-07-12 ENCOUNTER — CARE COORDINATION (OUTPATIENT)
Dept: CARE COORDINATION | Age: 34
End: 2024-07-12

## 2024-07-12 ASSESSMENT — HEART SCORE: ECG: NORMAL

## 2024-07-12 NOTE — CARE COORDINATION
Ambulatory Care Coordination  ED Follow up Call    Reason for ED visit:  chest pain, dental pain   Status:     pain has improved.     Did you call your PCP prior to going to the ED?  No      Did you receive a discharge instructions from the Emergency Room? Yes  Review of Instructions:     Understands what to report/when to return?:  Yes   Understands discharge instructions?:  Yes   Following discharge instructions?:  Yes   If not why? N/a    Are there any new complaints of pain? No  New Pain Meds? No    Constipation prophylaxis needed?  N/A    If you have a wound is the dressing clean, dry, and intact? No  Understands wound care regimen? No    Are there any other complaints/concerns that you wish to tell your provider?   N/a    FU appts/Provider:    Future Appointments   Date Time Provider Department Center   7/23/2024  1:00 PM Sully Ramos RD, LD [facility] [place]   7/26/2024 12:00 PM [facility] ROOM 8 [facility] OP NURS [place] HOD   9/12/2024  1:20 PM Juan Dominguez,  [facility] [place]   5/13/2025 10:00 AM Ramona You MD N [facility] [place]           New Medications?:   No      Medication Reconciliation by phone - No  Understands Medications?  Yes  Taking Medications? Yes  Can you swallow your pills?  Yes    Any further needs in the home i.e. Equipment?  No    Link to services in community?:  No   Which services:  n/a  Pt has arranged an appt with oral surgeon in [place] for Tuesday.  States that she also made an appt with another dental office in [place] for Thursday in case [facility] is unable to transport her to Tues. Appt.  Pt has an email in to her [facility] CM to get transport set up.  Whichever appt they can transport her to is the one she will keep.   Pt denies having any further CP.  Mouth is feeling better so far today.  Pt is  avoiding eating any food on the side that is bothering her.  Encouraged to keep foods soft and avoid anything that is going to require her to do a lot of chewing.

## 2024-07-12 NOTE — ED PROVIDER NOTES
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    Final Assessment and Plan:   Workup did not identify acute cause of chest pain, but pain was relieved with morphine. Patient does have poor dentition and probable abscess so she will be started on augmentin.   F/u with PCP     MEDICATION CHANGES   DISCHARGE MEDICATIONS:  Discharge Medication List as of 7/11/2024  4:05 PM             FINAL DISPOSITION     Final diagnoses:   Chest pain, unspecified type   Dental abscess     Condition: stable  Dispo: Discharge to home    PATIENT REFERRED TO:  Juan Dominguez,   [address] [address]  [phone]            The results of pertinent diagnostic studies and exam findings were discussed. The patient’s provisional diagnosis and plan of care were discussed with the patient who expressed understanding.     PROCEDURES: (None if blank)  Procedures:     This transcription was electronically signed. Parts of this transcriptions may have been dictated by use of voice recognition software and electronically transcribed, and parts may have been transcribed with the assistance of an ED scribe. The transcription may contain errors not detected in proofreading.    Electronically Signed: Nga Johansen MD, 07/12/24, 11:02 AM

## 2024-07-17 ENCOUNTER — CARE COORDINATION (OUTPATIENT)
Dept: CARE COORDINATION | Age: 34
End: 2024-07-17

## 2024-07-17 NOTE — CARE COORDINATION
Spoke briefly with Samantha.  Pt reports that she just arrived to her dental appt with Dr. Ash Figueroa in [place].  Pt reports mouth is swollen.  Appt time is at 150pm today.  Will attempt to f/u with pt later this wk.

## 2024-07-19 ENCOUNTER — CARE COORDINATION (OUTPATIENT)
Dept: CARE COORDINATION | Age: 34
End: 2024-07-19

## 2024-07-19 NOTE — CARE COORDINATION
Ambulatory Care Coordination Note     2024 2:56 PM     Patient Current Location:  Home: [facility] [address] [facility] [facility] [facility] [address]     ACM contacted the patient by telephone. Verified name and  with patient as identifiers.         ACM: Rosibel Nguyen RN     Challenges to be reviewed by the provider   Additional needs identified to be addressed with provider No  none               Method of communication with provider: none.    Care Summary Note: Samantha was referred to care coordination by CTN following recent utilization. Pt has h/o: CHF, DM, HTN, gastroparesis, chronic constipation.   Spoke with Samantha very briefly today.  She informed me that earlier this wk root canal was completed on the tooth but she continued to have problems.  Feels she is at a point where tooth needs extracted.  Pt just arrived to [facility] in [place] (oral surgeon's office).      Offered patient enrollment in the Remote Patient Monitoring (RPM) program for in-home monitoring: Yes, patient enrolled; current status is activated and monitoring.     Assessments Completed:       Medications Reviewed:   Completed during a previous call     Advance Care Planning:   Not reviewed during this call     Care Planning:   Not completed during this call    PCP/Specialist follow up:   Future Appointments         Provider Specialty Dept Phone    2024 1:00 PM Sully Ramos RD, LD Internal Medicine [phone]    2024 12:00 PM STR MINOR ROOM 8 IP Unit [phone]    2024 1:40 PM Shad Skinner DO Internal Medicine [phone]    2024 11:40 AM (Arrive by 11:10 AM) STR CT IMAGING RM1  [facility] [phone]    2024 1:20 PM Juan Dominguez DO Internal Medicine [phone]    2025 10:00 AM Ramona You MD Cardiology [phone]            Follow Up:   Plan for next AC outreach in approximately 1-2 days  to complete:  - CC Protocol assessments  - disease specific assessments  - goal

## 2024-07-23 DIAGNOSIS — R10.84 GENERALIZED ABDOMINAL PAIN: Primary | ICD-10-CM

## 2024-07-23 RX ORDER — TRAMADOL HYDROCHLORIDE 50 MG/1
50 TABLET ORAL EVERY 6 HOURS PRN
Qty: 20 TABLET | Refills: 0 | Status: ON HOLD | OUTPATIENT
Start: 2024-07-23 | End: 2024-08-02 | Stop reason: HOSPADM

## 2024-07-24 ENCOUNTER — CARE COORDINATION (OUTPATIENT)
Dept: CARE COORDINATION | Age: 34
End: 2024-07-24

## 2024-07-24 NOTE — CARE COORDINATION
Remote Alert Monitoring Note      Date/Time:  2024 9:55 AM  Patient Current Location: Home: [address] [place] [address]    LPN contacted patient by telephone regarding red alert received for blood pressure reading (126/103). Verified patients name and  as identifiers.    Rpm alert to be reviewed by the provider                         Background: CHF, High Blood-Pressure     Refer to 911 immediately if:  Patient unresponsive or unable to provide history  Change in cognition or sudden confusion  Patient unable to respond in complete sentences  Intense chest pain/tightness  Any concern for any clinical emergency  Red Alert: Provider response time of 1 hr required for any red alert requiring intervention  Yellow Alert: Provider response time of 3hr required for any escalated yellow alert        BP Triage  Are you having any Chest Pain? no   Are you having any Shortness of Breath? no   Do you have a headache or have any vision changes? no   Are you having any numbness or tingling? no   Are you having any other health concerns or issues? no     Have you taken your medications as instructed by your doctor today? Yes       Clinical Interventions: Reviewed and followed up on alerts and treatments-. Pt is asymptomatic and in no apparent distress, speaking in full sentences.  Patient states she is having a little pain from her recent dental procedure, but is otherwise feeling fine.  She was able to recheck her BP while on the call and reading remained elevated 134/106.  Patient has been up walking around.  Patient advised to relax for 15 minutes prior to rechecking.  Will await update.      Plan/Follow Up: Will continue to review, monitor and address alerts with follow up based on severity of symptoms and risk factors.    NYDIA Ramey [facility]/ CTN/ Remote Patient monitoring  [phone]

## 2024-07-26 ENCOUNTER — HOSPITAL ENCOUNTER (OUTPATIENT)
Dept: NURSING | Age: 34
Discharge: HOME | End: 2024-07-26
Payer: MEDICARE

## 2024-07-26 VITALS
TEMPERATURE: 97 F | HEART RATE: 78 BPM | RESPIRATION RATE: 18 BRPM | DIASTOLIC BLOOD PRESSURE: 75 MMHG | OXYGEN SATURATION: 97 % | SYSTOLIC BLOOD PRESSURE: 127 MMHG

## 2024-07-26 DIAGNOSIS — D50.8 OTHER IRON DEFICIENCY ANEMIA: Primary | ICD-10-CM

## 2024-07-26 PROCEDURE — 99212 OFFICE O/P EST SF 10 MIN: CPT

## 2024-07-26 PROCEDURE — 6360000002 HC RX W HCPCS: Performed by: SURGERY

## 2024-07-26 PROCEDURE — 2580000003 HC RX 258: Performed by: SURGERY

## 2024-07-26 RX ORDER — HEPARIN 100 UNIT/ML
500 SYRINGE INTRAVENOUS PRN
Status: DISCONTINUED | OUTPATIENT
Start: 2024-07-26 | End: 2024-07-27 | Stop reason: HOSPADM

## 2024-07-26 RX ORDER — SODIUM CHLORIDE 9 MG/ML
25 INJECTION, SOLUTION INTRAVENOUS PRN
OUTPATIENT
Start: 2024-07-26

## 2024-07-26 RX ORDER — HEPARIN 100 UNIT/ML
500 SYRINGE INTRAVENOUS PRN
OUTPATIENT
Start: 2024-07-26

## 2024-07-26 RX ORDER — SODIUM CHLORIDE 0.9 % (FLUSH) 0.9 %
5-40 SYRINGE (ML) INJECTION PRN
Status: DISCONTINUED | OUTPATIENT
Start: 2024-07-26 | End: 2024-07-27 | Stop reason: HOSPADM

## 2024-07-26 RX ORDER — SODIUM CHLORIDE 0.9 % (FLUSH) 0.9 %
5-40 SYRINGE (ML) INJECTION PRN
OUTPATIENT
Start: 2024-07-26

## 2024-07-26 RX ADMIN — SODIUM CHLORIDE, PRESERVATIVE FREE 10 ML: 5 INJECTION INTRAVENOUS at 13:39

## 2024-07-26 RX ADMIN — HEPARIN 500 UNITS: 100 SYRINGE at 13:39

## 2024-07-26 NOTE — DISCHARGE INSTRUCTIONS
ACTIVITY:  Continue usual care with your doctor. Call your doctor immediately if any severe problems or go to the nearest emergency room.  Next Mediport flush and lab draw is scheduled for Friday August 23rd at 1:30 pm  Please call if you have any questions [phone]    I have been treated and hereby acknowledge receiving this instruction sheet.

## 2024-07-26 NOTE — PROGRESS NOTES
Patient admitted to room B for a Mediport flush, vitals are stable. Patient offered rights and responsibilities.     __M__ Safety:       (Environmental)  [place] to environment  Ensure ID band is correct and in place/ allergy band as needed  Assess for fall risk  Initiate fall precautions as applicable (fall band, side rails, etc.)  Call light within reach  Bed in low position/ wheels locked    _M___ Pain:       Assess pain level and characteristics  Administer analgesics as ordered  Assess effectiveness of pain management and report to MD as needed    __M__ Knowledge Deficit:  Assess baseline knowledge  Provide teaching at level of understanding  Provide teaching via preferred learning method  Evaluate teaching effectiveness    __M__ Hemodynamic/Respiratory Status:       (Pre and Post Procedure Monitoring)  Assess/Monitor vital signs and LOC  Assess Baseline SpO2 prior to any sedation  Obtain weight/height  Assess vital signs/ LOC until patient meets discharge criteria  Monitor procedure site and notify MD of any issues    __M__ Infection-Risk of Central Venous Catheter:  Monitor for infection signs and symptoms (catheter site redness, temperature elevation, etc)  Assess for infection risks  Educate regarding infection prevention  Manage central venous catheter (flushes/ dressing changes per protocol)

## 2024-07-29 ENCOUNTER — OFFICE VISIT (OUTPATIENT)
Dept: INTERNAL MEDICINE CLINIC | Age: 34
End: 2024-07-29
Payer: MEDICARE

## 2024-07-29 VITALS — BODY MASS INDEX: 47.12 KG/M2 | WEIGHT: 276 LBS | HEIGHT: 64 IN

## 2024-07-29 DIAGNOSIS — K90.9 MALABSORPTION SYNDROME: Primary | ICD-10-CM

## 2024-07-29 DIAGNOSIS — R10.84 GENERALIZED ABDOMINAL PAIN: Primary | ICD-10-CM

## 2024-07-29 PROCEDURE — NBSRV NON-BILLABLE SERVICE: Performed by: DIETITIAN, REGISTERED

## 2024-07-29 PROCEDURE — 97803 MED NUTRITION INDIV SUBSEQ: CPT | Performed by: DIETITIAN, REGISTERED

## 2024-07-29 RX ORDER — TRAMADOL HYDROCHLORIDE 50 MG/1
50 TABLET ORAL EVERY 6 HOURS PRN
Qty: 28 TABLET | Refills: 0 | Status: SHIPPED | OUTPATIENT
Start: 2024-07-29 | End: 2024-08-05

## 2024-07-29 NOTE — PATIENT INSTRUCTIONS
Continue best efforts in keeping up with fluid intake and small portions of soft foods.  - May best tolerate cold foods such as fruit.    Follow the nausea and vomiting guidelines provided.  - After a vomiting episode - start with 1 tsp ice chips every 10 min then move to 1 TB every 10 min. Etc.    What counts as fluid:  - Examples - gingerale, juice, broth, gelatin.    Keep a food and fluid journal and bring to every Dietitian appt.  Thanks.

## 2024-07-29 NOTE — PROGRESS NOTES
[facility]  [facility]  [address] [place] [address]  [phone] (phone)  [phone] (fax)    Patient Name: Samantha Nichols. Date of Birth: [id number]. MRN: [id number]      Assessment: Patient is a 34 y.o. female seen for follow-up MNT visit for Malabsorption syndrome.     -Nutritionally relevant labs:   Lab Results   Component Value Date/Time    LABA1C 5.4 06/07/2024 04:14 PM    LABA1C 6.0 03/29/2024 06:03 AM    LABA1C 6.1 10/10/2023 05:00 AM    GLUCOSE 100 07/11/2024 01:04 PM    GLUCOSE 92 07/07/2024 11:30 AM    GLUCOSE 78 12/23/2018 11:30 AM    GLUCOSE 78 03/26/2018 02:41 PM    CHOL 153 06/28/2024 12:30 PM    HDL 36 06/28/2024 12:30 PM    TRIG 97 06/28/2024 12:30 PM     Pt c/o no appetite and throwing up often.     Broth, crackers and gingerale.  Occ light headedness.  Vomiting episodes 4x/day.  Diarrha - off and on all day & wearing \"Depends\" underwear    Current Outpatient Medications on File Prior to Visit   Medication Sig Dispense Refill    traMADol (ULTRAM) 50 MG tablet Take 1 tablet by mouth every 6 hours as needed for Pain for up to 20 doses. Intended supply: 5 days. Take lowest dose possible to manage pain Max Daily Amount: 200 mg 20 tablet 0    acetaminophen (TYLENOL) 500 MG tablet Take 1 tablet by mouth 4 times daily as needed for Pain 60 tablet 0    acetaminophen-codeine (TYLENOL #3) 300-30 MG per tablet       propranolol (INDERAL) 80 MG tablet Take 1 tablet by mouth 2 times daily      esomeprazole Magnesium (NEXIUM) 20 MG PACK 1 packet 2 times daily (before meals)      Tirzepatide (MOUNJARO) 5 MG/0.5ML SOPN SC injection Inject 0.5 mLs into the skin once a week 1 Adjustable Dose Pre-filled Pen Syringe 4    famotidine (PEPCID) 40 MG tablet Take 1 tablet by mouth nightly 90 tablet 3    omeprazole (PRILOSEC) 40 MG delayed release capsule Take 1 capsule by mouth every morning (before breakfast) 90 capsule 2    escitalopram (LEXAPRO) 20 MG

## 2024-07-30 ENCOUNTER — HOSPITAL ENCOUNTER (INPATIENT)
Age: 34
LOS: 3 days | Discharge: HOME | End: 2024-08-02
Attending: INTERNAL MEDICINE
Payer: MEDICARE

## 2024-07-30 ENCOUNTER — APPOINTMENT (OUTPATIENT)
Dept: GENERAL RADIOLOGY | Age: 34
End: 2024-07-30
Attending: INTERNAL MEDICINE
Payer: MEDICARE

## 2024-07-30 ENCOUNTER — OFFICE VISIT (OUTPATIENT)
Dept: INTERNAL MEDICINE CLINIC | Age: 34
End: 2024-07-30
Payer: MEDICARE

## 2024-07-30 VITALS
HEART RATE: 94 BPM | OXYGEN SATURATION: 99 % | SYSTOLIC BLOOD PRESSURE: 136 MMHG | RESPIRATION RATE: 18 BRPM | WEIGHT: 278.6 LBS | DIASTOLIC BLOOD PRESSURE: 86 MMHG | BODY MASS INDEX: 47.82 KG/M2 | TEMPERATURE: 98.1 F

## 2024-07-30 DIAGNOSIS — E11.43 GASTROPARESIS DUE TO DM (HCC): Primary | ICD-10-CM

## 2024-07-30 DIAGNOSIS — E11.69 TYPE 2 DIABETES MELLITUS WITH OBESITY (HCC): ICD-10-CM

## 2024-07-30 DIAGNOSIS — K92.1 MELENA: ICD-10-CM

## 2024-07-30 DIAGNOSIS — K31.84 GASTROPARESIS DUE TO DM (HCC): Primary | ICD-10-CM

## 2024-07-30 DIAGNOSIS — E66.9 TYPE 2 DIABETES MELLITUS WITH OBESITY (HCC): ICD-10-CM

## 2024-07-30 DIAGNOSIS — R11.2 NAUSEA AND VOMITING, UNSPECIFIED VOMITING TYPE: ICD-10-CM

## 2024-07-30 DIAGNOSIS — R10.84 GENERALIZED ABDOMINAL PAIN: Primary | ICD-10-CM

## 2024-07-30 LAB
ANION GAP SERPL CALC-SCNC: 17 MEQ/L (ref 8–16)
BASOPHILS ABSOLUTE: 0.1 THOU/MM3 (ref 0–0.1)
BASOPHILS NFR BLD AUTO: 0.5 %
BUN SERPL-MCNC: 16 MG/DL (ref 7–22)
CALCIUM SERPL-MCNC: 9.4 MG/DL (ref 8.5–10.5)
CHLORIDE SERPL-SCNC: 107 MEQ/L (ref 98–111)
CO2 SERPL-SCNC: 18 MEQ/L (ref 23–33)
CREAT SERPL-MCNC: 0.8 MG/DL (ref 0.4–1.2)
DEPRECATED RDW RBC AUTO: 43 FL (ref 35–45)
EKG ATRIAL RATE: 86 BPM
EKG P AXIS: 27 DEGREES
EKG P-R INTERVAL: 160 MS
EKG Q-T INTERVAL: 346 MS
EKG QRS DURATION: 90 MS
EKG QTC CALCULATION (BAZETT): 414 MS
EKG R AXIS: 8 DEGREES
EKG T AXIS: 31 DEGREES
EKG VENTRICULAR RATE: 86 BPM
EOSINOPHIL NFR BLD AUTO: 1 %
EOSINOPHILS ABSOLUTE: 0.1 THOU/MM3 (ref 0–0.4)
ERYTHROCYTE [DISTWIDTH] IN BLOOD BY AUTOMATED COUNT: 16.5 % (ref 11.5–14.5)
GFR SERPL CREATININE-BSD FRML MDRD: > 90 ML/MIN/1.73M2
GLUCOSE SERPL-MCNC: 88 MG/DL (ref 70–108)
HCT VFR BLD AUTO: 40.8 % (ref 37–47)
HGB BLD-MCNC: 13.4 GM/DL (ref 12–16)
IMM GRANULOCYTES # BLD AUTO: 0.08 THOU/MM3 (ref 0–0.07)
IMM GRANULOCYTES NFR BLD AUTO: 0.6 %
LYMPHOCYTES ABSOLUTE: 3.3 THOU/MM3 (ref 1–4.8)
LYMPHOCYTES NFR BLD AUTO: 24.5 %
MCH RBC QN AUTO: 24.1 PG (ref 26–33)
MCHC RBC AUTO-ENTMCNC: 32.8 GM/DL (ref 32.2–35.5)
MCV RBC AUTO: 73.5 FL (ref 81–99)
MONOCYTES ABSOLUTE: 0.7 THOU/MM3 (ref 0.4–1.3)
MONOCYTES NFR BLD AUTO: 5.6 %
NEUTROPHILS ABSOLUTE: 9 THOU/MM3 (ref 1.8–7.7)
NEUTROPHILS NFR BLD AUTO: 67.8 %
NRBC BLD AUTO-RTO: 0 /100 WBC
PLATELET # BLD AUTO: 321 THOU/MM3 (ref 130–400)
PMV BLD AUTO: 10.1 FL (ref 9.4–12.4)
POIKILOCYTES: ABNORMAL
POTASSIUM SERPL-SCNC: 5 MEQ/L (ref 3.5–5.2)
RBC # BLD AUTO: 5.55 MILL/MM3 (ref 4.2–5.4)
SCAN OF BLOOD SMEAR: NORMAL
SODIUM SERPL-SCNC: 142 MEQ/L (ref 135–145)
TARGETS BLD QL SMEAR: ABNORMAL
WBC # BLD AUTO: 13.3 THOU/MM3 (ref 4.8–10.8)

## 2024-07-30 PROCEDURE — 3075F SYST BP GE 130 - 139MM HG: CPT | Performed by: STUDENT IN AN ORGANIZED HEALTH CARE EDUCATION/TRAINING PROGRAM

## 2024-07-30 PROCEDURE — 6360000002 HC RX W HCPCS

## 2024-07-30 PROCEDURE — 93005 ELECTROCARDIOGRAM TRACING: CPT | Performed by: INTERNAL MEDICINE

## 2024-07-30 PROCEDURE — 80048 BASIC METABOLIC PNL TOTAL CA: CPT

## 2024-07-30 PROCEDURE — 3044F HG A1C LEVEL LT 7.0%: CPT | Performed by: STUDENT IN AN ORGANIZED HEALTH CARE EDUCATION/TRAINING PROGRAM

## 2024-07-30 PROCEDURE — 36415 COLL VENOUS BLD VENIPUNCTURE: CPT

## 2024-07-30 PROCEDURE — 85025 COMPLETE CBC W/AUTO DIFF WBC: CPT

## 2024-07-30 PROCEDURE — 3079F DIAST BP 80-89 MM HG: CPT | Performed by: STUDENT IN AN ORGANIZED HEALTH CARE EDUCATION/TRAINING PROGRAM

## 2024-07-30 PROCEDURE — 2580000003 HC RX 258

## 2024-07-30 PROCEDURE — 93010 ELECTROCARDIOGRAM REPORT: CPT | Performed by: INTERNAL MEDICINE

## 2024-07-30 PROCEDURE — 99214 OFFICE O/P EST MOD 30 MIN: CPT | Performed by: STUDENT IN AN ORGANIZED HEALTH CARE EDUCATION/TRAINING PROGRAM

## 2024-07-30 PROCEDURE — 74018 RADEX ABDOMEN 1 VIEW: CPT

## 2024-07-30 PROCEDURE — 1200000000 HC SEMI PRIVATE

## 2024-07-30 PROCEDURE — 99223 1ST HOSP IP/OBS HIGH 75: CPT

## 2024-07-30 RX ORDER — ROSUVASTATIN CALCIUM 10 MG/1
10 TABLET, COATED ORAL DAILY
Status: DISCONTINUED | OUTPATIENT
Start: 2024-07-30 | End: 2024-08-02 | Stop reason: HOSPADM

## 2024-07-30 RX ORDER — AMLODIPINE BESYLATE 10 MG/1
10 TABLET ORAL DAILY
Status: DISCONTINUED | OUTPATIENT
Start: 2024-07-31 | End: 2024-08-02 | Stop reason: HOSPADM

## 2024-07-30 RX ORDER — PROPRANOLOL HYDROCHLORIDE 40 MG/1
80 TABLET ORAL 2 TIMES DAILY
Status: DISCONTINUED | OUTPATIENT
Start: 2024-07-31 | End: 2024-08-02 | Stop reason: HOSPADM

## 2024-07-30 RX ORDER — PRAZOSIN HYDROCHLORIDE 1 MG/1
1 CAPSULE ORAL NIGHTLY
Status: DISCONTINUED | OUTPATIENT
Start: 2024-07-31 | End: 2024-08-02 | Stop reason: HOSPADM

## 2024-07-30 RX ORDER — SODIUM CHLORIDE 0.9 % (FLUSH) 0.9 %
5-40 SYRINGE (ML) INJECTION PRN
Status: DISCONTINUED | OUTPATIENT
Start: 2024-07-30 | End: 2024-08-02 | Stop reason: HOSPADM

## 2024-07-30 RX ORDER — SUCRALFATE 1 G/1
1 TABLET ORAL
Status: DISCONTINUED | OUTPATIENT
Start: 2024-07-30 | End: 2024-08-02 | Stop reason: HOSPADM

## 2024-07-30 RX ORDER — SIMETHICONE 80 MG
80 TABLET,CHEWABLE ORAL 4 TIMES DAILY PRN
Qty: 180 TABLET | Refills: 3 | Status: SHIPPED | OUTPATIENT
Start: 2024-07-30

## 2024-07-30 RX ORDER — LACTULOSE 10 G/15ML
30 SOLUTION ORAL EVERY 8 HOURS
Status: DISCONTINUED | OUTPATIENT
Start: 2024-07-30 | End: 2024-08-02 | Stop reason: HOSPADM

## 2024-07-30 RX ORDER — ESCITALOPRAM OXALATE 20 MG/1
20 TABLET ORAL DAILY
Status: DISCONTINUED | OUTPATIENT
Start: 2024-07-30 | End: 2024-08-02 | Stop reason: HOSPADM

## 2024-07-30 RX ORDER — TOPIRAMATE 100 MG/1
100 TABLET, FILM COATED ORAL 2 TIMES DAILY
Status: DISCONTINUED | OUTPATIENT
Start: 2024-07-30 | End: 2024-08-02 | Stop reason: HOSPADM

## 2024-07-30 RX ORDER — LACTULOSE 10 G/15ML
30 SOLUTION ORAL EVERY 8 HOURS
Qty: 120 ML | Refills: 0 | Status: SHIPPED | OUTPATIENT
Start: 2024-07-30

## 2024-07-30 RX ORDER — LACTULOSE 10 G/15ML
30 SOLUTION ORAL EVERY 8 HOURS
Qty: 120 ML | Refills: 0 | Status: SHIPPED | OUTPATIENT
Start: 2024-07-30 | End: 2024-07-30

## 2024-07-30 RX ORDER — FLUTICASONE PROPIONATE 110 UG/1
2 AEROSOL, METERED RESPIRATORY (INHALATION) EVERY 4 HOURS PRN
Status: DISCONTINUED | OUTPATIENT
Start: 2024-07-30 | End: 2024-08-02 | Stop reason: HOSPADM

## 2024-07-30 RX ORDER — ALBUTEROL SULFATE 2.5 MG/3ML
2.5 SOLUTION RESPIRATORY (INHALATION) EVERY 6 HOURS PRN
Status: DISCONTINUED | OUTPATIENT
Start: 2024-07-30 | End: 2024-08-02 | Stop reason: HOSPADM

## 2024-07-30 RX ORDER — PROPRANOLOL HYDROCHLORIDE 40 MG/1
80 TABLET ORAL 2 TIMES DAILY
Status: DISCONTINUED | OUTPATIENT
Start: 2024-07-30 | End: 2024-07-30

## 2024-07-30 RX ORDER — BUSPIRONE HYDROCHLORIDE 5 MG/1
5 TABLET ORAL 3 TIMES DAILY
Status: DISCONTINUED | OUTPATIENT
Start: 2024-07-31 | End: 2024-08-02 | Stop reason: HOSPADM

## 2024-07-30 RX ORDER — PRAZOSIN HYDROCHLORIDE 1 MG/1
1 CAPSULE ORAL NIGHTLY
Status: DISCONTINUED | OUTPATIENT
Start: 2024-07-30 | End: 2024-07-30

## 2024-07-30 RX ORDER — ENOXAPARIN SODIUM 100 MG/ML
30 INJECTION SUBCUTANEOUS 2 TIMES DAILY
Status: DISCONTINUED | OUTPATIENT
Start: 2024-07-30 | End: 2024-07-30

## 2024-07-30 RX ORDER — POTASSIUM CHLORIDE 7.45 MG/ML
10 INJECTION INTRAVENOUS PRN
Status: DISCONTINUED | OUTPATIENT
Start: 2024-07-30 | End: 2024-08-02 | Stop reason: HOSPADM

## 2024-07-30 RX ORDER — POTASSIUM CHLORIDE 20 MEQ/1
40 TABLET, EXTENDED RELEASE ORAL PRN
Status: DISCONTINUED | OUTPATIENT
Start: 2024-07-30 | End: 2024-08-02 | Stop reason: HOSPADM

## 2024-07-30 RX ORDER — MONTELUKAST SODIUM 10 MG/1
10 TABLET ORAL NIGHTLY
Status: DISCONTINUED | OUTPATIENT
Start: 2024-07-30 | End: 2024-08-02 | Stop reason: HOSPADM

## 2024-07-30 RX ORDER — PANTOPRAZOLE SODIUM 40 MG/10ML
40 INJECTION, POWDER, LYOPHILIZED, FOR SOLUTION INTRAVENOUS 2 TIMES DAILY
Status: DISCONTINUED | OUTPATIENT
Start: 2024-07-30 | End: 2024-08-02 | Stop reason: HOSPADM

## 2024-07-30 RX ORDER — SODIUM CHLORIDE 0.9 % (FLUSH) 0.9 %
5-40 SYRINGE (ML) INJECTION EVERY 12 HOURS SCHEDULED
Status: DISCONTINUED | OUTPATIENT
Start: 2024-07-30 | End: 2024-08-02 | Stop reason: HOSPADM

## 2024-07-30 RX ORDER — POLYETHYLENE GLYCOL 3350 17 G/17G
17 POWDER, FOR SOLUTION ORAL DAILY PRN
Status: DISCONTINUED | OUTPATIENT
Start: 2024-07-30 | End: 2024-08-01

## 2024-07-30 RX ORDER — SODIUM CHLORIDE 9 MG/ML
INJECTION, SOLUTION INTRAVENOUS PRN
Status: DISCONTINUED | OUTPATIENT
Start: 2024-07-30 | End: 2024-08-02 | Stop reason: HOSPADM

## 2024-07-30 RX ORDER — MAGNESIUM SULFATE IN WATER 40 MG/ML
2000 INJECTION, SOLUTION INTRAVENOUS PRN
Status: DISCONTINUED | OUTPATIENT
Start: 2024-07-30 | End: 2024-08-02 | Stop reason: HOSPADM

## 2024-07-30 RX ORDER — AMLODIPINE BESYLATE 10 MG/1
10 TABLET ORAL DAILY
Status: DISCONTINUED | OUTPATIENT
Start: 2024-07-30 | End: 2024-07-30

## 2024-07-30 RX ORDER — SPIRONOLACTONE 25 MG/1
25 TABLET ORAL DAILY
Status: DISCONTINUED | OUTPATIENT
Start: 2024-07-31 | End: 2024-07-31

## 2024-07-30 RX ORDER — SIMETHICONE 80 MG
80 TABLET,CHEWABLE ORAL 4 TIMES DAILY PRN
Status: DISCONTINUED | OUTPATIENT
Start: 2024-07-30 | End: 2024-08-02 | Stop reason: HOSPADM

## 2024-07-30 RX ORDER — TRAMADOL HYDROCHLORIDE 50 MG/1
50 TABLET ORAL EVERY 8 HOURS PRN
Status: DISCONTINUED | OUTPATIENT
Start: 2024-07-30 | End: 2024-08-02 | Stop reason: HOSPADM

## 2024-07-30 RX ORDER — ARIPIPRAZOLE 10 MG/1
10 TABLET ORAL DAILY
Status: DISCONTINUED | OUTPATIENT
Start: 2024-07-30 | End: 2024-08-02 | Stop reason: HOSPADM

## 2024-07-30 RX ORDER — ESTRADIOL 1 MG/1
0.5 TABLET ORAL DAILY
Status: DISCONTINUED | OUTPATIENT
Start: 2024-07-30 | End: 2024-08-02 | Stop reason: HOSPADM

## 2024-07-30 RX ORDER — SIMETHICONE 80 MG
80 TABLET,CHEWABLE ORAL 4 TIMES DAILY PRN
Qty: 180 TABLET | Refills: 3 | Status: SHIPPED | OUTPATIENT
Start: 2024-07-30 | End: 2024-07-30

## 2024-07-30 RX ADMIN — PANTOPRAZOLE SODIUM 40 MG: 40 INJECTION, POWDER, FOR SOLUTION INTRAVENOUS at 22:34

## 2024-07-30 RX ADMIN — SODIUM CHLORIDE, PRESERVATIVE FREE 10 ML: 5 INJECTION INTRAVENOUS at 22:34

## 2024-07-30 RX ADMIN — HYDROMORPHONE HYDROCHLORIDE 0.5 MG: 1 INJECTION, SOLUTION INTRAMUSCULAR; INTRAVENOUS; SUBCUTANEOUS at 22:34

## 2024-07-30 ASSESSMENT — PAIN - FUNCTIONAL ASSESSMENT: PAIN_FUNCTIONAL_ASSESSMENT: ACTIVITIES ARE NOT PREVENTED

## 2024-07-30 ASSESSMENT — PAIN SCALES - GENERAL
PAINLEVEL_OUTOF10: 10
PAINLEVEL_OUTOF10: 5
PAINLEVEL_OUTOF10: 10

## 2024-07-30 ASSESSMENT — PAIN DESCRIPTION - LOCATION
LOCATION: ABDOMEN
LOCATION: CHEST

## 2024-07-30 ASSESSMENT — PAIN DESCRIPTION - ONSET: ONSET: ON-GOING

## 2024-07-30 ASSESSMENT — PAIN DESCRIPTION - DESCRIPTORS: DESCRIPTORS: SHARP;STABBING

## 2024-07-30 ASSESSMENT — PAIN DESCRIPTION - PAIN TYPE: TYPE: ACUTE PAIN

## 2024-07-30 ASSESSMENT — PAIN DESCRIPTION - FREQUENCY: FREQUENCY: CONTINUOUS

## 2024-07-30 ASSESSMENT — PAIN DESCRIPTION - ORIENTATION
ORIENTATION: UPPER;MID
ORIENTATION: UPPER;MID

## 2024-07-30 NOTE — PROGRESS NOTES
Patient admitted to 8a room5, patient c/o chest pain, HR tachy 104, EKG ordered and hospitalist notified

## 2024-07-30 NOTE — PATIENT INSTRUCTIONS
Follow up in 1 week. Please call us if you feel worse or not getting better. Stop Mounjaro. Start taking Glucophage 500 twice daily. Start taking Lactulose every 8 hours.

## 2024-07-30 NOTE — PROGRESS NOTES
1990    Chief Complaint   Patient presents with    Follow-up     ED 7/11 Dental Abscess     Gastroparesis     Getting worse and can't a hold of GI in [place]       Pt is a 34 y.o. female w/ PMHx of HFpEF, anxiety, MDD, asthma, CAD, DM2, HLD, HTN, seizures, gastroparesis, GERD, migraines, and sickle cell trait who presents for an follow up visit.  She saw Dr Dominguez a month ago.  Patient currently endorses a gastroparesis flare up.  She follows with GI in [place].  She states that they are  she endorses trying to call them regularly for the past 2 weeks with no answer.  Not answering phones. Has bad nausea and foul smell when she burps. Has been going on for 2 weeks.  Patient endorses not being able to eat or drink much for the past 3 weeks.  Is visibly in pain. Endorses cramping, minimum amount of stool which she is described as liquid.  Endorses melena.  Her gastroparesis was diagnosed 2 years ago.  At one time she had a feeding tubes. It has been removed.  She was referred to Dr. Michel for possible new tube.  Apparently he referred her back to [place].  Patient endorses she is getting the run around from all of her physicians.  She endorses fever, night sweats, chills, N/V/liquid stools, anorexia.    DM2: most recent A1c 3/29/2024 6.0.  Patient is currently on metformin 500 mg daily and Mounjaro 5.0 qw. A1c 6/7 was great - 5.4.  Due to gastroparesis will discontinue patient's Mounjaro at this time.  Will increase metformin to 500 mg bid.  Lipid panel 6/28/2024: Cholesterol 153, HDL 36, LDL 98, triglycerides 97.  GI liver panel normal.  TSH 0.724 WNL  Urine microalbumin/creat ratio 8.8  Last diabetic foot exam 6/6/2024.    HTN: Patient is on Norvasc 10 mg qd, Minipress 1 mg qhs, Inderal 80 mg bid, Aldactone 25 mg qd, her blood pressure is well controlled on current regimen. She follows with Dr. You, cardiology.      Anxiety/depression: She is on abilify, buspar, lexapro.  Patient endorses

## 2024-07-30 NOTE — PROCEDURES
PROCEDURE NOTE  Date: 7/30/2024   Name: Samantha Nichols  YOB: 1990    Procedures EKG completed,

## 2024-07-30 NOTE — H&P
1/8/2019    EGD DIL performed by Ajti Washburn MD at [facility]    EGD COLONOSCOPY Left 5/14/2019    EGD DILATATION performed by Ajit Washburn MD at [facility]    EGD COLONOSCOPY Left 8/27/2019    EGD DILATATION performed by Ajit Washburn MD at [facility]    ENDOSCOPY, COLON, DIAGNOSTIC      GASTRIC FUNDOPLICATION      [facility]    GASTRIC FUNDOPLICATION      GASTROSTOMY TUBE PLACEMENT N/A 1/25/2023    EGD PEG TUBE PLACEMENT performed by Frida Elaine MD at [facility]    GASTROSTOMY TUBE PLACEMENT N/A 2/22/2023    EGD PEG TUBE PLACEMENT performed by Frida Elaine MD at [facility]    GASTROSTOMY TUBE PLACEMENT N/A 12/18/2023    EGD PEG TUBE PLACEMENT performed by Iron Michel MD at [facility] ENDOSCOPY    HERNIA REPAIR  2010, 2016    [facility]    HIATAL HERNIA REPAIR N/A 1/17/2023    Robotic Exploratory Paparoscopy with Extensive Lysis of Adhesions G Tube Insertion performed by Tonny Florence MD at [facility] OR    HYSTERECTOMY (CERVIX STATUS UNKNOWN)  04/2016    INSERTION / REMOVAL / REPLACEMENT VENOUS ACCESS CATHETER N/A 3/8/2019    REMOVAL OF LEFT PORT AND PLACEMENT OF SINGLE LUMEN MEDIPORT RIGHT SIDE performed by Iron Michel MD at [facility] OR    IR PORT PLACEMENT EQUAL OR GREATER THAN 5 YEARS  3/13/2024    IR PORT PLACEMENT EQUAL OR GREATER THAN 5 YEARS 3/13/2024 Larry Matthew MD [facility] SPECIAL PROCEDURES    KNEE ARTHROSCOPY      LAPAROSCOPY N/A 10/8/2019    ROBOTIC DIAGNOSTIC LAPAROSCOPY,  RECURRENT HIATAL HERNIA REPAIR, REVISION FUNDOPLICATION performed by Tonny Florence MD at [facility] OR    LAPAROSCOPY N/A 7/15/2022    ROBOTIC LAPAROSCOPY, LYSIS OF ADHESIONS performed by Iron Michel MD at [facility] OR    LAPAROSCOPY N/A 11/3/2023    Robotic Diagnostic Laparoscopy, LYSIS OF ADHESIONS performed by Iron Michel MD at [facility] OR    LAPAROSCOPY N/A 5/10/2024    ROBOTIC DIAGNOSTIC LAPAROSCOPY, LYSIS OF ADHESIONS performed by Iron Michel MD at [facility] OR    MYOMECTOMY       Amoxicillin, Ciprofloxacin, Compazine [prochlorperazine maleate], Compazine [prochlorperazine], Fentanyl, Flexeril [cyclobenzaprine], Haldol [haloperidol], Hydrochlorothiazide, Keflex [cephalexin], Ketamine, Lisinopril, Lorazepam, Macrobid [nitrofurantoin monohyd macro], Reglan [metoclopramide], Vicodin [hydrocodone-acetaminophen], and Vistaril [hydroxyzine hcl]    Social History:    reports that she has quit smoking. Her smoking use included cigarettes. She started smoking about 18 years ago. She has a 9.3 pack-year smoking history. She has never used smokeless tobacco. She reports that she does not currently use alcohol. She reports that she does not use drugs.    Family History:       Problem Relation Age of Onset    Cancer Mother     High Blood Pressure Mother     Heart Disease Mother         MI    Liver Cancer Mother     Sickle Cell Trait Father     High Blood Pressure Father     Heart Disease Father     Sickle Cell Trait Sister     Heart Disease Sister     Sickle Cell Trait Sister     Sickle Cell Trait Brother     Sickle Cell Trait Brother     Heart Attack Paternal Uncle     Diabetes Maternal Grandmother     Depression Maternal Grandmother     Heart Disease Maternal Grandmother         CHF    Colon Cancer Cousin     Stroke Neg Hx     Esophageal Cancer Neg Hx     Rectal Cancer Neg Hx     Stomach Cancer Neg Hx        Diet:  Diet NPO      Physical Exam:  /88   Pulse (!) 104   Temp 98.8 °F (37.1 °C) (Oral)   Resp 19   Ht 1.626 m (5' 4\")   Wt 126.2 kg (278 lb 3.5 oz)   LMP 07/11/2015   SpO2 100%   BMI 47.76 kg/m²   Physical Exam  Constitutional:       General: She is in acute distress.   HENT:      Head: Normocephalic and atraumatic.      Nose: No congestion or rhinorrhea.      Mouth/Throat:      Mouth: Mucous membranes are moist.      Pharynx: Oropharynx is clear.   Eyes:      Extraocular Movements: Extraocular movements intact.      Pupils: Pupils are equal, round, and reactive to light.

## 2024-07-30 NOTE — PROGRESS NOTES
Unsuccesfull PIV access x2. Will update night team to see if her port can be access. Left a voicemail to resource RN for assistance with IV access.

## 2024-07-31 ENCOUNTER — APPOINTMENT (OUTPATIENT)
Dept: CT IMAGING | Age: 34
End: 2024-07-31
Attending: INTERNAL MEDICINE
Payer: MEDICARE

## 2024-07-31 LAB
GLUCOSE BLD STRIP.AUTO-MCNC: 112 MG/DL (ref 70–108)
GLUCOSE BLD STRIP.AUTO-MCNC: 83 MG/DL (ref 70–108)
GLUCOSE BLD STRIP.AUTO-MCNC: 85 MG/DL (ref 70–108)
GLUCOSE BLD STRIP.AUTO-MCNC: 90 MG/DL (ref 70–108)

## 2024-07-31 PROCEDURE — 6360000004 HC RX CONTRAST MEDICATION

## 2024-07-31 PROCEDURE — 1200000000 HC SEMI PRIVATE

## 2024-07-31 PROCEDURE — 82948 REAGENT STRIP/BLOOD GLUCOSE: CPT

## 2024-07-31 PROCEDURE — 99233 SBSQ HOSP IP/OBS HIGH 50: CPT | Performed by: NURSE PRACTITIONER

## 2024-07-31 PROCEDURE — 2580000003 HC RX 258

## 2024-07-31 PROCEDURE — 6370000000 HC RX 637 (ALT 250 FOR IP)

## 2024-07-31 PROCEDURE — 6360000002 HC RX W HCPCS

## 2024-07-31 PROCEDURE — 6370000000 HC RX 637 (ALT 250 FOR IP): Performed by: NURSE PRACTITIONER

## 2024-07-31 PROCEDURE — 74178 CT ABD&PLV WO CNTR FLWD CNTR: CPT

## 2024-07-31 RX ORDER — GLUCAGON 1 MG/ML
1 KIT INJECTION PRN
Status: DISCONTINUED | OUTPATIENT
Start: 2024-07-31 | End: 2024-08-02 | Stop reason: HOSPADM

## 2024-07-31 RX ORDER — PROMETHAZINE HYDROCHLORIDE 25 MG/1
12.5 TABLET ORAL EVERY 6 HOURS PRN
Status: DISCONTINUED | OUTPATIENT
Start: 2024-07-31 | End: 2024-08-02 | Stop reason: HOSPADM

## 2024-07-31 RX ORDER — INSULIN LISPRO 100 [IU]/ML
0-4 INJECTION, SOLUTION INTRAVENOUS; SUBCUTANEOUS
Status: DISCONTINUED | OUTPATIENT
Start: 2024-07-31 | End: 2024-08-02 | Stop reason: HOSPADM

## 2024-07-31 RX ORDER — DEXTROSE MONOHYDRATE 100 MG/ML
INJECTION, SOLUTION INTRAVENOUS CONTINUOUS PRN
Status: DISCONTINUED | OUTPATIENT
Start: 2024-07-31 | End: 2024-08-02 | Stop reason: HOSPADM

## 2024-07-31 RX ORDER — SPIRONOLACTONE 25 MG/1
50 TABLET ORAL DAILY
Status: DISCONTINUED | OUTPATIENT
Start: 2024-07-31 | End: 2024-08-02 | Stop reason: HOSPADM

## 2024-07-31 RX ORDER — INSULIN LISPRO 100 [IU]/ML
0-4 INJECTION, SOLUTION INTRAVENOUS; SUBCUTANEOUS NIGHTLY
Status: DISCONTINUED | OUTPATIENT
Start: 2024-07-31 | End: 2024-08-02 | Stop reason: HOSPADM

## 2024-07-31 RX ADMIN — HYDROMORPHONE HYDROCHLORIDE 0.5 MG: 1 INJECTION, SOLUTION INTRAMUSCULAR; INTRAVENOUS; SUBCUTANEOUS at 04:16

## 2024-07-31 RX ADMIN — PROMETHAZINE HYDROCHLORIDE 12.5 MG: 25 TABLET ORAL at 11:53

## 2024-07-31 RX ADMIN — MONTELUKAST SODIUM 10 MG: 10 TABLET ORAL at 01:36

## 2024-07-31 RX ADMIN — ARIPIPRAZOLE 10 MG: 10 TABLET ORAL at 07:50

## 2024-07-31 RX ADMIN — TRAMADOL HYDROCHLORIDE 50 MG: 50 TABLET ORAL at 11:33

## 2024-07-31 RX ADMIN — AMLODIPINE BESYLATE 10 MG: 10 TABLET ORAL at 07:51

## 2024-07-31 RX ADMIN — LACTULOSE 20 G: 20 SOLUTION ORAL at 21:25

## 2024-07-31 RX ADMIN — SPIRONOLACTONE 50 MG: 25 TABLET ORAL at 07:53

## 2024-07-31 RX ADMIN — PRAZOSIN HYDROCHLORIDE 1 MG: 1 CAPSULE ORAL at 21:25

## 2024-07-31 RX ADMIN — PROPRANOLOL HYDROCHLORIDE 80 MG: 40 TABLET ORAL at 21:25

## 2024-07-31 RX ADMIN — TRAMADOL HYDROCHLORIDE 50 MG: 50 TABLET ORAL at 01:05

## 2024-07-31 RX ADMIN — SODIUM CHLORIDE, PRESERVATIVE FREE 10 ML: 5 INJECTION INTRAVENOUS at 20:32

## 2024-07-31 RX ADMIN — ARIPIPRAZOLE 10 MG: 10 TABLET ORAL at 01:37

## 2024-07-31 RX ADMIN — HYDROMORPHONE HYDROCHLORIDE 0.5 MG: 1 INJECTION, SOLUTION INTRAMUSCULAR; INTRAVENOUS; SUBCUTANEOUS at 13:56

## 2024-07-31 RX ADMIN — SUCRALFATE 1 G: 1 TABLET ORAL at 07:51

## 2024-07-31 RX ADMIN — TOPIRAMATE 100 MG: 100 TABLET, FILM COATED ORAL at 07:50

## 2024-07-31 RX ADMIN — TOPIRAMATE 100 MG: 100 TABLET, FILM COATED ORAL at 21:25

## 2024-07-31 RX ADMIN — BUSPIRONE HYDROCHLORIDE 5 MG: 5 TABLET ORAL at 07:51

## 2024-07-31 RX ADMIN — MONTELUKAST SODIUM 10 MG: 10 TABLET ORAL at 21:25

## 2024-07-31 RX ADMIN — ESTRADIOL 0.5 MG: 1 TABLET ORAL at 01:36

## 2024-07-31 RX ADMIN — ESCITALOPRAM OXALATE 20 MG: 20 TABLET ORAL at 01:36

## 2024-07-31 RX ADMIN — SUCRALFATE 1 G: 1 TABLET ORAL at 11:34

## 2024-07-31 RX ADMIN — BUSPIRONE HYDROCHLORIDE 5 MG: 5 TABLET ORAL at 01:38

## 2024-07-31 RX ADMIN — SUCRALFATE 1 G: 1 TABLET ORAL at 18:17

## 2024-07-31 RX ADMIN — TOPIRAMATE 100 MG: 100 TABLET, FILM COATED ORAL at 01:37

## 2024-07-31 RX ADMIN — SUCRALFATE 1 G: 1 TABLET ORAL at 21:26

## 2024-07-31 RX ADMIN — TRAMADOL HYDROCHLORIDE 50 MG: 50 TABLET ORAL at 20:31

## 2024-07-31 RX ADMIN — BUSPIRONE HYDROCHLORIDE 5 MG: 5 TABLET ORAL at 21:25

## 2024-07-31 RX ADMIN — HYDROMORPHONE HYDROCHLORIDE 0.5 MG: 1 INJECTION, SOLUTION INTRAMUSCULAR; INTRAVENOUS; SUBCUTANEOUS at 01:31

## 2024-07-31 RX ADMIN — PANTOPRAZOLE SODIUM 40 MG: 40 INJECTION, POWDER, FOR SOLUTION INTRAVENOUS at 07:51

## 2024-07-31 RX ADMIN — PROPRANOLOL HYDROCHLORIDE 80 MG: 40 TABLET ORAL at 07:49

## 2024-07-31 RX ADMIN — HYDROMORPHONE HYDROCHLORIDE 0.5 MG: 1 INJECTION, SOLUTION INTRAMUSCULAR; INTRAVENOUS; SUBCUTANEOUS at 21:26

## 2024-07-31 RX ADMIN — IOPAMIDOL 80 ML: 755 INJECTION, SOLUTION INTRAVENOUS at 04:44

## 2024-07-31 RX ADMIN — POLYETHYLENE GLYCOL 3350 17 G: 17 POWDER, FOR SOLUTION ORAL at 07:51

## 2024-07-31 RX ADMIN — LACTULOSE 20 G: 20 SOLUTION ORAL at 13:56

## 2024-07-31 RX ADMIN — HYDROMORPHONE HYDROCHLORIDE 0.5 MG: 1 INJECTION, SOLUTION INTRAMUSCULAR; INTRAVENOUS; SUBCUTANEOUS at 18:10

## 2024-07-31 RX ADMIN — HYDROMORPHONE HYDROCHLORIDE 0.5 MG: 1 INJECTION, SOLUTION INTRAMUSCULAR; INTRAVENOUS; SUBCUTANEOUS at 07:52

## 2024-07-31 RX ADMIN — ROSUVASTATIN 10 MG: 10 TABLET, FILM COATED ORAL at 01:36

## 2024-07-31 RX ADMIN — BUSPIRONE HYDROCHLORIDE 5 MG: 5 TABLET ORAL at 13:57

## 2024-07-31 RX ADMIN — ESCITALOPRAM OXALATE 20 MG: 20 TABLET ORAL at 07:51

## 2024-07-31 RX ADMIN — ROSUVASTATIN 10 MG: 10 TABLET, FILM COATED ORAL at 07:51

## 2024-07-31 RX ADMIN — ESTRADIOL 0.5 MG: 1 TABLET ORAL at 07:49

## 2024-07-31 RX ADMIN — PANTOPRAZOLE SODIUM 40 MG: 40 INJECTION, POWDER, FOR SOLUTION INTRAVENOUS at 21:26

## 2024-07-31 ASSESSMENT — PAIN SCALES - GENERAL
PAINLEVEL_OUTOF10: 10
PAINLEVEL_OUTOF10: 5
PAINLEVEL_OUTOF10: 7
PAINLEVEL_OUTOF10: 5
PAINLEVEL_OUTOF10: 10
PAINLEVEL_OUTOF10: 3
PAINLEVEL_OUTOF10: 7
PAINLEVEL_OUTOF10: 3
PAINLEVEL_OUTOF10: 10
PAINLEVEL_OUTOF10: 9
PAINLEVEL_OUTOF10: 7

## 2024-07-31 ASSESSMENT — PAIN DESCRIPTION - LOCATION
LOCATION: ABDOMEN

## 2024-07-31 ASSESSMENT — ENCOUNTER SYMPTOMS
PHOTOPHOBIA: 0
SORE THROAT: 0
VOMITING: 1
SHORTNESS OF BREATH: 0
NAUSEA: 1
CHEST TIGHTNESS: 0
DIARRHEA: 1
ABDOMINAL PAIN: 1
BACK PAIN: 0

## 2024-07-31 ASSESSMENT — PAIN DESCRIPTION - ORIENTATION
ORIENTATION: MID
ORIENTATION: MID;LOWER

## 2024-07-31 ASSESSMENT — PAIN DESCRIPTION - PAIN TYPE: TYPE: ACUTE PAIN

## 2024-07-31 ASSESSMENT — PAIN - FUNCTIONAL ASSESSMENT: PAIN_FUNCTIONAL_ASSESSMENT: ACTIVITIES ARE NOT PREVENTED

## 2024-07-31 ASSESSMENT — PAIN DESCRIPTION - DESCRIPTORS: DESCRIPTORS: SHARP;TENDER

## 2024-07-31 NOTE — PLAN OF CARE
Problem: Discharge Planning  Goal: Discharge to home or other facility with appropriate resources  Outcome: Progressing  Flowsheets (Taken 7/31/2024 0203)  Discharge to home or other facility with appropriate resources: Identify barriers to discharge with patient and caregiver     Problem: Pain  Goal: Verbalizes/displays adequate comfort level or baseline comfort level  Outcome: Progressing  Flowsheets (Taken 7/31/2024 0203)  Verbalizes/displays adequate comfort level or baseline comfort level:   Encourage patient to monitor pain and request assistance   Assess pain using appropriate pain scale     Problem: Safety - Adult  Goal: Free from fall injury  Outcome: Progressing  Note: Patient free of falls this shift. Patient on falling star program. Fall band intact. RN visually checks on patient with hourly rounds. Patient tolerates ambulation each shift.       Problem: Metabolic/Fluid and Electrolytes - Adult  Goal: Glucose maintained within prescribed range  Outcome: Progressing  Flowsheets (Taken 7/31/2024 0203)  Glucose maintained within prescribed range:   Monitor blood glucose as ordered   Assess for signs and symptoms of hyperglycemia and hypoglycemia

## 2024-07-31 NOTE — CARE COORDINATION
Case Management Assessment Initial Evaluation    Date/Time of Evaluation: 2024 8:38 AM  Assessment Completed by: Yenni Garcias, RN    If patient is discharged prior to next notation, then this note serves as note for discharge by case management.    Patient Name: Samantha Nichols                   YOB: 1990  Diagnosis: Gastroparesis [K31.84]                   Date / Time: 2024  5:44 PM  Location: [address] [place] [address]     Patient Admission Status: Inpatient   Readmission Risk Low 0-14, Mod 15-19), High > 20: Readmission Risk Score: 24.8    Current PCP: Juan Dominguez DO  Health Care Decision Makers:     Additional Case Management Notes: Samantha was a direct admit from her PCP office (resident clinic) with severe abdominal pain, progressively worsening x 2 weeks. Also endorses N/V, dark tarry stools. Hx of gastroparesis.     Admitted by hospitalist. Monitor stools closely. No GI consult at this time because Hgb stable, hospitalist plans to consult if dark tarry stools continue. Daily labs. IVF. IV Protonix. NPO. Lactulose and simethicone on hold.     Procedures: n/a    Imagin/31 CT A/P: No stones or hydronephrosis. Normal appendix. Dense retained stool. No mechanical bowel obstruction. Stomach non-distended.    Patient Goals/Plan/Treatment Preferences: Samantha plans to return home with wife at discharge. Requests HH nurse through continued care. Consulted SW, but explained to pt attending may not order if she does not see a need for skilled nursing. Current with RPM program. Denies further needs.      [id number]   Service Assessment   Patient Orientation Alert and Oriented   Cognition Alert   History Provided By Patient   Primary Caregiver Self   Accompanied By/Relationship no one   Support Systems Spouse/Significant Other   Patient's Healthcare Decision Maker is: Named in Scanned ACP Document   PCP Verified by CM Yes   Last Visit to PCP Within last 3 months   Prior Functional Level

## 2024-07-31 NOTE — PROGRESS NOTES
Hospitalist Progress Note    Patient:  Samantha Nichols      Unit/Bed:8A-05/005-A    YOB: 1990    MRN: [id number]       Acct: [id number]     PCP: Juan Dominguez DO    Date of Admission: 7/30/2024    Assessment/Plan:    Severe abdominal pain. Multifactorial. Constipation and Gastroparesis flair. Reported Melena:  KUB (-). Recently started on Lactulose and Simethicone by PCP for gastroparesis. CT abdomen and pelvis with no acute findings except retained stool. Has not had any stools or vomiting since admission. HH stable. Given IVF hydration X 24 hours. Has been NPO. Will start clears. IV Protonix BID. Home Carafate. Restart Lactulose. She is allergic to Reglan. Also reports allergy to Compazine and Zofran. Ordered Phenergan 12.5 mg every 6 hrs PO/DC PRN. Consult to GI for additional recs. She is requesting to speak with Dr. Michel who has agreed to see later today. Avoid narcotics as able.   NIDDM2: Controlled.  Last A1c 3/2024 6.0. Has been on Monjouro. This is likely increasing her gastroparesis symptoms. Recs to discontinue and not restart. Holding Metformin. Low dose SSI with hypoglycemic protocol. Carb control diet.  Primary HTN: Stable.  Continue home Amlodipine, Prazosin, Propranolol, and Spironolactone with hold parameters.    HLD: Most recent LDL (3/2024) 98. Continue home Statin.   HFpEF: Last echocardiogram 2/2023 showed LVEF 55-60%. Monitor volume status. Daily weights. Strict intake and output.   Anxiety: Stable. Continue home Buspar, Abilify, and Lexapro.   Sickle Cell trait: Has mediport for frequent blood draws.    Epilepsy: No reported recent seizures.  Continue Topamax.  Morbid obesity. BMI 47.29.       Chief Complaint:  Abdominal pain, dark stools.     Hospital Course:   Samantha Nichols is a 34 y.o. female with PMHx of HFpEF, anxiety, MDD, asthma, CAD, DM2, HLD, HTN, Epilepsy, gastroparesis, GERD, migraines and sickle cell trait, who presented to [facility] as a direct admit

## 2024-08-01 LAB
GLUCOSE BLD STRIP.AUTO-MCNC: 104 MG/DL (ref 70–108)
GLUCOSE BLD STRIP.AUTO-MCNC: 106 MG/DL (ref 70–108)
GLUCOSE BLD STRIP.AUTO-MCNC: 96 MG/DL (ref 70–108)

## 2024-08-01 PROCEDURE — 6370000000 HC RX 637 (ALT 250 FOR IP)

## 2024-08-01 PROCEDURE — 82948 REAGENT STRIP/BLOOD GLUCOSE: CPT

## 2024-08-01 PROCEDURE — 6360000002 HC RX W HCPCS

## 2024-08-01 PROCEDURE — 6370000000 HC RX 637 (ALT 250 FOR IP): Performed by: NURSE PRACTITIONER

## 2024-08-01 PROCEDURE — 99232 SBSQ HOSP IP/OBS MODERATE 35: CPT | Performed by: NURSE PRACTITIONER

## 2024-08-01 PROCEDURE — 1200000000 HC SEMI PRIVATE

## 2024-08-01 PROCEDURE — 2580000003 HC RX 258

## 2024-08-01 RX ORDER — BISACODYL 10 MG
10 SUPPOSITORY, RECTAL RECTAL DAILY
Status: DISCONTINUED | OUTPATIENT
Start: 2024-08-01 | End: 2024-08-02 | Stop reason: HOSPADM

## 2024-08-01 RX ORDER — SENNOSIDES A AND B 8.6 MG/1
1 TABLET, FILM COATED ORAL NIGHTLY
Status: DISCONTINUED | OUTPATIENT
Start: 2024-08-01 | End: 2024-08-02 | Stop reason: HOSPADM

## 2024-08-01 RX ORDER — DOCUSATE SODIUM 100 MG/1
100 CAPSULE, LIQUID FILLED ORAL 2 TIMES DAILY
Status: DISCONTINUED | OUTPATIENT
Start: 2024-08-01 | End: 2024-08-02 | Stop reason: HOSPADM

## 2024-08-01 RX ORDER — POLYETHYLENE GLYCOL 3350 17 G/17G
17 POWDER, FOR SOLUTION ORAL DAILY
Status: DISCONTINUED | OUTPATIENT
Start: 2024-08-02 | End: 2024-08-02 | Stop reason: HOSPADM

## 2024-08-01 RX ADMIN — SUCRALFATE 1 G: 1 TABLET ORAL at 05:09

## 2024-08-01 RX ADMIN — HYDROMORPHONE HYDROCHLORIDE 0.5 MG: 1 INJECTION, SOLUTION INTRAMUSCULAR; INTRAVENOUS; SUBCUTANEOUS at 10:11

## 2024-08-01 RX ADMIN — HYDROMORPHONE HYDROCHLORIDE 0.5 MG: 1 INJECTION, SOLUTION INTRAMUSCULAR; INTRAVENOUS; SUBCUTANEOUS at 00:35

## 2024-08-01 RX ADMIN — PROMETHAZINE HYDROCHLORIDE 12.5 MG: 25 TABLET ORAL at 00:46

## 2024-08-01 RX ADMIN — AMLODIPINE BESYLATE 10 MG: 10 TABLET ORAL at 09:56

## 2024-08-01 RX ADMIN — TRAMADOL HYDROCHLORIDE 50 MG: 50 TABLET ORAL at 14:59

## 2024-08-01 RX ADMIN — HYDROMORPHONE HYDROCHLORIDE 0.5 MG: 1 INJECTION, SOLUTION INTRAMUSCULAR; INTRAVENOUS; SUBCUTANEOUS at 03:42

## 2024-08-01 RX ADMIN — PROPRANOLOL HYDROCHLORIDE 80 MG: 40 TABLET ORAL at 09:55

## 2024-08-01 RX ADMIN — SODIUM CHLORIDE, PRESERVATIVE FREE 10 ML: 5 INJECTION INTRAVENOUS at 10:12

## 2024-08-01 RX ADMIN — TOPIRAMATE 100 MG: 100 TABLET, FILM COATED ORAL at 09:56

## 2024-08-01 RX ADMIN — HYDROMORPHONE HYDROCHLORIDE 0.5 MG: 1 INJECTION, SOLUTION INTRAMUSCULAR; INTRAVENOUS; SUBCUTANEOUS at 14:10

## 2024-08-01 RX ADMIN — TRAMADOL HYDROCHLORIDE 50 MG: 50 TABLET ORAL at 05:09

## 2024-08-01 RX ADMIN — HYDROMORPHONE HYDROCHLORIDE 0.5 MG: 1 INJECTION, SOLUTION INTRAMUSCULAR; INTRAVENOUS; SUBCUTANEOUS at 06:48

## 2024-08-01 RX ADMIN — DOCUSATE SODIUM 100 MG: 100 CAPSULE, LIQUID FILLED ORAL at 14:59

## 2024-08-01 RX ADMIN — ESTRADIOL 0.5 MG: 1 TABLET ORAL at 09:56

## 2024-08-01 ASSESSMENT — PAIN DESCRIPTION - ORIENTATION
ORIENTATION: MID;LOWER
ORIENTATION: LOWER;MID
ORIENTATION: MID

## 2024-08-01 ASSESSMENT — PAIN DESCRIPTION - PAIN TYPE
TYPE: ACUTE PAIN

## 2024-08-01 ASSESSMENT — PAIN SCALES - GENERAL
PAINLEVEL_OUTOF10: 10
PAINLEVEL_OUTOF10: 4
PAINLEVEL_OUTOF10: 10
PAINLEVEL_OUTOF10: 9
PAINLEVEL_OUTOF10: 10
PAINLEVEL_OUTOF10: 8
PAINLEVEL_OUTOF10: 10

## 2024-08-01 ASSESSMENT — PAIN DESCRIPTION - DESCRIPTORS
DESCRIPTORS: ACHING;DISCOMFORT
DESCRIPTORS: SHARP;STABBING
DESCRIPTORS: SHARP;TIGHTNESS
DESCRIPTORS: ACHING;CRAMPING
DESCRIPTORS: ACHING;DISCOMFORT
DESCRIPTORS: ACHING;DISCOMFORT;SHARP
DESCRIPTORS: SHARP

## 2024-08-01 ASSESSMENT — PAIN DESCRIPTION - LOCATION
LOCATION: ABDOMEN

## 2024-08-01 ASSESSMENT — PAIN DESCRIPTION - ONSET
ONSET: ON-GOING

## 2024-08-01 ASSESSMENT — PAIN DESCRIPTION - FREQUENCY
FREQUENCY: CONTINUOUS

## 2024-08-01 ASSESSMENT — PAIN - FUNCTIONAL ASSESSMENT
PAIN_FUNCTIONAL_ASSESSMENT: ACTIVITIES ARE NOT PREVENTED

## 2024-08-01 NOTE — PLAN OF CARE
Problem: Discharge Planning  Goal: Discharge to home or other facility with appropriate resources  Outcome: Progressing  Flowsheets (Taken 7/31/2024 2336)  Discharge to home or other facility with appropriate resources: Identify barriers to discharge with patient and caregiver     Problem: Pain  Goal: Verbalizes/displays adequate comfort level or baseline comfort level  Outcome: Progressing  Flowsheets (Taken 7/31/2024 2336)  Verbalizes/displays adequate comfort level or baseline comfort level:   Encourage patient to monitor pain and request assistance   Assess pain using appropriate pain scale     Problem: Safety - Adult  Goal: Free from fall injury  Outcome: Progressing  Flowsheets (Taken 7/31/2024 2336)  Free From Fall Injury:   Instruct family/caregiver on patient safety   Based on caregiver fall risk screen, instruct family/caregiver to ask for assistance with transferring infant if caregiver noted to have fall risk factors     Problem: Metabolic/Fluid and Electrolytes - Adult  Goal: Glucose maintained within prescribed range  Outcome: Progressing  Flowsheets (Taken 7/31/2024 2336)  Glucose maintained within prescribed range: Monitor blood glucose as ordered

## 2024-08-01 NOTE — PROGRESS NOTES
Hospitalist Progress Note    Patient:  Samantha Nichols      Unit/Bed:8A-05/005-A    YOB: 1990    MRN: [id number]       Acct: [id number]     PCP: Juan Dominguez DO    Date of Admission: 7/30/2024    Assessment/Plan:    Severe abdominal pain. Multifactorial. Constipation and Gastroparesis flair. Reported Melena:  KUB (-). CT abdomen and pelvis with no acute findings except retained stool. Has not had any stools or vomiting since admission. HH stable. Given IVF hydration X 24 hours. On clears. IV Protonix BID. Home Carafate. Restarted Lactulose 7/31 still no BM. Will schedule Miralax daily, add colace and start senna. She is allergic to Reglan. Also reports allergy to Compazine and Zofran. Continue Phenergan 12.5 mg every 6 hrs PO/WA PRN. Awaiting GI consult for additional recs. Discussed case with Dr. Michel per patient request, he does not have anything surgically to offer. Avoid narcotics as able. Stop Dilaudid. Consult to palliative for ongoing chronic abdominal pain.   NIDDM2: Controlled.  Last A1c 3/2024 6.0. Has been on Monjouro. This is likely increasing her gastroparesis symptoms. Recs to discontinue and not restart. Holding Metformin. Low dose SSI with hypoglycemic protocol. Carb control diet.  Primary HTN: Stable.  Continue home Amlodipine, Prazosin, Propranolol, and Spironolactone with hold parameters.    HLD: Most recent LDL (3/2024) 98. Continue home Statin.   HFpEF: Last echocardiogram 2/2023 showed LVEF 55-60%. Monitor volume status. Daily weights. Strict intake and output.   Anxiety: Stable. Continue home Buspar, Abilify, and Lexapro.   Sickle Cell trait: Has mediport for frequent blood draws.    Epilepsy: No reported recent seizures.  Continue Topamax.  Morbid obesity. BMI 47.29.       Chief Complaint:  Abdominal pain, dark stools.     Hospital Course:   Samantha Nichols is a 34 y.o. female with PMHx of HFpEF, anxiety, MDD, asthma, CAD, DM2, HLD, HTN, Epilepsy,

## 2024-08-01 NOTE — PROGRESS NOTES
Samantha is a 34 year old female admitted on 8A 5. Patient is alone and tearful at the time of this visit.  visited patient during rounding on the unit. Patient is alone propped up in her bed. Patient shared with me that she has a medical condition called gastroparesis.During my encounter with patient, she shared her concern that her doctor may not be able to do another surgery. Patient voiced her concern that this will be a third surgery on the same medical condition, the problem keep occurring. Patient said she is scare and don't know what to do.  This  encouraged patient and informed her that she should continue to talk with her primary doctor and also share her concern with the  to find a betted way to resolve the concern and find a better solution to the problem. Patient said she need a prayer for God to intervene in the her behalf. Prayer is offered in response.    Spiritual Care  will continue to follow up with continue support as needed.

## 2024-08-01 NOTE — CONSULTS
Patient seen evaluated full consult to follow plan for conservative management endoscopy will be done for acute GI bleed

## 2024-08-01 NOTE — CARE COORDINATION
DISCHARGE PLANNING EVALUATION  8/1/24, 3:18 PM EDT    Reason for Referral: wants HH  Decision Maker: patient makes decisions  Current Services: [facility]PM programs  New Services Requested: HH; nursing  Family/ Social/ Home environment: Spoke with patient lives with wife.  Requesting new HH nursing with [facility]  Payment Source:[facility] Medicare and Medicaid  Transportation at Discharge: wife  Post-acute (PAC) provider list was provided to patient. Patient was informed of their freedom to choose PAC provider. Discussed and offered to show the patient the relevant PAC Providers quality and resource use measures on Medicare Compare web site via computer based on patient's goals of care and treatment preferences. Questions regarding selection process were answered.      Teach Back Method used with patient regarding care plan and discharge planning  Patient  verbalized understanding of the plan of care and contribute to goal setting.       Patient preferences and discharge plan: Plan home alone with new HH, preference is [facility]    Electronically signed by KUNAL Reyna on 8/1/2024 at 3:18 PM

## 2024-08-02 ENCOUNTER — CARE COORDINATION (OUTPATIENT)
Dept: CARE COORDINATION | Facility: CLINIC | Age: 34
End: 2024-08-02

## 2024-08-02 VITALS
TEMPERATURE: 98.5 F | DIASTOLIC BLOOD PRESSURE: 85 MMHG | HEIGHT: 64 IN | HEART RATE: 70 BPM | SYSTOLIC BLOOD PRESSURE: 105 MMHG | RESPIRATION RATE: 16 BRPM | OXYGEN SATURATION: 100 % | BODY MASS INDEX: 49.76 KG/M2 | WEIGHT: 291.45 LBS

## 2024-08-02 LAB — GLUCOSE BLD STRIP.AUTO-MCNC: 97 MG/DL (ref 70–108)

## 2024-08-02 PROCEDURE — 6360000002 HC RX W HCPCS

## 2024-08-02 PROCEDURE — 82948 REAGENT STRIP/BLOOD GLUCOSE: CPT

## 2024-08-02 PROCEDURE — 6370000000 HC RX 637 (ALT 250 FOR IP)

## 2024-08-02 PROCEDURE — 2580000003 HC RX 258

## 2024-08-02 PROCEDURE — 99239 HOSP IP/OBS DSCHRG MGMT >30: CPT | Performed by: NURSE PRACTITIONER

## 2024-08-02 PROCEDURE — 6360000002 HC RX W HCPCS: Performed by: NURSE PRACTITIONER

## 2024-08-02 PROCEDURE — 6370000000 HC RX 637 (ALT 250 FOR IP): Performed by: NURSE PRACTITIONER

## 2024-08-02 RX ORDER — MORPHINE SULFATE 2 MG/ML
2 INJECTION, SOLUTION INTRAMUSCULAR; INTRAVENOUS
Status: COMPLETED | OUTPATIENT
Start: 2024-08-02 | End: 2024-08-02

## 2024-08-02 RX ADMIN — SODIUM CHLORIDE, PRESERVATIVE FREE 10 ML: 5 INJECTION INTRAVENOUS at 08:30

## 2024-08-02 RX ADMIN — AMLODIPINE BESYLATE 10 MG: 10 TABLET ORAL at 08:40

## 2024-08-02 RX ADMIN — DOCUSATE SODIUM 100 MG: 100 CAPSULE, LIQUID FILLED ORAL at 08:41

## 2024-08-02 RX ADMIN — PANTOPRAZOLE SODIUM 40 MG: 40 INJECTION, POWDER, FOR SOLUTION INTRAVENOUS at 08:43

## 2024-08-02 RX ADMIN — MORPHINE SULFATE 2 MG: 2 INJECTION, SOLUTION INTRAMUSCULAR; INTRAVENOUS at 11:25

## 2024-08-02 RX ADMIN — SPIRONOLACTONE 50 MG: 25 TABLET ORAL at 08:44

## 2024-08-02 RX ADMIN — SUCRALFATE 1 G: 1 TABLET ORAL at 08:46

## 2024-08-02 RX ADMIN — SIMETHICONE 80 MG: 80 TABLET, CHEWABLE ORAL at 08:47

## 2024-08-02 RX ADMIN — PROPRANOLOL HYDROCHLORIDE 80 MG: 40 TABLET ORAL at 08:43

## 2024-08-02 RX ADMIN — TRAMADOL HYDROCHLORIDE 50 MG: 50 TABLET ORAL at 04:52

## 2024-08-02 RX ADMIN — ESTRADIOL 0.5 MG: 1 TABLET ORAL at 08:42

## 2024-08-02 ASSESSMENT — PAIN DESCRIPTION - LOCATION
LOCATION: GENERALIZED
LOCATION: GENERALIZED

## 2024-08-02 ASSESSMENT — PAIN DESCRIPTION - DESCRIPTORS
DESCRIPTORS: ACHING
DESCRIPTORS: ACHING

## 2024-08-02 ASSESSMENT — PAIN SCALES - GENERAL
PAINLEVEL_OUTOF10: 10
PAINLEVEL_OUTOF10: 10

## 2024-08-02 NOTE — PROGRESS NOTES
When assessing patient she asked if the doctor had prescribed anything else for pain. This nurse informed the patient that the doctor did not. Asked hospitalist NP if patient could have something more for pain. NP advised that narcotics were limited due to patient gastroparesis. Patient was given information and still asked if there was anything like \"morphine\" that she was able to get for pain. Patient asked to see the NP and refused to take any medication until seen by NP.

## 2024-08-02 NOTE — CONSULTS
[place] [id number]                              CONSULTATION      PATIENT NAME: TREVOR ARZATE            : 1990  MED REC NO: [id number]                       ROOM: [facility]  ACCOUNT NO: [id number]                       ADMIT DATE: 2024  PROVIDER: Frida Elaine MD      CONSULT DATE: 2024    HISTORY OF PRESENT ILLNESS:  The patient is known to the practice, seen in GI evaluation for nausea and vomit.  She is a 34-year-old female who required admissions with a lots of complaints.  The vomit contained yellow material.  No hematemesis.  She has dysphagia around the neck area.  She benefitted from dilations in the past. Probably gone for a while.  She lost some weight.  She was around 298, she is around 275 at this time.  Her appetite is not very good.  She has no odynophagia, dysphagia.  She has epigastric discomfort postprandial.  She also has bilateral abdominal discomfort.  It improves with bowel movement.  She had black stool. The nursing staff described stool as being brown, nobody seen black stool.  She had discomfort with defecation and burning sensation when she goes to the bathroom.  She has no associated fever or chills.  She has not been on antibiotics lately.  She was taking Dilaudid.  Medication is changed, since then she is not happy with medications.  She is on PPI.  She is on Carafate.  She has a history of PEG tube is passed in the past.  She has been followed by the [facility] and they have plans to place it again.  Apparently, she does not want to do it.  I do not want to do it as the patient has multiple scars from the past which makes replacing the PEG tube more difficult to do and patient has no difficulty swallowing.  She has a history of diabetes, not well controlled.  Hypertension.  Has congestive heart failure.    PAST MEDICAL HISTORY:  Significant for morbid obesity, dysphagia, history  working for her well.    IMPRESSION:    1. Gastroparesis, chronic abdominal discomforts, fundoplication reversed in the past, status post PEG tube which was removed at this time.  2. Non-insulin-dependent diabetes.  3. Congestive heart failure.  4. Morbid obesity.  5. Sleep apnea.  6. Anxiety.  7. Depression.    PLAN:    1. Continue PPI.  2. No plan for endoscopy if GI bleed confirmed.  3. Follow with Surgery recommendation.  4. Follow up also with [facility].          KULDIP ELAM MD      D:  08/01/2024 20:46:37     T:  08/02/2024 00:56:51     AT/S  Job #:  [id number]     Doc#:  [id number]

## 2024-08-02 NOTE — CARE COORDINATION
Remote Patient Monitoring Note      Date/Time:  2024 3:16 PM  Patient Current Location: [place]  LPN attempted to contact patient by telephone regarding yellow alert received for no BP taken > 2 days. Metrics have not been updated x 3 days. Spoke with patients emergency contact, Fely Nichols. Verified patients name and  as identifiers.  Background: Pt enrolled in RPM CHF and HTN.  Clinical Interventions: Discussed RPM adherence and need for pt to update daily metrics. Fely Nichols v/u, states pt \"just got out of the hospital\", and \"she'll do it first thing tomorrow morning\". Fely Nichols also verbalizes understanding of RPM hours, when to notify patients provider(s) of changes / concerns, and when to seek emergent medical care for pt. Pt was admitted to [facility] on 24 - 24. Will update ACM.   Plan/Follow Up: Will continue to review, monitor and address alerts with follow up based on severity of symptoms and risk factors.

## 2024-08-02 NOTE — CARE COORDINATION
8/2/24, 11:28 AM EDT    DISCHARGE PLANNING EVALUATION    Spoke with patient regarding HH, no skilled need for HH nurse.  Patient ok with no HH.  Current with RPM program.    8/2/24, 11:29 AM EDT    Patient goals/plan/ treatment preferences discussed by  and .  Patient goals/plan/ treatment preferences reviewed with patient/ family.  Patient/ family verbalize understanding of discharge plan and are in agreement with goal/plan/treatment preferences.  Understanding was demonstrated using the teach back method.  AVS provided by RN at time of discharge, which includes all necessary medical information pertaining to the patients current course of illness, treatment, post-discharge goals of care, and treatment preferences.     Services At/After Discharge: None, Current with PRM program.    Patient discharged home with wife.

## 2024-08-02 NOTE — CONSULTS
gm/meal)    Nutrition:-  Oral intake    Review of Systems/Symptom Assessment:- Pertinent positives as noted in the HPI. All other systems reviewed and negative.    Objective   Vitals:-    /85   Pulse 70   Temp 98.5 °F (36.9 °C) (Oral)   Resp 16   Ht 1.626 m (5' 4\")   Wt 132.2 kg (291 lb 7.2 oz)   LMP 07/11/2015   SpO2 100%   BMI 50.03 kg/m²     Pt. Weight:  Wt Readings from Last 3 Encounters:   08/02/24 132.2 kg (291 lb 7.2 oz)   07/30/24 126.4 kg (278 lb 9.6 oz)   07/29/24 125.2 kg (276 lb)       Physical Exam    Data:-  CBC:   Recent Labs     07/30/24 1956   WBC 13.3*   HGB 13.4   HCT 40.8   MCV 73.5*        BMP:    Recent Labs     07/30/24 1956      K 5.0      CO2 18*   BUN 16   CREATININE 0.8   GLUCOSE 88     LIVER PROFILE: No results for input(s): \"AST\", \"ALT\", \"LIPASE\", \"AMYLASE\", \"BILIDIR\", \"BILITOT\", \"ALKPHOS\" in the last 72 hours.    Invalid input(s): \"ALB\"  PT/INR: No results for input(s): \"PROTIME\", \"INR\" in the last 72 hours.     CT ABDOMEN PELVIS W WO CONTRAST Additional Contrast? None   Final Result   No stones or hydronephrosis.   Normal appendix.   Dense retained stool.   No mechanical bowel obstruction. Stomach non-distended.      This document has been electronically signed by: Tami Sharma MD on    07/31/2024 05:27 AM      All CTs at this facility use dose modulation techniques and iterative    reconstructions, and/or weight-based dosing   when appropriate to reduce radiation to a low as reasonably achievable.      XR ABDOMEN (KUB) (SINGLE AP VIEW)   Final Result   Impression:      No significant abnormalities.      This document has been electronically signed by: Jeff Chicas MD on    07/30/2024 10:00 PM           Palliative Performance Scale   90%   Full ambulation; normal activity and work; some evidence of disease; able to do own self care; normal intake; fully conscious    Assessment and Plan   Samantha THOMAS Simone is a 34 y.o. who is admitted to [facility] for Gastroparesis. Palliative care is consulted for  chronic abdominal pain .  I suspect this abdominal pain is multifactorial. She does have complex abdominal history with multiple surgeries including nissen for neuroendocrine tumor of duodenum in 2012 and incarcerated hernia, PEG tube for dysphagia that was subsequently removed. She was concerned at one point that she needed to have this replaced; however, myself, GI, and surgery all agreed there was no indication for this. Her pain at this point presents as more of a functional abdominal pain.           Principal Problem:    Gastroparesis  Resolved Problems:    * No resolved hospital problems. *       Continue current plan of care for chronic diseases per primary and specialist teams  Recommend outpatient pain management referral.  Has been fired by pain management at [facility]. Patient preference, could be either [facility]naes/[facility].  Avoid opioids given gastroparesis.  NSAIDs unlikely to provide much relief.  May benefit from outpatient psychological therapies.  There is theoretical benefit from topamax, which she is already on.  Continue lexapro,buspar, abilify.  Recommend starting low dose cymbalta.  Most recent qtc 414 7/30/24.  Continue Bowel regimen: dulcolax, lactulose, glycolax, senna.     CURRENT CODE STATUS:  Full Code      The total time I spent in seeing the patient, discussing symptoms, goals of care, advanced directives, code status, counseling and other major issues in addition to reviewing their medical history and collaborating to create their plan was more than 30 minutes.    Parts of this note may have been dictated by use of voice recognition software and electronically transcribed. The note may contain errors not detected in proofreading    Electronically signed by Juan Dominguez DO on 8/2/2024 at 9:00 AM           Palliative Care Office: [phone]

## 2024-08-02 NOTE — PROGRESS NOTES
Port flushed and de accessed, patient said she was ready to go and didn't want to want on IV team to remove with heparin. Patient d/c in stable condition walked down to lobby for pickup.

## 2024-08-02 NOTE — DISCHARGE SUMMARY
Hospital Medicine Discharge Summary      Patient Identification:   Samantha Nichols   : 1990  MRN: [id number]   Account: [id number]      Patient's PCP: Juan Dominguez DO    Admit Date: 2024     Discharge Date: 2024      Admitting Physician: Vicky Burton, APRN - CNP      Discharge Physician: Benjamin Morales, AURELIO - CNP     Discharge Diagnoses and Hospital Course:    Presented to the ED with abdominal pain with self reported melena.     Severe chronic, recurrent abdominal pain. Multifactorial. Constipation and Gastroparesis flair. Reported Melena (non witnessed here):  KUB (-). CT abdomen and pelvis with no acute findings except retained stool. HH stable. Given IVF hydration X 24 hours. IV Protonix BID. Home Carafate. Restarted Lactulose along with Miralax daily, colace and senna. She is allergic to Reglan. Also reports allergy to Compazine and Zofran. Continue Phenergan 12.5 mg every 6 hrs PO/UT PRN. Had multiple stools following initiation of bowel regimen. No melena noted. Did not have any emesis while here. Tolerated advancement in diet. No increase in abdominal pain. GI seen, no plans for scope unless acute bleed. They recommend on discharge she continue her Azithromycin. Discussed case with Dr. Michel per patient request, he does not have anything surgically to offer. Avoid narcotics as able. Palliative care seen for ongoing chronic abdominal pain. They recommend OP pain management. She has been fired from the group here. Referral given for outside facility. They also recommended Cymbalta which she was not interested in starting. She follows at [facility]. She was encouraged to keep these appointments.   NIDDM2: Controlled.  Last A1c 3/2024 6.0. Has been on Monjouro. This is likely increasing her gastroparesis symptoms. Recs to discontinue and not restart. Held Metformin while here, ok to resume on discharge. Given low dose SSI with hypoglycemic protocol here. Carb control  electronically signed by: Jeff Chicas MD on    07/30/2024 10:00 PM             Consults:     IP CONSULT TO SOCIAL WORK  IP CONSULT TO GI  IP CONSULT TO GENERAL SURGERY  IP CONSULT TO PALLIATIVE CARE    Disposition:    [x] Home       [] TCU       [] Rehab       [] Psych       [] SNF       [] Long Term Care Facility       [] Other-    Condition at Discharge: Stable    Code Status:  Full code    Patient Instructions:    Discharge lab work: None  Activity: activity as tolerated  Diet: FODMAP      Follow-up visits:   Juan Dominguez,   [address] [id number]  [phone]    Follow up on 8/7/2024  Appointment August 7, 2024 at 2:20pm         Discharge Medications:        Medication List        START taking these medications      docusate sodium 100 MG capsule  Commonly known as: COLACE            CHANGE how you take these medications      * traMADol 50 MG tablet  Commonly known as: ULTRAM  What changed: Another medication with the same name was removed. Continue taking this medication, and follow the directions you see here.     * traMADol 50 MG tablet  Commonly known as: Ultram  Take 1 tablet by mouth every 6 hours as needed for Pain for up to 28 doses. Intended supply: 5 days. Take lowest dose possible to manage pain Max Daily Amount: 200 mg  What changed: Another medication with the same name was removed. Continue taking this medication, and follow the directions you see here.           * This list has 2 medication(s) that are the same as other medications prescribed for you. Read the directions carefully, and ask your doctor or other care provider to review them with you.                CONTINUE taking these medications      acetaminophen 500 MG tablet  Commonly known as: TYLENOL  Take 1 tablet by mouth 4 times daily as needed for Pain     albuterol (2.5 MG/3ML) 0.083% nebulizer solution  Commonly known as: PROVENTIL  Take 3 mLs by nebulization every 6 hours as needed for Wheezing

## 2024-08-02 NOTE — PROGRESS NOTES
Surg Patient seen in consult to follow well-known patient of several doctors who I have seen and performed 2 or 3 robotic lysis of adhesions and the patient continues with her acute on chronic abdominal pain her last robotic lysis of adhesions was in January of this year patient has some diffuse pain to palpation but again all of her CAT scans that she has had over the last few years as a rule show no acute abnormality discussed with patient I have nothing else to offer her at this time it does not seem reasonable need to do another lysis of adhesions because were in the exact same place we were in January before the operation difficult to reason with the patient she has been over to [facility] she states [facility] says I should take care of the problem and that she needs an exploratory I question the validity of her statement indicated to patient if [facility] feels she should have an exploratory and they should proceed with that I did discuss with the dietitian 2 days ago who had seen the patient and sent a note to me again I really have nothing to add surgically at this time

## 2024-08-05 ENCOUNTER — CARE COORDINATION (OUTPATIENT)
Dept: CASE MANAGEMENT | Age: 34
End: 2024-08-05

## 2024-08-05 DIAGNOSIS — K31.84 GASTROPARESIS: Primary | ICD-10-CM

## 2024-08-05 NOTE — CARE COORDINATION
Care Transitions Note    Initial Call - Call within 2 business days of discharge: Yes    Attempted to reach patient for transitions of care follow up. Unable to reach patient.    Outreach Attempts:   HIPAA compliant voicemail left for patient.     1st attempt to contact pt for initial care transition follow up.  Unable to reach pt.  Left message with contact information and request for call back.      Patient: Samantha Nichols    Patient : 1990   MRN: [id number]    Reason for Admission: Generalized abdominal pain  Discharge Date: 24  RURS: Readmission Risk Score: 24.6    Last Discharge Facility       Date Complaint Diagnosis Description Type Department Provider    24  Generalized abdominal pain Admission (Discharged) [facility] Benjamin Morales, APRN - CNP            Was this an external facility discharge? No    Follow Up Appointment:   Patient has hospital follow up appointment scheduled within 7 days of discharge.    Future Appointments         Provider Specialty Dept Phone    2024 2:20 PM Dewey Palmer DO Internal Medicine [phone]    2024 1:30 PM [facility] ROOM 6 IP Unit [phone]    2024 1:20 PM Juan Dominguez, DO Internal Medicine [phone]    10/1/2024 1:00 PM Sully Ramos RD, LD Internal Medicine [phone]    2025 10:00 AM Ramona You MD Cardiology [phone]            Plan for follow-up on next business day.      Em Hua RN      
Received incoming call from pt.  Left message stating she was out walking the dog and missed the call.  She is returning the call.  She can be reached at [phone].      CTN called pt back.  Samantha states she is at a doctor's appt with Fely right now.  States she would really like to talk to CTN.  Requesting CTN call her back later today. CTN agreed to call her back at a later time.      Em Hua, RN  
being 5:05 pm.  Pt has an appt with PCP provider on 8/7/24.  She does not have any other questions, concerns or needs at this time.  Pt currently enrolled in [facility] and currently being followed by [facility].  CTN will make another outreach before handing off.      Care Transition Nurse reviewed discharge instructions and medical action plan with patient. The patient was given an opportunity to ask questions; all questions answered at this time.. The patient verbalized understanding.   Were discharge instructions available to patient? Yes.   Reviewed appropriate site of care based on symptoms and resources available to patient including: PCP  Specialist  When to call 911. The patient agrees to contact the primary care provider and/or specialist office for questions related to their healthcare.      Advance Care Planning:   Does patient have an Advance Directive: reviewed and current.    Medication Reconciliation:  Medication reconciliation was performed with patient,1111F entered: yes.     Remote Patient Monitoring:  Offered patient enrollment in the Remote Patient Monitoring (RPM) program for in-home monitoring: Yes, patient enrolled; current status is activated and monitoring.    Assessments:  Care Transitions 24 Hour Call    Do you have a copy of your discharge instructions?: Yes  Do you have all of your prescriptions and are they filled?: Yes  Have you been contacted by a [facility] Pharmacist?: No  Have you scheduled your follow up appointment?: Yes  How are you going to get to your appointment?: Other  Post Acute Services: Home Health (Comment: [facility])  Patient Home Equipment: Nebulizer  Do you have support at home?: Partner/Spouse/SO  Are you an active caregiver in your home?: No  Care Transitions Interventions   Home Care Waiver: Completed        Transportation Support: Declined      DME Assistance: Declined   Disease Specific Clinic: Completed      Disease Association: Completed                Follow Up Appointment:

## 2024-08-06 ENCOUNTER — CARE COORDINATION (OUTPATIENT)
Dept: CASE MANAGEMENT | Age: 34
End: 2024-08-06

## 2024-08-06 NOTE — CARE COORDINATION
Contacted [facility].  Spoke with Leigh Ann.  Informed her that pt has been trying to get in touch with them and unable to reach anyone.  Leigh Ann states that she does not see where pt has called or sent a my chart message.  Informed her that pt was discharged from [facility] on 8/2/24 for gastroparesis.  Pt has an appt on 9/6/24.  Leigh Ann states they do not have any availability sooner.   Pt continues to have abdominal pain and nausea but no vomiting.  Able to eat small meals/snacks.  Pt taking Tramadol for the pain.  Pain level varies from day to day.  Pt denied having any shortness of breath, blood noted in stools or dark colored stools.  She will send the message to the nurse.  Leigh Ann states it can take 24-48 hours before someone contacts pt.  CTN will let pt know.    Contacted pt.  Pt states she is doing \"good\" this morning.  Informed her that CTN contacted [facility] and spoke with Leigh Ann.  Informed her that she gave her the above information.  She verbalized understanding and aware that it can take up to 24-48 hours before someone contacts her.  No other questions, concerns or needs at this time.  Final outreach from CTN.  Handing pt back to TAWNY who has been following her.    Sent message to TAWNY Deleon with update.      Em Hua RN

## 2024-08-07 ENCOUNTER — OFFICE VISIT (OUTPATIENT)
Dept: INTERNAL MEDICINE CLINIC | Age: 34
End: 2024-08-07
Payer: MEDICARE

## 2024-08-07 ENCOUNTER — CARE COORDINATION (OUTPATIENT)
Dept: CASE MANAGEMENT | Age: 34
End: 2024-08-07

## 2024-08-07 ENCOUNTER — TELEPHONE (OUTPATIENT)
Dept: INTERNAL MEDICINE CLINIC | Age: 34
End: 2024-08-07

## 2024-08-07 VITALS
OXYGEN SATURATION: 97 % | RESPIRATION RATE: 18 BRPM | WEIGHT: 279.8 LBS | DIASTOLIC BLOOD PRESSURE: 68 MMHG | SYSTOLIC BLOOD PRESSURE: 126 MMHG | HEART RATE: 78 BPM | BODY MASS INDEX: 48.03 KG/M2 | TEMPERATURE: 97.2 F

## 2024-08-07 DIAGNOSIS — K21.9 GASTROESOPHAGEAL REFLUX DISEASE WITHOUT ESOPHAGITIS: ICD-10-CM

## 2024-08-07 DIAGNOSIS — E11.9 DIABETES MELLITUS TYPE 2, NONINSULIN DEPENDENT (HCC): ICD-10-CM

## 2024-08-07 DIAGNOSIS — K31.84 GASTROPARESIS: ICD-10-CM

## 2024-08-07 DIAGNOSIS — D57.1 SICKLE-CELL DISEASE WITHOUT CRISIS (HCC): ICD-10-CM

## 2024-08-07 DIAGNOSIS — R11.10 POSTPRANDIAL VOMITING: ICD-10-CM

## 2024-08-07 DIAGNOSIS — R10.84 GENERALIZED ABDOMINAL PAIN: Primary | ICD-10-CM

## 2024-08-07 PROBLEM — J96.00 ACUTE RESPIRATORY FAILURE (HCC): Status: RESOLVED | Noted: 2022-03-02 | Resolved: 2024-08-07

## 2024-08-07 PROCEDURE — 3078F DIAST BP <80 MM HG: CPT

## 2024-08-07 PROCEDURE — 3044F HG A1C LEVEL LT 7.0%: CPT

## 2024-08-07 PROCEDURE — 99214 OFFICE O/P EST MOD 30 MIN: CPT

## 2024-08-07 PROCEDURE — 3074F SYST BP LT 130 MM HG: CPT

## 2024-08-07 RX ORDER — TRAMADOL HYDROCHLORIDE 50 MG/1
50 TABLET ORAL EVERY 8 HOURS PRN
Qty: 21 TABLET | Refills: 0 | Status: SHIPPED | OUTPATIENT
Start: 2024-08-07 | End: 2024-08-14

## 2024-08-07 RX ORDER — DULAGLUTIDE 0.75 MG/.5ML
0.75 INJECTION, SOLUTION SUBCUTANEOUS WEEKLY
Qty: 4 ADJUSTABLE DOSE PRE-FILLED PEN SYRINGE | Refills: 0 | Status: SHIPPED | OUTPATIENT
Start: 2024-08-07

## 2024-08-07 RX ORDER — FAMOTIDINE 40 MG/1
40 TABLET, FILM COATED ORAL NIGHTLY
Qty: 90 TABLET | Refills: 3 | Status: SHIPPED | OUTPATIENT
Start: 2024-08-07

## 2024-08-07 RX ORDER — FLUTICASONE PROPIONATE 50 MCG
2 SPRAY, SUSPENSION (ML) NASAL DAILY
Qty: 1 EACH | Refills: 0 | Status: SHIPPED | OUTPATIENT
Start: 2024-08-07

## 2024-08-07 NOTE — CARE COORDINATION
Received incoming call from alexis Singh states that she is \"not feeling well and still having pain in her stomach\".  States she does not have any Tramadol and contacted Dr. Michel's office to get a refill.  She reports that provider will not give her another refill of the Tramadol.  She has an appt with PCP provider this afternoon.  Advised to let provider know and discuss her options.  Informed her that they may recommend a referral to pain management.  She informed CTN that she has not heard back from [facility].  Advised she contact them as well to discuss her pain.      Em Hua RN

## 2024-08-07 NOTE — TELEPHONE ENCOUNTER
Care Transitions Initial Follow Up Call    Outreach made within 2 business days of discharge: No    Patient: Samantha Nichols Patient : 1990   MRN: [id number]  Reason for Admission: Gastroperesis  Discharge Date: 24       Spoke with: Unable to reach Pt    Discharge department/facility: [facility]    TCM Interactive Patient Contact:  Was patient able to fill all prescriptions:   Was patient instructed to bring all medications to the follow-up visit:   Is patient taking all medications as directed in the discharge summary?   Does patient understand their discharge instructions:   Does patient have questions or concerns that need addressed prior to 7-14 day follow up office visit:     Additional needs identified to be addressed with provider               Scheduled appointment with PCP within 7-14 days    Follow Up  Future Appointments   Date Time Provider Department Center   2024  2:20 PM Dewey Palmer DO SRP[facility] Physic [facility] DEP   2024  1:30 PM [facility] ROOM 6 STR OP NURS [place] HOD   2024  1:20 PM Juan Dominguez DO [facility] Physic [facility] DEP   10/1/2024  1:00 PM Sully Ramos RD, LD [facility] Physic [facility] DEP   2025 10:00 AM Ramona You MD N SRP[facility] Heart [facility]       Colleen Sharma MA

## 2024-08-07 NOTE — PROGRESS NOTES
1990    Chief Complaint   Patient presents with    Abdominal Pain           HPI    Pt is 34 Y.O.F with pmhx of NIDDM2, primary HTN, HLD, HFpEF (EJ 55-60%), sickle cell trait, epilepsy, morbid obesity class III, anxiety who comes to the office 8/724 for hospital follow-up. Pt was admitted in the hospital for severe chronic, recurrent abdominal pain and Pt was diagnosed with constipation and gastroparesis flair. Per Dr Michel pt had robotic lysis of adhesion in 01/24 for diffuse abd pain. For this hospital stay pt wanted adhesion lysed again, however Dr Michel did not had anything surgically to offer and GI recommended Azithromycin.  She follows a GI doctor in [place].  Pt also follows Dr Iniguez for her HF, CAD. She is on Norvasc.  Today patient is here for her hospital f/u and reports feeling intermittent abdominal pain that she tried to controls with tylenol. Pt report still feeling abdominal pain today.  Pt is also complaining constipation, which was also seen on CT abdomen during hospital visit. Pt states taking lactulose and miralax to help her with constipation.  Pt denies chest pain, shortness of breath, headache.    Past Medical History:   Diagnosis Date    Acute on chronic diastolic congestive heart failure (HCC) 06/25/2022    JANI (acute kidney injury) ([facility]) 07/07/2019    Anemia     Anesthesia     migraines    Anxiety     Asthma     CAD (coronary artery disease)     Depression     Diabetes (HCC)     Diet-controlled type 2 diabetes mellitus (HCC) 2016    Dyslipidemia 11/14/2016    Epilepsy ([facility])     last siezure is 2 years ago    Epilepsy ([facility])     Fibroids     Gastro - esophageal reflux disease     Gastroparesis     History of esophagogastroduodenoscopy (EGD) 08/13/2022    Hypertension     Hypertrophy of tonsil and adenoid 11/04/2017    Malignant carcinoid tumor of other sites ([facility]) 05/14/2013    Migraine     PONV (postoperative nausea and vomiting)     Prolonged emergence from general anesthesia

## 2024-08-07 NOTE — PATIENT INSTRUCTIONS
Please take medication as follows:   Tramadol 1 tablet every 8 hour  Please stop taking metformin after you starting taking Trulicity.

## 2024-08-08 NOTE — CARE COORDINATION
Received incoming call from [facility] nurse yesterday.  Left message stating she was calling back in regards to pt.  They can be reached at [phone].    CTN called [facility] this morning.  Spoke with Rosita,  who called yesterday.  Informed her that CTN was informed that pt has been trying to reach them but has not been able to get a hold of anyone.  Pt stated she left messages and My Chart messages with no response.  Rosita reports that there have been communication with Radha Rojas NP.  Last communication was on 7/29/24.  Informed her that pt was readmitted from 7/30-8/2 for the gastroparesis.  Informed her that pt continues to have abdominal pain and nausea.  Pain occurs every day but level varies from day to day.  Pt ran out of Tramadol and was told Dr. Michel would no longer give her any refills.  Pt had a follow up with PCP provider yesterday and prescribed Tramadol for up to 7 days.  Rosita informed CTN that Radha Rojas has reached out to Dr. Lr in regards to pt.  Rosita will follow up with Radha.  Advised to contact pt directly with any response and update to her care.      Em Hua RN

## 2024-08-12 ENCOUNTER — CARE COORDINATION (OUTPATIENT)
Dept: CARE COORDINATION | Age: 34
End: 2024-08-12

## 2024-08-12 NOTE — CARE COORDINATION
Ambulatory Care Coordination Note     2024 11:53 AM     Patient Current Location:  Home: [address] [place] [address]     ACM contacted the patient by telephone. Verified name and  with patient as identifiers.         ACM: Rosibel Nguyen RN     Challenges to be reviewed by the provider   Additional needs identified to be addressed with provider Yes  medications-pt started Trulicity last Thursday.  Since then she reports that her gastroparesis s/s have been worse.  Reports worsening nausea, belching, abd pain.  Please advise.                 Method of communication with provider: chart routing.    Care Summary Note: Samantha was referred to care coordination by CTN following recent utilization. Pt has h/o: CHF, DM, HTN, gastroparesis, chronic constipation.   Denies constipation at this time.  Taking Miralax to help with bowels while taking Tramadol for pain.    Taking phenergan PRN nausea.   Has noticed increased abd pain, nausea, and belching since starting Trulicity.   Encouraged to keep diet bland at this time.  Has f/u with GI specialist at [facility] .    Offered patient enrollment in the Remote Patient Monitoring (RPM) program for in-home monitoring: Yes, patient enrolled; current status is activated and monitoring.   VS's today: 116/-97%RA.  Pt reports that she was having pain at the time VS's were checked.   Assessments Completed:   Diabetes Assessment    Medic Alert ID: No  How often do you test your blood sugar?: Daily   Do you have barriers with adherence to non-pharmacologic self-management interventions? (Nutrition/Exercise/Self-Monitoring): No   Have you ever had to go to the ED for symptoms of low blood sugar?: No       Do you have hyperglycemia symptoms?: No   Do you have hypoglycemia symptoms?: No   Last Blood Sugar Value: 106   Blood Sugar Monitoring Regimen: Once a Day   Blood Sugar Trends: No Change          and   General Assessment    Do you have any symptoms that are causing

## 2024-08-12 NOTE — TELEPHONE ENCOUNTER
Pt informed to stop Trulicity now and to go back on Metformin as she was taking previously.  Pt verbalizes understanding.

## 2024-08-19 ENCOUNTER — CARE COORDINATION (OUTPATIENT)
Dept: CARE COORDINATION | Age: 34
End: 2024-08-19

## 2024-08-19 ENCOUNTER — PATIENT MESSAGE (OUTPATIENT)
Dept: INTERNAL MEDICINE CLINIC | Age: 34
End: 2024-08-19

## 2024-08-19 DIAGNOSIS — R10.84 GENERALIZED ABDOMINAL PAIN: ICD-10-CM

## 2024-08-19 NOTE — CARE COORDINATION
Ambulatory Care Coordination Note     8/19/2024 1:12 PM     ACM outreach attempt by this ACM today to perform care management follow up . ACM was unable to reach the patient by telephone today; left voice message requesting a return phone call to this ACM.     ACM: Rosibel Nguyen RN     Care Summary Note: Samantha was referred to care coordination by CTN following recent utilization. Pt has h/o: CHF, DM, HTN, gastroparesis, chronic constipation.     PCP/Specialist follow up:   Future Appointments         Provider Specialty Dept Phone    8/23/2024 1:30 PM STR MINOR ROOM 6 IP Unit [phone]    8/27/2024 2:20 PM Shad Skinner DO Internal Medicine [phone]    9/12/2024 1:20 PM Juan Dominguez DO Internal Medicine [phone]    10/1/2024 1:00 PM Sully Ramos RD, LD Internal Medicine [phone]    5/13/2025 10:00 AM Ramona You MD Cardiology [phone]            Follow Up:   Plan for next ACM outreach in approximately 1 week to complete:  - CC Protocol assessments  - disease specific assessments  - goal progression  - education .

## 2024-08-20 ENCOUNTER — OFFICE VISIT (OUTPATIENT)
Dept: INTERNAL MEDICINE CLINIC | Age: 34
End: 2024-08-20
Payer: MEDICARE

## 2024-08-20 VITALS
BODY MASS INDEX: 48.16 KG/M2 | DIASTOLIC BLOOD PRESSURE: 86 MMHG | HEART RATE: 76 BPM | TEMPERATURE: 97.7 F | OXYGEN SATURATION: 100 % | WEIGHT: 280.6 LBS | SYSTOLIC BLOOD PRESSURE: 132 MMHG | RESPIRATION RATE: 18 BRPM

## 2024-08-20 DIAGNOSIS — E11.65 TYPE 2 DIABETES MELLITUS WITH HYPERGLYCEMIA, WITHOUT LONG-TERM CURRENT USE OF INSULIN (HCC): ICD-10-CM

## 2024-08-20 DIAGNOSIS — D57.3 SICKLE CELL TRAIT (HCC): ICD-10-CM

## 2024-08-20 DIAGNOSIS — G47.33 OBSTRUCTIVE SLEEP APNEA SYNDROME: ICD-10-CM

## 2024-08-20 DIAGNOSIS — E87.8 ELECTROLYTE ABNORMALITY: ICD-10-CM

## 2024-08-20 DIAGNOSIS — R52 GENERALIZED BODY ACHES: ICD-10-CM

## 2024-08-20 DIAGNOSIS — R53.1 WEAKNESS: ICD-10-CM

## 2024-08-20 DIAGNOSIS — R10.84 GENERALIZED ABDOMINAL PAIN: Primary | ICD-10-CM

## 2024-08-20 PROCEDURE — 83036 HEMOGLOBIN GLYCOSYLATED A1C: CPT | Performed by: STUDENT IN AN ORGANIZED HEALTH CARE EDUCATION/TRAINING PROGRAM

## 2024-08-20 PROCEDURE — 3044F HG A1C LEVEL LT 7.0%: CPT | Performed by: STUDENT IN AN ORGANIZED HEALTH CARE EDUCATION/TRAINING PROGRAM

## 2024-08-20 PROCEDURE — 99214 OFFICE O/P EST MOD 30 MIN: CPT | Performed by: STUDENT IN AN ORGANIZED HEALTH CARE EDUCATION/TRAINING PROGRAM

## 2024-08-20 PROCEDURE — 3075F SYST BP GE 130 - 139MM HG: CPT | Performed by: STUDENT IN AN ORGANIZED HEALTH CARE EDUCATION/TRAINING PROGRAM

## 2024-08-20 PROCEDURE — 3079F DIAST BP 80-89 MM HG: CPT | Performed by: STUDENT IN AN ORGANIZED HEALTH CARE EDUCATION/TRAINING PROGRAM

## 2024-08-20 RX ORDER — TRAMADOL HYDROCHLORIDE 50 MG/1
50 TABLET ORAL EVERY 8 HOURS PRN
Qty: 21 TABLET | Refills: 0 | Status: SHIPPED | OUTPATIENT
Start: 2024-08-20 | End: 2024-08-27

## 2024-08-20 RX ORDER — TRAMADOL HYDROCHLORIDE 50 MG/1
50 TABLET ORAL EVERY 8 HOURS PRN
Qty: 21 TABLET | Refills: 2 | Status: SHIPPED | OUTPATIENT
Start: 2024-08-20 | End: 2024-09-10

## 2024-08-20 RX ORDER — BLOOD-GLUCOSE METER
1 KIT MISCELLANEOUS DAILY
Qty: 1 KIT | Refills: 0 | Status: SHIPPED | OUTPATIENT
Start: 2024-08-20

## 2024-08-20 NOTE — PROGRESS NOTES
light, extraocular muscles intact  Ears - external ears are normal, hearing is grossly normal  Mouth - oral pharynx clear, mucous membranes moist  Neck - supple, no significant adenopathy  Chest - clear to auscultation, no wheezes, rales or rhonchi, symmetric air entry  Heart - normal rate, regular rhythm, normal S1, S2, no murmurs, rubs, clicks or gallops  Abdomen -Obese soft, nontender, nondistended  Neurological - alert, oriented, cranial nerves II-XII intact, motor and sensation are grossly intact bilateral upper and lower extremities.  Extremities - peripheral pulses normal, no pedal edema, no clubbing or cyanosis  Skin - warm and dry    Diagnostic data:   I have reviewed recent diagnostic testing including:      Assessment and Plan:     Diagnosis Orders   1. Generalized abdominal pain  traMADol (ULTRAM) 50 MG tablet      2. Electrolyte abnormality  Basic Metabolic Panel      3. Generalized body aches  Basic Metabolic Panel      4. Sickle cell trait (HCC)  CBC      5. Weakness  CBC      6. Obstructive sleep apnea syndrome  [facility], [place]      7. Type 2 diabetes mellitus with hyperglycemia, without long-term current use of insulin ([facility])  POCT glycosylated hemoglobin (Hb A1C)    C-Peptide    glucose monitoring kit    Urinalysis              Staffed with: Dr. Gonzalo Cortez MD    \[facility]\""X [facility]  [address] [place], [place] [address]  Dept: [phone]  Dept Fax: [phone]  Loc: [phone]

## 2024-08-21 ENCOUNTER — CARE COORDINATION (OUTPATIENT)
Dept: CARE COORDINATION | Age: 34
End: 2024-08-21

## 2024-08-21 DIAGNOSIS — E11.9 DIABETES MELLITUS TYPE 2, NONINSULIN DEPENDENT (HCC): ICD-10-CM

## 2024-08-21 NOTE — CARE COORDINATION
Ambulatory Care Coordination Note     2024 12:11 PM     Patient Current Location:  Home: [address] [place] [address]     ACM contacted the patient by telephone. Verified name and  with patient as identifiers.         ACM: Rosibel Nguyen RN     Challenges to be reviewed by the provider   Additional needs identified to be addressed with provider Yes  medications-please clarify.  Pt seen in office yesterday.  Previously on  was advised to stop Trulicity and go back on metformin d/t worsening abd pain .  Pt has not had any change in her abd s/s since stopping Trulicity.  After being seen yesterday pt thought she was to go back on Trulicity. Please clarify if pt is to resume Trulicity as she was previously taking and if so does she need to stop metformin?  PLEASE ADVISE    If pt is to continue on metformin she will need new script sent to [facility]. Pt has been taking 500mg BID. PLEASE ADVISE               Method of communication with provider: chart routing.    Care Summary Note: Samantha was referred to care coordination by CTN following recent utilization. Pt has h/o: CHF, DM, HTN, gastroparesis, chronic constipation.   Spoke with Samantha today.  Pt had PCP appt yesterday.  Needs clarification on Trulicity and metformin.  Message routed to PCP.  Pt has lost glucometer. Order was sent yesterday for new meter but pt did not receive it.  She will check with pharmacy to see if they had to order one.   Pt reports low on food.  Pt aware PCP office has food boxes available.  Pt also aware of local food resources including pantry and soup kitchen.  Pt has utilized transport resources to get her to and from grocery.  Gastroparesis-continues to experience abd pain. Tramadol prescribed Has upcoming appt with new GI at [facility] on .  Reports nausea today.   Has phenergan to take PRN but pt reports that it causes her h/a's.  Unable to take Zofran.    Labs ordered at yesterday's appt. Pt will

## 2024-08-22 ENCOUNTER — CARE COORDINATION (OUTPATIENT)
Dept: CASE MANAGEMENT | Age: 34
End: 2024-08-22

## 2024-08-22 NOTE — CARE COORDINATION
-Remote Alert Monitoring Note      Date/Time:  2024 8:32 AM  Patient Current Location: Home: [address] [place] [address]  Verified patients name and  as identifiers.    Rpm alert to be reviewed by the provider   red alert  pulse ox heart rate (134)  Vitals Recheck pulse ox heart rate (78)  Additional needs to be addressed by provider: No                   LPN contacted patient by telephone regarding red alert received   Background: ENROLLED IN RPM FOR CHF AND HTN  Refer to 1 immediately if:  Patient unresponsive or unable to provide history  Change in cognition or sudden confusion  Patient unable to respond in complete sentences  Intense chest pain/tightness  Any concern for any clinical emergency  Red Alert: Provider response time of 1 hr required for any red alert requiring intervention  Yellow Alert: Provider response time of 3hr required for any escalated yellow alert  Patient Chief Complaint:  HR Triage  Are you having any Chest Pain? no   Are you having any Shortness of Breath? no   Are you having any dizziness? no   Are you feeling more fatigued or tired than normal? no   Are you having any other health concerns or issues? no     Clinical Interventions: Reviewed and followed up on alerts and treatments-spoke to Samantha regarding RPM RED alert for increased HR. Denies chest pain, dyspnea or dizziness. Pt reports she has palpitations   educated on checking pulse ox HR. Verbalized understanding.   Recheck of pulse ox HR. New reading 97% with HR 78. All metrics WNL.   Plan/Follow Up: Will continue to review, monitor and address alerts with follow up based on severity of symptoms and risk factors.  **For any new or worsening symptoms or you are concerned in anyway, please contact your Provider or report to the nearest Emergency Room.**

## 2024-08-22 NOTE — TELEPHONE ENCOUNTER
Pt notified to stop Trulicity altogether d/t high risk of worsening her gastroparesis.  Pt aware to resume Metformin,  informed her new script was sent to [facility].  Pt verbalizes understanding.

## 2024-08-23 ENCOUNTER — HOSPITAL ENCOUNTER (OUTPATIENT)
Dept: NURSING | Age: 34
Discharge: HOME | End: 2024-08-23
Payer: MEDICARE

## 2024-08-23 VITALS
RESPIRATION RATE: 18 BRPM | DIASTOLIC BLOOD PRESSURE: 84 MMHG | HEART RATE: 77 BPM | WEIGHT: 275 LBS | OXYGEN SATURATION: 100 % | BODY MASS INDEX: 47.2 KG/M2 | TEMPERATURE: 97.2 F | SYSTOLIC BLOOD PRESSURE: 130 MMHG

## 2024-08-23 DIAGNOSIS — D50.8 OTHER IRON DEFICIENCY ANEMIA: Primary | ICD-10-CM

## 2024-08-23 LAB
ANION GAP SERPL CALC-SCNC: 12 MEQ/L (ref 8–16)
BACTERIA: ABNORMAL
BILIRUB UR QL STRIP: NEGATIVE
BUN SERPL-MCNC: 13 MG/DL (ref 7–22)
C PEPTIDE SERPL-MCNC: 4.1 NG/ML (ref 1.1–4.4)
CALCIUM SERPL-MCNC: 9.3 MG/DL (ref 8.5–10.5)
CASTS #/AREA URNS LPF: ABNORMAL /LPF
CASTS #/AREA URNS LPF: ABNORMAL /LPF
CHARACTER UR: CLEAR
CHARCOAL URNS QL MICRO: ABNORMAL
CHLORIDE SERPL-SCNC: 105 MEQ/L (ref 98–111)
CO2 SERPL-SCNC: 23 MEQ/L (ref 23–33)
COLOR UR: YELLOW
CREAT SERPL-MCNC: 0.7 MG/DL (ref 0.4–1.2)
CRYSTALS URNS QL MICRO: ABNORMAL
DEPRECATED RDW RBC AUTO: 44.7 FL (ref 35–45)
EPITHELIAL CELLS, UA: ABNORMAL /HPF
ERYTHROCYTE [DISTWIDTH] IN BLOOD BY AUTOMATED COUNT: 16.5 % (ref 11.5–14.5)
GFR SERPL CREATININE-BSD FRML MDRD: > 90 ML/MIN/1.73M2
GLUCOSE SERPL-MCNC: 90 MG/DL (ref 70–108)
GLUCOSE UR QL STRIP.AUTO: NEGATIVE MG/DL
HCT VFR BLD AUTO: 40 % (ref 37–47)
HGB BLD-MCNC: 12.7 GM/DL (ref 12–16)
HGB UR QL STRIP.AUTO: ABNORMAL
KETONES UR QL STRIP.AUTO: NEGATIVE
LEUKOCYTE ESTERASE UR QL STRIP.AUTO: NEGATIVE
MCH RBC QN AUTO: 24.1 PG (ref 26–33)
MCHC RBC AUTO-ENTMCNC: 31.8 GM/DL (ref 32.2–35.5)
MCV RBC AUTO: 75.9 FL (ref 81–99)
NITRITE UR QL STRIP.AUTO: NEGATIVE
PH UR STRIP.AUTO: 5.5 [PH] (ref 5–9)
PLATELET # BLD AUTO: 293 THOU/MM3 (ref 130–400)
PMV BLD AUTO: 9.3 FL (ref 9.4–12.4)
POTASSIUM SERPL-SCNC: 3.5 MEQ/L (ref 3.5–5.2)
PROT UR STRIP.AUTO-MCNC: NEGATIVE MG/DL
RBC # BLD AUTO: 5.27 MILL/MM3 (ref 4.2–5.4)
RBC #/AREA URNS HPF: ABNORMAL /HPF
RENAL EPI CELLS #/AREA URNS HPF: ABNORMAL /[HPF]
SODIUM SERPL-SCNC: 140 MEQ/L (ref 135–145)
SP GR UR REFRACT.AUTO: 1.02 (ref 1–1.03)
UROBILINOGEN UR QL STRIP.AUTO: 1 EU/DL (ref 0–1)
WBC # BLD AUTO: 10.8 THOU/MM3 (ref 4.8–10.8)
WBC #/AREA URNS HPF: ABNORMAL /HPF
YEAST LIKE FUNGI URNS QL MICRO: ABNORMAL

## 2024-08-23 PROCEDURE — 2580000003 HC RX 258: Performed by: SURGERY

## 2024-08-23 PROCEDURE — 36591 DRAW BLOOD OFF VENOUS DEVICE: CPT

## 2024-08-23 PROCEDURE — 81001 URINALYSIS AUTO W/SCOPE: CPT

## 2024-08-23 PROCEDURE — 84681 ASSAY OF C-PEPTIDE: CPT

## 2024-08-23 PROCEDURE — 80048 BASIC METABOLIC PNL TOTAL CA: CPT

## 2024-08-23 PROCEDURE — 99212 OFFICE O/P EST SF 10 MIN: CPT

## 2024-08-23 PROCEDURE — 99213 OFFICE O/P EST LOW 20 MIN: CPT

## 2024-08-23 PROCEDURE — 6360000002 HC RX W HCPCS: Performed by: SURGERY

## 2024-08-23 PROCEDURE — 85027 COMPLETE CBC AUTOMATED: CPT

## 2024-08-23 RX ORDER — SODIUM CHLORIDE 0.9 % (FLUSH) 0.9 %
5-40 SYRINGE (ML) INJECTION PRN
OUTPATIENT
Start: 2024-08-23

## 2024-08-23 RX ORDER — HEPARIN 100 UNIT/ML
500 SYRINGE INTRAVENOUS PRN
Status: DISCONTINUED | OUTPATIENT
Start: 2024-08-23 | End: 2024-08-24 | Stop reason: HOSPADM

## 2024-08-23 RX ORDER — SODIUM CHLORIDE 9 MG/ML
25 INJECTION, SOLUTION INTRAVENOUS PRN
OUTPATIENT
Start: 2024-08-23

## 2024-08-23 RX ORDER — HEPARIN 100 UNIT/ML
500 SYRINGE INTRAVENOUS PRN
OUTPATIENT
Start: 2024-08-23

## 2024-08-23 RX ORDER — SODIUM CHLORIDE 0.9 % (FLUSH) 0.9 %
5-40 SYRINGE (ML) INJECTION PRN
Status: DISCONTINUED | OUTPATIENT
Start: 2024-08-23 | End: 2024-08-24 | Stop reason: HOSPADM

## 2024-08-23 RX ADMIN — HEPARIN 500 UNITS: 100 SYRINGE at 13:32

## 2024-08-23 RX ADMIN — SODIUM CHLORIDE, PRESERVATIVE FREE 10 ML: 5 INJECTION INTRAVENOUS at 13:32

## 2024-08-23 ASSESSMENT — PAIN - FUNCTIONAL ASSESSMENT: PAIN_FUNCTIONAL_ASSESSMENT: NONE - DENIES PAIN

## 2024-08-23 NOTE — PROGRESS NOTES
Patient admitted to room endo 3, vitals are stable. Patient offered rights and responsibilities.     ___m_ Safety:       (Environmental)  [place] to environment  Ensure ID band is correct and in place/ allergy band as needed  Assess for fall risk  Initiate fall precautions as applicable (fall band, side rails, etc.)  Call light within reach  Bed in low position/ wheels locked    __m__ Pain:       Assess pain level and characteristics  Administer analgesics as ordered  Assess effectiveness of pain management and report to MD as needed    __m__ Knowledge Deficit:  Assess baseline knowledge  Provide teaching at level of understanding  Provide teaching via preferred learning method  Evaluate teaching effectiveness    _m___ Hemodynamic/Respiratory Status:       (Pre and Post Procedure Monitoring)  Assess/Monitor vital signs and LOC  Assess Baseline SpO2 prior to any sedation  Obtain weight/height  Assess vital signs/ LOC until patient meets discharge criteria  Monitor procedure site and notify MD of any issues    __m__ Infection-Risk of Central Venous Catheter:  Monitor for infection signs and symptoms (catheter site redness, temperature elevation, etc)  Assess for infection risks  Educate regarding infection prevention  Manage central venous catheter (flushes/ dressing changes per protocol)

## 2024-08-23 NOTE — DISCHARGE INSTRUCTIONS
ACTIVITY:  Continue usual care with your doctor. Call your doctor immediately if any severe problems or go to the nearest emergency room.  Next Mediport flush is scheduled for Friday September 20th at 1:30 pm.  Please irais if you have any questions [phone].    I have been treated and hereby acknowledge receiving this instruction sheet.

## 2024-08-26 ENCOUNTER — CARE COORDINATION (OUTPATIENT)
Dept: CARE COORDINATION | Age: 34
End: 2024-08-26

## 2024-08-26 NOTE — CARE COORDINATION
SW called pt to follow up with food insecurities. Advised pt to take advantage of the food pantry's and to call [employer] if pt has no access to transportation and they will deliver in an emergency. Will mail a voucher and list of food pantry's to pt.

## 2024-08-27 ENCOUNTER — OFFICE VISIT (OUTPATIENT)
Dept: PULMONOLOGY | Age: 34
End: 2024-08-27
Payer: MEDICARE

## 2024-08-27 ENCOUNTER — OFFICE VISIT (OUTPATIENT)
Dept: INTERNAL MEDICINE CLINIC | Age: 34
End: 2024-08-27
Payer: MEDICARE

## 2024-08-27 VITALS
WEIGHT: 281.2 LBS | OXYGEN SATURATION: 97 % | HEART RATE: 84 BPM | BODY MASS INDEX: 48.01 KG/M2 | HEIGHT: 64 IN | SYSTOLIC BLOOD PRESSURE: 132 MMHG | TEMPERATURE: 97.9 F | DIASTOLIC BLOOD PRESSURE: 86 MMHG

## 2024-08-27 VITALS
HEIGHT: 64 IN | HEART RATE: 92 BPM | TEMPERATURE: 97.6 F | DIASTOLIC BLOOD PRESSURE: 84 MMHG | WEIGHT: 282.2 LBS | BODY MASS INDEX: 48.18 KG/M2 | SYSTOLIC BLOOD PRESSURE: 128 MMHG

## 2024-08-27 DIAGNOSIS — E66.01 MORBID OBESITY WITH BMI OF 50.0-59.9, ADULT (HCC): ICD-10-CM

## 2024-08-27 DIAGNOSIS — G47.19 EXCESSIVE DAYTIME SLEEPINESS: ICD-10-CM

## 2024-08-27 DIAGNOSIS — E11.69 TYPE 2 DIABETES MELLITUS WITH OBESITY (HCC): ICD-10-CM

## 2024-08-27 DIAGNOSIS — G47.09 OTHER INSOMNIA: ICD-10-CM

## 2024-08-27 DIAGNOSIS — G47.33 OSA (OBSTRUCTIVE SLEEP APNEA): Primary | ICD-10-CM

## 2024-08-27 DIAGNOSIS — E66.9 TYPE 2 DIABETES MELLITUS WITH OBESITY (HCC): ICD-10-CM

## 2024-08-27 DIAGNOSIS — E11.9 DIABETES MELLITUS TYPE 2, NONINSULIN DEPENDENT (HCC): Primary | ICD-10-CM

## 2024-08-27 LAB — HBA1C MFR BLD: 5.9 % (ref 4.3–5.7)

## 2024-08-27 PROCEDURE — 99213 OFFICE O/P EST LOW 20 MIN: CPT | Performed by: STUDENT IN AN ORGANIZED HEALTH CARE EDUCATION/TRAINING PROGRAM

## 2024-08-27 PROCEDURE — 3079F DIAST BP 80-89 MM HG: CPT | Performed by: STUDENT IN AN ORGANIZED HEALTH CARE EDUCATION/TRAINING PROGRAM

## 2024-08-27 PROCEDURE — 3079F DIAST BP 80-89 MM HG: CPT | Performed by: NURSE PRACTITIONER

## 2024-08-27 PROCEDURE — 3074F SYST BP LT 130 MM HG: CPT | Performed by: STUDENT IN AN ORGANIZED HEALTH CARE EDUCATION/TRAINING PROGRAM

## 2024-08-27 PROCEDURE — 99214 OFFICE O/P EST MOD 30 MIN: CPT | Performed by: NURSE PRACTITIONER

## 2024-08-27 PROCEDURE — 3044F HG A1C LEVEL LT 7.0%: CPT | Performed by: STUDENT IN AN ORGANIZED HEALTH CARE EDUCATION/TRAINING PROGRAM

## 2024-08-27 PROCEDURE — 3075F SYST BP GE 130 - 139MM HG: CPT | Performed by: NURSE PRACTITIONER

## 2024-08-27 PROCEDURE — 83036 HEMOGLOBIN GLYCOSYLATED A1C: CPT | Performed by: STUDENT IN AN ORGANIZED HEALTH CARE EDUCATION/TRAINING PROGRAM

## 2024-08-27 NOTE — PROGRESS NOTES
1990    Chief Complaint   Patient presents with    Diabetes     1 week f/u labs        Pt is a 34 y.o. female who presents for a follow up for her Diabetes and blood work.     HPI    Patient presents to our clinic this afternoon as a follow up to go over her blood work and to monitor for symptom improvement. Patient mentions she is doing well. She was able to visit the sleep clinic this morning. They plan on ordering patient a new machine and mask. A1c this visit is 5.9. Spironolactone was stopped last visit. Her body aches have improved.     She is doing well no complaints. Plan is to follow up with her in 6 months or sooner if needed.     Past Medical History:   Diagnosis Date    Acute on chronic diastolic congestive heart failure ([facility]) 06/25/2022    JANI (acute kidney injury) ([facility]) 07/07/2019    Anemia     Anesthesia     migraines    Anxiety     Asthma     CAD (coronary artery disease)     Depression     Diabetes ([facility])     Diet-controlled type 2 diabetes mellitus ([facility]) 2016    Dyslipidemia 11/14/2016    Epilepsy ([facility])     last siezure is 2 years ago    Epilepsy ([facility])     Fibroids     Gastro - esophageal reflux disease     Gastroparesis     History of esophagogastroduodenoscopy (EGD) 08/13/2022    Hypertension     Hypertrophy of tonsil and adenoid 11/04/2017    Malignant carcinoid tumor of other sites ([facility]) 05/14/2013    Migraine     PONV (postoperative nausea and vomiting)     Prolonged emergence from general anesthesia     Sickle cell anemia (HCC)     Sickle cell trait (HCC)     PT STATES SHE HAS THE TRAIT NOT THE DISEASE    Tumor associated pain     neuroendrocrine tumor, gastroma       Past Surgical History:   Procedure Laterality Date    ABDOMEN SURGERY  03/23/2017    Laparoscopic , Bi lat oopherectomy Dr Hinojosa    ABDOMINAL EXPLORATION SURGERY      x2    BREAST REDUCTION SURGERY      CENTRAL VENOUS CATHETER Right 12/11/2015    right subclavian single lumen mediport insertion--Dr. Michel     Alcohol use: Not Currently     Comment: no    Drug use: No    Sexual activity: Yes     Partners: Female   Other Topics Concern    Not on file   Social History Narrative    Not on file     Social Determinants of Health     Financial Resource Strain: Low Risk  (7/8/2024)    Overall Financial Resource Strain (CARDIA)     Difficulty of Paying Living Expenses: Not very hard   Food Insecurity: Not on file (4/27/2024)   Transportation Needs: No Transportation Needs (7/8/2024)    PRAPARE - Transportation     Lack of Transportation (Medical): No     Lack of Transportation (Non-Medical): No   Physical Activity: Inactive (7/8/2024)    Exercise Vital Sign     Days of Exercise per Week: 0 days     Minutes of Exercise per Session: 0 min   Stress: Stress Concern Present (7/8/2024)    [employer] - Occupational Stress Questionnaire     Feeling of Stress : Rather much   Social Connections: Unknown (7/8/2024)    Social Connection and Isolation Panel [NHANES]     Frequency of Communication with Friends and Family: More than three times a week     Frequency of Social Gatherings with Friends and Family: More than three times a week     Attends Druze Services: Not on file     Active Member of Clubs or Organizations: Not on file     Attends Club or Organization Meetings: Not on file     Marital Status: Not on file   Intimate Partner Violence: Not on file   Housing Stability: Low Risk  (7/8/2024)    Housing Stability Vital Sign     Unable to Pay for Housing in the Last Year: No     Number of Places Lived in the Last Year: 1     Unstable Housing in the Last Year: No       Family History   Problem Relation Age of Onset    Cancer Mother     High Blood Pressure Mother     Heart Disease Mother         MI    Liver Cancer Mother     Sickle Cell Trait Father     High Blood Pressure Father     Heart Disease Father     Sickle Cell Trait Sister     Heart Disease Sister     Sickle Cell Trait Sister     Sickle Cell

## 2024-08-27 NOTE — PROGRESS NOTES
[facility]      Samantha Nichols         [id number]  8/27/2024   Chief Complaint   Patient presents with    Follow-up     Last saw Dr Leach 12/4/23, referred back by Dr Abhishek Hamilton, HST 7/17/23, echo 6/25/22, patient does not have PAP machine currently but would like order for new PAP.         Pt of Dr. Leach    PAP Download:   Original or initial AHI: 20.1     Date of initial study: 7/17/23        Neck Size: 14 inches  Mallampati 4  ESS:  24  SAQLI: 38    Here is a scan of the most recent download:  **NO DOWNLOAD PATIENT DOES NOT HAVE MACHINE CURRENTLY.       Presentation:   Samantha presents for sleep medicine follow up for obstructive sleep apnea  Since the last visit, Samantha is wanting to discuss sleep apnea treatment options, tried CPAP in past, felt very claustrophobic despite different mask styles and could not tolerate it.   Very sleepy during the day, do nap frequently. Has difficulty sleeping at night. Wakes up gasping, choking .   Has been working on wt loss, is down 12 lbs since having study in 2023    Progress History:   Since last visit any new medical issues? No    Review of Systems -   Review of Systems   Constitutional:  Positive for fatigue.   Cardiovascular:  Positive for leg swelling.   Psychiatric/Behavioral:  Positive for sleep disturbance. The patient is nervous/anxious.    All other systems reviewed and are negative.       Physical Exam:    BMI:  Body mass index is 48.27 kg/m².    Wt Readings from Last 3 Encounters:   08/27/24 127.6 kg (281 lb 3.2 oz)   08/23/24 124.7 kg (275 lb)   08/20/24 127.3 kg (280 lb 9.6 oz)     Weight lost 12 lbs over 1 year   Vitals: /86 (Site: Left Lower Arm, Position: Sitting, Cuff Size: Medium Adult)   Pulse 84   Temp 97.9 °F (36.6 °C) (Skin)   Ht 1.626 m (5' 4\")   Wt 127.6 kg (281 lb 3.2 oz)   LMP 07/11/2015   SpO2 97% Comment: Patient on room air  BMI 48.27 kg/m²       Physical Exam  Vitals and nursing

## 2024-08-29 ENCOUNTER — CARE COORDINATION (OUTPATIENT)
Dept: CARE COORDINATION | Age: 34
End: 2024-08-29

## 2024-08-29 NOTE — CARE COORDINATION
Ambulatory Care Coordination Note     2024 12:53 PM     Patient Current Location:  Home: [facility] [address]  [place] [address]     ACM contacted the patient by telephone. Verified name and  with patient as identifiers.         ACM: Rosibel Nguyen RN     Challenges to be reviewed by the provider   Additional needs identified to be addressed with provider No  none               Method of communication with provider: none.    Care Summary Note:  Samantha was referred to care coordination by CTN following recent utilization. Pt has h/o: CHF, DM, HTN, gastroparesis, chronic constipation.   Spoke with Samantha today.   Pt has Sleep and PCP appt 2 days ago.   CPAP has been ordered for pt.   DM: A1C 5.9.  currently on metformin.  Pt will be able to obtain new glucometer on .   Reinforced importance of avoiding concentrated sweets, portion sizes  Gastroparesis-has VV with [facility] tomorrow.  Pt reports that nausea persists but states that she has been able to keep food and fluids down.  Pt reports that she is getting good amts of protein in her diet.   Eating more lamb, fish.    Body aches have improved some since coming off of aldactone.  Pt reports that they do come back at times.        Offered patient enrollment in the Remote Patient Monitoring (RPM) program for in-home monitoring: Yes, patient enrolled; current status is activated and monitoring.   pt has had some elevated BP readings yesterday but today BP upon recheck 118/93-90-93% RA  pt recently taken off of aldactone.  Remains on norvasc and prazosin  Assessments Completed:   Diabetes Assessment    Medic Alert ID: No  How often do you test your blood sugar?: Daily   Do you have barriers with adherence to non-pharmacologic self-management interventions? (Nutrition/Exercise/Self-Monitoring): No   Have you ever had to go to the ED for symptoms of low blood sugar?: No                 Medications Reviewed:   Patient denies any changes with medications and reports

## 2024-09-11 ENCOUNTER — HOSPITAL ENCOUNTER (INPATIENT)
Age: 34
LOS: 2 days | Discharge: HOME | End: 2024-09-13
Attending: PSYCHIATRY & NEUROLOGY | Admitting: PSYCHIATRY & NEUROLOGY
Payer: MEDICARE

## 2024-09-11 ENCOUNTER — APPOINTMENT (OUTPATIENT)
Dept: GENERAL RADIOLOGY | Age: 34
End: 2024-09-11
Payer: MEDICARE

## 2024-09-11 DIAGNOSIS — R10.9 CHRONIC ABDOMINAL PAIN: ICD-10-CM

## 2024-09-11 DIAGNOSIS — F32.A DEPRESSION, UNSPECIFIED DEPRESSION TYPE: ICD-10-CM

## 2024-09-11 DIAGNOSIS — S61.512A LACERATION OF LEFT WRIST, INITIAL ENCOUNTER: ICD-10-CM

## 2024-09-11 DIAGNOSIS — T14.91XA SUICIDAL BEHAVIOR WITH ATTEMPTED SELF-INJURY (HCC): Primary | ICD-10-CM

## 2024-09-11 DIAGNOSIS — G89.29 CHRONIC ABDOMINAL PAIN: ICD-10-CM

## 2024-09-11 PROBLEM — F33.9 MAJOR DEPRESSION, RECURRENT (HCC): Status: ACTIVE | Noted: 2024-09-11

## 2024-09-11 PROBLEM — R45.851 DEPRESSION WITH SUICIDAL IDEATION: Status: ACTIVE | Noted: 2024-09-11

## 2024-09-11 LAB
ALBUMIN SERPL BCG-MCNC: 4.5 G/DL (ref 3.5–5.1)
ALP SERPL-CCNC: 144 U/L (ref 38–126)
ALT SERPL W/O P-5'-P-CCNC: 15 U/L (ref 11–66)
AMPHETAMINES UR QL SCN: NEGATIVE
ANION GAP SERPL CALC-SCNC: 14 MEQ/L (ref 8–16)
APAP SERPL-MCNC: < 5 UG/ML (ref 0–20)
AST SERPL-CCNC: 28 U/L (ref 5–40)
BARBITURATES UR QL SCN: NEGATIVE
BASOPHILS ABSOLUTE: 0.1 THOU/MM3 (ref 0–0.1)
BASOPHILS NFR BLD AUTO: 0.4 %
BENZODIAZ UR QL SCN: NEGATIVE
BILIRUB SERPL-MCNC: < 0.2 MG/DL (ref 0.3–1.2)
BUN SERPL-MCNC: 15 MG/DL (ref 7–22)
BZE UR QL SCN: NEGATIVE
CALCIUM SERPL-MCNC: 9.5 MG/DL (ref 8.5–10.5)
CANNABINOIDS UR QL SCN: NEGATIVE
CHLORIDE SERPL-SCNC: 104 MEQ/L (ref 98–111)
CO2 SERPL-SCNC: 22 MEQ/L (ref 23–33)
CREAT SERPL-MCNC: 0.7 MG/DL (ref 0.4–1.2)
DEPRECATED RDW RBC AUTO: 45.4 FL (ref 35–45)
EOSINOPHIL NFR BLD AUTO: 1.5 %
EOSINOPHILS ABSOLUTE: 0.2 THOU/MM3 (ref 0–0.4)
ERYTHROCYTE [DISTWIDTH] IN BLOOD BY AUTOMATED COUNT: 16.5 % (ref 11.5–14.5)
ETHANOL SERPL-MCNC: < 0.01 % (ref 0–0.08)
FENTANYL: NEGATIVE
FERRITIN SERPL IA-MCNC: 146 NG/ML (ref 10–291)
GFR SERPL CREATININE-BSD FRML MDRD: > 90 ML/MIN/1.73M2
GLUCOSE BLD STRIP.AUTO-MCNC: 87 MG/DL (ref 70–108)
GLUCOSE SERPL-MCNC: 124 MG/DL (ref 70–108)
HCG UR QL: NEGATIVE
HCT VFR BLD AUTO: 41.6 % (ref 37–47)
HGB BLD-MCNC: 13.2 GM/DL (ref 12–16)
HGB RETIC QN AUTO: 27.8 PG (ref 28.2–35.7)
IMM GRANULOCYTES # BLD AUTO: 0.07 THOU/MM3 (ref 0–0.07)
IMM GRANULOCYTES NFR BLD AUTO: 0.5 %
IMM RETICS NFR: 15.3 % (ref 3–15.9)
LACTATE SERPL-SCNC: 1.6 MMOL/L (ref 0.5–2)
LIPASE SERPL-CCNC: 25.3 U/L (ref 5.6–51.3)
LYMPHOCYTES ABSOLUTE: 3.3 THOU/MM3 (ref 1–4.8)
LYMPHOCYTES NFR BLD AUTO: 24.7 %
MCH RBC QN AUTO: 24.5 PG (ref 26–33)
MCHC RBC AUTO-ENTMCNC: 31.7 GM/DL (ref 32.2–35.5)
MCV RBC AUTO: 77.2 FL (ref 81–99)
MONOCYTES ABSOLUTE: 0.7 THOU/MM3 (ref 0.4–1.3)
MONOCYTES NFR BLD AUTO: 5.3 %
NEUTROPHILS ABSOLUTE: 9.1 THOU/MM3 (ref 1.8–7.7)
NEUTROPHILS NFR BLD AUTO: 67.6 %
NRBC BLD AUTO-RTO: 0 /100 WBC
OPIATES UR QL SCN: NEGATIVE
OSMOLALITY SERPL CALC.SUM OF ELEC: 281.6 MOSMOL/KG (ref 275–300)
OXYCODONE: NEGATIVE
PCP UR QL SCN: NEGATIVE
PLATELET # BLD AUTO: 350 THOU/MM3 (ref 130–400)
PMV BLD AUTO: 9.4 FL (ref 9.4–12.4)
POTASSIUM SERPL-SCNC: 4.3 MEQ/L (ref 3.5–5.2)
PROT SERPL-MCNC: 7.7 G/DL (ref 6.1–8)
RBC # BLD AUTO: 5.39 MILL/MM3 (ref 4.2–5.4)
RETICS # AUTO: 76 THOU/MM3 (ref 20–115)
RETICS/RBC NFR AUTO: 1.3 % (ref 0.5–2)
SALICYLATES SERPL-MCNC: 2.1 MG/DL (ref 2–10)
SODIUM SERPL-SCNC: 140 MEQ/L (ref 135–145)
TSH SERPL DL<=0.005 MIU/L-ACNC: 2.09 UIU/ML (ref 0.4–4.2)
WBC # BLD AUTO: 13.5 THOU/MM3 (ref 4.8–10.8)

## 2024-09-11 PROCEDURE — 6370000000 HC RX 637 (ALT 250 FOR IP)

## 2024-09-11 PROCEDURE — 83690 ASSAY OF LIPASE: CPT

## 2024-09-11 PROCEDURE — 6370000000 HC RX 637 (ALT 250 FOR IP): Performed by: PSYCHIATRY & NEUROLOGY

## 2024-09-11 PROCEDURE — 85046 RETICYTE/HGB CONCENTRATE: CPT

## 2024-09-11 PROCEDURE — 80179 DRUG ASSAY SALICYLATE: CPT

## 2024-09-11 PROCEDURE — 80143 DRUG ASSAY ACETAMINOPHEN: CPT

## 2024-09-11 PROCEDURE — 84443 ASSAY THYROID STIM HORMONE: CPT

## 2024-09-11 PROCEDURE — 82077 ASSAY SPEC XCP UR&BREATH IA: CPT

## 2024-09-11 PROCEDURE — 83605 ASSAY OF LACTIC ACID: CPT

## 2024-09-11 PROCEDURE — 74018 RADEX ABDOMEN 1 VIEW: CPT

## 2024-09-11 PROCEDURE — 85025 COMPLETE CBC W/AUTO DIFF WBC: CPT

## 2024-09-11 PROCEDURE — 6370000000 HC RX 637 (ALT 250 FOR IP): Performed by: PHYSICIAN ASSISTANT

## 2024-09-11 PROCEDURE — APPSS30 APP SPLIT SHARED TIME 16-30 MINUTES: Performed by: PHYSICIAN ASSISTANT

## 2024-09-11 PROCEDURE — 1240000000 HC EMOTIONAL WELLNESS R&B

## 2024-09-11 PROCEDURE — 82728 ASSAY OF FERRITIN: CPT

## 2024-09-11 PROCEDURE — 80307 DRUG TEST PRSMV CHEM ANLYZR: CPT

## 2024-09-11 PROCEDURE — 99222 1ST HOSP IP/OBS MODERATE 55: CPT | Performed by: PSYCHIATRY & NEUROLOGY

## 2024-09-11 PROCEDURE — 80053 COMPREHEN METABOLIC PANEL: CPT

## 2024-09-11 PROCEDURE — 82948 REAGENT STRIP/BLOOD GLUCOSE: CPT

## 2024-09-11 PROCEDURE — 36415 COLL VENOUS BLD VENIPUNCTURE: CPT

## 2024-09-11 PROCEDURE — 81025 URINE PREGNANCY TEST: CPT

## 2024-09-11 PROCEDURE — 99285 EMERGENCY DEPT VISIT HI MDM: CPT

## 2024-09-11 RX ORDER — ARIPIPRAZOLE 10 MG/1
10 TABLET ORAL DAILY
Status: DISCONTINUED | OUTPATIENT
Start: 2024-09-11 | End: 2024-09-11

## 2024-09-11 RX ORDER — FAMOTIDINE 20 MG/1
40 TABLET, FILM COATED ORAL NIGHTLY
Status: DISCONTINUED | OUTPATIENT
Start: 2024-09-11 | End: 2024-09-13 | Stop reason: HOSPADM

## 2024-09-11 RX ORDER — PROPRANOLOL HYDROCHLORIDE 40 MG/1
80 TABLET ORAL 2 TIMES DAILY
Status: DISCONTINUED | OUTPATIENT
Start: 2024-09-11 | End: 2024-09-13 | Stop reason: HOSPADM

## 2024-09-11 RX ORDER — FLUTICASONE PROPIONATE 110 UG/1
2 AEROSOL, METERED RESPIRATORY (INHALATION) EVERY 4 HOURS PRN
Status: DISCONTINUED | OUTPATIENT
Start: 2024-09-11 | End: 2024-09-13 | Stop reason: HOSPADM

## 2024-09-11 RX ORDER — ROSUVASTATIN CALCIUM 10 MG/1
10 TABLET, COATED ORAL DAILY
Status: DISCONTINUED | OUTPATIENT
Start: 2024-09-11 | End: 2024-09-13 | Stop reason: HOSPADM

## 2024-09-11 RX ORDER — SUCRALFATE 1 G/1
1 TABLET ORAL
Status: DISCONTINUED | OUTPATIENT
Start: 2024-09-11 | End: 2024-09-13 | Stop reason: HOSPADM

## 2024-09-11 RX ORDER — ESTRADIOL 1 MG/1
0.5 TABLET ORAL DAILY
Status: DISCONTINUED | OUTPATIENT
Start: 2024-09-11 | End: 2024-09-13 | Stop reason: HOSPADM

## 2024-09-11 RX ORDER — ALBUTEROL SULFATE 0.83 MG/ML
2.5 SOLUTION RESPIRATORY (INHALATION) EVERY 6 HOURS PRN
Status: DISCONTINUED | OUTPATIENT
Start: 2024-09-11 | End: 2024-09-13 | Stop reason: HOSPADM

## 2024-09-11 RX ORDER — LACTULOSE 10 G/15ML
30 SOLUTION ORAL EVERY 8 HOURS PRN
Status: DISCONTINUED | OUTPATIENT
Start: 2024-09-11 | End: 2024-09-13 | Stop reason: HOSPADM

## 2024-09-11 RX ORDER — MAGNESIUM HYDROXIDE/ALUMINUM HYDROXICE/SIMETHICONE 120; 1200; 1200 MG/30ML; MG/30ML; MG/30ML
30 SUSPENSION ORAL EVERY 6 HOURS PRN
Status: DISCONTINUED | OUTPATIENT
Start: 2024-09-11 | End: 2024-09-13 | Stop reason: HOSPADM

## 2024-09-11 RX ORDER — DOCUSATE SODIUM 100 MG/1
100 CAPSULE, LIQUID FILLED ORAL 2 TIMES DAILY PRN
Status: DISCONTINUED | OUTPATIENT
Start: 2024-09-11 | End: 2024-09-13 | Stop reason: HOSPADM

## 2024-09-11 RX ORDER — MONTELUKAST SODIUM 10 MG/1
10 TABLET ORAL NIGHTLY
Status: DISCONTINUED | OUTPATIENT
Start: 2024-09-11 | End: 2024-09-13 | Stop reason: HOSPADM

## 2024-09-11 RX ORDER — TRAMADOL HYDROCHLORIDE 50 MG/1
50 TABLET ORAL ONCE
Status: COMPLETED | OUTPATIENT
Start: 2024-09-11 | End: 2024-09-11

## 2024-09-11 RX ORDER — SIMETHICONE 80 MG
80 TABLET,CHEWABLE ORAL 4 TIMES DAILY PRN
Status: DISCONTINUED | OUTPATIENT
Start: 2024-09-11 | End: 2024-09-13 | Stop reason: HOSPADM

## 2024-09-11 RX ORDER — PRAZOSIN HYDROCHLORIDE 1 MG/1
1 CAPSULE ORAL NIGHTLY
Status: DISCONTINUED | OUTPATIENT
Start: 2024-09-11 | End: 2024-09-13 | Stop reason: HOSPADM

## 2024-09-11 RX ORDER — SUMATRIPTAN 50 MG/1
25 TABLET, FILM COATED ORAL PRN
Status: COMPLETED | OUTPATIENT
Start: 2024-09-11 | End: 2024-09-12

## 2024-09-11 RX ORDER — FLUTICASONE PROPIONATE 50 MCG
2 SPRAY, SUSPENSION (ML) NASAL DAILY
Status: DISCONTINUED | OUTPATIENT
Start: 2024-09-11 | End: 2024-09-13 | Stop reason: HOSPADM

## 2024-09-11 RX ORDER — BUSPIRONE HYDROCHLORIDE 5 MG/1
5 TABLET ORAL 3 TIMES DAILY
Status: DISCONTINUED | OUTPATIENT
Start: 2024-09-11 | End: 2024-09-11

## 2024-09-11 RX ORDER — CAPSAICIN 0.025 %
CREAM (GRAM) TOPICAL 2 TIMES DAILY
Status: DISCONTINUED | OUTPATIENT
Start: 2024-09-11 | End: 2024-09-13 | Stop reason: HOSPADM

## 2024-09-11 RX ORDER — ESCITALOPRAM OXALATE 20 MG/1
20 TABLET ORAL DAILY
Status: DISCONTINUED | OUTPATIENT
Start: 2024-09-11 | End: 2024-09-11

## 2024-09-11 RX ORDER — TRAZODONE HYDROCHLORIDE 50 MG/1
50 TABLET, FILM COATED ORAL NIGHTLY PRN
Status: DISCONTINUED | OUTPATIENT
Start: 2024-09-11 | End: 2024-09-13 | Stop reason: HOSPADM

## 2024-09-11 RX ORDER — ESOMEPRAZOLE MAGNESIUM 20 MG/1
20 GRANULE, DELAYED RELEASE ORAL
Status: DISCONTINUED | OUTPATIENT
Start: 2024-09-11 | End: 2024-09-11 | Stop reason: SDUPTHER

## 2024-09-11 RX ORDER — AMLODIPINE BESYLATE 10 MG/1
10 TABLET ORAL DAILY
Status: DISCONTINUED | OUTPATIENT
Start: 2024-09-11 | End: 2024-09-13 | Stop reason: HOSPADM

## 2024-09-11 RX ORDER — PANTOPRAZOLE SODIUM 40 MG/1
40 TABLET, DELAYED RELEASE ORAL
Status: DISCONTINUED | OUTPATIENT
Start: 2024-09-11 | End: 2024-09-13 | Stop reason: HOSPADM

## 2024-09-11 RX ADMIN — PROPRANOLOL HYDROCHLORIDE 80 MG: 40 TABLET ORAL at 21:27

## 2024-09-11 RX ADMIN — TRAZODONE HYDROCHLORIDE 50 MG: 50 TABLET ORAL at 21:28

## 2024-09-11 RX ADMIN — ROSUVASTATIN 10 MG: 10 TABLET, FILM COATED ORAL at 21:27

## 2024-09-11 RX ADMIN — PROPRANOLOL HYDROCHLORIDE 80 MG: 40 TABLET ORAL at 09:28

## 2024-09-11 RX ADMIN — PRAZOSIN HYDROCHLORIDE 1 MG: 1 CAPSULE ORAL at 21:27

## 2024-09-11 RX ADMIN — TRAMADOL HYDROCHLORIDE 50 MG: 50 TABLET ORAL at 03:25

## 2024-09-11 RX ADMIN — ACETAMINOPHEN, ASPIRIN, CAFFEINE 1 TABLET: 250; 65; 250 TABLET, FILM COATED ORAL at 16:24

## 2024-09-11 RX ADMIN — METFORMIN HYDROCHLORIDE 500 MG: 500 TABLET ORAL at 09:27

## 2024-09-11 RX ADMIN — ESTRADIOL 0.5 MG: 1 TABLET ORAL at 09:24

## 2024-09-11 RX ADMIN — SUCRALFATE 1 G: 1 TABLET ORAL at 21:28

## 2024-09-11 RX ADMIN — METFORMIN HYDROCHLORIDE 500 MG: 500 TABLET ORAL at 17:35

## 2024-09-11 RX ADMIN — SUCRALFATE 1 G: 1 TABLET ORAL at 17:35

## 2024-09-11 RX ADMIN — FAMOTIDINE 40 MG: 20 TABLET, FILM COATED ORAL at 21:27

## 2024-09-11 RX ADMIN — PANTOPRAZOLE SODIUM 40 MG: 40 TABLET, DELAYED RELEASE ORAL at 09:29

## 2024-09-11 RX ADMIN — MONTELUKAST SODIUM 10 MG: 10 TABLET ORAL at 21:27

## 2024-09-11 RX ADMIN — SUCRALFATE 1 G: 1 TABLET ORAL at 09:27

## 2024-09-11 RX ADMIN — CAPSAICIN: 0.25 CREAM TOPICAL at 21:28

## 2024-09-11 RX ADMIN — AMLODIPINE BESYLATE 10 MG: 10 TABLET ORAL at 09:25

## 2024-09-11 ASSESSMENT — PAIN DESCRIPTION - LOCATION
LOCATION: HEAD
LOCATION: ABDOMEN

## 2024-09-11 ASSESSMENT — PATIENT HEALTH QUESTIONNAIRE - PHQ9
SUM OF ALL RESPONSES TO PHQ QUESTIONS 1-9: 0
1. LITTLE INTEREST OR PLEASURE IN DOING THINGS: NOT AT ALL
SUM OF ALL RESPONSES TO PHQ QUESTIONS 1-9: 0
1. LITTLE INTEREST OR PLEASURE IN DOING THINGS: NOT AT ALL
SUM OF ALL RESPONSES TO PHQ QUESTIONS 1-9: 0
2. FEELING DOWN, DEPRESSED OR HOPELESS: NOT AT ALL
SUM OF ALL RESPONSES TO PHQ9 QUESTIONS 1 & 2: 0
SUM OF ALL RESPONSES TO PHQ QUESTIONS 1-9: 0

## 2024-09-11 ASSESSMENT — PAIN DESCRIPTION - DESCRIPTORS
DESCRIPTORS: ACHING;POUNDING
DESCRIPTORS: ACHING;CRAMPING
DESCRIPTORS: SHARP

## 2024-09-11 ASSESSMENT — PAIN DESCRIPTION - PAIN TYPE
TYPE: CHRONIC PAIN
TYPE: CHRONIC PAIN

## 2024-09-11 ASSESSMENT — PAIN - FUNCTIONAL ASSESSMENT
PAIN_FUNCTIONAL_ASSESSMENT: ACTIVITIES ARE NOT PREVENTED
PAIN_FUNCTIONAL_ASSESSMENT: ACTIVITIES ARE NOT PREVENTED
PAIN_FUNCTIONAL_ASSESSMENT: PREVENTS OR INTERFERES SOME ACTIVE ACTIVITIES AND ADLS
PAIN_FUNCTIONAL_ASSESSMENT: NONE - DENIES PAIN

## 2024-09-11 ASSESSMENT — LIFESTYLE VARIABLES
HOW MANY STANDARD DRINKS CONTAINING ALCOHOL DO YOU HAVE ON A TYPICAL DAY: PATIENT DOES NOT DRINK
HOW MANY STANDARD DRINKS CONTAINING ALCOHOL DO YOU HAVE ON A TYPICAL DAY: PATIENT DOES NOT DRINK
HOW OFTEN DO YOU HAVE A DRINK CONTAINING ALCOHOL: NEVER
HOW OFTEN DO YOU HAVE A DRINK CONTAINING ALCOHOL: NEVER

## 2024-09-11 ASSESSMENT — SLEEP AND FATIGUE QUESTIONNAIRES
DO YOU HAVE DIFFICULTY SLEEPING: YES
AVERAGE NUMBER OF SLEEP HOURS: 6
DO YOU HAVE DIFFICULTY SLEEPING: YES
SLEEP PATTERN: DISTURBED/INTERRUPTED SLEEP
DO YOU USE A SLEEP AID: NO
AVERAGE NUMBER OF SLEEP HOURS: 4
SLEEP PATTERN: DIFFICULTY FALLING ASLEEP;DISTURBED/INTERRUPTED SLEEP
DO YOU USE A SLEEP AID: YES

## 2024-09-11 ASSESSMENT — ENCOUNTER SYMPTOMS
COUGH: 0
NAUSEA: 1
ABDOMINAL PAIN: 0
VOMITING: 0
SHORTNESS OF BREATH: 0
TROUBLE SWALLOWING: 1

## 2024-09-11 ASSESSMENT — PAIN DESCRIPTION - ORIENTATION
ORIENTATION: ANTERIOR
ORIENTATION: MID

## 2024-09-11 ASSESSMENT — PAIN SCALES - GENERAL
PAINLEVEL_OUTOF10: 10
PAINLEVEL_OUTOF10: 9
PAINLEVEL_OUTOF10: 10

## 2024-09-11 ASSESSMENT — PAIN DESCRIPTION - FREQUENCY
FREQUENCY: CONTINUOUS
FREQUENCY: CONTINUOUS

## 2024-09-11 ASSESSMENT — PAIN DESCRIPTION - ONSET
ONSET: ON-GOING
ONSET: ON-GOING

## 2024-09-12 PROBLEM — T14.91XA SUICIDAL BEHAVIOR WITH ATTEMPTED SELF-INJURY (HCC): Status: ACTIVE | Noted: 2024-09-12

## 2024-09-12 LAB
BASOPHILS ABSOLUTE: 0 THOU/MM3 (ref 0–0.1)
BASOPHILS NFR BLD AUTO: 0.3 %
DEPRECATED RDW RBC AUTO: 46.8 FL (ref 35–45)
EOSINOPHIL NFR BLD AUTO: 1.5 %
EOSINOPHILS ABSOLUTE: 0.2 THOU/MM3 (ref 0–0.4)
ERYTHROCYTE [DISTWIDTH] IN BLOOD BY AUTOMATED COUNT: 16.6 % (ref 11.5–14.5)
GLUCOSE BLD STRIP.AUTO-MCNC: 108 MG/DL (ref 70–108)
GLUCOSE BLD STRIP.AUTO-MCNC: 108 MG/DL (ref 70–108)
HCT VFR BLD AUTO: 40.8 % (ref 37–47)
HGB BLD-MCNC: 12.7 GM/DL (ref 12–16)
IMM GRANULOCYTES # BLD AUTO: 0.07 THOU/MM3 (ref 0–0.07)
IMM GRANULOCYTES NFR BLD AUTO: 0.5 %
LYMPHOCYTES ABSOLUTE: 2.6 THOU/MM3 (ref 1–4.8)
LYMPHOCYTES NFR BLD AUTO: 19.2 %
MCH RBC QN AUTO: 24.1 PG (ref 26–33)
MCHC RBC AUTO-ENTMCNC: 31.1 GM/DL (ref 32.2–35.5)
MCV RBC AUTO: 77.6 FL (ref 81–99)
MONOCYTES ABSOLUTE: 0.7 THOU/MM3 (ref 0.4–1.3)
MONOCYTES NFR BLD AUTO: 5.3 %
NEUTROPHILS ABSOLUTE: 9.7 THOU/MM3 (ref 1.8–7.7)
NEUTROPHILS NFR BLD AUTO: 73.2 %
NRBC BLD AUTO-RTO: 0 /100 WBC
PLATELET # BLD AUTO: 326 THOU/MM3 (ref 130–400)
PMV BLD AUTO: 9.3 FL (ref 9.4–12.4)
RBC # BLD AUTO: 5.26 MILL/MM3 (ref 4.2–5.4)
WBC # BLD AUTO: 13.3 THOU/MM3 (ref 4.8–10.8)

## 2024-09-12 PROCEDURE — 82948 REAGENT STRIP/BLOOD GLUCOSE: CPT

## 2024-09-12 PROCEDURE — 6370000000 HC RX 637 (ALT 250 FOR IP): Performed by: PHYSICIAN ASSISTANT

## 2024-09-12 PROCEDURE — 90833 PSYTX W PT W E/M 30 MIN: CPT | Performed by: PSYCHIATRY & NEUROLOGY

## 2024-09-12 PROCEDURE — 1240000000 HC EMOTIONAL WELLNESS R&B

## 2024-09-12 PROCEDURE — 6370000000 HC RX 637 (ALT 250 FOR IP): Performed by: PSYCHIATRY & NEUROLOGY

## 2024-09-12 PROCEDURE — 85025 COMPLETE CBC W/AUTO DIFF WBC: CPT

## 2024-09-12 PROCEDURE — 6360000002 HC RX W HCPCS: Performed by: PHYSICIAN ASSISTANT

## 2024-09-12 PROCEDURE — APPSS30 APP SPLIT SHARED TIME 16-30 MINUTES: Performed by: PHYSICIAN ASSISTANT

## 2024-09-12 PROCEDURE — 36415 COLL VENOUS BLD VENIPUNCTURE: CPT

## 2024-09-12 PROCEDURE — 99232 SBSQ HOSP IP/OBS MODERATE 35: CPT | Performed by: PHYSICIAN ASSISTANT

## 2024-09-12 PROCEDURE — 99232 SBSQ HOSP IP/OBS MODERATE 35: CPT | Performed by: PSYCHIATRY & NEUROLOGY

## 2024-09-12 RX ORDER — MORPHINE SULFATE 2 MG/ML
2 INJECTION, SOLUTION INTRAMUSCULAR; INTRAVENOUS ONCE
Status: COMPLETED | OUTPATIENT
Start: 2024-09-12 | End: 2024-09-12

## 2024-09-12 RX ADMIN — PROPRANOLOL HYDROCHLORIDE 80 MG: 40 TABLET ORAL at 08:34

## 2024-09-12 RX ADMIN — PROPRANOLOL HYDROCHLORIDE 80 MG: 40 TABLET ORAL at 21:05

## 2024-09-12 RX ADMIN — METFORMIN HYDROCHLORIDE 500 MG: 500 TABLET ORAL at 16:43

## 2024-09-12 RX ADMIN — MONTELUKAST SODIUM 10 MG: 10 TABLET ORAL at 21:06

## 2024-09-12 RX ADMIN — TRAZODONE HYDROCHLORIDE 50 MG: 50 TABLET ORAL at 21:05

## 2024-09-12 RX ADMIN — SUCRALFATE 1 G: 1 TABLET ORAL at 21:05

## 2024-09-12 RX ADMIN — METFORMIN HYDROCHLORIDE 500 MG: 500 TABLET ORAL at 08:34

## 2024-09-12 RX ADMIN — SUCRALFATE 1 G: 1 TABLET ORAL at 06:49

## 2024-09-12 RX ADMIN — ACETAMINOPHEN, ASPIRIN, CAFFEINE 1 TABLET: 250; 65; 250 TABLET, FILM COATED ORAL at 08:34

## 2024-09-12 RX ADMIN — MORPHINE SULFATE 2 MG: 2 INJECTION, SOLUTION INTRAMUSCULAR; INTRAVENOUS at 17:25

## 2024-09-12 RX ADMIN — AMLODIPINE BESYLATE 10 MG: 10 TABLET ORAL at 08:34

## 2024-09-12 RX ADMIN — ESTRADIOL 0.5 MG: 1 TABLET ORAL at 08:34

## 2024-09-12 RX ADMIN — PRAZOSIN HYDROCHLORIDE 1 MG: 1 CAPSULE ORAL at 21:05

## 2024-09-12 RX ADMIN — ROSUVASTATIN 10 MG: 10 TABLET, FILM COATED ORAL at 21:05

## 2024-09-12 RX ADMIN — FAMOTIDINE 40 MG: 20 TABLET, FILM COATED ORAL at 21:11

## 2024-09-12 RX ADMIN — CAPSAICIN: 0.25 CREAM TOPICAL at 21:04

## 2024-09-12 RX ADMIN — PANTOPRAZOLE SODIUM 40 MG: 40 TABLET, DELAYED RELEASE ORAL at 06:49

## 2024-09-12 RX ADMIN — SUMATRIPTAN SUCCINATE 25 MG: 50 TABLET ORAL at 15:23

## 2024-09-12 RX ADMIN — SUCRALFATE 1 G: 1 TABLET ORAL at 11:55

## 2024-09-12 RX ADMIN — NALOXEGOL OXALATE 25 MG: 25 TABLET, FILM COATED ORAL at 16:43

## 2024-09-12 RX ADMIN — SUCRALFATE 1 G: 1 TABLET ORAL at 16:43

## 2024-09-12 ASSESSMENT — PAIN SCALES - GENERAL
PAINLEVEL_OUTOF10: 0
PAINLEVEL_OUTOF10: 9
PAINLEVEL_OUTOF10: 7
PAINLEVEL_OUTOF10: 9

## 2024-09-12 ASSESSMENT — PAIN - FUNCTIONAL ASSESSMENT
PAIN_FUNCTIONAL_ASSESSMENT: ACTIVITIES ARE NOT PREVENTED

## 2024-09-12 ASSESSMENT — PAIN DESCRIPTION - ONSET: ONSET: SUDDEN

## 2024-09-12 ASSESSMENT — PAIN DESCRIPTION - DESCRIPTORS
DESCRIPTORS: STABBING
DESCRIPTORS: ACHING

## 2024-09-12 ASSESSMENT — PAIN DESCRIPTION - LOCATION
LOCATION: HEAD
LOCATION: ABDOMEN
LOCATION: HEAD
LOCATION: ABDOMEN

## 2024-09-12 ASSESSMENT — PAIN DESCRIPTION - FREQUENCY: FREQUENCY: INTERMITTENT

## 2024-09-12 ASSESSMENT — PAIN DESCRIPTION - ORIENTATION
ORIENTATION: MID
ORIENTATION: MID

## 2024-09-12 ASSESSMENT — PAIN DESCRIPTION - PAIN TYPE: TYPE: CHRONIC PAIN

## 2024-09-13 VITALS
TEMPERATURE: 98.2 F | HEART RATE: 71 BPM | SYSTOLIC BLOOD PRESSURE: 118 MMHG | OXYGEN SATURATION: 98 % | RESPIRATION RATE: 16 BRPM | HEIGHT: 64 IN | DIASTOLIC BLOOD PRESSURE: 72 MMHG | BODY MASS INDEX: 39.61 KG/M2 | WEIGHT: 232 LBS

## 2024-09-13 LAB
BASOPHILS ABSOLUTE: 0.1 THOU/MM3 (ref 0–0.1)
BASOPHILS NFR BLD AUTO: 0.5 %
DEPRECATED RDW RBC AUTO: 46.2 FL (ref 35–45)
EOSINOPHIL NFR BLD AUTO: 1.5 %
EOSINOPHILS ABSOLUTE: 0.2 THOU/MM3 (ref 0–0.4)
ERYTHROCYTE [DISTWIDTH] IN BLOOD BY AUTOMATED COUNT: 16.5 % (ref 11.5–14.5)
GLUCOSE BLD STRIP.AUTO-MCNC: 110 MG/DL (ref 70–108)
HCT VFR BLD AUTO: 37.8 % (ref 37–47)
HGB BLD-MCNC: 11.8 GM/DL (ref 12–16)
IMM GRANULOCYTES # BLD AUTO: 0.05 THOU/MM3 (ref 0–0.07)
IMM GRANULOCYTES NFR BLD AUTO: 0.5 %
LYMPHOCYTES ABSOLUTE: 2.5 THOU/MM3 (ref 1–4.8)
LYMPHOCYTES NFR BLD AUTO: 23.8 %
MCH RBC QN AUTO: 24.1 PG (ref 26–33)
MCHC RBC AUTO-ENTMCNC: 31.2 GM/DL (ref 32.2–35.5)
MCV RBC AUTO: 77.3 FL (ref 81–99)
MONOCYTES ABSOLUTE: 0.7 THOU/MM3 (ref 0.4–1.3)
MONOCYTES NFR BLD AUTO: 6.4 %
NEUTROPHILS ABSOLUTE: 7.1 THOU/MM3 (ref 1.8–7.7)
NEUTROPHILS NFR BLD AUTO: 67.3 %
NRBC BLD AUTO-RTO: 0 /100 WBC
PLATELET # BLD AUTO: 285 THOU/MM3 (ref 130–400)
PMV BLD AUTO: 9.3 FL (ref 9.4–12.4)
RBC # BLD AUTO: 4.89 MILL/MM3 (ref 4.2–5.4)
WBC # BLD AUTO: 10.6 THOU/MM3 (ref 4.8–10.8)

## 2024-09-13 PROCEDURE — 82948 REAGENT STRIP/BLOOD GLUCOSE: CPT

## 2024-09-13 PROCEDURE — 5130000000 HC BRIDGE APPOINTMENT

## 2024-09-13 PROCEDURE — 99239 HOSP IP/OBS DSCHRG MGMT >30: CPT | Performed by: PSYCHIATRY & NEUROLOGY

## 2024-09-13 PROCEDURE — 85025 COMPLETE CBC W/AUTO DIFF WBC: CPT

## 2024-09-13 PROCEDURE — 6370000000 HC RX 637 (ALT 250 FOR IP): Performed by: PHYSICIAN ASSISTANT

## 2024-09-13 PROCEDURE — 36415 COLL VENOUS BLD VENIPUNCTURE: CPT

## 2024-09-13 RX ORDER — TRAZODONE HYDROCHLORIDE 50 MG/1
50 TABLET, FILM COATED ORAL NIGHTLY PRN
Qty: 30 TABLET | Refills: 0 | Status: SHIPPED | OUTPATIENT
Start: 2024-09-13

## 2024-09-13 RX ORDER — CAPSAICIN 0.025 %
CREAM (GRAM) TOPICAL
Qty: 45 G | Refills: 0 | Status: SHIPPED | OUTPATIENT
Start: 2024-09-13 | End: 2024-10-13

## 2024-09-13 RX ADMIN — PROPRANOLOL HYDROCHLORIDE 80 MG: 40 TABLET ORAL at 08:40

## 2024-09-13 RX ADMIN — PANTOPRAZOLE SODIUM 40 MG: 40 TABLET, DELAYED RELEASE ORAL at 08:40

## 2024-09-13 RX ADMIN — ACETAMINOPHEN, ASPIRIN, CAFFEINE 1 TABLET: 250; 65; 250 TABLET, FILM COATED ORAL at 06:33

## 2024-09-13 RX ADMIN — SUCRALFATE 1 G: 1 TABLET ORAL at 08:40

## 2024-09-13 RX ADMIN — METFORMIN HYDROCHLORIDE 500 MG: 500 TABLET ORAL at 08:40

## 2024-09-13 RX ADMIN — AMLODIPINE BESYLATE 10 MG: 10 TABLET ORAL at 08:40

## 2024-09-13 RX ADMIN — ESTRADIOL 0.5 MG: 1 TABLET ORAL at 08:39

## 2024-09-13 ASSESSMENT — PAIN DESCRIPTION - LOCATION
LOCATION: HEAD
LOCATION: HEAD;ABDOMEN

## 2024-09-13 ASSESSMENT — PAIN DESCRIPTION - DESCRIPTORS
DESCRIPTORS: ACHING
DESCRIPTORS: ACHING

## 2024-09-13 ASSESSMENT — PAIN DESCRIPTION - ORIENTATION
ORIENTATION: ANTERIOR
ORIENTATION: ANTERIOR

## 2024-09-13 ASSESSMENT — PAIN SCALES - GENERAL
PAINLEVEL_OUTOF10: 7
PAINLEVEL_OUTOF10: 6
PAINLEVEL_OUTOF10: 6

## 2024-09-17 ENCOUNTER — HOSPITAL ENCOUNTER (OUTPATIENT)
Dept: PSYCHIATRY | Age: 34
Setting detail: THERAPIES SERIES
Discharge: HOME | End: 2024-09-17
Payer: MEDICARE

## 2024-09-17 PROCEDURE — 5130000000 HC BRIDGE APPOINTMENT

## 2024-09-17 ASSESSMENT — PATIENT HEALTH QUESTIONNAIRE - PHQ9
2. FEELING DOWN, DEPRESSED OR HOPELESS: NOT AT ALL
SUM OF ALL RESPONSES TO PHQ9 QUESTIONS 1 & 2: 0
1. LITTLE INTEREST OR PLEASURE IN DOING THINGS: NOT AT ALL
SUM OF ALL RESPONSES TO PHQ QUESTIONS 1-9: 0

## 2024-09-18 ENCOUNTER — CARE COORDINATION (OUTPATIENT)
Dept: CARE COORDINATION | Age: 34
End: 2024-09-18

## 2024-09-19 RX ORDER — LANCETS 28 GAUGE
1 EACH MISCELLANEOUS DAILY
Qty: 100 EACH | Refills: 3 | Status: SHIPPED | OUTPATIENT
Start: 2024-09-19

## 2024-09-19 RX ORDER — GLUCOSAMINE HCL/CHONDROITIN SU 500-400 MG
CAPSULE ORAL
Qty: 100 STRIP | Refills: 1 | Status: SHIPPED | OUTPATIENT
Start: 2024-09-19

## 2024-09-20 ENCOUNTER — HOSPITAL ENCOUNTER (OUTPATIENT)
Dept: NURSING | Age: 34
Discharge: HOME | End: 2024-09-20

## 2024-09-20 ENCOUNTER — TELEPHONE (OUTPATIENT)
Dept: PSYCHIATRY | Age: 34
End: 2024-09-20

## 2024-09-23 ENCOUNTER — HOSPITAL ENCOUNTER (OUTPATIENT)
Dept: PSYCHIATRY | Age: 34
Setting detail: THERAPIES SERIES
Discharge: HOME | End: 2024-09-23
Payer: MEDICARE

## 2024-09-23 PROCEDURE — 90792 PSYCH DIAG EVAL W/MED SRVCS: CPT | Performed by: PHYSICIAN ASSISTANT

## 2024-09-25 ENCOUNTER — HOSPITAL ENCOUNTER (OUTPATIENT)
Dept: PSYCHIATRY | Age: 34
Setting detail: THERAPIES SERIES
Discharge: HOME | End: 2024-09-25
Payer: MEDICARE

## 2024-09-25 PROCEDURE — H2020 THER BEHAV SVC, PER DIEM: HCPCS

## 2024-09-26 ENCOUNTER — CARE COORDINATION (OUTPATIENT)
Dept: CARE COORDINATION | Age: 34
End: 2024-09-26

## 2024-09-27 ENCOUNTER — HOSPITAL ENCOUNTER (OUTPATIENT)
Dept: NURSING | Age: 34
Discharge: HOME | End: 2024-09-27
Payer: MEDICARE

## 2024-09-27 ENCOUNTER — HOSPITAL ENCOUNTER (OUTPATIENT)
Dept: PSYCHIATRY | Age: 34
Setting detail: THERAPIES SERIES
Discharge: HOME | End: 2024-09-27
Payer: MEDICARE

## 2024-09-27 VITALS
TEMPERATURE: 96.9 F | RESPIRATION RATE: 18 BRPM | OXYGEN SATURATION: 99 % | SYSTOLIC BLOOD PRESSURE: 138 MMHG | HEART RATE: 107 BPM | DIASTOLIC BLOOD PRESSURE: 98 MMHG

## 2024-09-27 DIAGNOSIS — D50.8 OTHER IRON DEFICIENCY ANEMIA: Primary | ICD-10-CM

## 2024-09-27 PROCEDURE — 2580000003 HC RX 258: Performed by: SURGERY

## 2024-09-27 PROCEDURE — 6360000002 HC RX W HCPCS: Performed by: SURGERY

## 2024-09-27 PROCEDURE — H2020 THER BEHAV SVC, PER DIEM: HCPCS

## 2024-09-27 PROCEDURE — 99212 OFFICE O/P EST SF 10 MIN: CPT

## 2024-09-27 RX ORDER — HEPARIN 100 UNIT/ML
500 SYRINGE INTRAVENOUS PRN
OUTPATIENT
Start: 2024-09-27

## 2024-09-27 RX ORDER — HEPARIN 100 UNIT/ML
500 SYRINGE INTRAVENOUS PRN
Status: DISCONTINUED | OUTPATIENT
Start: 2024-09-27 | End: 2024-09-28 | Stop reason: HOSPADM

## 2024-09-27 RX ORDER — SODIUM CHLORIDE 0.9 % (FLUSH) 0.9 %
5-40 SYRINGE (ML) INJECTION PRN
Status: DISCONTINUED | OUTPATIENT
Start: 2024-09-27 | End: 2024-09-28 | Stop reason: HOSPADM

## 2024-09-27 RX ORDER — SODIUM CHLORIDE 9 MG/ML
25 INJECTION, SOLUTION INTRAVENOUS PRN
OUTPATIENT
Start: 2024-09-27

## 2024-09-27 RX ORDER — SODIUM CHLORIDE 0.9 % (FLUSH) 0.9 %
5-40 SYRINGE (ML) INJECTION PRN
OUTPATIENT
Start: 2024-09-27

## 2024-09-27 RX ADMIN — SODIUM CHLORIDE, PRESERVATIVE FREE 10 ML: 5 INJECTION INTRAVENOUS at 10:15

## 2024-09-27 RX ADMIN — HEPARIN 500 UNITS: 100 SYRINGE at 10:15

## 2024-09-27 NOTE — PROGRESS NOTES
1006  Samantha ambulated into room for mediport flush. Pt rights and responsibilities offered to pt. Mediport flushed and Samantha tolerated well.          D/c instructions explained and questions were answered, Samantha verbalized understanding. Samantha ambulated out per self for d/c today.         _m___ Safety:       (Environmental)  [place] to environment  Ensure ID band is correct and in place/ allergy band as needed  Assess for fall risk  Initiate fall precautions as applicable (fall band, side rails, etc.)  Call light within reach  Bed in low position/ wheels locked    _m___ Pain:       Assess pain level and characteristics  Administer analgesics as ordered  Assess effectiveness of pain management and report to MD as needed    _m___ Knowledge Deficit:  Assess baseline knowledge  Provide teaching at level of understanding  Provide teaching via preferred learning method  Evaluate teaching effectiveness    _m___ Hemodynamic/Respiratory Status:       (Pre and Post Procedure Monitoring)  Assess/Monitor vital signs and LOC  Assess Baseline SpO2 prior to any sedation  Obtain weight/height  Assess vital signs/ LOC until patient meets discharge criteria  Monitor procedure site and notify MD of any issues    __m__ Infection-Risk of Central Venous Catheter:  Monitor for infection signs and symptoms (catheter site redness, temperature elevation, etc)  Assess for infection risks  Educate regarding infection prevention  Manage central venous catheter (flushes/ dressing changes per protocol)

## 2024-09-27 NOTE — DISCHARGE INSTRUCTIONS
ACTIVITY:  Continue usual care with your doctor. Call your doctor immediately if any severe problems or go to the nearest emergency room.      I have been treated and hereby acknowledge receiving this instruction sheet.          OCTOBER 25  @  10  AM   [facility]   OUTPATIENT NURSING
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
## 2024-10-01 ENCOUNTER — OFFICE VISIT (OUTPATIENT)
Dept: INTERNAL MEDICINE CLINIC | Age: 34
End: 2024-10-01
Payer: MEDICARE

## 2024-10-01 ENCOUNTER — OFFICE VISIT (OUTPATIENT)
Dept: INTERNAL MEDICINE CLINIC | Age: 34
End: 2024-10-01

## 2024-10-01 VITALS
SYSTOLIC BLOOD PRESSURE: 153 MMHG | WEIGHT: 283.8 LBS | HEIGHT: 64 IN | BODY MASS INDEX: 48.45 KG/M2 | DIASTOLIC BLOOD PRESSURE: 90 MMHG | OXYGEN SATURATION: 98 % | HEART RATE: 89 BPM

## 2024-10-01 VITALS — WEIGHT: 284 LBS | BODY MASS INDEX: 48.49 KG/M2 | HEIGHT: 64 IN

## 2024-10-01 DIAGNOSIS — K90.9 MALABSORPTION SYNDROME: ICD-10-CM

## 2024-10-01 DIAGNOSIS — K90.9 ABNORMAL INTESTINAL ABSORPTION: Primary | ICD-10-CM

## 2024-10-01 DIAGNOSIS — M79.10 MUSCLE ACHE: Primary | ICD-10-CM

## 2024-10-01 PROCEDURE — NBSRV NON-BILLABLE SERVICE: Performed by: DIETITIAN, REGISTERED

## 2024-10-01 PROCEDURE — 97803 MED NUTRITION INDIV SUBSEQ: CPT | Performed by: DIETITIAN, REGISTERED

## 2024-10-01 RX ORDER — ESOMEPRAZOLE MAGNESIUM 20 MG/1
GRANULE, DELAYED RELEASE ORAL
COMMUNITY
Start: 2024-09-30

## 2024-10-01 RX ORDER — TRAMADOL HYDROCHLORIDE 50 MG/1
50 TABLET ORAL EVERY 6 HOURS PRN
Qty: 28 TABLET | Refills: 0 | Status: SHIPPED | OUTPATIENT
Start: 2024-10-01 | End: 2024-10-08

## 2024-10-01 RX ORDER — METHOCARBAMOL 750 MG/1
750 TABLET, FILM COATED ORAL NIGHTLY PRN
Qty: 10 TABLET | Refills: 0 | Status: SHIPPED | OUTPATIENT
Start: 2024-10-01 | End: 2024-10-15

## 2024-10-01 RX ORDER — PRUCALOPRIDE 2 MG/1
2 TABLET, FILM COATED ORAL DAILY
COMMUNITY
Start: 2024-09-30

## 2024-10-01 RX ORDER — SPIRONOLACTONE 50 MG/1
50 TABLET, FILM COATED ORAL DAILY
COMMUNITY
Start: 2024-09-24

## 2024-10-01 NOTE — PROGRESS NOTES
nightly as needed for Sleep (Patient not taking: Reported on 9/18/2024) 30 tablet 0    metFORMIN (GLUCOPHAGE) 500 MG tablet Take ONE tablet by mouth every 12 hours with meals for diabetes. 60 tablet 12    glucose monitoring kit 1 kit by Does not apply route daily 1 kit 0    famotidine (PEPCID) 40 MG tablet Take 1 tablet by mouth nightly 90 tablet 3    fluticasone (FLONASE) 50 MCG/ACT nasal spray 2 sprays by Each Nostril route daily 1 each 0    lactulose (CHRONULAC) 10 GM/15ML solution Take 30 mLs by mouth every 8 (eight) hours Until you have a bowel movement and then stop. (Patient taking differently: Take 30 mLs by mouth every 8 hours as needed Until you have a bowel movement and then stop.) 120 mL 0    simethicone (MYLICON) 80 MG chewable tablet Take 1 tablet by mouth 4 times daily as needed for Flatulence (gas) 180 tablet 3    propranolol (INDERAL) 80 MG tablet Take 1 tablet by mouth 2 times daily      omeprazole (PRILOSEC) 40 MG delayed release capsule Take 1 capsule by mouth every morning (before breakfast) 90 capsule 2    estradiol (ESTRACE) 0.5 MG tablet Take 1 tablet by mouth daily      rosuvastatin (CRESTOR) 10 MG tablet Take 1 tablet by mouth daily      amLODIPine (NORVASC) 10 MG tablet Take 1 tablet by mouth daily 30 tablet 3    docusate sodium (COLACE) 100 MG capsule Take 1 capsule by mouth 2 times daily as needed      ONETOUCH VERIO strip USE TO check BLOOD SUGAR ONCE DAILY      FLOVENT  MCG/ACT inhaler Inhale 2 puffs into the lungs every 4 hours as needed for Shortness of Breath      SUMAtriptan (IMITREX) 25 MG tablet Take 1 tablet by mouth See Admin Instructions Take 1 tablet on onset of migraine may repeat after 2 hours if migraine returns don't exceed 200 mg daily      sucralfate (CARAFATE) 1 GM tablet Take 1 tablet by mouth 4 times daily (before meals and nightly) 120 tablet 0    Incontinence Supply Disposable (DEPEND SILHOUETTE BRIEFS L/XL) MISC Use as needed for urinary and bowel

## 2024-10-01 NOTE — PROGRESS NOTES
1990    Chief Complaint   Patient presents with    Diabetes    Obesity       Pt is a 34 y.o. female who presents for a follow up visit for body aches.     HPI    Patient is here for a follow up visit for body/muscle aches. She mentions that her muscle pain has continued through out the day and it is making it very difficult for her. She says she sleeps fine at night. She sleeps roughly 6 hours a night. She has been using her CPAP to the best of her abilities. She has been trying tylenol for her pain but this has not helped much at all. She is allergic to many muscle relaxants as well as NSAID's. I have presented to her options for her aches but she mentions Tramadol is the only thing that has been working for her. I have explained to her that we cannot continue providing tramadol without addressing the underlying cause of her aches and pains. We have given her a referral to Pain specialist and Rheumatology. We will try Robaxin 750mg nightly PRN and give her only a 7 day supply for Tramadol until she follows up with the Pain specialist and rheumatologist. Her most recent blood work was unremarkable. Patient has been advised if she develops any rash or allergic reaction to Robaxin to call 911 or come to the ED immediately. Risks and benefits of therapy have been discussed. Patient understands and agrees with the plan.      Past Medical History:   Diagnosis Date    Acute on chronic diastolic congestive heart failure ([facility]) 06/25/2022    JANI (acute kidney injury) ([facility]) 07/07/2019    Anemia     Anesthesia     migraines    Anxiety     Asthma     CAD (coronary artery disease)     Depression     Diabetes ([facility])     Diet-controlled type 2 diabetes mellitus ([facility]) 2016    Dyslipidemia 11/14/2016    Epilepsy ([facility])     last siezure is 2 years ago    Epilepsy ([facility])     Fibroids     Gastro - esophageal reflux disease     Gastroparesis     History of esophagogastroduodenoscopy (EGD) 08/13/2022    Hypertension     Hypertrophy of

## 2024-10-01 NOTE — PATIENT INSTRUCTIONS
Try smoothies    Consume something every 2-3 hours.    Follow your gastroparesis guidelines. Refer to the sample menus of meal and snack suggestions.    Keep a journal of everything you eat and drink and indicate whether or not you vomited anytime during the day.  - Bring to next dietitian appt.

## 2024-10-02 ENCOUNTER — HOSPITAL ENCOUNTER (OUTPATIENT)
Dept: PSYCHIATRY | Age: 34
Setting detail: THERAPIES SERIES
Discharge: HOME | End: 2024-10-02
Payer: MEDICARE

## 2024-10-02 ENCOUNTER — TELEPHONE (OUTPATIENT)
Dept: INTERNAL MEDICINE CLINIC | Age: 34
End: 2024-10-02

## 2024-10-02 PROCEDURE — H2020 THER BEHAV SVC, PER DIEM: HCPCS

## 2024-10-02 NOTE — TELEPHONE ENCOUNTER
Rheumatology closed the referral. They are stating that muscle aches is not an appropriate diagnosis
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
## 2024-10-03 ENCOUNTER — CARE COORDINATION (OUTPATIENT)
Dept: CARE COORDINATION | Age: 34
End: 2024-10-03

## 2024-10-03 NOTE — CARE COORDINATION
Ambulatory Care Coordination Note     10/3/2024 12:05 PM     Patient Current Location:  Home: [address]  [facility] [place] [facility] [address]     ACM contacted the patient by telephone. Verified name and  with patient as identifiers.         ACM: Rosibel Nguyen RN     Challenges to be reviewed by the provider   Additional needs identified to be addressed with provider No  none               Method of communication with provider: none.    Has the patient been seen in the ED since your last call? no    Care Summary Note: Samantha was referred to care coordination by [facility]N following recent utilization. Pt has h/o: CHF, DM, HTN, dyspepsia, chronic constipation. Sleep apnea.   Pt had recent admission --for depression, suicidal ideation.   Spoke with Samantha today.  Pt reports that overall she is feeling good.  Participating in IOP.  Has found it to be beneficial.  Has group therapy tomorrow.    Denies thoughts of harming self or others  Continues to f/u with GI.  Considering second opinion at [facility] or .  Pt plans on contacting them to see about arranging appt.  Continues to lopez with n/v at times.  Following with dietician.  Reminded and encouraged pt to keep food journal and log s/s. Pt has been encouraged to eat every 2-3 hrs.    JOVANY-has CPAP. Having trouble adjusting to mask.  Pt is going to contact DME office today to see if she can trial a different mask  Recent referral to [facility].  Pt has aches and pains intermittently.  Unsure of cause.  Takes tramadol.   Offered patient enrollment in the Remote Patient Monitoring (RPM) program for in-home monitoring: Yes, patient enrolled; current status is activated and monitoring.   VS's have remained stable for the last 2 wks.  Pt has been on service for 137 days.  Stable for graduation.   Remote Patient Monitoring Graduation      Date/Time:  10/3/2024 12:05 PM  Patient Current Location: Home: [address]  [facility] [place] [facility] [id number]  Patient has graduated from the

## 2024-10-04 ENCOUNTER — HOSPITAL ENCOUNTER (OUTPATIENT)
Dept: PSYCHIATRY | Age: 34
Setting detail: THERAPIES SERIES
Discharge: HOME | End: 2024-10-04
Payer: MEDICARE

## 2024-10-07 ENCOUNTER — HOSPITAL ENCOUNTER (OUTPATIENT)
Dept: PSYCHIATRY | Age: 34
Setting detail: THERAPIES SERIES
Discharge: HOME | End: 2024-10-07
Payer: MEDICARE
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
## 2024-10-11 ENCOUNTER — HOSPITAL ENCOUNTER (OUTPATIENT)
Dept: PSYCHIATRY | Age: 34
Setting detail: THERAPIES SERIES
Discharge: HOME | End: 2024-10-11
Payer: MEDICARE

## 2024-10-11 PROCEDURE — H2020 THER BEHAV SVC, PER DIEM: HCPCS

## 2024-10-11 NOTE — PROGRESS NOTES
Outpatient Behavioral Health Services    Intensive Outpatient Program (IOP) Group Therapy Progress Notes  Program: Intensive Outpatient Program      Group 1:    Date of Group10/11/2024    Start Time: 8:35am  End Time:9:06am    Number of Participants: 5    Focus/Topic of Group Activity: Check in    Summary of Group Session: Pt checked in and  shared activities and encounters with stressors , use of coping and struggling with coping . Pt also identified goal for today.    Therapeutic Intervention: Empathic listening, Cognitive Behavioral Therapy (CBT), Psycho education,     Patient's Response to Intervention:  Pt was attentive and actively engaged in session. Patient completed check in form and form was reviewed by therapist.  Patient discussed being tired.  Patient discussed her doctor's appointment.  Patient discussed struggling with insomnia however classes.    Mental Status Exam:   Appearance: Appropriately dressed and groomed  Mood: euthymic  Affect: Full  Behavior: Pleasant, Cooperative, and Interactive; Alert  Speech: Appropriate and Clear  Cognition: Intact and Oriented X4    Progress Towards Goal(s):  Moderate    Additional Comments:  n/a    Next Step:   Continue with Current Services     _________________________________________________________________________________________________________________________________      Group 2:    Date of Group: 10/9/2024    Start Time: 9:06 am End Time: 9:33 am    Number of Participants: 5    Focus/Topic of Group Activity: Self care        Summary of Group : Group members discussed   what is self care mean to them . Group members learned the different kinds of self care . Group members completed a self care check in   Therapeutic Intervention: Empathic listening, Cognitive Behavioral Therapy (CBT)    Patient's Response to Intervention: Patient reports self-care is going for a walk listening music.  Patient reports that she is long-term there in meeting her goals that she set

## 2024-10-14 ENCOUNTER — HOSPITAL ENCOUNTER (OUTPATIENT)
Dept: PSYCHIATRY | Age: 34
Setting detail: THERAPIES SERIES
Discharge: HOME | End: 2024-10-14
Payer: MEDICARE

## 2024-10-14 PROCEDURE — H2020 THER BEHAV SVC, PER DIEM: HCPCS

## 2024-10-14 NOTE — PROGRESS NOTES
Outpatient Behavioral Health Services    Intensive Outpatient Program (IOP) Group Therapy Progress Notes  Program: Intensive Outpatient Program      Group 1:    Date of Group10/14/2024    Start Time: 8:35am  End Time:9:30am    Number of Participants: 4    Focus/Topic of Group Activity: Check in    Summary of Group Session: Pt checked in and  shared activities and encounters with stressors , use of coping and struggling with coping . Pt also identified goal for today.    Therapeutic Intervention: Empathic listening, Cognitive Behavioral Therapy (CBT), Psycho education,     Patient's Response to Intervention:  Pt was attentive and actively engaged in session. Patient completed check in form and form was reviewed by therapist.  Patient discussed doing some self-care over the weekend which made her feel a lot better.  Patient discussed going to a prayer meeting this weekend.      Mental Status Exam:   Appearance: Appropriately dressed and groomed  Mood: euthymic  Affect: Full  Behavior: Pleasant, Cooperative, and Interactive; Alert  Speech: Appropriate and Clear  Cognition: Intact and Oriented X4    Progress Towards Goal(s):  Moderate    Additional Comments:  n/a    Next Step:   Continue with Current Services     _________________________________________________________________________________________________________________________________      Group 2:    Date of Group: 10/9/2024    Start Time: 9:50am End Time: 10:30 am    Number of Participants: 4    Focus/Topic of Group Activity: feed back Communication skills         Summary of Group : Group members discussed   what is feed back and how well they accept and follow feed back. Group members discussed how quickly they recover from hurt or angry feelings .  Group members discussed how well they listen to other .  Group members discussed how comfortable are they to start conversations with people   Therapeutic Intervention: Empathic listening, Cognitive Behavioral

## 2024-10-16 ENCOUNTER — HOSPITAL ENCOUNTER (OUTPATIENT)
Dept: PSYCHIATRY | Age: 34
Setting detail: THERAPIES SERIES
Discharge: HOME | End: 2024-10-16
Payer: MEDICARE

## 2024-10-16 PROCEDURE — H2020 THER BEHAV SVC, PER DIEM: HCPCS

## 2024-10-16 NOTE — PROGRESS NOTES
Outpatient Behavioral Health Services    Intensive Outpatient Program (IOP) Group Therapy Progress Notes  Program: Intensive Outpatient Program      Group 1:    Date of Group10/16/2024    Start Time: 8:37am  End Time:9:39am    Number of Participants: 4    Focus/Topic of Group Activity: Check in    Summary of Group Session: Pt checked in and  shared activities and encounters with stressors , use of coping and struggling with coping . Pt also identified goal for today.    Therapeutic Intervention: Empathic listening, Cognitive Behavioral Therapy (CBT), Psycho education,     Patient's Response to Pt was attentive and actively engaged in session. Patient completed check in form and form was reviewed by therapist.  Patient report having a good day yesterday.  Patient reports feeling depressed due to neighbor continues to bother her.  Patient reports just feeling comfortable with my neighbor next-door, nephew stresses me out saying stuff.  Bad enough my CPAP machine has giving me hell .  Patient reports she is dealing with migraines.  .Intervention:      Mental Status Exam:   Appearance: Appropriately dressed and groomed  Mood: euthymic  Affect: Full  Behavior: Pleasant, Cooperative, and Interactive; Alert  Speech: Appropriate and Clear  Cognition: Intact and Oriented X4    Progress Towards Goal(s):  Moderate    Additional Comments:  n/a    Next Step:   Continue with Current Services     _________________________________________________________________________________________________________________________________      Group 2:    Date of Group: 10/9/2024    Start Time: 9:48am End Time: 10:30 am    Number of Participants: 4    Focus/Topic of Group Activity: Decision- making  skills        Summary of Group : Group members  discussed  how well do they try to plan ahead ,  how often do they seek advice from others , think through pros and cons before making a decision  ,and handling temptation .  Therapeutic Intervention:

## 2024-10-16 NOTE — PROGRESS NOTES
Outpatient Behavioral Health Services     Intensive Outpatient Program (IOP) Group Therapy Progress Notes  Program: Intensive Outpatient Program        Group 1: Check-In     Date of Group: 10/16/2024     Start Time: 8:30 AM     End Time: 9:30 AM     Number of Participants: 4     Focus/Topic of Group Activity: Check-In     Summary of Group Session: discussed recent events, emotional states, and any challenges or progress they are experiencing in their personal lives. group members shared a mix of personal struggles and successes, as well as insights into how they are coping with various stressors.     Therapeutic Intervention: empathic listening, cognitive behavioral therapy (CBT), psychoeducation     Patient's Response to Intervention: Samantha is present and active in group. She described her day yesterday as \"okay\", though she felt annoyed with her  during class. She also mentioned that while she was cooking dinner, "[employer] kids came over to eat which her wife was not overly happy about but she felt that it was important to make sure the kids had at least something as she's unsure what they eat at home. 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     Mental Status Exam:      Appearance: appropriately dressed and groomed      Mood: euthymic     Affect: congruent     Behavior: cooperative, interactive, alert     Speech: appropriate and clear     Cognition: intact and oriented X4     Progress Towards Goal(s): moderate     Additional Comments:  none               Next Step: continue current services

## 2024-10-17 ENCOUNTER — CARE COORDINATION (OUTPATIENT)
Dept: CARE COORDINATION | Age: 34
End: 2024-10-17

## 2024-10-17 NOTE — CARE COORDINATION
Ambulatory Care Coordination Note     10/17/2024 3:55 PM     Patient Current Location:  Home: [address] [place] [address]     ACM contacted the patient by telephone. Verified name and  with patient as identifiers.         ACM: Rosibel Nguyen RN     Challenges to be reviewed by the provider   Additional needs identified to be addressed with provider No  none               Method of communication with provider: none.    Has the patient been seen in the ED since your last call? no    Care Summary Note: Samantha was referred to care coordination by [facility] following recent utilization. Pt has h/o: CHF, DM, HTN, dyspepsia, chronic constipation. Sleep apnea.   Per chart pt has not heard from pain mgmt.    Contact [facility].  Informed that they did receive the referral but have not been able to reach the pt.  Office did not have correct phone number for pt in the chart.  Will have pt call and arrange appt today. Informed pt. She will call today and arrange appt.   Wearing CPAP but having issues with mask.  Has mask refit on 10/22  Continues to experience abd pain.  Having episodes of nausea and belching.  Pt has been in touch with [facility].  Will be seeing GI and general surgeon on .  Pt reports that nothing she does helps it will just eventually go away on her own.  Pt is working with [facility] to arrange transport to this appt.  Needing to complete rights and responsibility and NET form. Message sent to  so forms can be mailed to pt for completion.   Participating in IOP program.  Feels it has been very beneficial.  Pt is hoping to be able to continue with program.    Tolerating food and fluids today.    Offered patient enrollment in the Remote Patient Monitoring (RPM) program for in-home monitoring: Yes, but did not enroll at this time: pt graduated from program.  .     Assessments Completed:   Congestive Heart Failure Assessment    Are you currently restricting fluids?: 2000cc  Do you

## 2024-10-18 ENCOUNTER — HOSPITAL ENCOUNTER (OUTPATIENT)
Dept: PSYCHIATRY | Age: 34
Setting detail: THERAPIES SERIES
Discharge: HOME | End: 2024-10-18
Payer: MEDICARE

## 2024-10-18 PROCEDURE — H2020 THER BEHAV SVC, PER DIEM: HCPCS

## 2024-10-18 NOTE — PROGRESS NOTES
Outpatient Behavioral Health Services    Intensive Outpatient Program (IOP) Group Therapy Progress Notes  Program: Intensive Outpatient Program      Group 1:    Date of Group10/18/2024    Start Time: 8:38am  End Time:8:55am    Number of Participants: 6    Focus/Topic of Group Activity: Check in    Summary of Group Session: Pt checked in and  shared activities and encounters with stressors , use of coping and struggling with coping . Pt also identified goal for today.    Therapeutic Intervention: Empathic listening, Cognitive Behavioral Therapy (CBT), Psycho education,     Patient's Response to Intervention:  Pt was attentive and actively engaged in session. Patient completed check in form and form was reviewed by therapist.  Patient reports having a fun day yesterday and how she got a washer and dryer.  Patient discussed [employer] study going well yesterday    Mental Status Exam:   Appearance: Appropriately dressed and groomed  Mood: euthymic  Affect: Full  Behavior: Pleasant, Cooperative, and Interactive; Alert  Speech: Appropriate and Clear  Cognition: Intact and Oriented X4    Progress Towards Goal(s):  Moderate    Additional Comments:  n/a    Next Step:   Continue with Current Services     _________________________________________________________________________________________________________________________________      Group 2:    Date of Group: 10/18/2024    Start Time: 8:55am End Time: 9:26  am    Number of Participants: 6    Focus/Topic of Group Activity: the role of feeling and behavior . Where does feelings come from         Summary of Group : Group members discussed  uncomfortable feeling they had and the unhealthy behavior that came from that. Group members watched a video on how our thoughts affect our feelings .  Therapeutic Intervention: Empathic listening, Cognitive Behavioral Therapy (CBT)    Patient's Response to Intervention: Patient discussed feeling emotionally drained yesterday and that she

## 2024-10-21 ENCOUNTER — CARE COORDINATION (OUTPATIENT)
Dept: CARE COORDINATION | Age: 34
End: 2024-10-21

## 2024-10-21 ENCOUNTER — HOSPITAL ENCOUNTER (OUTPATIENT)
Dept: PSYCHIATRY | Age: 34
Setting detail: THERAPIES SERIES
Discharge: HOME | End: 2024-10-21
Payer: MEDICARE

## 2024-10-21 PROCEDURE — H2020 THER BEHAV SVC, PER DIEM: HCPCS

## 2024-10-21 NOTE — CARE COORDINATION
SW mailed out local NET information and application with instructions to complete and mail/take to [facility].

## 2024-10-21 NOTE — PROGRESS NOTES
Outpatient Behavioral Health Services    Intensive Outpatient Program (IOP) Group Therapy Progress Notes  Program: Intensive Outpatient Program      Group 1:    Date of Group10/21/2024    Start Time: 8:37am  End Time:9:44am    Number of Participants: 6    Focus/Topic of Group Activity: Check in    Summary of Group Session: Pt checked in and  shared activities and encounters with stressors , use of coping and struggling with coping . Pt also identified goal for today.    Therapeutic Intervention: Empathic listening, Cognitive Behavioral Therapy (CBT), Psycho education,     Patient's Response to Intervention:  Pt was attentive and actively engaged in session. Patient completed check in form and form was reviewed by therapist.  Patient discussed her weekend being ok.  Patient discussed feeling depressed due to thinking about her mom who passed away.  Patient discussed ways that she tries to keep her mom's memory alive.    Mental Status Exam:   Appearance: Appropriately dressed and groomed  Mood: euthymic  Affect: Full  Behavior: Pleasant, Cooperative, and Interactive; Alert  Speech: Appropriate and Clear  Cognition: Intact and Oriented X4    Progress Towards Goal(s):  Moderate    Additional Comments:  n/a    Next Step:   Continue with Current Services     _________________________________________________________________________________________________________________________________      Group 2:    Date of Group: 10/18/2024    Start Time: 9:50am End Time: 10:22  am    Number of Participants: 6    Focus/Topic of Group Activity: four proven strategies  to manage feelings .        Summary of Group : Group members learned strategies to manage their uncomfortable feelings . Group members discussed the people they can talk to about their uncomfortable feelings.  Therapeutic Intervention: Empathic listening, Cognitive Behavioral Therapy (CBT)    Patient's Response to Intervention: Patient reports when she is having

## 2024-10-23 ENCOUNTER — HOSPITAL ENCOUNTER (OUTPATIENT)
Dept: PSYCHIATRY | Age: 34
Setting detail: THERAPIES SERIES
Discharge: HOME | End: 2024-10-23
Payer: MEDICARE

## 2024-10-23 PROCEDURE — H2020 THER BEHAV SVC, PER DIEM: HCPCS

## 2024-10-23 NOTE — PROGRESS NOTES
Outpatient Behavioral Health Services    Intensive Outpatient Program (IOP) Group Therapy Progress Notes  Program: Intensive Outpatient Program      Group 1:    Date of Group10/23/2024    Start Time: 8:37am  End Time:9:25am    Number of Participants: 4    Focus/Topic of Group Activity: Check in    Summary of Group Session: Pt checked in and  shared activities and encounters with stressors , use of coping and struggling with coping . Pt also identified goal for today.    Therapeutic Intervention: Empathic listening, Cognitive Behavioral Therapy (CBT), Psycho education,     Patient's Response to Pt was attentive and actively engaged in session. Patient completed check in form and form was reviewed by therapist.  Patient reports having a okay Tuesday besides arguing with her professor.  Intervention:      Mental Status Exam:   Appearance: Appropriately dressed and groomed  Mood: euthymic  Affect: Full  Behavior: Pleasant, Cooperative, and Interactive; Alert  Speech: Appropriate and Clear  Cognition: Intact and Oriented X4    Progress Towards Goal(s):  Moderate    Additional Comments:  n/a    Next Step:   Continue with Current Services     _________________________________________________________________________________________________________________________________      Group 2:    Date of Group: 10/23/2024    Start Time: 9:43am End Time: 10:27  am    Number of Participants: 4    Focus/Topic of Group Activity: Cognitive distortion        Summary of Group : Group members learned watch a video on the different distortions and how to manage them . Group members discussed their experiences with the different cognitive distortion . Group members discuss what triggers their depression .  Therapeutic Intervention: Empathic listening, Cognitive Behavioral Therapy (CBT)    Patient's Response to Intervention: Patient reports a negative distortions that comes up often for her is jumping to conclusions.  Patient reports what

## 2024-10-25 ENCOUNTER — HOSPITAL ENCOUNTER (OUTPATIENT)
Dept: PSYCHIATRY | Age: 34
Setting detail: THERAPIES SERIES
Discharge: HOME | End: 2024-10-25
Payer: MEDICARE

## 2024-10-25 NOTE — PROGRESS NOTES
7:50 am : Pt called clinician  today to let clinician know that they will not be able to attend group today due to not feeling well.  Clinician also let patient know that they will be extended for group until 11/4/2024 .

## 2024-10-28 ENCOUNTER — HOSPITAL ENCOUNTER (OUTPATIENT)
Dept: PSYCHIATRY | Age: 34
Setting detail: THERAPIES SERIES
Discharge: HOME | End: 2024-10-28
Payer: MEDICARE

## 2024-10-28 ENCOUNTER — HOSPITAL ENCOUNTER (OUTPATIENT)
Dept: NURSING | Age: 34
Discharge: HOME | End: 2024-10-28
Payer: MEDICARE

## 2024-10-28 VITALS
TEMPERATURE: 97 F | DIASTOLIC BLOOD PRESSURE: 96 MMHG | SYSTOLIC BLOOD PRESSURE: 143 MMHG | RESPIRATION RATE: 20 BRPM | OXYGEN SATURATION: 97 % | HEART RATE: 83 BPM

## 2024-10-28 DIAGNOSIS — D50.8 OTHER IRON DEFICIENCY ANEMIA: Primary | ICD-10-CM

## 2024-10-28 PROCEDURE — 6360000002 HC RX W HCPCS: Performed by: SURGERY

## 2024-10-28 PROCEDURE — 99212 OFFICE O/P EST SF 10 MIN: CPT

## 2024-10-28 PROCEDURE — 2580000003 HC RX 258: Performed by: SURGERY

## 2024-10-28 RX ORDER — SODIUM CHLORIDE 0.9 % (FLUSH) 0.9 %
5-40 SYRINGE (ML) INJECTION PRN
Status: DISCONTINUED | OUTPATIENT
Start: 2024-10-28 | End: 2024-10-29 | Stop reason: HOSPADM

## 2024-10-28 RX ORDER — HEPARIN 100 UNIT/ML
500 SYRINGE INTRAVENOUS PRN
Status: DISCONTINUED | OUTPATIENT
Start: 2024-10-28 | End: 2024-10-29 | Stop reason: HOSPADM

## 2024-10-28 RX ORDER — HEPARIN 100 UNIT/ML
500 SYRINGE INTRAVENOUS PRN
OUTPATIENT
Start: 2024-10-28

## 2024-10-28 RX ORDER — SODIUM CHLORIDE 9 MG/ML
25 INJECTION, SOLUTION INTRAVENOUS PRN
OUTPATIENT
Start: 2024-10-28

## 2024-10-28 RX ORDER — SODIUM CHLORIDE 0.9 % (FLUSH) 0.9 %
5-40 SYRINGE (ML) INJECTION PRN
OUTPATIENT
Start: 2024-10-28

## 2024-10-28 RX ADMIN — SODIUM CHLORIDE, PRESERVATIVE FREE 10 ML: 5 INJECTION INTRAVENOUS at 10:26

## 2024-10-28 RX ADMIN — HEPARIN 500 UNITS: 100 SYRINGE at 10:26

## 2024-10-28 NOTE — DISCHARGE INSTRUCTIONS
ACTIVITY:  Continue usual care with your doctor. Call your doctor immediately if any severe problems or go to the nearest emergency room.      I have been treated and hereby acknowledge receiving this instruction sheet.          MONDAY NOVEMBER 25TH, 2024 AT 10:30am [facility] flush

## 2024-10-28 NOTE — PROGRESS NOTES
Outpatient Behavioral Health Services    Intensive Outpatient Program (IOP) Group Therapy Progress Notes  Program: Intensive Outpatient Program      Group 1:    Date of Group10/23/2024    Start Time: 8:36am  End Time:9 :27am    Number of Participants: 5    Focus/Topic of Group Activity: Check in    Summary of Group Session: Pt checked in and  shared activities and encounters with stressors , use of coping and struggling with coping . Pt also identified goal for today.    Therapeutic Intervention: Empathic listening, Cognitive Behavioral Therapy (CBT), Psycho education,     Patient's Response to Intervention:  Pt was attentive and actively engaged in session. Patient completed check in form and form was reviewed by therapist.  Patient discussed struggles with her health and not being able to eat.  Patient reports being in bed all weekend.  Patient reports not getting any help from a doctor due to them feeling like she is pill seeking.    Mental Status Exam:   Appearance: Appropriately dressed and groomed  Mood: euthymic  Affect: Full  Behavior: Pleasant, Cooperative, and Interactive; Alert  Speech: Appropriate and Clear  Cognition: Intact and Oriented X4    Progress Towards Goal(s):  Moderate    Additional Comments:  n/a    Next Step:   Continue with Current Services     _________________________________________________________________________________________________________________________________      Group 2:    Date of Group: 10/23/2024    Start Time: 9:45am End Time: 10:36  am    Number of Participants: 5    Focus/Topic of Group Activity: Learning about our feelings and behaviors         Summary of Group : Group members discussed  when was the last time they felt shame . Group members discussed what makes them feel shameful now .  Therapeutic Intervention: Empathic listening, Cognitive Behavioral Therapy (CBT)    Patient's Response to Intervention: Patient had to leave due to doctor's appointment and was not

## 2024-10-28 NOTE — PROGRESS NOTES
1020 pt arrives ambulatory for mediport flush. Procedure explained and questions answered. PT RIGHTS AND RESPONSIBILITIES OFFERED TO PT.  1026 mediport accessed using sterile technique. Pt tolerated it well with no complaints. Pt discharged ambulatory with instructions with no complaints.                 _m___ Safety:       (Environmental)  [place] to environment  Ensure ID band is correct and in place/ allergy band as needed  Assess for fall risk  Initiate fall precautions as applicable (fall band, side rails, etc.)  Call light within reach  Bed in low position/ wheels locked    _m___ Pain:       Assess pain level and characteristics  Administer analgesics as ordered  Assess effectiveness of pain management and report to MD as needed    _m___ Knowledge Deficit:  Assess baseline knowledge  Provide teaching at level of understanding  Provide teaching via preferred learning method  Evaluate teaching effectiveness    _m___ Hemodynamic/Respiratory Status:       (Pre and Post Procedure Monitoring)  Assess/Monitor vital signs and LOC  Assess Baseline SpO2 prior to any sedation  Obtain weight/height  Assess vital signs/ LOC until patient meets discharge criteria  Monitor procedure site and notify MD of any issues    __m__ Infection-Risk of Central Venous Catheter:  Monitor for infection signs and symptoms (catheter site redness, temperature elevation, etc)  Assess for infection risks  Educate regarding infection prevention  Manage central venous catheter (flushes/ dressing changes per protocol)

## 2024-10-30 ENCOUNTER — HOSPITAL ENCOUNTER (OUTPATIENT)
Dept: PSYCHIATRY | Age: 34
Setting detail: THERAPIES SERIES
Discharge: HOME | End: 2024-10-30
Payer: MEDICARE

## 2024-10-30 PROCEDURE — 90853 GROUP PSYCHOTHERAPY: CPT

## 2024-10-31 ENCOUNTER — OFFICE VISIT (OUTPATIENT)
Dept: INTERNAL MEDICINE CLINIC | Age: 34
End: 2024-10-31

## 2024-10-31 VITALS
BODY MASS INDEX: 48.9 KG/M2 | DIASTOLIC BLOOD PRESSURE: 87 MMHG | SYSTOLIC BLOOD PRESSURE: 139 MMHG | HEART RATE: 80 BPM | HEIGHT: 64 IN | OXYGEN SATURATION: 96 % | WEIGHT: 286.4 LBS

## 2024-10-31 DIAGNOSIS — G89.29 CHRONIC ABDOMINAL PAIN: Primary | ICD-10-CM

## 2024-10-31 DIAGNOSIS — R10.9 CHRONIC ABDOMINAL PAIN: Primary | ICD-10-CM

## 2024-10-31 RX ORDER — PROMETHAZINE HYDROCHLORIDE 25 MG/1
25 TABLET ORAL EVERY 8 HOURS PRN
COMMUNITY
Start: 2024-10-29

## 2024-10-31 NOTE — PROGRESS NOTES
[facility] INTERNAL MEDICINE  [address] [place] [id number]  Dept: [phone]  Dept Fax: [phone]  Loc: [phone]

## 2024-11-01 ENCOUNTER — CARE COORDINATION (OUTPATIENT)
Dept: CARE COORDINATION | Age: 34
End: 2024-11-01

## 2024-11-01 NOTE — CARE COORDINATION
have barriers with adherence to non-pharmacologic self-management interventions? (Nutrition/Exercise/Self-Monitoring): No   Have you ever had to go to the ED for symptoms of low blood sugar?: No       Do you have hyperglycemia symptoms?: No   Do you have hypoglycemia symptoms?: No   Blood Sugar Trends: No Change         ,   General Assessment    Do you have any symptoms that are causing concern?: Yes  Progression since Onset: Unchanged, Intermittent - Waxing/Waning  Reported Symptoms: Abdominal Pain, Nausea, Constipation (Comment: and diarrhea-referral to Dr. Washburn. seeing  general surgery.)          Medications Reviewed:   Completed during a previous call     Advance Care Planning:   Not reviewed during this call     Care Planning:   Education Documentation  Educate reporting changes in condition, taught by Rosibel Nguyen RN at 11/1/2024  1:09 PM.  Learner: Patient  Readiness: Acceptance  Method: Explanation  Response: Verbalizes Understanding, Needs Reinforcement    Educate Patient on When to Call for Symptoms, taught by Rosibel Nguyen RN at 11/1/2024  1:09 PM.  Learner: Patient  Readiness: Acceptance  Method: Explanation  Response: Verbalizes Understanding, Needs Reinforcement    Education Comments  No comments found.     ,    Goals Addressed                   This Visit's Progress     Conditions and Symptoms   On track     I will schedule office visits, as directed by my provider.  I will keep my appointment or reschedule if I have to cancel.  I will notify my provider of any barriers to my plan of care.  I will follow my Zone Management tool to seek urgent or emergent care.  I will notify my provider of any symptoms that indicate a worsening of my condition.    Barriers: lack of support, overwhelmed by complexity of regimen, stress, and lack of education  Plan for overcoming my barriers: education and support on chronic conditions, early symptom recognition and reporting, RPM  Confidence:

## 2024-11-05 RX ORDER — AMLODIPINE BESYLATE 10 MG/1
10 TABLET ORAL DAILY
Qty: 90 TABLET | Refills: 3 | Status: SHIPPED | OUTPATIENT
Start: 2024-11-05

## 2024-11-06 ENCOUNTER — HOSPITAL ENCOUNTER (OUTPATIENT)
Dept: PSYCHIATRY | Age: 34
Setting detail: THERAPIES SERIES
Discharge: HOME | End: 2024-11-06
Payer: MEDICARE

## 2024-11-06 PROCEDURE — 5130000000 HC BRIDGE APPOINTMENT

## 2024-11-10 ENCOUNTER — HOSPITAL ENCOUNTER (EMERGENCY)
Age: 34
Discharge: HOME | End: 2024-11-11
Payer: MEDICARE

## 2024-11-10 ENCOUNTER — APPOINTMENT (OUTPATIENT)
Dept: GENERAL RADIOLOGY | Age: 34
End: 2024-11-10
Payer: MEDICARE

## 2024-11-10 DIAGNOSIS — R11.2 NAUSEA AND VOMITING, UNSPECIFIED VOMITING TYPE: Primary | ICD-10-CM

## 2024-11-10 LAB
ALBUMIN SERPL BCG-MCNC: 4.2 G/DL (ref 3.5–5.1)
ALP SERPL-CCNC: 127 U/L (ref 38–126)
ALT SERPL W/O P-5'-P-CCNC: 14 U/L (ref 11–66)
ANION GAP SERPL CALC-SCNC: 14 MEQ/L (ref 8–16)
AST SERPL-CCNC: 16 U/L (ref 5–40)
B-HCG SERPL QL: NEGATIVE
BASOPHILS ABSOLUTE: 0.1 THOU/MM3 (ref 0–0.1)
BASOPHILS NFR BLD AUTO: 0.5 %
BILIRUB SERPL-MCNC: < 0.2 MG/DL (ref 0.3–1.2)
BUN SERPL-MCNC: 16 MG/DL (ref 7–22)
CALCIUM SERPL-MCNC: 9.4 MG/DL (ref 8.5–10.5)
CHLORIDE SERPL-SCNC: 103 MEQ/L (ref 98–111)
CO2 SERPL-SCNC: 21 MEQ/L (ref 23–33)
CREAT SERPL-MCNC: 0.7 MG/DL (ref 0.4–1.2)
DEPRECATED RDW RBC AUTO: 42.8 FL (ref 35–45)
EOSINOPHIL NFR BLD AUTO: 1.6 %
EOSINOPHILS ABSOLUTE: 0.2 THOU/MM3 (ref 0–0.4)
ERYTHROCYTE [DISTWIDTH] IN BLOOD BY AUTOMATED COUNT: 15.8 % (ref 11.5–14.5)
FLUAV RNA RESP QL NAA+PROBE: NOT DETECTED
FLUBV RNA RESP QL NAA+PROBE: NOT DETECTED
GFR SERPL CREATININE-BSD FRML MDRD: > 90 ML/MIN/1.73M2
GLUCOSE SERPL-MCNC: 104 MG/DL (ref 70–108)
HCT VFR BLD AUTO: 40.3 % (ref 37–47)
HGB BLD-MCNC: 12.8 GM/DL (ref 12–16)
HGB RETIC QN AUTO: 27.4 PG (ref 28.2–35.7)
IMM GRANULOCYTES # BLD AUTO: 0.05 THOU/MM3 (ref 0–0.07)
IMM GRANULOCYTES NFR BLD AUTO: 0.4 %
IMM RETICS NFR: 17.4 % (ref 3–15.9)
LIPASE SERPL-CCNC: 22.2 U/L (ref 5.6–51.3)
LYMPHOCYTES ABSOLUTE: 2.9 THOU/MM3 (ref 1–4.8)
LYMPHOCYTES NFR BLD AUTO: 22.5 %
MAGNESIUM SERPL-MCNC: 1.9 MG/DL (ref 1.6–2.4)
MCH RBC QN AUTO: 24.3 PG (ref 26–33)
MCHC RBC AUTO-ENTMCNC: 31.8 GM/DL (ref 32.2–35.5)
MCV RBC AUTO: 76.5 FL (ref 81–99)
MONOCYTES ABSOLUTE: 0.8 THOU/MM3 (ref 0.4–1.3)
MONOCYTES NFR BLD AUTO: 6.4 %
NEUTROPHILS ABSOLUTE: 8.7 THOU/MM3 (ref 1.8–7.7)
NEUTROPHILS NFR BLD AUTO: 68.6 %
NRBC BLD AUTO-RTO: 0 /100 WBC
OSMOLALITY SERPL CALC.SUM OF ELEC: 277.2 MOSMOL/KG (ref 275–300)
PLATELET # BLD AUTO: 293 THOU/MM3 (ref 130–400)
PMV BLD AUTO: 9.7 FL (ref 9.4–12.4)
POTASSIUM SERPL-SCNC: 3.8 MEQ/L (ref 3.5–5.2)
PROT SERPL-MCNC: 7.7 G/DL (ref 6.1–8)
RBC # BLD AUTO: 5.27 MILL/MM3 (ref 4.2–5.4)
RETICS # AUTO: 62 THOU/MM3 (ref 20–115)
RETICS/RBC NFR AUTO: 1.2 % (ref 0.5–2)
SARS-COV-2 RNA RESP QL NAA+PROBE: NOT DETECTED
SODIUM SERPL-SCNC: 138 MEQ/L (ref 135–145)
WBC # BLD AUTO: 12.7 THOU/MM3 (ref 4.8–10.8)

## 2024-11-10 PROCEDURE — 36415 COLL VENOUS BLD VENIPUNCTURE: CPT

## 2024-11-10 PROCEDURE — 85025 COMPLETE CBC W/AUTO DIFF WBC: CPT

## 2024-11-10 PROCEDURE — 85046 RETICYTE/HGB CONCENTRATE: CPT

## 2024-11-10 PROCEDURE — 87636 SARSCOV2 & INF A&B AMP PRB: CPT

## 2024-11-10 PROCEDURE — 96372 THER/PROPH/DIAG INJ SC/IM: CPT

## 2024-11-10 PROCEDURE — 6360000002 HC RX W HCPCS

## 2024-11-10 PROCEDURE — 71046 X-RAY EXAM CHEST 2 VIEWS: CPT

## 2024-11-10 PROCEDURE — 93005 ELECTROCARDIOGRAM TRACING: CPT | Performed by: STUDENT IN AN ORGANIZED HEALTH CARE EDUCATION/TRAINING PROGRAM

## 2024-11-10 PROCEDURE — 83690 ASSAY OF LIPASE: CPT

## 2024-11-10 PROCEDURE — 80053 COMPREHEN METABOLIC PANEL: CPT

## 2024-11-10 PROCEDURE — 83735 ASSAY OF MAGNESIUM: CPT

## 2024-11-10 PROCEDURE — 99285 EMERGENCY DEPT VISIT HI MDM: CPT

## 2024-11-10 PROCEDURE — 84703 CHORIONIC GONADOTROPIN ASSAY: CPT

## 2024-11-10 RX ORDER — PROMETHAZINE HYDROCHLORIDE 25 MG/ML
25 INJECTION, SOLUTION INTRAMUSCULAR; INTRAVENOUS ONCE
Status: COMPLETED | OUTPATIENT
Start: 2024-11-10 | End: 2024-11-10

## 2024-11-10 RX ADMIN — PROMETHAZINE HYDROCHLORIDE 25 MG: 25 INJECTION INTRAMUSCULAR; INTRAVENOUS at 23:45

## 2024-11-10 ASSESSMENT — PAIN - FUNCTIONAL ASSESSMENT: PAIN_FUNCTIONAL_ASSESSMENT: NONE - DENIES PAIN

## 2024-11-11 ENCOUNTER — CARE COORDINATION (OUTPATIENT)
Dept: CARE COORDINATION | Age: 34
End: 2024-11-11

## 2024-11-11 VITALS
HEART RATE: 94 BPM | HEIGHT: 64 IN | OXYGEN SATURATION: 97 % | TEMPERATURE: 98.1 F | BODY MASS INDEX: 48.83 KG/M2 | DIASTOLIC BLOOD PRESSURE: 70 MMHG | WEIGHT: 286 LBS | SYSTOLIC BLOOD PRESSURE: 110 MMHG | RESPIRATION RATE: 15 BRPM

## 2024-11-11 DIAGNOSIS — M79.10 MUSCLE ACHE: ICD-10-CM

## 2024-11-11 LAB
EKG ATRIAL RATE: 102 BPM
EKG P AXIS: 58 DEGREES
EKG P-R INTERVAL: 152 MS
EKG Q-T INTERVAL: 330 MS
EKG QRS DURATION: 78 MS
EKG QTC CALCULATION (BAZETT): 430 MS
EKG R AXIS: 9 DEGREES
EKG T AXIS: 32 DEGREES
EKG VENTRICULAR RATE: 102 BPM

## 2024-11-11 PROCEDURE — 93010 ELECTROCARDIOGRAM REPORT: CPT | Performed by: NUCLEAR MEDICINE

## 2024-11-11 PROCEDURE — 96372 THER/PROPH/DIAG INJ SC/IM: CPT

## 2024-11-11 PROCEDURE — 6360000002 HC RX W HCPCS

## 2024-11-11 RX ORDER — LIDOCAINE 50 MG/G
1 PATCH TOPICAL DAILY
Qty: 30 PATCH | Refills: 0 | OUTPATIENT
Start: 2024-11-11 | End: 2024-12-11

## 2024-11-11 RX ORDER — MORPHINE SULFATE 4 MG/ML
4 INJECTION, SOLUTION INTRAMUSCULAR; INTRAVENOUS ONCE
Status: COMPLETED | OUTPATIENT
Start: 2024-11-11 | End: 2024-11-11

## 2024-11-11 RX ORDER — TRAMADOL HYDROCHLORIDE 50 MG/1
50 TABLET ORAL EVERY 6 HOURS PRN
Qty: 28 TABLET | Refills: 0 | OUTPATIENT
Start: 2024-11-11 | End: 2024-11-18

## 2024-11-11 RX ADMIN — MORPHINE SULFATE 4 MG: 4 INJECTION, SOLUTION INTRAMUSCULAR; INTRAVENOUS at 00:38

## 2024-11-11 ASSESSMENT — PAIN SCALES - GENERAL: PAINLEVEL_OUTOF10: 9

## 2024-11-11 NOTE — TELEPHONE ENCOUNTER
Samantha Nichols called requesting a refill on the following medications:  Requested Prescriptions     Pending Prescriptions Disp Refills    lidocaine (LIDODERM) 5 % 30 patch 0     Sig: Place 1 patch onto the skin daily 12 hours on, 12 hours off.    traMADol (ULTRAM) 50 MG tablet 28 tablet 0     Sig: Take 1 tablet by mouth every 6 hours as needed for Pain for up to 7 days. Intended supply: 7 days. Take lowest dose possible to manage pain Max Daily Amount: 200 mg     Pharmacy verified:    [facility] - [place], [place] - [facility] [address] [place] [address] [phone] - F [phone]       Date of last visit: 2018  Date of next visit (if applicable): Visit date not found

## 2024-11-11 NOTE — CARE COORDINATION
Ambulatory Care Coordination Note     11/11/2024 10:28 AM     Patient outreach attempt by this ACM today to perform care management follow up . ACM was unable to reach the patient by telephone today; left voice message requesting a return phone call to this ACM.     ACM: Rosibel Nguyen RN     Care Summary Note: Samantha was referred to care coordination by CTN following recent utilization. Pt has h/o: CHF, DM, HTN, dyspepsia, chronic constipation. Sleep apnea.     PCP/Specialist follow up:   Future Appointments         Provider Specialty Dept Phone    11/25/2024 10:30 AM [facility] RECOVERY  ROOM C IP Unit [phone]    12/3/2024 1:00 PM Sully Ramos RD, LD Internal Medicine [phone]    12/12/2024 1:20 PM Carolann Salomon APRN - [facility] Pulmonology [phone]    12/19/2024 1:40 PM Shad Werner DO Internal Medicine [phone]    2/18/2025 1:00 PM Abhishek Hamilton MD Internal Medicine [phone]    5/13/2025 10:00 AM Ramona You MD Cardiology [phone]            Follow Up:   Plan for next ACM outreach in approximately 1 week to complete:  - CC Protocol assessments  - disease specific assessments  - goal progression  - education   Check to see if pt has f/u with Dr. Washburn-GI and [facility] .

## 2024-11-11 NOTE — ED PROVIDER NOTES
11/10/24 2343 11/11/24 0043   BP: 109/74 (!) 114/91 110/70    Pulse: 100 96 95 94   Resp: 18 16 18 15   Temp: 98.1 °F (36.7 °C)      TempSrc: Oral      SpO2: 97% 96% 97% 97%   Weight: 129.7 kg (286 lb)      Height: 1.626 m (5' 4\")          The patient was seen and examined. Appropriate diagnostic testing was performed and results reviewed with the patient.      The results of pertinent diagnostic studies and exam findings were discussed. The patient’s provisional diagnosis and plan of care were discussed with the patient and present family who expressed understanding. Any medications were reviewed and indications and risks of medications were discussed with the patient /family present. Strict verbal and written return precautions, instructions and appropriate follow-up provided to  the patient.     ED Medications administered this visit:  (None if blank)  Medications   promethazine (PHENERGAN) injection 25 mg (25 mg IntraMUSCular Given 11/10/24 2345)   morphine (PF) injection 4 mg (4 mg SubCUTAneous Given 11/11/24 0038)         PROCEDURES: (None if blank)      CRITICAL CARE: (None if blank)      DISCHARGE PRESCRIPTIONS: (None if blank)  Discharge Medication List as of 11/11/2024  1:05 AM          FINAL IMPRESSION      1. Nausea and vomiting, unspecified vomiting type          DISPOSITION/PLAN   DISPOSITION Decision To Discharge 11/11/2024 01:09:59 AM           OUTPATIENT FOLLOW UP THE PATIENT:  Juan Dominguez, DO  [address] [facility] [address]  [phone]    Schedule an appointment as soon as possible for a visit in 2 days        WHITNEY Yu Rachel A, PA-C  11/11/24 0207

## 2024-11-11 NOTE — ED TRIAGE NOTES
Patient presents to ED from home with C/O abdominal pain, lightheadedness, cough, congestion, nausea and vomiting. Patient states her wife came home and \"found me passed out laying next to the dog cage.\" Patient states her wife has been sick and she uses public transportation. Patient C/O abdominal pain \"all over\" and nausea and vomiting. Patient has extensive medical history. Patient states they took her off her metformin and singular recently. VS stable. EKG complete. Telemetry in place.

## 2024-11-12 DIAGNOSIS — R10.9 CHRONIC ABDOMINAL PAIN: Primary | ICD-10-CM

## 2024-11-12 DIAGNOSIS — G89.29 CHRONIC ABDOMINAL PAIN: Primary | ICD-10-CM

## 2024-11-13 ENCOUNTER — TELEPHONE (OUTPATIENT)
Dept: INTERNAL MEDICINE CLINIC | Age: 34
End: 2024-11-13

## 2024-11-20 ENCOUNTER — CARE COORDINATION (OUTPATIENT)
Dept: CARE COORDINATION | Age: 34
End: 2024-11-20

## 2024-11-20 NOTE — CARE COORDINATION
Ambulatory Care Coordination Note     11/20/2024 12:36 PM     Patient outreach attempt by this ACM today to perform care management follow up . ACM was unable to reach the patient by telephone today;   Pt answered but unable to talk d/t pt being at Dr. Lara's office for colonoscopy.  Pt reports that she will be having EGD tomorrow.  Will attempt to f/u within the next wk.      ACM: Rosibel Nguyen, RN     Care Summary Note: Samantha was referred to care coordination by CTN following recent utilization. Pt has h/o: CHF, DM, HTN, dyspepsia, chronic constipation. Sleep apnea.     PCP/Specialist follow up:   Future Appointments         Provider Specialty Dept Phone    11/25/2024 10:30 AM [facility] RECOVERY  ROOM C IP Unit [phone]    12/3/2024 1:00 PM Sully Ramos RD, LD Internal Medicine [phone]    12/12/2024 1:20 PM Carolann Salomon APRN - [facility] Pulmonology [phone]    12/19/2024 1:40 PM Shad Werner,  Internal Medicine [phone]    2/18/2025 1:00 PM Abhishek Hamilton MD Internal Medicine [phone]    5/13/2025 10:00 AM Ramona You MD Cardiology [phone]            Follow Up:   Plan for next AC outreach in approximately 1 week to complete:  - CC Protocol assessments  - disease specific assessments  - goal progression  - education   - follow-up appointment with Dr. Lara .

## 2024-11-25 ENCOUNTER — CARE COORDINATION (OUTPATIENT)
Dept: CARE COORDINATION | Age: 34
End: 2024-11-25

## 2024-11-25 ENCOUNTER — HOSPITAL ENCOUNTER (OUTPATIENT)
Dept: NURSING | Age: 34
Discharge: HOME | End: 2024-11-25
Payer: MEDICARE

## 2024-11-25 VITALS
SYSTOLIC BLOOD PRESSURE: 124 MMHG | HEART RATE: 86 BPM | OXYGEN SATURATION: 96 % | DIASTOLIC BLOOD PRESSURE: 82 MMHG | TEMPERATURE: 96.2 F | RESPIRATION RATE: 16 BRPM

## 2024-11-25 DIAGNOSIS — D50.8 OTHER IRON DEFICIENCY ANEMIA: Primary | ICD-10-CM

## 2024-11-25 PROCEDURE — 99212 OFFICE O/P EST SF 10 MIN: CPT

## 2024-11-25 PROCEDURE — 2580000003 HC RX 258: Performed by: SURGERY

## 2024-11-25 PROCEDURE — 6360000002 HC RX W HCPCS: Performed by: SURGERY

## 2024-11-25 RX ORDER — HEPARIN 100 UNIT/ML
500 SYRINGE INTRAVENOUS PRN
Status: DISCONTINUED | OUTPATIENT
Start: 2024-11-25 | End: 2024-11-26 | Stop reason: HOSPADM

## 2024-11-25 RX ORDER — SODIUM CHLORIDE 9 MG/ML
25 INJECTION, SOLUTION INTRAVENOUS PRN
OUTPATIENT
Start: 2024-11-25

## 2024-11-25 RX ORDER — SODIUM CHLORIDE 0.9 % (FLUSH) 0.9 %
5-40 SYRINGE (ML) INJECTION PRN
OUTPATIENT
Start: 2024-11-25

## 2024-11-25 RX ORDER — HEPARIN 100 UNIT/ML
500 SYRINGE INTRAVENOUS PRN
OUTPATIENT
Start: 2024-11-25

## 2024-11-25 RX ORDER — SODIUM CHLORIDE 0.9 % (FLUSH) 0.9 %
5-40 SYRINGE (ML) INJECTION PRN
Status: DISCONTINUED | OUTPATIENT
Start: 2024-11-25 | End: 2024-11-26 | Stop reason: HOSPADM

## 2024-11-25 RX ORDER — PANTOPRAZOLE SODIUM 40 MG/1
40 TABLET, DELAYED RELEASE ORAL 2 TIMES DAILY
COMMUNITY

## 2024-11-25 RX ADMIN — SODIUM CHLORIDE, PRESERVATIVE FREE 10 ML: 5 INJECTION INTRAVENOUS at 10:08

## 2024-11-25 RX ADMIN — HEPARIN 500 UNITS: 100 SYRINGE at 10:08

## 2024-11-25 NOTE — DISCHARGE INSTRUCTIONS
Next appointment is scheduled for December 23 at 10:30    Outpatient nursing [phone]    ACTIVITY:  Continue usual care with your doctor. Call your doctor immediately if any severe problems or go to the nearest emergency room.      I have been treated and hereby acknowledge receiving this instruction sheet.

## 2024-11-25 NOTE — PROGRESS NOTES
100 Patient arrived to OPN ambulatory for port flush.  Oriented to room and call light  PT RIGHTS AND RESPONSIBILITIES OFFERED TO PT.    1008 Port accessed using sterile technique.  Sluggish blood return noted.      1009 Port de-accessed.      1024 Discharge instructions given and explained and she denies questions.  Discharged ambulatory     1030 Spoke with Dr. Michel regarding patient's port and the sluggish blood return.  He stated ok to order for a dye study.  Orders faxed to radiology scheduling     _M___ Safety:       (Environmental)  [place] to environment  Ensure ID band is correct and in place/ allergy band as needed  Assess for fall risk  Initiate fall precautions as applicable (fall band, side rails, etc.)  Call light within reach  Bed in low position/ wheels locked    _M___ Pain:       Assess pain level and characteristics  Administer analgesics as ordered  Assess effectiveness of pain management and report to MD as needed    _M___ Knowledge Deficit:  Assess baseline knowledge  Provide teaching at level of understanding  Provide teaching via preferred learning method  Evaluate teaching effectiveness    _M___ Hemodynamic/Respiratory Status:       (Pre and Post Procedure Monitoring)  Assess/Monitor vital signs and LOC  Assess Baseline SpO2 prior to any sedation  Obtain weight/height  Assess vital signs/ LOC until patient meets discharge criteria  Monitor procedure site and notify MD of any issues    _M___ Infection-Risk of Central Venous Catheter:  Monitor for infection signs and symptoms (catheter site redness, temperature elevation, etc)  Assess for infection risks  Educate regarding infection prevention  Manage central venous catheter (flushes/ dressing changes per protocol)

## 2024-11-26 ENCOUNTER — HOSPITAL ENCOUNTER (OUTPATIENT)
Dept: INTERVENTIONAL RADIOLOGY/VASCULAR | Age: 34
Discharge: HOME | End: 2024-11-26
Payer: MEDICARE

## 2024-11-26 DIAGNOSIS — E61.1 IRON DEFICIENCY: ICD-10-CM

## 2024-11-26 DIAGNOSIS — D57.00 SICKLE CELL PAIN CRISIS (HCC): ICD-10-CM

## 2024-11-26 PROCEDURE — 2709999900 IR FLUOROSCOPY LESS THAN 1 HOUR

## 2024-11-26 PROCEDURE — 36598 INJ W/FLUOR EVAL CV DEVICE: CPT

## 2024-11-26 RX ORDER — IOPAMIDOL 612 MG/ML
INJECTION, SOLUTION INTRAVASCULAR PRN
Status: DISCONTINUED | OUTPATIENT
Start: 2024-11-26 | End: 2024-11-27 | Stop reason: HOSPADM

## 2024-11-26 RX ORDER — HEPARIN 100 UNIT/ML
SYRINGE INTRAVENOUS PRN
Status: DISCONTINUED | OUTPATIENT
Start: 2024-11-26 | End: 2024-11-27 | Stop reason: HOSPADM

## 2024-11-26 RX ADMIN — IOPAMIDOL 8 ML: 612 INJECTION, SOLUTION INTRAVASCULAR at 09:57

## 2024-11-26 RX ADMIN — HEPARIN 500 UNITS: 100 SYRINGE at 09:58

## 2024-11-26 NOTE — PROGRESS NOTES
0940 Pt in specials radiology for medi-port dye study. Discussed procedure with pt and pt verbalizes understanding. Pt c/o pain at medi-port site.  0951 Port accessed with 20 gauge Power loc needle and good blood return noted.  0957 Medi-port dye study complete. No issues noted.  0958 Medi-port de-accessed. Band-aid applied to site.  1002 Pt discharged ambulatory with steady gait. Pt offers no complaints.

## 2024-11-28 ENCOUNTER — PATIENT MESSAGE (OUTPATIENT)
Dept: PULMONOLOGY | Age: 34
End: 2024-11-28

## 2024-12-03 NOTE — TELEPHONE ENCOUNTER
I scanned a download into this encounter. Patient is not complaint on PAP machine. I know patient should be able to change humidity settings, but unsure about temp settings.     Please advise!

## 2024-12-11 ENCOUNTER — APPOINTMENT (OUTPATIENT)
Dept: CT IMAGING | Age: 34
End: 2024-12-11
Payer: MEDICARE

## 2024-12-11 ENCOUNTER — HOSPITAL ENCOUNTER (EMERGENCY)
Age: 34
Discharge: HOME | End: 2024-12-11
Attending: EMERGENCY MEDICINE
Payer: MEDICARE

## 2024-12-11 ENCOUNTER — APPOINTMENT (OUTPATIENT)
Dept: GENERAL RADIOLOGY | Age: 34
End: 2024-12-11
Payer: MEDICARE

## 2024-12-11 VITALS
HEART RATE: 76 BPM | SYSTOLIC BLOOD PRESSURE: 113 MMHG | RESPIRATION RATE: 16 BRPM | TEMPERATURE: 98.7 F | DIASTOLIC BLOOD PRESSURE: 98 MMHG | OXYGEN SATURATION: 98 %

## 2024-12-11 DIAGNOSIS — R10.9 ABDOMINAL PAIN, UNSPECIFIED ABDOMINAL LOCATION: Primary | ICD-10-CM

## 2024-12-11 LAB
ALBUMIN SERPL BCG-MCNC: 3.9 G/DL (ref 3.5–5.1)
ALP SERPL-CCNC: 124 U/L (ref 38–126)
ALT SERPL W/O P-5'-P-CCNC: 13 U/L (ref 11–66)
ANION GAP SERPL CALC-SCNC: 13 MEQ/L (ref 8–16)
AST SERPL-CCNC: 15 U/L (ref 5–40)
BASOPHILS ABSOLUTE: 0.1 THOU/MM3 (ref 0–0.1)
BASOPHILS NFR BLD AUTO: 0.5 %
BILIRUB SERPL-MCNC: 0.2 MG/DL (ref 0.3–1.2)
BILIRUB UR QL STRIP: NEGATIVE
BUN SERPL-MCNC: 14 MG/DL (ref 7–22)
CALCIUM SERPL-MCNC: 9.6 MG/DL (ref 8.5–10.5)
CHARACTER UR: CLEAR
CHLORIDE SERPL-SCNC: 104 MEQ/L (ref 98–111)
CO2 SERPL-SCNC: 22 MEQ/L (ref 23–33)
COLOR UR: YELLOW
CREAT SERPL-MCNC: 0.8 MG/DL (ref 0.4–1.2)
DEPRECATED RDW RBC AUTO: 41.9 FL (ref 35–45)
EKG ATRIAL RATE: 71 BPM
EKG P AXIS: 28 DEGREES
EKG P-R INTERVAL: 160 MS
EKG Q-T INTERVAL: 370 MS
EKG QRS DURATION: 80 MS
EKG QTC CALCULATION (BAZETT): 402 MS
EKG R AXIS: 27 DEGREES
EKG T AXIS: 26 DEGREES
EKG VENTRICULAR RATE: 71 BPM
EOSINOPHIL NFR BLD AUTO: 1.5 %
EOSINOPHILS ABSOLUTE: 0.2 THOU/MM3 (ref 0–0.4)
ERYTHROCYTE [DISTWIDTH] IN BLOOD BY AUTOMATED COUNT: 15.3 % (ref 11.5–14.5)
GFR SERPL CREATININE-BSD FRML MDRD: > 90 ML/MIN/1.73M2
GLUCOSE SERPL-MCNC: 87 MG/DL (ref 70–108)
GLUCOSE UR QL STRIP.AUTO: NEGATIVE MG/DL
HCT VFR BLD AUTO: 40.2 % (ref 37–47)
HGB BLD-MCNC: 12.7 GM/DL (ref 12–16)
HGB UR QL STRIP.AUTO: NEGATIVE
IMM GRANULOCYTES # BLD AUTO: 0.04 THOU/MM3 (ref 0–0.07)
IMM GRANULOCYTES NFR BLD AUTO: 0.4 %
KETONES UR QL STRIP.AUTO: NEGATIVE
LEUKOCYTE ESTERASE UR QL STRIP.AUTO: NEGATIVE
LIPASE SERPL-CCNC: 23 U/L (ref 5.6–51.3)
LYMPHOCYTES ABSOLUTE: 2.6 THOU/MM3 (ref 1–4.8)
LYMPHOCYTES NFR BLD AUTO: 23.8 %
MCH RBC QN AUTO: 24.1 PG (ref 26–33)
MCHC RBC AUTO-ENTMCNC: 31.6 GM/DL (ref 32.2–35.5)
MCV RBC AUTO: 76.4 FL (ref 81–99)
MONOCYTES ABSOLUTE: 0.8 THOU/MM3 (ref 0.4–1.3)
MONOCYTES NFR BLD AUTO: 7 %
NEUTROPHILS ABSOLUTE: 7.2 THOU/MM3 (ref 1.8–7.7)
NEUTROPHILS NFR BLD AUTO: 66.8 %
NITRITE UR QL STRIP.AUTO: NEGATIVE
NRBC BLD AUTO-RTO: 0 /100 WBC
NT-PROBNP SERPL IA-MCNC: 58.9 PG/ML (ref 0–124)
OSMOLALITY SERPL CALC.SUM OF ELEC: 277.4 MOSMOL/KG (ref 275–300)
PH UR STRIP.AUTO: 6 [PH] (ref 5–9)
PLATELET # BLD AUTO: 302 THOU/MM3 (ref 130–400)
PMV BLD AUTO: 9.5 FL (ref 9.4–12.4)
POTASSIUM SERPL-SCNC: 4 MEQ/L (ref 3.5–5.2)
PROT SERPL-MCNC: 7.8 G/DL (ref 6.1–8)
PROT UR STRIP.AUTO-MCNC: NEGATIVE MG/DL
RBC # BLD AUTO: 5.26 MILL/MM3 (ref 4.2–5.4)
SODIUM SERPL-SCNC: 139 MEQ/L (ref 135–145)
SP GR UR REFRACT.AUTO: < 1.005 (ref 1–1.03)
TROPONIN, HIGH SENSITIVITY: < 6 NG/L (ref 0–12)
UROBILINOGEN UR QL STRIP.AUTO: 0.2 EU/DL (ref 0–1)
WBC # BLD AUTO: 10.8 THOU/MM3 (ref 4.8–10.8)

## 2024-12-11 PROCEDURE — 96376 TX/PRO/DX INJ SAME DRUG ADON: CPT

## 2024-12-11 PROCEDURE — 93010 ELECTROCARDIOGRAM REPORT: CPT | Performed by: NUCLEAR MEDICINE

## 2024-12-11 PROCEDURE — 83880 ASSAY OF NATRIURETIC PEPTIDE: CPT

## 2024-12-11 PROCEDURE — 6360000004 HC RX CONTRAST MEDICATION: Performed by: EMERGENCY MEDICINE

## 2024-12-11 PROCEDURE — 74177 CT ABD & PELVIS W/CONTRAST: CPT

## 2024-12-11 PROCEDURE — 96372 THER/PROPH/DIAG INJ SC/IM: CPT

## 2024-12-11 PROCEDURE — 93005 ELECTROCARDIOGRAM TRACING: CPT

## 2024-12-11 PROCEDURE — 6360000002 HC RX W HCPCS

## 2024-12-11 PROCEDURE — 80053 COMPREHEN METABOLIC PANEL: CPT

## 2024-12-11 PROCEDURE — 85025 COMPLETE CBC W/AUTO DIFF WBC: CPT

## 2024-12-11 PROCEDURE — 83690 ASSAY OF LIPASE: CPT

## 2024-12-11 PROCEDURE — 84484 ASSAY OF TROPONIN QUANT: CPT

## 2024-12-11 PROCEDURE — 36415 COLL VENOUS BLD VENIPUNCTURE: CPT

## 2024-12-11 PROCEDURE — 96375 TX/PRO/DX INJ NEW DRUG ADDON: CPT

## 2024-12-11 PROCEDURE — 6360000002 HC RX W HCPCS: Performed by: EMERGENCY MEDICINE

## 2024-12-11 PROCEDURE — 71045 X-RAY EXAM CHEST 1 VIEW: CPT

## 2024-12-11 PROCEDURE — 99285 EMERGENCY DEPT VISIT HI MDM: CPT

## 2024-12-11 PROCEDURE — 96374 THER/PROPH/DIAG INJ IV PUSH: CPT

## 2024-12-11 PROCEDURE — 81003 URINALYSIS AUTO W/O SCOPE: CPT

## 2024-12-11 PROCEDURE — 6370000000 HC RX 637 (ALT 250 FOR IP)

## 2024-12-11 RX ORDER — IOPAMIDOL 755 MG/ML
80 INJECTION, SOLUTION INTRAVASCULAR
Status: COMPLETED | OUTPATIENT
Start: 2024-12-11 | End: 2024-12-11

## 2024-12-11 RX ORDER — MORPHINE SULFATE 4 MG/ML
4 INJECTION, SOLUTION INTRAMUSCULAR; INTRAVENOUS ONCE
Status: COMPLETED | OUTPATIENT
Start: 2024-12-11 | End: 2024-12-11

## 2024-12-11 RX ORDER — METOCLOPRAMIDE HYDROCHLORIDE 5 MG/ML
10 INJECTION INTRAMUSCULAR; INTRAVENOUS ONCE
Status: DISCONTINUED | OUTPATIENT
Start: 2024-12-11 | End: 2024-12-11 | Stop reason: HOSPADM

## 2024-12-11 RX ORDER — MORPHINE SULFATE 10 MG/ML
6 INJECTION, SOLUTION INTRAMUSCULAR; INTRAVENOUS ONCE
Status: COMPLETED | OUTPATIENT
Start: 2024-12-11 | End: 2024-12-11

## 2024-12-11 RX ORDER — PROMETHAZINE HYDROCHLORIDE 25 MG/1
25 TABLET ORAL ONCE
Status: COMPLETED | OUTPATIENT
Start: 2024-12-11 | End: 2024-12-11

## 2024-12-11 RX ORDER — BENZTROPINE MESYLATE 1 MG/ML
1 INJECTION, SOLUTION INTRAMUSCULAR; INTRAVENOUS ONCE
Status: COMPLETED | OUTPATIENT
Start: 2024-12-11 | End: 2024-12-11

## 2024-12-11 RX ORDER — PROMETHAZINE HYDROCHLORIDE 25 MG/ML
25 INJECTION, SOLUTION INTRAMUSCULAR; INTRAVENOUS ONCE
Status: COMPLETED | OUTPATIENT
Start: 2024-12-11 | End: 2024-12-11

## 2024-12-11 RX ADMIN — MORPHINE SULFATE 4 MG: 4 INJECTION, SOLUTION INTRAMUSCULAR; INTRAVENOUS at 14:05

## 2024-12-11 RX ADMIN — MORPHINE SULFATE 6 MG: 10 INJECTION, SOLUTION INTRAMUSCULAR; INTRAVENOUS at 16:53

## 2024-12-11 RX ADMIN — BENZTROPINE MESYLATE 1 MG: 1 INJECTION INTRAMUSCULAR; INTRAVENOUS at 16:16

## 2024-12-11 RX ADMIN — IOPAMIDOL 80 ML: 755 INJECTION, SOLUTION INTRAVENOUS at 15:46

## 2024-12-11 RX ADMIN — PROMETHAZINE HYDROCHLORIDE 25 MG: 25 INJECTION INTRAMUSCULAR; INTRAVENOUS at 16:15

## 2024-12-11 RX ADMIN — PROMETHAZINE HYDROCHLORIDE 25 MG: 25 TABLET ORAL at 14:05

## 2024-12-11 ASSESSMENT — PAIN SCALES - GENERAL: PAINLEVEL_OUTOF10: 9

## 2024-12-11 NOTE — ED TRIAGE NOTES
Present to ER from home by LACP. She reports hx of gastroparesis. She reports abd pain and dark tarry stool for 2 weeks. Reports abd pain 10/10. She reports Gi Specialist Dr. Monge.

## 2024-12-11 NOTE — DISCHARGE INSTRUCTIONS
Continue to use phenergan and protonix at home as prescribed.    Follow up with your primary gastroenterologist.

## 2024-12-11 NOTE — ED PROVIDER NOTES
PATIENT NAME: Samantha Nichols  MRN: [id number]  : 1990  PARRY: 2024    I performed a history and physical examination of the patient and discussed management with the Resident. I reviewed the Resident's note and agree with the documented findings and plan of care. Any areas of disagreement are noted on the chart. I was personally present for the key portions of any procedures and have documented in the chart those procedures where I was not present during the key portions. I have reviewed the emergency nurses triage note and agree with the chief complaint, past medical history, past surgical history, allergies, medications, social and family history as documented unless otherwise noted below.    MEDICAL DEDISION MAKING (MDM)     Samantha Nichols is a 34 y.o. female who presents to Emergency Department with Abdominal Pain and Melena     Samantha is a pleasant 34 year old female patient with a history of fundoplication X2, and about 3-4 years of vomiting, dysphagia, difficulty keeping things down. Her fundoplication preceded the onset of GI symptoms. She also had an EGD with bravo- the stomach was noted to be angulated. Very little food was in the stomach. A Bravo study was negative.    Her physical exam reveals stable vital signs, no surgical abdomen.    EKG interpreted by me as normal sinus rhythm, ventricular rate 71 bpm, WI interval 160 ms, QRS duration 80 ms,  ms, no acute ischemic changes.    Symptoms are improving with ED treatment. ED workup is reassuring.  Discharged with GI follow-up as scheduled.      Vitals:    24 1305 24 1306 24 1404 24 1619   BP:  (!) 127/96 (!) 144/91 (!) 113/98   Pulse: 88  76 76   Resp: 16  16 16   Temp: 98.7 °F (37.1 °C)      SpO2: 97%  97% 98%     Labs Reviewed   CBC WITH AUTO DIFFERENTIAL - Abnormal; Notable for the following components:       Result Value    MCV 76.4 (*)     MCH 24.1 (*)     MCHC 31.6 (*)     RDW-CV 15.3 (*)     All other 
    Consultants:  None    Documentation:  N/A    MEDICATION CHANGES     Discharge Medication List as of 12/11/2024  5:06 PM          FINAL IMPRESSION     Final diagnoses:   Abdominal pain, unspecified abdominal location       DISPOSITION   DISPOSITION Decision To Discharge 12/11/2024 05:08:16 PM   DISPOSITION CONDITION Stable           Results and plan discussed with patient at bedside. Patient is agreeable to plan.     Outpatient Follow-Up:  Juan Dominguez, DO  [address] [facility] [place] [address]  [phone]          Janusz Lara MD  [address] [facility] [id number] [phone]    Call          This transcription was electronically signed. Parts of this transcriptions may have been dictated by use of voice recognition software and electronically transcribed. The transcription may contain errors not detected in proofreading. Please refer to my supervising physician's documentation if my documentation differs.    Electronically Signed: Sergey Gaytan MD, 12/11/24, 6:23 PM

## 2024-12-11 NOTE — ED NOTES
Pt reports Reglan does not work for her and made her break out around her mouth.    Speaking Coherently

## 2024-12-11 NOTE — ED NOTES
Provided CT contrast to drink. Verbalized understanding of times. Times written on cups. Will monitor

## 2024-12-16 ENCOUNTER — OFFICE VISIT (OUTPATIENT)
Dept: INTERNAL MEDICINE CLINIC | Age: 34
End: 2024-12-16

## 2024-12-16 VITALS — WEIGHT: 289.6 LBS | HEIGHT: 64 IN | BODY MASS INDEX: 49.44 KG/M2

## 2024-12-16 DIAGNOSIS — K90.9 MALABSORPTION SYNDROME: Primary | ICD-10-CM

## 2024-12-16 PROCEDURE — NBSRV NON-BILLABLE SERVICE: Performed by: DIETITIAN, REGISTERED

## 2024-12-16 NOTE — PATIENT INSTRUCTIONS
Add Vanilla or unflavored protein powder into any of your foods that you blend.  - Examples - add into your milk shakes, add into blended soup, add into mashed potatoes.    Continue to eat small meals and snacks.    Refer to the blended diet information given to you in the past.    Try cream of rice or cream of wheat hot cereal and thin to desired tolerance.  - try oatmeal.  - other foods tolerated - applesauce, banana, well cooked pasta with sauce.  - Try cornflakes

## 2024-12-16 NOTE — PROGRESS NOTES
blood glucose monitor strips Test 1 time a day Freestyle freedom Lite test strips Dx: E11.9 100 strip 1    glucose monitoring kit 1 kit by Does not apply route daily 1 kit 0    fluticasone (FLONASE) 50 MCG/ACT nasal spray 2 sprays by Each Nostril route daily 1 each 0    lactulose (CHRONULAC) 10 GM/15ML solution Take 30 mLs by mouth every 8 (eight) hours Until you have a bowel movement and then stop. (Patient taking differently: Take 30 mLs by mouth every 8 hours as needed Until you have a bowel movement and then stop.) 120 mL 0    simethicone (MYLICON) 80 MG chewable tablet Take 1 tablet by mouth 4 times daily as needed for Flatulence (gas) 180 tablet 3    estradiol (ESTRACE) 0.5 MG tablet Take 1 tablet by mouth daily      docusate sodium (COLACE) 100 MG capsule Take 1 capsule by mouth 2 times daily as needed      ONETOUCH VERIO strip USE TO check BLOOD SUGAR ONCE DAILY      FLOVENT  MCG/ACT inhaler Inhale 2 puffs into the lungs every 4 hours as needed for Shortness of Breath      SUMAtriptan (IMITREX) 25 MG tablet Take 1 tablet by mouth See Admin Instructions Take 1 tablet on onset of migraine may repeat after 2 hours if migraine returns don't exceed 200 mg daily      Incontinence Supply Disposable (DEPEND SILHOUETTE BRIEFS L/XL) MISC Use as needed for urinary and bowel incontinence 5 each 5    prazosin (MINIPRESS) 1 MG capsule Take 1 capsule by mouth nightly      albuterol (PROVENTIL) (2.5 MG/3ML) 0.083% nebulizer solution Take 3 mLs by nebulization every 6 hours as needed for Wheezing 90 each 0    Spacer/Aero-Holding Chambers (E-Z SPACER) AISLINN 1 Device by Does not apply route daily 1 Device 0    metFORMIN (GLUCOPHAGE) 500 MG tablet Take ONE tablet by mouth every 12 hours with meals for diabetes. (Patient not taking: Reported on 11/1/2024) 60 tablet 12     No current facility-administered medications on file prior to visit.        Vitals from current and previous visits:  Ht 1.626 m (5' 4\")   Wt 131.4 kg

## 2024-12-17 ENCOUNTER — OFFICE VISIT (OUTPATIENT)
Dept: PULMONOLOGY | Age: 34
End: 2024-12-17
Payer: MEDICARE

## 2024-12-17 VITALS
WEIGHT: 287.2 LBS | HEIGHT: 64 IN | SYSTOLIC BLOOD PRESSURE: 120 MMHG | BODY MASS INDEX: 49.03 KG/M2 | TEMPERATURE: 97.9 F | DIASTOLIC BLOOD PRESSURE: 82 MMHG | OXYGEN SATURATION: 97 % | HEART RATE: 87 BPM

## 2024-12-17 DIAGNOSIS — E66.01 MORBID OBESITY WITH BMI OF 50.0-59.9, ADULT: ICD-10-CM

## 2024-12-17 DIAGNOSIS — G47.19 EXCESSIVE DAYTIME SLEEPINESS: ICD-10-CM

## 2024-12-17 DIAGNOSIS — G47.33 OSA (OBSTRUCTIVE SLEEP APNEA): Primary | ICD-10-CM

## 2024-12-17 DIAGNOSIS — J35.3 HYPERTROPHY OF TONSIL AND ADENOID: Chronic | ICD-10-CM

## 2024-12-17 PROCEDURE — 3074F SYST BP LT 130 MM HG: CPT | Performed by: NURSE PRACTITIONER

## 2024-12-17 PROCEDURE — 3079F DIAST BP 80-89 MM HG: CPT | Performed by: NURSE PRACTITIONER

## 2024-12-17 PROCEDURE — 99214 OFFICE O/P EST MOD 30 MIN: CPT | Performed by: NURSE PRACTITIONER

## 2024-12-17 ASSESSMENT — ENCOUNTER SYMPTOMS
TROUBLE SWALLOWING: 1
NAUSEA: 1

## 2024-12-17 NOTE — PROGRESS NOTES
[facility]      Samantha Nichols         [id number]  12/17/2024   Chief Complaint   Patient presents with    Follow-up     JOVANY 3 month f/u PAP set up 9/16/24, per [facility] pt returned machine. No download.         Pt of Dr. Hany RODRIGUEZ Download:   Original or initial AHI: 20.1     Date of initial study: 7/17/24        Neck Size: 14  Mallampati 4  ESS:  18  SAQLI: 29    Here is a scan of the most recent download:  PT RETURNED PAP.       Presentation:   Samantha presents for 4 monthssle medicine follow up for obstructive sleep apnea  Since the last visit, Samantha's machine was taken away due to poor compliance.  this is the second machine that has been returned now.  States she didn't want the machine returned until seeing us today to discuss options   Felt \" too hot \" despite turning off heated humidifier, not using heated tubing and using a fan blowing on her.   She saw a nasal mask in office today that she feels she would be available to tolerate.   She was down 12 lbs last visit,unfortunately gained with wt back.  Now 6 lbs heavier than last visit   Not an inspire candidate due to her high BMI     Is following with ENT, talks about needing possible adenoidectomy surgery   Follows with dietician.    Did not tolerate GLP injections, due to gastroparesis       Progress History:   Since last visit any new medical issues? No  Any trouble with Machine No  Any new sleep medicines? no      Equipment issues:  The pressure is  acceptable, the mask is acceptable     Review of Systems -   Review of Systems   Constitutional:  Positive for fatigue.   HENT:  Positive for trouble swallowing.    Gastrointestinal:  Positive for nausea.        Gastroparesis    Psychiatric/Behavioral:  Positive for sleep disturbance.         Physical Exam:    BMI:  Body mass index is 49.3 kg/m².    Wt Readings from Last 3 Encounters:   12/17/24 130.3 kg (287 lb 3.2 oz)   12/16/24 131.4 kg (289 lb 9.6 oz)

## 2024-12-19 ENCOUNTER — OFFICE VISIT (OUTPATIENT)
Dept: INTERNAL MEDICINE CLINIC | Age: 34
End: 2024-12-19

## 2024-12-19 ENCOUNTER — TELEPHONE (OUTPATIENT)
Dept: CARDIOLOGY CLINIC | Age: 34
End: 2024-12-19

## 2024-12-19 VITALS
SYSTOLIC BLOOD PRESSURE: 130 MMHG | TEMPERATURE: 97.5 F | BODY MASS INDEX: 49.24 KG/M2 | HEIGHT: 64 IN | WEIGHT: 288.4 LBS | DIASTOLIC BLOOD PRESSURE: 72 MMHG | HEART RATE: 92 BPM

## 2024-12-19 DIAGNOSIS — G89.29 CHRONIC ABDOMINAL PAIN: ICD-10-CM

## 2024-12-19 DIAGNOSIS — K92.0 HEMATEMESIS, UNSPECIFIED WHETHER NAUSEA PRESENT: Primary | ICD-10-CM

## 2024-12-19 DIAGNOSIS — R10.13 ABDOMINAL PAIN, EPIGASTRIC: ICD-10-CM

## 2024-12-19 DIAGNOSIS — R10.9 CHRONIC ABDOMINAL PAIN: ICD-10-CM

## 2024-12-19 DIAGNOSIS — E11.65 TYPE 2 DIABETES MELLITUS WITH HYPERGLYCEMIA, WITHOUT LONG-TERM CURRENT USE OF INSULIN (HCC): ICD-10-CM

## 2024-12-19 LAB — HBA1C MFR BLD: 6 % (ref 4.3–5.7)

## 2024-12-19 NOTE — PATIENT INSTRUCTIONS
Please call the office if you would like to start azithroymcyin    HIGHLY RECOMMEND YOU GO TO THE ED FOR EVALUATION OF THE BLOOD YOU ARE COUGHING UP. IF YOUR SYMPTOMS WORSEN YOU NEED TO GO TO THE ED IMMEDIATELY

## 2024-12-19 NOTE — PROGRESS NOTES
1990    Chief Complaint   Patient presents with    Chronic abdominal Pain     6 weeks    Diabetes     Samantha Nichols is a 34 year old female that presents for a 6 week follow up. Patient was last seen by her PCP Dr. Dominguez on 10/30/24 for her chronic abdominal pain and diabetes. Since her last visit patient was seen and evaluated by GI Dr. Harris. EDG was done from patient's description she has gastroparesis was seen. This corresponds to patients recent CT AP that shows a distended stomach. Today 12/19/24 patient state that she continues to have her chronic abdominal pain which has not worsened or improved. Patient states that in the last week she has been having acute abdominal pain that is epigastric in nature that radiates circumferential. Her pain is only improved with no oral intake. Pain does not improve with eating, bowel movements, vomiting or flatus. Patient reports fowl smelling burps. Patient states she then was throwing up/coughing up bright right blood as well as melena. Patient went to the ED, lab work showed stable hg and no acute findings on imaging. Since she was seen in the ED on 12/11 she has continued to have daily emesis and intermittent seeing blood. Denies any symptoms in her lungs, no wheezing, sob, difficulty breathing or congestion in her test. No recent blood nose, or bleeding gums. She adds that she has been having the sensation of tasting blood in her sleep. She notes sharp stabbing pain that lasts less than 1 second. Patient adds that this sensation is associated with sweats. She has been compliant with Protonix and phenergan.     Past Medical History:   Diagnosis Date    Acute on chronic diastolic congestive heart failure ([facility]) 06/25/2022    JANI (acute kidney injury) ([facility]) 07/07/2019    Anemia     Anesthesia     migraines    Anxiety     Asthma     CAD (coronary artery disease)     Depression     Diabetes ([facility])     Diet-controlled type 2 diabetes mellitus ([facility]) 2016

## 2024-12-19 NOTE — TELEPHONE ENCOUNTER
Please send to Dr You when he returns     Pre op Risk Assessment    Procedure Root Canal Fillings and extractions  Physician Tala [facility]   Date of surgery/procedure TBD    Last OV 5/2/24  Last Stress None in Epic  Last Echo 6/25/22  Last Cath None in Epic  Last Stent None in Epic  Is patient on blood thinners None  Hold Meds/how many days

## 2024-12-20 ENCOUNTER — HOSPITAL ENCOUNTER (EMERGENCY)
Age: 34
Discharge: HOME | DRG: 074 | End: 2024-12-20
Payer: MEDICARE

## 2024-12-20 ENCOUNTER — APPOINTMENT (OUTPATIENT)
Dept: CT IMAGING | Age: 34
DRG: 074 | End: 2024-12-20
Payer: MEDICARE

## 2024-12-20 VITALS
TEMPERATURE: 98 F | HEART RATE: 88 BPM | OXYGEN SATURATION: 93 % | DIASTOLIC BLOOD PRESSURE: 86 MMHG | SYSTOLIC BLOOD PRESSURE: 135 MMHG | RESPIRATION RATE: 16 BRPM

## 2024-12-20 DIAGNOSIS — G89.29 CHRONIC ABDOMINAL PAIN: Primary | ICD-10-CM

## 2024-12-20 DIAGNOSIS — R04.2 HEMOPTYSIS: ICD-10-CM

## 2024-12-20 DIAGNOSIS — R91.1 PULMONARY NODULE: ICD-10-CM

## 2024-12-20 DIAGNOSIS — R10.9 CHRONIC ABDOMINAL PAIN: Primary | ICD-10-CM

## 2024-12-20 LAB
ALBUMIN SERPL BCG-MCNC: 4 G/DL (ref 3.5–5.1)
ALP SERPL-CCNC: 121 U/L (ref 38–126)
ALT SERPL W/O P-5'-P-CCNC: 14 U/L (ref 11–66)
ANION GAP SERPL CALC-SCNC: 12 MEQ/L (ref 8–16)
AST SERPL-CCNC: 14 U/L (ref 5–40)
BACTERIA URNS QL MICRO: ABNORMAL /HPF
BASOPHILS ABSOLUTE: 0.1 THOU/MM3 (ref 0–0.1)
BASOPHILS NFR BLD AUTO: 0.5 %
BILIRUB CONJ SERPL-MCNC: < 0.1 MG/DL (ref 0.1–13.8)
BILIRUB SERPL-MCNC: 0.2 MG/DL (ref 0.3–1.2)
BILIRUB UR QL STRIP.AUTO: NEGATIVE
BUN SERPL-MCNC: 14 MG/DL (ref 7–22)
CALCIUM SERPL-MCNC: 9.6 MG/DL (ref 8.5–10.5)
CASTS #/AREA URNS LPF: ABNORMAL /LPF
CASTS 2: ABNORMAL /LPF
CHARACTER UR: CLEAR
CHLORIDE SERPL-SCNC: 102 MEQ/L (ref 98–111)
CO2 SERPL-SCNC: 23 MEQ/L (ref 23–33)
COLOR, UA: YELLOW
CREAT SERPL-MCNC: 0.7 MG/DL (ref 0.4–1.2)
CRP SERPL-MCNC: 1.44 MG/DL (ref 0–1)
CRYSTALS URNS MICRO: ABNORMAL
DEPRECATED RDW RBC AUTO: 41 FL (ref 35–45)
EOSINOPHIL NFR BLD AUTO: 1.2 %
EOSINOPHILS ABSOLUTE: 0.2 THOU/MM3 (ref 0–0.4)
EPITHELIAL CELLS, UA: ABNORMAL /HPF
ERYTHROCYTE [DISTWIDTH] IN BLOOD BY AUTOMATED COUNT: 14.9 % (ref 11.5–14.5)
GFR SERPL CREATININE-BSD FRML MDRD: > 90 ML/MIN/1.73M2
GLUCOSE SERPL-MCNC: 98 MG/DL (ref 70–108)
GLUCOSE UR QL STRIP.AUTO: NEGATIVE MG/DL
HCT VFR BLD AUTO: 39 % (ref 37–47)
HGB BLD-MCNC: 12.3 GM/DL (ref 12–16)
HGB UR QL STRIP.AUTO: ABNORMAL
IMM GRANULOCYTES # BLD AUTO: 0.06 THOU/MM3 (ref 0–0.07)
IMM GRANULOCYTES NFR BLD AUTO: 0.5 %
KETONES UR QL STRIP.AUTO: NEGATIVE
LIPASE SERPL-CCNC: 22.8 U/L (ref 5.6–51.3)
LYMPHOCYTES ABSOLUTE: 3.2 THOU/MM3 (ref 1–4.8)
LYMPHOCYTES NFR BLD AUTO: 24.3 %
MCH RBC QN AUTO: 24 PG (ref 26–33)
MCHC RBC AUTO-ENTMCNC: 31.5 GM/DL (ref 32.2–35.5)
MCV RBC AUTO: 76 FL (ref 81–99)
MISCELLANEOUS 2: ABNORMAL
MONOCYTES ABSOLUTE: 0.8 THOU/MM3 (ref 0.4–1.3)
MONOCYTES NFR BLD AUTO: 6.5 %
NEUTROPHILS ABSOLUTE: 8.7 THOU/MM3 (ref 1.8–7.7)
NEUTROPHILS NFR BLD AUTO: 67 %
NITRITE UR QL STRIP: NEGATIVE
NRBC BLD AUTO-RTO: 0 /100 WBC
OSMOLALITY SERPL CALC.SUM OF ELEC: 274.3 MOSMOL/KG (ref 275–300)
PH UR STRIP.AUTO: 5.5 [PH] (ref 5–9)
PLATELET # BLD AUTO: 289 THOU/MM3 (ref 130–400)
PMV BLD AUTO: 9.1 FL (ref 9.4–12.4)
POTASSIUM SERPL-SCNC: 3.8 MEQ/L (ref 3.5–5.2)
PROT SERPL-MCNC: 7.5 G/DL (ref 6.1–8)
PROT UR STRIP.AUTO-MCNC: NEGATIVE MG/DL
RBC # BLD AUTO: 5.13 MILL/MM3 (ref 4.2–5.4)
RBC URINE: ABNORMAL /HPF
RENAL EPI CELLS #/AREA URNS HPF: ABNORMAL /[HPF]
SODIUM SERPL-SCNC: 137 MEQ/L (ref 135–145)
SP GR UR REFRACT.AUTO: 1.01 (ref 1–1.03)
UROBILINOGEN, URINE: 0.2 EU/DL (ref 0–1)
WBC # BLD AUTO: 13 THOU/MM3 (ref 4.8–10.8)
WBC #/AREA URNS HPF: ABNORMAL /HPF
WBC #/AREA URNS HPF: NEGATIVE /[HPF]
YEAST LIKE FUNGI URNS QL MICRO: ABNORMAL

## 2024-12-20 PROCEDURE — 81001 URINALYSIS AUTO W/SCOPE: CPT

## 2024-12-20 PROCEDURE — 86140 C-REACTIVE PROTEIN: CPT

## 2024-12-20 PROCEDURE — 36415 COLL VENOUS BLD VENIPUNCTURE: CPT

## 2024-12-20 PROCEDURE — 74177 CT ABD & PELVIS W/CONTRAST: CPT

## 2024-12-20 PROCEDURE — 6360000004 HC RX CONTRAST MEDICATION: Performed by: NURSE PRACTITIONER

## 2024-12-20 PROCEDURE — 85025 COMPLETE CBC W/AUTO DIFF WBC: CPT

## 2024-12-20 PROCEDURE — 6360000002 HC RX W HCPCS: Performed by: NURSE PRACTITIONER

## 2024-12-20 PROCEDURE — 71260 CT THORAX DX C+: CPT

## 2024-12-20 PROCEDURE — 80053 COMPREHEN METABOLIC PANEL: CPT

## 2024-12-20 PROCEDURE — 83690 ASSAY OF LIPASE: CPT

## 2024-12-20 PROCEDURE — 82248 BILIRUBIN DIRECT: CPT

## 2024-12-20 RX ORDER — TRAMADOL HYDROCHLORIDE 50 MG/1
50 TABLET ORAL EVERY 6 HOURS PRN
Qty: 12 TABLET | Refills: 0 | Status: ON HOLD | OUTPATIENT
Start: 2024-12-20 | End: 2024-12-26 | Stop reason: HOSPADM

## 2024-12-20 RX ORDER — DIPHENHYDRAMINE HYDROCHLORIDE 50 MG/ML
25 INJECTION INTRAMUSCULAR; INTRAVENOUS ONCE
Status: COMPLETED | OUTPATIENT
Start: 2024-12-20 | End: 2024-12-20

## 2024-12-20 RX ORDER — HEPARIN 100 UNIT/ML
500 SYRINGE INTRAVENOUS ONCE
Status: DISCONTINUED | OUTPATIENT
Start: 2024-12-20 | End: 2024-12-20 | Stop reason: HOSPADM

## 2024-12-20 RX ORDER — METOCLOPRAMIDE HYDROCHLORIDE 5 MG/ML
10 INJECTION INTRAMUSCULAR; INTRAVENOUS ONCE
Status: COMPLETED | OUTPATIENT
Start: 2024-12-20 | End: 2024-12-20

## 2024-12-20 RX ORDER — IOPAMIDOL 755 MG/ML
80 INJECTION, SOLUTION INTRAVASCULAR
Status: COMPLETED | OUTPATIENT
Start: 2024-12-20 | End: 2024-12-20

## 2024-12-20 RX ORDER — TRAMADOL HYDROCHLORIDE 50 MG/1
50 TABLET ORAL EVERY 6 HOURS PRN
Qty: 12 TABLET | Refills: 0 | Status: SHIPPED | OUTPATIENT
Start: 2024-12-20 | End: 2024-12-20 | Stop reason: ALTCHOICE

## 2024-12-20 RX ORDER — PROMETHAZINE HYDROCHLORIDE 25 MG/ML
25 INJECTION, SOLUTION INTRAMUSCULAR; INTRAVENOUS ONCE
Status: COMPLETED | OUTPATIENT
Start: 2024-12-20 | End: 2024-12-20

## 2024-12-20 RX ADMIN — METOCLOPRAMIDE HYDROCHLORIDE 10 MG: 5 INJECTION, SOLUTION INTRAMUSCULAR; INTRAVENOUS at 04:29

## 2024-12-20 RX ADMIN — HYDROMORPHONE HYDROCHLORIDE 0.5 MG: 1 INJECTION, SOLUTION INTRAMUSCULAR; INTRAVENOUS; SUBCUTANEOUS at 03:01

## 2024-12-20 RX ADMIN — IOPAMIDOL 80 ML: 755 INJECTION, SOLUTION INTRAVENOUS at 05:22

## 2024-12-20 RX ADMIN — PROMETHAZINE HYDROCHLORIDE 25 MG: 25 INJECTION INTRAMUSCULAR; INTRAVENOUS at 03:40

## 2024-12-20 RX ADMIN — DIPHENHYDRAMINE HYDROCHLORIDE 25 MG: 50 INJECTION INTRAMUSCULAR; INTRAVENOUS at 04:29

## 2024-12-20 RX ADMIN — HYDROMORPHONE HYDROCHLORIDE 1 MG: 1 INJECTION, SOLUTION INTRAMUSCULAR; INTRAVENOUS; SUBCUTANEOUS at 04:29

## 2024-12-20 ASSESSMENT — PAIN DESCRIPTION - LOCATION: LOCATION: ABDOMEN

## 2024-12-20 ASSESSMENT — PAIN SCALES - GENERAL: PAINLEVEL_OUTOF10: 10

## 2024-12-20 ASSESSMENT — PAIN - FUNCTIONAL ASSESSMENT: PAIN_FUNCTIONAL_ASSESSMENT: 0-10

## 2024-12-20 NOTE — DISCHARGE INSTRUCTIONS
Please keep your appointment with the internal medicine clinic on the 30th or to see if you can get in any sooner.  Your CT scan shows a large pulmonary nodule that is new.  You need further testing including a PET scan.  Follow-up with your PCP and discuss pain management protocols.

## 2024-12-22 ENCOUNTER — HOSPITAL ENCOUNTER (INPATIENT)
Age: 34
LOS: 4 days | Discharge: HOME | DRG: 074 | End: 2024-12-26
Attending: STUDENT IN AN ORGANIZED HEALTH CARE EDUCATION/TRAINING PROGRAM | Admitting: STUDENT IN AN ORGANIZED HEALTH CARE EDUCATION/TRAINING PROGRAM
Payer: MEDICARE

## 2024-12-22 ENCOUNTER — APPOINTMENT (OUTPATIENT)
Dept: CT IMAGING | Age: 34
DRG: 074 | End: 2024-12-22
Payer: MEDICARE

## 2024-12-22 ENCOUNTER — APPOINTMENT (OUTPATIENT)
Dept: GENERAL RADIOLOGY | Age: 34
DRG: 074 | End: 2024-12-22
Payer: MEDICARE

## 2024-12-22 DIAGNOSIS — R00.0 TACHYCARDIA: ICD-10-CM

## 2024-12-22 DIAGNOSIS — R07.9 CHEST PAIN, UNSPECIFIED TYPE: Primary | ICD-10-CM

## 2024-12-22 DIAGNOSIS — R10.9 ABDOMINAL PAIN, UNSPECIFIED ABDOMINAL LOCATION: ICD-10-CM

## 2024-12-22 DIAGNOSIS — R11.2 INTRACTABLE NAUSEA AND VOMITING: ICD-10-CM

## 2024-12-22 LAB
AMPHETAMINES UR QL SCN: NEGATIVE
ANION GAP SERPL CALC-SCNC: 12 MEQ/L (ref 8–16)
ANION GAP SERPL CALC-SCNC: 12 MEQ/L (ref 8–16)
BACTERIA URNS QL MICRO: ABNORMAL /HPF
BARBITURATES UR QL SCN: NEGATIVE
BASOPHILS ABSOLUTE: 0 THOU/MM3 (ref 0–0.1)
BASOPHILS NFR BLD AUTO: 0.3 %
BENZODIAZ UR QL SCN: NEGATIVE
BILIRUB UR QL STRIP.AUTO: NEGATIVE
BUN SERPL-MCNC: 12 MG/DL (ref 7–22)
BUN SERPL-MCNC: 14 MG/DL (ref 7–22)
BZE UR QL SCN: NEGATIVE
CALCIUM SERPL-MCNC: 9 MG/DL (ref 8.5–10.5)
CALCIUM SERPL-MCNC: 9.6 MG/DL (ref 8.5–10.5)
CANNABINOIDS UR QL SCN: NEGATIVE
CASTS #/AREA URNS LPF: ABNORMAL /LPF
CASTS 2: ABNORMAL /LPF
CHARACTER UR: CLEAR
CHLORIDE SERPL-SCNC: 103 MEQ/L (ref 98–111)
CHLORIDE SERPL-SCNC: 104 MEQ/L (ref 98–111)
CO2 SERPL-SCNC: 21 MEQ/L (ref 23–33)
CO2 SERPL-SCNC: 22 MEQ/L (ref 23–33)
COLOR, UA: YELLOW
CREAT SERPL-MCNC: 0.6 MG/DL (ref 0.4–1.2)
CREAT SERPL-MCNC: 0.7 MG/DL (ref 0.4–1.2)
CRYSTALS URNS MICRO: ABNORMAL
DEPRECATED RDW RBC AUTO: 43.8 FL (ref 35–45)
EKG ATRIAL RATE: 103 BPM
EKG ATRIAL RATE: 117 BPM
EKG ATRIAL RATE: 98 BPM
EKG P AXIS: 47 DEGREES
EKG P AXIS: 48 DEGREES
EKG P AXIS: 68 DEGREES
EKG P-R INTERVAL: 144 MS
EKG P-R INTERVAL: 144 MS
EKG P-R INTERVAL: 152 MS
EKG Q-T INTERVAL: 320 MS
EKG Q-T INTERVAL: 326 MS
EKG Q-T INTERVAL: 334 MS
EKG QRS DURATION: 78 MS
EKG QRS DURATION: 78 MS
EKG QRS DURATION: 86 MS
EKG QTC CALCULATION (BAZETT): 426 MS
EKG QTC CALCULATION (BAZETT): 427 MS
EKG QTC CALCULATION (BAZETT): 446 MS
EKG R AXIS: 12 DEGREES
EKG R AXIS: 12 DEGREES
EKG R AXIS: 18 DEGREES
EKG T AXIS: 14 DEGREES
EKG T AXIS: 48 DEGREES
EKG T AXIS: 51 DEGREES
EKG VENTRICULAR RATE: 103 BPM
EKG VENTRICULAR RATE: 117 BPM
EKG VENTRICULAR RATE: 98 BPM
EOSINOPHIL NFR BLD AUTO: 0.3 %
EOSINOPHILS ABSOLUTE: 0 THOU/MM3 (ref 0–0.4)
EPITHELIAL CELLS, UA: ABNORMAL /HPF
ERYTHROCYTE [DISTWIDTH] IN BLOOD BY AUTOMATED COUNT: 15.1 % (ref 11.5–14.5)
FENTANYL: NEGATIVE
FLUAV RNA RESP QL NAA+PROBE: NOT DETECTED
FLUBV RNA RESP QL NAA+PROBE: NOT DETECTED
GFR SERPL CREATININE-BSD FRML MDRD: > 90 ML/MIN/1.73M2
GFR SERPL CREATININE-BSD FRML MDRD: > 90 ML/MIN/1.73M2
GLUCOSE SERPL-MCNC: 101 MG/DL (ref 70–108)
GLUCOSE SERPL-MCNC: 124 MG/DL (ref 70–108)
GLUCOSE UR QL STRIP.AUTO: NEGATIVE MG/DL
HCT VFR BLD AUTO: 45.7 % (ref 37–47)
HGB BLD-MCNC: 14 GM/DL (ref 12–16)
HGB UR QL STRIP.AUTO: ABNORMAL
IMM GRANULOCYTES # BLD AUTO: 0.03 THOU/MM3 (ref 0–0.07)
IMM GRANULOCYTES NFR BLD AUTO: 0.4 %
KETONES UR QL STRIP.AUTO: NEGATIVE
LYMPHOCYTES ABSOLUTE: 0.7 THOU/MM3 (ref 1–4.8)
LYMPHOCYTES NFR BLD AUTO: 9.3 %
MAGNESIUM SERPL-MCNC: 1.8 MG/DL (ref 1.6–2.4)
MAGNESIUM SERPL-MCNC: 2 MG/DL (ref 1.6–2.4)
MCH RBC QN AUTO: 24.8 PG (ref 26–33)
MCHC RBC AUTO-ENTMCNC: 30.6 GM/DL (ref 32.2–35.5)
MCV RBC AUTO: 80.9 FL (ref 81–99)
MISCELLANEOUS 2: ABNORMAL
MONOCYTES ABSOLUTE: 0.3 THOU/MM3 (ref 0.4–1.3)
MONOCYTES NFR BLD AUTO: 3.8 %
NEUTROPHILS ABSOLUTE: 6.1 THOU/MM3 (ref 1.8–7.7)
NEUTROPHILS NFR BLD AUTO: 85.9 %
NITRITE UR QL STRIP: NEGATIVE
NRBC BLD AUTO-RTO: 0 /100 WBC
NT-PROBNP SERPL IA-MCNC: < 36 PG/ML (ref 0–124)
OPIATES UR QL SCN: POSITIVE
OSMOLALITY SERPL CALC.SUM OF ELEC: 274.4 MOSMOL/KG (ref 275–300)
OXYCODONE: NEGATIVE
PCP UR QL SCN: NEGATIVE
PH UR STRIP.AUTO: 7 [PH] (ref 5–9)
PHOSPHATE SERPL-MCNC: 3.2 MG/DL (ref 2.4–4.7)
PLATELET # BLD AUTO: 265 THOU/MM3 (ref 130–400)
PMV BLD AUTO: 9.2 FL (ref 9.4–12.4)
POTASSIUM SERPL-SCNC: 4 MEQ/L (ref 3.5–5.2)
POTASSIUM SERPL-SCNC: 4.5 MEQ/L (ref 3.5–5.2)
PROT UR STRIP.AUTO-MCNC: NEGATIVE MG/DL
RBC # BLD AUTO: 5.65 MILL/MM3 (ref 4.2–5.4)
RBC URINE: ABNORMAL /HPF
REASON FOR REJECTION: NORMAL
REJECTED TEST: NORMAL
RENAL EPI CELLS #/AREA URNS HPF: ABNORMAL /[HPF]
SARS-COV-2 RNA RESP QL NAA+PROBE: NOT DETECTED
SODIUM SERPL-SCNC: 137 MEQ/L (ref 135–145)
SODIUM SERPL-SCNC: 137 MEQ/L (ref 135–145)
SP GR UR REFRACT.AUTO: > 1.03 (ref 1–1.03)
TROPONIN, HIGH SENSITIVITY: < 6 NG/L (ref 0–12)
TROPONIN, HIGH SENSITIVITY: < 6 NG/L (ref 0–12)
UROBILINOGEN, URINE: 1 EU/DL (ref 0–1)
WBC # BLD AUTO: 7.1 THOU/MM3 (ref 4.8–10.8)
WBC #/AREA URNS HPF: ABNORMAL /HPF
WBC #/AREA URNS HPF: NEGATIVE /[HPF]
YEAST LIKE FUNGI URNS QL MICRO: ABNORMAL

## 2024-12-22 PROCEDURE — 99285 EMERGENCY DEPT VISIT HI MDM: CPT

## 2024-12-22 PROCEDURE — 84100 ASSAY OF PHOSPHORUS: CPT

## 2024-12-22 PROCEDURE — 6360000002 HC RX W HCPCS: Performed by: STUDENT IN AN ORGANIZED HEALTH CARE EDUCATION/TRAINING PROGRAM

## 2024-12-22 PROCEDURE — 96376 TX/PRO/DX INJ SAME DRUG ADON: CPT

## 2024-12-22 PROCEDURE — 6360000002 HC RX W HCPCS

## 2024-12-22 PROCEDURE — 71045 X-RAY EXAM CHEST 1 VIEW: CPT

## 2024-12-22 PROCEDURE — 6370000000 HC RX 637 (ALT 250 FOR IP)

## 2024-12-22 PROCEDURE — 36415 COLL VENOUS BLD VENIPUNCTURE: CPT

## 2024-12-22 PROCEDURE — 96374 THER/PROPH/DIAG INJ IV PUSH: CPT

## 2024-12-22 PROCEDURE — 2500000003 HC RX 250 WO HCPCS: Performed by: STUDENT IN AN ORGANIZED HEALTH CARE EDUCATION/TRAINING PROGRAM

## 2024-12-22 PROCEDURE — 80048 BASIC METABOLIC PNL TOTAL CA: CPT

## 2024-12-22 PROCEDURE — 1200000003 HC TELEMETRY R&B

## 2024-12-22 PROCEDURE — 6360000004 HC RX CONTRAST MEDICATION: Performed by: STUDENT IN AN ORGANIZED HEALTH CARE EDUCATION/TRAINING PROGRAM

## 2024-12-22 PROCEDURE — 93010 ELECTROCARDIOGRAM REPORT: CPT | Performed by: INTERNAL MEDICINE

## 2024-12-22 PROCEDURE — 85025 COMPLETE CBC W/AUTO DIFF WBC: CPT

## 2024-12-22 PROCEDURE — 84484 ASSAY OF TROPONIN QUANT: CPT

## 2024-12-22 PROCEDURE — 71275 CT ANGIOGRAPHY CHEST: CPT

## 2024-12-22 PROCEDURE — 2580000003 HC RX 258: Performed by: STUDENT IN AN ORGANIZED HEALTH CARE EDUCATION/TRAINING PROGRAM

## 2024-12-22 PROCEDURE — 96375 TX/PRO/DX INJ NEW DRUG ADDON: CPT

## 2024-12-22 PROCEDURE — 96372 THER/PROPH/DIAG INJ SC/IM: CPT

## 2024-12-22 PROCEDURE — 93005 ELECTROCARDIOGRAM TRACING: CPT | Performed by: STUDENT IN AN ORGANIZED HEALTH CARE EDUCATION/TRAINING PROGRAM

## 2024-12-22 PROCEDURE — 74176 CT ABD & PELVIS W/O CONTRAST: CPT

## 2024-12-22 PROCEDURE — 83735 ASSAY OF MAGNESIUM: CPT

## 2024-12-22 PROCEDURE — 81001 URINALYSIS AUTO W/SCOPE: CPT

## 2024-12-22 PROCEDURE — 80307 DRUG TEST PRSMV CHEM ANLYZR: CPT

## 2024-12-22 PROCEDURE — 6370000000 HC RX 637 (ALT 250 FOR IP): Performed by: STUDENT IN AN ORGANIZED HEALTH CARE EDUCATION/TRAINING PROGRAM

## 2024-12-22 PROCEDURE — 2500000003 HC RX 250 WO HCPCS

## 2024-12-22 PROCEDURE — 83880 ASSAY OF NATRIURETIC PEPTIDE: CPT

## 2024-12-22 PROCEDURE — 87636 SARSCOV2 & INF A&B AMP PRB: CPT

## 2024-12-22 RX ORDER — SODIUM CHLORIDE 0.9 % (FLUSH) 0.9 %
5-40 SYRINGE (ML) INJECTION PRN
Status: DISCONTINUED | OUTPATIENT
Start: 2024-12-22 | End: 2024-12-26 | Stop reason: HOSPADM

## 2024-12-22 RX ORDER — 0.9 % SODIUM CHLORIDE 0.9 %
1000 INTRAVENOUS SOLUTION INTRAVENOUS ONCE
Status: COMPLETED | OUTPATIENT
Start: 2024-12-22 | End: 2024-12-22

## 2024-12-22 RX ORDER — AMLODIPINE BESYLATE 10 MG/1
10 TABLET ORAL DAILY
Status: DISCONTINUED | OUTPATIENT
Start: 2024-12-22 | End: 2024-12-26 | Stop reason: HOSPADM

## 2024-12-22 RX ORDER — ASPIRIN 81 MG/1
324 TABLET, CHEWABLE ORAL ONCE
Status: COMPLETED | OUTPATIENT
Start: 2024-12-22 | End: 2024-12-22

## 2024-12-22 RX ORDER — PROMETHAZINE HYDROCHLORIDE 25 MG/ML
12.5 INJECTION, SOLUTION INTRAMUSCULAR; INTRAVENOUS EVERY 6 HOURS PRN
Status: DISCONTINUED | OUTPATIENT
Start: 2024-12-22 | End: 2024-12-26 | Stop reason: HOSPADM

## 2024-12-22 RX ORDER — POLYETHYLENE GLYCOL 3350 17 G/17G
17 POWDER, FOR SOLUTION ORAL DAILY PRN
Status: DISCONTINUED | OUTPATIENT
Start: 2024-12-22 | End: 2024-12-26 | Stop reason: HOSPADM

## 2024-12-22 RX ORDER — DEXAMETHASONE SODIUM PHOSPHATE 4 MG/ML
6 INJECTION, SOLUTION INTRA-ARTICULAR; INTRALESIONAL; INTRAMUSCULAR; INTRAVENOUS; SOFT TISSUE ONCE
Status: COMPLETED | OUTPATIENT
Start: 2024-12-22 | End: 2024-12-22

## 2024-12-22 RX ORDER — IOPAMIDOL 755 MG/ML
80 INJECTION, SOLUTION INTRAVASCULAR
Status: COMPLETED | OUTPATIENT
Start: 2024-12-22 | End: 2024-12-22

## 2024-12-22 RX ORDER — METOCLOPRAMIDE HYDROCHLORIDE 5 MG/ML
5 INJECTION INTRAMUSCULAR; INTRAVENOUS 3 TIMES DAILY PRN
Status: DISCONTINUED | OUTPATIENT
Start: 2024-12-22 | End: 2024-12-22

## 2024-12-22 RX ORDER — SODIUM CHLORIDE 0.9 % (FLUSH) 0.9 %
5-40 SYRINGE (ML) INJECTION EVERY 12 HOURS SCHEDULED
Status: DISCONTINUED | OUTPATIENT
Start: 2024-12-22 | End: 2024-12-26 | Stop reason: HOSPADM

## 2024-12-22 RX ORDER — MORPHINE SULFATE 4 MG/ML
4 INJECTION, SOLUTION INTRAMUSCULAR; INTRAVENOUS ONCE
Status: COMPLETED | OUTPATIENT
Start: 2024-12-22 | End: 2024-12-22

## 2024-12-22 RX ORDER — METOCLOPRAMIDE HYDROCHLORIDE 5 MG/ML
5 INJECTION INTRAMUSCULAR; INTRAVENOUS ONCE
Status: COMPLETED | OUTPATIENT
Start: 2024-12-22 | End: 2024-12-22

## 2024-12-22 RX ORDER — PROMETHAZINE HYDROCHLORIDE 25 MG/1
12.5 TABLET ORAL EVERY 6 HOURS PRN
Status: DISCONTINUED | OUTPATIENT
Start: 2024-12-22 | End: 2024-12-22 | Stop reason: ALTCHOICE

## 2024-12-22 RX ORDER — PROMETHAZINE HYDROCHLORIDE 25 MG/ML
12.5 INJECTION, SOLUTION INTRAMUSCULAR; INTRAVENOUS ONCE
Status: COMPLETED | OUTPATIENT
Start: 2024-12-22 | End: 2024-12-22

## 2024-12-22 RX ORDER — PANTOPRAZOLE SODIUM 40 MG/10ML
40 INJECTION, POWDER, LYOPHILIZED, FOR SOLUTION INTRAVENOUS DAILY
Status: DISCONTINUED | OUTPATIENT
Start: 2024-12-22 | End: 2024-12-26 | Stop reason: HOSPADM

## 2024-12-22 RX ORDER — LIDOCAINE 4 G/G
1 PATCH TOPICAL DAILY
Status: DISCONTINUED | OUTPATIENT
Start: 2024-12-22 | End: 2024-12-26 | Stop reason: HOSPADM

## 2024-12-22 RX ORDER — ONDANSETRON 2 MG/ML
4 INJECTION INTRAMUSCULAR; INTRAVENOUS EVERY 6 HOURS PRN
Status: DISCONTINUED | OUTPATIENT
Start: 2024-12-22 | End: 2024-12-26 | Stop reason: HOSPADM

## 2024-12-22 RX ORDER — DIPHENHYDRAMINE HYDROCHLORIDE 50 MG/ML
10 INJECTION INTRAMUSCULAR; INTRAVENOUS EVERY 6 HOURS PRN
Status: DISCONTINUED | OUTPATIENT
Start: 2024-12-22 | End: 2024-12-26 | Stop reason: HOSPADM

## 2024-12-22 RX ORDER — SODIUM CHLORIDE 9 MG/ML
INJECTION, SOLUTION INTRAVENOUS PRN
Status: DISCONTINUED | OUTPATIENT
Start: 2024-12-22 | End: 2024-12-26 | Stop reason: HOSPADM

## 2024-12-22 RX ADMIN — ONDANSETRON 4 MG: 2 INJECTION INTRAMUSCULAR; INTRAVENOUS at 15:44

## 2024-12-22 RX ADMIN — SODIUM CHLORIDE 1000 ML: 9 INJECTION, SOLUTION INTRAVENOUS at 15:45

## 2024-12-22 RX ADMIN — DEXAMETHASONE SODIUM PHOSPHATE 6 MG: 4 INJECTION, SOLUTION INTRA-ARTICULAR; INTRALESIONAL; INTRAMUSCULAR; INTRAVENOUS; SOFT TISSUE at 10:03

## 2024-12-22 RX ADMIN — SODIUM CHLORIDE 1000 ML: 9 INJECTION, SOLUTION INTRAVENOUS at 10:43

## 2024-12-22 RX ADMIN — HYDROMORPHONE HYDROCHLORIDE 0.5 MG: 1 INJECTION, SOLUTION INTRAMUSCULAR; INTRAVENOUS; SUBCUTANEOUS at 10:03

## 2024-12-22 RX ADMIN — PANTOPRAZOLE SODIUM 40 MG: 40 INJECTION, POWDER, FOR SOLUTION INTRAVENOUS at 16:39

## 2024-12-22 RX ADMIN — MORPHINE SULFATE 4 MG: 4 INJECTION, SOLUTION INTRAMUSCULAR; INTRAVENOUS at 08:12

## 2024-12-22 RX ADMIN — WATER 5 MG: 1 INJECTION INTRAMUSCULAR; INTRAVENOUS; SUBCUTANEOUS at 11:55

## 2024-12-22 RX ADMIN — SODIUM CHLORIDE, PRESERVATIVE FREE 10 ML: 5 INJECTION INTRAVENOUS at 21:25

## 2024-12-22 RX ADMIN — HYDROMORPHONE HYDROCHLORIDE 0.5 MG: 1 INJECTION, SOLUTION INTRAMUSCULAR; INTRAVENOUS; SUBCUTANEOUS at 11:55

## 2024-12-22 RX ADMIN — DIPHENHYDRAMINE HYDROCHLORIDE 10 MG: 50 INJECTION INTRAMUSCULAR; INTRAVENOUS at 23:09

## 2024-12-22 RX ADMIN — METOCLOPRAMIDE HYDROCHLORIDE 5 MG: 5 INJECTION, SOLUTION INTRAMUSCULAR; INTRAVENOUS at 22:07

## 2024-12-22 RX ADMIN — ASPIRIN 324 MG: 81 TABLET, CHEWABLE ORAL at 05:55

## 2024-12-22 RX ADMIN — PROMETHAZINE HYDROCHLORIDE 12.5 MG: 25 INJECTION INTRAMUSCULAR; INTRAVENOUS at 08:12

## 2024-12-22 RX ADMIN — MORPHINE SULFATE 4 MG: 4 INJECTION, SOLUTION INTRAMUSCULAR; INTRAVENOUS at 06:21

## 2024-12-22 RX ADMIN — IOPAMIDOL 80 ML: 755 INJECTION, SOLUTION INTRAVENOUS at 07:07

## 2024-12-22 ASSESSMENT — PAIN DESCRIPTION - DESCRIPTORS
DESCRIPTORS: SHARP

## 2024-12-22 ASSESSMENT — PAIN - FUNCTIONAL ASSESSMENT
PAIN_FUNCTIONAL_ASSESSMENT: 0-10
PAIN_FUNCTIONAL_ASSESSMENT: ACTIVITIES ARE NOT PREVENTED
PAIN_FUNCTIONAL_ASSESSMENT: 0-10
PAIN_FUNCTIONAL_ASSESSMENT: PREVENTS OR INTERFERES SOME ACTIVE ACTIVITIES AND ADLS
PAIN_FUNCTIONAL_ASSESSMENT: 0-10

## 2024-12-22 ASSESSMENT — PAIN DESCRIPTION - LOCATION
LOCATION: CHEST;ABDOMEN
LOCATION: ABDOMEN;ARM;CHEST
LOCATION: ABDOMEN
LOCATION: CHEST;ABDOMEN;ARM

## 2024-12-22 ASSESSMENT — PAIN DESCRIPTION - ORIENTATION
ORIENTATION: RIGHT;MID;UPPER;LOWER
ORIENTATION: RIGHT;MID;LOWER;UPPER
ORIENTATION: RIGHT
ORIENTATION: RIGHT
ORIENTATION: RIGHT;LOWER;MID;UPPER

## 2024-12-22 ASSESSMENT — HEART SCORE: ECG: NORMAL

## 2024-12-22 ASSESSMENT — PAIN SCALES - GENERAL
PAINLEVEL_OUTOF10: 9
PAINLEVEL_OUTOF10: 10
PAINLEVEL_OUTOF10: 8
PAINLEVEL_OUTOF10: 6
PAINLEVEL_OUTOF10: 10
PAINLEVEL_OUTOF10: 10
PAINLEVEL_OUTOF10: 9
PAINLEVEL_OUTOF10: 10
PAINLEVEL_OUTOF10: 10

## 2024-12-22 NOTE — ED NOTES
Report given to CATY So. This RN attempted to call pt's wife and got voicemail, this RN left a message for pt's wife to call ED back for an update. Pt's wife's name is Chasity and phone number is [phone].

## 2024-12-22 NOTE — ED NOTES
ED to inpatient nurses report      Chief Complaint:  Chief Complaint   Patient presents with    Chest Pain     Right sided    Abdominal Pain     Right sided    Vomiting     Present to ED from: home     MOA:     LOC: alert and orientated to name, place, date  Mobility: Independent  Oxygen Baseline: none    Current needs required: none     Code Status:   Full Code    What abnormal results were found and what did you give/do to treat them? vomiting  Any procedures or intervention occur? Medicated as noted on MAR    Mental Status:  Level of Consciousness: Alert (0)    Psych Assessment:        Vitals:  Patient Vitals for the past 24 hrs:   BP Temp Temp src Pulse Resp SpO2 Height Weight   12/22/24 1315 (!) 129/96 -- -- (!) 113 22 98 % -- --   12/22/24 1206 -- -- -- (!) 105 24 94 % -- --   12/22/24 1044 -- -- -- (!) 107 23 93 % -- --   12/22/24 0948 -- -- -- (!) 109 18 98 % -- --   12/22/24 0844 (!) 146/100 -- -- (!) 110 25 98 % -- --   12/22/24 0632 (!) 123/101 -- -- (!) 106 25 99 % -- --   12/22/24 0611 -- -- -- (!) 107 23 99 % -- --   12/22/24 0426 (!) 151/107 98.8 °F (37.1 °C) Oral (!) 105 24 100 % 1.626 m (5' 4\") 130.6 kg (288 lb)        LDAs:   Peripheral IV 12/22/24 Right Antecubital (Active)   Site Assessment Clean, dry & intact 12/22/24 0621   Line Status Blood return noted;Flushed;Normal saline locked 12/22/24 0621   Phlebitis Assessment No symptoms 12/22/24 0621   Infiltration Assessment 0 12/22/24 0621   Dressing Status Clean, dry & intact 12/22/24 0621       Ambulatory Status:  Presents to emergency department  because of falls (Syncope, seizure, or loss of consciousness): No, Age > 70: No, Altered Mental Status, Intoxication with alcohol or substance confusion (Disorientation, impaired judgment, poor safety awaremess, or inability to follow instructions): No, Impaired Mobility: Ambulates or transfers with assistive devices or assistance; Unable to ambulate or transer.: No, Nursing Judgement:

## 2024-12-22 NOTE — ED TRIAGE NOTES
Pt presents to the ED via EMS from home with c/o chest pain, abdominal pain, vomiting with bright red blood present. Pt states she got discharged on 12/20 and diagnosed with nodule on right lung, pt states when she got home from hospital her chest pain started on the 20th and has been constant since then, pain radiates down right arm and down right side of upper mid and lower abdomen, rated 10/10 described as sharp. Pt states she vomiting all day yesterday and was spitting up bright red blood. Pt hypertensive and tachycardic with , pt moaning out in pain at this time. Pt A&O x4. EKG complete

## 2024-12-22 NOTE — H&P
[hydrocodone-acetaminophen], and Vistaril [hydroxyzine hcl]    Past Medical, Family, Social, Surgical Hx      Diagnosis Date    Acute on chronic diastolic congestive heart failure (HCC) 06/25/2022    JANI (acute kidney injury) ([facility]) 07/07/2019    Anemia     Anesthesia     migraines    Anxiety     Asthma     CAD (coronary artery disease)     Depression     Diabetes (HCC)     Diet-controlled type 2 diabetes mellitus (HCC) 2016    Dyslipidemia 11/14/2016    Epilepsy (HCC)     last siezure is 2 years ago    Epilepsy (HCC)     Fibroids     Gastro - esophageal reflux disease     Gastroparesis     History of esophagogastroduodenoscopy (EGD) 08/13/2022    Hypertension     Hypertrophy of tonsil and adenoid 11/04/2017    Malignant carcinoid tumor of other sites ([facility]) 05/14/2013    Migraine     PONV (postoperative nausea and vomiting)     Prolonged emergence from general anesthesia     Sickle cell anemia (HCC)     Sickle cell trait (HCC)     PT STATES SHE HAS THE TRAIT NOT THE DISEASE    Tumor associated pain     neuroendrocrine tumor, gastroma         Procedure Laterality Date    ABDOMEN SURGERY  03/23/2017    Laparoscopic , Bi lat oopherectomy Dr Hinojosa    ABDOMINAL EXPLORATION SURGERY      x2    BREAST REDUCTION SURGERY      CENTRAL VENOUS CATHETER Right 12/11/2015    right subclavian single lumen mediport insertion--Dr. Michel    CHOLECYSTECTOMY, LAPAROSCOPIC N/A 7/11/2019    CHOLECYSTECTOMY LAPAROSCOPIC performed by Iron Michel MD at [facility] OR    COLONOSCOPY N/A 8/24/2020    COLONOSCOPY POLYPECTOMY SNARE/COLD BIOPSY performed by Frida Elaine MD at [facility] Endoscopy    DENTAL SURGERY      DILATION AND CURETTAGE OF UTERUS  10/21/2013    Dilation and curettage, Diagnostic Hysteroscopy and laparoscopy (Dr. Bartholomew, [facility])    EGD  2019    EGD COLONOSCOPY N/A 1/8/2019    EGD DIL performed by Ajit Washburn MD at [facility] Endoscopy    EGD COLONOSCOPY Left 5/14/2019    EGD DILATATION performed by Ajit Washburn MD at [facility]

## 2024-12-22 NOTE — ED PROVIDER NOTES
STATUS UNKNOWN)  04/2016    INSERTION / REMOVAL / REPLACEMENT VENOUS ACCESS CATHETER N/A 3/8/2019    REMOVAL OF LEFT PORT AND PLACEMENT OF SINGLE LUMEN MEDIPORT RIGHT SIDE performed by Iron Michel MD at [facility] OR    IR PORT PLACEMENT > 5 YEARS  3/13/2024    IR PORT PLACEMENT EQUAL OR GREATER THAN 5 YEARS 3/13/2024 Larry Matthew MD [facility] SPECIAL PROCEDURES    KNEE ARTHROSCOPY      LAPAROSCOPY N/A 10/8/2019    ROBOTIC DIAGNOSTIC LAPAROSCOPY,  RECURRENT HIATAL HERNIA REPAIR, REVISION FUNDOPLICATION performed by Tonny Florence MD at [facility] OR    LAPAROSCOPY N/A 7/15/2022    ROBOTIC LAPAROSCOPY, LYSIS OF ADHESIONS performed by Iron Michel MD at [facility] OR    LAPAROSCOPY N/A 11/3/2023    Robotic Diagnostic Laparoscopy, LYSIS OF ADHESIONS performed by Iron Michel MD at [facility] OR    LAPAROSCOPY N/A 5/10/2024    ROBOTIC DIAGNOSTIC LAPAROSCOPY, LYSIS OF ADHESIONS performed by Iron Michel MD at [facility] OR    MYOMECTOMY      OTHER SURGICAL HISTORY  08/12/2016    Right Port Removal, Placement of new Port Left by Dr Michel    OTHER SURGICAL HISTORY  12/02/2019    Mediport insertion-IR Dr Matthew    PARTIAL HYSTERECTOMY (CERVIX NOT REMOVED)  4/8/16    PORT SURGERY N/A 11/11/2019    REMOVAL OF RIGHT MEDIPORT & ATTEMPTED PLACEMENT OF LEFT SINGLE LUMEN MEDIPORT performed by Iron Michel MD at [facility] OR    PORT SURGERY Right 11/29/2019    RT INTERNAL JUGULAR SINGLE LUMEN MEDIPORT INSERTION performed by Iron Michel MD at [facility] OR    MT EGD BALLOON DILATION ESOPHAGUS <30 MM DIAM Left 8/8/2017    EGD/DIL performed by Ajit Washburn MD at [facility] Endoscopy    MT EGD BALLOON DILATION ESOPHAGUS <30 MM DIAM N/A 9/26/2017    EGD DIL performed by Ajit Washburn MD at [facility] Endoscopy    MT EGD BALLOON DILATION ESOPHAGUS <30 MM DIAM Left 12/12/2017    EGD DIL performed by Ajit Washburn MD at [facility] Endoscopy    MT EGD BALLOON DILATION ESOPHAGUS <30 MM DIAM N/A 2/13/2018    EGD  DILATATION performed by Ajit LUCIANO 
CTA Chest. No imaging obtained. [SC]   1005 Continued pain we will try dexamethasone, hydromorphone, IV fluids. [SC]   1151 Referral for admission sent to Dr. Trung Jackson  ([email or web address]) via [employer] Text Message.  [SC]   1159 Discussed admission with Dr. Jackson face-to-face. [SC]   1159 Reviewed results with patient.  Still feeling unwell.  We did try inhalation of isopropyl alcohol which was slightly improved her nausea [SC]      ED Course User Index  [EM] Teddy Moody, DO  [SC] John Paul Olivo MD       Procedures: (None if left blank)  Procedures:     Consultants:      Documentation:    Heart Score    Heart Score for chest pain patients  History: Moderately Suspicious  ECG: Normal  Patient Age: < 45 years  *Risk factors for Atherosclerotic disease: Diabetes Mellitus, Hypercholesterolemia, Hypertension, Obesity  Risk Factors: > 3 Risk factors or history of atherosclerotic disease*  Troponin: < 1X normal limit  Heart Score Total: 3    HEART Score recommendations:  Score 0 - 3:   <1.7%  risk of MACE over next 6 wks = Outpatient workup  Score 4 - 6: 12-16% risk of MACE over next 6 wks = Admission for observation  Score 7- 10: 50-65% risk of MACE over next 6 wks = Early invasive strategy    MACE: Major Acute Cardiac Event (All Cause Mortality, AMI or revascularization)  “Chest Pain in the Emergency Room: A Multicenter Validation of the HEART Score”. [place] BE, Beau AJ, Octavio YULIYA, Faiza TP, van hari Thornton F, Faiza EG, Jeni SH, van Marcelino RM, Jeannie PA. Crit Pathw Cardiol. 2010 Sep; 9(3): 164-169.  \"A prospective validation of the HEART score for chest pain patients at the emergency department.\" Int J Cardiol. 2013 Oct 3;168(3):2153-8. doi: 10.1016/j.ijcard.2013.01.255. Epub 2013 Mar 7.    MEDICATION CHANGES     Current Discharge Medication List          FINAL IMPRESSION     Final diagnoses:   Chest pain, unspecified type   Tachycardia   Intractable nausea and vomiting       DISPOSITION    DISPOSITION Admitted

## 2024-12-22 NOTE — ED NOTES
This RN in for rounds. Pt covered in vomit and all over blankets. This RN helped get pt cleaned up with new blankets and washed face with wash cloth. Pt states that her pain is still intense and the phenergan didn't help the nausea. Pt is allergic to all other nausea meds so is unsure what will help. Will talk with provider.

## 2024-12-22 NOTE — ED NOTES
ED to inpatient nurses report      Chief Complaint:  Chief Complaint   Patient presents with    Chest Pain     Right sided    Abdominal Pain     Right sided    Vomiting     Present to ED from: home    MOA:     LOC: alert and orientated to name, place, date  Mobility: Requires assistance * 1  Oxygen Baseline: RA    Current needs required: RA     Code Status:   Full Code    What abnormal results were found and what did you give/do to treat them?   Any procedures or intervention occur? Gave multiple doses of pain medications and antinausea meds without improvement    Mental Status:  Level of Consciousness: Alert (0)    Psych Assessment:        Vitals:  Patient Vitals for the past 24 hrs:   BP Temp Temp src Pulse Resp SpO2 Height Weight   12/22/24 1315 (!) 129/96 -- -- (!) 113 22 98 % -- --   12/22/24 1206 -- -- -- (!) 105 24 94 % -- --   12/22/24 1044 -- -- -- (!) 107 23 93 % -- --   12/22/24 0948 -- -- -- (!) 109 18 98 % -- --   12/22/24 0844 (!) 146/100 -- -- (!) 110 25 98 % -- --   12/22/24 0632 (!) 123/101 -- -- (!) 106 25 99 % -- --   12/22/24 0611 -- -- -- (!) 107 23 99 % -- --   12/22/24 0426 (!) 151/107 98.8 °F (37.1 °C) Oral (!) 105 24 100 % 1.626 m (5' 4\") 130.6 kg (288 lb)        LDAs:   Peripheral IV 12/22/24 Right Antecubital (Active)   Site Assessment Clean, dry & intact 12/22/24 0621   Line Status Blood return noted;Flushed;Normal saline locked 12/22/24 0621   Phlebitis Assessment No symptoms 12/22/24 0621   Infiltration Assessment 0 12/22/24 0621   Dressing Status Clean, dry & intact 12/22/24 0621       Ambulatory Status:  Presents to emergency department  because of falls (Syncope, seizure, or loss of consciousness): No, Age > 70: No, Altered Mental Status, Intoxication with alcohol or substance confusion (Disorientation, impaired judgment, poor safety awaremess, or inability to follow instructions): No, Impaired Mobility: Ambulates or transfers with assistive devices or assistance; Unable to ambulate

## 2024-12-22 NOTE — ED NOTES
Pt to be transferred to [facility]. Pt in stable condition at this time. Spoke with [facility] staff [place] prior to transport.

## 2024-12-22 NOTE — ED NOTES
Patient vomiting again. Vomit is brown in color. Asked pt if she has ever had a bowel obstruction. She stated she has not. Talked to Dr. Leach and CT abd/pelvis is ordered

## 2024-12-22 NOTE — ED NOTES
This RN attempted to access pt's port but was unsuccessful at getting blood return, Chelsea, RN attempted to access pt's port and was able to flush successfully but no blood return. Dr. Moody at bedside attempting Ultrasound IV at this time.

## 2024-12-22 NOTE — ED NOTES
Phlebotomy at bedside getting blood at this time via fingerstick. This RN able to get a blue top and sent to lab. Pt is laying in bed and asks for warm blanket and lights turned off, pt is breathing regular and unlabored on room air at this time. Pt has pain in right side of chest and abdomen described as sharp rated 10/10 at this time. Call light within reach.

## 2024-12-23 ENCOUNTER — HOSPITAL ENCOUNTER (OUTPATIENT)
Dept: NURSING | Age: 34
Discharge: HOME | End: 2024-12-23

## 2024-12-23 DIAGNOSIS — D50.8 OTHER IRON DEFICIENCY ANEMIA: Primary | ICD-10-CM

## 2024-12-23 LAB
ALBUMIN SERPL BCG-MCNC: 3.8 G/DL (ref 3.5–5.1)
ALP SERPL-CCNC: 95 U/L (ref 38–126)
ALT SERPL W/O P-5'-P-CCNC: 13 U/L (ref 11–66)
ANION GAP SERPL CALC-SCNC: 12 MEQ/L (ref 8–16)
AST SERPL-CCNC: 14 U/L (ref 5–40)
BILIRUB CONJ SERPL-MCNC: < 0.1 MG/DL (ref 0.1–13.8)
BILIRUB SERPL-MCNC: 0.2 MG/DL (ref 0.3–1.2)
BUN SERPL-MCNC: 13 MG/DL (ref 7–22)
CALCIUM SERPL-MCNC: 9.3 MG/DL (ref 8.5–10.5)
CHLORIDE SERPL-SCNC: 104 MEQ/L (ref 98–111)
CO2 SERPL-SCNC: 23 MEQ/L (ref 23–33)
CREAT SERPL-MCNC: 0.7 MG/DL (ref 0.4–1.2)
DEPRECATED RDW RBC AUTO: 41.2 FL (ref 35–45)
ERYTHROCYTE [DISTWIDTH] IN BLOOD BY AUTOMATED COUNT: 14.9 % (ref 11.5–14.5)
GFR SERPL CREATININE-BSD FRML MDRD: > 90 ML/MIN/1.73M2
GLUCOSE BLD STRIP.AUTO-MCNC: 122 MG/DL (ref 70–108)
GLUCOSE SERPL-MCNC: 116 MG/DL (ref 70–108)
HCT VFR BLD AUTO: 40.1 % (ref 37–47)
HGB BLD-MCNC: 12.5 GM/DL (ref 12–16)
MAGNESIUM SERPL-MCNC: 2 MG/DL (ref 1.6–2.4)
MCH RBC QN AUTO: 23.8 PG (ref 26–33)
MCHC RBC AUTO-ENTMCNC: 31.2 GM/DL (ref 32.2–35.5)
MCV RBC AUTO: 76.4 FL (ref 81–99)
PLATELET # BLD AUTO: 236 THOU/MM3 (ref 130–400)
PMV BLD AUTO: 9.2 FL (ref 9.4–12.4)
POTASSIUM SERPL-SCNC: 3.8 MEQ/L (ref 3.5–5.2)
PROT SERPL-MCNC: 7.3 G/DL (ref 6.1–8)
RBC # BLD AUTO: 5.25 MILL/MM3 (ref 4.2–5.4)
SODIUM SERPL-SCNC: 139 MEQ/L (ref 135–145)
WBC # BLD AUTO: 7.7 THOU/MM3 (ref 4.8–10.8)

## 2024-12-23 PROCEDURE — 6360000002 HC RX W HCPCS

## 2024-12-23 PROCEDURE — 6370000000 HC RX 637 (ALT 250 FOR IP)

## 2024-12-23 PROCEDURE — 82948 REAGENT STRIP/BLOOD GLUCOSE: CPT

## 2024-12-23 PROCEDURE — 83735 ASSAY OF MAGNESIUM: CPT

## 2024-12-23 PROCEDURE — 2580000003 HC RX 258

## 2024-12-23 PROCEDURE — 85027 COMPLETE CBC AUTOMATED: CPT

## 2024-12-23 PROCEDURE — 80053 COMPREHEN METABOLIC PANEL: CPT

## 2024-12-23 PROCEDURE — 99233 SBSQ HOSP IP/OBS HIGH 50: CPT | Performed by: STUDENT IN AN ORGANIZED HEALTH CARE EDUCATION/TRAINING PROGRAM

## 2024-12-23 PROCEDURE — 36415 COLL VENOUS BLD VENIPUNCTURE: CPT

## 2024-12-23 PROCEDURE — 6360000002 HC RX W HCPCS: Performed by: STUDENT IN AN ORGANIZED HEALTH CARE EDUCATION/TRAINING PROGRAM

## 2024-12-23 PROCEDURE — 1200000003 HC TELEMETRY R&B

## 2024-12-23 PROCEDURE — 6370000000 HC RX 637 (ALT 250 FOR IP): Performed by: STUDENT IN AN ORGANIZED HEALTH CARE EDUCATION/TRAINING PROGRAM

## 2024-12-23 PROCEDURE — 82248 BILIRUBIN DIRECT: CPT

## 2024-12-23 RX ORDER — HEPARIN 100 UNIT/ML
500 SYRINGE INTRAVENOUS PRN
Status: DISCONTINUED | OUTPATIENT
Start: 2024-12-23 | End: 2024-12-24 | Stop reason: HOSPADM

## 2024-12-23 RX ORDER — SODIUM CHLORIDE 0.9 % (FLUSH) 0.9 %
5-40 SYRINGE (ML) INJECTION PRN
OUTPATIENT
Start: 2024-12-23

## 2024-12-23 RX ORDER — SODIUM CHLORIDE 0.9 % (FLUSH) 0.9 %
5-40 SYRINGE (ML) INJECTION EVERY 12 HOURS SCHEDULED
Status: DISCONTINUED | OUTPATIENT
Start: 2024-12-23 | End: 2024-12-24 | Stop reason: HOSPADM

## 2024-12-23 RX ORDER — GLUCAGON 1 MG/ML
1 KIT INJECTION PRN
Status: DISCONTINUED | OUTPATIENT
Start: 2024-12-23 | End: 2024-12-26 | Stop reason: HOSPADM

## 2024-12-23 RX ORDER — SODIUM CHLORIDE 9 MG/ML
25 INJECTION, SOLUTION INTRAVENOUS PRN
Status: DISCONTINUED | OUTPATIENT
Start: 2024-12-23 | End: 2024-12-24 | Stop reason: HOSPADM

## 2024-12-23 RX ORDER — DEXTROSE MONOHYDRATE 100 MG/ML
INJECTION, SOLUTION INTRAVENOUS CONTINUOUS PRN
Status: DISCONTINUED | OUTPATIENT
Start: 2024-12-23 | End: 2024-12-26 | Stop reason: HOSPADM

## 2024-12-23 RX ORDER — HEPARIN 100 UNIT/ML
500 SYRINGE INTRAVENOUS PRN
OUTPATIENT
Start: 2024-12-23

## 2024-12-23 RX ORDER — SODIUM CHLORIDE 9 MG/ML
25 INJECTION, SOLUTION INTRAVENOUS PRN
OUTPATIENT
Start: 2024-12-23

## 2024-12-23 RX ORDER — OXYCODONE HYDROCHLORIDE 5 MG/1
5 TABLET ORAL EVERY 4 HOURS PRN
Status: DISCONTINUED | OUTPATIENT
Start: 2024-12-23 | End: 2024-12-23

## 2024-12-23 RX ORDER — SODIUM CHLORIDE 0.9 % (FLUSH) 0.9 %
5-40 SYRINGE (ML) INJECTION PRN
Status: DISCONTINUED | OUTPATIENT
Start: 2024-12-23 | End: 2024-12-24 | Stop reason: HOSPADM

## 2024-12-23 RX ORDER — MORPHINE SULFATE 2 MG/ML
1 INJECTION, SOLUTION INTRAMUSCULAR; INTRAVENOUS EVERY 4 HOURS PRN
Status: DISCONTINUED | OUTPATIENT
Start: 2024-12-23 | End: 2024-12-24

## 2024-12-23 RX ADMIN — PANTOPRAZOLE SODIUM 40 MG: 40 INJECTION, POWDER, FOR SOLUTION INTRAVENOUS at 06:20

## 2024-12-23 RX ADMIN — MORPHINE SULFATE 1 MG: 2 INJECTION, SOLUTION INTRAMUSCULAR; INTRAVENOUS at 12:31

## 2024-12-23 RX ADMIN — OXYCODONE 5 MG: 5 TABLET ORAL at 08:46

## 2024-12-23 RX ADMIN — PROMETHAZINE HYDROCHLORIDE 12.5 MG: 25 INJECTION INTRAMUSCULAR; INTRAVENOUS at 20:48

## 2024-12-23 RX ADMIN — SODIUM CHLORIDE: 9 INJECTION, SOLUTION INTRAVENOUS at 04:19

## 2024-12-23 RX ADMIN — PROMETHAZINE HYDROCHLORIDE 12.5 MG: 25 INJECTION INTRAMUSCULAR; INTRAVENOUS at 12:36

## 2024-12-23 RX ADMIN — MORPHINE SULFATE 1 MG: 2 INJECTION, SOLUTION INTRAMUSCULAR; INTRAVENOUS at 16:41

## 2024-12-23 RX ADMIN — MORPHINE SULFATE 1 MG: 2 INJECTION, SOLUTION INTRAMUSCULAR; INTRAVENOUS at 20:48

## 2024-12-23 RX ADMIN — HYDROMORPHONE HYDROCHLORIDE 0.25 MG: 1 INJECTION, SOLUTION INTRAMUSCULAR; INTRAVENOUS; SUBCUTANEOUS at 04:47

## 2024-12-23 RX ADMIN — ACETAMINOPHEN, ASPIRIN, CAFFEINE 1 TABLET: 250; 65; 250 TABLET, FILM COATED ORAL at 05:38

## 2024-12-23 ASSESSMENT — PAIN DESCRIPTION - DESCRIPTORS
DESCRIPTORS: ACHING;SHARP;STABBING
DESCRIPTORS: STABBING
DESCRIPTORS: SHARP;STABBING
DESCRIPTORS: SHARP
DESCRIPTORS: ACHING;SHARP

## 2024-12-23 ASSESSMENT — PAIN SCALES - GENERAL
PAINLEVEL_OUTOF10: 9
PAINLEVEL_OUTOF10: 8
PAINLEVEL_OUTOF10: 10
PAINLEVEL_OUTOF10: 4
PAINLEVEL_OUTOF10: 8
PAINLEVEL_OUTOF10: 10

## 2024-12-23 ASSESSMENT — PAIN - FUNCTIONAL ASSESSMENT
PAIN_FUNCTIONAL_ASSESSMENT: ACTIVITIES ARE NOT PREVENTED
PAIN_FUNCTIONAL_ASSESSMENT: PREVENTS OR INTERFERES SOME ACTIVE ACTIVITIES AND ADLS

## 2024-12-23 ASSESSMENT — PAIN DESCRIPTION - LOCATION
LOCATION: RIB CAGE;ABDOMEN
LOCATION: RIB CAGE;ABDOMEN
LOCATION: ABDOMEN
LOCATION: ABDOMEN;RIB CAGE
LOCATION: RIB CAGE
LOCATION: CHEST
LOCATION: ABDOMEN;FLANK

## 2024-12-23 ASSESSMENT — PAIN DESCRIPTION - PAIN TYPE: TYPE: ACUTE PAIN

## 2024-12-23 ASSESSMENT — PAIN DESCRIPTION - ORIENTATION
ORIENTATION: RIGHT

## 2024-12-23 NOTE — CARE COORDINATION
Case Management Assessment Initial Evaluation    Date/Time of Evaluation: 2024 7:20 AM  Assessment Completed by: Kiara Chavez RN    If patient is discharged prior to next notation, then this note serves as note for discharge by case management.    Patient Name: Samantha Nichols                   YOB: 1990  Diagnosis: Tachycardia [R00.0]  Intractable nausea and vomiting [R11.2]  Intractable vomiting with nausea [R11.2]  Chest pain, unspecified type [R07.9]                   Date / Time: 2024  4:23 AM  Location: [address] [place] [address]     Patient Admission Status: Inpatient   Readmission Risk Low 0-14, Mod 15-19), High > 20: Readmission Risk Score: 22.2    Current PCP: Juan Dominguez,   Health Care Decision Makers:     Additional Case Management Notes: Admitted from ER with CP, abd pain and vomiting. Hx long time abd pain and known gastroparesis. GI consulted. Pt has hx of prior G-tube. GI planning EGD tomorrow with GJ tube placement.     Procedures: No    Imagin24 CT Abd/Pelvis    IMPRESSION:  No evidence of an acute process in the abdomen or pelvis.    Patient Goals/Plan/Treatment Preferences:  Resides with SO/Spouse. She is independent. She does not drive or work however. She has a PCP and is insured. States her insurance will change to [employer] at the beginning of the year. She state she would like HH services when she firsts gets discharged with new tube, as she did previously. States had [facility] but does not want them this time.  consult placed.      24 1244   Service Assessment   Patient Orientation Alert and Oriented   Cognition Alert   History Provided By Patient   Support Systems Spouse/Significant Other;Family Members   Patient's Healthcare Decision Maker is: Legal Next of Kin   PCP Verified by CM Yes   Last Visit to PCP Within last 3 months   Prior Functional Level Independent in ADLs/IADLs   Current Functional Level Independent in ADLs/IADLs   Can patient return

## 2024-12-23 NOTE — CONSULTS
(HCC)    Leaking percutaneous endoscopic gastrostomy (PEG) tube (HCC)    Intestinal malabsorption    Gastroparesis    Adult body mass index 40 and over    Intractable vomiting    Intractable abdominal pain    CAD (coronary artery disease)    History of malignant carcinoid tumor    JOVANY on CPAP    Sickle cell trait (HCC)    Epilepsy (HCC)    Migration of percutaneous endoscopic gastrostomy (PEG) tube (HCC)    Gastroesophageal reflux disease without esophagitis    Tobacco dependence syndrome    Local infection due to Port-A-Cath    Abdominal pain    Type 2 diabetes mellitus with obesity (HCC)    Depression with suicidal ideation    Suicidal behavior with attempted self-injury (HCC)    Intractable vomiting with nausea           ASSESSMENT AND PLAN     EGD for GJ tube placement tomorrow 0830.   NPO at midnight.  Hold all blood thinners.   Anti nausea medication per primary.     Assessment and plan of care discussed with supervising physician, Dr Lara.      Thank you Curt Pederson MD for allowing me to participate in this patient's care.    AURELIO Mayorga CNP, MD,FACP 12/23/2024 8:25 AM

## 2024-12-24 ENCOUNTER — ANESTHESIA (OUTPATIENT)
Dept: ENDOSCOPY | Age: 34
DRG: 074 | End: 2024-12-24
Payer: MEDICARE

## 2024-12-24 ENCOUNTER — ANESTHESIA EVENT (OUTPATIENT)
Dept: ENDOSCOPY | Age: 34
DRG: 074 | End: 2024-12-24
Payer: MEDICARE

## 2024-12-24 ENCOUNTER — APPOINTMENT (OUTPATIENT)
Dept: ENDOSCOPY | Age: 34
DRG: 074 | End: 2024-12-24
Attending: INTERNAL MEDICINE
Payer: MEDICARE

## 2024-12-24 LAB — GLUCOSE BLD STRIP.AUTO-MCNC: 147 MG/DL (ref 70–108)

## 2024-12-24 PROCEDURE — 6370000000 HC RX 637 (ALT 250 FOR IP)

## 2024-12-24 PROCEDURE — 0DB78ZX EXCISION OF STOMACH, PYLORUS, VIA NATURAL OR ARTIFICIAL OPENING ENDOSCOPIC, DIAGNOSTIC: ICD-10-PCS | Performed by: INTERNAL MEDICINE

## 2024-12-24 PROCEDURE — 99232 SBSQ HOSP IP/OBS MODERATE 35: CPT | Performed by: STUDENT IN AN ORGANIZED HEALTH CARE EDUCATION/TRAINING PROGRAM

## 2024-12-24 PROCEDURE — 6360000002 HC RX W HCPCS: Performed by: NURSE ANESTHETIST, CERTIFIED REGISTERED

## 2024-12-24 PROCEDURE — 0DH63UZ INSERTION OF FEEDING DEVICE INTO STOMACH, PERCUTANEOUS APPROACH: ICD-10-PCS | Performed by: INTERNAL MEDICINE

## 2024-12-24 PROCEDURE — 6360000002 HC RX W HCPCS: Performed by: INTERNAL MEDICINE

## 2024-12-24 PROCEDURE — 6360000002 HC RX W HCPCS

## 2024-12-24 PROCEDURE — 6360000002 HC RX W HCPCS: Performed by: STUDENT IN AN ORGANIZED HEALTH CARE EDUCATION/TRAINING PROGRAM

## 2024-12-24 PROCEDURE — 82948 REAGENT STRIP/BLOOD GLUCOSE: CPT

## 2024-12-24 PROCEDURE — 1200000003 HC TELEMETRY R&B

## 2024-12-24 PROCEDURE — 2500000003 HC RX 250 WO HCPCS: Performed by: NURSE ANESTHETIST, CERTIFIED REGISTERED

## 2024-12-24 PROCEDURE — 6360000002 HC RX W HCPCS: Performed by: ANESTHESIOLOGY

## 2024-12-24 PROCEDURE — 6370000000 HC RX 637 (ALT 250 FOR IP): Performed by: PHYSICIAN ASSISTANT

## 2024-12-24 PROCEDURE — 6360000002 HC RX W HCPCS: Performed by: PHYSICIAN ASSISTANT

## 2024-12-24 PROCEDURE — 88305 TISSUE EXAM BY PATHOLOGIST: CPT

## 2024-12-24 RX ORDER — FENTANYL CITRATE 50 UG/ML
INJECTION, SOLUTION INTRAMUSCULAR; INTRAVENOUS
Status: DISCONTINUED | OUTPATIENT
Start: 2024-12-24 | End: 2024-12-24 | Stop reason: SDUPTHER

## 2024-12-24 RX ORDER — ONDANSETRON 2 MG/ML
INJECTION INTRAMUSCULAR; INTRAVENOUS
Status: DISCONTINUED | OUTPATIENT
Start: 2024-12-24 | End: 2024-12-24 | Stop reason: SDUPTHER

## 2024-12-24 RX ORDER — MORPHINE SULFATE 2 MG/ML
2 INJECTION, SOLUTION INTRAMUSCULAR; INTRAVENOUS EVERY 4 HOURS PRN
Status: DISCONTINUED | OUTPATIENT
Start: 2024-12-24 | End: 2024-12-25

## 2024-12-24 RX ORDER — DEXAMETHASONE SODIUM PHOSPHATE 4 MG/ML
INJECTION, SOLUTION INTRA-ARTICULAR; INTRALESIONAL; INTRAMUSCULAR; INTRAVENOUS; SOFT TISSUE
Status: DISCONTINUED | OUTPATIENT
Start: 2024-12-24 | End: 2024-12-24 | Stop reason: SDUPTHER

## 2024-12-24 RX ORDER — VANCOMYCIN HYDROCHLORIDE 1 G/20ML
1000 INJECTION, POWDER, LYOPHILIZED, FOR SOLUTION INTRAVENOUS ONCE
Status: DISCONTINUED | OUTPATIENT
Start: 2024-12-24 | End: 2024-12-24

## 2024-12-24 RX ORDER — SUCCINYLCHOLINE/SOD CL,ISO/PF 100 MG/5ML
SYRINGE (ML) INTRAVENOUS
Status: DISCONTINUED | OUTPATIENT
Start: 2024-12-24 | End: 2024-12-24 | Stop reason: SDUPTHER

## 2024-12-24 RX ORDER — ROCURONIUM BROMIDE 10 MG/ML
INJECTION, SOLUTION INTRAVENOUS
Status: DISCONTINUED | OUTPATIENT
Start: 2024-12-24 | End: 2024-12-24 | Stop reason: SDUPTHER

## 2024-12-24 RX ORDER — PROPOFOL 10 MG/ML
INJECTION, EMULSION INTRAVENOUS
Status: DISCONTINUED | OUTPATIENT
Start: 2024-12-24 | End: 2024-12-24 | Stop reason: SDUPTHER

## 2024-12-24 RX ADMIN — MORPHINE SULFATE 1 MG: 2 INJECTION, SOLUTION INTRAMUSCULAR; INTRAVENOUS at 01:06

## 2024-12-24 RX ADMIN — HYDROMORPHONE HYDROCHLORIDE 0.5 MG: 1 INJECTION, SOLUTION INTRAMUSCULAR; INTRAVENOUS; SUBCUTANEOUS at 11:10

## 2024-12-24 RX ADMIN — MORPHINE SULFATE 1 MG: 2 INJECTION, SOLUTION INTRAMUSCULAR; INTRAVENOUS at 16:20

## 2024-12-24 RX ADMIN — Medication 140 MG: at 09:46

## 2024-12-24 RX ADMIN — VANCOMYCIN HYDROCHLORIDE 1000 MG: 1 INJECTION, POWDER, LYOPHILIZED, FOR SOLUTION INTRAVENOUS at 09:56

## 2024-12-24 RX ADMIN — ONDANSETRON 4 MG: 2 INJECTION INTRAMUSCULAR; INTRAVENOUS at 10:34

## 2024-12-24 RX ADMIN — PROPOFOL 150 MG: 10 INJECTION, EMULSION INTRAVENOUS at 09:46

## 2024-12-24 RX ADMIN — ROCURONIUM BROMIDE 20 MG: 10 INJECTION INTRAVENOUS at 10:17

## 2024-12-24 RX ADMIN — PROMETHAZINE HYDROCHLORIDE 12.5 MG: 25 INJECTION INTRAMUSCULAR; INTRAVENOUS at 06:54

## 2024-12-24 RX ADMIN — HYDROMORPHONE HYDROCHLORIDE 0.5 MG: 1 INJECTION, SOLUTION INTRAMUSCULAR; INTRAVENOUS; SUBCUTANEOUS at 11:15

## 2024-12-24 RX ADMIN — MORPHINE SULFATE 1 MG: 2 INJECTION, SOLUTION INTRAMUSCULAR; INTRAVENOUS at 05:23

## 2024-12-24 RX ADMIN — FENTANYL CITRATE 100 MCG: 50 INJECTION, SOLUTION INTRAMUSCULAR; INTRAVENOUS at 09:46

## 2024-12-24 RX ADMIN — ROCURONIUM BROMIDE 10 MG: 10 INJECTION INTRAVENOUS at 09:46

## 2024-12-24 RX ADMIN — PANTOPRAZOLE SODIUM 40 MG: 40 INJECTION, POWDER, FOR SOLUTION INTRAVENOUS at 05:30

## 2024-12-24 RX ADMIN — ACETAMINOPHEN, ASPIRIN, CAFFEINE 1 TABLET: 250; 65; 250 TABLET, FILM COATED ORAL at 01:06

## 2024-12-24 RX ADMIN — MORPHINE SULFATE 1 MG: 2 INJECTION, SOLUTION INTRAMUSCULAR; INTRAVENOUS at 12:18

## 2024-12-24 RX ADMIN — DEXAMETHASONE SODIUM PHOSPHATE 8 MG: 4 INJECTION, SOLUTION INTRAMUSCULAR; INTRAVENOUS at 09:46

## 2024-12-24 RX ADMIN — MORPHINE SULFATE 2 MG: 2 INJECTION, SOLUTION INTRAMUSCULAR; INTRAVENOUS at 20:44

## 2024-12-24 ASSESSMENT — PAIN DESCRIPTION - DESCRIPTORS
DESCRIPTORS: SHARP;SHOOTING
DESCRIPTORS: STABBING
DESCRIPTORS: SORE
DESCRIPTORS: STABBING
DESCRIPTORS: SORE
DESCRIPTORS: ACHING;SORE
DESCRIPTORS: STABBING
DESCRIPTORS: ACHING;SORE

## 2024-12-24 ASSESSMENT — PAIN DESCRIPTION - LOCATION
LOCATION: ABDOMEN

## 2024-12-24 ASSESSMENT — PAIN SCALES - GENERAL
PAINLEVEL_OUTOF10: 8
PAINLEVEL_OUTOF10: 10
PAINLEVEL_OUTOF10: 9
PAINLEVEL_OUTOF10: 10
PAINLEVEL_OUTOF10: 4
PAINLEVEL_OUTOF10: 10

## 2024-12-24 ASSESSMENT — PAIN DESCRIPTION - ORIENTATION
ORIENTATION: LEFT;LOWER
ORIENTATION: LEFT;LOWER
ORIENTATION: LEFT
ORIENTATION: LEFT
ORIENTATION: RIGHT
ORIENTATION: RIGHT
ORIENTATION: LEFT;UPPER

## 2024-12-24 ASSESSMENT — PAIN - FUNCTIONAL ASSESSMENT
PAIN_FUNCTIONAL_ASSESSMENT: PREVENTS OR INTERFERES SOME ACTIVE ACTIVITIES AND ADLS
PAIN_FUNCTIONAL_ASSESSMENT: 0-10
PAIN_FUNCTIONAL_ASSESSMENT: NONE - DENIES PAIN
PAIN_FUNCTIONAL_ASSESSMENT: PREVENTS OR INTERFERES SOME ACTIVE ACTIVITIES AND ADLS
PAIN_FUNCTIONAL_ASSESSMENT: ACTIVITIES ARE NOT PREVENTED

## 2024-12-24 ASSESSMENT — PAIN DESCRIPTION - PAIN TYPE: TYPE: SURGICAL PAIN

## 2024-12-24 NOTE — BRIEF OP NOTE
Brief Postoperative Note      Patient: Samantha Nichols  YOB: 1990  MRN: [id number]    Date of Procedure: 12/24/2024    Pre-Op Diagnosis Codes:      * Vomiting, unspecified vomiting type, unspecified whether nausea present [R11.10]    Post-Op Diagnosis: Post-Op Diagnosis Codes:     * Vomiting, unspecified vomiting type, unspecified whether nausea present [R11.10]       Procedure(s):  EGD W/ G TUBE PLACEMENT    Surgeon(s):  Janusz Lara MD    Assistant:  * No surgical staff found *    Anesthesia: General    Estimated Blood Loss (mL): Minimal    Complications: None    Specimens:   ID Type Source Tests Collected by Time Destination   A : BX GASTRITIS Tissue Stomach SURGICAL PATHOLOGY Janusz Lara MD 12/24/2024 1017        Implants:  * No implants in log *      Drains: * No LDAs found *    Findings:  Infection Present At Time Of Surgery (PATOS) (choose all levels that have infection present):  No infection present  Other Findings: 20 f bard g tube placed. Antral gastritis.    Electronically signed by Janusz Lara MD on 12/24/2024 at 10:55 AM

## 2024-12-24 NOTE — ANESTHESIA POSTPROCEDURE EVALUATION
Department of Anesthesiology  Postprocedure Note    Patient: Samantha Nichols  MRN: [id number]  YOB: 1990  Date of evaluation: 12/24/2024    Procedure Summary       Date: 12/24/24 Room / Location: [place] [address] / [facility]    Anesthesia Start: 0910 Anesthesia Stop: 1102    Procedures:       EGD W/ G TUBE PLACEMENT      ESOPHAGOGASTRODUODENOSCOPY BIOPSY (Left: Esophagus) Diagnosis:       Vomiting, unspecified vomiting type, unspecified whether nausea present      (Vomiting, unspecified vomiting type, unspecified whether nausea present [R11.10])    Surgeons: Janusz Lara MD Responsible Provider: Fausto Blandon MD    Anesthesia Type: MAC ASA Status: 3            Anesthesia Type: No value filed.    Juan Phase I: Juan Score: 9    Juan Phase II:      Anesthesia Post Evaluation    Patient location during evaluation: PACU  Patient participation: complete - patient participated  Level of consciousness: awake and alert  Airway patency: patent  Nausea & Vomiting: no nausea  Cardiovascular status: blood pressure returned to baseline and hemodynamically stable  Respiratory status: acceptable and spontaneous ventilation  Hydration status: euvolemic  Pain management: adequate    No notable events documented.

## 2024-12-24 NOTE — CARE COORDINATION
12/24/24 4641   Condition of Participation: Discharge Planning   The Patient and/or Patient Representative was provided with a Choice of Provider? Patient   The Patient and/Or Patient Representative agree with the Discharge Plan? Yes   Freedom of Choice list was provided with basic dialogue that supports the patient's individualized plan of care/goals, treatment preferences, and shares the quality data associated with the providers?  Yes     Patient elects to use [facility] and [facility]. Referral faxed to [facility], but on-call RN states CM will need to call back on Thursday to see if they can take. [facility] can accept, but does not have needed formula at this time, can be delivered Thursday/Friday.

## 2024-12-24 NOTE — ADDENDUM NOTE
Addendum  created 12/24/24 1509 by Jessica Hall APRN - HOOD    Intraprocedure Meds edited, Orders acknowledged in Narrator

## 2024-12-24 NOTE — ANESTHESIA PRE PROCEDURE
Department of Anesthesiology  Preprocedure Note       Name:  Samantha Nichols   Age:  34 y.o.  :  1990                                          MRN:  [id number]         Date:  2024      Surgeon: Surgeon(s):  Janusz Lara MD    Procedure: Procedure(s):  EGD W/ G TUBE PLACEMENT    Medications prior to admission:   Prior to Admission medications    Medication Sig Start Date End Date Taking? Authorizing Provider   pantoprazole (PROTONIX) 40 MG tablet Take 1 tablet by mouth in the morning and at bedtime    ProviderOsmar MD   amLODIPine (NORVASC) 10 MG tablet TAKE 1 TABLET BY MOUTH DAILY 24   Ramona You MD   promethazine (PHENERGAN) 25 MG tablet Take 1 tablet by mouth every 8 hours as needed 10/29/24   Provider, MD Osmar   FreeStyle Lancets MISC 1 each by Does not apply route daily Use to check blood sugar once daily. Freestyle Freedom Lite Dx: E11.9 24   Juan Dominugez DO   blood glucose monitor strips Test 1 time a day Freestyle freedom Lite test strips Dx: E11.9 24   Juan Dominguez DO   metFORMIN (GLUCOPHAGE) 500 MG tablet Take ONE tablet by mouth every 12 hours with meals for diabetes.  Patient not taking: Reported on 2024   Gonzalo Cortez DO   glucose monitoring kit 1 kit by Does not apply route daily 24   Abhishek Hamilton MD   fluticasone (FLONASE) 50 MCG/ACT nasal spray 2 sprays by Each Nostril route daily 24   Hernando Weems MD   lactulose (CHRONULAC) 10 GM/15ML solution Take 30 mLs by mouth every 8 (eight) hours Until you have a bowel movement and then stop.  Patient taking differently: Take 30 mLs by mouth every 8 hours as needed Until you have a bowel movement and then stop. 24   Shad Skinner DO   simethicone (MYLICON) 80 MG chewable tablet Take 1 tablet by mouth 4 times daily as needed for Flatulence (gas) 24   Shad Skinner DO   estradiol (ESTRACE) 0.5 MG tablet Take 1 tablet by mouth daily 24   Provider,

## 2024-12-24 NOTE — PROCEDURES
[address] [address]                             PROCEDURE NOTE      PATIENT NAME: TREVOR ARZATE            : 1990  MED REC NO: [id number]                       ROOM: [facility]  ACCOUNT NO: [id number]                       ADMIT DATE: 2024  PROVIDER: Janusz Lara MD      DATE OF PROCEDURE:  2024    SURGEON:  Janusz Lara MD    PROCEDURES:  Esophagogastroduodenoscopy and G-tube placement.    INDICATIONS:  Patient is a 34-year-old pleasant female, who presented to the [facility] secondary to hemoptysis, right-sided chest pain.  The patient had EGD and colonoscopy by me. On 2024, colonoscopy showed no polyps.  EGD 2024, showed food in the stomach and gastric emptying study showed gastroparesis.  She was placed on low residue diet.  She said she is allergic to Reglan.  She tried erythromycin, that did not help. The patient had sickle cell anemia.  The patient had neuroendocrine tumor, who had abdominal exploration surgery x2, colonoscopy, EGD, and she had a G-tube placed on 2023, and that was most recently removed.  She tolerated the G-tube feedings well.  Now, she has severe nausea, vomiting. G-tube was removed.  She is unable to keep anything, severe nausea and vomiting.  I discussed with the patient at length, and she is allergic to Reglan, and she has intolerance to erythromycin.  I discussed with her at length, and she wants to undergo G-tube placement.    PHYSICAL EXAMINATION:  VITAL SIGNS:  Temperature 97.8, pulse 67, respiratory rate 16, blood pressure 138/88.  HEART:  Regular. Normal S1 and S2.  LUNGS:  Equal to expansion on inspection.  Fair air entry bilaterally.  ABDOMEN:  Soft.  Postsurgical scar on the anterior abdominal wall noted.    ASA CLASSIFICATION:  As per Anesthesia, please see Anesthesia note for details.    INSTRUMENT:  GIF- gastroscope, 20-[place]

## 2024-12-25 LAB
ANION GAP SERPL CALC-SCNC: 9 MEQ/L (ref 8–16)
BUN SERPL-MCNC: 13 MG/DL (ref 7–22)
CALCIUM SERPL-MCNC: 9.3 MG/DL (ref 8.5–10.5)
CHLORIDE SERPL-SCNC: 105 MEQ/L (ref 98–111)
CO2 SERPL-SCNC: 26 MEQ/L (ref 23–33)
CREAT SERPL-MCNC: 0.7 MG/DL (ref 0.4–1.2)
DEPRECATED RDW RBC AUTO: 41.1 FL (ref 35–45)
ERYTHROCYTE [DISTWIDTH] IN BLOOD BY AUTOMATED COUNT: 14.9 % (ref 11.5–14.5)
GFR SERPL CREATININE-BSD FRML MDRD: > 90 ML/MIN/1.73M2
GLUCOSE BLD STRIP.AUTO-MCNC: 116 MG/DL (ref 70–108)
GLUCOSE SERPL-MCNC: 118 MG/DL (ref 70–108)
HCT VFR BLD AUTO: 37.2 % (ref 37–47)
HGB BLD-MCNC: 11.5 GM/DL (ref 12–16)
MCH RBC QN AUTO: 23.7 PG (ref 26–33)
MCHC RBC AUTO-ENTMCNC: 30.9 GM/DL (ref 32.2–35.5)
MCV RBC AUTO: 76.5 FL (ref 81–99)
PLATELET # BLD AUTO: 238 THOU/MM3 (ref 130–400)
PMV BLD AUTO: 9.1 FL (ref 9.4–12.4)
POTASSIUM SERPL-SCNC: 3.9 MEQ/L (ref 3.5–5.2)
RBC # BLD AUTO: 4.86 MILL/MM3 (ref 4.2–5.4)
SODIUM SERPL-SCNC: 140 MEQ/L (ref 135–145)
WBC # BLD AUTO: 14.7 THOU/MM3 (ref 4.8–10.8)

## 2024-12-25 PROCEDURE — 2500000003 HC RX 250 WO HCPCS

## 2024-12-25 PROCEDURE — 6360000002 HC RX W HCPCS: Performed by: PHYSICIAN ASSISTANT

## 2024-12-25 PROCEDURE — 36415 COLL VENOUS BLD VENIPUNCTURE: CPT

## 2024-12-25 PROCEDURE — 6370000000 HC RX 637 (ALT 250 FOR IP): Performed by: PHYSICIAN ASSISTANT

## 2024-12-25 PROCEDURE — 80048 BASIC METABOLIC PNL TOTAL CA: CPT

## 2024-12-25 PROCEDURE — 6360000002 HC RX W HCPCS

## 2024-12-25 PROCEDURE — 82948 REAGENT STRIP/BLOOD GLUCOSE: CPT

## 2024-12-25 PROCEDURE — 85027 COMPLETE CBC AUTOMATED: CPT

## 2024-12-25 PROCEDURE — 1200000003 HC TELEMETRY R&B

## 2024-12-25 PROCEDURE — 6370000000 HC RX 637 (ALT 250 FOR IP)

## 2024-12-25 RX ORDER — MORPHINE SULFATE 2 MG/ML
2 INJECTION, SOLUTION INTRAMUSCULAR; INTRAVENOUS ONCE
Status: COMPLETED | OUTPATIENT
Start: 2024-12-25 | End: 2024-12-25

## 2024-12-25 RX ORDER — TRAMADOL HYDROCHLORIDE 50 MG/1
50 TABLET ORAL EVERY 6 HOURS PRN
Status: DISCONTINUED | OUTPATIENT
Start: 2024-12-25 | End: 2024-12-26 | Stop reason: HOSPADM

## 2024-12-25 RX ORDER — TRAMADOL HYDROCHLORIDE 50 MG/1
100 TABLET ORAL EVERY 6 HOURS PRN
Status: DISCONTINUED | OUTPATIENT
Start: 2024-12-25 | End: 2024-12-26 | Stop reason: HOSPADM

## 2024-12-25 RX ORDER — HYDROMORPHONE HYDROCHLORIDE 2 MG/1
1 TABLET ORAL ONCE
Status: DISCONTINUED | OUTPATIENT
Start: 2024-12-25 | End: 2024-12-25

## 2024-12-25 RX ADMIN — MORPHINE SULFATE 2 MG: 2 INJECTION, SOLUTION INTRAMUSCULAR; INTRAVENOUS at 14:27

## 2024-12-25 RX ADMIN — PANTOPRAZOLE SODIUM 40 MG: 40 INJECTION, POWDER, FOR SOLUTION INTRAVENOUS at 05:36

## 2024-12-25 RX ADMIN — TRAMADOL HYDROCHLORIDE 100 MG: 50 TABLET, COATED ORAL at 20:48

## 2024-12-25 RX ADMIN — SODIUM CHLORIDE, PRESERVATIVE FREE 10 ML: 5 INJECTION INTRAVENOUS at 20:48

## 2024-12-25 RX ADMIN — MORPHINE SULFATE 2 MG: 2 INJECTION, SOLUTION INTRAMUSCULAR; INTRAVENOUS at 19:37

## 2024-12-25 RX ADMIN — MORPHINE SULFATE 2 MG: 2 INJECTION, SOLUTION INTRAMUSCULAR; INTRAVENOUS at 09:58

## 2024-12-25 RX ADMIN — PROMETHAZINE HYDROCHLORIDE 12.5 MG: 25 INJECTION INTRAMUSCULAR; INTRAVENOUS at 03:47

## 2024-12-25 RX ADMIN — PROMETHAZINE HYDROCHLORIDE 12.5 MG: 25 INJECTION INTRAMUSCULAR; INTRAVENOUS at 17:49

## 2024-12-25 RX ADMIN — MORPHINE SULFATE 2 MG: 2 INJECTION, SOLUTION INTRAMUSCULAR; INTRAVENOUS at 05:36

## 2024-12-25 RX ADMIN — HYDROMORPHONE HYDROCHLORIDE 0.5 MG: 1 INJECTION, SOLUTION INTRAMUSCULAR; INTRAVENOUS; SUBCUTANEOUS at 01:19

## 2024-12-25 RX ADMIN — SODIUM CHLORIDE, PRESERVATIVE FREE 10 ML: 5 INJECTION INTRAVENOUS at 09:57

## 2024-12-25 RX ADMIN — HYDROMORPHONE HYDROCHLORIDE 0.25 MG: 1 INJECTION, SOLUTION INTRAMUSCULAR; INTRAVENOUS; SUBCUTANEOUS at 12:35

## 2024-12-25 ASSESSMENT — PAIN DESCRIPTION - ORIENTATION
ORIENTATION: LEFT;LOWER
ORIENTATION: LEFT;LOWER
ORIENTATION: LEFT
ORIENTATION: LEFT;LOWER
ORIENTATION: LEFT
ORIENTATION: LEFT;LOWER

## 2024-12-25 ASSESSMENT — PAIN DESCRIPTION - FREQUENCY
FREQUENCY: CONTINUOUS

## 2024-12-25 ASSESSMENT — PAIN - FUNCTIONAL ASSESSMENT
PAIN_FUNCTIONAL_ASSESSMENT: PREVENTS OR INTERFERES SOME ACTIVE ACTIVITIES AND ADLS
PAIN_FUNCTIONAL_ASSESSMENT: ACTIVITIES ARE NOT PREVENTED
PAIN_FUNCTIONAL_ASSESSMENT: ACTIVITIES ARE NOT PREVENTED
PAIN_FUNCTIONAL_ASSESSMENT: PREVENTS OR INTERFERES SOME ACTIVE ACTIVITIES AND ADLS
PAIN_FUNCTIONAL_ASSESSMENT: ACTIVITIES ARE NOT PREVENTED
PAIN_FUNCTIONAL_ASSESSMENT: PREVENTS OR INTERFERES SOME ACTIVE ACTIVITIES AND ADLS
PAIN_FUNCTIONAL_ASSESSMENT: ACTIVITIES ARE NOT PREVENTED
PAIN_FUNCTIONAL_ASSESSMENT: ACTIVITIES ARE NOT PREVENTED

## 2024-12-25 ASSESSMENT — PAIN SCALES - GENERAL
PAINLEVEL_OUTOF10: 9
PAINLEVEL_OUTOF10: 9
PAINLEVEL_OUTOF10: 8
PAINLEVEL_OUTOF10: 8
PAINLEVEL_OUTOF10: 9
PAINLEVEL_OUTOF10: 9
PAINLEVEL_OUTOF10: 8
PAINLEVEL_OUTOF10: 10
PAINLEVEL_OUTOF10: 7
PAINLEVEL_OUTOF10: 8
PAINLEVEL_OUTOF10: 10

## 2024-12-25 ASSESSMENT — PAIN DESCRIPTION - ONSET
ONSET: ON-GOING

## 2024-12-25 ASSESSMENT — PAIN DESCRIPTION - LOCATION
LOCATION: ABDOMEN

## 2024-12-25 ASSESSMENT — PAIN DESCRIPTION - DESCRIPTORS
DESCRIPTORS: STABBING
DESCRIPTORS: SHARP
DESCRIPTORS: STABBING

## 2024-12-25 ASSESSMENT — PAIN DESCRIPTION - PAIN TYPE
TYPE: SURGICAL PAIN

## 2024-12-25 NOTE — DISCHARGE INSTRUCTIONS
An incidental pulmonary nodule was found on your CT scan. This was 9mm in size- please follow up with your PCP for repeat imaging in 6-12 months to assess for stability       Please follow-up with your psychiatrist for medication management.  These medications can also help your abdominal pain    Continue with tube feedings as prescribed.  You are able to take your pills by mouth.  You were also able to eat and drink foods as tolerated-  If solid foods cause nausea or abdominal pain, stick to liquid diet. Eat slowly and small amounts to prevent abdominal discomfort

## 2024-12-26 VITALS
SYSTOLIC BLOOD PRESSURE: 134 MMHG | DIASTOLIC BLOOD PRESSURE: 82 MMHG | RESPIRATION RATE: 16 BRPM | OXYGEN SATURATION: 93 % | TEMPERATURE: 99.1 F | BODY MASS INDEX: 48.73 KG/M2 | HEIGHT: 64 IN | WEIGHT: 285.4 LBS | HEART RATE: 76 BPM

## 2024-12-26 LAB
ANION GAP SERPL CALC-SCNC: 12 MEQ/L (ref 8–16)
BUN SERPL-MCNC: 14 MG/DL (ref 7–22)
CALCIUM SERPL-MCNC: 9 MG/DL (ref 8.5–10.5)
CHLORIDE SERPL-SCNC: 102 MEQ/L (ref 98–111)
CO2 SERPL-SCNC: 26 MEQ/L (ref 23–33)
CREAT SERPL-MCNC: 0.7 MG/DL (ref 0.4–1.2)
DEPRECATED RDW RBC AUTO: 40.7 FL (ref 35–45)
ERYTHROCYTE [DISTWIDTH] IN BLOOD BY AUTOMATED COUNT: 14.9 % (ref 11.5–14.5)
GFR SERPL CREATININE-BSD FRML MDRD: > 90 ML/MIN/1.73M2
GLUCOSE SERPL-MCNC: 98 MG/DL (ref 70–108)
HCT VFR BLD AUTO: 36.7 % (ref 37–47)
HGB BLD-MCNC: 11.5 GM/DL (ref 12–16)
MAGNESIUM SERPL-MCNC: 1.9 MG/DL (ref 1.6–2.4)
MCH RBC QN AUTO: 23.9 PG (ref 26–33)
MCHC RBC AUTO-ENTMCNC: 31.3 GM/DL (ref 32.2–35.5)
MCV RBC AUTO: 76.1 FL (ref 81–99)
PLATELET # BLD AUTO: 221 THOU/MM3 (ref 130–400)
PMV BLD AUTO: 9.3 FL (ref 9.4–12.4)
POTASSIUM SERPL-SCNC: 3.4 MEQ/L (ref 3.5–5.2)
RBC # BLD AUTO: 4.82 MILL/MM3 (ref 4.2–5.4)
SODIUM SERPL-SCNC: 140 MEQ/L (ref 135–145)
WBC # BLD AUTO: 13.8 THOU/MM3 (ref 4.8–10.8)

## 2024-12-26 PROCEDURE — 36415 COLL VENOUS BLD VENIPUNCTURE: CPT

## 2024-12-26 PROCEDURE — 6370000000 HC RX 637 (ALT 250 FOR IP)

## 2024-12-26 PROCEDURE — 6360000002 HC RX W HCPCS

## 2024-12-26 PROCEDURE — 6370000000 HC RX 637 (ALT 250 FOR IP): Performed by: PHYSICIAN ASSISTANT

## 2024-12-26 PROCEDURE — 2500000003 HC RX 250 WO HCPCS

## 2024-12-26 PROCEDURE — 85027 COMPLETE CBC AUTOMATED: CPT

## 2024-12-26 PROCEDURE — 83735 ASSAY OF MAGNESIUM: CPT

## 2024-12-26 PROCEDURE — 80048 BASIC METABOLIC PNL TOTAL CA: CPT

## 2024-12-26 PROCEDURE — 6360000002 HC RX W HCPCS: Performed by: PHYSICIAN ASSISTANT

## 2024-12-26 RX ORDER — MORPHINE SULFATE 2 MG/ML
1 INJECTION, SOLUTION INTRAMUSCULAR; INTRAVENOUS ONCE
Status: COMPLETED | OUTPATIENT
Start: 2024-12-26 | End: 2024-12-26

## 2024-12-26 RX ORDER — POTASSIUM CHLORIDE 1500 MG/1
20 TABLET, EXTENDED RELEASE ORAL DAILY
Qty: 30 TABLET | Refills: 0 | Status: SHIPPED | OUTPATIENT
Start: 2024-12-26

## 2024-12-26 RX ORDER — HEPARIN 100 UNIT/ML
500 SYRINGE INTRAVENOUS ONCE
Status: COMPLETED | OUTPATIENT
Start: 2024-12-26 | End: 2024-12-26

## 2024-12-26 RX ORDER — TRAMADOL HYDROCHLORIDE 50 MG/1
100 TABLET ORAL EVERY 6 HOURS PRN
Qty: 24 TABLET | Refills: 0 | Status: SHIPPED | OUTPATIENT
Start: 2024-12-26 | End: 2024-12-29

## 2024-12-26 RX ADMIN — MORPHINE SULFATE 1 MG: 2 INJECTION, SOLUTION INTRAMUSCULAR; INTRAVENOUS at 14:26

## 2024-12-26 RX ADMIN — ACETAMINOPHEN, ASPIRIN, CAFFEINE 1 TABLET: 250; 65; 250 TABLET, FILM COATED ORAL at 05:43

## 2024-12-26 RX ADMIN — PANTOPRAZOLE SODIUM 40 MG: 40 INJECTION, POWDER, FOR SOLUTION INTRAVENOUS at 09:21

## 2024-12-26 RX ADMIN — POTASSIUM BICARBONATE 40 MEQ: 782 TABLET, EFFERVESCENT ORAL at 10:12

## 2024-12-26 RX ADMIN — HEPARIN 500 UNITS: 100 SYRINGE at 14:48

## 2024-12-26 RX ADMIN — SODIUM CHLORIDE, PRESERVATIVE FREE 10 ML: 5 INJECTION INTRAVENOUS at 09:22

## 2024-12-26 RX ADMIN — PROMETHAZINE HYDROCHLORIDE 12.5 MG: 25 INJECTION INTRAMUSCULAR; INTRAVENOUS at 13:28

## 2024-12-26 RX ADMIN — TRAMADOL HYDROCHLORIDE 100 MG: 50 TABLET, COATED ORAL at 09:21

## 2024-12-26 ASSESSMENT — PAIN DESCRIPTION - DESCRIPTORS
DESCRIPTORS: ACHING
DESCRIPTORS: ACHING

## 2024-12-26 ASSESSMENT — PAIN DESCRIPTION - ORIENTATION
ORIENTATION: LEFT
ORIENTATION: LEFT

## 2024-12-26 ASSESSMENT — PAIN DESCRIPTION - LOCATION
LOCATION: ABDOMEN
LOCATION: ABDOMEN
LOCATION: HEAD

## 2024-12-26 ASSESSMENT — PAIN DESCRIPTION - FREQUENCY: FREQUENCY: CONTINUOUS

## 2024-12-26 ASSESSMENT — PAIN - FUNCTIONAL ASSESSMENT: PAIN_FUNCTIONAL_ASSESSMENT: ACTIVITIES ARE NOT PREVENTED

## 2024-12-26 ASSESSMENT — PAIN SCALES - GENERAL
PAINLEVEL_OUTOF10: 10
PAINLEVEL_OUTOF10: 7
PAINLEVEL_OUTOF10: 7

## 2024-12-26 ASSESSMENT — PAIN DESCRIPTION - PAIN TYPE: TYPE: ACUTE PAIN

## 2024-12-26 ASSESSMENT — PAIN DESCRIPTION - ONSET: ONSET: ON-GOING

## 2024-12-26 NOTE — PROGRESS NOTES
Pharmacy Pre-Op Antibiotic Dose Adjustment    Samanthaady Nichols is a 34 y.o. female scheduled for surgery. Pharmacy will adjust the following pre-op antibiotic per P&T approved policy: vancomycin    Weight:  Wt Readings from Last 1 Encounters:   12/22/24 129.5 kg (285 lb 6.4 oz)     Body mass index is 48.99 kg/m².    Plan:   Pre-op antibiotic adjustment: adjust vancomycin to 15 mg/kg                              
  Physician Progress Note      PATIENT:               TREVOR ARZATE  [facility] #:                  [id number]  :                       1990  ADMIT DATE:       2024 4:23 AM  DISCH DATE:  RESPONDING  PROVIDER #:        Curt Pederson MD          QUERY TEXT:    Dr Pederson,    Patient admitted with BMI 48. If possible, please document in progress notes   and discharge summary if you are evaluating and /or treating any of the   following:    The medical record reflects the following:  Risk Factors: BMI 48  Clinical Indicators: BMI 48  Treatment: assistance with ADLs    Specificity of obesity and morbid obesity should be reported based on   physician documentation, as there are several published classifications and   definitions?  MS-DRG Training Guide. CDC:   https://www.cdc.gov/obesity/basics/adult-defining.html. WHO:   https://www.who.int/news-room/fact-sheets/detail/obesity-and-overweight. NIH:   https://www.nhlbi.nih.gov/health/educational/lose_wt/BMI/bmi_dis.htm  Options provided:  -- Morbid obesity  -- No morbid obesity.  -- Other - I will add my own diagnosis  -- Disagree - Not applicable / Not valid  -- Disagree - Clinically unable to determine / Unknown  -- Refer to Clinical Documentation Reviewer    PROVIDER RESPONSE TEXT:    This patient has morbid obesity.    Query created by: Fanny Stephenson on 2024 6:38 AM      Electronically signed by:  Curt Pederson MD 2024 1:33 PM          
 Progress note: GI    Patient - Samantha Nichols,  Age - 34 y.o.    - 1990      Room Number - 6K-27/027-A   MRN -  [id number]   [facility] # - [id number]  Date of Admission -  2024  4:23 AM    SUBJECTIVE:   Patient resting in bed. Denies any nausea or vomiting, slight pain at tube site. Site is without any signs of infection. Rt sided pain has improved. Pt wants to go home.   OBJECTIVE   VITALS    height is 1.626 m (5' 4\") and weight is 129.5 kg (285 lb 6.4 oz). Her oral temperature is 97.9 °F (36.6 °C). Her blood pressure is 155/88 (abnormal) and her pulse is 79. Her respiration is 20 and oxygen saturation is 99%.       Wt Readings from Last 3 Encounters:   24 129.5 kg (285 lb 6.4 oz)   24 130.8 kg (288 lb 6.4 oz)   24 130.3 kg (287 lb 3.2 oz)       I/O (24 Hours)  No intake or output data in the 24 hours ending 24 0847    General Appearance  Awake, alert, oriented,  not  In acute distress  HEENT - normocephalic, atraumatic, pink conjunctiva,  anicteric sclera  Lungs -  Bilateral good air entry, no rhonchi, no wheeze  Cardiovascular - Heart sounds are normal.  Regular rate and rhythm without murmur, gallop or rub.  Abdomen - soft, not distended, nontender, PEG tube intact no s/s of infection  Neurologic - Awake, alert, oriented to name, place and time.no deficit  Skin - Warm dry  Extremities - As expected.     MEDICATIONS:      sodium chloride flush  5-40 mL IntraVENous 2 times per day    lidocaine  1 patch TransDERmal Daily    pantoprazole  40 mg IntraVENous Daily    [Held by provider] amLODIPine  10 mg Oral Daily      dextrose      sodium chloride 20 mL/hr at 24 0910     traMADol **OR** traMADol, glucose, dextrose bolus **OR** dextrose bolus, glucagon (rDNA), dextrose, sodium chloride flush, sodium chloride, polyethylene glycol, [DISCONTINUED] promethazine **OR** ondansetron, diphenhydrAMINE, promethazine      LABS:     CBC:   Recent Labs     24  0545 24  0635 
1055: Pt arrives to pacu. Awake and alert. Pt on simple mask and O2 10L. Respirations easy and unlabored. Pt has strong, productive, moist cough occasionally. Sputum thick and clear. PEG tube to LUQ intact with split gauze clean, dry, and intact. No drainage. VSS.    1110: Pt c/o abdominal pain. Dilaudid administered per MAR. VSS.     1115: Pt c/o abdominal pain. Dilaudid administered per MAR. VSS.    1120: Pt resting comfortably in bed awake. VSS.     1123: Pt voided 200 ml clear yellow urine on bed pan.     1131: Phone call to . Spoke with nurse Gaona and report given. Pt resting in bed with eyes closed and respirations easy and unlabored. Awakens easily to voice. VSS.     1135: Pt meets criteria for pacu discharge. Awaiting transport.     1139: Phone call to pt's spouse, Fely. No answer. Voicemail left with update on pt status with pt's approval.     1150: Pt resting in bed with eyes closed. Respirations easy and unlabored. Awakens easily to voice. VSS.    1155: Pt transported to [facility] via transport in stable condition.          
Comprehensive Nutrition Assessment    Type and Reason for Visit:  Initial, Consult (tube feed order and management consult per Dr Lara)    Nutrition Recommendations/Plan:   Begin Vital 1.2 at 20ml/ hour, increase by 20ml every 6 hours as tolerates to goal 60ml/ hour  180ml free water flush three times per day   Tube feed education completed, provided tube feed log per patient request  Recommend follow up with outpatient RD at discharge (patient followed PTA)  Diet as per Physician      Malnutrition Assessment:  Malnutrition Status:  At risk for malnutrition (12/24/24 2092)    Context:  Chronic Illness     Findings of the 6 clinical characteristics of malnutrition:  Energy Intake:  Mild decrease in energy intake  Weight Loss:  No weight loss (hx CHF)     Body Fat Loss:  No body fat loss     Muscle Mass Loss:  No muscle mass loss    Fluid Accumulation:   (hx CHF)     Strength:  Not Performed    Nutrition Assessment:     Pt. nutritionally compromised AEB tube feed initiation due to altered GI status.  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       Nutrition Related Findings:    Pt. Report/Treatments/Miscellaneous: Patient seen and reports sore from feeding tube placed today, reports had PEG tube in 2023 but was clogged and so had removed, reports life long nausea/ vomiting but worse the past 2-3 weeks, constant abdomen pain, had been eating mashed potatoes and drinking beverages most recently, patient reports will be NPO and receive all nutrition from tube feed, note starting clear liquids this afternoon per Dr Pederson order, discussed need for continuous feeding due to gastroparesis and patient agreeable and 
Comprehensive Nutrition Assessment    Type and Reason for Visit:  Reassess (tubefeeding)    Nutrition Recommendations/Plan:   Planning discharge home today, continue Vital 1.2 goal 60 ml/hr x 24 hours per day (tubefeeding to be main source of nutrition, taking in very minimal sips of clear liquids due to GI intolerance). Tubefeeds currently at goal rate and tolerating.   180 mls free water flush every 8 hours or if better tolerated patient may do 90 mls 6 times per day.  Clear liquid diet as tolerated (taking in very minimal bites/sips clear liquids due to GI intolerance).  Patient has written home tubefeeding information, reviewed 12/22/24 and reinforced again today.  Planning [facility], they can deliver home TF formula and pump today (they called patient when writer was in room).  Recommend follow-up with outpatient dietitian, message left with Sully Ramos 12/24/24.     Malnutrition Assessment:  Malnutrition Status:  At risk for malnutrition (12/24/24 8262)    Context:  Chronic Illness     Findings of the 6 clinical characteristics of malnutrition:  Energy Intake:  Mild decrease in energy intake  Weight Loss:  No weight loss (hx CHF)     Body Fat Loss:  No body fat loss     Muscle Mass Loss:  No muscle mass loss    Fluid Accumulation:   (hx CHF)     Strength:  Not Performed    Nutrition Assessment:     Pt. nutritionally compromised AEB tube feed initiation due to altered GI status.  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       Nutrition Related Findings:    Pt. Report/Treatments/Miscellaneous: Patient seen along with her nurse, Ketty.  Tubefeedings have been at goal rate continuous and tolerating well. She 
Discharge teaching and instructions for diagnosis/procedure of intractable committing with nausea completed with patient using teachback method. AVS reviewed. Printed prescriptions given to patient. Patient voiced understanding regarding prescriptions, follow up appointments, and care of self at home. Discharged in a wheelchair to  home with support per family   
EGD complete, PEG tube placed at 6, antibiotic ointment at site. photos taken, 1 specimens taken, pt did not tolerate MAC anesthesia, patient moved to endo room 14 per Dr. Hidalgo to be intubated. Dr. Lara verbal order of 1gm vancomycin. Pharmacy called and made intervention to switch to 2 gm based on patient weight. Dr. Lara ordered to infuse 1 gm 30 min prior to procedure, with an additional 1 gm to be infused post procedure. Report given to PACU.     Scope Number [id number] used.     
Offered patient 100mg of Tramadol per MAR. Patient refused stating \"It doesn't work. No one is listening to me.\"  
Patient has been complaining of pain at G tube incision site 10/10. Ice and heating pad were offered and refused by patient. Hospitalist physician was contacted for pain medication with no orders received. House Supervisor up to see patient as RN is unable to obtain pain medication orders at this time from physician.   
Pt admitted to  [facility] ambulatory and from ED.   Complaints: abdominal pain.    IV site free of s/s of infection or infiltration. Vital signs obtained. Assessment and data collection initiated. Two nurse skin assessment performed by CATY Tavarez and CATY Skaggs. Oriented to room. Policies and procedures for 6 explained. CATY Tavarez discussed hourly rounding with patient addressing 5 P's. Fall prevention and safety brochure discussed with patient.  Bed alarm on. Call light in reach.  Explained patients right to have family, representative or physician notified of their admission.  Patient has Declined for physician to be notified.  Patient has Declined for family/representative to be notified. All questions answered with no further questions at this time.   Patient falling asleep and snoring, not awake enough to answer questions.  Will try back once more awake.  
Spiritual Health History and Assessment/Progress Note  [facility]    (P) Initial Encounter,  ,  ,      Name: Samantha Nichols MRN: [id number]    Age: 34 y.o.     Sex: female   Language: English   Oriental orthodox: Mandaeism   Intractable vomiting with nausea     Date: 12/26/2024            Total Time Calculated: (P) 15 min              Spiritual Assessment began in [facility]        Referral/Consult From: (P) Rounding   Encounter Overview/Reason: (P) Initial Encounter  Service Provided For: (P) Patient    Kortney, Belief, Meaning:   Patient identifies as spiritual and has beliefs or practices that help with coping during difficult times  Family/Friends No family/friends present      Importance and Influence:  Patient has no beliefs influential to healthcare decision-making identified during this visit  Family/Friends No family/friends present    Community:  Patient feels well-supported. Support system includes: Spouse/Partner and Extended family  Family/Friends No family/friends present    Assessment and Plan of Care:     Patient, Samantha, shared about her hospital stay and the newness of having a feeding tube now. She expressed gratitude for the support of her wife and other family members. She is anticipating a procedure at [facility] in the near future and feels hopeful about her next steps. Patient shared that prayer is important to her during this time especially, and  provided prayers for peace and healing.     Patient Interventions include: Facilitated expression of thoughts and feelings, Explored spiritual coping/struggle/distress, Affirmed coping skills/support systems, and Provided sacramental/Gnosticism ritual  Family/Friends Interventions include: No family/friends present    Patient Plan of Care: Spiritual Care available upon further referral  Family/Friends Plan of Care: No family/friends present    Electronically signed by Chaplain Rashaun on 
Spoke with patient about concerns with pain management. Patient states she is having surgical site pain from PEG tube. This nurse listened to patient concerns and asked if anything   PO has helped with her pain? Patient states no and only morphine and dilaudid have helped. Patient states she would like one more dose to help with her pain. Patient also expressed to have Excedrin for migraines and a blood pressure medication she normally takes at home. Primary nurse to follow up on these medication requests and notify night-shift Hospitalist.  
admitted to Endo department and admitted to Endo recovery room   Plan of care reviewed with patient.   Call light within reach.   Allergies reviewed with  pt   Bed in lowest position, locked, with one bed rail up.   Appropriate arm bands on patient.   Bathroom offered.   All questions and concerns of patient addressed    Name: Fely  Relationship to patient: wife  Phone number: [phone]   Not present but would like a phone call when procedure is finished.   
repeated bouts of emesis in the emergency department with a pinkish or brown tinge.     In the emergency department, patient received 8 mg morphine, 1 mg Dilaudid, 324 mg aspirin, 12.5 mg promethazine, 1000 mL of normal saline, 6 mg dexamethasone, 5 mg olanzapine, and a repeat bolus of normal saline.     After the medication was administered, the patient was notably very lethargic, only arousable to significant verbal stimuli.  The patient was admitted and seen again upon arrival to the unit.  The patient noted that she was having persistent nausea, right upper quadrant abdominal pain.  The patient additionally noted soiling herself secondary to persistent nausea/vomiting.     The patient noted that her abdominal pain profile was persistent and became her primary focus, with improvement in her nausea upon admission to the unit.  The patient's past medical history was discussed at length, especially in the context of her imaging which showed no evidence of acute pathology as well as her previous diagnosis of gastroparesis.  Risk/benefits/alternatives of motility agents for gastroparesis versus opioid analgesia were discussed at length.  The patient becomes tearful at this point and states that she is very focused on her right side pain and states that her pain profile has changed.  The considerations of her previous specialist evaluation from [facility] as well as from the palliative care team in the fall 2024 were discussed at length.  After an extended conversation of optimizing the patient's analgesia and definitive treatment for her pathology was discussed at length, the patient agreed to administration of metoclopramide (which the patient has tolerated in the past) with diphenhydramine with consideration for escalation of analgesia pending response.  Care was coordinated with the patient's primary RN as well as the pharmacy team for optimized administration of these medications.  The case was additionally discussed 
states that she is very focused on her right side pain and states that her pain profile has changed.  The considerations of her previous specialist evaluation from [facility] as well as from the palliative care team in the fall 2024 were discussed at length.  After an extended conversation of optimizing the patient's analgesia and definitive treatment for her pathology was discussed at length, the patient agreed to administration of metoclopramide (which the patient has tolerated in the past) with diphenhydramine with consideration for escalation of analgesia pending response.  Care was coordinated with the patient's primary RN as well as the pharmacy team for optimized administration of these medications.  The case was additionally discussed with the nighttime resident physicians to ensure close follow-up with the patient's response.    Medications:    Infusion Medications    dextrose      sodium chloride 20 mL/hr at 12/24/24 0910    Scheduled Medications    vancomycin  15 mg/kg IntraVENous Once    sodium chloride flush  5-40 mL IntraVENous 2 times per day    lidocaine  1 patch TransDERmal Daily    pantoprazole  40 mg IntraVENous Daily    [Held by provider] amLODIPine  10 mg Oral Daily    PRN Meds: glucose, dextrose bolus **OR** dextrose bolus, glucagon (rDNA), dextrose, morphine, sodium chloride flush, sodium chloride, polyethylene glycol, [DISCONTINUED] promethazine **OR** ondansetron, diphenhydrAMINE, promethazine    Exam:  /79   Pulse 70   Temp 98.1 °F (36.7 °C) (Oral)   Resp 18   Ht 1.626 m (5' 4\")   Wt 129.5 kg (285 lb 6.4 oz)   LMP 07/11/2015   SpO2 93%   BMI 48.99 kg/m²   General appearance: Appears comfortable  Eyes:  Pupils equal, round, and reactive to light. Conjunctivae/corneas clear.  HENT: Head normal in appearance. External nares normal.  Oral mucosa moist without lesions.  Hearing grossly intact.   Neck: Supple, with full range of motion. Trachea midline.  No gross JVD 
Diminished breath sounds bilaterally with no wheezes, crackles, or rhonchi; patient follows asleep frequently during interview throughout hospital course with snoring  Cardiovascular: Normal rate, regular rhythm with normal S1/S2 without murmurs.  No lower extremity edema.   Abdomen: Soft, non-tender, non-distended with normal bowel sounds; patient reported abdominal tenderness in the right upper and lower quadrant with no guarding or rigidity.  Previous evidence of incisions that are well-healed.    Musculoskeletal: No joint swelling or tenderness. Normal tone. No abnormal movements.   Skin: Warm and dry. No rashes or lesions.  Neurologic:  No focal sensory/motor deficits in the upper and lower extremities. Cranial nerves:  grossly non-focal 2-12.     Psychiatric: Alert and oriented, patient displays significant emotional lability throughout hospital course with fixation on pain profile and the usage of opioid medications to address; tearful at times  Capillary Refill: Brisk,< 3 seconds.  Peripheral Pulses: +2 palpable, equal bilaterally.       Labs/Radiology: See chart or assessment above.     Electronically signed by Curt Pederson MD on 12/23/2024 at 1:41 PM

## 2024-12-26 NOTE — CARE COORDINATION
12/26/24, 1:19 PM EST    Patient goals/plan/ treatment preferences discussed by  and .  Patient goals/plan/ treatment preferences reviewed with patient/ family.  Patient/ family verbalize understanding of discharge plan and are in agreement with goal/plan/treatment preferences.  Understanding was demonstrated using the teach back method.  AVS provided by RN at time of discharge, which includes all necessary medical information pertaining to the patients current course of illness, treatment, post-discharge goals of care, and treatment preferences.     Services At/After Discharge: Home Health, Home Infusion.    Returning home with significant other, new home health through [facility] and [facility] for continuous tube feed, Vital 1.2 @ 60 mL ATC.     CM confirmed discharge with both agencies. Confirmed w Kay Hart that Dr Monge will follow tube feed. PCP Juan Dominguez will follow home care orders.

## 2024-12-26 NOTE — PLAN OF CARE
Problem: Chronic Conditions and Co-morbidities  Goal: Patient's chronic conditions and co-morbidity symptoms are monitored and maintained or improved  12/25/2024 2128 by Carmen Jackson RN  Outcome: Progressing  Flowsheets (Taken 12/24/2024 1715 by Sara Davis, RN)  Care Plan - Patient's Chronic Conditions and Co-Morbidity Symptoms are Monitored and Maintained or Improved:   Monitor and assess patient's chronic conditions and comorbid symptoms for stability, deterioration, or improvement   Collaborate with multidisciplinary team to address chronic and comorbid conditions and prevent exacerbation or deterioration  12/25/2024 1141 by Bradly Muñiz, RN  Outcome: Progressing  Flowsheets (Taken 12/24/2024 1715 by Sara Davis, RN)  Care Plan - Patient's Chronic Conditions and Co-Morbidity Symptoms are Monitored and Maintained or Improved:   Monitor and assess patient's chronic conditions and comorbid symptoms for stability, deterioration, or improvement   Collaborate with multidisciplinary team to address chronic and comorbid conditions and prevent exacerbation or deterioration     Problem: Discharge Planning  Goal: Discharge to home or other facility with appropriate resources  12/25/2024 2128 by Carmen Jackson RN  Outcome: Progressing  Flowsheets (Taken 12/24/2024 1715 by Sara Davis, RN)  Discharge to home or other facility with appropriate resources: Identify barriers to discharge with patient and caregiver  12/25/2024 1141 by Bradly Muñiz, RN  Outcome: Progressing  Flowsheets (Taken 12/24/2024 1715 by Sara Davis, RN)  Discharge to home or other facility with appropriate resources: Identify barriers to discharge with patient and caregiver     Problem: Pain  Goal: Verbalizes/displays adequate comfort level or baseline comfort level  12/25/2024 2128 by Carmen Jackson, RN  Outcome: Progressing  Flowsheets (Taken 12/24/2024 1930 by Sara Davis, RN)  Verbalizes/displays adequate comfort level or 
  Problem: Chronic Conditions and Co-morbidities  Goal: Patient's chronic conditions and co-morbidity symptoms are monitored and maintained or improved  12/26/2024 1044 by Ketty Juarez RN  Outcome: Progressing  Flowsheets (Taken 12/26/2024 0900)  Care Plan - Patient's Chronic Conditions and Co-Morbidity Symptoms are Monitored and Maintained or Improved: Monitor and assess patient's chronic conditions and comorbid symptoms for stability, deterioration, or improvement  12/25/2024 2128 by Carmen Jackson RN  Outcome: Progressing  Flowsheets (Taken 12/24/2024 1715 by Sara Davis RN)  Care Plan - Patient's Chronic Conditions and Co-Morbidity Symptoms are Monitored and Maintained or Improved:   Monitor and assess patient's chronic conditions and comorbid symptoms for stability, deterioration, or improvement   Collaborate with multidisciplinary team to address chronic and comorbid conditions and prevent exacerbation or deterioration     Problem: Discharge Planning  Goal: Discharge to home or other facility with appropriate resources  12/26/2024 1044 by Ketty Juarez RN  Outcome: Progressing  Flowsheets (Taken 12/26/2024 0900)  Discharge to home or other facility with appropriate resources: Identify barriers to discharge with patient and caregiver  12/25/2024 2128 by Carmen Jackson RN  Outcome: Progressing  Flowsheets (Taken 12/24/2024 1715 by Sara Davis RN)  Discharge to home or other facility with appropriate resources: Identify barriers to discharge with patient and caregiver     Problem: Pain  Goal: Verbalizes/displays adequate comfort level or baseline comfort level  12/26/2024 1044 by Ketty Juarez RN  Outcome: Progressing  Flowsheets (Taken 12/26/2024 0900)  Verbalizes/displays adequate comfort level or baseline comfort level: Encourage patient to monitor pain and request assistance  12/25/2024 2128 by Carmen Jackson RN  Outcome: Progressing  Flowsheets (Taken 12/24/2024 1930 by Susan 
  Problem: Chronic Conditions and Co-morbidities  Goal: Patient's chronic conditions and co-morbidity symptoms are monitored and maintained or improved  Outcome: Progressing  Flowsheets (Taken 12/22/2024 2304)  Care Plan - Patient's Chronic Conditions and Co-Morbidity Symptoms are Monitored and Maintained or Improved: Monitor and assess patient's chronic conditions and comorbid symptoms for stability, deterioration, or improvement     Problem: Discharge Planning  Goal: Discharge to home or other facility with appropriate resources  Outcome: Progressing  Flowsheets (Taken 12/22/2024 2304)  Discharge to home or other facility with appropriate resources: Identify discharge learning needs (meds, wound care, etc)     Problem: Pain  Goal: Verbalizes/displays adequate comfort level or baseline comfort level  Outcome: Progressing  Flowsheets (Taken 12/22/2024 2304)  Verbalizes/displays adequate comfort level or baseline comfort level: Assess pain using appropriate pain scale     Problem: Safety - Adult  Goal: Free from fall injury  Outcome: Progressing  Flowsheets (Taken 12/22/2024 2304)  Free From Fall Injury: Based on caregiver fall risk screen, instruct family/caregiver to ask for assistance with transferring infant if caregiver noted to have fall risk factors     
  Problem: Chronic Conditions and Co-morbidities  Goal: Patient's chronic conditions and co-morbidity symptoms are monitored and maintained or improved  Outcome: Progressing  Flowsheets (Taken 12/24/2024 1220)  Care Plan - Patient's Chronic Conditions and Co-Morbidity Symptoms are Monitored and Maintained or Improved: Monitor and assess patient's chronic conditions and comorbid symptoms for stability, deterioration, or improvement     Problem: Discharge Planning  Goal: Discharge to home or other facility with appropriate resources  Outcome: Progressing  Flowsheets (Taken 12/24/2024 1220)  Discharge to home or other facility with appropriate resources: Identify barriers to discharge with patient and caregiver  Note: Patient is from home and plans to return once medically stable     Problem: Pain  Goal: Verbalizes/displays adequate comfort level or baseline comfort level  Outcome: Progressing  Flowsheets (Taken 12/23/2024 2040 by Sara Davis, RN)  Verbalizes/displays adequate comfort level or baseline comfort level:   Encourage patient to monitor pain and request assistance   Assess pain using appropriate pain scale  Note: Patient has pain in her abdomen from G-tube placement and is being treated with PRN morphine. Will continue to monitor      Problem: Safety - Adult  Goal: Free from fall injury  Outcome: Progressing  Flowsheets (Taken 12/22/2024 2304 by Ame Manzo, RN)  Free From Fall Injury: Based on caregiver fall risk screen, instruct family/caregiver to ask for assistance with transferring infant if caregiver noted to have fall risk factors   Patient aware and updated on plan of care   
  Problem: Chronic Conditions and Co-morbidities  Goal: Patient's chronic conditions and co-morbidity symptoms are monitored and maintained or improved  Outcome: Progressing  Flowsheets (Taken 12/24/2024 1715)  Care Plan - Patient's Chronic Conditions and Co-Morbidity Symptoms are Monitored and Maintained or Improved:   Monitor and assess patient's chronic conditions and comorbid symptoms for stability, deterioration, or improvement   Collaborate with multidisciplinary team to address chronic and comorbid conditions and prevent exacerbation or deterioration     Problem: Discharge Planning  Goal: Discharge to home or other facility with appropriate resources  Outcome: Progressing  Flowsheets (Taken 12/24/2024 1715)  Discharge to home or other facility with appropriate resources: Identify barriers to discharge with patient and caregiver     Problem: Pain  Goal: Verbalizes/displays adequate comfort level or baseline comfort level  Outcome: Progressing  Flowsheets (Taken 12/24/2024 1930)  Verbalizes/displays adequate comfort level or baseline comfort level: Encourage patient to monitor pain and request assistance     Problem: Safety - Adult  Goal: Free from fall injury  Outcome: Progressing     Problem: Nutrition Deficit:  Goal: Optimize nutritional status  Outcome: Progressing     
pain and request assistance  12/25/2024 0650 by Sara Davis, RN  Outcome: Progressing  Flowsheets (Taken 12/24/2024 1930)  Verbalizes/displays adequate comfort level or baseline comfort level: Encourage patient to monitor pain and request assistance     Problem: Safety - Adult  Goal: Free from fall injury  12/25/2024 1141 by Bradly Muñiz, RN  Outcome: Progressing  Flowsheets (Taken 12/22/2024 2304 by Ame Manzo, RN)  Free From Fall Injury: Based on caregiver fall risk screen, instruct family/caregiver to ask for assistance with transferring infant if caregiver noted to have fall risk factors  12/25/2024 0650 by Sara Davis RN  Outcome: Progressing     Problem: Nutrition Deficit:  Goal: Optimize nutritional status  12/25/2024 1141 by Bradly Muñiz, RN  Outcome: Progressing  Flowsheets (Taken 12/24/2024 1456 by Nga Quinones, RD, LD)  Nutrient intake appropriate for improving, restoring, or maintaining nutritional needs: Assess nutritional status and recommend course of action  12/25/2024 0650 by Sara Davis, RN  Outcome: Progressing

## 2024-12-26 NOTE — DISCHARGE SUMMARY
secondary to abdominal pain for 1 week which she rated as a 10 out of 10.  At the time of writing, there is no additional documentation around the medical decision making for this encounter.     Patient presented again on 12/22/2024 per ER provider documentation for a 2-day history of right-sided chest pain with hemoptysis.  The patient had noted longstanding abdominal pain.     Initial vital signs temperature 98.8, respirations 24, pulse 105, blood pressure 151/107, SpO2 100% on room air.     BMP sodium 137, potassium 4, chloride 103, bicarb 22, BUN 14, creatinine 0.7, anion gap 12, EGFR greater than 90, magnesium 2, glucose 101, calcium 9.6, calculated osmolality 274.4, proBNP less than 36, high-sensitivity troponin <6 x2.     UDS positive for opioids (subsequently after ER administration).     CBC WBC 7.1, Hb 14, HCT 45.7, platelets 265.     COVID and influenza testing negative.     Urinalysis showing specific gravity greater than 1.030, small blood, negative nitrite, negative leukocyte esterase, WBC 2-4, RBC 5-10, epithelial cells 3-5, few bacteria.     12/22/2024 CXR showing low inspiration with central vascular crowding and subsegmental atelectasis.  CTA chest with and without contrast showing no pulmonary emboli or pulmonary infiltrates.  9 mm pulmonary nodule in the right upper lobe identified.  CT abdomen pelvis without contrast showing no evidence of any acute process in the abdomen or pelvis.     Upon arrival to the emergency department on 12/22/2024, the patient had noted that she had been vomiting per ED triage note all day on 12/21 as well as hemoptysis of bright red blood.  Patient was additionally noted to be moaning out in pain.  ER nursing indicates that the patient had repeated bouts of emesis in the emergency department with a pinkish or brown tinge.     In the emergency department, patient received 8 mg morphine, 1 mg Dilaudid, 324 mg aspirin, 12.5 mg promethazine, 1000 mL of normal saline, 6 mg

## 2024-12-27 NOTE — ED PROVIDER NOTES
from the hospital. Based on the reassuring ED workup and patient's stable vital signs, I feel the patient may be safely discharged home. At this point in time, I believe the patient has the mental capacity to make medical decisions.      No notes of EC Admission Criteria type on file.        Patient was seen independently by myself. The patient's final impression and disposition and plan was determined by myself.     Strict return precautions and follow up instructions were discussed with the patient prior to discharge, with which the patient agrees.    Physical assessment findings, diagnostic testing(s) if applicable, and vital signs reviewed with patient/patient representative.  Questions answered.   Medications asdirected, including OTC medications for supportive care.   Education provided on medications.  Differential diagnosis(s) discussed with patient/patient representative.  Home care/self care instructions reviewed withpatient/patient representative.  Patient is to follow-up with family care provider in 2-3 days if no improvement.  Patient is to go to the emergency department if symptoms worsen.  Patient/patient representative isaware of care plan, questions answered, verbalizes understanding and is in agreement.     ED Medications administered this visit:  (None if blank)  Medications   HYDROmorphone (DILAUDID) injection 0.5 mg (0.5 mg IntraVENous Given 12/20/24 0301)   promethazine (PHENERGAN) injection 25 mg (25 mg IntraMUSCular Given 12/20/24 0340)   HYDROmorphone (DILAUDID) injection 1 mg (1 mg IntraVENous Given 12/20/24 0429)   diphenhydrAMINE (BENADRYL) injection 25 mg (25 mg IntraVENous Given 12/20/24 0429)   metoclopramide (REGLAN) injection 10 mg (10 mg IntraVENous Given 12/20/24 0429)   iopamidol (ISOVUE-370) 76 % injection 80 mL (80 mLs IntraVENous Given 12/20/24 0522)         CONSULTS:  None    PROCEDURES: (None if blank)  Procedures:     CRITICAL CARE: (None if blank)      DISCHARGE

## 2024-12-30 ENCOUNTER — OFFICE VISIT (OUTPATIENT)
Dept: INTERNAL MEDICINE CLINIC | Age: 34
End: 2024-12-30
Payer: MEDICARE

## 2024-12-30 ENCOUNTER — TELEPHONE (OUTPATIENT)
Dept: INTERNAL MEDICINE CLINIC | Age: 34
End: 2024-12-30

## 2024-12-30 VITALS
SYSTOLIC BLOOD PRESSURE: 108 MMHG | BODY MASS INDEX: 48.86 KG/M2 | HEIGHT: 64 IN | OXYGEN SATURATION: 98 % | WEIGHT: 286.2 LBS | DIASTOLIC BLOOD PRESSURE: 80 MMHG | HEART RATE: 87 BPM

## 2024-12-30 DIAGNOSIS — Z09 HOSPITAL DISCHARGE FOLLOW-UP: ICD-10-CM

## 2024-12-30 DIAGNOSIS — R10.12 LEFT UPPER QUADRANT ABDOMINAL PAIN: Primary | ICD-10-CM

## 2024-12-30 PROCEDURE — 3074F SYST BP LT 130 MM HG: CPT

## 2024-12-30 PROCEDURE — 1111F DSCHRG MED/CURRENT MED MERGE: CPT

## 2024-12-30 PROCEDURE — 99213 OFFICE O/P EST LOW 20 MIN: CPT

## 2024-12-30 PROCEDURE — 3079F DIAST BP 80-89 MM HG: CPT

## 2024-12-30 RX ORDER — TRAMADOL HYDROCHLORIDE 50 MG/1
50 TABLET ORAL EVERY 8 HOURS PRN
Qty: 90 TABLET | Refills: 0 | Status: SHIPPED | OUTPATIENT
Start: 2024-12-30 | End: 2025-01-29

## 2024-12-30 NOTE — TELEPHONE ENCOUNTER
Have the medications prescribed at discharge been filled? yes  Does the patient understand what the medications are for? yes  Has the patient experienced any new symptoms or have previous symptoms worsened? No  Is the patient experiencing any pain? yes If yes, is the pain well controlled? yes  We want to be sure the patient has the best recovery possible. Does the patient understand all the discharge instructions? yes  Did someone talk to the patient and/or family about the patient needs prior to being discharged? yes  Did the patient's doctor communicate well? yes  Did the patient's nurse communicate well? Sometimes  Was the patient satisfied with the services and care received at [facility]? yes

## 2024-12-30 NOTE — PROGRESS NOTES
medications prescribed for you. Read the directions carefully, and ask your doctor or other care provider to review them with you.                   Where to Get Your Medications        These medications were sent to [facility] - [place], [place] - [address] - P [phone] - F [phone]  [address], [facility] [address]      Phone: [phone]   traMADol 50 MG tablet           Medications marked \"taking\" at this time  Outpatient Medications Marked as Taking for the 12/30/24 encounter (Office Visit) with Giuseppe Wilson MD   Medication Sig Dispense Refill    traMADol (ULTRAM) 50 MG tablet Take 1 tablet by mouth every 8 hours as needed for Pain for up to 30 days. Intended supply: 30 days. Take lowest dose possible to manage pain Max Daily Amount: 150 mg 90 tablet 0    potassium chloride (KLOR-CON M) 20 MEQ extended release tablet Take 1 tablet by mouth daily 30 tablet 0    pantoprazole (PROTONIX) 40 MG tablet Take 1 tablet by mouth in the morning and at bedtime      amLODIPine (NORVASC) 10 MG tablet TAKE 1 TABLET BY MOUTH DAILY 90 tablet 3    promethazine (PHENERGAN) 25 MG tablet Take 1 tablet by mouth every 8 hours as needed      FreeStyle Lancets MISC 1 each by Does not apply route daily Use to check blood sugar once daily. Freestyle Freedom Lite Dx: E11.9 100 each 3    blood glucose monitor strips Test 1 time a day Freestyle freedom Lite test strips Dx: E11.9 100 strip 1    fluticasone (FLONASE) 50 MCG/ACT nasal spray 2 sprays by Each Nostril route daily 1 each 0    lactulose (CHRONULAC) 10 GM/15ML solution Take 30 mLs by mouth every 8 (eight) hours Until you have a bowel movement and then stop. (Patient taking differently: Take 30 mLs by mouth every 8 hours as needed Until you have a bowel movement and then stop.) 120 mL 0    simethicone (MYLICON) 80 MG chewable tablet Take 1 tablet by mouth 4 times daily as needed for Flatulence (gas) 180 tablet 3    estradiol (ESTRACE) 0.5 MG

## 2024-12-30 NOTE — TELEPHONE ENCOUNTER
Patient calling this RD wanting to decrease her rate on her tube feeding c/o nausea.  Pt is on continuous feeding 60 ml/hr Vital 1.2 formula.  Explained to patient - ok to decrease rate to 55 ml/hr to see if this helps with her nausea.  Explained to patient to take anti-nausea medication for her nausea.  Pt c/o constipation and has lactulose on hand to take for this - Explained to patient to take this as prescribed.   Vital 1.2 @ new rate of 55 ml/hr x 24 hr still meets her estimated energy and protein requirements  = 1584 kcal (12 kcal/kg actual weight), 99 gms protein (1.7 gm protein/IBW 57 kg).  Verified f/u appt 1/13/25 with patient.

## 2025-01-02 ENCOUNTER — TELEPHONE (OUTPATIENT)
Dept: PULMONOLOGY | Age: 35
End: 2025-01-02

## 2025-01-02 DIAGNOSIS — K31.84 GASTROPARESIS DUE TO DM (HCC): ICD-10-CM

## 2025-01-02 DIAGNOSIS — E11.43 GASTROPARESIS DUE TO DM (HCC): ICD-10-CM

## 2025-01-02 RX ORDER — LACTULOSE 10 G/15ML
30 SOLUTION ORAL EVERY 8 HOURS
Qty: 120 ML | Refills: 0 | Status: SHIPPED | OUTPATIENT
Start: 2025-01-02

## 2025-01-02 NOTE — TELEPHONE ENCOUNTER
Pt called in today stating she had surgery a couple days ago for her feeding tube and is wanting to know would she still be fine to do her HST on the 5th of February, I informed her she should and that I will still make you aware of it. She also mentioned she was seen in the ER and was told she has a lung nodule and I scheduled her to see Hany. Just an FYI.

## 2025-01-08 ENCOUNTER — TELEPHONE (OUTPATIENT)
Dept: PULMONOLOGY | Age: 35
End: 2025-01-08

## 2025-01-08 NOTE — TELEPHONE ENCOUNTER
Patient is scheduled in February for a new patient appointment for lung nodules. Called patient to move her appointment up. We need to get lung nodule appointments in, in 1-2 weeks. Had to leave message for her to call us back.

## 2025-01-09 ENCOUNTER — TELEPHONE (OUTPATIENT)
Dept: INTERNAL MEDICINE CLINIC | Age: 35
End: 2025-01-09

## 2025-01-09 NOTE — TELEPHONE ENCOUNTER
Patient called in stating she spoke with Dr Michel's office and they told her we are in charge of taking care of her port flushes. Patient also complaining of burning in her lower back.    Please advise

## 2025-01-13 ENCOUNTER — OFFICE VISIT (OUTPATIENT)
Dept: INTERNAL MEDICINE CLINIC | Age: 35
End: 2025-01-13
Payer: COMMERCIAL

## 2025-01-13 VITALS — WEIGHT: 292.8 LBS | BODY MASS INDEX: 49.99 KG/M2 | HEIGHT: 64 IN

## 2025-01-13 DIAGNOSIS — K90.9 INTESTINAL MALABSORPTION, UNSPECIFIED TYPE: ICD-10-CM

## 2025-01-13 DIAGNOSIS — Z93.1 G TUBE FEEDINGS (HCC): ICD-10-CM

## 2025-01-13 DIAGNOSIS — K90.9 MALABSORPTION SYNDROME: Primary | ICD-10-CM

## 2025-01-13 PROCEDURE — NBSRV NON-BILLABLE SERVICE: Performed by: DIETITIAN, REGISTERED

## 2025-01-13 PROCEDURE — 97803 MED NUTRITION INDIV SUBSEQ: CPT | Performed by: DIETITIAN, REGISTERED

## 2025-01-13 NOTE — PATIENT INSTRUCTIONS
Gradually Increase your Tube feeding rate to 60 ml/hr x 12 hr, then 70 ml/hr x 12, then 80 ml/hr goal rate.  - Goal Rate of 80 ml/hr x 18 hrs. (Using 6 cartons/day)    Goal Rate of 66 ml/hr x 18 hours (Using 5 Cartons/day)    Continue 180 ml water flushes 3x/day.    Continue anti - nausea medications and medications for constipation as needed.

## 2025-01-13 NOTE — PROGRESS NOTES
mouth See Admin Instructions Take 1 tablet on onset of migraine may repeat after 2 hours if migraine returns don't exceed 200 mg daily      Incontinence Supply Disposable (DEPEND SILHOUETTE BRIEFS L/XL) MISC Use as needed for urinary and bowel incontinence 5 each 5    prazosin (MINIPRESS) 1 MG capsule Take 1 capsule by mouth nightly      albuterol (PROVENTIL) (2.5 MG/3ML) 0.083% nebulizer solution Take 3 mLs by nebulization every 6 hours as needed for Wheezing 90 each 0    Spacer/Aero-Holding Chambers (E-Z SPACER) AISLINN 1 Device by Does not apply route daily 1 Device 0     No current facility-administered medications on file prior to visit.        Vitals from current and previous visits:   12/16/2024   Weight Loss Metrics    Height 5' 4\"    Weight - Scale 289 lbs 10 oz    BMI (Calculated) 49.8 kg/m2      Ht 1.626 m (5' 4\")   Wt 132.8 kg (292 lb 12.8 oz)   LMP 07/11/2015   BMI 50.26 kg/m²     -Body mass index is 50.26 kg/m². Greater than 40 - Morbid Obesity / Extreme Obesity / Grade III.   - Patient gained and 3 pounds over the last 4 weeks..  -Weight goal: lose weight.     Nutrition Diagnosis:   Altered GI function related to currently undergoing MNT as evidenced by Conditions associated with a diagnosis or treatment: intestinal malabsorption and patient report hx of nausea and vomiting with oral intake of water and occasionally with water flushes.  .    Assessment & Plan   Intervention:  - Impression:  Pt has been hungry in the past when on Tube Feedings.  This RD to follow with  patient on trial of 5 cartons/day to see if acceptable with patient.  Would like to see gradual weight loss.  Pt states she is working out for exercise.  Increasing rate/hr so as to free patient from Tube feeding for 6 hours/day.  Pt wanting to be off Tube feeding during the night d/t likes to lay down flat.    -Instructed the patient on: See further instructions below - AVS. Sip on water throughout the day as able so not to get

## 2025-01-16 ENCOUNTER — APPOINTMENT (OUTPATIENT)
Dept: GENERAL RADIOLOGY | Age: 35
DRG: 394 | End: 2025-01-16
Payer: MEDICARE

## 2025-01-16 ENCOUNTER — APPOINTMENT (OUTPATIENT)
Dept: CT IMAGING | Age: 35
DRG: 394 | End: 2025-01-16
Payer: MEDICARE

## 2025-01-16 ENCOUNTER — HOSPITAL ENCOUNTER (INPATIENT)
Age: 35
LOS: 7 days | Discharge: HOME | DRG: 394 | End: 2025-01-23
Attending: EMERGENCY MEDICINE | Admitting: STUDENT IN AN ORGANIZED HEALTH CARE EDUCATION/TRAINING PROGRAM
Payer: MEDICARE

## 2025-01-16 DIAGNOSIS — T85.598A FEEDING TUBE DYSFUNCTION, INITIAL ENCOUNTER: Primary | ICD-10-CM

## 2025-01-16 DIAGNOSIS — R11.2 INTRACTABLE VOMITING WITH NAUSEA: ICD-10-CM

## 2025-01-16 DIAGNOSIS — R10.9 LEFT FLANK PAIN: ICD-10-CM

## 2025-01-16 DIAGNOSIS — R10.9 LEFT SIDED ABDOMINAL PAIN: ICD-10-CM

## 2025-01-16 LAB
ALBUMIN SERPL BCG-MCNC: 3.8 G/DL (ref 3.5–5.1)
ALP SERPL-CCNC: 110 U/L (ref 38–126)
ALT SERPL W/O P-5'-P-CCNC: 13 U/L (ref 11–66)
ANION GAP SERPL CALC-SCNC: 12 MEQ/L (ref 8–16)
AST SERPL-CCNC: 16 U/L (ref 5–40)
B-HCG SERPL QL: NEGATIVE
BASOPHILS ABSOLUTE: 0.1 THOU/MM3 (ref 0–0.1)
BASOPHILS NFR BLD AUTO: 0.5 %
BILIRUB SERPL-MCNC: 0.2 MG/DL (ref 0.3–1.2)
BILIRUB UR QL STRIP.AUTO: NEGATIVE
BUN SERPL-MCNC: 14 MG/DL (ref 7–22)
CALCIUM SERPL-MCNC: 9.5 MG/DL (ref 8.5–10.5)
CHARACTER UR: CLEAR
CHLORIDE SERPL-SCNC: 104 MEQ/L (ref 98–111)
CO2 SERPL-SCNC: 25 MEQ/L (ref 23–33)
COLOR, UA: YELLOW
CREAT SERPL-MCNC: 0.7 MG/DL (ref 0.4–1.2)
DEPRECATED RDW RBC AUTO: 41.1 FL (ref 35–45)
EKG ATRIAL RATE: 85 BPM
EKG P AXIS: 38 DEGREES
EKG P-R INTERVAL: 150 MS
EKG Q-T INTERVAL: 354 MS
EKG QRS DURATION: 84 MS
EKG QTC CALCULATION (BAZETT): 421 MS
EKG R AXIS: 25 DEGREES
EKG T AXIS: 31 DEGREES
EKG VENTRICULAR RATE: 85 BPM
EOSINOPHIL NFR BLD AUTO: 2.2 %
EOSINOPHILS ABSOLUTE: 0.3 THOU/MM3 (ref 0–0.4)
ERYTHROCYTE [DISTWIDTH] IN BLOOD BY AUTOMATED COUNT: 15.1 % (ref 11.5–14.5)
FLUAV RNA RESP QL NAA+PROBE: NOT DETECTED
FLUBV RNA RESP QL NAA+PROBE: NOT DETECTED
GFR SERPL CREATININE-BSD FRML MDRD: > 90 ML/MIN/1.73M2
GLUCOSE SERPL-MCNC: 99 MG/DL (ref 70–108)
GLUCOSE UR QL STRIP.AUTO: NEGATIVE MG/DL
HCT VFR BLD AUTO: 36.4 % (ref 37–47)
HGB BLD-MCNC: 11.4 GM/DL (ref 12–16)
HGB UR QL STRIP.AUTO: NEGATIVE
IMM GRANULOCYTES # BLD AUTO: 0.08 THOU/MM3 (ref 0–0.07)
IMM GRANULOCYTES NFR BLD AUTO: 0.7 %
KETONES UR QL STRIP.AUTO: NEGATIVE
LIPASE SERPL-CCNC: 24.1 U/L (ref 5.6–51.3)
LYMPHOCYTES ABSOLUTE: 2.6 THOU/MM3 (ref 1–4.8)
LYMPHOCYTES NFR BLD AUTO: 22.2 %
MAGNESIUM SERPL-MCNC: 1.9 MG/DL (ref 1.6–2.4)
MCH RBC QN AUTO: 23.7 PG (ref 26–33)
MCHC RBC AUTO-ENTMCNC: 31.3 GM/DL (ref 32.2–35.5)
MCV RBC AUTO: 75.5 FL (ref 81–99)
MONOCYTES ABSOLUTE: 0.8 THOU/MM3 (ref 0.4–1.3)
MONOCYTES NFR BLD AUTO: 7.3 %
NEUTROPHILS ABSOLUTE: 7.7 THOU/MM3 (ref 1.8–7.7)
NEUTROPHILS NFR BLD AUTO: 67.1 %
NITRITE UR QL STRIP: NEGATIVE
NRBC BLD AUTO-RTO: 0 /100 WBC
OSMOLALITY SERPL CALC.SUM OF ELEC: 281.8 MOSMOL/KG (ref 275–300)
PH UR STRIP.AUTO: 7 [PH] (ref 5–9)
PLATELET # BLD AUTO: 321 THOU/MM3 (ref 130–400)
PMV BLD AUTO: 9.5 FL (ref 9.4–12.4)
POTASSIUM SERPL-SCNC: 4.2 MEQ/L (ref 3.5–5.2)
PROT SERPL-MCNC: 7.6 G/DL (ref 6.1–8)
PROT UR STRIP.AUTO-MCNC: NEGATIVE MG/DL
RBC # BLD AUTO: 4.82 MILL/MM3 (ref 4.2–5.4)
SARS-COV-2 RNA RESP QL NAA+PROBE: NOT DETECTED
SODIUM SERPL-SCNC: 141 MEQ/L (ref 135–145)
SP GR UR REFRACT.AUTO: 1.02 (ref 1–1.03)
TROPONIN, HIGH SENSITIVITY: < 6 NG/L (ref 0–12)
UROBILINOGEN, URINE: 0.2 EU/DL (ref 0–1)
WBC # BLD AUTO: 11.5 THOU/MM3 (ref 4.8–10.8)
WBC #/AREA URNS HPF: NEGATIVE /[HPF]

## 2025-01-16 PROCEDURE — 99223 1ST HOSP IP/OBS HIGH 75: CPT | Performed by: STUDENT IN AN ORGANIZED HEALTH CARE EDUCATION/TRAINING PROGRAM

## 2025-01-16 PROCEDURE — 84703 CHORIONIC GONADOTROPIN ASSAY: CPT

## 2025-01-16 PROCEDURE — 80053 COMPREHEN METABOLIC PANEL: CPT

## 2025-01-16 PROCEDURE — 81003 URINALYSIS AUTO W/O SCOPE: CPT

## 2025-01-16 PROCEDURE — 84484 ASSAY OF TROPONIN QUANT: CPT

## 2025-01-16 PROCEDURE — 96374 THER/PROPH/DIAG INJ IV PUSH: CPT

## 2025-01-16 PROCEDURE — 71046 X-RAY EXAM CHEST 2 VIEWS: CPT

## 2025-01-16 PROCEDURE — 83690 ASSAY OF LIPASE: CPT

## 2025-01-16 PROCEDURE — 74176 CT ABD & PELVIS W/O CONTRAST: CPT

## 2025-01-16 PROCEDURE — 93005 ELECTROCARDIOGRAM TRACING: CPT

## 2025-01-16 PROCEDURE — 83735 ASSAY OF MAGNESIUM: CPT

## 2025-01-16 PROCEDURE — 2500000003 HC RX 250 WO HCPCS

## 2025-01-16 PROCEDURE — 6360000002 HC RX W HCPCS

## 2025-01-16 PROCEDURE — 96375 TX/PRO/DX INJ NEW DRUG ADDON: CPT

## 2025-01-16 PROCEDURE — 87636 SARSCOV2 & INF A&B AMP PRB: CPT

## 2025-01-16 PROCEDURE — 6360000002 HC RX W HCPCS: Performed by: PHYSICIAN ASSISTANT

## 2025-01-16 PROCEDURE — 36415 COLL VENOUS BLD VENIPUNCTURE: CPT

## 2025-01-16 PROCEDURE — 93010 ELECTROCARDIOGRAM REPORT: CPT | Performed by: INTERNAL MEDICINE

## 2025-01-16 PROCEDURE — 85025 COMPLETE CBC W/AUTO DIFF WBC: CPT

## 2025-01-16 PROCEDURE — 1200000000 HC SEMI PRIVATE

## 2025-01-16 PROCEDURE — 99285 EMERGENCY DEPT VISIT HI MDM: CPT

## 2025-01-16 RX ORDER — ENOXAPARIN SODIUM 100 MG/ML
30 INJECTION SUBCUTANEOUS 2 TIMES DAILY
Status: DISCONTINUED | OUTPATIENT
Start: 2025-01-16 | End: 2025-01-23 | Stop reason: HOSPADM

## 2025-01-16 RX ORDER — FLUTICASONE PROPIONATE 50 MCG
2 SPRAY, SUSPENSION (ML) NASAL DAILY PRN
Status: DISCONTINUED | OUTPATIENT
Start: 2025-01-16 | End: 2025-01-23 | Stop reason: HOSPADM

## 2025-01-16 RX ORDER — MORPHINE SULFATE 2 MG/ML
2 INJECTION, SOLUTION INTRAMUSCULAR; INTRAVENOUS ONCE
Status: COMPLETED | OUTPATIENT
Start: 2025-01-16 | End: 2025-01-16

## 2025-01-16 RX ORDER — POTASSIUM CHLORIDE 7.45 MG/ML
10 INJECTION INTRAVENOUS PRN
Status: DISCONTINUED | OUTPATIENT
Start: 2025-01-16 | End: 2025-01-23 | Stop reason: HOSPADM

## 2025-01-16 RX ORDER — SODIUM CHLORIDE 9 MG/ML
INJECTION, SOLUTION INTRAVENOUS PRN
Status: DISCONTINUED | OUTPATIENT
Start: 2025-01-16 | End: 2025-01-23 | Stop reason: HOSPADM

## 2025-01-16 RX ORDER — PRAZOSIN HYDROCHLORIDE 1 MG/1
1 CAPSULE ORAL NIGHTLY
Status: DISCONTINUED | OUTPATIENT
Start: 2025-01-16 | End: 2025-01-23 | Stop reason: HOSPADM

## 2025-01-16 RX ORDER — SODIUM CHLORIDE 0.9 % (FLUSH) 0.9 %
5-40 SYRINGE (ML) INJECTION EVERY 12 HOURS SCHEDULED
Status: DISCONTINUED | OUTPATIENT
Start: 2025-01-16 | End: 2025-01-23 | Stop reason: HOSPADM

## 2025-01-16 RX ORDER — PANTOPRAZOLE SODIUM 40 MG/1
40 TABLET, DELAYED RELEASE ORAL
Status: DISCONTINUED | OUTPATIENT
Start: 2025-01-17 | End: 2025-01-17

## 2025-01-16 RX ORDER — MAGNESIUM SULFATE IN WATER 40 MG/ML
2000 INJECTION, SOLUTION INTRAVENOUS PRN
Status: DISCONTINUED | OUTPATIENT
Start: 2025-01-16 | End: 2025-01-23 | Stop reason: HOSPADM

## 2025-01-16 RX ORDER — FLUTICASONE PROPIONATE 110 UG/1
2 AEROSOL, METERED RESPIRATORY (INHALATION) EVERY 4 HOURS PRN
Status: DISCONTINUED | OUTPATIENT
Start: 2025-01-16 | End: 2025-01-16

## 2025-01-16 RX ORDER — SODIUM CHLORIDE 0.9 % (FLUSH) 0.9 %
5-40 SYRINGE (ML) INJECTION PRN
Status: DISCONTINUED | OUTPATIENT
Start: 2025-01-16 | End: 2025-01-23 | Stop reason: HOSPADM

## 2025-01-16 RX ORDER — POLYETHYLENE GLYCOL 3350 17 G/17G
17 POWDER, FOR SOLUTION ORAL DAILY PRN
Status: DISCONTINUED | OUTPATIENT
Start: 2025-01-16 | End: 2025-01-23 | Stop reason: HOSPADM

## 2025-01-16 RX ORDER — ALBUTEROL SULFATE 0.83 MG/ML
2.5 SOLUTION RESPIRATORY (INHALATION) EVERY 6 HOURS PRN
Status: DISCONTINUED | OUTPATIENT
Start: 2025-01-16 | End: 2025-01-23 | Stop reason: HOSPADM

## 2025-01-16 RX ORDER — SIMETHICONE 80 MG
80 TABLET,CHEWABLE ORAL 4 TIMES DAILY PRN
Status: DISCONTINUED | OUTPATIENT
Start: 2025-01-16 | End: 2025-01-23 | Stop reason: HOSPADM

## 2025-01-16 RX ORDER — DOCUSATE SODIUM 100 MG/1
100 CAPSULE, LIQUID FILLED ORAL 2 TIMES DAILY PRN
Status: DISCONTINUED | OUTPATIENT
Start: 2025-01-16 | End: 2025-01-23 | Stop reason: HOSPADM

## 2025-01-16 RX ORDER — SUMATRIPTAN 50 MG/1
25 TABLET, FILM COATED ORAL PRN
Status: DISCONTINUED | OUTPATIENT
Start: 2025-01-16 | End: 2025-01-23 | Stop reason: HOSPADM

## 2025-01-16 RX ORDER — PROMETHAZINE HYDROCHLORIDE 25 MG/1
25 TABLET ORAL EVERY 8 HOURS PRN
Status: DISCONTINUED | OUTPATIENT
Start: 2025-01-16 | End: 2025-01-17

## 2025-01-16 RX ORDER — ESTRADIOL 1 MG/1
0.5 TABLET ORAL DAILY
Status: DISCONTINUED | OUTPATIENT
Start: 2025-01-17 | End: 2025-01-23 | Stop reason: HOSPADM

## 2025-01-16 RX ORDER — LACTULOSE 10 G/15ML
30 SOLUTION ORAL EVERY 8 HOURS
Status: DISCONTINUED | OUTPATIENT
Start: 2025-01-16 | End: 2025-01-23 | Stop reason: HOSPADM

## 2025-01-16 RX ORDER — POTASSIUM CHLORIDE 1500 MG/1
40 TABLET, EXTENDED RELEASE ORAL PRN
Status: DISCONTINUED | OUTPATIENT
Start: 2025-01-16 | End: 2025-01-23 | Stop reason: HOSPADM

## 2025-01-16 RX ORDER — AMLODIPINE BESYLATE 10 MG/1
10 TABLET ORAL DAILY
Status: DISCONTINUED | OUTPATIENT
Start: 2025-01-16 | End: 2025-01-23 | Stop reason: HOSPADM

## 2025-01-16 RX ADMIN — SODIUM CHLORIDE, PRESERVATIVE FREE 10 ML: 5 INJECTION INTRAVENOUS at 21:47

## 2025-01-16 RX ADMIN — HYDROMORPHONE HYDROCHLORIDE 0.5 MG: 1 INJECTION, SOLUTION INTRAMUSCULAR; INTRAVENOUS; SUBCUTANEOUS at 19:53

## 2025-01-16 RX ADMIN — ENOXAPARIN SODIUM 30 MG: 100 INJECTION SUBCUTANEOUS at 21:41

## 2025-01-16 RX ADMIN — MORPHINE SULFATE 2 MG: 2 INJECTION, SOLUTION INTRAMUSCULAR; INTRAVENOUS at 12:13

## 2025-01-16 RX ADMIN — HYDROMORPHONE HYDROCHLORIDE 0.5 MG: 1 INJECTION, SOLUTION INTRAMUSCULAR; INTRAVENOUS; SUBCUTANEOUS at 21:39

## 2025-01-16 RX ADMIN — HYDROMORPHONE HYDROCHLORIDE 0.5 MG: 1 INJECTION, SOLUTION INTRAMUSCULAR; INTRAVENOUS; SUBCUTANEOUS at 15:45

## 2025-01-16 RX ADMIN — HYDROMORPHONE HYDROCHLORIDE 0.5 MG: 1 INJECTION, SOLUTION INTRAMUSCULAR; INTRAVENOUS; SUBCUTANEOUS at 23:05

## 2025-01-16 RX ADMIN — HYDROMORPHONE HYDROCHLORIDE 0.5 MG: 1 INJECTION, SOLUTION INTRAMUSCULAR; INTRAVENOUS; SUBCUTANEOUS at 18:32

## 2025-01-16 ASSESSMENT — PAIN SCALES - GENERAL
PAINLEVEL_OUTOF10: 10
PAINLEVEL_OUTOF10: 10
PAINLEVEL_OUTOF10: 9
PAINLEVEL_OUTOF10: 10
PAINLEVEL_OUTOF10: 10
PAINLEVEL_OUTOF10: 7
PAINLEVEL_OUTOF10: 10
PAINLEVEL_OUTOF10: 10
PAINLEVEL_OUTOF10: 9

## 2025-01-16 ASSESSMENT — PAIN - FUNCTIONAL ASSESSMENT
PAIN_FUNCTIONAL_ASSESSMENT: 0-10
PAIN_FUNCTIONAL_ASSESSMENT: WONG-BAKER FACES
PAIN_FUNCTIONAL_ASSESSMENT: 0-10
PAIN_FUNCTIONAL_ASSESSMENT: ACTIVITIES ARE NOT PREVENTED

## 2025-01-16 ASSESSMENT — PAIN DESCRIPTION - LOCATION
LOCATION: ABDOMEN
LOCATION: ABDOMEN;BACK
LOCATION: ABDOMEN
LOCATION: ABDOMEN

## 2025-01-16 ASSESSMENT — PAIN DESCRIPTION - DESCRIPTORS: DESCRIPTORS: ACHING

## 2025-01-16 ASSESSMENT — PAIN SCALES - WONG BAKER: WONGBAKER_NUMERICALRESPONSE: HURTS A LITTLE BIT

## 2025-01-16 ASSESSMENT — PAIN DESCRIPTION - ORIENTATION: ORIENTATION: LEFT

## 2025-01-16 NOTE — ED NOTES
Pt states to resident that she is SOB. Pt did not initially mention to nurse during initial triage. EKG completed.

## 2025-01-16 NOTE — ED NOTES
ED to inpatient nurses report      Chief Complaint:  Chief Complaint   Patient presents with    Feeding Tube Problem    Shortness of Breath     Present to ED from: HOME    MOA:     LOC: alert and orientated to name, place, date  Mobility: Requires assistance * 1  Oxygen Baseline: ROOM AIR     Current needs required: NONE     Code Status:   Full Code    Mental Status:  Level of Consciousness: Alert (0)    Psych Assessment:        Vitals:  Patient Vitals for the past 24 hrs:   BP Temp Temp src Pulse Resp SpO2 Height Weight   01/16/25 1644 112/71 -- -- 94 18 97 % -- --   01/16/25 1547 (!) 161/88 -- -- 94 18 96 % -- --   01/16/25 1441 92/67 -- -- 84 18 100 % -- --   01/16/25 1307 123/79 -- -- 83 19 96 % -- --   01/16/25 1214 127/80 -- -- 90 22 97 % -- --   01/16/25 1047 (!) 118/54 97.6 °F (36.4 °C) Oral 88 18 96 % 1.626 m (5' 4\") 129.3 kg (285 lb)        LDAs:      Ambulatory Status:  No data recorded    Diagnosis:  DISPOSITION Admitted 01/16/2025 05:10:31 PM   Final diagnoses:   Feeding tube dysfunction, initial encounter   Left sided abdominal pain   Left flank pain        Consults:  IP CONSULT TO GI     Pain Score:  Pain Assessment  Pain Assessment: Martínez-Baker FACES  Pain Level: 10  Martínez-Baker Pain Rating: Hurts a little bit  Pain Location: Abdomen    C-SSRS:   Risk of Suicide: No Risk    Sepsis Screening:       [place] Fall Risk:       Swallow Screening        Preferred Language:   English      ALLERGIES     Latex, Cymbalta [duloxetine hcl], Dicyclomine, Fioricet [butalbital-apap-caffeine], Gabapentin, Ibuprofen [ibuprofen], Ketorolac tromethamine, Oxycodone, Pcn [penicillins], Sulfamethoxazole, Trimethoprim, Losartan, Mushroom extract complex (do not select), Zofran [ondansetron], Adhesive tape, Amoxicillin, Ciprofloxacin, Compazine [prochlorperazine maleate], Compazine [prochlorperazine], Fentanyl, Flexeril [cyclobenzaprine], Haldol [haloperidol], Hydrochlorothiazide, Keflex [cephalexin], Ketamine, Lisinopril,  W/SUBQ PORT UNDER 5 YR Bilateral 1/5/2018    EXCISION OF MEDIPORT LEFT SIDE, INSERTION MEDIPORT RIGHT  IJ performed by Iron Michel MD at [facility] OR    PA OFFICE/OUTPT VISIT,PROCEDURE ONLY N/A 5/15/2018    EGD DILATATION performed by Ajit Washburn MD at [facility] Endoscopy    PA OFFICE/OUTPT VISIT,PROCEDURE ONLY Bilateral 9/10/2018    REMOVAL RT MEDIPORT, INSERTION LEFT performed by Iron Michel MD at [facility] OR    PA OFFICE/OUTPT VISIT,PROCEDURE ONLY N/A 11/16/2018    MEDIPORT REPOSITIONING performed by Iron Michel MD at [facility] OR    TONSILLECTOMY AND ADENOIDECTOMY N/A 11/6/2017    TONSILLECTOMY AND ADENOIDECTOMY performed by Charlie Weiss MD at [facility] OR    TUMOR REMOVAL      neoendocrine    TUNNELED VENOUS PORT PLACEMENT      UPPER GASTROINTESTINAL ENDOSCOPY Left 4/3/2018    EGD DILATION SAVORY performed by Ajit Washburn MD at [facility] Endoscopy    UPPER GASTROINTESTINAL ENDOSCOPY Left 6/26/2018    EGD DILATION SAVORY performed by Ajit Washburn MD at [facility] Endoscopy    UPPER GASTROINTESTINAL ENDOSCOPY Left 2/26/2019    EGD DILATION SAVORY performed by Ajit Washburn MD at [facility] Endoscopy    UPPER GASTROINTESTINAL ENDOSCOPY N/A 7/9/2019    EGD DILATION SAVORY performed by Ajit Washburn MD at [facility] Endoscopy    UPPER GASTROINTESTINAL ENDOSCOPY Left 9/9/2019    EGD BIOPSY performed by Vivienne Leon MD at [facility] Endoscopy    UPPER GASTROINTESTINAL ENDOSCOPY N/A 4/1/2020    EGD DILATION BALLOON performed by Frida Elaine MD at [facility] OR    UPPER GASTROINTESTINAL ENDOSCOPY Left 8/24/2020    EGD ESOPHAGOGASTRODUODENOSCOPY DILATATION performed by Frida Elaine MD at [facility] Endoscopy    UPPER GASTROINTESTINAL ENDOSCOPY Left 8/24/2020    EGD BIOPSY performed by Frida Elaine MD at [facility] Endoscopy    UPPER GASTROINTESTINAL ENDOSCOPY N/A 12/2/2020    EGD DILATION BALLOON performed by Frida Elaine MD at [facility] Endoscopy    UPPER GASTROINTESTINAL ENDOSCOPY Left 12/2/2020    EGD BIOPSY performed by Frida Elaine

## 2025-01-16 NOTE — ED TRIAGE NOTES
Pt to ED by EMS from home with c/c feeding tub problem. Pt reports she had feeding tube placed on 12/24/24 d/t gastroparesis. Pt reports that today, she has been having pain around tube site and in back. Pt reports when she hooks up to feeding she feels like her belly is getting larger. Rates pain 10/10. Vitals stable.

## 2025-01-16 NOTE — ED PROVIDER NOTES
[facility] EMERGENCY DEPARTMENT  EMERGENCY DEPARTMENT ENCOUNTER          Pt Name: Samantha Nichols  MRN: [id number]  Birthdate 1990  Date of evaluation: 1/16/2025  Physician: Collin Campo DO  Supervising Attending Physician: Richard Weiss DO       CHIEF COMPLAINT       Chief Complaint   Patient presents with    Feeding Tube Problem    Shortness of Breath         HISTORY OF PRESENT ILLNESS      Samantha Nichols is a 34 y.o. female who presents to the emergency department with chief complaint of feeding tube problem.  She had a feeding tube placed on 12/24/2024 due to gastroparesis.  She states that today she has been having pain around the tube site and in her back.  She reports when she hooked up to feeding she feels like her belly is getting larger and rates the pain as a 10 out of 10.  Pertinent past medical history of acute on chronic diastolic heart failure, JANI, anemia, CAD, diabetes type, hypertension.    Patient states that she had a feeding tube placed secondary to gastroparesis on 12/24/2024 by Dr. Herman.  She states that over the last 1 to 2 days she has had increased pain to the left side of her abdomen, into her left flank and left lower back.  She comes in today with extreme amount of pain stating that she tried to flush her tube and it did was not successful, she stated that she called the GI office and was told to use Coca-Cola which did not work as well.  She states that with the pain that she is and she wanted to come into the emergency department at this time.  She states that she has also had some shortness of breath.  Denies any chest pain, constipation, urinary symptoms at this time.      REVIEW OF SYSTEMS   Review of Systems   Constitutional:  Negative for chills and fever.   Respiratory:  Positive for shortness of breath. Negative for cough.    Cardiovascular:  Negative for chest pain, palpitations and leg swelling.   Gastrointestinal:  Positive for

## 2025-01-16 NOTE — ED NOTES
Attempted to flush pt feeding tube at this time. Pt complaining of severe pain when flushing. Pt asked this RN to stop attempting to flush tube. Pt feeding tube difficult to flush.

## 2025-01-16 NOTE — H&P
discussed in assessment/plan)  EKG:  I have reviewed the EKG with the following interpretation: NSR no ichemic changes  Imaging: I have reviewed CT A/P with the following interpretation: overall unremarkable  Labs: Reviewed, see chart and plan above.       =======================================================================    SUBJECTIVE    Chief Complaint:  G-tube complication    History Of Present Illness:  Samantha Nichols is a 34 y.o. female with PMHx of gastroparesis s/p G-tube placement 12/24, duodenal neuroendocrine tumor, sickle cell, post fundoplication dysphagia, essential hypertension, cholecystecomy, BL oopherectomy, who presents to [facility] with concerns of pain around G-tube, radiating to back and BL flanks. Patient states she has been unable to flush her G-tube and contacted her GI provider Dr. Lara who advised her to use coca-cola to try to flush. Pt attempted this however still unable to flush so she presented to the ED. Admits to fevers, states she has \"felt hot\" and has had to use a cold washcloth on her head to cool down. ED provider contacted Dr. Lara who said he will see her in the AM. Pt admitted to inpatient service for further evaluation/ management.    Review of Systems:   Pertinent positives and negatives as noted in the HPI. Otherwise complete ROS negative.    OBJECTIVE  Physical Exam:  /71   Pulse 94   Temp 97.6 °F (36.4 °C) (Oral)   Resp 18   Ht 1.626 m (5' 4\")   Wt 129.3 kg (285 lb)   LMP 07/11/2015   SpO2 97%   BMI 48.92 kg/m²   General appearance: In moderate distress 2/2 pain  Eyes:  Normocephalic, atraumatic. Conjunctivae/corneas clear.  HENT: Head normal in appearance. External nares normal. Oral mucosa moist without lesions. Hearing grossly intact.   Neck: Supple, with full range of motion. Trachea midline.  No gross JVD appreciated.  Respiratory:  Normal respiratory effort. Clear to auscultation, bilaterally without rales or wheezes  or rhonchi.  Cardiovascular: Normal rate, regular rhythm with normal S1/S2 without murmurs.   Abdomen: TTP around G-tube site  Musculoskeletal: No joint swelling or tenderness. Normal tone. No abnormal movements.   Skin: Warm and dry. No rashes or lesions.  Neurologic:  No neurodeficits.  Psychiatric: Alert and oriented, normal insight and thought content.   Capillary Refill: Brisk,< 3 seconds.  Peripheral Pulses: +2 palpable, equal bilaterally.       Medications Prior to Admission:   Prior to Admission Medications   Prescriptions Last Dose Informant Patient Reported? Taking?   FLOVENT  MCG/ACT inhaler   Yes No   Sig: Inhale 2 puffs into the lungs every 4 hours as needed for Shortness of Breath   FreeStyle Lancets MISC   No No   Si each by Does not apply route daily Use to check blood sugar once daily. Freestyle Freedom Lite Dx: E11.9   Incontinence Supply Disposable (DEPEND SILHOUETTE BRIEFS L/XL) MISC   No No   Sig: Use as needed for urinary and bowel incontinence   ONETOUCH VERIO strip   Yes No   Sig: USE TO check BLOOD SUGAR ONCE DAILY   SUMAtriptan (IMITREX) 25 MG tablet   Yes No   Sig: Take 1 tablet by mouth See Admin Instructions Take 1 tablet on onset of migraine may repeat after 2 hours if migraine returns don't exceed 200 mg daily   Spacer/Aero-Holding Chambers (E-Z SPACER) AISLINN  Self No No   Si Device by Does not apply route daily   albuterol (PROVENTIL) (2.5 MG/3ML) 0.083% nebulizer solution   No No   Sig: Take 3 mLs by nebulization every 6 hours as needed for Wheezing   amLODIPine (NORVASC) 10 MG tablet   No No   Sig: TAKE 1 TABLET BY MOUTH DAILY   blood glucose monitor strips   No No   Sig: Test 1 time a day Freestyle freedom Lite test strips Dx: E11.9   docusate sodium (COLACE) 100 MG capsule   Yes No   Sig: Take 1 capsule by mouth 2 times daily as needed   estradiol (ESTRACE) 0.5 MG tablet   Yes No   Sig: Take 1 tablet by mouth daily   fluticasone (FLONASE) 50 MCG/ACT nasal spray   No

## 2025-01-17 ENCOUNTER — APPOINTMENT (OUTPATIENT)
Dept: ENDOSCOPY | Age: 35
DRG: 394 | End: 2025-01-17
Attending: INTERNAL MEDICINE
Payer: MEDICARE

## 2025-01-17 ENCOUNTER — ANESTHESIA (OUTPATIENT)
Dept: ENDOSCOPY | Age: 35
End: 2025-01-17
Payer: MEDICARE

## 2025-01-17 ENCOUNTER — ANESTHESIA EVENT (OUTPATIENT)
Dept: ENDOSCOPY | Age: 35
End: 2025-01-17
Payer: MEDICARE

## 2025-01-17 LAB
ANION GAP SERPL CALC-SCNC: 14 MEQ/L (ref 8–16)
BUN SERPL-MCNC: 11 MG/DL (ref 7–22)
CALCIUM SERPL-MCNC: 9.4 MG/DL (ref 8.5–10.5)
CHLORIDE SERPL-SCNC: 104 MEQ/L (ref 98–111)
CO2 SERPL-SCNC: 23 MEQ/L (ref 23–33)
CREAT SERPL-MCNC: 0.7 MG/DL (ref 0.4–1.2)
DEPRECATED RDW RBC AUTO: 42.4 FL (ref 35–45)
ERYTHROCYTE [DISTWIDTH] IN BLOOD BY AUTOMATED COUNT: 15.3 % (ref 11.5–14.5)
GFR SERPL CREATININE-BSD FRML MDRD: > 90 ML/MIN/1.73M2
GLUCOSE SERPL-MCNC: 92 MG/DL (ref 70–108)
HCT VFR BLD AUTO: 38.1 % (ref 37–47)
HGB BLD-MCNC: 11.9 GM/DL (ref 12–16)
MCH RBC QN AUTO: 24.1 PG (ref 26–33)
MCHC RBC AUTO-ENTMCNC: 31.2 GM/DL (ref 32.2–35.5)
MCV RBC AUTO: 77.1 FL (ref 81–99)
PLATELET # BLD AUTO: 267 THOU/MM3 (ref 130–400)
PMV BLD AUTO: 9.4 FL (ref 9.4–12.4)
POTASSIUM SERPL-SCNC: 3.9 MEQ/L (ref 3.5–5.2)
RBC # BLD AUTO: 4.94 MILL/MM3 (ref 4.2–5.4)
SODIUM SERPL-SCNC: 141 MEQ/L (ref 135–145)
WBC # BLD AUTO: 8.4 THOU/MM3 (ref 4.8–10.8)

## 2025-01-17 PROCEDURE — 2500000003 HC RX 250 WO HCPCS

## 2025-01-17 PROCEDURE — 3700000001 HC ADD 15 MINUTES (ANESTHESIA): Performed by: INTERNAL MEDICINE

## 2025-01-17 PROCEDURE — 0DP6XUZ REMOVAL OF FEEDING DEVICE FROM STOMACH, EXTERNAL APPROACH: ICD-10-PCS | Performed by: INTERNAL MEDICINE

## 2025-01-17 PROCEDURE — 3609017100 HC EGD: Performed by: INTERNAL MEDICINE

## 2025-01-17 PROCEDURE — 6360000002 HC RX W HCPCS

## 2025-01-17 PROCEDURE — 85027 COMPLETE CBC AUTOMATED: CPT

## 2025-01-17 PROCEDURE — 80048 BASIC METABOLIC PNL TOTAL CA: CPT

## 2025-01-17 PROCEDURE — 36415 COLL VENOUS BLD VENIPUNCTURE: CPT

## 2025-01-17 PROCEDURE — 36591 DRAW BLOOD OFF VENOUS DEVICE: CPT

## 2025-01-17 PROCEDURE — 0DJ08ZZ INSPECTION OF UPPER INTESTINAL TRACT, VIA NATURAL OR ARTIFICIAL OPENING ENDOSCOPIC: ICD-10-PCS | Performed by: INTERNAL MEDICINE

## 2025-01-17 PROCEDURE — 7100000010 HC PHASE II RECOVERY - FIRST 15 MIN: Performed by: INTERNAL MEDICINE

## 2025-01-17 PROCEDURE — 6370000000 HC RX 637 (ALT 250 FOR IP)

## 2025-01-17 PROCEDURE — 3609038000 HC PEG TUBE REMOVAL: Performed by: INTERNAL MEDICINE

## 2025-01-17 PROCEDURE — 6370000000 HC RX 637 (ALT 250 FOR IP): Performed by: STUDENT IN AN ORGANIZED HEALTH CARE EDUCATION/TRAINING PROGRAM

## 2025-01-17 PROCEDURE — 6360000002 HC RX W HCPCS: Performed by: INTERNAL MEDICINE

## 2025-01-17 PROCEDURE — 99223 1ST HOSP IP/OBS HIGH 75: CPT | Performed by: STUDENT IN AN ORGANIZED HEALTH CARE EDUCATION/TRAINING PROGRAM

## 2025-01-17 PROCEDURE — 7100000011 HC PHASE II RECOVERY - ADDTL 15 MIN: Performed by: INTERNAL MEDICINE

## 2025-01-17 PROCEDURE — 1200000000 HC SEMI PRIVATE

## 2025-01-17 PROCEDURE — 3700000000 HC ANESTHESIA ATTENDED CARE: Performed by: INTERNAL MEDICINE

## 2025-01-17 PROCEDURE — 99232 SBSQ HOSP IP/OBS MODERATE 35: CPT | Performed by: STUDENT IN AN ORGANIZED HEALTH CARE EDUCATION/TRAINING PROGRAM

## 2025-01-17 PROCEDURE — 6360000002 HC RX W HCPCS: Performed by: STUDENT IN AN ORGANIZED HEALTH CARE EDUCATION/TRAINING PROGRAM

## 2025-01-17 RX ORDER — CLINDAMYCIN PHOSPHATE 600 MG/50ML
600 INJECTION, SOLUTION INTRAVENOUS EVERY 8 HOURS
Status: DISCONTINUED | OUTPATIENT
Start: 2025-01-17 | End: 2025-01-23 | Stop reason: HOSPADM

## 2025-01-17 RX ORDER — PANTOPRAZOLE SODIUM 40 MG/1
40 TABLET, DELAYED RELEASE ORAL
Status: DISCONTINUED | OUTPATIENT
Start: 2025-01-18 | End: 2025-01-23 | Stop reason: HOSPADM

## 2025-01-17 RX ORDER — PANTOPRAZOLE SODIUM 40 MG/10ML
40 INJECTION, POWDER, LYOPHILIZED, FOR SOLUTION INTRAVENOUS DAILY
Status: DISCONTINUED | OUTPATIENT
Start: 2025-01-17 | End: 2025-01-17

## 2025-01-17 RX ORDER — DIPHENHYDRAMINE HCL 25 MG
25 TABLET ORAL ONCE
Status: COMPLETED | OUTPATIENT
Start: 2025-01-17 | End: 2025-01-17

## 2025-01-17 RX ORDER — LIDOCAINE HYDROCHLORIDE 20 MG/ML
INJECTION, SOLUTION INFILTRATION; PERINEURAL
Status: DISCONTINUED | OUTPATIENT
Start: 2025-01-17 | End: 2025-01-17 | Stop reason: SDUPTHER

## 2025-01-17 RX ORDER — PROMETHAZINE HYDROCHLORIDE 25 MG/ML
6.25 INJECTION, SOLUTION INTRAMUSCULAR; INTRAVENOUS EVERY 6 HOURS PRN
Status: DISCONTINUED | OUTPATIENT
Start: 2025-01-17 | End: 2025-01-18

## 2025-01-17 RX ADMIN — LACTULOSE 20 G: 20 SOLUTION ORAL at 13:18

## 2025-01-17 RX ADMIN — PROPOFOL 20 MG: 10 INJECTION, EMULSION INTRAVENOUS at 09:08

## 2025-01-17 RX ADMIN — HYDROMORPHONE HYDROCHLORIDE 0.5 MG: 1 INJECTION, SOLUTION INTRAMUSCULAR; INTRAVENOUS; SUBCUTANEOUS at 10:08

## 2025-01-17 RX ADMIN — ENOXAPARIN SODIUM 30 MG: 100 INJECTION SUBCUTANEOUS at 10:08

## 2025-01-17 RX ADMIN — ACETAMINOPHEN, ASPIRIN, CAFFEINE 1 TABLET: 250; 65; 250 TABLET, FILM COATED ORAL at 11:27

## 2025-01-17 RX ADMIN — ENOXAPARIN SODIUM 30 MG: 100 INJECTION SUBCUTANEOUS at 20:47

## 2025-01-17 RX ADMIN — HYDROMORPHONE HYDROCHLORIDE 0.5 MG: 1 INJECTION, SOLUTION INTRAMUSCULAR; INTRAVENOUS; SUBCUTANEOUS at 22:33

## 2025-01-17 RX ADMIN — HYDROMORPHONE HYDROCHLORIDE 0.5 MG: 1 INJECTION, SOLUTION INTRAMUSCULAR; INTRAVENOUS; SUBCUTANEOUS at 16:26

## 2025-01-17 RX ADMIN — HYDROMORPHONE HYDROCHLORIDE 0.5 MG: 1 INJECTION, SOLUTION INTRAMUSCULAR; INTRAVENOUS; SUBCUTANEOUS at 19:25

## 2025-01-17 RX ADMIN — LACTULOSE 20 G: 20 SOLUTION ORAL at 20:47

## 2025-01-17 RX ADMIN — PROPOFOL 100 MG: 10 INJECTION, EMULSION INTRAVENOUS at 09:00

## 2025-01-17 RX ADMIN — CLINDAMYCIN PHOSPHATE 600 MG: 600 INJECTION, SOLUTION INTRAVENOUS at 10:23

## 2025-01-17 RX ADMIN — SODIUM CHLORIDE, PRESERVATIVE FREE 10 ML: 5 INJECTION INTRAVENOUS at 10:11

## 2025-01-17 RX ADMIN — HYDROMORPHONE HYDROCHLORIDE 0.5 MG: 1 INJECTION, SOLUTION INTRAMUSCULAR; INTRAVENOUS; SUBCUTANEOUS at 00:46

## 2025-01-17 RX ADMIN — METRONIDAZOLE 250 MG: 500 INJECTION, SOLUTION INTRAVENOUS at 11:11

## 2025-01-17 RX ADMIN — HYDROMORPHONE HYDROCHLORIDE 0.5 MG: 1 INJECTION, SOLUTION INTRAMUSCULAR; INTRAVENOUS; SUBCUTANEOUS at 13:17

## 2025-01-17 RX ADMIN — ESTRADIOL 0.5 MG: 1 TABLET ORAL at 10:14

## 2025-01-17 RX ADMIN — LIDOCAINE HYDROCHLORIDE 100 MG: 20 INJECTION, SOLUTION INFILTRATION; PERINEURAL at 08:55

## 2025-01-17 RX ADMIN — AMLODIPINE BESYLATE 10 MG: 10 TABLET ORAL at 10:14

## 2025-01-17 RX ADMIN — PROPOFOL 50 MG: 10 INJECTION, EMULSION INTRAVENOUS at 09:03

## 2025-01-17 RX ADMIN — PROMETHAZINE HYDROCHLORIDE 25 MG: 25 TABLET ORAL at 03:26

## 2025-01-17 RX ADMIN — PROPOFOL 50 MG: 10 INJECTION, EMULSION INTRAVENOUS at 09:05

## 2025-01-17 RX ADMIN — SODIUM CHLORIDE, PRESERVATIVE FREE 10 ML: 5 INJECTION INTRAVENOUS at 20:48

## 2025-01-17 RX ADMIN — HYDROMORPHONE HYDROCHLORIDE 0.5 MG: 1 INJECTION, SOLUTION INTRAMUSCULAR; INTRAVENOUS; SUBCUTANEOUS at 05:20

## 2025-01-17 RX ADMIN — ACETAMINOPHEN, ASPIRIN, CAFFEINE 1 TABLET: 250; 65; 250 TABLET, FILM COATED ORAL at 18:48

## 2025-01-17 RX ADMIN — DIPHENHYDRAMINE HYDROCHLORIDE 25 MG: 25 TABLET ORAL at 22:52

## 2025-01-17 RX ADMIN — CLINDAMYCIN PHOSPHATE 600 MG: 600 INJECTION, SOLUTION INTRAVENOUS at 18:16

## 2025-01-17 ASSESSMENT — PAIN SCALES - GENERAL
PAINLEVEL_OUTOF10: 10
PAINLEVEL_OUTOF10: 9
PAINLEVEL_OUTOF10: 10
PAINLEVEL_OUTOF10: 8
PAINLEVEL_OUTOF10: 5
PAINLEVEL_OUTOF10: 9
PAINLEVEL_OUTOF10: 8
PAINLEVEL_OUTOF10: 9

## 2025-01-17 ASSESSMENT — PAIN DESCRIPTION - DESCRIPTORS
DESCRIPTORS: ACHING;TENDER
DESCRIPTORS: ACHING
DESCRIPTORS: TENDER;ACHING

## 2025-01-17 ASSESSMENT — PAIN - FUNCTIONAL ASSESSMENT
PAIN_FUNCTIONAL_ASSESSMENT: ACTIVITIES ARE NOT PREVENTED
PAIN_FUNCTIONAL_ASSESSMENT: PREVENTS OR INTERFERES SOME ACTIVE ACTIVITIES AND ADLS
PAIN_FUNCTIONAL_ASSESSMENT: WONG-BAKER FACES
PAIN_FUNCTIONAL_ASSESSMENT: 0-10

## 2025-01-17 ASSESSMENT — PAIN DESCRIPTION - ONSET: ONSET: ON-GOING

## 2025-01-17 ASSESSMENT — PAIN DESCRIPTION - PAIN TYPE: TYPE: SURGICAL PAIN;ACUTE PAIN

## 2025-01-17 ASSESSMENT — PAIN DESCRIPTION - LOCATION
LOCATION: ABDOMEN
LOCATION: ABDOMEN;HEAD
LOCATION: BACK
LOCATION: ABDOMEN;BACK

## 2025-01-17 ASSESSMENT — PAIN DESCRIPTION - ORIENTATION: ORIENTATION: LEFT

## 2025-01-17 ASSESSMENT — PAIN DESCRIPTION - FREQUENCY: FREQUENCY: CONTINUOUS

## 2025-01-17 NOTE — PROGRESS NOTES
Comprehensive Nutrition Assessment    Type and Reason for Visit:  Initial, Positive nutrition screen (home TF)    Nutrition Recommendations/Plan:   When able/have feeding tube access- recommend resume TF Vital 1.2 at 60 ml/hr (goal).    Suggest flush 180 ml free water flush every 8 hours.    Note - pt declines having NGT for TF until PEG replaced.  Recommend continue f/u with OP RD (last appointment 1/13/25.     Malnutrition Assessment:  Malnutrition Status:  At risk for malnutrition (01/17/25 1230)    Context:  Chronic Illness     Findings of the 6 clinical characteristics of malnutrition:  Energy Intake:  No decrease in energy intake (receives TF for nutrition)  Weight Loss:  No weight loss     Body Fat Loss:  No body fat loss     Muscle Mass Loss:  No muscle mass loss    Fluid Accumulation:  Unable to assess     Strength:  Not Performed    Nutrition Assessment:     Pt. nutritionally compromised AEB NPO status, feeding tube removed; plan replace 1/20. At risk for further nutrition compromise r/t admit with recurrent nausea/ vomiting, gastroparesis, abdominal pain, and underlying medical condition (duodenal neuro endocrine tumor 2012 complicated by incarcerated incisional hernia s/p open repair of incarcerated hernia in 2013, CHF, CAD, depression, DM2 with A1C (12/19/24) 6%, epilepsy, GERD).        Nutrition Related Findings:    Pt. Report/Treatments/Miscellaneous:   1/17-pt. Seen s/p EGD; states not feeling well; G tube removed; plan to replace 1/20 if able; plan IV ATB for 48-72 hours; pt. Declines having NGT placed to provide TF over the weekend; reports receiving TF pta - Vital 1.2 at 60 ml/hr continuous; reports tolerating; states \"hard to keep water down\" when asked if was drinking any fluids; discussed giving water flushes via feeding tube instead; pt. Follows w/ OP RD - reviewed recommendations for flushes; note per 1/13 visit recommendations- Gradually Increase your Tube feeding rate to 60 ml/hr x 12  hr, then 70 ml/hr x 12, then 80 ml/hr goal rate.- Goal Rate of 80 ml/hr x 18 hrs. (Using 6 cartons/day); Goal Rate of 66 ml/hr x 18 hours (Using 5 Cartons/day); Continue 180 ml water flushes 3x/day; Continue anti - nausea medications and medications for constipation as needed (recommendations based on pt. Request to be off the TF at night so can lay flat.  GI Status: no BM noted  Pertinent Labs: 1/16: Glucose 99, BUN 14, Cr 0.7  Pertinent Meds: Mylicon, Lactulose, Colace, Glycolax, ATB, PPI      Wound Type: None       Current Nutrition Intake & Therapies:    Average Meal Intake: NPO     Diet NPO Exceptions are: Sips of Water with Meds  Home Tube Feeding (TF) Orders:  Feeding Route: PEG (12/24/24)  Formula: Peptide Based  Schedule: Continuous  Feeding Regimen: Vital 1.2 at 60 ml/hr  Additives/Modulars: None  Water Flushes: goal 180 ml 3x/day; pt reports flushing ~ 60 ml every 6 hours  Current TF Provides: Vital 1.2 at 60 ml/hr = 1728 kcals, 108 gm protein, 159 gm CHO, 7 gm fiber, 1708 ml free water (1168 TF, 540 flushes) in 1980 ml volume (1440 TF, 540 flushes)/24 hours    Anthropometric Measures:  Height: 162.6 cm (5' 4\")  Ideal Body Weight (IBW): 120 lbs (55 kg)    Admission Body Weight: 129.3 kg (285 lb) (1/16 no edema, ? stated weight)  Current Body Weight: 129.3 kg (285 lb) (1/16 no edema, ? stated weight),      Current BMI (kg/m2): 48.9  Usual Body Weight:  (per EMR: 12/2/20: 294# 3 oz, 3/2/22: 301# 14 oz, 8/22/22: 293# 13 oz, 8/22/22: 293# 13 oz, 3/29/23: 295# 13 oz, 6/18/24 292# 6oz, 8/27/24 282# 3oz, 10/31/24 286# 6oz, 12/19/24 288# 6oz, 1/13/25: 292# 13 oz); * reported desire for weight loss                          BMI Categories: Obese Class 3 (BMI 40.0 or greater)    Estimated Daily Nutrient Needs:  Energy Requirements Based On: Kcal/kg  Weight Used for Energy Requirements: Other (129)  Energy (kcal/day): 7184-8717 kcals (12-15)  Weight Used for Protein Requirements: Ideal (55)  Protein (g/day):

## 2025-01-17 NOTE — BRIEF OP NOTE
Brief Postoperative Note      Patient: Samantha Nichols  YOB: 1990  MRN: [id number]    Date of Procedure: 1/17/2025    Pre-Op Diagnosis Codes:      * Generalized abdominal pain [R10.84]    Post-Op Diagnosis: Post-Op Diagnosis Codes:     * Generalized abdominal pain [R10.84]       Procedure(s):  EGD FOR DYSFUNCTIONAL G-TUBE  REMOVAL PERCUTANEOUS ENDOSCOPIC GASTROSTOMY TUBE    Surgeon(s):  Janusz Lara MD    Assistant:  * No surgical staff found *    Anesthesia: Monitor Anesthesia Care    Estimated Blood Loss (mL): Minimal    Complications: None    Specimens:   * No specimens in log *    Implants:  * No implants in log *      Drains:   [REMOVED] Gastrostomy/Enterostomy/Jejunostomy Tube Percutaneous Endoscopic Gastrostomy (PEG) LUQ (Removed)   External Catheter Length (cm) 6 cm 12/24/24 1055   Site Description Clean, dry & intact 01/16/25 1938   J Port Status Clamped 01/16/25 1938   G Port Status Clamped 01/16/25 1938   Surrounding Skin Clean, dry & intact 01/16/25 1938   Dressing Status Clean, dry & intact 01/16/25 1938   Dressing Type Split gauze 01/16/25 1938   G-Tube Care Completed Yes 12/26/24 0900   Tube Feeding Other Tube Feeding (specify) 12/26/24 0900   Tube feeding/verify rate (mL/hr) 60 mL/hr 12/26/24 0900   Tube Feeding Intake (mL) 1591 ml 12/26/24 1205   Free Water/Flush (mL) 0 mL 01/16/25 1938   Output (mL) 0 ml 01/16/25 1938   Residual Volume (ml) 40 ml 12/26/24 0900       Findings:  Infection Present At Time Of Surgery (PATOS) (choose all levels that have infection present):  No infection present  Other Findings: migration of  g tube , inteernal bumper migrated sub mucosally. G tube removed.    Electronically signed by Janusz Lara MD on 1/17/2025 at 9:18 AM

## 2025-01-17 NOTE — CARE COORDINATION
01/17/25 1126   Readmission Assessment   Number of Days since last admission? 8-30 days   Previous Disposition Home with Home Health   Who is being Interviewed Patient   What was the patient's/caregiver's perception as to why they think they needed to return back to the hospital? Did not realize care needs would be so extensive   Did you visit your Primary Care Physician after you left the hospital, before you returned this time? Yes   Did you see a specialist, such as Cardiac, Pulmonary, Orthopedic Physician, etc. after you left the hospital? Yes   Who advised the patient to return to the hospital? Physician   Does the patient report anything that got in the way of taking their medications? No   In our efforts to provide the best possible care to you and others like you, can you think of anything that we could have done to help you after you left the hospital the first time, so that you might not have needed to return so soon? Arrange for more help when leaving the hospital  (Samantha requests to change her home health agency to [facility]. She is current with [facility].)

## 2025-01-17 NOTE — ED NOTES
Pt transported to [facility]. Spoke to 5K staff prior to transport. Pt in stable condition at this time.

## 2025-01-17 NOTE — PROGRESS NOTES
Recovery mode, pt states discomfort in abdomen, eyes closed and resting. Passing gas  discussed findings with pt and responsible party. Discharge instructions provided and understanding verbalized.   Report called to pt primary nurse- CATY Chopra with update

## 2025-01-17 NOTE — PROGRESS NOTES
PROGRESS NOTE         Patient: Samantha Nichols 34 y.o. female      : 1990  Date of Admission: 2025  Date of Service: Pt seen/examined on 25 and Admitted to Inpatient with expected LOS greater than two midnights due to medical therapy.       ASSESSMENT AND PLAN    Dislodged G-tube s/p EGD and removal of G-tube by Dr. Lara 25    - Pt to be NPO throughout weekend and per Dr. Lara will try to replace G-tube 25  - Pt presented with reports of pain around G-tube and unable to flush her G-tube, attempted multiple times without success -> pt contacted Dr. Lara who stated to flush with coca-cola however still would not flush -> ED attempted to flush however not able to, placed consult to Dr. Lara  - CT A/P ordered, unremarkable for any acute pathology  - Labs reviewed - unremarkable   - Pain control with Dilaudid    G-tube surgical site infection    - Pt placed on clindamycin Q8H (25-) and Flagyl Q8H (25-) per GI Dr. Lara  - ID Dr. Coombs consulted for further evaluation and recommendations    Gastroparesis with intractable nausea, vomiting - recurrent    - Hx G-tube in  2/2 Type 2 diabetes mellitus, post surgical in the context of extensive gastrointestinal surgery for neuroendocrine tumor  - NPO as above  - IV antiemetics    Functional dyspepsia  GERD    - S/p fundoplication x2 secondary to history of neuroendocrine tumor of the duodenus  - Hx multiple EGDs  - Previous esophagram showing no hiatal hernia, reflux, or gross stricture   - Protonix 40mg daily    Primary HTN    - Continue home Norvasc 10mg    History Type 2 diabetes mellitus    - POCT A1C 24 6.0  - Hold home metformin    Chronic Problems  - Hx duodenal neuroendocrine tumor (); complicated by incarcerated incisional hernia s/p open repair of incarcerated hernia in   - Hx post fundoplication dysphagia  - Hx moderate obstructive sleep apnea, with PAP noncompliance  -  12/24/2024    EGD W/ G TUBE PLACEMENT performed by Janusz Lara MD at [facility] ENDOSCOPY    UPPER GASTROINTESTINAL ENDOSCOPY Left 12/24/2024    ESOPHAGOGASTRODUODENOSCOPY BIOPSY performed by Janusz Lara MD at [facility] ENDOSCOPY    VENTRAL HERNIA REPAIR N/A 8/22/2022    OPEN LEFT ABDOMINAL PORT SITE HERNIA REPAIR WITH MESH performed by Iron Michel MD at [facility] OR    WISDOM TOOTH EXTRACTION           Problem Relation Age of Onset    Cancer Mother     High Blood Pressure Mother     Heart Disease Mother         MI    Liver Cancer Mother     Sickle Cell Trait Father     High Blood Pressure Father     Heart Disease Father     Sickle Cell Trait Sister     Heart Disease Sister     Sickle Cell Trait Sister     Sickle Cell Trait Brother     Sickle Cell Trait Brother     Heart Attack Paternal Uncle     Diabetes Maternal Grandmother     Depression Maternal Grandmother     Heart Disease Maternal Grandmother         CHF    Colon Cancer Cousin     Stroke Neg Hx     Esophageal Cancer Neg Hx     Rectal Cancer Neg Hx     Stomach Cancer Neg Hx      Social History     Socioeconomic History    Marital status:      Spouse name: Fely Nichols    Number of children: 0    Years of education: 12    Highest education level: None   Tobacco Use    Smoking status: Former     Average packs/day: 0.5 packs/day for 19.0 years (9.5 ttl pk-yrs)     Types: Cigarettes     Start date: 12/18/2005    Smokeless tobacco: Never    Tobacco comments:     .5 PPD 6/20/23     2 cig per day 1/23/2024     1 cig/day 8/27/24   Vaping Use    Vaping status: Never Used   Substance and Sexual Activity    Alcohol use: Not Currently     Comment: no    Drug use: No    Sexual activity: Yes     Partners: Female     Social Determinants of Health     Financial Resource Strain: Low Risk  (7/8/2024)    Overall Financial Resource Strain (CARDIA)     Difficulty of Paying Living Expenses: Not very hard   Food Insecurity: No Food Insecurity (1/16/2025)    Hunger Vital

## 2025-01-17 NOTE — RT PROTOCOL NOTE
RT Inhaler-Nebulizer Bronchodilator Protocol Note    There is a bronchodilator order in the chart from a provider indicating to follow the RT Bronchodilator Protocol and there is an “Initiate RT Inhaler-Nebulizer Bronchodilator Protocol” order as well (see protocol at bottom of note).    CXR Findings:  No results found.    The findings from the last RT Protocol Assessment were as follows:   History Pulmonary Disease: Smoker 15 pack years or more  Respiratory Pattern: Regular pattern and RR 12-20 bpm  Breath Sounds: Slightly diminished and/or crackles  Cough: Strong, spontaneous, non-productive  Indication for Bronchodilator Therapy: Decreased or absent breath sounds, On home bronchodilators (prn at home)  Bronchodilator Assessment Score: 3    Aerosolized bronchodilator medication orders have been revised according to the RT Inhaler-Nebulizer Bronchodilator Protocol below.    Respiratory Therapist to perform RT Therapy Protocol Assessment initially then follow the protocol.  Repeat RT Therapy Protocol Assessment PRN for score 0-3 or on second treatment, BID, and PRN for scores above 3.    No Indications - adjust the frequency to every 6 hours PRN wheezing or bronchospasm, if no treatments needed after 48 hours then discontinue using Per Protocol order mode.     If indication present, adjust the RT bronchodilator orders based on the Bronchodilator Assessment Score as indicated below.  Use Inhaler orders unless patient has one or more of the following: on home nebulizer, not able to hold breath for 10 seconds, is not alert and oriented, cannot activate and use MDI correctly, or respiratory rate 25 breaths per minute or more, then use the equivalent nebulizer order(s) with same Frequency and PRN reasons based on the score.  If a patient is on this medication at home then do not decrease Frequency below that used at home.    0-3 - enter or revise RT bronchodilator order(s) to equivalent RT Bronchodilator order with

## 2025-01-17 NOTE — PROGRESS NOTES
EGD completed. Photos taken. No specimens taken. Feeding tube pulled out due to being misplaced. Dressing and ATB ointment applied. Pt tolerated procedure well.    Scope #  used.

## 2025-01-17 NOTE — ANESTHESIA POSTPROCEDURE EVALUATION
Department of Anesthesiology  Postprocedure Note    Patient: Samantha Nichols  MRN: [id number]  YOB: 1990  Date of evaluation: 1/17/2025    Procedure Summary       Date: 01/17/25 Room / Location: [place] [address] / [facility]    Anesthesia Start: 0851 Anesthesia Stop: 0913    Procedure: EGD FOR DYSFUNCTIONAL G-TUBE Diagnosis:       Generalized abdominal pain      (Generalized abdominal pain [R10.84])    Surgeons: Janusz Lara MD Responsible Provider: Francisco Moody MD    Anesthesia Type: MAC ASA Status: 4            Anesthesia Type: No value filed.    Juan Phase I: Juan Score: 10    Juan Phase II:      Anesthesia Post Evaluation    Patient location during evaluation: bedside  Patient participation: complete - patient participated  Level of consciousness: sleepy but conscious  Pain score: 0  Airway patency: patent  Nausea & Vomiting: no nausea and no vomiting  Cardiovascular status: blood pressure returned to baseline  Respiratory status: acceptable  Hydration status: stable  Pain management: adequate        No notable events documented.

## 2025-01-17 NOTE — PLAN OF CARE
Problem: Chronic Conditions and Co-morbidities  Goal: Patient's chronic conditions and co-morbidity symptoms are monitored and maintained or improved  Outcome: Progressing  Flowsheets (Taken 1/17/2025 0512)  Care Plan - Patient's Chronic Conditions and Co-Morbidity Symptoms are Monitored and Maintained or Improved: Monitor and assess patient's chronic conditions and comorbid symptoms for stability, deterioration, or improvement     Problem: Pain  Goal: Verbalizes/displays adequate comfort level or baseline comfort level  Outcome: Progressing  Flowsheets (Taken 1/17/2025 0512)  Verbalizes/displays adequate comfort level or baseline comfort level:   Encourage patient to monitor pain and request assistance   Assess pain using appropriate pain scale   Administer analgesics based on type and severity of pain and evaluate response     Problem: Skin/Tissue Integrity - Adult  Goal: Incisions, wounds, or drain sites healing without S/S of infection  Outcome: Progressing  Flowsheets (Taken 1/17/2025 0512)  Incisions, Wounds, or Drain Sites Healing Without Sign and Symptoms of Infection: Implement wound care per orders     Problem: Gastrointestinal - Adult  Goal: Minimal or absence of nausea and vomiting  Outcome: Progressing  Flowsheets (Taken 1/17/2025 0512)  Minimal or absence of nausea and vomiting:   Administer IV fluids as ordered to ensure adequate hydration   Maintain NPO status until nausea and vomiting are resolved  Goal: Maintains or returns to baseline bowel function  Outcome: Progressing  Flowsheets (Taken 1/17/2025 0512)  Maintains or returns to baseline bowel function:   Assess bowel function   Administer ordered medications as needed   Encourage oral fluids to ensure adequate hydration  Goal: Maintains adequate nutritional intake  Outcome: Progressing  Flowsheets (Taken 1/17/2025 0512)  Maintains adequate nutritional intake:   Monitor percentage of each meal consumed   Assist with meals as needed     Problem:

## 2025-01-17 NOTE — PROGRESS NOTES
Patient oriented to Endoscopy department and admitted to Endoscopy room 9.   Patient verbalized approval for first name, last initial with physician name on unit whiteboard.     Plan of care reviewed with patient.   Patient room whiteboard filled out and discussed with patient and responsible adult.     Call light in reach.   Bed in lowest position, locked, with one bed rail up.   Appropriate arm bands on patient.   Bathroom offered.   All questions and concerns of patient addressed.

## 2025-01-17 NOTE — CONSULTS
[place] [id number]                              CONSULTATION      PATIENT NAME: TREVOR ARZATE            : 1990  MED REC NO: [id number]                       ROOM: [facility]  ACCOUNT NO: [id number]                       ADMIT DATE: 2025  PROVIDER: Janusz Lara MD      CONSULT DATE: 2025    REASON FOR CONSULT:  For further evaluation of abdominal pain and unable to use the G-tube.    HISTORY OF PRESENT ILLNESS:  Patient is a 34-year-old pleasant female, who has history of gastroparesis and she was tried on all the motility agents, some of them she did not tolerate and without any significant improvement, and she had a G-tube placed by me on 2024.  She did well without any problems.  For the last 1 week, she said she was unable to flush the G-tube.  She attempted multiple times without success.  The patient also complains of pain around the G-tube site, sometimes pain radiating to back, nausea.  No vomiting.  Denied any blood in the stool or black stools.  Because of worsening of symptoms, she presented to the emergency room.  Patient's CT scan which was reported as no abnormalities noted.  Subsequently, patient was admitted and she is undergoing further evaluation.    REVIEW OF SYSTEMS:  14-point review of systems was reviewed.  Pertinent positives were mentioned in HPI and Past Medical History, otherwise noncontributory.    PAST MEDICAL HISTORY:  Acute chronic diastolic congestive heart failure, migraine headaches, anxiety, asthma, depression, diabetes mellitus, seizure disorder, fibroid uterus, gastroparesis, postoperative nausea, vomiting, sickle cell trait, neuroendocrine tumor, gastrinoma.    PAST SURGICAL HISTORY:  Abdominal surgery, laparoscopy, bilateral salpingo-oophorectomy, abdominal exploration surgery, breast reduction surgery, central venous catheter placement, cholecystectomy,  not limited to, perforation, bleeding, infection, reaction to medicine, very slight chance of missing significant lesions.  The patient expressed her understanding.  Further plans pending the above test results.          KATELYN WILKERSON MD      D:  01/17/2025 08:55:00     T:  01/17/2025 09:59:39     ALBERTO/SHIRA  Job #:  [id number]     Doc#:  [id number]    CC:   MD Juan Thornton

## 2025-01-17 NOTE — CONSULTS
Hx and P/E :Infectious D.       Patient - Samantha Nichols,  Age - 34 y.o.    - 1990      Room Number - 5K-14/014-A   N -  [id number]   Located within [facility] # - [id number]  Date of Admission -  2025 10:42 AM  Patient's PCP: Juan Dominguez DO     Requesting Physician: Charbel Chow DO    CHIEF COMPLAINT:     G-tube dislodgment    ASSESSMENT AND PLAN     Patient is a 34-year-old female who I am consulted for antibiotic recommendations following G-tube dislodgment.  I examined the skin and there is no overt evidence of infection but GI service would like to pursue prophylactic therapy to prevent infection prior to placement of new G-tube on .    Due to numerous antibiotic allergies, prophylaxis is slightly more difficult as really Augmentin would be an ideal agent for this patient I would recommend therapy with clindamycin alone as it has fairly good anaerobic coverage and some staph and strep as well.  My main concern would be overlying fede inoculating the wound bed.  I would continue this therapy until 24 hours following drain placement and then plan on discontinuation.  If an alternative agent for staph and strep is recommended, I would pair a tetracycline with clindamycin.    Stop metronidazole  Continue clindamycin  If additional coverage necessary, I would consider doxycycline for staph and strep though clinda should be okay as monotherapy  Continue therapy until 24 hours following G-tube placement  ID will continue to follow  I have reviewed allergies in detail with patient      HISTORY OF PRESENT ILLNESS       This is a very pleasant 34 y.o. female who was admitted to the hospital with a chief complaints of G-tube dislodgment.  Infectious disease consulted for antibiotic recommendations.    Patient had noticed increasing difficulty with flushing her G-tube in the past 2 to 3 days.  CT imaging did not demonstrate evidence of dislodgment but then subsequent EGD performed  GASTROSTOMY TUBE performed by Janusz Lara MD at [facility] ENDOSCOPY    TONSILLECTOMY AND ADENOIDECTOMY N/A 11/6/2017    TONSILLECTOMY AND ADENOIDECTOMY performed by Charlie Weiss MD at [facility] SURGERY [facility]ENTER OR    TUMOR REMOVAL      neoendocrine    TUNNELED VENOUS PORT PLACEMENT      UPPER GASTROINTESTINAL ENDOSCOPY Left 4/3/2018    EGD DILATION SAVORY performed by Ajit Washburn MD at [facility] Endoscopy    UPPER GASTROINTESTINAL ENDOSCOPY Left 6/26/2018    EGD DILATION SAVORY performed by Ajit Washburn MD at [facility] Endoscopy    UPPER GASTROINTESTINAL ENDOSCOPY Left 2/26/2019    EGD DILATION SAVORY performed by Ajit Washburn MD at [facility] Endoscopy    UPPER GASTROINTESTINAL ENDOSCOPY N/A 7/9/2019    EGD DILATION SAVORY performed by Ajit Washburn MD at [facility] Endoscopy    UPPER GASTROINTESTINAL ENDOSCOPY Left 9/9/2019    EGD BIOPSY performed by Vivienne Leon MD at [facility] Endoscopy    UPPER GASTROINTESTINAL ENDOSCOPY N/A 4/1/2020    EGD DILATION BALLOON performed by Frida Elaine MD at [facility] OR    UPPER GASTROINTESTINAL ENDOSCOPY Left 8/24/2020    EGD ESOPHAGOGASTRODUODENOSCOPY DILATATION performed by Frida Elaine MD at [facility] Endoscopy    UPPER GASTROINTESTINAL ENDOSCOPY Left 8/24/2020    EGD BIOPSY performed by Frida Elaine MD at [facility] Endoscopy    UPPER GASTROINTESTINAL ENDOSCOPY N/A 12/2/2020    EGD DILATION BALLOON performed by Frida Elaine MD at [facility] Endoscopy    UPPER GASTROINTESTINAL ENDOSCOPY Left 12/2/2020    EGD BIOPSY performed by Frida Elaine MD at [facility] Endoscopy    UPPER GASTROINTESTINAL ENDOSCOPY Left 1/19/2022    EGD performed by Iron Michel MD at [facility] Endoscopy    UPPER GASTROINTESTINAL ENDOSCOPY Left 1/19/2022    EGD BIOPSY performed by Iron Michel MD at [facility] Endoscopy    UPPER GASTROINTESTINAL ENDOSCOPY N/A 7/15/2022    EGD WITH DILATION AND BIOPSY performed by Iron Michel MD at [facility] OR    UPPER GASTROINTESTINAL ENDOSCOPY N/A 1/24/2023    EGD ESOPHAGOGASTRODUODENOSCOPY performed by

## 2025-01-17 NOTE — ANESTHESIA PRE PROCEDURE
Anesthesia Evaluation  Patient summary reviewed   history of anesthetic complications: PONV.  Airway: Mallampati: IV       Mouth opening: < 3 FB   Dental:    (+) poor dentition      Pulmonary: breath sounds clear to auscultation  (+)     sleep apnea: on CPAP,       asthma:                            Cardiovascular:  Exercise tolerance: poor (<4 METS)  (+) hypertension:, CAD:, CHF:        Rhythm: regular  Rate: normal           Beta Blocker:  Not on Beta Blocker         Neuro/Psych:   (+) neuromuscular disease:, headaches:, psychiatric history:            GI/Hepatic/Renal:   (+) hiatal hernia, GERD:          Endo/Other:    (+) DiabetesType II DM, poorly controlled.                 Abdominal:   (+) obese          Vascular:          Other Findings: 1/16 via peg tube no water or feeding for two days             Anesthesia Plan      MAC     ASA 4       Induction: intravenous.      Anesthetic plan and risks discussed with patient.                        AURELIO Saucedo - CRNA   1/17/2025

## 2025-01-17 NOTE — PROCEDURES
[address] [address]                             PROCEDURE NOTE      PATIENT NAME: TREVOR ARZATE            : 1990  MED REC NO: [id number]                       ROOM: [facility]  ACCOUNT NO: [id number]                       ADMIT DATE: 2025  PROVIDER: Janusz Lara MD      DATE OF PROCEDURE:  2025    SURGEON:  Janusz Lara MD    PROCEDURE:  EGD and evaluation.    HISTORY OF PRESENT ILLNESS:  Patient is a 34-year-old pleasant female who has gastroparesis, who had G-tube placed on .  For the last 1 week, she is unable to flush the G-tube and complains of pain around the G-tube.  CT scan was reported as normal.  Because of continuation of symptoms, she is undergoing further evaluation.  Please see my consultation note for details and for physical examination.    ASA CLASSIFICATION:  As per Anesthesia, please see Anesthesia note for details.    INSTRUMENT:  GIF- gastroscope.    PHOTOGRAPHS:  Yes.    BIOPSIES:  No.    ESTIMATED BLOOD LOSS:  Less than 10 mL.    DESCRIPTION OF PROCEDURE:  Procedure, indications, and complications including, but not limited to, perforation, bleeding, infection, adverse reaction to medicine, very slight chance of missing significant lesions discussed with the patient and patient expressed her understanding, and a written consent was obtained.    Patient was placed in supine position in Endo room #11.  Patient was placed on appropriate monitoring of vitals including blood pressure, heart rate, and pulse ox.  Oropharynx was sprayed with Cetacaine spray, and bite block was placed between maxilla and mandible.  After the adequate total intravenous anesthesia was given by Anesthesia, the GIF- gastroscope was inserted into the oropharynx, and the esophagus was intubated under direct vision.  The scope was advanced down through the stomach into second portion of duodenum.  On

## 2025-01-17 NOTE — PROGRESS NOTES
Pt admitted to  5K14 via in a wheelchair from ED.  Complaints: complication of G tube.    IV site free of s/s of infection or infiltration. Vital signs obtained. Assessment and data collection initiated. Two nurse skin assessment performed by will BYRNE and steven RN. Oriented to room. Policies and procedures for  explained. All questions answered with no further questions at this time. Fall prevention and safety brochure discussed with patient.  Bed alarm on. Call light in reach.Oriented to room.   Miya Ulrich, RN, RN 1/17/2025 1:43 AM

## 2025-01-17 NOTE — CARE COORDINATION
01/17/25 1121   Service Assessment   Patient Orientation Alert and Oriented;Person;Place;Situation;Self   Cognition Alert   History Provided By Patient;Medical Record   Primary Caregiver Self   Accompanied By/Relationship Unaccompanied   Support Systems Spouse/Significant Other;Family Members   Patient's Healthcare Decision Maker is: Named in Scanned ACP Document   PCP Verified by CM Yes   Last Visit to PCP Within last 3 months   Prior Functional Level Independent in ADLs/IADLs;Bathing;Dressing;Toileting;Feeding;Cooking;Housework;Shopping;Mobility   Current Functional Level Independent in ADLs/IADLs;Bathing;Dressing;Mobility;Shopping;Housework;Cooking;Feeding;Toileting   Can patient return to prior living arrangement Yes   Ability to make needs known: Good   Family able to assist with home care needs: Yes   Would you like for me to discuss the discharge plan with any other family members/significant others, and if so, who? No   Financial Resources Medicare;Medicaid   Community Resources ECF/Home Care  ([facility])   CM/SW Referral ADLs/IADLs   Social/Functional History   Active  No   Patient's  Info Family or insurance   Occupation On disability   Discharge Planning   Type of Residence House   Living Arrangements Spouse/Significant Other   Current Services Prior To Admission Durable Medical Equipment   Current DME Prior to Arrival Enteral Feedings;Infusion Pump   Potential Assistance Needed Home Care   DME Ordered? No   Potential Assistance Purchasing Medications No   Type of Home Care Services Nursing Services   Patient expects to be discharged to: House   History of falls? 0   Services At/After Discharge   Transition of Care Consult (CM Consult) Home Health   Internal Home Health Yes   Services At/After Discharge Nursing services   Confirm Follow Up Transport Family   Condition of Participation: Discharge Planning   The Plan for Transition of Care is related to the following treatment goals:

## 2025-01-17 NOTE — CARE COORDINATION
Phone call placed to [facility], spoke to Samaria. Updated Samaria that Samantha has requested to change home health agencies. Electronically signed by Lissa Osman RN on 1/17/25 at 3:34 PM EST

## 2025-01-18 PROBLEM — E11.43 GASTROPARESIS DUE TO DM (HCC): Status: ACTIVE | Noted: 2023-03-30

## 2025-01-18 PROBLEM — Z87.892 HISTORY OF ANAPHYLAXIS: Status: ACTIVE | Noted: 2025-01-18

## 2025-01-18 LAB
ANION GAP SERPL CALC-SCNC: 14 MEQ/L (ref 8–16)
BUN SERPL-MCNC: 12 MG/DL (ref 7–22)
CALCIUM SERPL-MCNC: 9.3 MG/DL (ref 8.5–10.5)
CHLORIDE SERPL-SCNC: 103 MEQ/L (ref 98–111)
CO2 SERPL-SCNC: 25 MEQ/L (ref 23–33)
CREAT SERPL-MCNC: 0.6 MG/DL (ref 0.4–1.2)
DEPRECATED RDW RBC AUTO: 41.3 FL (ref 35–45)
ERYTHROCYTE [DISTWIDTH] IN BLOOD BY AUTOMATED COUNT: 15 % (ref 11.5–14.5)
GFR SERPL CREATININE-BSD FRML MDRD: > 90 ML/MIN/1.73M2
GLUCOSE SERPL-MCNC: 80 MG/DL (ref 70–108)
HCT VFR BLD AUTO: 38.1 % (ref 37–47)
HGB BLD-MCNC: 11.9 GM/DL (ref 12–16)
MCH RBC QN AUTO: 24 PG (ref 26–33)
MCHC RBC AUTO-ENTMCNC: 31.2 GM/DL (ref 32.2–35.5)
MCV RBC AUTO: 76.8 FL (ref 81–99)
PLATELET # BLD AUTO: 279 THOU/MM3 (ref 130–400)
PMV BLD AUTO: 9.2 FL (ref 9.4–12.4)
POTASSIUM SERPL-SCNC: 3.9 MEQ/L (ref 3.5–5.2)
RBC # BLD AUTO: 4.96 MILL/MM3 (ref 4.2–5.4)
SODIUM SERPL-SCNC: 142 MEQ/L (ref 135–145)
WBC # BLD AUTO: 8.5 THOU/MM3 (ref 4.8–10.8)

## 2025-01-18 PROCEDURE — 6360000002 HC RX W HCPCS: Performed by: STUDENT IN AN ORGANIZED HEALTH CARE EDUCATION/TRAINING PROGRAM

## 2025-01-18 PROCEDURE — 1200000000 HC SEMI PRIVATE

## 2025-01-18 PROCEDURE — 6370000000 HC RX 637 (ALT 250 FOR IP): Performed by: STUDENT IN AN ORGANIZED HEALTH CARE EDUCATION/TRAINING PROGRAM

## 2025-01-18 PROCEDURE — 99233 SBSQ HOSP IP/OBS HIGH 50: CPT | Performed by: STUDENT IN AN ORGANIZED HEALTH CARE EDUCATION/TRAINING PROGRAM

## 2025-01-18 PROCEDURE — 2500000003 HC RX 250 WO HCPCS

## 2025-01-18 PROCEDURE — 6370000000 HC RX 637 (ALT 250 FOR IP)

## 2025-01-18 PROCEDURE — 6360000002 HC RX W HCPCS

## 2025-01-18 PROCEDURE — 99232 SBSQ HOSP IP/OBS MODERATE 35: CPT | Performed by: STUDENT IN AN ORGANIZED HEALTH CARE EDUCATION/TRAINING PROGRAM

## 2025-01-18 PROCEDURE — 6360000002 HC RX W HCPCS: Performed by: INTERNAL MEDICINE

## 2025-01-18 PROCEDURE — 85027 COMPLETE CBC AUTOMATED: CPT

## 2025-01-18 PROCEDURE — 80048 BASIC METABOLIC PNL TOTAL CA: CPT

## 2025-01-18 PROCEDURE — 36415 COLL VENOUS BLD VENIPUNCTURE: CPT

## 2025-01-18 RX ORDER — HYDROCODONE BITARTRATE AND ACETAMINOPHEN 5; 325 MG/1; MG/1
2 TABLET ORAL EVERY 6 HOURS PRN
Status: DISCONTINUED | OUTPATIENT
Start: 2025-01-18 | End: 2025-01-23 | Stop reason: HOSPADM

## 2025-01-18 RX ORDER — HYDROCODONE BITARTRATE AND ACETAMINOPHEN 5; 325 MG/1; MG/1
1 TABLET ORAL EVERY 6 HOURS PRN
Status: DISCONTINUED | OUTPATIENT
Start: 2025-01-18 | End: 2025-01-23 | Stop reason: HOSPADM

## 2025-01-18 RX ORDER — PROCHLORPERAZINE EDISYLATE 5 MG/ML
10 INJECTION INTRAMUSCULAR; INTRAVENOUS EVERY 6 HOURS PRN
Status: DISCONTINUED | OUTPATIENT
Start: 2025-01-18 | End: 2025-01-23 | Stop reason: HOSPADM

## 2025-01-18 RX ADMIN — CLINDAMYCIN PHOSPHATE 600 MG: 600 INJECTION, SOLUTION INTRAVENOUS at 11:04

## 2025-01-18 RX ADMIN — LACTULOSE 20 G: 20 SOLUTION ORAL at 20:21

## 2025-01-18 RX ADMIN — HYDROMORPHONE HYDROCHLORIDE 0.5 MG: 1 INJECTION, SOLUTION INTRAMUSCULAR; INTRAVENOUS; SUBCUTANEOUS at 15:30

## 2025-01-18 RX ADMIN — HYDROCODONE BITARTRATE AND ACETAMINOPHEN 2 TABLET: 5; 325 TABLET ORAL at 11:07

## 2025-01-18 RX ADMIN — PANTOPRAZOLE SODIUM 40 MG: 40 TABLET, DELAYED RELEASE ORAL at 05:05

## 2025-01-18 RX ADMIN — HYDROMORPHONE HYDROCHLORIDE 0.5 MG: 1 INJECTION, SOLUTION INTRAMUSCULAR; INTRAVENOUS; SUBCUTANEOUS at 01:33

## 2025-01-18 RX ADMIN — HYDROCODONE BITARTRATE AND ACETAMINOPHEN 2 TABLET: 5; 325 TABLET ORAL at 23:26

## 2025-01-18 RX ADMIN — CLINDAMYCIN PHOSPHATE 600 MG: 600 INJECTION, SOLUTION INTRAVENOUS at 18:10

## 2025-01-18 RX ADMIN — ENOXAPARIN SODIUM 30 MG: 100 INJECTION SUBCUTANEOUS at 07:45

## 2025-01-18 RX ADMIN — CLINDAMYCIN PHOSPHATE 600 MG: 600 INJECTION, SOLUTION INTRAVENOUS at 02:26

## 2025-01-18 RX ADMIN — HYDROCODONE BITARTRATE AND ACETAMINOPHEN 2 TABLET: 5; 325 TABLET ORAL at 17:13

## 2025-01-18 RX ADMIN — LACTULOSE 20 G: 20 SOLUTION ORAL at 05:05

## 2025-01-18 RX ADMIN — HYDROMORPHONE HYDROCHLORIDE 0.5 MG: 1 INJECTION, SOLUTION INTRAMUSCULAR; INTRAVENOUS; SUBCUTANEOUS at 12:32

## 2025-01-18 RX ADMIN — HYDROMORPHONE HYDROCHLORIDE 0.5 MG: 1 INJECTION, SOLUTION INTRAMUSCULAR; INTRAVENOUS; SUBCUTANEOUS at 03:26

## 2025-01-18 RX ADMIN — ACETAMINOPHEN, ASPIRIN, CAFFEINE 1 TABLET: 250; 65; 250 TABLET, FILM COATED ORAL at 05:54

## 2025-01-18 RX ADMIN — HYDROMORPHONE HYDROCHLORIDE 0.5 MG: 1 INJECTION, SOLUTION INTRAMUSCULAR; INTRAVENOUS; SUBCUTANEOUS at 05:50

## 2025-01-18 RX ADMIN — LACTULOSE 20 G: 20 SOLUTION ORAL at 12:32

## 2025-01-18 RX ADMIN — HYDROMORPHONE HYDROCHLORIDE 0.5 MG: 1 INJECTION, SOLUTION INTRAMUSCULAR; INTRAVENOUS; SUBCUTANEOUS at 09:04

## 2025-01-18 RX ADMIN — SODIUM CHLORIDE, PRESERVATIVE FREE 10 ML: 5 INJECTION INTRAVENOUS at 20:22

## 2025-01-18 RX ADMIN — AMLODIPINE BESYLATE 10 MG: 10 TABLET ORAL at 07:45

## 2025-01-18 RX ADMIN — ENOXAPARIN SODIUM 30 MG: 100 INJECTION SUBCUTANEOUS at 20:22

## 2025-01-18 RX ADMIN — ESTRADIOL 0.5 MG: 1 TABLET ORAL at 07:45

## 2025-01-18 RX ADMIN — HYDROMORPHONE HYDROCHLORIDE 0.5 MG: 1 INJECTION, SOLUTION INTRAMUSCULAR; INTRAVENOUS; SUBCUTANEOUS at 21:33

## 2025-01-18 RX ADMIN — SODIUM CHLORIDE, PRESERVATIVE FREE 10 ML: 5 INJECTION INTRAVENOUS at 07:45

## 2025-01-18 RX ADMIN — ACETAMINOPHEN, ASPIRIN, CAFFEINE 1 TABLET: 250; 65; 250 TABLET, FILM COATED ORAL at 15:29

## 2025-01-18 RX ADMIN — HYDROMORPHONE HYDROCHLORIDE 0.5 MG: 1 INJECTION, SOLUTION INTRAMUSCULAR; INTRAVENOUS; SUBCUTANEOUS at 18:32

## 2025-01-18 ASSESSMENT — PAIN SCALES - GENERAL
PAINLEVEL_OUTOF10: 10
PAINLEVEL_OUTOF10: 5
PAINLEVEL_OUTOF10: 9
PAINLEVEL_OUTOF10: 8
PAINLEVEL_OUTOF10: 9
PAINLEVEL_OUTOF10: 10
PAINLEVEL_OUTOF10: 9
PAINLEVEL_OUTOF10: 9
PAINLEVEL_OUTOF10: 10
PAINLEVEL_OUTOF10: 7
PAINLEVEL_OUTOF10: 9

## 2025-01-18 ASSESSMENT — PAIN DESCRIPTION - LOCATION
LOCATION: ABDOMEN;BACK
LOCATION: ABDOMEN
LOCATION: BACK
LOCATION: ABDOMEN

## 2025-01-18 ASSESSMENT — PAIN DESCRIPTION - DESCRIPTORS
DESCRIPTORS: STABBING
DESCRIPTORS: STABBING
DESCRIPTORS: ACHING

## 2025-01-18 NOTE — PROGRESS NOTES
Hospitalist Progress Note    Patient:  Samantha Nichols      Unit/Bed:5K-14/014-A    YOB: 1990    MRN: [id number]       Acct: [id number]     PCP: Juan Dominguez DO    Date of Admission: 1/16/2025    Assessment/Plan:    Malfunctioning G-tube: secondary to dislodged G-tube.  CT a/P with no acute intra-abdominal process and appropriate G-tube.  EGD showed G-tube migration into submucosa.  Removed by GI, started on clindamycin for empiric antibiotic coverage.  ID consulted assist.  Continue pain control.  Reevaluation 1/20 with possible replacement of tube  Functional dyspepsia, intractable gastroparesis: Associated intractable nausea and vomiting, recurrent.  S/p fundoplication x2 secondary to history of neuroendocrine tumor of the duodenum. Multple EGDs. Previous esophagram showing no hiatal hernia, reflux, or gross stricture.  Continue Protonix and IV antiemetics as needed.  HTN: Continue amlodipine 10 mg p.o. daily with holding parameters  NIDDMII: Last HgbA1c 6.0 12/2024.  Home antidiabetic includes metformin.  Monitor blood glucose with daily BMP.  History of duodenal NET: Diagnosed in 2012, cardiac incarcerated incisional hernia s/p open repair of incarcerated hernia 1213.        Expected discharge date:  1/20/2025    Disposition:    [x] Home       [] TCU       [] Rehab       [] Psych       [] SNF       [] Long Term Care Facility       [] Other-    Chief Complaint: G-tube complications    Hospital Course:   Per HPI, \"Samantha Nichols is a 34 y.o. female with PMHx of gastroparesis s/p G-tube placement 12/24, duodenal neuroendocrine tumor, sickle cell, post fundoplication dysphagia, essential hypertension, cholecystecomy, BL oopherectomy, who presents to [facility] with concerns of pain around G-tube, radiating to back and BL flanks. Patient states she has been unable to flush her G-tube and contacted her GI provider Dr. Lara who advised her to use coca-cola to try to

## 2025-01-18 NOTE — PROGRESS NOTES
This nurse escorted patient downstairs to smoke her cigarette at 1139.  Patient and nurse returned to patient's room at 1143.

## 2025-01-18 NOTE — PROGRESS NOTES
This nurse reached out to provider regarding patient wanting to go outside to smoke a cigarette.  MD informed this nurse that he was fine with patient going outside for smoke with staff.  At 0900 this nurse walked patient outside to smoke. This nurse and patient arrived back to patient's room at 0905.

## 2025-01-18 NOTE — PROGRESS NOTES
[facility]         Progress Note      Samanthasusanna Nichols  MEDICAL RECORD NUMBER:  [id number]  AGE: 34 y.o.   GENDER: female  : 1990  EPISODE DATE:  2025      Assessment:     Patient is a 34-year-old female who I am consulted for antibiotic recommendations following G-tube dislodgment.  I examined the skin and there is no overt evidence of infection but GI service would like to pursue prophylactic therapy to prevent infection prior to placement of new G-tube on .     Due to numerous antibiotic allergies, prophylaxis is slightly more difficult as really Augmentin would be an ideal agent for this patient I would recommend therapy with clindamycin alone as it has fairly good anaerobic coverage and some staph and strep as well.  My main concern would be overlying fede inoculating the wound bed.  I would continue this therapy until 24 hours following drain placement and then plan on discontinuation.  If an alternative agent for staph and strep is recommended, I would pair a tetracycline with clindamycin.     Currently on therapy with IV clindamycin  Consideration for doxycycline if suspicion for staph and strep infection, would not initiate at this time  Continue therapy until 24 hours following G-tube placement  ID will continue to follow        Subjective:     Chief Complaint   Patient presents with    Feeding Tube Problem    Shortness of Breath         No acute events overnight.  No new complaints or concerns this morning.      Patient Active Problem List   Diagnosis Code    Chronic abdominal pain R10.9, G89.29    Nausea and vomiting R11.2    Carcinoid tumor D3A.00    Pelvic inflammatory disease N73.9    Intractable nausea and vomiting R11.2    Microcytic anemia D50.9    Diet-controlled type 2 diabetes mellitus (HCC) E11.9    Esophageal dysphagia R13.19    Esophageal stricture K22.2    Morbid obesity E66.01    Migraine equivalent G43.109    Mild persistent asthma  06/29/1994, 06/06/1995    Hep B, ENGERIX-B, RECOMBIVAX-HB, (age Birth - 19y), IM, 0.5mL 08/12/2004, 10/06/2004, 03/17/2005    Hib vaccine 04/18/1991, 06/14/1991, 09/26/1991, 04/06/1995    Influenza Vaccine, unspecified formulation 10/01/2016    Influenza Virus Vaccine 09/15/2018    Influenza, AFLURIA (age 3 y+), FLUZONE, (age 6 mo+), Quadv MDV, 0.5mL 10/03/2016    Influenza, FLUARIX, FLULAVAL, FLUZONE (age 6 mo+) and AFLURIA, (age 3 y+), Quadv PF, 0.5mL 11/07/2017, 02/11/2020, 09/28/2022, 11/28/2023    Influenza, FLUCELVAX, (age 6 mo+), MDCK, Quadv PF, 0.5mL 11/17/2020    Influenza, FLUZONE High Dose, (age 65 y+), IM, Trivalent PF, 0.5mL 10/22/2015, 08/15/2019    MMR, PRIORIX, M-M-R II, (age 12m+), SC, 0.5mL 09/26/1991, 06/24/1996, 08/08/2001    Pneumococcal Conjugate 7-valent (Prevnar7) 02/11/2020    Pneumococcal, PPSV23, PNEUMOVAX 23, (age 2y+), SC/IM, 0.5mL 05/02/2014, 10/13/2016, 06/06/2024    Polio OPV 1990, 01/09/1991, 12/26/1991, 06/29/1994    TD 2LF, TDVAX, (age 7y+), IM, 0.5mL 08/12/2004    TDaP, ADACEL (age 10y-64y), BOOSTRIX (age 10y+), IM, 0.5mL 02/11/2020    Td vaccine (adult) 08/12/2004       PHYSICAL EXAM    /80   Pulse 82   Temp 97.7 °F (36.5 °C) (Oral)   Resp 18   Ht 1.626 m (5' 4\")   Wt 129.3 kg (285 lb)   LMP 07/11/2015   SpO2 96%   BMI 48.92 kg/m²   General:  Awake, alert, not in distress.  HEENT: pink conjunctiva, unicteric sclera, moist oral mucosa.  Chest:  bilateral air entry, Clear to auscultation,   Cardiovascular:  RRR ,S1S2, no murmur or gallop.  Abdomen:  Soft, non tender to palpation.  Extremities: no edema  Skin:  Warm and dry.  CNS: aox3    Wound 01/17/25 Abdomen Left;Upper old g tube site (Active)   Dressing Status Clean;Dry;Intact 01/17/25 2031   Dressing/Treatment Antibacterial ointment 01/17/25 2031   Number of days: 0       LABS       CBC:   Lab Results   Component Value Date/Time    WBC 8.5 01/18/2025 05:51 AM    HGB 11.9 01/18/2025 05:51 AM    HGB 12.2

## 2025-01-18 NOTE — PLAN OF CARE
Problem: Pain  Goal: Verbalizes/displays adequate comfort level or baseline comfort level  1/18/2025 0948 by Jasmin Neil, RN  Outcome: Progressing  1/18/2025 0948 by Jasmin Neil, RN  Outcome: Progressing

## 2025-01-19 PROBLEM — Z88.0 PENICILLIN ALLERGY: Status: ACTIVE | Noted: 2025-01-19

## 2025-01-19 LAB
ANION GAP SERPL CALC-SCNC: 14 MEQ/L (ref 8–16)
BUN SERPL-MCNC: 10 MG/DL (ref 7–22)
CALCIUM SERPL-MCNC: 9.1 MG/DL (ref 8.5–10.5)
CHLORIDE SERPL-SCNC: 104 MEQ/L (ref 98–111)
CO2 SERPL-SCNC: 22 MEQ/L (ref 23–33)
CREAT SERPL-MCNC: 0.7 MG/DL (ref 0.4–1.2)
DEPRECATED RDW RBC AUTO: 41 FL (ref 35–45)
ERYTHROCYTE [DISTWIDTH] IN BLOOD BY AUTOMATED COUNT: 14.7 % (ref 11.5–14.5)
GFR SERPL CREATININE-BSD FRML MDRD: > 90 ML/MIN/1.73M2
GLUCOSE SERPL-MCNC: 106 MG/DL (ref 70–108)
HCT VFR BLD AUTO: 38.3 % (ref 37–47)
HGB BLD-MCNC: 11.9 GM/DL (ref 12–16)
MCH RBC QN AUTO: 24 PG (ref 26–33)
MCHC RBC AUTO-ENTMCNC: 31.1 GM/DL (ref 32.2–35.5)
MCV RBC AUTO: 77.2 FL (ref 81–99)
PLATELET # BLD AUTO: 284 THOU/MM3 (ref 130–400)
PMV BLD AUTO: 9.6 FL (ref 9.4–12.4)
POTASSIUM SERPL-SCNC: 3.2 MEQ/L (ref 3.5–5.2)
RBC # BLD AUTO: 4.96 MILL/MM3 (ref 4.2–5.4)
SODIUM SERPL-SCNC: 140 MEQ/L (ref 135–145)
WBC # BLD AUTO: 9.3 THOU/MM3 (ref 4.8–10.8)

## 2025-01-19 PROCEDURE — 6360000002 HC RX W HCPCS

## 2025-01-19 PROCEDURE — 6370000000 HC RX 637 (ALT 250 FOR IP): Performed by: STUDENT IN AN ORGANIZED HEALTH CARE EDUCATION/TRAINING PROGRAM

## 2025-01-19 PROCEDURE — 1200000000 HC SEMI PRIVATE

## 2025-01-19 PROCEDURE — 6360000002 HC RX W HCPCS: Performed by: NURSE PRACTITIONER

## 2025-01-19 PROCEDURE — 6360000002 HC RX W HCPCS: Performed by: STUDENT IN AN ORGANIZED HEALTH CARE EDUCATION/TRAINING PROGRAM

## 2025-01-19 PROCEDURE — 85027 COMPLETE CBC AUTOMATED: CPT

## 2025-01-19 PROCEDURE — 6360000002 HC RX W HCPCS: Performed by: INTERNAL MEDICINE

## 2025-01-19 PROCEDURE — 99232 SBSQ HOSP IP/OBS MODERATE 35: CPT | Performed by: STUDENT IN AN ORGANIZED HEALTH CARE EDUCATION/TRAINING PROGRAM

## 2025-01-19 PROCEDURE — 80048 BASIC METABOLIC PNL TOTAL CA: CPT

## 2025-01-19 PROCEDURE — 6370000000 HC RX 637 (ALT 250 FOR IP)

## 2025-01-19 PROCEDURE — 2500000003 HC RX 250 WO HCPCS

## 2025-01-19 PROCEDURE — 36415 COLL VENOUS BLD VENIPUNCTURE: CPT

## 2025-01-19 PROCEDURE — 99233 SBSQ HOSP IP/OBS HIGH 50: CPT | Performed by: STUDENT IN AN ORGANIZED HEALTH CARE EDUCATION/TRAINING PROGRAM

## 2025-01-19 RX ADMIN — CLINDAMYCIN PHOSPHATE 600 MG: 600 INJECTION, SOLUTION INTRAVENOUS at 02:21

## 2025-01-19 RX ADMIN — HYDROMORPHONE HYDROCHLORIDE 0.5 MG: 1 INJECTION, SOLUTION INTRAMUSCULAR; INTRAVENOUS; SUBCUTANEOUS at 15:56

## 2025-01-19 RX ADMIN — ACETAMINOPHEN, ASPIRIN, CAFFEINE 1 TABLET: 250; 65; 250 TABLET, FILM COATED ORAL at 23:10

## 2025-01-19 RX ADMIN — ENOXAPARIN SODIUM 30 MG: 100 INJECTION SUBCUTANEOUS at 08:05

## 2025-01-19 RX ADMIN — CLINDAMYCIN PHOSPHATE 600 MG: 600 INJECTION, SOLUTION INTRAVENOUS at 18:26

## 2025-01-19 RX ADMIN — HYDROMORPHONE HYDROCHLORIDE 0.5 MG: 1 INJECTION, SOLUTION INTRAMUSCULAR; INTRAVENOUS; SUBCUTANEOUS at 03:38

## 2025-01-19 RX ADMIN — AMLODIPINE BESYLATE 10 MG: 10 TABLET ORAL at 08:06

## 2025-01-19 RX ADMIN — HYDROMORPHONE HYDROCHLORIDE 0.5 MG: 1 INJECTION, SOLUTION INTRAMUSCULAR; INTRAVENOUS; SUBCUTANEOUS at 00:36

## 2025-01-19 RX ADMIN — CLINDAMYCIN PHOSPHATE 600 MG: 600 INJECTION, SOLUTION INTRAVENOUS at 09:37

## 2025-01-19 RX ADMIN — HYDROMORPHONE HYDROCHLORIDE 0.5 MG: 1 INJECTION, SOLUTION INTRAMUSCULAR; INTRAVENOUS; SUBCUTANEOUS at 02:18

## 2025-01-19 RX ADMIN — PANTOPRAZOLE SODIUM 40 MG: 40 TABLET, DELAYED RELEASE ORAL at 06:36

## 2025-01-19 RX ADMIN — HYDROMORPHONE HYDROCHLORIDE 0.5 MG: 1 INJECTION, SOLUTION INTRAMUSCULAR; INTRAVENOUS; SUBCUTANEOUS at 22:02

## 2025-01-19 RX ADMIN — HYDROCODONE BITARTRATE AND ACETAMINOPHEN 2 TABLET: 5; 325 TABLET ORAL at 16:59

## 2025-01-19 RX ADMIN — SODIUM CHLORIDE, PRESERVATIVE FREE 10 ML: 5 INJECTION INTRAVENOUS at 08:06

## 2025-01-19 RX ADMIN — SODIUM CHLORIDE, PRESERVATIVE FREE 5 ML: 5 INJECTION INTRAVENOUS at 22:05

## 2025-01-19 RX ADMIN — HYDROCODONE BITARTRATE AND ACETAMINOPHEN 2 TABLET: 5; 325 TABLET ORAL at 08:06

## 2025-01-19 RX ADMIN — HYDROMORPHONE HYDROCHLORIDE 0.5 MG: 1 INJECTION, SOLUTION INTRAMUSCULAR; INTRAVENOUS; SUBCUTANEOUS at 06:36

## 2025-01-19 RX ADMIN — HYDROMORPHONE HYDROCHLORIDE 0.5 MG: 1 INJECTION, SOLUTION INTRAMUSCULAR; INTRAVENOUS; SUBCUTANEOUS at 12:41

## 2025-01-19 RX ADMIN — ACETAMINOPHEN, ASPIRIN, CAFFEINE 1 TABLET: 250; 65; 250 TABLET, FILM COATED ORAL at 10:10

## 2025-01-19 RX ADMIN — HYDROMORPHONE HYDROCHLORIDE 0.5 MG: 1 INJECTION, SOLUTION INTRAMUSCULAR; INTRAVENOUS; SUBCUTANEOUS at 09:36

## 2025-01-19 RX ADMIN — PRAZOSIN HYDROCHLORIDE 1 MG: 1 CAPSULE ORAL at 22:08

## 2025-01-19 RX ADMIN — LACTULOSE 20 G: 20 SOLUTION ORAL at 06:36

## 2025-01-19 RX ADMIN — ESTRADIOL 0.5 MG: 1 TABLET ORAL at 08:06

## 2025-01-19 RX ADMIN — HYDROCODONE BITARTRATE AND ACETAMINOPHEN 2 TABLET: 5; 325 TABLET ORAL at 23:10

## 2025-01-19 RX ADMIN — HYDROMORPHONE HYDROCHLORIDE 0.5 MG: 1 INJECTION, SOLUTION INTRAMUSCULAR; INTRAVENOUS; SUBCUTANEOUS at 18:56

## 2025-01-19 ASSESSMENT — PAIN - FUNCTIONAL ASSESSMENT
PAIN_FUNCTIONAL_ASSESSMENT: ACTIVITIES ARE NOT PREVENTED

## 2025-01-19 ASSESSMENT — PAIN SCALES - GENERAL
PAINLEVEL_OUTOF10: 10
PAINLEVEL_OUTOF10: 10
PAINLEVEL_OUTOF10: 9
PAINLEVEL_OUTOF10: 8
PAINLEVEL_OUTOF10: 9
PAINLEVEL_OUTOF10: 10
PAINLEVEL_OUTOF10: 9
PAINLEVEL_OUTOF10: 7
PAINLEVEL_OUTOF10: 10
PAINLEVEL_OUTOF10: 10

## 2025-01-19 ASSESSMENT — PAIN DESCRIPTION - LOCATION
LOCATION: ABDOMEN;BACK
LOCATION: HEAD
LOCATION: ABDOMEN
LOCATION: HEAD
LOCATION: ABDOMEN;BACK
LOCATION: ABDOMEN
LOCATION: ABDOMEN

## 2025-01-19 ASSESSMENT — PAIN DESCRIPTION - DESCRIPTORS
DESCRIPTORS: ACHING;SORE
DESCRIPTORS: ACHING
DESCRIPTORS: ACHING
DESCRIPTORS: ACHING;SORE
DESCRIPTORS: ACHING
DESCRIPTORS: ACHING
DESCRIPTORS: DISCOMFORT

## 2025-01-19 ASSESSMENT — PAIN DESCRIPTION - ORIENTATION
ORIENTATION: MID
ORIENTATION: MID;LOWER

## 2025-01-19 NOTE — PLAN OF CARE
Problem: Chronic Conditions and Co-morbidities  Goal: Patient's chronic conditions and co-morbidity symptoms are monitored and maintained or improved  1/19/2025 1116 by Mirela Juarez RN  Outcome: Progressing  Flowsheets (Taken 1/19/2025 1116)  Care Plan - Patient's Chronic Conditions and Co-Morbidity Symptoms are Monitored and Maintained or Improved: Monitor and assess patient's chronic conditions and comorbid symptoms for stability, deterioration, or improvement     Problem: Pain  Goal: Verbalizes/displays adequate comfort level or baseline comfort level  1/19/2025 1116 by Mirela Juarez RN  Outcome: Progressing  Flowsheets (Taken 1/19/2025 1116)  Verbalizes/displays adequate comfort level or baseline comfort level:   Encourage patient to monitor pain and request assistance   Assess pain using appropriate pain scale   Administer analgesics based on type and severity of pain and evaluate response     Problem: Skin/Tissue Integrity - Adult  Goal: Incisions, wounds, or drain sites healing without S/S of infection  1/19/2025 1116 by Mirela Juarez RN  Outcome: Progressing  Flowsheets (Taken 1/19/2025 1116)  Incisions, Wounds, or Drain Sites Healing Without Sign and Symptoms of Infection:   TWICE DAILY: Assess and document skin integrity   TWICE DAILY: Assess and document dressing/incision, wound bed, drain sites and surrounding tissue     Problem: Gastrointestinal - Adult  Goal: Minimal or absence of nausea and vomiting  1/19/2025 1116 by Mirela Juarez RN  Outcome: Progressing  Flowsheets (Taken 1/19/2025 1116)  Minimal or absence of nausea and vomiting: Maintain NPO status until nausea and vomiting are resolved     Problem: Discharge Planning  Goal: Discharge to home or other facility with appropriate resources  1/19/2025 1116 by Mirela Juarez RN  Outcome: Progressing  Flowsheets (Taken 1/19/2025 1116)  Discharge to home or other facility with appropriate resources:   Identify barriers to

## 2025-01-19 NOTE — PROGRESS NOTES
Patient declining to have potassium replacement at this time. She was informed that her potassium level was low at 3.2 but still declining.

## 2025-01-19 NOTE — PROGRESS NOTES
[facility] Infectious Disease         Progress Note      Samantha Nichols  MEDICAL RECORD NUMBER:  [id number]  AGE: 34 y.o.   GENDER: female  : 1990  EPISODE DATE:  2025      Assessment:     Patient is a 34-year-old female who I am consulted for antibiotic recommendations following G-tube dislodgment.  I examined the skin and there is no overt evidence of infection but GI service would like to pursue prophylactic therapy to prevent infection prior to placement of new G-tube on .     Due to numerous antibiotic allergies, prophylaxis is slightly more difficult as really Augmentin would be an ideal agent for this patient I would recommend therapy with clindamycin alone as it has fairly good anaerobic coverage and some staph and strep as well.  My main concern would be overlying fede inoculating the wound bed.  I would continue this therapy until 24 hours following drain placement and then plan on discontinuation.  If an alternative agent for staph and strep is recommended, I would pair a tetracycline with clindamycin.     Currently on therapy with IV clindamycin  Continue therapy until 24 hours following G-tube placement  ID will continue to follow        Subjective:     Chief Complaint   Patient presents with    Feeding Tube Problem    Shortness of Breath         No acute events overnight.  No new complaints or concerns this morning.      Patient Active Problem List   Diagnosis Code    Chronic abdominal pain R10.9, G89.29    Nausea and vomiting R11.2    Carcinoid tumor D3A.00    Pelvic inflammatory disease N73.9    Intractable nausea and vomiting R11.2    Microcytic anemia D50.9    Diet-controlled type 2 diabetes mellitus (HCC) E11.9    Esophageal dysphagia R13.19    Esophageal stricture K22.2    Morbid obesity E66.01    Migraine equivalent G43.109    Mild persistent asthma without complication J45.30    Dyslipidemia E78.5    Moderate persistent asthma with acute exacerbation J45.41  Birth - 19y), IM, 0.5mL 08/12/2004, 10/06/2004, 03/17/2005    Hib vaccine 04/18/1991, 06/14/1991, 09/26/1991, 04/06/1995    Influenza Vaccine, unspecified formulation 10/01/2016    Influenza Virus Vaccine 09/15/2018    Influenza, AFLURIA (age 3 y+), FLUZONE, (age 6 mo+), Quadv MDV, 0.5mL 10/03/2016    Influenza, FLUARIX, FLULAVAL, FLUZONE (age 6 mo+) and AFLURIA, (age 3 y+), Quadv PF, 0.5mL 11/07/2017, 02/11/2020, 09/28/2022, 11/28/2023    Influenza, FLUCELVAX, (age 6 mo+), MDCK, Quadv PF, 0.5mL 11/17/2020    Influenza, FLUZONE High Dose, (age 65 y+), IM, Trivalent PF, 0.5mL 10/22/2015, 08/15/2019    MMR, PRIORIX, M-M-R II, (age 12m+), SC, 0.5mL 09/26/1991, 06/24/1996, 08/08/2001    Pneumococcal Conjugate 7-valent (Prevnar7) 02/11/2020    Pneumococcal, PPSV23, PNEUMOVAX 23, (age 2y+), SC/IM, 0.5mL 05/02/2014, 10/13/2016, 06/06/2024    Polio OPV 1990, 01/09/1991, 12/26/1991, 06/29/1994    TD 2LF, TDVAX, (age 7y+), IM, 0.5mL 08/12/2004    TDaP, ADACEL (age 10y-64y), BOOSTRIX (age 10y+), IM, 0.5mL 02/11/2020    Td vaccine (adult) 08/12/2004       PHYSICAL EXAM    /63   Pulse 89   Temp 97.8 °F (36.6 °C) (Oral)   Resp 18   Ht 1.626 m (5' 4\")   Wt 129.3 kg (285 lb)   LMP 07/11/2015   SpO2 98%   BMI 48.92 kg/m²   General:  Awake, alert, not in distress.  HEENT: pink conjunctiva, unicteric sclera, moist oral mucosa.  Chest:  bilateral air entry, Clear to auscultation,   Cardiovascular:  RRR ,S1S2, no murmur or gallop.  Abdomen:  Soft, non tender to palpation.  Extremities: no edema  Skin:  Warm and dry.  CNS: aox3    Wound 01/17/25 Abdomen Left;Upper old g tube site (Active)   Dressing Status Clean;Dry;Intact 01/17/25 2031   Dressing/Treatment Antibacterial ointment 01/17/25 2031   Number of days: 0       LABS       CBC:   Lab Results   Component Value Date/Time    WBC 9.3 01/19/2025 07:10 AM    HGB 11.9 01/19/2025 07:10 AM    HGB 12.2 05/19/2012 03:40 PM    HCT 38.3 01/19/2025 07:10 AM    MCV 77.2

## 2025-01-19 NOTE — PLAN OF CARE
Problem: Chronic Conditions and Co-morbidities  Goal: Patient's chronic conditions and co-morbidity symptoms are monitored and maintained or improved  Flowsheets (Taken 1/19/2025 0329)  Care Plan - Patient's Chronic Conditions and Co-Morbidity Symptoms are Monitored and Maintained or Improved: Monitor and assess patient's chronic conditions and comorbid symptoms for stability, deterioration, or improvement     Problem: Pain  Goal: Verbalizes/displays adequate comfort level or baseline comfort level  Flowsheets (Taken 1/19/2025 0329)  Verbalizes/displays adequate comfort level or baseline comfort level:   Encourage patient to monitor pain and request assistance   Assess pain using appropriate pain scale   Administer analgesics based on type and severity of pain and evaluate response   Implement non-pharmacological measures as appropriate and evaluate response     Problem: Skin/Tissue Integrity - Adult  Goal: Incisions, wounds, or drain sites healing without S/S of infection  Flowsheets (Taken 1/19/2025 0329)  Incisions, Wounds, or Drain Sites Healing Without Sign and Symptoms of Infection: Implement wound care per orders     Problem: Discharge Planning  Goal: Discharge to home or other facility with appropriate resources  Flowsheets (Taken 1/19/2025 0329)  Discharge to home or other facility with appropriate resources:   Identify barriers to discharge with patient and caregiver   Arrange for needed discharge resources and transportation as appropriate   Identify discharge learning needs (meds, wound care, etc)     Problem: Safety - Adult  Goal: Free from fall injury  Flowsheets (Taken 1/19/2025 0329)  Free From Fall Injury: Instruct family/caregiver on patient safety    Care plan reviewed with patient.  Patient verbalizes understanding of the plan of care and contributes to goal setting.

## 2025-01-19 NOTE — PROGRESS NOTES
Hospitalist Progress Note    Patient:  Samantha Nichols      Unit/Bed:5K-14/014-A    YOB: 1990    MRN: [id number]       Acct: [id number]     PCP: Juan Dominguez DO    Date of Admission: 1/16/2025    Assessment/Plan:    Malfunctioning G-tube: secondary to dislodged G-tube.  CT a/P with no acute intra-abdominal process and appropriate G-tube.  EGD showed G-tube migration into submucosa.  Removed by GI, started on clindamycin for empiric antibiotic coverage.  ID consulted assist.  Continue pain control.  Reevaluation 1/20 with possible replacement of tube  Functional dyspepsia, intractable gastroparesis: Associated intractable nausea and vomiting, recurrent.  S/p fundoplication x2 secondary to history of neuroendocrine tumor of the duodenum. Multple EGDs. Previous esophagram showing no hiatal hernia, reflux, or gross stricture.  Continue Protonix and IV antiemetics as needed.  HTN: Continue amlodipine 10 mg p.o. daily with holding parameters  NIDDMII: Last HgbA1c 6.0 12/2024.  Home antidiabetic includes metformin.  Monitor blood glucose with daily BMP.  History of duodenal NET: Diagnosed in 2012, cardiac incarcerated incisional hernia s/p open repair of incarcerated hernia 1213.        Expected discharge date:  1/20/2025    Disposition:    [x] Home       [] TCU       [] Rehab       [] Psych       [] SNF       [] Long Term Care Facility       [] Other-    Chief Complaint: G-tube complications    Hospital Course:   Per HPI, \"Samantha Nichols is a 34 y.o. female with PMHx of gastroparesis s/p G-tube placement 12/24, duodenal neuroendocrine tumor, sickle cell, post fundoplication dysphagia, essential hypertension, cholecystecomy, BL oopherectomy, who presents to [facility] with concerns of pain around G-tube, radiating to back and BL flanks. Patient states she has been unable to flush her G-tube and contacted her GI provider Dr. Lara who advised her to use coca-cola to try to

## 2025-01-20 ENCOUNTER — APPOINTMENT (OUTPATIENT)
Dept: CT IMAGING | Age: 35
DRG: 394 | End: 2025-01-20
Payer: MEDICARE

## 2025-01-20 ENCOUNTER — APPOINTMENT (OUTPATIENT)
Dept: ENDOSCOPY | Age: 35
DRG: 394 | End: 2025-01-20
Attending: INTERNAL MEDICINE
Payer: MEDICARE

## 2025-01-20 LAB
ANION GAP SERPL CALC-SCNC: 12 MEQ/L (ref 8–16)
BUN SERPL-MCNC: 8 MG/DL (ref 7–22)
CALCIUM SERPL-MCNC: 9.2 MG/DL (ref 8.5–10.5)
CHLORIDE SERPL-SCNC: 103 MEQ/L (ref 98–111)
CO2 SERPL-SCNC: 25 MEQ/L (ref 23–33)
CREAT SERPL-MCNC: 0.7 MG/DL (ref 0.4–1.2)
DEPRECATED RDW RBC AUTO: 40.7 FL (ref 35–45)
ERYTHROCYTE [DISTWIDTH] IN BLOOD BY AUTOMATED COUNT: 14.8 % (ref 11.5–14.5)
GFR SERPL CREATININE-BSD FRML MDRD: > 90 ML/MIN/1.73M2
GLUCOSE SERPL-MCNC: 99 MG/DL (ref 70–108)
HCT VFR BLD AUTO: 37.7 % (ref 37–47)
HGB BLD-MCNC: 11.7 GM/DL (ref 12–16)
MCH RBC QN AUTO: 23.7 PG (ref 26–33)
MCHC RBC AUTO-ENTMCNC: 31 GM/DL (ref 32.2–35.5)
MCV RBC AUTO: 76.5 FL (ref 81–99)
PLATELET # BLD AUTO: 277 THOU/MM3 (ref 130–400)
PMV BLD AUTO: 9.2 FL (ref 9.4–12.4)
POTASSIUM SERPL-SCNC: 3.4 MEQ/L (ref 3.5–5.2)
RBC # BLD AUTO: 4.93 MILL/MM3 (ref 4.2–5.4)
SODIUM SERPL-SCNC: 140 MEQ/L (ref 135–145)
WBC # BLD AUTO: 7.6 THOU/MM3 (ref 4.8–10.8)

## 2025-01-20 PROCEDURE — 85027 COMPLETE CBC AUTOMATED: CPT

## 2025-01-20 PROCEDURE — 6370000000 HC RX 637 (ALT 250 FOR IP)

## 2025-01-20 PROCEDURE — 6370000000 HC RX 637 (ALT 250 FOR IP): Performed by: STUDENT IN AN ORGANIZED HEALTH CARE EDUCATION/TRAINING PROGRAM

## 2025-01-20 PROCEDURE — 6360000002 HC RX W HCPCS: Performed by: INTERNAL MEDICINE

## 2025-01-20 PROCEDURE — 6360000002 HC RX W HCPCS: Performed by: NURSE PRACTITIONER

## 2025-01-20 PROCEDURE — 6360000004 HC RX CONTRAST MEDICATION: Performed by: STUDENT IN AN ORGANIZED HEALTH CARE EDUCATION/TRAINING PROGRAM

## 2025-01-20 PROCEDURE — 99233 SBSQ HOSP IP/OBS HIGH 50: CPT | Performed by: STUDENT IN AN ORGANIZED HEALTH CARE EDUCATION/TRAINING PROGRAM

## 2025-01-20 PROCEDURE — 1200000000 HC SEMI PRIVATE

## 2025-01-20 PROCEDURE — 99232 SBSQ HOSP IP/OBS MODERATE 35: CPT | Performed by: STUDENT IN AN ORGANIZED HEALTH CARE EDUCATION/TRAINING PROGRAM

## 2025-01-20 PROCEDURE — 2500000003 HC RX 250 WO HCPCS

## 2025-01-20 PROCEDURE — 74177 CT ABD & PELVIS W/CONTRAST: CPT

## 2025-01-20 PROCEDURE — 80048 BASIC METABOLIC PNL TOTAL CA: CPT

## 2025-01-20 PROCEDURE — 6360000002 HC RX W HCPCS: Performed by: STUDENT IN AN ORGANIZED HEALTH CARE EDUCATION/TRAINING PROGRAM

## 2025-01-20 RX ORDER — IOPAMIDOL 755 MG/ML
80 INJECTION, SOLUTION INTRAVASCULAR
Status: COMPLETED | OUTPATIENT
Start: 2025-01-20 | End: 2025-01-20

## 2025-01-20 RX ADMIN — HYDROMORPHONE HYDROCHLORIDE 0.5 MG: 1 INJECTION, SOLUTION INTRAMUSCULAR; INTRAVENOUS; SUBCUTANEOUS at 14:16

## 2025-01-20 RX ADMIN — ESTRADIOL 0.5 MG: 1 TABLET ORAL at 10:18

## 2025-01-20 RX ADMIN — LACTULOSE 20 G: 20 SOLUTION ORAL at 21:04

## 2025-01-20 RX ADMIN — HYDROMORPHONE HYDROCHLORIDE 0.5 MG: 1 INJECTION, SOLUTION INTRAMUSCULAR; INTRAVENOUS; SUBCUTANEOUS at 16:41

## 2025-01-20 RX ADMIN — CLINDAMYCIN PHOSPHATE 600 MG: 600 INJECTION, SOLUTION INTRAVENOUS at 18:40

## 2025-01-20 RX ADMIN — HYDROMORPHONE HYDROCHLORIDE 0.5 MG: 1 INJECTION, SOLUTION INTRAMUSCULAR; INTRAVENOUS; SUBCUTANEOUS at 04:35

## 2025-01-20 RX ADMIN — SODIUM CHLORIDE, PRESERVATIVE FREE 10 ML: 5 INJECTION INTRAVENOUS at 09:48

## 2025-01-20 RX ADMIN — HYDROMORPHONE HYDROCHLORIDE 0.5 MG: 1 INJECTION, SOLUTION INTRAMUSCULAR; INTRAVENOUS; SUBCUTANEOUS at 18:53

## 2025-01-20 RX ADMIN — HYDROCODONE BITARTRATE AND ACETAMINOPHEN 2 TABLET: 5; 325 TABLET ORAL at 12:40

## 2025-01-20 RX ADMIN — HYDROMORPHONE HYDROCHLORIDE 0.5 MG: 1 INJECTION, SOLUTION INTRAMUSCULAR; INTRAVENOUS; SUBCUTANEOUS at 09:46

## 2025-01-20 RX ADMIN — CLINDAMYCIN PHOSPHATE 600 MG: 600 INJECTION, SOLUTION INTRAVENOUS at 01:36

## 2025-01-20 RX ADMIN — SODIUM CHLORIDE, PRESERVATIVE FREE 10 ML: 5 INJECTION INTRAVENOUS at 20:18

## 2025-01-20 RX ADMIN — HYDROMORPHONE HYDROCHLORIDE 0.5 MG: 1 INJECTION, SOLUTION INTRAMUSCULAR; INTRAVENOUS; SUBCUTANEOUS at 23:28

## 2025-01-20 RX ADMIN — CLINDAMYCIN PHOSPHATE 600 MG: 600 INJECTION, SOLUTION INTRAVENOUS at 10:21

## 2025-01-20 RX ADMIN — AMLODIPINE BESYLATE 10 MG: 10 TABLET ORAL at 10:18

## 2025-01-20 RX ADMIN — HYDROMORPHONE HYDROCHLORIDE 0.5 MG: 1 INJECTION, SOLUTION INTRAMUSCULAR; INTRAVENOUS; SUBCUTANEOUS at 21:04

## 2025-01-20 RX ADMIN — HYDROMORPHONE HYDROCHLORIDE 0.5 MG: 1 INJECTION, SOLUTION INTRAMUSCULAR; INTRAVENOUS; SUBCUTANEOUS at 01:32

## 2025-01-20 RX ADMIN — ACETAMINOPHEN, ASPIRIN, CAFFEINE 1 TABLET: 250; 65; 250 TABLET, FILM COATED ORAL at 05:16

## 2025-01-20 RX ADMIN — PANTOPRAZOLE SODIUM 40 MG: 40 TABLET, DELAYED RELEASE ORAL at 05:16

## 2025-01-20 RX ADMIN — HYDROMORPHONE HYDROCHLORIDE 0.5 MG: 1 INJECTION, SOLUTION INTRAMUSCULAR; INTRAVENOUS; SUBCUTANEOUS at 06:54

## 2025-01-20 RX ADMIN — IOPAMIDOL 80 ML: 755 INJECTION, SOLUTION INTRAVENOUS at 07:10

## 2025-01-20 RX ADMIN — HYDROMORPHONE HYDROCHLORIDE 0.5 MG: 1 INJECTION, SOLUTION INTRAMUSCULAR; INTRAVENOUS; SUBCUTANEOUS at 00:55

## 2025-01-20 RX ADMIN — HYDROCODONE BITARTRATE AND ACETAMINOPHEN 2 TABLET: 5; 325 TABLET ORAL at 05:16

## 2025-01-20 ASSESSMENT — PAIN SCALES - GENERAL
PAINLEVEL_OUTOF10: 9
PAINLEVEL_OUTOF10: 5
PAINLEVEL_OUTOF10: 10
PAINLEVEL_OUTOF10: 10
PAINLEVEL_OUTOF10: 9
PAINLEVEL_OUTOF10: 9
PAINLEVEL_OUTOF10: 7
PAINLEVEL_OUTOF10: 8
PAINLEVEL_OUTOF10: 7
PAINLEVEL_OUTOF10: 7
PAINLEVEL_OUTOF10: 9
PAINLEVEL_OUTOF10: 7
PAINLEVEL_OUTOF10: 9
PAINLEVEL_OUTOF10: 9
PAINLEVEL_OUTOF10: 7
PAINLEVEL_OUTOF10: 10
PAINLEVEL_OUTOF10: 7

## 2025-01-20 ASSESSMENT — PAIN - FUNCTIONAL ASSESSMENT
PAIN_FUNCTIONAL_ASSESSMENT: ACTIVITIES ARE NOT PREVENTED

## 2025-01-20 ASSESSMENT — PAIN DESCRIPTION - LOCATION
LOCATION: GENERALIZED
LOCATION: ABDOMEN;BACK
LOCATION: FLANK
LOCATION: ABDOMEN;BACK

## 2025-01-20 ASSESSMENT — PAIN DESCRIPTION - ORIENTATION
ORIENTATION: MID
ORIENTATION: INNER
ORIENTATION: MID
ORIENTATION: ANTERIOR
ORIENTATION: MID
ORIENTATION: LEFT

## 2025-01-20 ASSESSMENT — PAIN DESCRIPTION - DESCRIPTORS
DESCRIPTORS: SHARP
DESCRIPTORS: ACHING
DESCRIPTORS: ACHING
DESCRIPTORS: ACHING;JABBING
DESCRIPTORS: ACHING;NAGGING
DESCRIPTORS: DISCOMFORT;ACHING
DESCRIPTORS: JABBING

## 2025-01-20 ASSESSMENT — PAIN DESCRIPTION - FREQUENCY: FREQUENCY: CONTINUOUS

## 2025-01-20 ASSESSMENT — PAIN DESCRIPTION - ONSET: ONSET: ON-GOING

## 2025-01-20 ASSESSMENT — PAIN DESCRIPTION - PAIN TYPE: TYPE: ACUTE PAIN

## 2025-01-20 NOTE — SIGNIFICANT EVENT
Patient requesting something more for complaint of abdominal pain.  #10/10   Patient specifically requested \"an additional dose of IV Dilaudid \".  Looking back in history it appears the last 2 nights she has required this.    Currently waiting on reevaluation of a malfunctioning G-tube with migration into submucosa that required removal.    Additional IVP dose was given  Nursing responded with request from patient of a Dilaudid PCA pump.  Nursing identified that patient had reached out to a family member with this request.

## 2025-01-20 NOTE — SIGNIFICANT EVENT
Patient is requesting provider to come to the bedside and demanding a Dilaudid PCA pump    #10/10 abdominal pain right lower quadrant with radiation through to her back.  Abdomen appears soft  Pain is aggravated with movement and palpation.  CT imaging orders have been placed for further evaluation.  I did agree to increase frequency of Dilaudid until after imaging has been reviewed.

## 2025-01-20 NOTE — PROGRESS NOTES
Patient has been adamant about this RN contacting provider due to uncontrollable pain. When Hospitalist entered room patients eyes were closed and patient was quiet. This RN Is letting patient rest currently.

## 2025-01-20 NOTE — PROGRESS NOTES
Hospitalist Progress Note    Patient:  Samantha Nichols      Unit/Bed:5K-14/014-A    YOB: 1990    MRN: [id number]       Acct: [id number]     PCP: Juan Dominguez DO    Date of Admission: 1/16/2025    Assessment/Plan:    S/p EGD and Malfunctioning G-tube 1/16/25: By Dr. Lara - secondary to dislodged G-tube. Per Dr. Lara, obtain CT A/P with contrast - showed \"fistulous tract is a consideration.\" Reached out to Dr. Lara who stated she would need to be evaluated for J-tube by general surgery. Consulted gen surg. Continue pain control.   Functional dyspepsia, intractable gastroparesis: Associated intractable nausea and vomiting, recurrent.  S/p fundoplication x2 secondary to history of neuroendocrine tumor of the duodenum. Multple EGDs. Previous esophagram showing no hiatal hernia, reflux, or gross stricture.  Continue Protonix and IV antiemetics as needed.  HTN: Continue amlodipine 10 mg p.o. daily with holding parameters  Hypokalemia: Replace per protocol.  NIDDMII: Last HgbA1c 6.0 12/2024.  Home antidiabetic includes metformin.  Monitor blood glucose with daily BMP.  History of duodenal NET: Diagnosed in 2012, cardiac incarcerated incisional hernia s/p open repair of incarcerated hernia 1213.      Expected discharge date:  1/21/2025    Disposition:    [x] Home       [] TCU       [] Rehab       [] Psych       [] SNF       [] Long Term Care Facility       [] Other-    Chief Complaint: G-tube complications    Hospital Course:   Per HPI, \"Samantha Nichols is a 34 y.o. female with PMHx of gastroparesis s/p G-tube placement 12/24, duodenal neuroendocrine tumor, sickle cell, post fundoplication dysphagia, essential hypertension, cholecystecomy, BL oopherectomy, who presents to [facility] with concerns of pain around G-tube, radiating to back and BL flanks. Patient states she has been unable to flush her G-tube and contacted her GI provider Dr. Lara who advised her

## 2025-01-20 NOTE — CARE COORDINATION
1/20/25, 3:10 PM EST    DISCHARGE ON GOING EVALUATION    Samantha THOMAS [facility] day: 4  Location: -14/014-A Reason for admit: Left flank pain [R10.9]  Left sided abdominal pain [R10.9]  Feeding tube dysfunction, initial encounter [T85.320Z]  Complication of gastrostomy tube (HCC) [K94.20]     Procedures: Planning for G/J-tube placement    Imaging since last note:   1/20 CT of abdomen    Barriers to Discharge: Hospitalist and GI following, GI unable to place G-tube, General Surgery consulted, IV Cleocin q 8 hr., Lovenox on hold, Lactulose q 8 hr., prn Norco, Dilaudid, and Compazine, electrolyte replacement protocol, daily BMP and CBC, up as tolerated.     PCP: Juan Dominguez DO  Readmission Risk Score: 27.8    Patient Goals/Plan/Treatment Preferences: Samantha is from home with her wife. She has enteral feeding supplies from [facility]. She has been accepted to [facility] by [facility].

## 2025-01-20 NOTE — ADT AUTH CERT
Utilization Reviews       PA INPT rec letter   Last updated by Tanvi Washington RN on 2025 1554     Review Status Created By   In Primary Tanvi Washington RN       Review Type Associated Date   -- 2025      Criteria Review   Name: Samantha Nichols  : 1990  CSN: [id number]  INSURANCE: [employer]     Date of admission: 25  Date of discharge:     Current status: Inpatient     RECOMMENDATION:  KEEP CURRENT STATUS. No status change indicated.     RATIONALE: 1. Malfunctioning G-tube: secondary to dislodged G-tube. EGD showed G-tube migration into submucosa. Removed by GI, started on clindamycin for empiric antibiotic coverage. ID consulted assist. Continue pain control. Reevaluation  with possible replacement of tube  Functional dyspepsia, intractable gastroparesis: Associated intractable nausea and vomiting, recurrent.        Clinical summary 34 y.o. female with G tube dispalcement.  Vitals reviewed  Labs and Imaging As above     This chart was reviewed at 9:30 PM EST on 2025     Florence Camara MD  Physician Advisor  [employer]  Cell: [phone]                        Last updated by Celine Krishna RN on 2025 1702     Review Status Created By   In Primary Celine Krishna RN       Review Type Associated Date   -- 2025      Criteria Review   DATE:   TYPE OF BED: med surg     Patient has or is expected to exceed 2 midnights of medically necessary care.         RELEVANT BASELINES: (lab values, vitals, o2 amount/delivery, etc.)  feeding tube placed on 2024 due to gastroparesis.      PERTINENT UPDATES:  Gastro consulted  G Tube removed  There is a tentative plan for G tube replacement on  with the GI service.   Required iv pain medication  ID consulted  Stop metronidazole, Continue clindamycin        VITALS:  98.3  18  101  147/76  93% RA     ABNL/PERTINENT LABS/RADIOLOGY/DIAGNOSTIC STUDIES:  Hgb 11.9           MD

## 2025-01-20 NOTE — PROGRESS NOTES
[facility] Infectious Disease         Progress Note      Samantha Nichols  MEDICAL RECORD NUMBER:  [id number]  AGE: 34 y.o.   GENDER: female  : 1990  EPISODE DATE:  2025      Assessment:     Patient is a 34-year-old female who I am consulted for antibiotic recommendations following G-tube dislodgment.  I examined the skin and there is no overt evidence of infection but GI service would like to pursue prophylactic therapy to prevent infection prior to placement of new G-tube on .     Due to numerous antibiotic allergies, prophylaxis is slightly more difficult as really Augmentin would be an ideal agent for this patient I would recommend therapy with clindamycin alone as it has fairly good anaerobic coverage and some staph and strep as well.  My main concern would be overlying fede inoculating the wound bed.  I would continue this therapy until 24 hours following drain placement and then plan on discontinuation.  If an alternative agent for staph and strep is recommended, I would pair a tetracycline with clindamycin.     Currently on therapy with IV clindamycin  Continue therapy until 24 hours following G-tube placement  ID will continue to follow        Subjective:     Chief Complaint   Patient presents with    Feeding Tube Problem    Shortness of Breath         No acute events overnight.  No new complaints or concerns this morning.      Patient Active Problem List   Diagnosis Code    Chronic abdominal pain R10.9, G89.29    Nausea and vomiting R11.2    Carcinoid tumor D3A.00    Pelvic inflammatory disease N73.9    Intractable nausea and vomiting R11.2    Microcytic anemia D50.9    Diet-controlled type 2 diabetes mellitus (HCC) E11.9    Esophageal dysphagia R13.19    Esophageal stricture K22.2    Morbid obesity E66.01    Migraine equivalent G43.109    Mild persistent asthma without complication J45.30    Dyslipidemia E78.5    Moderate persistent asthma with acute exacerbation J45.41  01/20/2025 06:15 AM    MCV 76.5 01/20/2025 06:15 AM     01/20/2025 06:15 AM     BMP:   Lab Results   Component Value Date/Time     01/19/2025 07:10 AM    K 3.2 01/19/2025 07:10 AM    K 3.4 12/26/2024 06:35 AM     01/19/2025 07:10 AM    CO2 22 01/19/2025 07:10 AM    PHOS 3.2 12/22/2024 09:45 PM    BUN 10 01/19/2025 07:10 AM    CREATININE 0.7 01/19/2025 07:10 AM     PT/INR:   Lab Results   Component Value Date/Time    PROTIME 1.09 09/24/2011 05:36 AM    INR 0.96 08/22/2022 10:18 AM     Prealbumin:   Lab Results   Component Value Date/Time    PREALBUMIN 9.3 01/21/2023 12:13 PM     Albumin:No results found for: \"LABALBU\"  Sed Rate:  Lab Results   Component Value Date/Time    SEDRATE 20 11/10/2021 08:32 PM     CRP:   Lab Results   Component Value Date/Time    CRP 1.44 12/20/2024 02:50 AM     Micro:   Lab Results   Component Value Date/Time    BC  03/29/2024 12:15 PM     No growth 24 hours. No growth 48 hours. No growth at 5 days      Hemoglobin A1C:   Lab Results   Component Value Date/Time    LABA1C 6.0 12/19/2024 01:13 PM    LABA1C 5.4 06/07/2024 04:14 PM           Electronically signed by Juma Coombs MD TD@ at 6:35 AM

## 2025-01-20 NOTE — PROGRESS NOTES
Consult received.  Patient has fired our services in the past.  Due to conflict of interest recommend tertiary center evaluation or may reach out to other providers in our office to see if they would accept patient.

## 2025-01-20 NOTE — PROGRESS NOTES
Progress note: GI    Patient - Samantha Nichols,  Age - 34 y.o.    - 1990      Room Number - [facility] ENDO POOL RM/NONE   MRN -  [id number]   [facility] # - [id number]  Date of Admission -  2025 10:42 AM    SUBJECTIVE:   Pain is better. No n,v.  OBJECTIVE   VITALS    height is 1.626 m (5' 4\") and weight is 129.3 kg (285 lb). Her oral temperature is 98 °F (36.7 °C). Her blood pressure is 118/77 and her pulse is 74. Her respiration is 16 and oxygen saturation is 96%.       Wt Readings from Last 3 Encounters:   25 129.3 kg (285 lb)   25 132.8 kg (292 lb 12.8 oz)   24 129.8 kg (286 lb 3.2 oz)       I/O (24 Hours)    Intake/Output Summary (Last 24 hours) at 2025 0915  Last data filed at 2025 2231  Gross per 24 hour   Intake 0 ml   Output 0 ml   Net 0 ml       General Appearance  Awake, alert, oriented,  not  In acute distress  HEENT - normocephalic, atraumatic, pink conjunctiva,  anicteric sclera  Neck - Supple, no mass  Lungs -  Bilateral good air entry, no rhonchi, no wheeze  Cardiovascular - Heart sounds are normal.  Regular rate and rhythm without murmur, gallop or rub.  Abdomen - soft, not distended, ++tender, no organomegally,  Neurologic - Awake, alert, oriented to name, place and time.no deficit  Skin - No bruising or bleeding  Extremities - No edema, no cyanosis, clubbing     MEDICATIONS:      clindamycin (CLEOCIN) IV  600 mg IntraVENous Q8H    pantoprazole  40 mg Oral QAM AC    sodium chloride flush  5-40 mL IntraVENous 2 times per day    [Held by provider] enoxaparin  30 mg SubCUTAneous BID    amLODIPine  10 mg Oral Daily    estradiol  0.5 mg Oral Daily    lactulose  30 mL Oral q8h    prazosin  1 mg Oral Nightly      sodium chloride       HYDROmorphone **OR** HYDROmorphone, HYDROcodone 5 mg - acetaminophen **OR** HYDROcodone 5 mg - acetaminophen, prochlorperazine, aspirin-acetaminophen-caffeine, sodium chloride flush, sodium chloride, potassium chloride **OR** potassium  vomiting    Intractable abdominal pain    CAD (coronary artery disease)    History of malignant carcinoid tumor    JOVANY on CPAP    Sickle cell trait (HCC)    Epilepsy (HCC)    Migration of percutaneous endoscopic gastrostomy (PEG) tube (HCC)    Gastroesophageal reflux disease without esophagitis    Tobacco dependence syndrome    Local infection due to Port-A-Cath    Abdominal pain    Type 2 diabetes mellitus with obesity (HCC)    Depression with suicidal ideation    Suicidal behavior with attempted self-injury (HCC)    Intractable vomiting with nausea    History of anaphylaxis    Penicillin allergy         ASSESSMENT/PLAN     Patient Active Problem List   Diagnosis    Chronic abdominal pain    Nausea and vomiting    Carcinoid tumor    Pelvic inflammatory disease    Intractable nausea and vomiting    Microcytic anemia    Diet-controlled type 2 diabetes mellitus (HCC)    Esophageal dysphagia    Esophageal stricture    Morbid obesity    Migraine equivalent    Mild persistent asthma without complication    Dyslipidemia    Moderate persistent asthma with acute exacerbation    Gastroenteritis    Hematuria    Diarrhea    Diarrhea of presumed infectious origin    Hematochezia    Generalized abdominal pain    Hypertrophy of tonsil and adenoid    Multiple allergies    S/P T&A (status post tonsillectomy and adenoidectomy)    Iron deficiency anemia, unspecified    Poor intravenous access    Abnormal Pap smear of cervix    Abnormal uterine bleeding    Asthma    Other chest pain    Female pelvic pain    Head ache    Heartburn    Incarcerated ventral hernia    Malignant carcinoid tumor of other sites (HCC)    Pyelonephritis    Spigelian hernia    Uterine leiomyoma    Acute kidney injury (HCC)    Hypernatremia    Hypokalemia    Volume depletion, gastrointestinal loss    Essential hypertension    Hiatal hernia    S/P Nissen fundoplication (without gastrostomy tube) procedure    Sickle cell disease (HCC)    Callus of heel    Foot

## 2025-01-20 NOTE — PROGRESS NOTES
Comprehensive Nutrition Assessment    Type and Reason for Visit:  Reassess    Nutrition Recommendations/Plan:   When able/have feeding tube access- recommend resume TF Vital 1.2 at 60 ml/hr (goal).    Suggest flush 180 ml free water flush every 8 hours.    Note - pt declines having NGT for TF until feeding tube replaced.  Recommend continue f/u with OP RD (last appointment 1/13/25).     Malnutrition Assessment:  Malnutrition Status:  At risk for malnutrition (01/17/25 1230)    Context:  Chronic Illness     Findings of the 6 clinical characteristics of malnutrition:  Energy Intake:   (NPO/TF held since 1/17)  Weight Loss:  No weight loss     Body Fat Loss:  No body fat loss     Muscle Mass Loss:  No muscle mass loss    Fluid Accumulation:  Unable to assess     Strength:  Not Performed    Nutrition Assessment:     Pt. nutritionally compromised AEB NPO status, feeding tube removed; plan replace 1/20. At risk for further nutrition compromise r/t admit with recurrent nausea/ vomiting, gastroparesis, abdominal pain, and underlying medical condition (duodenal neuro endocrine tumor 2012 complicated by incarcerated incisional hernia s/p open repair of incarcerated hernia in 2013, CHF, CAD, depression, DM2 with A1C (12/19/24) 6%, epilepsy, GERD).        Nutrition Related Findings:    Pt. Report/Treatments/Miscellaneous:   1/20-pt. Seen - c/o feeling weak; continues to decline NGT for TF until feeding tube replaced; CT scan reviewed with Dr. Bush -stated fistula with air bubbles; We don't place g tube with fistula; P- continue anti-biotics, Please get surgical j tube or transfer to tertiary care center; pt. Reports having difficulty tolerating water flushes pta - voices frustration that can't get a pump that can give programmed flushes; discussed possibility of changing home infusion companies if u/a to get different pump -however feel J tube placement should help tolerance  1/17-pt. Seen s/p EGD; states not feeling

## 2025-01-21 PROBLEM — T85.598A FEEDING TUBE DYSFUNCTION: Status: ACTIVE | Noted: 2025-01-21

## 2025-01-21 LAB
ANION GAP SERPL CALC-SCNC: 12 MEQ/L (ref 8–16)
BUN SERPL-MCNC: 10 MG/DL (ref 7–22)
CALCIUM SERPL-MCNC: 9.4 MG/DL (ref 8.5–10.5)
CHLORIDE SERPL-SCNC: 104 MEQ/L (ref 98–111)
CO2 SERPL-SCNC: 24 MEQ/L (ref 23–33)
CREAT SERPL-MCNC: 0.7 MG/DL (ref 0.4–1.2)
DEPRECATED RDW RBC AUTO: 41.6 FL (ref 35–45)
ERYTHROCYTE [DISTWIDTH] IN BLOOD BY AUTOMATED COUNT: 15.1 % (ref 11.5–14.5)
GFR SERPL CREATININE-BSD FRML MDRD: > 90 ML/MIN/1.73M2
GLUCOSE SERPL-MCNC: 99 MG/DL (ref 70–108)
HCT VFR BLD AUTO: 39.9 % (ref 37–47)
HGB BLD-MCNC: 12.3 GM/DL (ref 12–16)
MCH RBC QN AUTO: 23.7 PG (ref 26–33)
MCHC RBC AUTO-ENTMCNC: 30.8 GM/DL (ref 32.2–35.5)
MCV RBC AUTO: 76.7 FL (ref 81–99)
PLATELET # BLD AUTO: 270 THOU/MM3 (ref 130–400)
PMV BLD AUTO: 9.1 FL (ref 9.4–12.4)
POTASSIUM SERPL-SCNC: 3.8 MEQ/L (ref 3.5–5.2)
RBC # BLD AUTO: 5.2 MILL/MM3 (ref 4.2–5.4)
SODIUM SERPL-SCNC: 140 MEQ/L (ref 135–145)
WBC # BLD AUTO: 7.2 THOU/MM3 (ref 4.8–10.8)

## 2025-01-21 PROCEDURE — 85027 COMPLETE CBC AUTOMATED: CPT

## 2025-01-21 PROCEDURE — 36415 COLL VENOUS BLD VENIPUNCTURE: CPT

## 2025-01-21 PROCEDURE — 2500000003 HC RX 250 WO HCPCS

## 2025-01-21 PROCEDURE — 99233 SBSQ HOSP IP/OBS HIGH 50: CPT | Performed by: STUDENT IN AN ORGANIZED HEALTH CARE EDUCATION/TRAINING PROGRAM

## 2025-01-21 PROCEDURE — 6370000000 HC RX 637 (ALT 250 FOR IP): Performed by: STUDENT IN AN ORGANIZED HEALTH CARE EDUCATION/TRAINING PROGRAM

## 2025-01-21 PROCEDURE — 6360000002 HC RX W HCPCS: Performed by: NURSE PRACTITIONER

## 2025-01-21 PROCEDURE — 6360000002 HC RX W HCPCS: Performed by: INTERNAL MEDICINE

## 2025-01-21 PROCEDURE — 99222 1ST HOSP IP/OBS MODERATE 55: CPT | Performed by: SURGERY

## 2025-01-21 PROCEDURE — 6370000000 HC RX 637 (ALT 250 FOR IP)

## 2025-01-21 PROCEDURE — 1200000000 HC SEMI PRIVATE

## 2025-01-21 PROCEDURE — 6360000002 HC RX W HCPCS

## 2025-01-21 PROCEDURE — 80048 BASIC METABOLIC PNL TOTAL CA: CPT

## 2025-01-21 PROCEDURE — 99232 SBSQ HOSP IP/OBS MODERATE 35: CPT | Performed by: STUDENT IN AN ORGANIZED HEALTH CARE EDUCATION/TRAINING PROGRAM

## 2025-01-21 RX ORDER — FLUCONAZOLE 200 MG/1
200 TABLET ORAL ONCE
Status: COMPLETED | OUTPATIENT
Start: 2025-01-21 | End: 2025-01-21

## 2025-01-21 RX ADMIN — HYDROMORPHONE HYDROCHLORIDE 0.5 MG: 1 INJECTION, SOLUTION INTRAMUSCULAR; INTRAVENOUS; SUBCUTANEOUS at 02:28

## 2025-01-21 RX ADMIN — SODIUM CHLORIDE, PRESERVATIVE FREE 10 ML: 5 INJECTION INTRAVENOUS at 09:56

## 2025-01-21 RX ADMIN — HYDROMORPHONE HYDROCHLORIDE 0.5 MG: 1 INJECTION, SOLUTION INTRAMUSCULAR; INTRAVENOUS; SUBCUTANEOUS at 17:55

## 2025-01-21 RX ADMIN — HYDROMORPHONE HYDROCHLORIDE 0.5 MG: 1 INJECTION, SOLUTION INTRAMUSCULAR; INTRAVENOUS; SUBCUTANEOUS at 08:46

## 2025-01-21 RX ADMIN — HYDROMORPHONE HYDROCHLORIDE 0.5 MG: 1 INJECTION, SOLUTION INTRAMUSCULAR; INTRAVENOUS; SUBCUTANEOUS at 15:52

## 2025-01-21 RX ADMIN — CLINDAMYCIN PHOSPHATE 600 MG: 600 INJECTION, SOLUTION INTRAVENOUS at 02:32

## 2025-01-21 RX ADMIN — HYDROMORPHONE HYDROCHLORIDE 0.5 MG: 1 INJECTION, SOLUTION INTRAMUSCULAR; INTRAVENOUS; SUBCUTANEOUS at 21:57

## 2025-01-21 RX ADMIN — CLINDAMYCIN PHOSPHATE 600 MG: 600 INJECTION, SOLUTION INTRAVENOUS at 09:57

## 2025-01-21 RX ADMIN — LACTULOSE 20 G: 20 SOLUTION ORAL at 06:23

## 2025-01-21 RX ADMIN — HYDROMORPHONE HYDROCHLORIDE 0.5 MG: 1 INJECTION, SOLUTION INTRAMUSCULAR; INTRAVENOUS; SUBCUTANEOUS at 11:00

## 2025-01-21 RX ADMIN — PANTOPRAZOLE SODIUM 40 MG: 40 TABLET, DELAYED RELEASE ORAL at 06:23

## 2025-01-21 RX ADMIN — HYDROMORPHONE HYDROCHLORIDE 0.5 MG: 1 INJECTION, SOLUTION INTRAMUSCULAR; INTRAVENOUS; SUBCUTANEOUS at 06:23

## 2025-01-21 RX ADMIN — HYDROMORPHONE HYDROCHLORIDE 0.5 MG: 1 INJECTION, SOLUTION INTRAMUSCULAR; INTRAVENOUS; SUBCUTANEOUS at 19:55

## 2025-01-21 RX ADMIN — CLINDAMYCIN PHOSPHATE 600 MG: 600 INJECTION, SOLUTION INTRAVENOUS at 18:35

## 2025-01-21 RX ADMIN — HYDROMORPHONE HYDROCHLORIDE 1 MG: 1 INJECTION, SOLUTION INTRAMUSCULAR; INTRAVENOUS; SUBCUTANEOUS at 23:16

## 2025-01-21 RX ADMIN — HYDROMORPHONE HYDROCHLORIDE 0.5 MG: 1 INJECTION, SOLUTION INTRAMUSCULAR; INTRAVENOUS; SUBCUTANEOUS at 13:21

## 2025-01-21 RX ADMIN — LACTULOSE 20 G: 20 SOLUTION ORAL at 22:12

## 2025-01-21 RX ADMIN — AMLODIPINE BESYLATE 10 MG: 10 TABLET ORAL at 09:57

## 2025-01-21 RX ADMIN — ESTRADIOL 0.5 MG: 1 TABLET ORAL at 09:57

## 2025-01-21 RX ADMIN — FLUCONAZOLE 200 MG: 200 TABLET ORAL at 13:33

## 2025-01-21 RX ADMIN — SODIUM CHLORIDE, PRESERVATIVE FREE 10 ML: 5 INJECTION INTRAVENOUS at 19:55

## 2025-01-21 ASSESSMENT — PAIN - FUNCTIONAL ASSESSMENT
PAIN_FUNCTIONAL_ASSESSMENT: ACTIVITIES ARE NOT PREVENTED
PAIN_FUNCTIONAL_ASSESSMENT: PREVENTS OR INTERFERES SOME ACTIVE ACTIVITIES AND ADLS
PAIN_FUNCTIONAL_ASSESSMENT: ACTIVITIES ARE NOT PREVENTED
PAIN_FUNCTIONAL_ASSESSMENT: PREVENTS OR INTERFERES SOME ACTIVE ACTIVITIES AND ADLS

## 2025-01-21 ASSESSMENT — PAIN DESCRIPTION - LOCATION
LOCATION: GENERALIZED
LOCATION: ABDOMEN
LOCATION: GENERALIZED
LOCATION: ABDOMEN
LOCATION: GENERALIZED

## 2025-01-21 ASSESSMENT — PAIN DESCRIPTION - ORIENTATION
ORIENTATION: INNER
ORIENTATION: MID
ORIENTATION: MID
ORIENTATION: INNER
ORIENTATION: MID

## 2025-01-21 ASSESSMENT — PAIN DESCRIPTION - DESCRIPTORS
DESCRIPTORS: ACHING;DISCOMFORT
DESCRIPTORS: DISCOMFORT;ACHING
DESCRIPTORS: ACHING
DESCRIPTORS: DISCOMFORT;ACHING
DESCRIPTORS: ACHING
DESCRIPTORS: DISCOMFORT
DESCRIPTORS: ACHING;DISCOMFORT

## 2025-01-21 ASSESSMENT — PAIN SCALES - GENERAL
PAINLEVEL_OUTOF10: 9
PAINLEVEL_OUTOF10: 10
PAINLEVEL_OUTOF10: 9
PAINLEVEL_OUTOF10: 8
PAINLEVEL_OUTOF10: 9
PAINLEVEL_OUTOF10: 10
PAINLEVEL_OUTOF10: 8
PAINLEVEL_OUTOF10: 10
PAINLEVEL_OUTOF10: 7
PAINLEVEL_OUTOF10: 9
PAINLEVEL_OUTOF10: 10
PAINLEVEL_OUTOF10: 9
PAINLEVEL_OUTOF10: 9
PAINLEVEL_OUTOF10: 6
PAINLEVEL_OUTOF10: 8
PAINLEVEL_OUTOF10: 9
PAINLEVEL_OUTOF10: 10
PAINLEVEL_OUTOF10: 9
PAINLEVEL_OUTOF10: 10
PAINLEVEL_OUTOF10: 9
PAINLEVEL_OUTOF10: 10
PAINLEVEL_OUTOF10: 9
PAINLEVEL_OUTOF10: 10

## 2025-01-21 ASSESSMENT — PAIN SCALES - WONG BAKER
WONGBAKER_NUMERICALRESPONSE: NO HURT

## 2025-01-21 ASSESSMENT — PAIN DESCRIPTION - ONSET: ONSET: ON-GOING

## 2025-01-21 ASSESSMENT — PAIN DESCRIPTION - FREQUENCY: FREQUENCY: CONTINUOUS

## 2025-01-21 ASSESSMENT — ENCOUNTER SYMPTOMS: ABDOMINAL PAIN: 1

## 2025-01-21 NOTE — PROGRESS NOTES
[facility] Infectious Disease         Progress Note      Samanthasusanna Nichols  MEDICAL RECORD NUMBER:  [id number]  AGE: 34 y.o.   GENDER: female  : 1990  EPISODE DATE:  2025      Assessment:     Patient is a 34-year-old female who I am consulted for antibiotic recommendations following G-tube dislodgment.  I examined the skin and there is no overt evidence of infection but GI service would like to pursue prophylactic therapy to prevent infection prior to placement of new G-tube on .     Due to CT evidence demonstrating possible fistula, patient will be evaluated by general surgery.  Tentative plan to continue clindamycin in this setting and may require transfer to a tertiary care center versus surgical J-tube placement.    ID will continue to follow      Subjective:     Chief Complaint   Patient presents with    Feeding Tube Problem    Shortness of Breath         No acute events overnight.  No new complaints or concerns this morning.      Patient Active Problem List   Diagnosis Code    Chronic abdominal pain R10.9, G89.29    Nausea and vomiting R11.2    Carcinoid tumor D3A.00    Pelvic inflammatory disease N73.9    Intractable nausea and vomiting R11.2    Microcytic anemia D50.9    Diet-controlled type 2 diabetes mellitus (HCC) E11.9    Esophageal dysphagia R13.19    Esophageal stricture K22.2    Morbid obesity E66.01    Migraine equivalent G43.109    Mild persistent asthma without complication J45.30    Dyslipidemia E78.5    Moderate persistent asthma with acute exacerbation J45.41    Gastroenteritis K52.9    Hematuria R31.9    Diarrhea R19.7    Diarrhea of presumed infectious origin R19.7    Hematochezia K92.1    Generalized abdominal pain R10.84    Hypertrophy of tonsil and adenoid J35.3    Multiple allergies Z88.9    S/P T&A (status post tonsillectomy and adenoidectomy) Z90.89    Iron deficiency anemia, unspecified D50.9    Poor intravenous access Z78.9    Abnormal Pap smear of  Type 2 diabetes mellitus with obesity (HCC) E11.69, E66.9    Depression with suicidal ideation F32.A, R45.851    Suicidal behavior with attempted self-injury (HCC) T14.91XA    Intractable vomiting with nausea R11.2    History of anaphylaxis Z87.892    Penicillin allergy Z88.0             PAST MEDICAL HISTORY        Diagnosis Date    Acute on chronic diastolic congestive heart failure (HCC) 06/25/2022    JANI (acute kidney injury) ([facility]) 07/07/2019    Anemia     Anesthesia     migraines    Anxiety     Asthma     CAD (coronary artery disease)     Depression     Diabetes (HCC)     Diet-controlled type 2 diabetes mellitus (HCC) 2016    Dyslipidemia 11/14/2016    Epilepsy (HCC)     last siezure is 2 years ago    Epilepsy ([facility])     Fibroids     Gastro - esophageal reflux disease     Gastroparesis     History of esophagogastroduodenoscopy (EGD) 08/13/2022    Hypertension     Hypertrophy of tonsil and adenoid 11/04/2017    Malignant carcinoid tumor of other sites ([facility]) 05/14/2013    Migraine     PONV (postoperative nausea and vomiting)     Prolonged emergence from general anesthesia     Sickle cell anemia (HCC)     Sickle cell trait (HCC)     PT STATES SHE HAS THE TRAIT NOT THE DISEASE    Tumor associated pain     neuroendrocrine tumor, gastroma       PAST SURGICAL HISTORY    Past Surgical History:   Procedure Laterality Date    ABDOMEN SURGERY  03/23/2017    Laparoscopic , Bi lat oopherectomy Dr Hinojosa    ABDOMINAL EXPLORATION SURGERY      x2    BREAST REDUCTION SURGERY      CENTRAL VENOUS CATHETER Right 12/11/2015    right subclavian single lumen mediport insertion--Dr. Michel    CHOLECYSTECTOMY, LAPAROSCOPIC N/A 7/11/2019    CHOLECYSTECTOMY LAPAROSCOPIC performed by Iron Michel MD at [facility] OR    COLONOSCOPY N/A 8/24/2020    COLONOSCOPY POLYPECTOMY SNARE/COLD BIOPSY performed by Frida Elaine MD at [facility] Endoscopy    DENTAL SURGERY      DILATION AND CURETTAGE OF UTERUS  10/21/2013    Dilation and curettage,

## 2025-01-21 NOTE — CARE COORDINATION
1/21/25, 3:15 PM EST    DISCHARGE PLANNING EVALUATION    Dr. Howard visiting with SW, reports [facility] reporting transfer would be lateral transfer and pt concerned about cost of this.  did call [facility], spoke with billing, reports Medicare does not cover lateral transfers, provided estimate of cost from [facility] to [facility], reports would need half up front. Reports pt can always call their insurance company and ask about coverage and request it be in writing if they do cover.      did provide information above to pt and wife at bedside. Providers in with SW and CM. Pt call insurance CM Christine King on speaker phone during SW providing information. Christine looking more into this and will call CM Leonora back. Providers informed pt of options.

## 2025-01-21 NOTE — CONSULTS
Sujata Tapia MD  General Surgery Consult   Pt Name: Samantha Nichols  MRN: [id number]  YOB: 1990  Date of evaluation: 1/21/2025  Primary Care Physician: Juan Dominguez DO  Consulting Physician:  Dr. Chow  Reason for evaluation: feeding tube dislodgement       Chief complaint:      Chief Complaint   Patient presents with    Feeding Tube Problem    Shortness of Breath        SUBJECTIVE:     HPI:    Samantha is a 34 y.o.female who has a complex past surgical history, patient had feeding tube placed by GI in around [place] time.  Patient simply had dislodged after it was clogged.  Patient was planning to get a new one placed by GI however when they did a CT scan that shows findings consistent with a gastrocutaneous fistula and they are hesitant to place another 1.  Surgery has seen her in the past she has had multiple extensive abdominal operations and a hostile abdomen.  Surgery consulted for open G-tube    Review of Systems:  Review of Systems   Constitutional: Negative.    Gastrointestinal:  Positive for abdominal pain.   Musculoskeletal: Negative.    Skin: Negative.    Psychiatric/Behavioral: Negative.          Past Medical History  Past Medical History:   Diagnosis Date    Acute on chronic diastolic congestive heart failure ([facility]) 06/25/2022    JANI (acute kidney injury) ([facility]) 07/07/2019    Anemia     Anesthesia     migraines    Anxiety     Asthma     CAD (coronary artery disease)     Depression     Diabetes ([facility])     Diet-controlled type 2 diabetes mellitus ([facility]) 2016    Dyslipidemia 11/14/2016    Epilepsy ([facility])     last siezure is 2 years ago    Epilepsy ([facility])     Fibroids     Gastro - esophageal reflux disease     Gastroparesis     History of esophagogastroduodenoscopy (EGD) 08/13/2022    Hypertension     Hypertrophy of tonsil and adenoid 11/04/2017    Malignant carcinoid tumor of other sites ([facility]) 05/14/2013    Migraine     PONV (postoperative nausea and vomiting)     Prolonged emergence from

## 2025-01-21 NOTE — PROGRESS NOTES
Hospitalist Progress Note    Patient:  Samantha Maneman      Unit/Bed:5K-14/014-A    YOB: 1990    MRN: [id number]       Acct: [id number]     PCP: Juan Dominguez DO    Date of Admission: 1/16/2025    Assessment/Plan:    Dispo -> Initiated transfer to [facility] per pt preference and general surgery recommendations to tertiary center, however was told would need to be a lateral transfer due to conflict of interest and pt would need to cover transportation/ EMS cost. Social work consulted. Had discussion with social work, case management, attending physician and with patient and family. Pt working with insurance to see if this can get covered. Discussed several options with pt. Received call back from [facility] transfer center stating transfer has been accepted - informed transfer would need to be put on hold as patient in process of exploring her options. Transfer center reached out again later in day with hospitalist Dr. Rodney Singletary on phone to discuss patient and plan - stated reviewed her history/ chart review and is uncertain if [facility] general surgery would operate on patient. Recommended post pyloric feeding tube for the time being. Re-assess in AM.    S/p EGD and Malfunctioning G-tube 1/16/25: By Dr. Lara - secondary to dislodged G-tube. Per Dr. Lara, obtain CT A/P with contrast -? recommended she would need to be evaluated for J-tube by general surgery as CT imaging with \"fistulous tract is a consideration.\" Consulted gen surg Dr. Florence, Dr Tapia - however due to conflict of interest recommend transfer to tertiary center - initiated. ID following for surgical site infection - continue IV Clindamycin.  Functional dyspepsia, intractable gastroparesis: Associated intractable nausea and vomiting, recurrent.  S/p fundoplication x2 secondary to history of neuroendocrine tumor of the duodenum. Multple EGDs. Previous esophagram showing no hiatal hernia, reflux, or gross stricture.

## 2025-01-21 NOTE — PROGRESS NOTES
Assessment:  I attempted to visit Samantha, a 34 year old female admitted on 5K due to complication of gastrostomy tube medical issues. Patient is not available in the room. There were no family members present. I left the room and proceeded to round on the unit to provide spiritual and emotional support to other patients. Spiritual Health  staff will follow up with continue assessment and support as needed.

## 2025-01-22 LAB
ANION GAP SERPL CALC-SCNC: 15 MEQ/L (ref 8–16)
BUN SERPL-MCNC: 11 MG/DL (ref 7–22)
CALCIUM SERPL-MCNC: 9.5 MG/DL (ref 8.5–10.5)
CHLORIDE SERPL-SCNC: 106 MEQ/L (ref 98–111)
CO2 SERPL-SCNC: 23 MEQ/L (ref 23–33)
CREAT SERPL-MCNC: 0.6 MG/DL (ref 0.4–1.2)
DEPRECATED RDW RBC AUTO: 41.7 FL (ref 35–45)
ERYTHROCYTE [DISTWIDTH] IN BLOOD BY AUTOMATED COUNT: 15.4 % (ref 11.5–14.5)
GFR SERPL CREATININE-BSD FRML MDRD: > 90 ML/MIN/1.73M2
GLUCOSE SERPL-MCNC: 114 MG/DL (ref 70–108)
HCT VFR BLD AUTO: 38.2 % (ref 37–47)
HGB BLD-MCNC: 12 GM/DL (ref 12–16)
MCH RBC QN AUTO: 23.8 PG (ref 26–33)
MCHC RBC AUTO-ENTMCNC: 31.4 GM/DL (ref 32.2–35.5)
MCV RBC AUTO: 75.8 FL (ref 81–99)
PLATELET # BLD AUTO: 315 THOU/MM3 (ref 130–400)
PMV BLD AUTO: 9.3 FL (ref 9.4–12.4)
POTASSIUM SERPL-SCNC: 4 MEQ/L (ref 3.5–5.2)
RBC # BLD AUTO: 5.04 MILL/MM3 (ref 4.2–5.4)
SODIUM SERPL-SCNC: 144 MEQ/L (ref 135–145)
WBC # BLD AUTO: 8.8 THOU/MM3 (ref 4.8–10.8)

## 2025-01-22 PROCEDURE — 80048 BASIC METABOLIC PNL TOTAL CA: CPT

## 2025-01-22 PROCEDURE — 36415 COLL VENOUS BLD VENIPUNCTURE: CPT

## 2025-01-22 PROCEDURE — 99232 SBSQ HOSP IP/OBS MODERATE 35: CPT | Performed by: STUDENT IN AN ORGANIZED HEALTH CARE EDUCATION/TRAINING PROGRAM

## 2025-01-22 PROCEDURE — 6370000000 HC RX 637 (ALT 250 FOR IP): Performed by: STUDENT IN AN ORGANIZED HEALTH CARE EDUCATION/TRAINING PROGRAM

## 2025-01-22 PROCEDURE — 99233 SBSQ HOSP IP/OBS HIGH 50: CPT | Performed by: STUDENT IN AN ORGANIZED HEALTH CARE EDUCATION/TRAINING PROGRAM

## 2025-01-22 PROCEDURE — 85027 COMPLETE CBC AUTOMATED: CPT

## 2025-01-22 PROCEDURE — 6370000000 HC RX 637 (ALT 250 FOR IP)

## 2025-01-22 PROCEDURE — 2500000003 HC RX 250 WO HCPCS

## 2025-01-22 PROCEDURE — 1200000000 HC SEMI PRIVATE

## 2025-01-22 PROCEDURE — 6360000002 HC RX W HCPCS: Performed by: INTERNAL MEDICINE

## 2025-01-22 PROCEDURE — 6360000002 HC RX W HCPCS

## 2025-01-22 RX ADMIN — SODIUM CHLORIDE, PRESERVATIVE FREE 10 ML: 5 INJECTION INTRAVENOUS at 09:28

## 2025-01-22 RX ADMIN — HYDROMORPHONE HYDROCHLORIDE 1 MG: 1 INJECTION, SOLUTION INTRAMUSCULAR; INTRAVENOUS; SUBCUTANEOUS at 15:36

## 2025-01-22 RX ADMIN — LACTULOSE 20 G: 20 SOLUTION ORAL at 06:33

## 2025-01-22 RX ADMIN — HYDROMORPHONE HYDROCHLORIDE 1 MG: 1 INJECTION, SOLUTION INTRAMUSCULAR; INTRAVENOUS; SUBCUTANEOUS at 09:26

## 2025-01-22 RX ADMIN — HYDROMORPHONE HYDROCHLORIDE 1 MG: 1 INJECTION, SOLUTION INTRAMUSCULAR; INTRAVENOUS; SUBCUTANEOUS at 05:59

## 2025-01-22 RX ADMIN — HYDROCODONE BITARTRATE AND ACETAMINOPHEN 2 TABLET: 5; 325 TABLET ORAL at 17:42

## 2025-01-22 RX ADMIN — CLINDAMYCIN PHOSPHATE 600 MG: 600 INJECTION, SOLUTION INTRAVENOUS at 19:05

## 2025-01-22 RX ADMIN — LACTULOSE 20 G: 20 SOLUTION ORAL at 22:07

## 2025-01-22 RX ADMIN — AMLODIPINE BESYLATE 10 MG: 10 TABLET ORAL at 09:25

## 2025-01-22 RX ADMIN — HYDROMORPHONE HYDROCHLORIDE 1 MG: 1 INJECTION, SOLUTION INTRAMUSCULAR; INTRAVENOUS; SUBCUTANEOUS at 02:23

## 2025-01-22 RX ADMIN — CLINDAMYCIN PHOSPHATE 600 MG: 600 INJECTION, SOLUTION INTRAVENOUS at 12:26

## 2025-01-22 RX ADMIN — CLINDAMYCIN PHOSPHATE 600 MG: 600 INJECTION, SOLUTION INTRAVENOUS at 02:27

## 2025-01-22 RX ADMIN — HYDROMORPHONE HYDROCHLORIDE 1 MG: 1 INJECTION, SOLUTION INTRAMUSCULAR; INTRAVENOUS; SUBCUTANEOUS at 19:01

## 2025-01-22 RX ADMIN — HYDROMORPHONE HYDROCHLORIDE 1 MG: 1 INJECTION, SOLUTION INTRAMUSCULAR; INTRAVENOUS; SUBCUTANEOUS at 12:20

## 2025-01-22 RX ADMIN — HYDROMORPHONE HYDROCHLORIDE 1 MG: 1 INJECTION, SOLUTION INTRAMUSCULAR; INTRAVENOUS; SUBCUTANEOUS at 22:08

## 2025-01-22 RX ADMIN — PANTOPRAZOLE SODIUM 40 MG: 40 TABLET, DELAYED RELEASE ORAL at 06:33

## 2025-01-22 RX ADMIN — ESTRADIOL 0.5 MG: 1 TABLET ORAL at 09:25

## 2025-01-22 RX ADMIN — ACETAMINOPHEN, ASPIRIN, CAFFEINE 1 TABLET: 250; 65; 250 TABLET, FILM COATED ORAL at 13:43

## 2025-01-22 ASSESSMENT — PAIN SCALES - GENERAL
PAINLEVEL_OUTOF10: 10
PAINLEVEL_OUTOF10: 8
PAINLEVEL_OUTOF10: 5
PAINLEVEL_OUTOF10: 10
PAINLEVEL_OUTOF10: 7
PAINLEVEL_OUTOF10: 9
PAINLEVEL_OUTOF10: 8
PAINLEVEL_OUTOF10: 8
PAINLEVEL_OUTOF10: 9
PAINLEVEL_OUTOF10: 10
PAINLEVEL_OUTOF10: 8
PAINLEVEL_OUTOF10: 8

## 2025-01-22 ASSESSMENT — PAIN DESCRIPTION - DESCRIPTORS
DESCRIPTORS: ACHING

## 2025-01-22 ASSESSMENT — PAIN DESCRIPTION - LOCATION
LOCATION: ABDOMEN;BACK
LOCATION: GENERALIZED
LOCATION: ABDOMEN
LOCATION: ABDOMEN;BACK

## 2025-01-22 ASSESSMENT — PAIN DESCRIPTION - ORIENTATION
ORIENTATION: MID

## 2025-01-22 ASSESSMENT — PAIN - FUNCTIONAL ASSESSMENT: PAIN_FUNCTIONAL_ASSESSMENT: ACTIVITIES ARE NOT PREVENTED

## 2025-01-22 NOTE — PROGRESS NOTES
[facility] Infectious Disease         Progress Note      Samanthaady Nichols  MEDICAL RECORD NUMBER:  [id number]  AGE: 34 y.o.   GENDER: female  : 1990  EPISODE DATE:  2025      Assessment:     Patient is a 34-year-old female who I am consulted for antibiotic recommendations following G-tube dislodgment.  I examined the skin and there is no overt evidence of infection but GI service would like to pursue prophylactic therapy to prevent infection prior to placement of new G-tube on .     Due to CT evidence demonstrating possible fistula, patient will be evaluated by general surgery.  I reviewed general surgery notes which note that patient would likely be better candidate at a tertiary care facility.    Okay to continue therapy with clindamycin in the setting of multiple allergies  The benefit of antibiotics may be fairly limited and is being used more as a prophylactic in this case    Subjective:     Chief Complaint   Patient presents with    Feeding Tube Problem    Shortness of Breath         No acute events overnight.  She continues to complain of abdominal pain.      Patient Active Problem List   Diagnosis Code    Chronic abdominal pain R10.9, G89.29    Nausea and vomiting R11.2    Carcinoid tumor D3A.00    Pelvic inflammatory disease N73.9    Intractable nausea and vomiting R11.2    Microcytic anemia D50.9    Diet-controlled type 2 diabetes mellitus (HCC) E11.9    Esophageal dysphagia R13.19    Esophageal stricture K22.2    Morbid obesity E66.01    Migraine equivalent G43.109    Mild persistent asthma without complication J45.30    Dyslipidemia E78.5    Moderate persistent asthma with acute exacerbation J45.41    Gastroenteritis K52.9    Hematuria R31.9    Diarrhea R19.7    Diarrhea of presumed infectious origin R19.7    Hematochezia K92.1    Generalized abdominal pain R10.84    Hypertrophy of tonsil and adenoid J35.3    Multiple allergies Z88.9    S/P T&A (status post tonsillectomy  01/21/2025 09:43 AM    PHOS 3.2 12/22/2024 09:45 PM    BUN 10 01/21/2025 09:43 AM    CREATININE 0.7 01/21/2025 09:43 AM     PT/INR:   Lab Results   Component Value Date/Time    PROTIME 1.09 09/24/2011 05:36 AM    INR 0.96 08/22/2022 10:18 AM     Prealbumin:   Lab Results   Component Value Date/Time    PREALBUMIN 9.3 01/21/2023 12:13 PM     Albumin:No results found for: \"LABALBU\"  Sed Rate:  Lab Results   Component Value Date/Time    SEDRATE 20 11/10/2021 08:32 PM     CRP:   Lab Results   Component Value Date/Time    CRP 1.44 12/20/2024 02:50 AM     Micro:   Lab Results   Component Value Date/Time    BC  03/29/2024 12:15 PM     No growth 24 hours. No growth 48 hours. No growth at 5 days      Hemoglobin A1C:   Lab Results   Component Value Date/Time    LABA1C 6.0 12/19/2024 01:13 PM    LABA1C 5.4 06/07/2024 04:14 PM           Electronically signed by Juma Coombs MD TD@ at 6:51 AM

## 2025-01-22 NOTE — PROGRESS NOTES
Hospitalist Progress Note    Patient:  Samantha THOMAS [place]      Unit/Bed:5K-14/014-A    YOB: 1990    MRN: [id number]       Acct: [id number]     PCP: Juan Dominguez DO    Date of Admission: 1/16/2025    Assessment/Plan:  S/p EGD and Malfunctioning G-tube 1/16/25: Initially placed by GI for refractory gastroparesis.  Endorses oral eating, however intractable N/V prohibitive of adequate intake.  Presented abdominal pain, concern for dislodgment.  CT A/P with G-tube and movement.  EGD guided evaluation showed malpositioning into submucosa.  PEG tube removed.  Low suspicion for infection, no peritonitic signs.  ID consulted, started on clindamycin for empiric coverage.  Repeat CT A/P with fistulous tract inconsideration.  GI advised general surgery evaluation for surgical G-tube placement.  General surgery consulted, however advised transfer to tertiary care center.  Transfer initiated but given financial constraints, family to reevaluate.  Case discussed with general surgery and as patient is able to tolerate oral food with no concerns for active infection and likely mature healing of chronic G-tube per imaging patient stable for discharge with brief presentation at tertiary care center.  Options provided, family to decided.  Functional dyspepsia, intractable gastroparesis: Associated intractable nausea and vomiting, recurrent.  S/p fundoplication x2 secondary to history of neuroendocrine tumor of the duodenum. Multple EGDs. Previous esophagram showing no hiatal hernia, reflux, or gross stricture.  Continue Protonix and IV antiemetics as needed.  HTN: Continue amlodipine 10 mg p.o. daily with holding parameters  NIDDMII: Last HgbA1c 6.0 12/2024.  Home antidiabetic includes metformin - hold in inptient setting.  Monitor blood glucose with daily BMP.  Vaginal yeast infection: Ordered Diflucan 200mg x 1 dose.  History of duodenal NET: Diagnosed in 2012, cardiac incarcerated incisional hernia s/p open

## 2025-01-22 NOTE — PLAN OF CARE
Problem: Chronic Conditions and Co-morbidities  Goal: Patient's chronic conditions and co-morbidity symptoms are monitored and maintained or improved  Outcome: Progressing  Flowsheets (Taken 1/21/2025 1943)  Care Plan - Patient's Chronic Conditions and Co-Morbidity Symptoms are Monitored and Maintained or Improved:   Monitor and assess patient's chronic conditions and comorbid symptoms for stability, deterioration, or improvement   Collaborate with multidisciplinary team to address chronic and comorbid conditions and prevent exacerbation or deterioration   Update acute care plan with appropriate goals if chronic or comorbid symptoms are exacerbated and prevent overall improvement and discharge     Problem: Pain  Goal: Verbalizes/displays adequate comfort level or baseline comfort level  Outcome: Progressing  Flowsheets (Taken 1/21/2025 1943)  Verbalizes/displays adequate comfort level or baseline comfort level:   Encourage patient to monitor pain and request assistance   Assess pain using appropriate pain scale   Administer analgesics based on type and severity of pain and evaluate response     Problem: Skin/Tissue Integrity - Adult  Goal: Incisions, wounds, or drain sites healing without S/S of infection  Outcome: Progressing  Flowsheets (Taken 1/21/2025 1943)  Incisions, Wounds, or Drain Sites Healing Without Sign and Symptoms of Infection: ADMISSION and DAILY: Assess and document risk factors for pressure ulcer development     Problem: Gastrointestinal - Adult  Goal: Minimal or absence of nausea and vomiting  Outcome: Progressing     Problem: Gastrointestinal - Adult  Goal: Maintains or returns to baseline bowel function  Outcome: Progressing  Flowsheets (Taken 1/21/2025 1943)  Maintains or returns to baseline bowel function:   Assess bowel function   Encourage oral fluids to ensure adequate hydration     Problem: Gastrointestinal - Adult  Goal: Maintains adequate nutritional intake  Outcome:

## 2025-01-22 NOTE — PROGRESS NOTES
Comprehensive Nutrition Assessment    Type and Reason for Visit:  Reassess    Nutrition Recommendations/Plan:   When able/have feeding tube access- recommend resume TF Vital 1.2 at 60 ml/hr (goal).    Suggest flush 180 ml free water flush every 8 hours.    Note - pt declines having NGT for TF until feeding tube replaced.  Recommend continue f/u with OP RD (last appointment 1/13/25).  Diet as tolerated - per provider.     Malnutrition Assessment:  Malnutrition Status:  At risk for malnutrition (01/17/25 1230)    Context:  Chronic Illness     Findings of the 6 clinical characteristics of malnutrition:  Energy Intake:   (NPO/TF held since 1/17)  Weight Loss:  No weight loss     Body Fat Loss:  No body fat loss     Muscle Mass Loss:  No muscle mass loss    Fluid Accumulation:  Unable to assess     Strength:  Not Performed    Nutrition Assessment:     Pt. nutritionally compromised AEB NPO status, feeding tube removed; plan replace 1/20. At risk for further nutrition compromise r/t admit with recurrent nausea/ vomiting, gastroparesis, abdominal pain, and underlying medical condition (duodenal neuro endocrine tumor 2012 complicated by incarcerated incisional hernia s/p open repair of incarcerated hernia in 2013, CHF, CAD, depression, DM2 with A1C (12/19/24) 6%, epilepsy, GERD).     Nutrition Related Findings:    Pt. Report/Treatments/Miscellaneous:   1/22-pt. Seen while ambulating in the pink - states getting ready to head to the chapel to pray; states is taking some po but vomiting; TF remains on hold - GI advised general surgery evaluation for surgical G-tube placement. General surgery consulted, however advised transfer to tertiary care center; Provider-discussed options regarding re-initiation to [facility] vs discharge home and outpatient eval. She opted for [facility]. Will initiate transfer ; pt. Continues to report \"scared\" of NGT placement for interim until G tube can be placed; discussed as something to consider pending

## 2025-01-22 NOTE — CARE COORDINATION
1/22/25, 2:31 PM EST    DISCHARGE ON GOING EVALUATION    Samantha THOMAS [facility] day: 6  Location: -14/014-A Reason for admit: Left flank pain [R10.9]  Left sided abdominal pain [R10.9]  Feeding tube dysfunction, initial encounter [T85.511D]  Complication of gastrostomy tube (HCC) [K94.20]     Procedures:   1/17 EGD-g-tube removed      Barriers to Discharge: Hospitalist, ID, and GI following, Cleocin q 8 hr., Lactulose every 8 hr., prn Proventil, Norco, Dilaudid, Compazine, electrolyte replacement protocol, regular diet, Dietician, up as tolerated.     PCP: Juan Dominguez DO  Readmission Risk Score: 27    Patient Goals/Plan/Treatment Preferences: Samantha is from home with her wife. Possible transfer to [facility] pending, vs. Discharge to home with home health.

## 2025-01-23 VITALS
OXYGEN SATURATION: 93 % | BODY MASS INDEX: 48.65 KG/M2 | DIASTOLIC BLOOD PRESSURE: 72 MMHG | WEIGHT: 285 LBS | HEART RATE: 69 BPM | SYSTOLIC BLOOD PRESSURE: 118 MMHG | RESPIRATION RATE: 16 BRPM | HEIGHT: 64 IN | TEMPERATURE: 97.3 F

## 2025-01-23 LAB
ANION GAP SERPL CALC-SCNC: 10 MEQ/L (ref 8–16)
BUN SERPL-MCNC: 13 MG/DL (ref 7–22)
CALCIUM SERPL-MCNC: 9.1 MG/DL (ref 8.5–10.5)
CHLORIDE SERPL-SCNC: 105 MEQ/L (ref 98–111)
CO2 SERPL-SCNC: 25 MEQ/L (ref 23–33)
CREAT SERPL-MCNC: 0.6 MG/DL (ref 0.4–1.2)
DEPRECATED RDW RBC AUTO: 42.5 FL (ref 35–45)
ERYTHROCYTE [DISTWIDTH] IN BLOOD BY AUTOMATED COUNT: 15.6 % (ref 11.5–14.5)
GFR SERPL CREATININE-BSD FRML MDRD: > 90 ML/MIN/1.73M2
GLUCOSE SERPL-MCNC: 90 MG/DL (ref 70–108)
HCT VFR BLD AUTO: 38.1 % (ref 37–47)
HGB BLD-MCNC: 12.2 GM/DL (ref 12–16)
MCH RBC QN AUTO: 24.3 PG (ref 26–33)
MCHC RBC AUTO-ENTMCNC: 32 GM/DL (ref 32.2–35.5)
MCV RBC AUTO: 75.9 FL (ref 81–99)
PLATELET # BLD AUTO: 254 THOU/MM3 (ref 130–400)
PMV BLD AUTO: 9.5 FL (ref 9.4–12.4)
POTASSIUM SERPL-SCNC: 4.5 MEQ/L (ref 3.5–5.2)
RBC # BLD AUTO: 5.02 MILL/MM3 (ref 4.2–5.4)
SODIUM SERPL-SCNC: 140 MEQ/L (ref 135–145)
WBC # BLD AUTO: 8.9 THOU/MM3 (ref 4.8–10.8)

## 2025-01-23 PROCEDURE — 6360000002 HC RX W HCPCS: Performed by: PHYSICIAN ASSISTANT

## 2025-01-23 PROCEDURE — 6360000002 HC RX W HCPCS

## 2025-01-23 PROCEDURE — 36415 COLL VENOUS BLD VENIPUNCTURE: CPT

## 2025-01-23 PROCEDURE — 6370000000 HC RX 637 (ALT 250 FOR IP)

## 2025-01-23 PROCEDURE — 99239 HOSP IP/OBS DSCHRG MGMT >30: CPT | Performed by: STUDENT IN AN ORGANIZED HEALTH CARE EDUCATION/TRAINING PROGRAM

## 2025-01-23 PROCEDURE — 85027 COMPLETE CBC AUTOMATED: CPT

## 2025-01-23 PROCEDURE — 6360000002 HC RX W HCPCS: Performed by: INTERNAL MEDICINE

## 2025-01-23 PROCEDURE — 6360000002 HC RX W HCPCS: Performed by: STUDENT IN AN ORGANIZED HEALTH CARE EDUCATION/TRAINING PROGRAM

## 2025-01-23 PROCEDURE — 6370000000 HC RX 637 (ALT 250 FOR IP): Performed by: STUDENT IN AN ORGANIZED HEALTH CARE EDUCATION/TRAINING PROGRAM

## 2025-01-23 PROCEDURE — 80048 BASIC METABOLIC PNL TOTAL CA: CPT

## 2025-01-23 RX ORDER — HEPARIN 100 UNIT/ML
500 SYRINGE INTRAVENOUS ONCE
Status: COMPLETED | OUTPATIENT
Start: 2025-01-23 | End: 2025-01-23

## 2025-01-23 RX ORDER — DIPHENHYDRAMINE HYDROCHLORIDE 50 MG/ML
50 INJECTION INTRAMUSCULAR; INTRAVENOUS ONCE
Status: COMPLETED | OUTPATIENT
Start: 2025-01-23 | End: 2025-01-23

## 2025-01-23 RX ORDER — LIDOCAINE 4 G/G
1 PATCH TOPICAL DAILY
Status: DISCONTINUED | OUTPATIENT
Start: 2025-01-23 | End: 2025-01-23 | Stop reason: HOSPADM

## 2025-01-23 RX ADMIN — HYDROCODONE BITARTRATE AND ACETAMINOPHEN 2 TABLET: 5; 325 TABLET ORAL at 08:41

## 2025-01-23 RX ADMIN — CLINDAMYCIN PHOSPHATE 600 MG: 600 INJECTION, SOLUTION INTRAVENOUS at 10:21

## 2025-01-23 RX ADMIN — HYDROMORPHONE HYDROCHLORIDE 0.25 MG: 1 INJECTION, SOLUTION INTRAMUSCULAR; INTRAVENOUS; SUBCUTANEOUS at 13:51

## 2025-01-23 RX ADMIN — CLINDAMYCIN PHOSPHATE 600 MG: 600 INJECTION, SOLUTION INTRAVENOUS at 02:24

## 2025-01-23 RX ADMIN — HYDROMORPHONE HYDROCHLORIDE 1 MG: 1 INJECTION, SOLUTION INTRAMUSCULAR; INTRAVENOUS; SUBCUTANEOUS at 01:16

## 2025-01-23 RX ADMIN — DIPHENHYDRAMINE HYDROCHLORIDE 50 MG: 50 INJECTION INTRAMUSCULAR; INTRAVENOUS at 03:12

## 2025-01-23 RX ADMIN — HYDROMORPHONE HYDROCHLORIDE 1 MG: 1 INJECTION, SOLUTION INTRAMUSCULAR; INTRAVENOUS; SUBCUTANEOUS at 05:40

## 2025-01-23 RX ADMIN — HEPARIN 500 UNITS: 100 SYRINGE at 14:15

## 2025-01-23 RX ADMIN — AMLODIPINE BESYLATE 10 MG: 10 TABLET ORAL at 08:38

## 2025-01-23 RX ADMIN — HYDROMORPHONE HYDROCHLORIDE 0.5 MG: 1 INJECTION, SOLUTION INTRAMUSCULAR; INTRAVENOUS; SUBCUTANEOUS at 03:12

## 2025-01-23 RX ADMIN — PANTOPRAZOLE SODIUM 40 MG: 40 TABLET, DELAYED RELEASE ORAL at 05:40

## 2025-01-23 RX ADMIN — ESTRADIOL 0.5 MG: 1 TABLET ORAL at 08:38

## 2025-01-23 RX ADMIN — LACTULOSE 20 G: 20 SOLUTION ORAL at 13:51

## 2025-01-23 ASSESSMENT — PAIN DESCRIPTION - DESCRIPTORS: DESCRIPTORS: ACHING;DISCOMFORT;TENDER

## 2025-01-23 ASSESSMENT — PAIN SCALES - GENERAL
PAINLEVEL_OUTOF10: 9
PAINLEVEL_OUTOF10: 10
PAINLEVEL_OUTOF10: 10
PAINLEVEL_OUTOF10: 9
PAINLEVEL_OUTOF10: 9

## 2025-01-23 ASSESSMENT — PAIN DESCRIPTION - ORIENTATION
ORIENTATION: LOWER;MID;INNER
ORIENTATION: MID;LOWER

## 2025-01-23 ASSESSMENT — PAIN DESCRIPTION - LOCATION
LOCATION: ABDOMEN
LOCATION: ABDOMEN

## 2025-01-23 NOTE — PLAN OF CARE
Problem: Chronic Conditions and Co-morbidities  Goal: Patient's chronic conditions and co-morbidity symptoms are monitored and maintained or improved  Outcome: Progressing  Flowsheets (Taken 1/22/2025 2013)  Care Plan - Patient's Chronic Conditions and Co-Morbidity Symptoms are Monitored and Maintained or Improved:   Monitor and assess patient's chronic conditions and comorbid symptoms for stability, deterioration, or improvement   Collaborate with multidisciplinary team to address chronic and comorbid conditions and prevent exacerbation or deterioration   Update acute care plan with appropriate goals if chronic or comorbid symptoms are exacerbated and prevent overall improvement and discharge     Problem: Pain  Goal: Verbalizes/displays adequate comfort level or baseline comfort level  Outcome: Progressing  Flowsheets (Taken 1/22/2025 2013)  Verbalizes/displays adequate comfort level or baseline comfort level:   Encourage patient to monitor pain and request assistance   Administer analgesics based on type and severity of pain and evaluate response   Assess pain using appropriate pain scale     Problem: Skin/Tissue Integrity - Adult  Goal: Incisions, wounds, or drain sites healing without S/S of infection  Outcome: Progressing  Flowsheets (Taken 1/22/2025 2013)  Incisions, Wounds, or Drain Sites Healing Without Sign and Symptoms of Infection: ADMISSION and DAILY: Assess and document risk factors for pressure ulcer development     Problem: Gastrointestinal - Adult  Goal: Minimal or absence of nausea and vomiting  Outcome: Progressing     Problem: Gastrointestinal - Adult  Goal: Maintains or returns to baseline bowel function  Outcome: Progressing     Problem: Gastrointestinal - Adult  Goal: Maintains adequate nutritional intake  Outcome: Progressing     Problem: Discharge Planning  Goal: Discharge to home or other facility with appropriate resources  Outcome: Progressing  Flowsheets (Taken 1/22/2025

## 2025-01-23 NOTE — CARE COORDINATION
1/23/25, 2:25 PM EST    Patient goals/plan/ treatment preferences discussed by  and .  Patient goals/plan/ treatment preferences reviewed with patient/ family.  Patient/ family verbalize understanding of discharge plan and are in agreement with goal/plan/treatment preferences.  Understanding was demonstrated using the teach back method.  AVS provided by RN at time of discharge, which includes all necessary medical information pertaining to the patients current course of illness, treatment, post-discharge goals of care, and treatment preferences.     Services At/After Discharge: Home Health, Nursing service, OT, and PT      [facility] Care by [facility]

## 2025-01-23 NOTE — PROGRESS NOTES
Discharge education and instructions provided. Patient verbalized understanding at this time. No questions asked. IV access removed. All personal belongings, AVS present with patient. Transport to main lobby via wheelchair declined by pt. Walked to main lobby with friend.

## 2025-01-23 NOTE — DISCHARGE SUMMARY
DISCHARGE SUMMARY    Patient:  Samantha Maneman      Unit/Bed:5K-14/014-A    YOB: 1990    MRN: [id number]       Acct: [id number]     PCP: Juan Dominguez DO    Date of Admission: 1/16/2025    DISCHARGE ->     1/21: Initiated transfer to [facility] per pt preference and general surgery recommendations to tertiary center, however was told would need to be a lateral transfer due to conflict of interest and pt would need to cover transportation/ EMS cost. Social work consulted. Had discussion with social work, case management, attending physician and with patient and family. Pt working with insurance to see if this can get covered. Discussed several options with pt. Received call back from [facility] transfer center stating transfer has been accepted - informed transfer would need to be put on hold as patient in process of exploring her options. Transfer center reached out again later in day with hospitalist Dr. Rodney Singletary on phone to discuss patient and plan - stated reviewed her history/ chart review and is uncertain if [facility] general surgery would operate on patient. Recommended post pyloric feeding tube for the time being.    1/23/25: No emergent need for surgical intervention. Patient tolerating PO, walking, taking smoke breaks. No emesis visualized in bathroom by this resident or RN. Discussed pt with GI provider Dr. Lara - f/u in 2 weeks with CT A/P. Discussed with ID Dr. Coombs who states no need for continuation of IV Abx. Pt also reports of itching/ allergic reaction/ yeast infection with IV Clinda, this resident explained if having allergic reaction to this would be best not to continue and ID has very limited options in terms of Abx due to several allergies. Explained to pt options and that best interest would be to discharge with outpatient follow up with GI/ gen surg of her choice. Advised to discuss with her insurance company options in terms of financial restraints. Patient voiced

## 2025-01-23 NOTE — PLAN OF CARE
Problem: Chronic Conditions and Co-morbidities  Goal: Patient's chronic conditions and co-morbidity symptoms are monitored and maintained or improved  1/23/2025 1359 by Alma Rosa Zelaya RN  Outcome: Adequate for Discharge     Problem: Pain  Goal: Verbalizes/displays adequate comfort level or baseline comfort level  1/23/2025 1359 by Alma Rosa Zelaya RN  Outcome: Adequate for Discharge     Problem: Skin/Tissue Integrity - Adult  Goal: Incisions, wounds, or drain sites healing without S/S of infection  1/23/2025 1359 by Alma Rosa Zelaya RN  Outcome: Adequate for Discharge     Problem: Gastrointestinal - Adult  Goal: Minimal or absence of nausea and vomiting  1/23/2025 1359 by Alma Rosa Zelaya RN  Outcome: Adequate for Discharge     Problem: Gastrointestinal - Adult  Goal: Maintains or returns to baseline bowel function  1/23/2025 1359 by Amla Rosa Zelaya RN  Outcome: Adequate for Discharge     Problem: Gastrointestinal - Adult  Goal: Maintains adequate nutritional intake  1/23/2025 1359 by Alma Rosa Zelaya RN  Outcome: Adequate for Discharge     Problem: Discharge Planning  Goal: Discharge to home or other facility with appropriate resources  1/23/2025 1359 by Alma Rosa Zelaya RN  Outcome: Adequate for Discharge     Problem: Skin/Tissue Integrity  Goal: Absence of new skin breakdown  Description: 1.  Monitor for areas of redness and/or skin breakdown  2.  Assess vascular access sites hourly  3.  Every 4-6 hours minimum:  Change oxygen saturation probe site  4.  Every 4-6 hours:  If on nasal continuous positive airway pressure, respiratory therapy assess nares and determine need for appliance change or resting period.  1/23/2025 1359 by Alma Rosa Zelaya RN  Outcome: Adequate for Discharge     Problem: Safety - Adult  Goal: Free from fall injury  1/23/2025 1359 by Alma Rosa Zelaya RN  Outcome: Adequate for Discharge     Problem: Nutrition Deficit:  Goal: Optimize nutritional status  1/23/2025 1359 by Alma Rosa Zelaya RN  Outcome:

## 2025-01-23 NOTE — PROGRESS NOTES
Spiritual Health History and Assessment/Progress Note  [facility]    (P) Initial Encounter,  ,  ,      Name: Samantha Nichols MRN: [id number]    Age: 34 y.o.     Sex: female   Language: English   Oriental orthodox: [employer]   Complication of gastrostomy tube (HCC)     Date: 1/23/2025            Total Time Calculated: (P) 5 min              Spiritual Assessment began in [facility] MED 5K        Referral/Consult From: (P) Rounding   Encounter Overview/Reason: (P) Initial Encounter  Service Provided For: (P) Patient (\"spiritual mothers\")    Kortney, Belief, Meaning:   Patient identifies as spiritual, is connected with a kortney tradition or spiritual practice, and has beliefs or practices that help with coping during difficult times  Family/Friends identify as spiritual      Importance and Influence:  Patient has spiritual/personal beliefs that influence decisions regarding their health  Family/Friends Other: Did not assess at this time.    Community:  Patient is connected with a spiritual community  Family/Friends Other: Did not assess at this time.    Assessment and Plan of Care:     Patient, Samantha, was in bed at the time of this visit. She had two visitors present, who she introduced as her \"spiritual mothers.\"  Patient shared her uncertainty about her hospital stay but her hope that she will receive further updates from her medical team soon. Visit was shortened due to patient's guests, but  provided information regarding further  support and availability, should patient desire it in the future.    Patient Interventions include: Facilitated expression of thoughts and feelings  Family/Friends Interventions include: Other: Did not assess at this time.    Patient Plan of Care: Spiritual Care available upon further referral  Family/Friends Plan of Care: Spiritual Care available upon further referral    Electronically signed by Chaplain Rashaun on 1/23/2025 at 9:14 AM

## 2025-01-24 ENCOUNTER — TELEPHONE (OUTPATIENT)
Dept: INTERNAL MEDICINE CLINIC | Age: 35
End: 2025-01-24

## 2025-01-24 NOTE — TELEPHONE ENCOUNTER
Care Transitions Initial Follow Up Call    Outreach made within 2 business days of discharge: Yes    Patient: Samantha Nichols Patient : 1990   MRN: [id number]  Reason for Admission: Peg Tube   Discharge Date: 25       Spoke with: Samantha    Discharge department/facility: [facility]    TCM Interactive Patient Contact:  Was patient able to fill all prescriptions: Yes  Was patient instructed to bring all medications to the follow-up visit: Yes  Is patient taking all medications as directed in the discharge summary? Yes  Does patient understand their discharge instructions: Yes  Does patient have questions or concerns that need addressed prior to 7-14 day follow up office visit: no    Additional needs identified to be addressed with provider  No needs identified             Scheduled appointment with PCP within 7-14 days    Follow Up  Future Appointments   Date Time Provider Department Center   2025  1:30 PM STR CT IMAGING RM1  [facility] STR[facility] OUT EXP STR Rad/Card   2025  1:20 PM Giuseppe Wilson MD SRPX Physic [facility] DEP   2025 12:00 PM SCHEDULE, STR SLEEP TECH 04 STRZ SLEEP [place] HOD   2025  9:40 AM Carolann Salomon, AURELIO - CNP N Pulm Med [facility] - [place]   2025  1:20 PM Venkata Peterson MD SRPX Physic [facility] DEP   3/3/2025  1:00 PM Sully Ramos RD, LD SRPX Physic [facility] ECC DEP   3/17/2025 11:00 AM Sully Ramos RD, LD [facility]PX Physic [facility] DEP   2025 10:00 AM Ramona You MD N SRPX Heart P - [place]       Colleen Sharma MA     Have the medications prescribed at discharge been filled? yes  Does the patient understand what the medications are for? yes  Has the patient experienced any new symptoms or have previous symptoms worsened? Not really any better  Is the patient experiencing any pain? yes If yes, is the pain well controlled? same  We want to be sure the patient has the best recovery possible. Does the patient understand all the discharge instructions? yes  Did

## 2025-01-30 ENCOUNTER — OFFICE VISIT (OUTPATIENT)
Dept: INTERNAL MEDICINE CLINIC | Age: 35
End: 2025-01-30

## 2025-01-30 ENCOUNTER — TELEPHONE (OUTPATIENT)
Dept: ENT CLINIC | Age: 35
End: 2025-01-30

## 2025-01-30 VITALS
TEMPERATURE: 97.9 F | WEIGHT: 285.2 LBS | BODY MASS INDEX: 48.69 KG/M2 | SYSTOLIC BLOOD PRESSURE: 120 MMHG | HEIGHT: 64 IN | DIASTOLIC BLOOD PRESSURE: 88 MMHG | HEART RATE: 92 BPM

## 2025-01-30 DIAGNOSIS — K31.84 GASTROPARESIS: ICD-10-CM

## 2025-01-30 DIAGNOSIS — R10.12 LEFT UPPER QUADRANT ABDOMINAL PAIN: ICD-10-CM

## 2025-01-30 DIAGNOSIS — Z09 HOSPITAL DISCHARGE FOLLOW-UP: Primary | ICD-10-CM

## 2025-01-30 DIAGNOSIS — E11.43 GASTROPARESIS DUE TO DM (HCC): ICD-10-CM

## 2025-01-30 DIAGNOSIS — I10 PRIMARY HYPERTENSION: ICD-10-CM

## 2025-01-30 DIAGNOSIS — E11.69 TYPE 2 DIABETES MELLITUS WITH OBESITY (HCC): ICD-10-CM

## 2025-01-30 DIAGNOSIS — E66.9 TYPE 2 DIABETES MELLITUS WITH OBESITY (HCC): ICD-10-CM

## 2025-01-30 DIAGNOSIS — K31.84 GASTROPARESIS DUE TO DM (HCC): ICD-10-CM

## 2025-01-30 RX ORDER — PILOCARPINE HYDROCHLORIDE 5 MG/1
5 TABLET, FILM COATED ORAL 3 TIMES DAILY
Qty: 90 TABLET | Refills: 0 | Status: SHIPPED | OUTPATIENT
Start: 2025-01-30 | End: 2025-03-01

## 2025-01-30 RX ORDER — LACTULOSE 10 G/15ML
30 SOLUTION ORAL EVERY 8 HOURS
Qty: 120 ML | Refills: 0 | Status: SHIPPED | OUTPATIENT
Start: 2025-01-30

## 2025-01-30 RX ORDER — TRAMADOL HYDROCHLORIDE 50 MG/1
50 TABLET ORAL EVERY 8 HOURS PRN
Qty: 90 TABLET | Refills: 0 | Status: SHIPPED | OUTPATIENT
Start: 2025-01-30 | End: 2025-03-01

## 2025-01-30 NOTE — TELEPHONE ENCOUNTER
Received a message on clinical [employer] from a Jessica. She stated she was calling in regards to patient by the last name of Simone, tracking # [id number]., Peer to Peer is available, call Phone # [phone].     Unsure at this point if CT scan needs authorized or not. According to the referral notes it states no auth needed per payor.     Patient was scheduled on 1/24/25 and no showed. Since it has been 6 month since Dr Weiss saw the patient and ordered it we may need to see the patient again in the office in order to get the CT scan authorized if needed. I will check into this further.

## 2025-01-30 NOTE — PROGRESS NOTES
Post-Discharge Transitional Care Follow Up      Samantha Nichols   YOB: 1990    Date of Office Visit:  1/30/2025  Date of Hospital Admission: 1/16/25  Date of Hospital Discharge: 1/23/25  Readmission Risk Score (high >=14%. Medium >=10%):Readmission Risk Score: 26.1      Care management risk score Rising risk (score 2-5) and Complex Care (Scores >=6): No Risk Score On File     Non face to face  following discharge, date last encounter closed (first attempt may have been earlier): 01/24/2025     Call initiated 2 business days of discharge: Yes     Hospital discharge follow-up  -     NC DISCHARGE MEDS RECONCILED W/ CURRENT OUTPATIENT MED LIST  Primary hypertension  Type 2 diabetes mellitus with obesity (HCC)  -     C-Peptide; Future  Left upper quadrant abdominal pain  -     traMADol (ULTRAM) 50 MG tablet; Take 1 tablet by mouth every 8 hours as needed for Pain for up to 30 days. Intended supply: 30 days. Take lowest dose possible to manage pain Max Daily Amount: 150 mg, Disp-90 tablet, R-0Normal  Gastroparesis due to DM (HCC)  -     lactulose (CHRONULAC) 10 GM/15ML solution; Take 30 mLs by mouth every 8 (eight) hours Until you have a bowel movement and then stop., Disp-120 mL, R-0Normal  Gastroparesis  -     pilocarpine (SALAGEN) 5 MG tablet; Take 1 tablet by mouth 3 times daily, Disp-90 tablet, R-0Normal  -     AFL - Dillan Kruger MD, Gastroenterology, [place]      Medical Decision Making: straightforward  Return in 2 weeks (on 2/13/2025).           Subjective:   HPI  Pt is a 34 y.o. female who presents for hospital follow-up, patient was recently admitted to the hospital and discharged 1/24/2025 secondary to gastrostomy tube complication, during her admission patient was originally plan to transfer to [facility] general surgery follow-up however patient improved tolerating p.o. walking and taking smoke breaks.  Ultimately discharge with plans for follow-up CT A/P in 2 weeks, along with surgical

## 2025-02-03 NOTE — TELEPHONE ENCOUNTER
Yes recommend visit with next available provider.  Patient was not started on any medical treatment prior to CT soft tissue neck being ordered; may need updated symptoms/trial of medications.

## 2025-02-03 NOTE — TELEPHONE ENCOUNTER
Patient was a no show for CT Scan, but insurance also denied it.  We have Peer to Peer info but patient has not been seen for over 6 months.    Does this patient need to be seen before the CT is rescheduled.    Please advise

## 2025-02-13 ENCOUNTER — HOSPITAL ENCOUNTER (OUTPATIENT)
Dept: CT IMAGING | Age: 35
Discharge: HOME | End: 2025-02-13
Payer: COMMERCIAL

## 2025-02-13 ENCOUNTER — HOSPITAL ENCOUNTER (OUTPATIENT)
Age: 35
Discharge: HOME | End: 2025-02-13
Payer: COMMERCIAL

## 2025-02-13 DIAGNOSIS — E11.69 TYPE 2 DIABETES MELLITUS WITH OBESITY (HCC): ICD-10-CM

## 2025-02-13 DIAGNOSIS — R11.2 INTRACTABLE VOMITING WITH NAUSEA: ICD-10-CM

## 2025-02-13 DIAGNOSIS — E66.9 TYPE 2 DIABETES MELLITUS WITH OBESITY (HCC): ICD-10-CM

## 2025-02-13 DIAGNOSIS — T85.598A FEEDING TUBE DYSFUNCTION, INITIAL ENCOUNTER: ICD-10-CM

## 2025-02-13 LAB — C PEPTIDE SERPL-MCNC: 3.7 NG/ML (ref 1.1–4.4)

## 2025-02-13 PROCEDURE — 74174 CTA ABD&PLVS W/CONTRAST: CPT

## 2025-02-13 PROCEDURE — 6360000004 HC RX CONTRAST MEDICATION: Performed by: STUDENT IN AN ORGANIZED HEALTH CARE EDUCATION/TRAINING PROGRAM

## 2025-02-13 PROCEDURE — 84681 ASSAY OF C-PEPTIDE: CPT

## 2025-02-13 PROCEDURE — 36415 COLL VENOUS BLD VENIPUNCTURE: CPT

## 2025-02-13 RX ORDER — IOPAMIDOL 755 MG/ML
80 INJECTION, SOLUTION INTRAVASCULAR
Status: COMPLETED | OUTPATIENT
Start: 2025-02-13 | End: 2025-02-13

## 2025-02-13 RX ADMIN — IOPAMIDOL 80 ML: 755 INJECTION, SOLUTION INTRAVENOUS at 11:41

## 2025-02-18 ENCOUNTER — TELEPHONE (OUTPATIENT)
Dept: INTERNAL MEDICINE CLINIC | Age: 35
End: 2025-02-18

## 2025-02-18 RX ORDER — SODIUM CHLORIDE 0.9 % (FLUSH) 0.9 %
5-40 SYRINGE (ML) INJECTION PRN
Status: DISCONTINUED | OUTPATIENT
Start: 2025-02-18 | End: 2025-02-19 | Stop reason: HOSPADM

## 2025-02-18 RX ORDER — SODIUM CHLORIDE 9 MG/ML
25 INJECTION, SOLUTION INTRAVENOUS PRN
Status: DISCONTINUED | OUTPATIENT
Start: 2025-02-18 | End: 2025-02-19 | Stop reason: HOSPADM

## 2025-02-18 RX ORDER — SODIUM CHLORIDE 0.9 % (FLUSH) 0.9 %
5-40 SYRINGE (ML) INJECTION EVERY 12 HOURS SCHEDULED
Status: DISCONTINUED | OUTPATIENT
Start: 2025-02-18 | End: 2025-02-19 | Stop reason: HOSPADM

## 2025-02-18 RX ORDER — SODIUM CHLORIDE 9 MG/ML
INJECTION, SOLUTION INTRAVENOUS CONTINUOUS
Status: DISCONTINUED | OUTPATIENT
Start: 2025-02-18 | End: 2025-02-19 | Stop reason: HOSPADM

## 2025-02-18 NOTE — TELEPHONE ENCOUNTER
Samantha calling outpatient dietitian stating that [facility] needs a new tube feeding order completed because Dr Lara is placing a PEG tube tomorrow.  Explained to patient that this RD will write up the recommended tube feeding prescription and send to Dr Lara for review and if appoves, the order will get faxed to [facility].   Patient expressed appreciation. Later this RD called Dr Lara's (Dr ARMSTRONG)office to look for the fax of this new tube feeding order for Dr ARMSTRONG to sign if he is in agreement to the tube feeding prescription, and then to fax directly to [facility].  Dr ARMSTRONG staff understood.  Expressed appreciation.

## 2025-02-19 ENCOUNTER — ANESTHESIA EVENT (OUTPATIENT)
Dept: ENDOSCOPY | Age: 35
End: 2025-02-19
Payer: COMMERCIAL

## 2025-02-19 ENCOUNTER — ANESTHESIA (OUTPATIENT)
Dept: ENDOSCOPY | Age: 35
End: 2025-02-19
Payer: COMMERCIAL

## 2025-02-19 ENCOUNTER — HOSPITAL ENCOUNTER (OUTPATIENT)
Age: 35
Setting detail: OUTPATIENT SURGERY
Discharge: HOME | End: 2025-02-19
Attending: INTERNAL MEDICINE | Admitting: INTERNAL MEDICINE
Payer: COMMERCIAL

## 2025-02-19 ENCOUNTER — APPOINTMENT (OUTPATIENT)
Dept: ENDOSCOPY | Age: 35
End: 2025-02-19
Attending: INTERNAL MEDICINE
Payer: COMMERCIAL

## 2025-02-19 VITALS
BODY MASS INDEX: 49.24 KG/M2 | RESPIRATION RATE: 18 BRPM | SYSTOLIC BLOOD PRESSURE: 123 MMHG | DIASTOLIC BLOOD PRESSURE: 76 MMHG | OXYGEN SATURATION: 98 % | HEART RATE: 73 BPM | HEIGHT: 64 IN | WEIGHT: 288.4 LBS | TEMPERATURE: 96.4 F

## 2025-02-19 PROCEDURE — 7100000011 HC PHASE II RECOVERY - ADDTL 15 MIN: Performed by: INTERNAL MEDICINE

## 2025-02-19 PROCEDURE — 3700000001 HC ADD 15 MINUTES (ANESTHESIA): Performed by: INTERNAL MEDICINE

## 2025-02-19 PROCEDURE — 2500000003 HC RX 250 WO HCPCS: Performed by: INTERNAL MEDICINE

## 2025-02-19 PROCEDURE — 2709999900 HC NON-CHARGEABLE SUPPLY: Performed by: INTERNAL MEDICINE

## 2025-02-19 PROCEDURE — 7100000010 HC PHASE II RECOVERY - FIRST 15 MIN: Performed by: INTERNAL MEDICINE

## 2025-02-19 PROCEDURE — 3700000000 HC ANESTHESIA ATTENDED CARE: Performed by: INTERNAL MEDICINE

## 2025-02-19 PROCEDURE — 6360000002 HC RX W HCPCS: Performed by: NURSE ANESTHETIST, CERTIFIED REGISTERED

## 2025-02-19 PROCEDURE — 6360000002 HC RX W HCPCS: Performed by: INTERNAL MEDICINE

## 2025-02-19 PROCEDURE — 2580000003 HC RX 258: Performed by: INTERNAL MEDICINE

## 2025-02-19 PROCEDURE — 3609013300 HC EGD TUBE PLACEMENT: Performed by: INTERNAL MEDICINE

## 2025-02-19 RX ORDER — SODIUM CHLORIDE 0.9 % (FLUSH) 0.9 %
10 SYRINGE (ML) INJECTION ONCE
Status: COMPLETED | OUTPATIENT
Start: 2025-02-19 | End: 2025-02-19

## 2025-02-19 RX ORDER — HEPARIN 100 UNIT/ML
500 SYRINGE INTRAVENOUS ONCE
Status: COMPLETED | OUTPATIENT
Start: 2025-02-19 | End: 2025-02-19

## 2025-02-19 RX ORDER — PROPOFOL 10 MG/ML
INJECTION, EMULSION INTRAVENOUS
Status: DISCONTINUED | OUTPATIENT
Start: 2025-02-19 | End: 2025-02-19 | Stop reason: SDUPTHER

## 2025-02-19 RX ORDER — LIDOCAINE HYDROCHLORIDE 20 MG/ML
INJECTION, SOLUTION INFILTRATION; PERINEURAL
Status: DISCONTINUED | OUTPATIENT
Start: 2025-02-19 | End: 2025-02-19 | Stop reason: SDUPTHER

## 2025-02-19 RX ADMIN — HEPARIN 500 UNITS: 100 SYRINGE at 13:33

## 2025-02-19 RX ADMIN — SODIUM CHLORIDE, PRESERVATIVE FREE 10 ML: 5 INJECTION INTRAVENOUS at 13:32

## 2025-02-19 RX ADMIN — LIDOCAINE HYDROCHLORIDE 100 MG: 20 INJECTION, SOLUTION INFILTRATION; PERINEURAL at 12:27

## 2025-02-19 RX ADMIN — SODIUM CHLORIDE: 0.9 INJECTION, SOLUTION INTRAVENOUS at 12:01

## 2025-02-19 RX ADMIN — SODIUM CHLORIDE, PRESERVATIVE FREE 10 ML: 5 INJECTION INTRAVENOUS at 12:05

## 2025-02-19 RX ADMIN — VANCOMYCIN HYDROCHLORIDE 1000 MG: 1 INJECTION, POWDER, LYOPHILIZED, FOR SOLUTION INTRAVENOUS at 12:03

## 2025-02-19 RX ADMIN — PROPOFOL 500 MG: 10 INJECTION, EMULSION INTRAVENOUS at 12:27

## 2025-02-19 RX ADMIN — HYDROMORPHONE HYDROCHLORIDE 1 MG: 1 INJECTION, SOLUTION INTRAMUSCULAR; INTRAVENOUS; SUBCUTANEOUS at 13:08

## 2025-02-19 ASSESSMENT — PAIN - FUNCTIONAL ASSESSMENT
PAIN_FUNCTIONAL_ASSESSMENT: 0-10

## 2025-02-19 ASSESSMENT — PAIN SCALES - GENERAL: PAINLEVEL_OUTOF10: 10

## 2025-02-19 ASSESSMENT — LIFESTYLE VARIABLES: SMOKING_STATUS: 1

## 2025-02-19 ASSESSMENT — PAIN DESCRIPTION - ORIENTATION: ORIENTATION: MID

## 2025-02-19 ASSESSMENT — PAIN DESCRIPTION - LOCATION: LOCATION: ABDOMEN

## 2025-02-19 NOTE — BRIEF OP NOTE
Brief Postoperative Note      Patient: Samantha Nichols  YOB: 1990  MRN: [id number]    Date of Procedure: 2/19/2025    Pre-Op Diagnosis Codes:      * Encounter for PEG (percutaneous endoscopic gastrostomy) (HCC) [Z43.1]    Post-Op Diagnosis: Post-Op Diagnosis Codes:     * Encounter for PEG (percutaneous endoscopic gastrostomy) (HCC) [Z43.1]       Procedure(s):  EGD Peg Tube Placement    Surgeon(s):  Janusz Lara MD    Assistant:  * No surgical staff found *    Anesthesia: Monitor Anesthesia Care    Estimated Blood Loss (mL): Minimal    Complications: None    Specimens:   * No specimens in log *    Implants:  * No implants in log *      Drains:   [REMOVED] Gastrostomy/Enterostomy/Jejunostomy Tube Percutaneous Endoscopic Gastrostomy (PEG) LUQ (Removed)   Site Description Clean, dry & intact 01/16/25 1938   J Port Status Clamped 01/16/25 1938   G Port Status Clamped 01/16/25 1938   Surrounding Skin Clean, dry & intact 01/16/25 1938   Dressing Status Clean, dry & intact 01/16/25 1938   Dressing Type Split gauze 01/16/25 1938   Free Water/Flush (mL) 0 mL 01/16/25 1938   Output (mL) 0 ml 01/16/25 1938       Findings:  Infection Present At Time Of Surgery (PATOS) (choose all levels that have infection present):  No infection present  Other Findings: 29 F G TUBE PLACED    Electronically signed by Janusz Lara MD on 2/19/2025 at 12:52 PM

## 2025-02-19 NOTE — ANESTHESIA PRE PROCEDURE
Department of Anesthesiology  Preprocedure Note       Name:  Samantha Nichols   Age:  34 y.o.  :  1990                                          MRN:  [id number]         Date:  2025      Surgeon: Surgeon(s):  Janusz Lara MD    Procedure: Procedure(s):  EGD Peg Tube Placement    Medications prior to admission:   Prior to Admission medications    Medication Sig Start Date End Date Taking? Authorizing Provider   traMADol (ULTRAM) 50 MG tablet Take 1 tablet by mouth every 8 hours as needed for Pain for up to 30 days. Intended supply: 30 days. Take lowest dose possible to manage pain Max Daily Amount: 150 mg 1/30/25 3/1/25 Yes Giuseppe Wilson MD   lactulose (CHRONULAC) 10 GM/15ML solution Take 30 mLs by mouth every 8 (eight) hours Until you have a bowel movement and then stop. 25  Yes Giuseppe Wilson MD   potassium chloride (KLOR-CON M) 20 MEQ extended release tablet Take 1 tablet by mouth daily 24  Yes Umm Whaley PA-C   pantoprazole (PROTONIX) 40 MG tablet Take 1 tablet by mouth in the morning and at bedtime   Yes ProviderOsmar MD   amLODIPine (NORVASC) 10 MG tablet TAKE 1 TABLET BY MOUTH DAILY 24  Yes Ramona You MD   promethazine (PHENERGAN) 25 MG tablet Take 1 tablet by mouth every 8 hours as needed 10/29/24  Yes ProviderOsmar MD   FreeStyle Lancets MISC 1 each by Does not apply route daily Use to check blood sugar once daily. Freestyle Freedom Lite Dx: E11.9 24  Yes Juan Dominguez DO   blood glucose monitor strips Test 1 time a day Freestyle freedom Lite test strips Dx: E11.9 24  Yes Juan Dominguez DO   glucose monitoring kit 1 kit by Does not apply route daily 24  Yes Abhishek Hamilton MD   fluticasone (FLONASE) 50 MCG/ACT nasal spray 2 sprays by Each Nostril route daily 24  Yes Hernando Weems MD   simethicone (MYLICON) 80 MG chewable tablet Take 1 tablet by mouth 4 times daily as needed for Flatulence (gas) 24  Yes

## 2025-02-19 NOTE — ANESTHESIA POSTPROCEDURE EVALUATION
Department of Anesthesiology  Postprocedure Note    Patient: Samantha Nichols  MRN: [id number]  YOB: 1990  Date of evaluation: 2/19/2025    Procedure Summary       Date: 02/19/25 Room / Location: [place] [address] / [facility]    Anesthesia Start: 1225 Anesthesia Stop: 1252    Procedure: EGD Peg Tube Placement (Abdomen) Diagnosis:       Encounter for PEG (percutaneous endoscopic gastrostomy) (HCC)      (Encounter for PEG (percutaneous endoscopic gastrostomy) (HCC) [Z43.1])    Surgeons: Janusz Lara MD Responsible Provider:     Anesthesia Type: MAC ASA Status: 4            Anesthesia Type: No value filed.    Juan Phase I: Juan Score: 10    Juan Phase II:      Anesthesia Post Evaluation    Patient location during evaluation: bedside  Patient participation: complete - patient participated  Level of consciousness: awake  Airway patency: patent  Nausea & Vomiting: no vomiting and no nausea  Cardiovascular status: hemodynamically stable  Respiratory status: acceptable  Hydration status: stable  Pain management: adequate        No notable events documented.

## 2025-02-19 NOTE — PROGRESS NOTES
Samantha admitted to [facility] for egd peg tube placement with Dr. Lara. Consent form signed and verified with pt. Allergies verified with pt and cousin. Herman Shabazz at  bedside.

## 2025-02-19 NOTE — PROGRESS NOTES
Patient in recovery, vitals are stable. She will be transferred by an driving service accompanied by family.

## 2025-02-19 NOTE — PROGRESS NOTES
EGD completed. Photos taken. No specimens taken. 20F PEG tube placed at 4cm to the skin. Pt tolerated procedure well    Scope #  used.

## 2025-02-19 NOTE — PROGRESS NOTES
Patient in recovery, vitals are stable. PEG tube dressing is dry and intact. Dr Lara talked to patient and family about findings and plan of care.

## 2025-02-19 NOTE — DISCHARGE INSTRUCTIONS
Anti- reflux and aspiration precautions  Start tube feeding in 2 hours   please call our office if any problems with g tube,

## 2025-02-19 NOTE — PROGRESS NOTES
[address] [address]                              PROGRESS NOTE      PATIENT NAME: TREVOR ARZATE            : 1990  MED REC NO: [id number]                       ROOM: [facility]  ACCOUNT NO: [id number]                       ADMIT DATE: 2025  PROVIDER: Janusz Lara MD      DATE OF SERVICE: 2025    PROCEDURE:  Esophagogastroduodenoscopy with gastrostomy tube placement.    INDICATIONS:  34-year-old pleasant female who has severe gastroparesis, decreased oral intake.  Patient had G-tube placed.  She was tolerating the G-tube feedings, but inadvertently pulled out the G-tube and also post G-tube was removed, patient has what appears to be subcutaneous fistula.  Patient treated with antibiotics.  Most recent CT scan negative for any fistula.  She is undergoing EGD with G-tube placement.  Patient states that she is not getting anything down.  She lost 5 pounds since January.  Please see my brief history for details and for physical examination.    ASA CLASSIFICATION:  As per Anesthesia, please see Anesthesia note for details.    INSTRUMENTS:  GIF- gastroscope, 20-[employer] [place] [employer] G-tube.    PHOTOGRAPHS:  Yes.    BIOPSIES:  No.    ESTIMATED BLOOD LOSS:  Less than 10 mL.    DESCRIPTION OF PROCEDURE:  Procedure, indications, and complications including, but not limited to, perforation, bleeding, infection, reaction to medicine, damage to the adjacent organs, damage to the colon discussed with the patient and she expressed her understanding and a written consent was obtained.    Patient was placed in supine position in Endo room #11.  Patient was placed on appropriate monitoring of vitals including blood pressure, heart rate, and pulse ox.  Oropharynx was sprayed with Cetacaine spray, and bite block was placed between maxilla and mandible.  After the adequate total intravenous anesthesia was given by

## 2025-02-19 NOTE — H&P
[address] [address]                            PREOPERATIVE H&P      PATIENT NAME: TREVOR ARZATE            : 1990  MED REC NO: [id number]                       ROOM: [facility]  ACCOUNT NO: [id number]                       ADMIT DATE: 2025  PROVIDER: Janusz Lara MD      INDICATIONS:  Patient is a 34-year-old pleasant female, who had severe gastroparesis and patient unable to tolerate anything by mouth.  Patient had G-tube placed in the past and G-tube was dislodged, and post dislodgement CT scan showed possible fistula.  Patient was treated with antibiotics and patient has been doing well.  Most recent CT scan negative for any fistula.  She is undergoing EGD and PEG tube placement.    PAST MEDICAL HISTORY:  Gastroparesis; postoperative nausea, vomiting; diastolic congestive heart failure; acute kidney injury; anemia; anxiety; asthma; coronary artery disease; depression; diabetes mellitus; dyslipidemia; epilepsy; hypertension; malignant carcinoid tumor; migraine headaches; sickle cell trait.    PAST SURGICAL HISTORY:  Laparoscopic left nephrectomy, abdominal exploration surgery, breast reduction, right subclavian central catheter placement, cholecystectomy, colonoscopy, dental surgery, dilation and curettage of the uterus, EGD, gastric fundoplication, gastrostomy tube placement, hernia repair, hysterectomy, *** removal of venous access catheter, arthroscopy of the knee, laparoscopy, myomectomy, port surgery, tonsillectomy and adenoidectomy, tumor removal, tunneled venous port placement, ventral hernia repair, wisdom teeth extraction.    MEDICATIONS:  Reviewed.    PHYSICAL EXAMINATION:  VITAL SIGNS:  Afebrile.  Vital signs are stable.  Please see electronic medical records for details.  HEART:  Regular.  Normal S1 and S2.  No S3.  LUNGS:  Equal to expansion on inspection.  Fair air entry bilaterally.  ABDOMEN:

## 2025-02-20 NOTE — PROCEDURES
[address] [address]                             PROCEDURE NOTE      PATIENT NAME: TREVOR ARZATE            : 1990  MED REC NO: [id number]                       ROOM: [facility]  ACCOUNT NO: [id number]                       ADMIT DATE: 2025  PROVIDER: Janusz Lara MD      DATE OF PROCEDURE:  2025    SURGEON:  Janusz Lara MD    CORRECTED WT (25 hedy)    PROCEDURE:  Esophagogastroduodenoscopy with gastrostomy tube placement.    INDICATIONS:  34-year-old pleasant female who has severe gastroparesis, decreased oral intake.  Patient had G-tube placed.  She was tolerating the G-tube feedings, but inadvertently pulled out the G-tube and also post G-tube was removed, patient has what appears to be subcutaneous fistula.  Patient treated with antibiotics.  Most recent CT scan negative for any fistula.  She is undergoing EGD with G-tube placement.  Patient states that she is not getting anything down.  She lost 5 pounds since January.  Please see my brief history for details and for physical examination.    ASA CLASSIFICATION:  As per Anesthesia, please see Anesthesia note for details.    INSTRUMENTS:  GIF- gastroscope, 20-[place] [employer] G-tube.    PHOTOGRAPHS:  Yes.    BIOPSIES:  No.    ESTIMATED BLOOD LOSS:  Less than 10 mL.    DESCRIPTION OF PROCEDURE:  Procedure, indications, and complications including, but not limited to, perforation, bleeding, infection, reaction to medicine, damage to the adjacent organs, damage to the colon discussed with the patient and she expressed her understanding and a written consent was obtained.    Patient was placed in supine position in Endo room #11.  Patient was placed on appropriate monitoring of vitals including blood pressure, heart rate, and pulse ox.  Oropharynx was sprayed with Cetacaine spray, and bite block was placed between maxilla and mandible.

## 2025-02-24 ENCOUNTER — OFFICE VISIT (OUTPATIENT)
Dept: INTERNAL MEDICINE CLINIC | Age: 35
End: 2025-02-24

## 2025-02-24 VITALS
WEIGHT: 287.8 LBS | TEMPERATURE: 97.2 F | HEIGHT: 64 IN | DIASTOLIC BLOOD PRESSURE: 84 MMHG | SYSTOLIC BLOOD PRESSURE: 130 MMHG | BODY MASS INDEX: 49.13 KG/M2 | HEART RATE: 92 BPM

## 2025-02-24 DIAGNOSIS — K31.84 GASTROPARESIS DUE TO DM (HCC): ICD-10-CM

## 2025-02-24 DIAGNOSIS — E11.43 GASTROPARESIS DUE TO DM (HCC): ICD-10-CM

## 2025-02-24 DIAGNOSIS — R10.12 LEFT UPPER QUADRANT ABDOMINAL PAIN: ICD-10-CM

## 2025-02-24 DIAGNOSIS — E66.9 TYPE 2 DIABETES MELLITUS WITH OBESITY (HCC): ICD-10-CM

## 2025-02-24 DIAGNOSIS — I10 PRIMARY HYPERTENSION: Primary | ICD-10-CM

## 2025-02-24 DIAGNOSIS — E11.69 TYPE 2 DIABETES MELLITUS WITH OBESITY (HCC): ICD-10-CM

## 2025-02-24 DIAGNOSIS — R11.2 NAUSEA AND VOMITING, UNSPECIFIED VOMITING TYPE: ICD-10-CM

## 2025-02-24 RX ORDER — TRAMADOL HYDROCHLORIDE 50 MG/1
50 TABLET ORAL EVERY 8 HOURS PRN
Qty: 90 TABLET | Refills: 0 | Status: SHIPPED | OUTPATIENT
Start: 2025-02-24 | End: 2025-03-17 | Stop reason: SDUPTHER

## 2025-02-24 RX ORDER — FAMOTIDINE 20 MG/1
20 TABLET, FILM COATED ORAL 2 TIMES DAILY
Qty: 60 TABLET | Refills: 3 | Status: SHIPPED | OUTPATIENT
Start: 2025-02-24

## 2025-02-24 NOTE — PROGRESS NOTES
MG tablet    Ajit Wilson MD, Gastroenterology, [place]        Hypertension: controled   Norvasc 10 mg daily  Prazosin Minipress 1 mg capsule nightly    Type 2 diabetes with gastroparesis: Recently underwent PEG tube placement with GI 2/19/2025 (3rd PEG tube)  Last A1c 6.0 12/19/24  C-peptide ordered when last in clinic, resulted as 3.7 (within normal limits)  Patient was started on pilocarpine 5 mg 3 times daily for gastroparesis at last clinic visit, was not able to tolerate secondary to nausea/vomiting  Patient states she has been referred to [place] for possible workup for bowel pacemaker  Will attempt to refer patient back to [facility] for a second opinion   Gastric emptying study    Left upper quadrant abdominal pain: chronic, unchanged    Continue Ultram 50 milligram daily, PDMP reviewed     Nausea/Vomiting: improved after new peg tube placement   Phenergan  Protonix  Will start pepcid     Follow up in 1 month fro DM/HTN checkup    Staffed with:GONZÁLEZ BARKER [facility] PROFESSIONAL SERVS  [facility] INTERNAL MEDICINE  [address] [place] [id number]  Dept: [phone]  Dept Fax: [phone]  Loc: [phone]    Electronically signed by Giuseppe Wilson MD on 2/24/25 at 3:31 PM EST

## 2025-03-17 ENCOUNTER — OFFICE VISIT (OUTPATIENT)
Dept: INTERNAL MEDICINE CLINIC | Age: 35
End: 2025-03-17
Payer: COMMERCIAL

## 2025-03-17 VITALS
SYSTOLIC BLOOD PRESSURE: 138 MMHG | HEIGHT: 64 IN | OXYGEN SATURATION: 96 % | WEIGHT: 293 LBS | BODY MASS INDEX: 50.02 KG/M2 | HEART RATE: 92 BPM | DIASTOLIC BLOOD PRESSURE: 84 MMHG

## 2025-03-17 VITALS — WEIGHT: 292.2 LBS | BODY MASS INDEX: 45.86 KG/M2 | HEIGHT: 67 IN

## 2025-03-17 DIAGNOSIS — K90.9 MALABSORPTION SYNDROME: Primary | ICD-10-CM

## 2025-03-17 DIAGNOSIS — R10.12 LEFT UPPER QUADRANT ABDOMINAL PAIN: ICD-10-CM

## 2025-03-17 DIAGNOSIS — E11.69 TYPE 2 DIABETES MELLITUS WITH OBESITY (HCC): ICD-10-CM

## 2025-03-17 DIAGNOSIS — I10 PRIMARY HYPERTENSION: Primary | ICD-10-CM

## 2025-03-17 DIAGNOSIS — R11.10 POSTPRANDIAL VOMITING: ICD-10-CM

## 2025-03-17 DIAGNOSIS — E66.9 TYPE 2 DIABETES MELLITUS WITH OBESITY (HCC): ICD-10-CM

## 2025-03-17 DIAGNOSIS — R10.13 ABDOMINAL PAIN, EPIGASTRIC: ICD-10-CM

## 2025-03-17 PROCEDURE — NBSRV NON-BILLABLE SERVICE: Performed by: DIETITIAN, REGISTERED

## 2025-03-17 PROCEDURE — 97803 MED NUTRITION INDIV SUBSEQ: CPT | Performed by: DIETITIAN, REGISTERED

## 2025-03-17 PROCEDURE — 3079F DIAST BP 80-89 MM HG: CPT

## 2025-03-17 PROCEDURE — 3046F HEMOGLOBIN A1C LEVEL >9.0%: CPT

## 2025-03-17 PROCEDURE — 3075F SYST BP GE 130 - 139MM HG: CPT

## 2025-03-17 PROCEDURE — 4004F PT TOBACCO SCREEN RCVD TLK: CPT

## 2025-03-17 PROCEDURE — 2022F DILAT RTA XM EVC RTNOPTHY: CPT

## 2025-03-17 PROCEDURE — G8417 CALC BMI ABV UP PARAM F/U: HCPCS

## 2025-03-17 PROCEDURE — G8427 DOCREV CUR MEDS BY ELIG CLIN: HCPCS

## 2025-03-17 PROCEDURE — 99214 OFFICE O/P EST MOD 30 MIN: CPT

## 2025-03-17 RX ORDER — TRAMADOL HYDROCHLORIDE 50 MG/1
50 TABLET ORAL EVERY 8 HOURS PRN
Qty: 90 TABLET | Refills: 0 | Status: SHIPPED | OUTPATIENT
Start: 2025-03-17 | End: 2025-04-16

## 2025-03-17 RX ORDER — LIDOCAINE 40 MG/G
CREAM TOPICAL PRN
COMMUNITY
Start: 2025-03-14

## 2025-03-17 RX ORDER — ESOMEPRAZOLE MAGNESIUM 20 MG/1
20 GRANULE, DELAYED RELEASE ORAL DAILY
COMMUNITY
Start: 2025-02-25

## 2025-03-17 RX ORDER — PROMETHAZINE HYDROCHLORIDE 6.25 MG/5ML
6.25 SYRUP ORAL 4 TIMES DAILY PRN
Qty: 473 ML | Refills: 2 | Status: SHIPPED | OUTPATIENT
Start: 2025-03-17 | End: 2025-05-27

## 2025-03-17 NOTE — PROGRESS NOTES
schedule below.    Goal Rate:    7 am - 7 pm (12 hours) @ 100 ml/hr (Using 5 cartons/day)  OR  7 am - 7 pm (12 hours) @ 120 ml/hr (Using 6 cartons/day)    -Estimated Daily Nutrient Needs:  Energy Requirements Based On: Kcal/kg  Weight Used for Energy Requirements:  (129kgm on 12/22)  Energy (kcal/day): 3945-8711 kcals (12-15)  Weight Used for Protein Requirements: Ideal (55kgm)  Protein (g/day):  grams (1.3-2)  Fluid (ml/day): 2000ml (hx CHF)    Comprehension verified using teachback method.    Monitoring/Evaluation:   -Followup visit: 3 mo with dietitian.   -Receptiveness to education/goals: Agreeable.  -Evaluation of education: Indicates understanding.  -Readiness to change: precontemplative- Running TF during the day @ 100 ml/hr x 12 hrs so keeping track better of the amount of formula she is taking in.  -Expected compliance: good.    Thank you for your referral of this patient.     Total time involved in direct patient education: 30 minutes for follow-up MNT visit.

## 2025-03-17 NOTE — PROGRESS NOTES
years ago    Epilepsy (HCC)     Fibroids     Gastro - esophageal reflux disease     Gastroparesis     History of esophagogastroduodenoscopy (EGD) 08/13/2022    Hypertension     Hypertrophy of tonsil and adenoid 11/04/2017    Malignant carcinoid tumor of other sites (HCC) 05/14/2013    Migraine     PONV (postoperative nausea and vomiting)     Prolonged emergence from general anesthesia     Sickle cell anemia (HCC)     Sickle cell trait     PT STATES SHE HAS THE TRAIT NOT THE DISEASE    Tumor associated pain     neuroendrocrine tumor, gastroma       Past Surgical History:   Procedure Laterality Date    ABDOMEN SURGERY  03/23/2017    Laparoscopic , Bi lat oopherectomy Dr Hinojosa    ABDOMINAL EXPLORATION SURGERY      x2    BREAST REDUCTION SURGERY      CENTRAL VENOUS CATHETER Right 12/11/2015    right subclavian single lumen mediport insertion--Dr. Michel    CHOLECYSTECTOMY, LAPAROSCOPIC N/A 7/11/2019    CHOLECYSTECTOMY LAPAROSCOPIC performed by Iron Michel MD at [facility]    COLONOSCOPY N/A 8/24/2020    COLONOSCOPY POLYPECTOMY SNARE/COLD BIOPSY performed by Frida Elaine MD at [facility]    DENTAL SURGERY      DILATION AND CURETTAGE OF UTERUS  10/21/2013    Dilation and curettage, Diagnostic Hysteroscopy and laparoscopy (Dr. Bartholomew, [facility])    EGD  2019    EGD COLONOSCOPY N/A 1/8/2019    EGD DIL performed by Ajit Washburn MD at [facility]    EGD COLONOSCOPY Left 5/14/2019    EGD DILATATION performed by Ajit Washburn MD at [facility]    EGD COLONOSCOPY Left 8/27/2019    EGD DILATATION performed by Ajit Washburn MD at [facility]    ENDOSCOPY, COLON, DIAGNOSTIC      GASTRIC FUNDOPLICATION      [facility]    GASTRIC FUNDOPLICATION      GASTROSTOMY TUBE PLACEMENT N/A 1/25/2023    EGD PEG TUBE PLACEMENT performed by Frida Elaine MD at [facility]    GASTROSTOMY TUBE PLACEMENT N/A 2/22/2023    EGD PEG TUBE PLACEMENT performed by Frida Elaine MD at [facility]    GASTROSTOMY TUBE PLACEMENT N/A

## 2025-03-17 NOTE — PATIENT INSTRUCTIONS
Stopping the tube feeding 2 hours before going to bed will help with your gastric reflux.  - Try the following schedule below.    Goal Rate:    7 am - 7 pm (12 hours) @ 100 ml/hr (Using 5 cartons/day)  OR  7 am - 7 pm (12 hours) @ 120 ml/hr (Using 6 cartons/day)    Continue your free water flushes  - 180 ml x3/day    Do Stretches and muscle strengthening exercises 20 minutes at least 4days/week.  Keep monitoring your steps - always work up to more steps as able to help with weight loss efforts.

## 2025-03-18 ENCOUNTER — TELEPHONE (OUTPATIENT)
Dept: INTERNAL MEDICINE CLINIC | Age: 35
End: 2025-03-18

## 2025-03-18 ENCOUNTER — HOSPITAL ENCOUNTER (EMERGENCY)
Age: 35
Discharge: HOME | End: 2025-03-18
Attending: STUDENT IN AN ORGANIZED HEALTH CARE EDUCATION/TRAINING PROGRAM
Payer: COMMERCIAL

## 2025-03-18 ENCOUNTER — APPOINTMENT (OUTPATIENT)
Dept: CT IMAGING | Age: 35
End: 2025-03-18
Payer: COMMERCIAL

## 2025-03-18 VITALS
HEIGHT: 64 IN | WEIGHT: 293 LBS | TEMPERATURE: 97.8 F | BODY MASS INDEX: 50.02 KG/M2 | DIASTOLIC BLOOD PRESSURE: 96 MMHG | RESPIRATION RATE: 26 BRPM | HEART RATE: 102 BPM | OXYGEN SATURATION: 100 % | SYSTOLIC BLOOD PRESSURE: 134 MMHG

## 2025-03-18 DIAGNOSIS — K94.23 MIGRATION OF PERCUTANEOUS ENDOSCOPIC GASTROSTOMY (PEG) TUBE (HCC): Primary | ICD-10-CM

## 2025-03-18 DIAGNOSIS — T85.848A PAIN AROUND PEG TUBE SITE, INITIAL ENCOUNTER: ICD-10-CM

## 2025-03-18 LAB
ALBUMIN SERPL BCG-MCNC: 3.8 G/DL (ref 3.4–4.9)
ALP SERPL-CCNC: 119 U/L (ref 35–104)
ALT SERPL W/O P-5'-P-CCNC: 14 U/L (ref 10–35)
ANION GAP SERPL CALC-SCNC: 12 MEQ/L (ref 8–16)
AST SERPL-CCNC: 19 U/L (ref 10–35)
BASOPHILS ABSOLUTE: 0.1 THOU/MM3 (ref 0–0.1)
BASOPHILS NFR BLD AUTO: 0.4 %
BILIRUB SERPL-MCNC: < 0.2 MG/DL (ref 0.3–1.2)
BUN SERPL-MCNC: 16 MG/DL (ref 8–23)
CALCIUM SERPL-MCNC: 9.5 MG/DL (ref 8.6–10)
CHLORIDE SERPL-SCNC: 104 MEQ/L (ref 98–111)
CO2 SERPL-SCNC: 24 MEQ/L (ref 22–29)
CREAT SERPL-MCNC: 0.7 MG/DL (ref 0.5–0.9)
DEPRECATED RDW RBC AUTO: 42.4 FL (ref 35–45)
EOSINOPHIL NFR BLD AUTO: 2.9 %
EOSINOPHILS ABSOLUTE: 0.4 THOU/MM3 (ref 0–0.4)
ERYTHROCYTE [DISTWIDTH] IN BLOOD BY AUTOMATED COUNT: 15.8 % (ref 11.5–14.5)
GFR SERPL CREATININE-BSD FRML MDRD: > 90 ML/MIN/1.73M2
GLUCOSE SERPL-MCNC: 106 MG/DL (ref 74–109)
HCT VFR BLD AUTO: 37 % (ref 37–47)
HGB BLD-MCNC: 11.7 GM/DL (ref 12–16)
IMM GRANULOCYTES # BLD AUTO: 0.04 THOU/MM3 (ref 0–0.07)
IMM GRANULOCYTES NFR BLD AUTO: 0.3 %
LIPASE SERPL-CCNC: 23 U/L (ref 13–60)
LYMPHOCYTES ABSOLUTE: 3.4 THOU/MM3 (ref 1–4.8)
LYMPHOCYTES NFR BLD AUTO: 25.8 %
MCH RBC QN AUTO: 23.7 PG (ref 26–33)
MCHC RBC AUTO-ENTMCNC: 31.6 GM/DL (ref 32.2–35.5)
MCV RBC AUTO: 74.9 FL (ref 81–99)
MONOCYTES ABSOLUTE: 0.9 THOU/MM3 (ref 0.4–1.3)
MONOCYTES NFR BLD AUTO: 6.9 %
NEUTROPHILS ABSOLUTE: 8.3 THOU/MM3 (ref 1.8–7.7)
NEUTROPHILS NFR BLD AUTO: 63.7 %
NRBC BLD AUTO-RTO: 0 /100 WBC
OSMOLALITY SERPL CALC.SUM OF ELEC: 281 MOSMOL/KG (ref 275–300)
PLATELET # BLD AUTO: 293 THOU/MM3 (ref 130–400)
PMV BLD AUTO: 9.8 FL (ref 9.4–12.4)
POTASSIUM SERPL-SCNC: 3.6 MEQ/L (ref 3.5–5.2)
PROT SERPL-MCNC: 7.3 G/DL (ref 6.4–8.3)
RBC # BLD AUTO: 4.94 MILL/MM3 (ref 4.2–5.4)
SODIUM SERPL-SCNC: 140 MEQ/L (ref 135–145)
WBC # BLD AUTO: 13 THOU/MM3 (ref 4.8–10.8)

## 2025-03-18 PROCEDURE — 83690 ASSAY OF LIPASE: CPT

## 2025-03-18 PROCEDURE — 96368 THER/DIAG CONCURRENT INF: CPT

## 2025-03-18 PROCEDURE — 6360000002 HC RX W HCPCS: Performed by: STUDENT IN AN ORGANIZED HEALTH CARE EDUCATION/TRAINING PROGRAM

## 2025-03-18 PROCEDURE — 36415 COLL VENOUS BLD VENIPUNCTURE: CPT

## 2025-03-18 PROCEDURE — 99285 EMERGENCY DEPT VISIT HI MDM: CPT

## 2025-03-18 PROCEDURE — 6360000002 HC RX W HCPCS: Performed by: EMERGENCY MEDICINE

## 2025-03-18 PROCEDURE — 96375 TX/PRO/DX INJ NEW DRUG ADDON: CPT

## 2025-03-18 PROCEDURE — 96366 THER/PROPH/DIAG IV INF ADDON: CPT

## 2025-03-18 PROCEDURE — 96376 TX/PRO/DX INJ SAME DRUG ADON: CPT

## 2025-03-18 PROCEDURE — 6370000000 HC RX 637 (ALT 250 FOR IP): Performed by: STUDENT IN AN ORGANIZED HEALTH CARE EDUCATION/TRAINING PROGRAM

## 2025-03-18 PROCEDURE — 6360000002 HC RX W HCPCS

## 2025-03-18 PROCEDURE — 96365 THER/PROPH/DIAG IV INF INIT: CPT

## 2025-03-18 PROCEDURE — 74177 CT ABD & PELVIS W/CONTRAST: CPT

## 2025-03-18 PROCEDURE — 80053 COMPREHEN METABOLIC PANEL: CPT

## 2025-03-18 PROCEDURE — 6360000004 HC RX CONTRAST MEDICATION: Performed by: STUDENT IN AN ORGANIZED HEALTH CARE EDUCATION/TRAINING PROGRAM

## 2025-03-18 PROCEDURE — 85025 COMPLETE CBC W/AUTO DIFF WBC: CPT

## 2025-03-18 RX ORDER — METRONIDAZOLE 500 MG/1
250 TABLET ORAL 3 TIMES DAILY
Qty: 21 TABLET | Refills: 0 | Status: SHIPPED | OUTPATIENT
Start: 2025-03-18 | End: 2025-04-01

## 2025-03-18 RX ORDER — METRONIDAZOLE 500 MG/100ML
500 INJECTION, SOLUTION INTRAVENOUS ONCE
Status: COMPLETED | OUTPATIENT
Start: 2025-03-18 | End: 2025-03-18

## 2025-03-18 RX ORDER — LEVOFLOXACIN 500 MG/1
500 TABLET, FILM COATED ORAL DAILY
Qty: 14 TABLET | Refills: 0 | Status: SHIPPED | OUTPATIENT
Start: 2025-03-18 | End: 2025-04-01

## 2025-03-18 RX ORDER — SODIUM CHLORIDE, SODIUM LACTATE, POTASSIUM CHLORIDE, CALCIUM CHLORIDE 600; 310; 30; 20 MG/100ML; MG/100ML; MG/100ML; MG/100ML
INJECTION, SOLUTION INTRAVENOUS CONTINUOUS
Status: DISCONTINUED | OUTPATIENT
Start: 2025-03-18 | End: 2025-03-18

## 2025-03-18 RX ORDER — IOPAMIDOL 755 MG/ML
80 INJECTION, SOLUTION INTRAVASCULAR
Status: COMPLETED | OUTPATIENT
Start: 2025-03-18 | End: 2025-03-18

## 2025-03-18 RX ORDER — HEPARIN 100 UNIT/ML
300 SYRINGE INTRAVENOUS ONCE
Status: COMPLETED | OUTPATIENT
Start: 2025-03-18 | End: 2025-03-18

## 2025-03-18 RX ORDER — FENTANYL CITRATE 50 UG/ML
100 INJECTION, SOLUTION INTRAMUSCULAR; INTRAVENOUS ONCE
Status: DISCONTINUED | OUTPATIENT
Start: 2025-03-18 | End: 2025-03-18

## 2025-03-18 RX ORDER — DIPHENHYDRAMINE HYDROCHLORIDE 50 MG/ML
25 INJECTION, SOLUTION INTRAMUSCULAR; INTRAVENOUS ONCE
Status: COMPLETED | OUTPATIENT
Start: 2025-03-18 | End: 2025-03-18

## 2025-03-18 RX ORDER — GINSENG 100 MG
CAPSULE ORAL ONCE
Status: COMPLETED | OUTPATIENT
Start: 2025-03-18 | End: 2025-03-18

## 2025-03-18 RX ORDER — MORPHINE SULFATE 4 MG/ML
4 INJECTION, SOLUTION INTRAMUSCULAR; INTRAVENOUS ONCE
Status: COMPLETED | OUTPATIENT
Start: 2025-03-18 | End: 2025-03-18

## 2025-03-18 RX ORDER — PROMETHAZINE HYDROCHLORIDE 25 MG/ML
12.5 INJECTION, SOLUTION INTRAMUSCULAR; INTRAVENOUS ONCE
Status: DISCONTINUED | OUTPATIENT
Start: 2025-03-18 | End: 2025-03-18

## 2025-03-18 RX ORDER — PROMETHAZINE HYDROCHLORIDE 25 MG/1
25 TABLET ORAL ONCE
Status: DISCONTINUED | OUTPATIENT
Start: 2025-03-18 | End: 2025-03-18

## 2025-03-18 RX ORDER — LEVOFLOXACIN 5 MG/ML
750 INJECTION, SOLUTION INTRAVENOUS ONCE
Status: COMPLETED | OUTPATIENT
Start: 2025-03-18 | End: 2025-03-18

## 2025-03-18 RX ADMIN — HYDROMORPHONE HYDROCHLORIDE 1 MG: 1 INJECTION, SOLUTION INTRAMUSCULAR; INTRAVENOUS; SUBCUTANEOUS at 03:25

## 2025-03-18 RX ADMIN — IOPAMIDOL 80 ML: 755 INJECTION, SOLUTION INTRAVENOUS at 03:47

## 2025-03-18 RX ADMIN — HYDROMORPHONE HYDROCHLORIDE 1 MG: 1 INJECTION, SOLUTION INTRAMUSCULAR; INTRAVENOUS; SUBCUTANEOUS at 04:34

## 2025-03-18 RX ADMIN — MORPHINE SULFATE 4 MG: 4 INJECTION, SOLUTION INTRAMUSCULAR; INTRAVENOUS at 02:30

## 2025-03-18 RX ADMIN — HEPARIN 300 UNITS: 100 SYRINGE at 06:42

## 2025-03-18 RX ADMIN — LEVOFLOXACIN 750 MG: 5 INJECTION, SOLUTION INTRAVENOUS at 04:39

## 2025-03-18 RX ADMIN — HYDROMORPHONE HYDROCHLORIDE 1 MG: 1 INJECTION, SOLUTION INTRAMUSCULAR; INTRAVENOUS; SUBCUTANEOUS at 06:15

## 2025-03-18 RX ADMIN — METRONIDAZOLE 500 MG: 500 INJECTION, SOLUTION INTRAVENOUS at 04:43

## 2025-03-18 RX ADMIN — BACITRACIN: 500 OINTMENT TOPICAL at 06:20

## 2025-03-18 RX ADMIN — DIPHENHYDRAMINE HYDROCHLORIDE 25 MG: 50 INJECTION INTRAMUSCULAR; INTRAVENOUS at 05:46

## 2025-03-18 ASSESSMENT — PAIN DESCRIPTION - LOCATION: LOCATION: ABDOMEN

## 2025-03-18 ASSESSMENT — PAIN - FUNCTIONAL ASSESSMENT: PAIN_FUNCTIONAL_ASSESSMENT: 0-10

## 2025-03-18 ASSESSMENT — PAIN SCALES - GENERAL: PAINLEVEL_OUTOF10: 10

## 2025-03-18 NOTE — DISCHARGE INSTRUCTIONS
You were seen in the ED today for lower abdominal pain, found to have displaced PEG tube.  Follow-up with your GI doctor as soon as possible.  If you develop worsening uncontrolled pain, fever, signs of heat and redness around the site of your insertion site please return to the ED.  Otherwise this time follow-up with your GI doctor.  Take your antibiotics as prescribed.

## 2025-03-18 NOTE — ED PROVIDER NOTES
Ultrasound Guided Peripheral Intravenous Catheter Insertion    Resident Performing Procedure: John Paul Olivo MD    Indicatin: Unable to obtain peripheral IV access, IV access needed for IV medications during admission    Date Placed: 03/18/25   Time Placed: 2:39 AM    Location: Left Basilic Vein   Catheter: AccuCath Ace Intravascular Catheter  Size: 20G    Procedure: A suitable vein was identified in the left arm with ultrasound. Area cleaned with chloraprep. Using sterile ultrasound gel and under ultrasound guidance, peripheral IV catheter was placed using accelerated Seldinger technique. There was good blood return and line flushed easily. Secured with StatLock and dressed with clear occlusive dressing.      Complications: None.        John Paul Olivo MD  03/18/25 0247

## 2025-03-18 NOTE — CONSULTS
[address] [address]                              CONSULTATION      PATIENT NAME: TREVOR ARZATE            : 1990  MED REC NO: [id number]                       ROOM: 24  ACCOUNT NO: [id number]                       ADMIT DATE: 2025  PROVIDER: Janusz Lara MD      CONSULT DATE: 2025    REASON FOR EVALUATION:  Dislodgement of G-tube.    HISTORY OF PRESENT ILLNESS:  The patient is a 34-year-old pleasant female, has history of diabetes mellitus complicated by gastroparesis.  The patient was unable to take anything by mouth.  The patient was evaluated at [facility].  The patient was followed by multiple GI physicians here in [place].  She had G-tube placed several times.  I put 2 G-tubes in her.  Last G-tube, she came to the emergency room with abdominal pain after placement of G-tube.  At that time, CT scan showed displaced G-tube with possible fistula and the G-tube was removed.  The patient kept nothing by mouth.  She was placed on TPN and then I put the end of the G-tube.  Now she presented back with abdominal pain, unable to get the tube feedings in.  She had CT scan of the abdomen and pelvis with reported as a percutaneous gastrostomy tube.  Mushroom cap has pulled outside of the gastric lumen where it remains within the abdominal cavity.  The patient is undergoing further evaluation.    DESCRIPTION OF PROCEDURE:  Indications and complications including but not limited to infection and bleeding discussed with the patient and patient expressed her understanding.  The patient was placed in supine position and G-tube site was sterilized with Betadine and G-tube was removed by gentle pull method.  Triple antibiotic ointment was applied.  Pressure dressing was applied.  No immediate complications noted.    IMPRESSION:  G-tube was removed without any immediate complications.  Triple antibiotic ointment

## 2025-03-18 NOTE — ED NOTES
Pt resting on cot at this time. Pt rolling around cot tearful at this time stating \"It hurts so bad.\" Pt updated on POC, pt verbalizes understanding. Vitals collected. Pt breathing even and unlabored. Call light in reach.

## 2025-03-18 NOTE — ED TRIAGE NOTES
Pt to ED via EMS from home w/ report of abdominal pain around G-tube. Pt reports that she she had G-tube placed appropriately 1 month ago due gastroparesis. Pt reports that for about one week now, she has been having abdominal pain left of G-tube that sometimes travels to back. Pt reports that G-tube is flushing well and is having no problems w/ use. Pt reports that she has taken Tylenol and Tramadol w/ no relief. Pt ambulatory from ambulance to cot. Pt breathing even and unlabored. Call light in reach.

## 2025-03-18 NOTE — TELEPHONE ENCOUNTER
Patient called in stating she had to go to the ER last night for abdominal pain. Dr Lara removed the G tube because it was displaced. Pt states he yelled at her, blaming her for the displacement. He told patient to call [facility] surgeon who apparently told patient he wont put another one in. Dr Lara also told her that he doesn't know what to do and to follow up with us. Patient is frustrated and is asking for advice.      Please advise

## 2025-03-18 NOTE — ED PROVIDER NOTES
[facility] EMERGENCY DEPARTMENT    EMERGENCY MEDICINE      Pt Name: Samantha Nichols  MRN: [id number]  Birthdate 1990  Date of evaluation: 3/18/2025  Treating Physician: Dr. Olivo  Resident Physician: Pili Alejandre MD    CHIEF COMPLAINT       Chief Complaint   Patient presents with    Abdominal Pain     History obtained from chart review and the patient.    HISTORY OF PRESENT ILLNESS    HPI    Samantha Nichols is a 34 y.o. female with PMH of gastroparesis, sickle cell anemia, anxiety, CAD, migraines, HTN, PEG tube presents to the emergency department for evaluation of abdominal pain.  Patient stated that she has been having abdominal pain in her upper abdomen right next to her PEG tube for the past week.  She stated that she has seen her PCP about the pain but is still having the pain.  Patient stated that she has had normal p.o. intake and has been eating and drinking well.  Patient does have intermittent nausea but is denying any fever, no vomiting, diarrhea, constipation, dysuria, swelling, chest pain, shortness of breath, trauma.    The patient has no other acute complaints at this time.    PAST MEDICAL AND SURGICAL HISTORY     Past Medical History:   Diagnosis Date    Acute on chronic diastolic congestive heart failure (HCC) 06/25/2022    JANI (acute kidney injury) 07/07/2019    Anemia     Anesthesia     migraines    Anxiety     Asthma     CAD (coronary artery disease)     Depression     Diabetes (HCC)     Diet-controlled type 2 diabetes mellitus (HCC) 2016    Dyslipidemia 11/14/2016    Epilepsy (HCC)     last siezure is 2 years ago    Epilepsy (HCC)     Fibroids     Gastro - esophageal reflux disease     Gastroparesis     History of esophagogastroduodenoscopy (EGD) 08/13/2022    Hypertension     Hypertrophy of tonsil and adenoid 11/04/2017    Malignant carcinoid tumor of other sites ([facility]) 05/14/2013    Migraine     PONV (postoperative nausea and vomiting)     Prolonged emergence from general  transcription was electronically signed. Parts of this transcriptions may have been dictated by use of voice recognition software and electronically transcribed, and parts may have been transcribed with the assistance of an ED scribe. The transcription may contain errors not detected in proofreading. Please refer to my supervising physician's documentation if my documentation differs.    Electronically Signed: Pili Alejandre MD, 03/18/25, 7:54 AM

## 2025-03-18 NOTE — ED NOTES
Pt medicated per MAR. Patient resting in bed. Respirations easy and unlabored. No distress noted. Call light within reach.

## 2025-03-18 NOTE — ED PROVIDER NOTES
injection 1 mg (1 mg IntraVENous Given 3/18/25 0325)   HYDROmorphone (DILAUDID) injection 1 mg (1 mg IntraVENous Given 3/18/25 0434)   levoFLOXacin (LEVAQUIN) 750 MG/150ML infusion 750 mg (0 mg IntraVENous Stopped 3/18/25 0618)   metroNIDAZOLE (FLAGYL) 500 mg in 0.9% NaCl 100 mL IVPB premix (0 mg IntraVENous Stopped 3/18/25 0548)   diphenhydrAMINE (BENADRYL) injection 25 mg (25 mg IntraVENous Given 3/18/25 0546)   HYDROmorphone (DILAUDID) injection 1 mg (1 mg IntraVENous Given 3/18/25 0615)   bacitracin ointment ( Topical Given 3/18/25 0620)   heparin (PF) 100 UNIT/ML injection 300 Units (300 Units IntraCATHeter Given 3/18/25 0642)         CT ABDOMEN PELVIS W IV CONTRAST Additional Contrast? None   Final Result   Percutaneous gastrostomy tube mushroom cap has pulled outside of the    gastric lumen, however remains within the abdominal cavity.      This document has been electronically signed by: Abhishek Gonzalez MD on 03/18/2025 04:11 AM      All CTs at this facility use dose modulation techniques and iterative    reconstructions, and/or weight-based dosing   when appropriate to reduce radiation to a low as reasonably achievable.            ED Course as of 03/18/25 0654   Tue Mar 18, 2025   0413 CT ABDOMEN PELVIS W IV CONTRAST Additional Contrast? None  IMPRESSION:  Percutaneous gastrostomy tube mushroom cap has pulled outside of the   gastric lumen, however remains within the abdominal cavity.   [SC]   2864 Spoke with Dr. Kauffman who recommended removing her G-tube and put Abx ointment and send patient home with oral Abx with outpatient follow up in his clinic. [AA]   6117 Spoke with Marco due to difficulty deflating the tube so he stated that he would come to the ED. [AA]   0611 Dr. Lara at bedside.  Will give a single dose of 1 mg of hydromorphone. for removal of G-tube. [SC]   0634 Dr. Lara removed the gastrostomy tube, bacitracin was placed over the lumen then a 4 x 4.  He is  recommending that she be discharged stay n.p.o. call general surgery office at [facility] for follow-up today.  He reports that in the past she has been able to tolerate oral intake when she has not had a G-tube. [SC]      ED Course User Index  [AA] Pili Alejandre MD  [SC] John Paul Olivo MD         Further MDM and disposition:   Assessment:   Displaced G-tube  Lower abdominal pain  Plan:   Discharge home with general surgery outpt f/u as soon as possible for replacement  Formal outpatient consult given and patient information faxed to Dr. 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 It was dictated by use of voice recognition software and electronically transcribed. The transcription may contain errors not detected in proofreading.

## 2025-03-18 NOTE — ED NOTES
Dr. Olivo and Dr. Alejandre at bedside. Pt medicated per MAR. Pt resting on cot at this time. Pt updated on POC, pt verbalizes understanding. Pt denies further needs at this time. Vitals collected. Pt breathing even and unlabored. Call light in reach.

## 2025-03-19 DIAGNOSIS — K31.84 GASTROPARESIS DUE TO DM (HCC): ICD-10-CM

## 2025-03-19 DIAGNOSIS — R11.10 POSTPRANDIAL VOMITING: Primary | ICD-10-CM

## 2025-03-19 DIAGNOSIS — E11.43 GASTROPARESIS DUE TO DM (HCC): ICD-10-CM

## 2025-03-25 ENCOUNTER — OFFICE VISIT (OUTPATIENT)
Dept: ENT CLINIC | Age: 35
End: 2025-03-25
Payer: COMMERCIAL

## 2025-03-25 VITALS
DIASTOLIC BLOOD PRESSURE: 78 MMHG | BODY MASS INDEX: 49.8 KG/M2 | TEMPERATURE: 97.6 F | OXYGEN SATURATION: 99 % | SYSTOLIC BLOOD PRESSURE: 124 MMHG | RESPIRATION RATE: 18 BRPM | HEIGHT: 64 IN | HEART RATE: 74 BPM | WEIGHT: 291.7 LBS

## 2025-03-25 DIAGNOSIS — K21.9 GASTROESOPHAGEAL REFLUX DISEASE, UNSPECIFIED WHETHER ESOPHAGITIS PRESENT: ICD-10-CM

## 2025-03-25 DIAGNOSIS — G47.33 OSA TREATED WITH BIPAP: Primary | ICD-10-CM

## 2025-03-25 DIAGNOSIS — K31.84 GASTROPARESIS: ICD-10-CM

## 2025-03-25 DIAGNOSIS — R49.0 HOARSENESS OF VOICE: ICD-10-CM

## 2025-03-25 PROCEDURE — G8427 DOCREV CUR MEDS BY ELIG CLIN: HCPCS | Performed by: REGISTERED NURSE

## 2025-03-25 PROCEDURE — G8417 CALC BMI ABV UP PARAM F/U: HCPCS | Performed by: REGISTERED NURSE

## 2025-03-25 PROCEDURE — 99215 OFFICE O/P EST HI 40 MIN: CPT | Performed by: REGISTERED NURSE

## 2025-03-25 PROCEDURE — 3078F DIAST BP <80 MM HG: CPT | Performed by: REGISTERED NURSE

## 2025-03-25 PROCEDURE — 3074F SYST BP LT 130 MM HG: CPT | Performed by: REGISTERED NURSE

## 2025-03-25 PROCEDURE — 1036F TOBACCO NON-USER: CPT | Performed by: REGISTERED NURSE

## 2025-03-25 NOTE — PROGRESS NOTES
syrup Take 5 mLs by mouth 4 times daily as needed for Nausea 473 mL 2    famotidine (PEPCID) 20 MG tablet Take 1 tablet by mouth 2 times daily 60 tablet 3    lactulose (CHRONULAC) 10 GM/15ML solution Take 30 mLs by mouth every 8 (eight) hours Until you have a bowel movement and then stop. 120 mL 0    potassium chloride (KLOR-CON M) 20 MEQ extended release tablet Take 1 tablet by mouth daily 30 tablet 0    pantoprazole (PROTONIX) 40 MG tablet Take 1 tablet by mouth in the morning and at bedtime      amLODIPine (NORVASC) 10 MG tablet TAKE 1 TABLET BY MOUTH DAILY 90 tablet 3    FreeStyle Lancets MISC 1 each by Does not apply route daily Use to check blood sugar once daily. Freestyle Freedom Lite Dx: E11.9 100 each 3    blood glucose monitor strips Test 1 time a day Freestyle freedom Lite test strips Dx: E11.9 100 strip 1    glucose monitoring kit 1 kit by Does not apply route daily 1 kit 0    fluticasone (FLONASE) 50 MCG/ACT nasal spray 2 sprays by Each Nostril route daily 1 each 0    simethicone (MYLICON) 80 MG chewable tablet Take 1 tablet by mouth 4 times daily as needed for Flatulence (gas) 180 tablet 3    estradiol (ESTRACE) 0.5 MG tablet Take 1 tablet by mouth daily      docusate sodium (COLACE) 100 MG capsule Take 1 capsule by mouth 2 times daily as needed      ONETOUCH VERIO strip USE TO check BLOOD SUGAR ONCE DAILY      FLOVENT  MCG/ACT inhaler Inhale 2 puffs into the lungs every 4 hours as needed for Shortness of Breath      SUMAtriptan (IMITREX) 25 MG tablet Take 1 tablet by mouth See Admin Instructions Take 1 tablet on onset of migraine may repeat after 2 hours if migraine returns don't exceed 200 mg daily      Incontinence Supply Disposable (DEPEND SILHOUETTE BRIEFS L/XL) MISC Use as needed for urinary and bowel incontinence 5 each 5    prazosin (MINIPRESS) 1 MG capsule Take 1 capsule by mouth nightly      albuterol (PROVENTIL) (2.5 MG/3ML) 0.083% nebulizer solution Take 3 mLs by nebulization every

## 2025-03-31 ENCOUNTER — HOSPITAL ENCOUNTER (EMERGENCY)
Age: 35
Discharge: HOME | End: 2025-03-31
Payer: COMMERCIAL

## 2025-03-31 VITALS
TEMPERATURE: 98.8 F | WEIGHT: 291 LBS | SYSTOLIC BLOOD PRESSURE: 170 MMHG | BODY MASS INDEX: 49.68 KG/M2 | HEART RATE: 87 BPM | DIASTOLIC BLOOD PRESSURE: 95 MMHG | RESPIRATION RATE: 16 BRPM | HEIGHT: 64 IN | OXYGEN SATURATION: 97 %

## 2025-03-31 DIAGNOSIS — M54.42 ACUTE BACK PAIN WITH SCIATICA, LEFT: Primary | ICD-10-CM

## 2025-03-31 DIAGNOSIS — I10 HYPERTENSION, POOR CONTROL: ICD-10-CM

## 2025-03-31 LAB — GLUCOSE BLD STRIP.AUTO-MCNC: 99 MG/DL (ref 70–108)

## 2025-03-31 PROCEDURE — 6370000000 HC RX 637 (ALT 250 FOR IP): Performed by: PHYSICIAN ASSISTANT

## 2025-03-31 PROCEDURE — 82948 REAGENT STRIP/BLOOD GLUCOSE: CPT

## 2025-03-31 PROCEDURE — 96372 THER/PROPH/DIAG INJ SC/IM: CPT

## 2025-03-31 PROCEDURE — 99284 EMERGENCY DEPT VISIT MOD MDM: CPT

## 2025-03-31 PROCEDURE — 99283 EMERGENCY DEPT VISIT LOW MDM: CPT

## 2025-03-31 PROCEDURE — 6360000002 HC RX W HCPCS: Performed by: PHYSICIAN ASSISTANT

## 2025-03-31 RX ORDER — ACETAMINOPHEN 500 MG
1000 TABLET ORAL
Status: DISCONTINUED | OUTPATIENT
Start: 2025-03-31 | End: 2025-03-31 | Stop reason: HOSPADM

## 2025-03-31 RX ORDER — BACLOFEN 10 MG/1
10 TABLET ORAL 3 TIMES DAILY PRN
Qty: 20 TABLET | Refills: 0 | Status: SHIPPED | OUTPATIENT
Start: 2025-03-31

## 2025-03-31 RX ORDER — DIPHENHYDRAMINE HYDROCHLORIDE 50 MG/ML
50 INJECTION, SOLUTION INTRAMUSCULAR; INTRAVENOUS ONCE
Status: DISCONTINUED | OUTPATIENT
Start: 2025-03-31 | End: 2025-03-31

## 2025-03-31 RX ORDER — LIDOCAINE 4 G/G
1 PATCH TOPICAL ONCE
Status: DISCONTINUED | OUTPATIENT
Start: 2025-03-31 | End: 2025-03-31 | Stop reason: HOSPADM

## 2025-03-31 RX ORDER — PREDNISONE 20 MG/1
60 TABLET ORAL ONCE
Status: COMPLETED | OUTPATIENT
Start: 2025-03-31 | End: 2025-03-31

## 2025-03-31 RX ORDER — PREDNISONE 20 MG/1
60 TABLET ORAL DAILY
Status: DISCONTINUED | OUTPATIENT
Start: 2025-03-31 | End: 2025-03-31

## 2025-03-31 RX ORDER — METHYLPREDNISOLONE 4 MG/1
TABLET ORAL
Qty: 1 KIT | Refills: 0 | Status: SHIPPED | OUTPATIENT
Start: 2025-03-31 | End: 2025-04-06

## 2025-03-31 RX ORDER — MORPHINE SULFATE 4 MG/ML
4 INJECTION, SOLUTION INTRAMUSCULAR; INTRAVENOUS
Status: COMPLETED | OUTPATIENT
Start: 2025-03-31 | End: 2025-03-31

## 2025-03-31 RX ORDER — MORPHINE SULFATE 10 MG/ML
8 INJECTION INTRAVENOUS
Status: COMPLETED | OUTPATIENT
Start: 2025-03-31 | End: 2025-03-31

## 2025-03-31 RX ORDER — DIPHENHYDRAMINE HYDROCHLORIDE 50 MG/ML
50 INJECTION, SOLUTION INTRAMUSCULAR; INTRAVENOUS ONCE
Status: COMPLETED | OUTPATIENT
Start: 2025-03-31 | End: 2025-03-31

## 2025-03-31 RX ORDER — LIDOCAINE 50 MG/G
1 PATCH TOPICAL DAILY
Qty: 10 PATCH | Refills: 0 | Status: SHIPPED | OUTPATIENT
Start: 2025-03-31 | End: 2025-04-10

## 2025-03-31 RX ADMIN — PREDNISONE 60 MG: 20 TABLET ORAL at 03:09

## 2025-03-31 RX ADMIN — DIPHENHYDRAMINE HYDROCHLORIDE 50 MG: 50 INJECTION INTRAMUSCULAR; INTRAVENOUS at 01:52

## 2025-03-31 RX ADMIN — MORPHINE SULFATE 4 MG: 4 INJECTION, SOLUTION INTRAMUSCULAR; INTRAVENOUS at 03:10

## 2025-03-31 RX ADMIN — MORPHINE SULFATE 8 MG: 10 INJECTION, SOLUTION INTRAMUSCULAR; INTRAVENOUS at 01:53

## 2025-03-31 ASSESSMENT — PAIN SCALES - GENERAL: PAINLEVEL_OUTOF10: 10

## 2025-03-31 ASSESSMENT — PAIN DESCRIPTION - ORIENTATION: ORIENTATION: LEFT

## 2025-03-31 ASSESSMENT — PAIN - FUNCTIONAL ASSESSMENT: PAIN_FUNCTIONAL_ASSESSMENT: 0-10

## 2025-03-31 ASSESSMENT — PAIN DESCRIPTION - LOCATION: LOCATION: HIP

## 2025-03-31 NOTE — ED PROVIDER NOTES
[facility] EMERGENCY DEPARTMENT      EMERGENCY MEDICINE     Pt Name: Samantha Nichols  MRN: [id number]  Birthdate 1990  Date of evaluation: 3/31/2025  Provider: JIMENEZ Nielson    CHIEF COMPLAINT       Chief Complaint   Patient presents with    Hip Pain     HISTORY OF PRESENT ILLNESS   Samantha Nichols is a pleasant 34 y.o. female who presents to the emergency department complaining of left hip and leg pain.  Patient states it radiates from her left buttock down the back of her left leg.  Patient states this started a few days ago and is getting worse.  Patient has multiple medical issues to include acute kidney injury, diabetes, hypertension, sickle cell trait    No chest pain or shortness of breath.  No fever or chills.  No nausea or vomiting.  No abdominal pain.  No fall or injury.  No numbness or tingling  PASTMEDICAL HISTORY     Past Medical History:   Diagnosis Date    Acute on chronic diastolic congestive heart failure (HCC) 06/25/2022    JANI (acute kidney injury) 07/07/2019    Anemia     Anesthesia     migraines    Anxiety     Asthma     CAD (coronary artery disease)     Depression     Diabetes ([facility])     Diet-controlled type 2 diabetes mellitus ([facility]) 2016    Dyslipidemia 11/14/2016    Epilepsy ([facility])     last siezure is 2 years ago    Epilepsy ([facility])     Fibroids     Gastro - esophageal reflux disease     Gastroparesis     History of esophagogastroduodenoscopy (EGD) 08/13/2022    Hypertension     Hypertrophy of tonsil and adenoid 11/04/2017    Malignant carcinoid tumor of other sites ([facility]) 05/14/2013    Migraine     PONV (postoperative nausea and vomiting)     Prolonged emergence from general anesthesia     Sickle cell anemia (HCC)     Sickle cell trait     PT STATES SHE HAS THE TRAIT NOT THE DISEASE    Tumor associated pain     neuroendrocrine tumor, gastroma       Patient Active Problem List   Diagnosis Code    Chronic abdominal pain R10.9, G89.29    Nausea and vomiting R11.2    Carcinoid

## 2025-03-31 NOTE — DISCHARGE INSTRUCTIONS
Call today to make a follow-up appoint with your regular physician.  Do not drive or operate machinery while taking the muscle relaxer this can cause sedation.  Take the Medrol Dosepak as directed.  You need to  prescriptions this morning.    Light walking is good, avoid any lifting or twisting for the next several days.    Your blood pressure was elevated this will need to be monitored by your primary care physician as well.    Discharge warning    Please remember that examination and testing performed in the emergency department is not a comprehensive evaluation of all medical conditions and does not replace the need to follow up with your primary care provider.  In the emergency department, we are only able to evaluate your symptoms in the current condition, but symptoms may change or worsen.  Although you are felt safe to be discharged today, if your symptoms persist or change, you need to be re-evaluated by your regular/primary care doctor as soon as possible.  If you are unable to make appointment with your regular doctor, please come back to the ER to be re-evaluated.

## 2025-03-31 NOTE — ED TRIAGE NOTES
Pt to the ED with c/o left hip pain. Pt reports this pain has been ongoing for three days. Pt reports she went to the chiropractor 2 days ago and had some relief but it worsened since. Pt reports she took tramadol and extra strength tylenol today with no relief also.

## 2025-04-14 ENCOUNTER — APPOINTMENT (OUTPATIENT)
Dept: CT IMAGING | Age: 35
End: 2025-04-14
Attending: REGISTERED NURSE
Payer: COMMERCIAL

## 2025-04-14 ENCOUNTER — TELEPHONE (OUTPATIENT)
Dept: SLEEP CENTER | Age: 35
End: 2025-04-14

## 2025-04-14 ENCOUNTER — LAB (OUTPATIENT)
Dept: LAB | Age: 35
End: 2025-04-14

## 2025-04-14 ENCOUNTER — HOSPITAL ENCOUNTER (OUTPATIENT)
Dept: SLEEP CENTER | Age: 35
Discharge: HOME | End: 2025-04-16
Payer: COMMERCIAL

## 2025-04-14 ENCOUNTER — OFFICE VISIT (OUTPATIENT)
Dept: INTERNAL MEDICINE CLINIC | Age: 35
End: 2025-04-14
Payer: COMMERCIAL

## 2025-04-14 VITALS
BODY MASS INDEX: 49.24 KG/M2 | OXYGEN SATURATION: 96 % | SYSTOLIC BLOOD PRESSURE: 120 MMHG | HEIGHT: 64 IN | TEMPERATURE: 97.4 F | HEART RATE: 96 BPM | DIASTOLIC BLOOD PRESSURE: 74 MMHG | WEIGHT: 288.4 LBS

## 2025-04-14 DIAGNOSIS — R04.2 HEMOPTYSIS: ICD-10-CM

## 2025-04-14 DIAGNOSIS — H65.03 BILATERAL ACUTE SEROUS OTITIS MEDIA, RECURRENCE NOT SPECIFIED: ICD-10-CM

## 2025-04-14 DIAGNOSIS — H65.03 BILATERAL ACUTE SEROUS OTITIS MEDIA, RECURRENCE NOT SPECIFIED: Primary | ICD-10-CM

## 2025-04-14 DIAGNOSIS — R04.0 EPISTAXIS: ICD-10-CM

## 2025-04-14 DIAGNOSIS — G47.33 OSA (OBSTRUCTIVE SLEEP APNEA): ICD-10-CM

## 2025-04-14 PROBLEM — R11.2 INTRACTABLE VOMITING WITH NAUSEA: Status: RESOLVED | Noted: 2024-12-22 | Resolved: 2025-04-14

## 2025-04-14 PROBLEM — R11.10 INTRACTABLE VOMITING: Status: RESOLVED | Noted: 2023-10-10 | Resolved: 2025-04-14

## 2025-04-14 PROBLEM — R00.0 TACHYCARDIA: Status: RESOLVED | Noted: 2023-01-29 | Resolved: 2025-04-14

## 2025-04-14 PROBLEM — K91.89 PORT-SITE HERNIA: Status: RESOLVED | Noted: 2022-08-22 | Resolved: 2025-04-14

## 2025-04-14 PROBLEM — K92.1 HEMATOCHEZIA: Status: RESOLVED | Noted: 2017-08-07 | Resolved: 2025-04-14

## 2025-04-14 PROBLEM — N12 PYELONEPHRITIS: Status: RESOLVED | Noted: 2019-06-13 | Resolved: 2025-04-14

## 2025-04-14 PROBLEM — D72.829 LEUKOCYTOSIS: Status: RESOLVED | Noted: 2023-01-29 | Resolved: 2025-04-14

## 2025-04-14 PROBLEM — N17.9 ACUTE KIDNEY INJURY: Status: RESOLVED | Noted: 2019-07-07 | Resolved: 2025-04-14

## 2025-04-14 PROBLEM — K94.20 COMPLICATION OF GASTROSTOMY TUBE (HCC): Status: RESOLVED | Noted: 2023-02-16 | Resolved: 2025-04-14

## 2025-04-14 PROBLEM — R31.9 HEMATURIA: Status: RESOLVED | Noted: 2017-02-27 | Resolved: 2025-04-14

## 2025-04-14 PROBLEM — Z88.9 MULTIPLE ALLERGIES: Status: RESOLVED | Noted: 2017-11-04 | Resolved: 2025-04-14

## 2025-04-14 PROBLEM — R10.10 PAIN OF UPPER ABDOMEN: Status: RESOLVED | Noted: 2023-01-13 | Resolved: 2025-04-14

## 2025-04-14 PROBLEM — K43.2 PORT-SITE HERNIA: Status: RESOLVED | Noted: 2022-08-22 | Resolved: 2025-04-14

## 2025-04-14 PROBLEM — R19.7 DIARRHEA: Status: RESOLVED | Noted: 2017-02-27 | Resolved: 2025-04-14

## 2025-04-14 PROBLEM — R29.90 STROKE-LIKE SYMPTOM: Status: RESOLVED | Noted: 2022-06-24 | Resolved: 2025-04-14

## 2025-04-14 PROBLEM — R10.84 GENERALIZED ABDOMINAL PAIN: Status: RESOLVED | Noted: 2017-08-07 | Resolved: 2025-04-14

## 2025-04-14 PROBLEM — R10.9 ABDOMINAL PAIN: Status: RESOLVED | Noted: 2024-05-10 | Resolved: 2025-04-14

## 2025-04-14 PROBLEM — I10 PRIMARY HYPERTENSION: Status: RESOLVED | Noted: 2025-02-24 | Resolved: 2025-04-14

## 2025-04-14 LAB
ANION GAP SERPL CALC-SCNC: 13 MEQ/L (ref 8–16)
BACTERIA: ABNORMAL
BILIRUB UR QL STRIP: NEGATIVE
BUN SERPL-MCNC: 13 MG/DL (ref 8–23)
CALCIUM SERPL-MCNC: 9.5 MG/DL (ref 8.6–10)
CASTS #/AREA URNS LPF: ABNORMAL /LPF
CASTS #/AREA URNS LPF: ABNORMAL /LPF
CHARACTER UR: ABNORMAL
CHARCOAL URNS QL MICRO: ABNORMAL
CHLORIDE SERPL-SCNC: 105 MEQ/L (ref 98–111)
CO2 SERPL-SCNC: 22 MEQ/L (ref 22–29)
COLOR UR: YELLOW
CREAT SERPL-MCNC: 0.8 MG/DL (ref 0.5–0.9)
CRYSTALS URNS QL MICRO: ABNORMAL
EPITHELIAL CELLS, UA: ABNORMAL /HPF
GFR SERPL CREATININE-BSD FRML MDRD: > 90 ML/MIN/1.73M2
GLUCOSE SERPL-MCNC: 97 MG/DL (ref 74–109)
GLUCOSE UR QL STRIP.AUTO: NEGATIVE MG/DL
HGB UR QL STRIP.AUTO: ABNORMAL
KETONES UR QL STRIP.AUTO: ABNORMAL
LEUKOCYTE ESTERASE UR QL STRIP.AUTO: NEGATIVE
NITRITE UR QL STRIP.AUTO: NEGATIVE
PH UR STRIP.AUTO: 5.5 [PH] (ref 5–9)
POTASSIUM SERPL-SCNC: 3.4 MEQ/L (ref 3.5–5.2)
PROT UR STRIP.AUTO-MCNC: 30 MG/DL
RBC #/AREA URNS HPF: ABNORMAL /HPF
RENAL EPI CELLS #/AREA URNS HPF: ABNORMAL /[HPF]
SODIUM SERPL-SCNC: 140 MEQ/L (ref 135–145)
SPECIFIC GRAVITY UA: > 1.03 (ref 1–1.03)
UROBILINOGEN, URINE: 0.2 EU/DL (ref 0–1)
WBC #/AREA URNS HPF: ABNORMAL /HPF
YEAST LIKE FUNGI URNS QL MICRO: ABNORMAL

## 2025-04-14 PROCEDURE — G8427 DOCREV CUR MEDS BY ELIG CLIN: HCPCS | Performed by: STUDENT IN AN ORGANIZED HEALTH CARE EDUCATION/TRAINING PROGRAM

## 2025-04-14 PROCEDURE — 99213 OFFICE O/P EST LOW 20 MIN: CPT | Performed by: STUDENT IN AN ORGANIZED HEALTH CARE EDUCATION/TRAINING PROGRAM

## 2025-04-14 PROCEDURE — 1036F TOBACCO NON-USER: CPT | Performed by: STUDENT IN AN ORGANIZED HEALTH CARE EDUCATION/TRAINING PROGRAM

## 2025-04-14 PROCEDURE — 95806 SLEEP STUDY UNATT&RESP EFFT: CPT

## 2025-04-14 PROCEDURE — 3074F SYST BP LT 130 MM HG: CPT | Performed by: STUDENT IN AN ORGANIZED HEALTH CARE EDUCATION/TRAINING PROGRAM

## 2025-04-14 PROCEDURE — 3078F DIAST BP <80 MM HG: CPT | Performed by: STUDENT IN AN ORGANIZED HEALTH CARE EDUCATION/TRAINING PROGRAM

## 2025-04-14 PROCEDURE — G8417 CALC BMI ABV UP PARAM F/U: HCPCS | Performed by: STUDENT IN AN ORGANIZED HEALTH CARE EDUCATION/TRAINING PROGRAM

## 2025-04-14 RX ORDER — AZITHROMYCIN 250 MG/1
TABLET, FILM COATED ORAL
Qty: 6 TABLET | Refills: 0 | Status: SHIPPED | OUTPATIENT
Start: 2025-04-14 | End: 2025-04-14

## 2025-04-14 RX ORDER — AZITHROMYCIN 250 MG/1
TABLET, FILM COATED ORAL
Qty: 6 TABLET | Refills: 0 | Status: SHIPPED | OUTPATIENT
Start: 2025-04-14 | End: 2025-04-17 | Stop reason: ALTCHOICE

## 2025-04-14 NOTE — PROGRESS NOTES
1990    Chief Complaint   Patient presents with    Cold Symptoms      Cough , sore throat , chills , hoarseness        HPI  34-year-old female with past medical history of diabetes, hypertension, hyperlipidemia, GERD who presented with 6-day history of cough, congestion, sore throat, chills, malaise and voice hoarseness.  Patient is also complaining of having watery diarrhea for about 3 days.  Patient denies any sick contacts at home or work.  Patient states that she has been feeling hot and cold at the same time.  Patient is also complaining of bilateral ear pain and complaining of decrease hearing.  Patient also states she has been bringing up whitish sputum with occasional blood tinge and epistaxis.  Patient denies any headache, dizziness, nausea, vomiting or leg swelling    Past Medical History:   Diagnosis Date    Acute on chronic diastolic congestive heart failure (HCC) 06/25/2022    JANI (acute kidney injury) 07/07/2019    Anemia     Anesthesia     migraines    Anxiety     Asthma     CAD (coronary artery disease)     Depression     Diabetes ([facility])     Diet-controlled type 2 diabetes mellitus ([facility]) 2016    Dyslipidemia 11/14/2016    Epilepsy     last siezure is 2 years ago    Epilepsy     Fibroids     Gastro - esophageal reflux disease     Gastroparesis     History of esophagogastroduodenoscopy (EGD) 08/13/2022    Hypertension     Hypertrophy of tonsil and adenoid 11/04/2017    Malignant carcinoid tumor of other sites ([facility]) 05/14/2013    Migraine     PONV (postoperative nausea and vomiting)     Prolonged emergence from general anesthesia     Sickle cell anemia     Sickle cell trait     PT STATES SHE HAS THE TRAIT NOT THE DISEASE    Tumor associated pain     neuroendrocrine tumor, gastroma       Past Surgical History:   Procedure Laterality Date    ABDOMEN SURGERY  03/23/2017    Laparoscopic , Bi lat oopherectomy Dr Hinojosa    ABDOMINAL EXPLORATION SURGERY          BREAST REDUCTION SURGERY      [place]

## 2025-04-14 NOTE — TELEPHONE ENCOUNTER
Please r/s pt f/u appt.  She needed to r/s her HST due to being sick.    Her new appt is scheduled for 04/29/25.        Thank you,  Ketty

## 2025-04-15 ENCOUNTER — TELEPHONE (OUTPATIENT)
Dept: INTERNAL MEDICINE CLINIC | Age: 35
End: 2025-04-15

## 2025-04-17 ENCOUNTER — OFFICE VISIT (OUTPATIENT)
Dept: INTERNAL MEDICINE CLINIC | Age: 35
End: 2025-04-17
Payer: COMMERCIAL

## 2025-04-17 VITALS
WEIGHT: 293 LBS | HEIGHT: 64 IN | HEART RATE: 80 BPM | DIASTOLIC BLOOD PRESSURE: 84 MMHG | BODY MASS INDEX: 50.02 KG/M2 | SYSTOLIC BLOOD PRESSURE: 136 MMHG | TEMPERATURE: 97.1 F

## 2025-04-17 DIAGNOSIS — G89.29 OTHER CHRONIC PAIN: Primary | ICD-10-CM

## 2025-04-17 DIAGNOSIS — E11.43 GASTROPARESIS DUE TO DM (HCC): ICD-10-CM

## 2025-04-17 DIAGNOSIS — K31.84 GASTROPARESIS DUE TO DM (HCC): ICD-10-CM

## 2025-04-17 PROCEDURE — G8427 DOCREV CUR MEDS BY ELIG CLIN: HCPCS

## 2025-04-17 PROCEDURE — 99214 OFFICE O/P EST MOD 30 MIN: CPT

## 2025-04-17 PROCEDURE — G8417 CALC BMI ABV UP PARAM F/U: HCPCS

## 2025-04-17 PROCEDURE — 3075F SYST BP GE 130 - 139MM HG: CPT

## 2025-04-17 PROCEDURE — 3046F HEMOGLOBIN A1C LEVEL >9.0%: CPT

## 2025-04-17 PROCEDURE — 3079F DIAST BP 80-89 MM HG: CPT

## 2025-04-17 PROCEDURE — 1036F TOBACCO NON-USER: CPT

## 2025-04-17 PROCEDURE — 2022F DILAT RTA XM EVC RTNOPTHY: CPT

## 2025-04-17 RX ORDER — TRAMADOL HYDROCHLORIDE 50 MG/1
50 TABLET ORAL EVERY 8 HOURS PRN
Qty: 90 TABLET | Refills: 0 | Status: CANCELLED | OUTPATIENT
Start: 2025-04-17 | End: 2025-05-17

## 2025-04-17 RX ORDER — FLUTICASONE PROPIONATE 50 MCG
2 SPRAY, SUSPENSION (ML) NASAL DAILY
Qty: 1 EACH | Refills: 0 | Status: SHIPPED | OUTPATIENT
Start: 2025-04-17

## 2025-04-17 RX ORDER — SUMATRIPTAN SUCCINATE 25 MG/1
25 TABLET ORAL SEE ADMIN INSTRUCTIONS
Qty: 30 TABLET | Refills: 0 | Status: SHIPPED | OUTPATIENT
Start: 2025-04-17 | End: 2025-05-17

## 2025-04-17 RX ORDER — LACTULOSE 10 G/15ML
30 SOLUTION ORAL EVERY 8 HOURS
Qty: 120 ML | Refills: 0 | Status: CANCELLED | OUTPATIENT
Start: 2025-04-17

## 2025-04-17 RX ORDER — DEXAMETHASONE 4 MG/1
2 TABLET ORAL EVERY 4 HOURS PRN
Qty: 12 G | Refills: 0 | Status: SHIPPED | OUTPATIENT
Start: 2025-04-17 | End: 2025-05-17

## 2025-04-17 NOTE — PATIENT INSTRUCTIONS
Hold Pepcid for next few days to see if Protonix alone is effective. If heart burn continues, recommend starting over the counter generic Gaviscon instead of resuming Pepcid.    Follow back in one month for Medicare Wellness Exam.

## 2025-04-17 NOTE — PROGRESS NOTES
Device by Does not apply route daily 1 Device 0    traMADol (ULTRAM) 50 MG tablet Take 1 tablet by mouth every 8 hours as needed for Pain for up to 30 days. Intended supply: 30 days. Take lowest dose possible to manage pain Max Daily Amount: 150 mg 90 tablet 0     No current facility-administered medications for this visit.       Allergies   Allergen Reactions    Latex Itching and Swelling     Swelling lips    Cymbalta [Duloxetine Hcl] Anaphylaxis    Dicyclomine Rash    Fioricet [Butalbital-Apap-Caffeine] Swelling     Of the mouth, tongue    Gabapentin Swelling     tongue    Ibuprofen [Ibuprofen] Other (See Comments)     Mouth swelling and ulcers    Ketorolac Tromethamine Anaphylaxis     Throat swelling    Oxycodone Itching    Pcn [Penicillins] Anaphylaxis    Sulfamethoxazole Hives    Trimethoprim Hives    Losartan      Elevated pulse    Mushroom Extract Complex (Obsolete) Swelling    Zofran [Ondansetron] Hives     Patient has had this here sporadically after the allergy was reported    Adhesive Tape Rash    Amoxicillin Hives    Ciprofloxacin Hives and Swelling    Compazine [Prochlorperazine Maleate] Itching    Compazine [Prochlorperazine] Other (See Comments)     Patient states that it made her \"feel funny and loopy\".    Fentanyl Other (See Comments)     Pt states that her \"lips start busting out.\" Blisters    Flexeril [Cyclobenzaprine] Hives    Haldol [Haloperidol] Itching     Blisters on vagina    Hydrochlorothiazide Itching    Keflex [Cephalexin] Itching    Ketamine Swelling     hallucinations    Lisinopril Itching    Lorazepam Hives    Macrobid [Nitrofurantoin Monohyd Macro] Itching    Reglan [Metoclopramide] Itching     Patient has received this here sporadically over the years since this reported allergy    Vistaril [Hydroxyzine Hcl] Itching       Social History     Socioeconomic History    Marital status:      Spouse name: Fely Nichols    Number of children: 0    Years of education: 12    Highest

## 2025-04-22 DIAGNOSIS — E11.43 GASTROPARESIS DUE TO DM (HCC): ICD-10-CM

## 2025-04-22 DIAGNOSIS — K31.84 GASTROPARESIS DUE TO DM (HCC): ICD-10-CM

## 2025-04-22 DIAGNOSIS — R10.12 LEFT UPPER QUADRANT ABDOMINAL PAIN: ICD-10-CM

## 2025-04-22 RX ORDER — TRAMADOL HYDROCHLORIDE 50 MG/1
50 TABLET ORAL EVERY 8 HOURS PRN
Qty: 90 TABLET | Refills: 0 | Status: CANCELLED | OUTPATIENT
Start: 2025-04-22 | End: 2025-05-22

## 2025-04-23 DIAGNOSIS — R10.12 LEFT UPPER QUADRANT ABDOMINAL PAIN: ICD-10-CM

## 2025-04-23 RX ORDER — GLUCOSAMINE HCL/CHONDROITIN SU 500-400 MG
CAPSULE ORAL
Qty: 100 STRIP | Refills: 1 | Status: SHIPPED | OUTPATIENT
Start: 2025-04-23 | End: 2025-04-24 | Stop reason: SDUPTHER

## 2025-04-23 RX ORDER — LANCETS 28 GAUGE
1 EACH MISCELLANEOUS DAILY
Qty: 100 EACH | Refills: 3 | Status: SHIPPED | OUTPATIENT
Start: 2025-04-23 | End: 2025-04-24 | Stop reason: SDUPTHER

## 2025-04-23 RX ORDER — LACTULOSE 10 G/15ML
30 SOLUTION ORAL EVERY 8 HOURS
Qty: 120 ML | Refills: 0 | Status: SHIPPED | OUTPATIENT
Start: 2025-04-23 | End: 2025-04-24 | Stop reason: SDUPTHER

## 2025-04-23 RX ORDER — TRAMADOL HYDROCHLORIDE 50 MG/1
50 TABLET ORAL EVERY 8 HOURS PRN
Qty: 90 TABLET | Refills: 0 | Status: SHIPPED | OUTPATIENT
Start: 2025-04-23 | End: 2025-04-24 | Stop reason: SDUPTHER

## 2025-04-24 DIAGNOSIS — K31.84 GASTROPARESIS DUE TO DM (HCC): ICD-10-CM

## 2025-04-24 DIAGNOSIS — E11.43 GASTROPARESIS DUE TO DM (HCC): ICD-10-CM

## 2025-04-24 DIAGNOSIS — R10.12 LEFT UPPER QUADRANT ABDOMINAL PAIN: ICD-10-CM

## 2025-04-24 RX ORDER — LACTULOSE 10 G/15ML
30 SOLUTION ORAL EVERY 8 HOURS
Qty: 120 ML | Refills: 0 | Status: SHIPPED | OUTPATIENT
Start: 2025-04-24

## 2025-04-24 RX ORDER — TRAMADOL HYDROCHLORIDE 50 MG/1
50 TABLET ORAL EVERY 8 HOURS PRN
Qty: 90 TABLET | Refills: 0 | Status: SHIPPED | OUTPATIENT
Start: 2025-04-24 | End: 2025-05-24

## 2025-04-24 RX ORDER — LANCETS 28 GAUGE
1 EACH MISCELLANEOUS DAILY
Qty: 100 EACH | Refills: 3 | Status: SHIPPED | OUTPATIENT
Start: 2025-04-24

## 2025-04-24 RX ORDER — GLUCOSAMINE HCL/CHONDROITIN SU 500-400 MG
CAPSULE ORAL
Qty: 100 STRIP | Refills: 1 | Status: SHIPPED | OUTPATIENT
Start: 2025-04-24

## 2025-04-24 NOTE — TELEPHONE ENCOUNTER
Patient contacted office and stated that [facility] will not fill Tramadol Rx as pt not seeing physician there. We will contact them to make sure that yesterday's tramadol Rx is cancelled and send bert prescription to alternate pharmacy today per pt request.

## 2025-04-28 RX ORDER — ALBUTEROL SULFATE 0.83 MG/ML
2.5 SOLUTION RESPIRATORY (INHALATION) EVERY 6 HOURS PRN
Qty: 90 EACH | Refills: 0 | Status: SHIPPED | OUTPATIENT
Start: 2025-04-28

## 2025-04-30 ENCOUNTER — OFFICE VISIT (OUTPATIENT)
Dept: PALLATIVE CARE | Age: 35
End: 2025-04-30
Payer: MEDICAID

## 2025-04-30 VITALS
BODY MASS INDEX: 50.02 KG/M2 | DIASTOLIC BLOOD PRESSURE: 78 MMHG | WEIGHT: 293 LBS | OXYGEN SATURATION: 99 % | HEIGHT: 64 IN | HEART RATE: 80 BPM | SYSTOLIC BLOOD PRESSURE: 132 MMHG | RESPIRATION RATE: 17 BRPM

## 2025-04-30 DIAGNOSIS — D57.00 SICKLE CELL ANEMIA WITH PAIN (HCC): ICD-10-CM

## 2025-04-30 DIAGNOSIS — E11.43 GASTROPARESIS DUE TO DM (HCC): ICD-10-CM

## 2025-04-30 DIAGNOSIS — D57.1 SICKLE CELL DISEASE WITHOUT CRISIS (HCC): Primary | ICD-10-CM

## 2025-04-30 DIAGNOSIS — K31.84 GASTROPARESIS DUE TO DM (HCC): ICD-10-CM

## 2025-04-30 DIAGNOSIS — Z51.5 PALLIATIVE CARE PATIENT: ICD-10-CM

## 2025-04-30 DIAGNOSIS — G89.29 OTHER CHRONIC PAIN: ICD-10-CM

## 2025-04-30 PROCEDURE — 3075F SYST BP GE 130 - 139MM HG: CPT | Performed by: STUDENT IN AN ORGANIZED HEALTH CARE EDUCATION/TRAINING PROGRAM

## 2025-04-30 PROCEDURE — 99204 OFFICE O/P NEW MOD 45 MIN: CPT | Performed by: STUDENT IN AN ORGANIZED HEALTH CARE EDUCATION/TRAINING PROGRAM

## 2025-04-30 PROCEDURE — 3078F DIAST BP <80 MM HG: CPT | Performed by: STUDENT IN AN ORGANIZED HEALTH CARE EDUCATION/TRAINING PROGRAM

## 2025-04-30 RX ORDER — HYDROMORPHONE HYDROCHLORIDE 2 MG/1
2 TABLET ORAL EVERY 6 HOURS PRN
Qty: 60 TABLET | Refills: 0 | Status: SHIPPED | OUTPATIENT
Start: 2025-04-30 | End: 2025-05-15

## 2025-04-30 RX ORDER — LACTULOSE 10 G/15ML
SOLUTION ORAL
Qty: 120 ML | Refills: 0 | OUTPATIENT
Start: 2025-04-30

## 2025-04-30 NOTE — PROGRESS NOTES
mouth 2 times daily as needed      ONETOUCH VERIO strip USE TO check BLOOD SUGAR ONCE DAILY      Incontinence Supply Disposable (DEPEND SILHOUETTE BRIEFS L/XL) MISC Use as needed for urinary and bowel incontinence 5 each 5    Spacer/Aero-Holding Chambers (E-Z SPACER) AISLINN 1 Device by Does not apply route daily 1 Device 0     No current facility-administered medications for this visit.       Food Insecurity: No Food Insecurity (4/23/2025)    Received from [facility] Vital Sign     Worried About Running Out of Food in the Last Year: Never true     Ran Out of Food in the Last Year: Never true       Objective:     Vitals:    04/30/25 1254   BP: 132/78   BP Site: Left Lower Arm   Patient Position: Sitting   BP Cuff Size: Large Adult   Pulse: 80   Resp: 17   SpO2: 99%   Weight: 133.4 kg (294 lb)   Height: 1.626 m (5' 4\")       Body mass index is 50.46 kg/m².    Wt Readings from Last 3 Encounters:   04/30/25 133.4 kg (294 lb)   04/17/25 133.2 kg (293 lb 9.6 oz)   04/14/25 130.8 kg (288 lb 6.4 oz)       Physical Exam  Vitals and nursing note reviewed.   Constitutional:       General: She is awake. She is not in acute distress.  HENT:      Head: Normocephalic and atraumatic.      Right Ear: External ear normal.      Left Ear: External ear normal.      Nose: No rhinorrhea.   Eyes:      General:         Right eye: No discharge.         Left eye: No discharge.   Cardiovascular:      Rate and Rhythm: Normal rate.   Pulmonary:      Effort: Pulmonary effort is normal. No respiratory distress.   Musculoskeletal:      Cervical back: Neck supple.   Neurological:      General: No focal deficit present.      Mental Status: She is alert and oriented to person, place, and time. Mental status is at baseline.   Psychiatric:         Mood and Affect: Affect normal.         Speech: Speech normal. She is communicative.         Behavior: Behavior is not agitated, aggressive or hyperactive. Behavior is cooperative.

## 2025-04-30 NOTE — PATIENT INSTRUCTIONS
Continue current plan of care for acute and chronic conditions per primary and specialist teams  Start Hydromorphone IR 2 mg every 6 hours as needed for breakthrough pain and shortness of breath  Discontinue tramadol  Controlled substances agreement signed  PDMP reviewed  Please call the office if your symptoms worsen or if you were to develop new symptoms  Please call the palliative care office when you need refills

## 2025-05-05 ENCOUNTER — TELEPHONE (OUTPATIENT)
Dept: PALLATIVE CARE | Age: 35
End: 2025-05-05

## 2025-05-05 ENCOUNTER — HOSPITAL ENCOUNTER (OUTPATIENT)
Dept: NURSING | Age: 35
Discharge: HOME | End: 2025-05-05
Payer: COMMERCIAL

## 2025-05-05 VITALS
OXYGEN SATURATION: 99 % | TEMPERATURE: 97 F | SYSTOLIC BLOOD PRESSURE: 150 MMHG | DIASTOLIC BLOOD PRESSURE: 82 MMHG | HEART RATE: 92 BPM | RESPIRATION RATE: 16 BRPM

## 2025-05-05 DIAGNOSIS — D50.9 IRON DEFICIENCY ANEMIA, UNSPECIFIED IRON DEFICIENCY ANEMIA TYPE: Primary | ICD-10-CM

## 2025-05-05 PROCEDURE — 6360000002 HC RX W HCPCS

## 2025-05-05 PROCEDURE — 2500000003 HC RX 250 WO HCPCS

## 2025-05-05 PROCEDURE — 99212 OFFICE O/P EST SF 10 MIN: CPT

## 2025-05-05 RX ORDER — HEPARIN 100 UNIT/ML
500 SYRINGE INTRAVENOUS PRN
Status: DISCONTINUED | OUTPATIENT
Start: 2025-05-05 | End: 2025-05-06 | Stop reason: HOSPADM

## 2025-05-05 RX ORDER — HEPARIN 100 UNIT/ML
500 SYRINGE INTRAVENOUS PRN
OUTPATIENT
Start: 2025-05-05

## 2025-05-05 RX ORDER — SODIUM CHLORIDE 9 MG/ML
25 INJECTION, SOLUTION INTRAVENOUS PRN
OUTPATIENT
Start: 2025-05-05

## 2025-05-05 RX ORDER — SODIUM CHLORIDE 0.9 % (FLUSH) 0.9 %
5-40 SYRINGE (ML) INJECTION PRN
OUTPATIENT
Start: 2025-05-05

## 2025-05-05 RX ORDER — SODIUM CHLORIDE 0.9 % (FLUSH) 0.9 %
5-40 SYRINGE (ML) INJECTION PRN
Status: DISCONTINUED | OUTPATIENT
Start: 2025-05-05 | End: 2025-05-06 | Stop reason: HOSPADM

## 2025-05-05 RX ADMIN — HEPARIN 500 UNITS: 100 SYRINGE at 14:43

## 2025-05-05 RX ADMIN — SODIUM CHLORIDE, PRESERVATIVE FREE 10 ML: 5 INJECTION INTRAVENOUS at 14:43

## 2025-05-05 ASSESSMENT — PAIN SCALES - GENERAL: PAINLEVEL_OUTOF10: 10

## 2025-05-05 ASSESSMENT — PAIN DESCRIPTION - LOCATION: LOCATION: GENERALIZED

## 2025-05-05 ASSESSMENT — PAIN DESCRIPTION - PAIN TYPE: TYPE: CHRONIC PAIN

## 2025-05-05 NOTE — DISCHARGE INSTRUCTIONS
ACTIVITY:  Continue usual care with your doctor. Call your doctor immediately if any severe problems or go to the nearest emergency room.      I have been treated and hereby acknowledge receiving this instruction sheet.                  Next Port flush: June 2nd @ 2:00pm

## 2025-05-05 NOTE — TELEPHONE ENCOUNTER
Patient informed to increase Dilaudid  to 2 mg every 4 hours as needed. Patient verbalized understanding with no further questions at this time.

## 2025-05-05 NOTE — PROGRESS NOTES
1410- Patient arrived to [facility] ambulatory with friend for port flush. Patient denies changes since last appointment. Vitals stable. Mediport accessed using sterile technique. Call light placed within reach. PT RIGHTS AND RESPONSIBILITIES OFFERED TO PT.    1443- Mediport de accessed using heparin per MAR.     1445- Discharge instructions reviewed with patient. Verbalized understanding with no further questions. AVS provided. Discharged ambulatory to [facility] in stable condition.           __M__ Safety:       (Environmental)  [place] to environment  Ensure ID band is correct and in place/ allergy band as needed  Assess for fall risk  Initiate fall precautions as applicable (fall band, side rails, etc.)  Call light within reach  Bed in low position/ wheels locked    __M__ Pain:       Assess pain level and characteristics  Administer analgesics as ordered  Assess effectiveness of pain management and report to MD as needed    __M__ Knowledge Deficit:  Assess baseline knowledge  Provide teaching at level of understanding  Provide teaching via preferred learning method  Evaluate teaching effectiveness    __M__ Hemodynamic/Respiratory Status:       (Pre and Post Procedure Monitoring)  Assess/Monitor vital signs and LOC  Assess Baseline SpO2 prior to any sedation  Obtain weight/height  Assess vital signs/ LOC until patient meets discharge criteria  Monitor procedure site and notify MD of any issues    __M__ Infection-Risk of Central Venous Catheter:  Monitor for infection signs and symptoms (catheter site redness, temperature elevation, etc)  Assess for infection risks  Educate regarding infection prevention  Manage central venous catheter (flushes/ dressing changes per protocol)

## 2025-05-05 NOTE — TELEPHONE ENCOUNTER
Patient reports having uncontrolled pain- sharp/achey/stabbing all over x 3 days. Patient reports she has been taking Dilaudid 2 mg 1 tablet every 6 hours and is not effective.

## 2025-05-07 ENCOUNTER — CARE COORDINATION (OUTPATIENT)
Dept: CARE COORDINATION | Age: 35
End: 2025-05-07

## 2025-05-12 ENCOUNTER — TELEPHONE (OUTPATIENT)
Dept: PALLATIVE CARE | Age: 35
End: 2025-05-12

## 2025-05-12 NOTE — TELEPHONE ENCOUNTER
Patient called states that she she started having headaches shortly after she started Dilaudid 2mg 1 tablet every 4 hours.. Pt is asking if the dilaudid can have a side effect of headaches? Pt also mentioned that the 2mg Dilaudid every4 hours worked but wore off quickly.     Please advise.

## 2025-05-13 ENCOUNTER — OFFICE VISIT (OUTPATIENT)
Dept: CARDIOLOGY CLINIC | Age: 35
End: 2025-05-13
Payer: COMMERCIAL

## 2025-05-13 ENCOUNTER — OFFICE VISIT (OUTPATIENT)
Dept: INTERNAL MEDICINE CLINIC | Age: 35
End: 2025-05-13
Payer: COMMERCIAL

## 2025-05-13 VITALS — BODY MASS INDEX: 45.3 KG/M2 | HEIGHT: 67 IN | WEIGHT: 288.6 LBS

## 2025-05-13 VITALS
DIASTOLIC BLOOD PRESSURE: 107 MMHG | WEIGHT: 288.6 LBS | SYSTOLIC BLOOD PRESSURE: 163 MMHG | HEART RATE: 82 BPM | HEIGHT: 64 IN | BODY MASS INDEX: 49.27 KG/M2

## 2025-05-13 DIAGNOSIS — K90.9 ABNORMAL INTESTINAL ABSORPTION: ICD-10-CM

## 2025-05-13 DIAGNOSIS — K90.9 MALABSORPTION SYNDROME: Primary | ICD-10-CM

## 2025-05-13 DIAGNOSIS — R07.9 CHEST PAIN, UNSPECIFIED TYPE: Primary | ICD-10-CM

## 2025-05-13 DIAGNOSIS — R06.02 SHORTNESS OF BREATH: ICD-10-CM

## 2025-05-13 PROCEDURE — 1036F TOBACCO NON-USER: CPT | Performed by: INTERNAL MEDICINE

## 2025-05-13 PROCEDURE — 3077F SYST BP >= 140 MM HG: CPT | Performed by: INTERNAL MEDICINE

## 2025-05-13 PROCEDURE — 99214 OFFICE O/P EST MOD 30 MIN: CPT | Performed by: INTERNAL MEDICINE

## 2025-05-13 PROCEDURE — G8427 DOCREV CUR MEDS BY ELIG CLIN: HCPCS | Performed by: INTERNAL MEDICINE

## 2025-05-13 PROCEDURE — NBSRV NON-BILLABLE SERVICE: Performed by: DIETITIAN, REGISTERED

## 2025-05-13 PROCEDURE — 3080F DIAST BP >= 90 MM HG: CPT | Performed by: INTERNAL MEDICINE

## 2025-05-13 PROCEDURE — 93000 ELECTROCARDIOGRAM COMPLETE: CPT | Performed by: INTERNAL MEDICINE

## 2025-05-13 PROCEDURE — 97803 MED NUTRITION INDIV SUBSEQ: CPT | Performed by: DIETITIAN, REGISTERED

## 2025-05-13 PROCEDURE — G8417 CALC BMI ABV UP PARAM F/U: HCPCS | Performed by: INTERNAL MEDICINE

## 2025-05-13 RX ORDER — NICOTINE 21 MG/24HR
1 PATCH, TRANSDERMAL 24 HOURS TRANSDERMAL DAILY
Qty: 42 PATCH | Refills: 0 | Status: ON HOLD | OUTPATIENT
Start: 2025-05-13 | End: 2025-06-24

## 2025-05-13 RX ORDER — CARVEDILOL 6.25 MG/1
6.25 TABLET ORAL 2 TIMES DAILY
Qty: 60 TABLET | Refills: 3 | Status: ON HOLD | OUTPATIENT
Start: 2025-05-13

## 2025-05-13 NOTE — PROGRESS NOTES
1 year follow up  EKG completed today  C/o current chest pain radiating into arm, sob,   Denies palpitations, BLE  Pt states chest pain has been consistent for over a week.  
SEEN NONE SEEN    Casts NONE SEEN /lpf    Miscellaneous Lab Test Result NONE SEEN    Anion Gap   Result Value Ref Range    Anion Gap 13.0 8.0 - 16.0 meq/L   Glomerular Filtration Rate, Estimated   Result Value Ref Range    Est, Glom Filt Rate > 90 >60 ml/min/1.73m2     *Note: Due to a large number of results and/or encounters for the requested time period, some results have not been displayed. A complete set of results can be found in Results Review.       I have individually reviewed the below cardiac tests below:    Cardiac Catheterization 5/2021  LEFT VENTRICULOGRAPHY:  No regional wall motion abnormality.  Ejection  fraction estimated 55%.     CORONARY ANGIOGRAM:  1.  Right coronary artery:  Right coronary artery is patent without  significant stenotic lesions.  The right coronary artery is a relatively  small nondominant vessel.  2.  Left main coronary artery:  Left main coronary artery is patent  without significant stenotic lesions.  3.  Left circumflex artery:  Dominant vessel, patent without significant  stenotic lesions.  4.  Left anterior descending artery:  LAD is patent without significant  lesions.  There is high takeoff of first diagonal branch that is patent  without significant stenotic lesions     HEMODYNAMICS:  Left ventricular end-diastolic pressure 20 mmHg.     MEDICATIONS:  See MAR.     COMPLICATIONS:  None.     ESTIMATED BLOOD LOSS:  Less than 50 mL.     ACCESS:  Right radial artery access.  Vasc Band was applied.  Hemostasis  was achieved.     IMPRESSION:  No significant coronary artery disease.  No evidence for  myocardial infarction with acute coronary syndrome based on left cardiac  catheterization findings.     RECOMMENDATIONS:  The labs were reviewed and the results from workup  from the ER is now available.  COVID test was positive.  The patient  reported some symptoms consistent with probably what seems to be mild  COVID including loss of taste and smell.  She continues to have

## 2025-05-13 NOTE — PATIENT INSTRUCTIONS
Don't eat raw veggies or salads.  - ok to eat well cooked veggies with seasoning.    Soft fresh fruits are ok to eat - take off peelings.    Before getting out of bed have saltine crackers.    Follow the nausea and vomiting guidelines given.    Like the idea of trying some Gluten free products because you stated you felt better after eating them.    Small bites and chew thoroughly.

## 2025-05-13 NOTE — PROGRESS NOTES
tablet on onset of migraine may repeat after 2 hours if migraine returns don't exceed 200 mg daily 30 tablet 0    FLOVENT  MCG/ACT inhaler Inhale 2 puffs into the lungs every 4 hours as needed (shorntess of breath) 12 g 0    baclofen (LIORESAL) 10 MG tablet Take 1 tablet by mouth 3 times daily as needed (Pain) 20 tablet 0    promethazine (PHENERGAN) 6.25 MG/5ML syrup Take 5 mLs by mouth 4 times daily as needed for Nausea 473 mL 2    famotidine (PEPCID) 20 MG tablet Take 1 tablet by mouth 2 times daily 60 tablet 3    potassium chloride (KLOR-CON M) 20 MEQ extended release tablet Take 1 tablet by mouth daily 30 tablet 0    pantoprazole (PROTONIX) 40 MG tablet Take 1 tablet by mouth in the morning and at bedtime      amLODIPine (NORVASC) 10 MG tablet TAKE 1 TABLET BY MOUTH DAILY 90 tablet 3    glucose monitoring kit 1 kit by Does not apply route daily 1 kit 0    simethicone (MYLICON) 80 MG chewable tablet Take 1 tablet by mouth 4 times daily as needed for Flatulence (gas) 180 tablet 3    estradiol (ESTRACE) 0.5 MG tablet Take 1 tablet by mouth daily      docusate sodium (COLACE) 100 MG capsule Take 1 capsule by mouth 2 times daily as needed      ONETOUCH VERIO strip USE TO check BLOOD SUGAR ONCE DAILY      Incontinence Supply Disposable (DEPEND SILHOUETTE BRIEFS L/XL) MISC Use as needed for urinary and bowel incontinence 5 each 5    Spacer/Aero-Holding Chambers (E-Z SPACER) AISLINN 1 Device by Does not apply route daily 1 Device 0     No current facility-administered medications on file prior to visit.        Vitals from current and previous visits:  Ht 1.702 m (5' 7\")   Wt 130.9 kg (288 lb 9.6 oz)   LMP 07/11/2015   BMI 45.20 kg/m²     -Body mass index is 45.2 kg/m². Greater than 40 - Morbid Obesity / Extreme Obesity / Grade III.   - Patient lost and 3 pounds over the last 8 weeks..  -Weight goal: lose weight.     Nutrition Diagnosis:   Altered GI function related to learning/practicing recommendations and

## 2025-05-13 NOTE — PATIENT INSTRUCTIONS
You may receive a survey regarding the care you received during your visit. We encourage you to complete and return your survey, as your input is valuable to us. We hope you will choose us in the future for your healthcare needs. Thank you!    Your Medical Assistant today: BERT Birch & NYDIA Salcedo  Thank you for coming to our office! It was a pleasure to serve you.

## 2025-05-14 NOTE — TELEPHONE ENCOUNTER
Patient informed of recommendations to increase Dilaudid to 3 mg every 6 hours as needed, and if that does not work then increase Dilaudid to 3 mg every 4 hours as needed. Patient may also add Tylenol 500 mg every 6 hours as needed for headaches. Patient verbalized understanding with no further questions at this time.

## 2025-05-15 ENCOUNTER — OFFICE VISIT (OUTPATIENT)
Dept: INTERNAL MEDICINE CLINIC | Age: 35
End: 2025-05-15
Payer: COMMERCIAL

## 2025-05-15 ENCOUNTER — HOSPITAL ENCOUNTER (INPATIENT)
Age: 35
LOS: 3 days | Discharge: HOME | DRG: 812 | End: 2025-05-20
Admitting: INTERNAL MEDICINE
Payer: COMMERCIAL

## 2025-05-15 VITALS
OXYGEN SATURATION: 98 % | SYSTOLIC BLOOD PRESSURE: 162 MMHG | HEART RATE: 96 BPM | HEIGHT: 64 IN | WEIGHT: 289.4 LBS | DIASTOLIC BLOOD PRESSURE: 100 MMHG | BODY MASS INDEX: 49.41 KG/M2

## 2025-05-15 DIAGNOSIS — M60.80 OTHER MYOSITIS, UNSPECIFIED SITE: ICD-10-CM

## 2025-05-15 DIAGNOSIS — D57.1 SICKLE-CELL DISEASE WITHOUT CRISIS (HCC): ICD-10-CM

## 2025-05-15 DIAGNOSIS — R52 GENERALIZED PAIN: Primary | ICD-10-CM

## 2025-05-15 DIAGNOSIS — R07.89 OTHER CHEST PAIN: ICD-10-CM

## 2025-05-15 DIAGNOSIS — D57.00 SICKLE-CELL DISEASE WITH PAIN (HCC): Primary | ICD-10-CM

## 2025-05-15 DIAGNOSIS — Z51.5 PALLIATIVE CARE ENCOUNTER: ICD-10-CM

## 2025-05-15 DIAGNOSIS — I10 ESSENTIAL HYPERTENSION: ICD-10-CM

## 2025-05-15 PROBLEM — I50.30 (HFPEF) HEART FAILURE WITH PRESERVED EJECTION FRACTION (HCC): Status: ACTIVE | Noted: 2025-05-15

## 2025-05-15 LAB
ANION GAP SERPL CALC-SCNC: 11 MEQ/L (ref 8–16)
BACTERIA URNS QL MICRO: ABNORMAL /HPF
BASOPHILS ABSOLUTE: 0.1 THOU/MM3 (ref 0–0.1)
BASOPHILS NFR BLD AUTO: 0.6 %
BILIRUB UR QL STRIP.AUTO: NEGATIVE
BUN SERPL-MCNC: 11 MG/DL (ref 8–23)
CALCIUM SERPL-MCNC: 9.4 MG/DL (ref 8.6–10)
CASTS #/AREA URNS LPF: ABNORMAL /LPF
CASTS 2: ABNORMAL /LPF
CHARACTER UR: CLEAR
CHLORIDE SERPL-SCNC: 107 MEQ/L (ref 98–111)
CO2 SERPL-SCNC: 24 MEQ/L (ref 22–29)
COLOR, UA: YELLOW
CREAT SERPL-MCNC: 0.7 MG/DL (ref 0.5–0.9)
CRYSTALS URNS MICRO: ABNORMAL
DEPRECATED RDW RBC AUTO: 45.1 FL (ref 35–45)
EOSINOPHIL NFR BLD AUTO: 1.7 %
EOSINOPHILS ABSOLUTE: 0.2 THOU/MM3 (ref 0–0.4)
EPITHELIAL CELLS, UA: ABNORMAL /HPF
ERYTHROCYTE [DISTWIDTH] IN BLOOD BY AUTOMATED COUNT: 16.7 % (ref 11.5–14.5)
GFR SERPL CREATININE-BSD FRML MDRD: > 90 ML/MIN/1.73M2
GLUCOSE BLD STRIP.AUTO-MCNC: 113 MG/DL (ref 70–108)
GLUCOSE SERPL-MCNC: 90 MG/DL (ref 74–109)
GLUCOSE UR QL STRIP.AUTO: NEGATIVE MG/DL
HCT VFR BLD AUTO: 38.9 % (ref 37–47)
HGB BLD-MCNC: 12.4 GM/DL (ref 12–16)
HGB RETIC QN AUTO: 27.9 PG (ref 28.2–35.7)
HGB UR QL STRIP.AUTO: ABNORMAL
IMM GRANULOCYTES # BLD AUTO: 0.03 THOU/MM3 (ref 0–0.07)
IMM GRANULOCYTES NFR BLD AUTO: 0.3 %
IMM RETICS NFR: 12.3 % (ref 3–15.9)
KETONES UR QL STRIP.AUTO: NEGATIVE
LYMPHOCYTES ABSOLUTE: 2.7 THOU/MM3 (ref 1–4.8)
LYMPHOCYTES NFR BLD AUTO: 28.3 %
MCH RBC QN AUTO: 23.9 PG (ref 26–33)
MCHC RBC AUTO-ENTMCNC: 31.9 GM/DL (ref 32.2–35.5)
MCV RBC AUTO: 75 FL (ref 81–99)
MISCELLANEOUS 2: ABNORMAL
MONOCYTES ABSOLUTE: 0.6 THOU/MM3 (ref 0.4–1.3)
MONOCYTES NFR BLD AUTO: 6.5 %
NEUTROPHILS ABSOLUTE: 6 THOU/MM3 (ref 1.8–7.7)
NEUTROPHILS NFR BLD AUTO: 62.6 %
NITRITE UR QL STRIP: NEGATIVE
NRBC BLD AUTO-RTO: 0 /100 WBC
OSMOLALITY SERPL CALC.SUM OF ELEC: 282 MOSMOL/KG (ref 275–300)
PH UR STRIP.AUTO: 6.5 [PH] (ref 5–9)
PLATELET # BLD AUTO: 263 THOU/MM3 (ref 130–400)
PMV BLD AUTO: 9.7 FL (ref 9.4–12.4)
POTASSIUM SERPL-SCNC: 3.9 MEQ/L (ref 3.5–5.2)
PROT UR STRIP.AUTO-MCNC: NEGATIVE MG/DL
RBC # BLD AUTO: 5.19 MILL/MM3 (ref 4.2–5.4)
RBC URINE: ABNORMAL /HPF
RENAL EPI CELLS #/AREA URNS HPF: ABNORMAL /[HPF]
RETICS # AUTO: 52 THOU/MM3 (ref 20–115)
RETICS/RBC NFR AUTO: 1 % (ref 0.5–2)
SODIUM SERPL-SCNC: 142 MEQ/L (ref 135–145)
SP GR UR REFRACT.AUTO: 1.02 (ref 1–1.03)
UROBILINOGEN, URINE: 1 EU/DL (ref 0–1)
WBC # BLD AUTO: 9.6 THOU/MM3 (ref 4.8–10.8)
WBC #/AREA URNS HPF: ABNORMAL /HPF
WBC #/AREA URNS HPF: NEGATIVE /[HPF]
YEAST LIKE FUNGI URNS QL MICRO: ABNORMAL

## 2025-05-15 PROCEDURE — 96374 THER/PROPH/DIAG INJ IV PUSH: CPT

## 2025-05-15 PROCEDURE — 6360000002 HC RX W HCPCS

## 2025-05-15 PROCEDURE — 99285 EMERGENCY DEPT VISIT HI MDM: CPT

## 2025-05-15 PROCEDURE — 2580000003 HC RX 258: Performed by: NURSE PRACTITIONER

## 2025-05-15 PROCEDURE — 6370000000 HC RX 637 (ALT 250 FOR IP)

## 2025-05-15 PROCEDURE — 81001 URINALYSIS AUTO W/SCOPE: CPT

## 2025-05-15 PROCEDURE — 85046 RETICYTE/HGB CONCENTRATE: CPT

## 2025-05-15 PROCEDURE — 96376 TX/PRO/DX INJ SAME DRUG ADON: CPT

## 2025-05-15 PROCEDURE — 6360000002 HC RX W HCPCS: Performed by: PHYSICIAN ASSISTANT

## 2025-05-15 PROCEDURE — 80048 BASIC METABOLIC PNL TOTAL CA: CPT

## 2025-05-15 PROCEDURE — 6360000002 HC RX W HCPCS: Performed by: NURSE PRACTITIONER

## 2025-05-15 PROCEDURE — 99212 OFFICE O/P EST SF 10 MIN: CPT

## 2025-05-15 PROCEDURE — 36415 COLL VENOUS BLD VENIPUNCTURE: CPT

## 2025-05-15 PROCEDURE — 85025 COMPLETE CBC W/AUTO DIFF WBC: CPT

## 2025-05-15 PROCEDURE — 82948 REAGENT STRIP/BLOOD GLUCOSE: CPT

## 2025-05-15 PROCEDURE — 3080F DIAST BP >= 90 MM HG: CPT

## 2025-05-15 PROCEDURE — 1036F TOBACCO NON-USER: CPT

## 2025-05-15 PROCEDURE — G0378 HOSPITAL OBSERVATION PER HR: HCPCS

## 2025-05-15 PROCEDURE — 2580000003 HC RX 258

## 2025-05-15 PROCEDURE — 99223 1ST HOSP IP/OBS HIGH 75: CPT

## 2025-05-15 PROCEDURE — 3077F SYST BP >= 140 MM HG: CPT

## 2025-05-15 PROCEDURE — 96372 THER/PROPH/DIAG INJ SC/IM: CPT

## 2025-05-15 PROCEDURE — G8417 CALC BMI ABV UP PARAM F/U: HCPCS

## 2025-05-15 PROCEDURE — G8427 DOCREV CUR MEDS BY ELIG CLIN: HCPCS

## 2025-05-15 RX ORDER — PANTOPRAZOLE SODIUM 40 MG/1
40 TABLET, DELAYED RELEASE ORAL 2 TIMES DAILY
Status: DISCONTINUED | OUTPATIENT
Start: 2025-05-15 | End: 2025-05-20 | Stop reason: HOSPADM

## 2025-05-15 RX ORDER — AMLODIPINE BESYLATE 10 MG/1
10 TABLET ORAL DAILY
Status: DISCONTINUED | OUTPATIENT
Start: 2025-05-15 | End: 2025-05-20 | Stop reason: HOSPADM

## 2025-05-15 RX ORDER — POLYETHYLENE GLYCOL 3350 17 G/17G
17 POWDER, FOR SOLUTION ORAL DAILY PRN
Status: DISCONTINUED | OUTPATIENT
Start: 2025-05-15 | End: 2025-05-20 | Stop reason: HOSPADM

## 2025-05-15 RX ORDER — LACTULOSE 10 G/15ML
30 SOLUTION ORAL EVERY 8 HOURS
Status: DISCONTINUED | OUTPATIENT
Start: 2025-05-15 | End: 2025-05-15

## 2025-05-15 RX ORDER — MAGNESIUM SULFATE IN WATER 40 MG/ML
2000 INJECTION, SOLUTION INTRAVENOUS PRN
Status: DISCONTINUED | OUTPATIENT
Start: 2025-05-15 | End: 2025-05-20 | Stop reason: HOSPADM

## 2025-05-15 RX ORDER — FAMOTIDINE 20 MG/1
20 TABLET, FILM COATED ORAL 2 TIMES DAILY
Status: DISCONTINUED | OUTPATIENT
Start: 2025-05-15 | End: 2025-05-20 | Stop reason: HOSPADM

## 2025-05-15 RX ORDER — SODIUM CHLORIDE 0.9 % (FLUSH) 0.9 %
5-40 SYRINGE (ML) INJECTION EVERY 12 HOURS SCHEDULED
Status: DISCONTINUED | OUTPATIENT
Start: 2025-05-15 | End: 2025-05-20 | Stop reason: HOSPADM

## 2025-05-15 RX ORDER — SODIUM CHLORIDE 0.9 % (FLUSH) 0.9 %
5-40 SYRINGE (ML) INJECTION PRN
Status: DISCONTINUED | OUTPATIENT
Start: 2025-05-15 | End: 2025-05-20 | Stop reason: HOSPADM

## 2025-05-15 RX ORDER — BACLOFEN 10 MG/1
10 TABLET ORAL 3 TIMES DAILY PRN
Status: DISCONTINUED | OUTPATIENT
Start: 2025-05-15 | End: 2025-05-20 | Stop reason: HOSPADM

## 2025-05-15 RX ORDER — SUMATRIPTAN 50 MG/1
25 TABLET, FILM COATED ORAL ONCE
Status: COMPLETED | OUTPATIENT
Start: 2025-05-16 | End: 2025-05-16

## 2025-05-15 RX ORDER — POTASSIUM CHLORIDE 1500 MG/1
40 TABLET, EXTENDED RELEASE ORAL PRN
Status: DISCONTINUED | OUTPATIENT
Start: 2025-05-15 | End: 2025-05-20 | Stop reason: HOSPADM

## 2025-05-15 RX ORDER — POTASSIUM CHLORIDE 1500 MG/1
20 TABLET, EXTENDED RELEASE ORAL DAILY
Status: DISCONTINUED | OUTPATIENT
Start: 2025-05-15 | End: 2025-05-15

## 2025-05-15 RX ORDER — SODIUM CHLORIDE 9 MG/ML
INJECTION, SOLUTION INTRAVENOUS PRN
Status: DISCONTINUED | OUTPATIENT
Start: 2025-05-15 | End: 2025-05-20 | Stop reason: HOSPADM

## 2025-05-15 RX ORDER — SODIUM CHLORIDE 9 MG/ML
INJECTION, SOLUTION INTRAVENOUS CONTINUOUS
Status: ACTIVE | OUTPATIENT
Start: 2025-05-15 | End: 2025-05-16

## 2025-05-15 RX ORDER — ENOXAPARIN SODIUM 100 MG/ML
30 INJECTION SUBCUTANEOUS EVERY 12 HOURS
Status: DISCONTINUED | OUTPATIENT
Start: 2025-05-15 | End: 2025-05-20 | Stop reason: HOSPADM

## 2025-05-15 RX ORDER — PROMETHAZINE HYDROCHLORIDE 25 MG/ML
25 INJECTION, SOLUTION INTRAMUSCULAR; INTRAVENOUS ONCE
Status: COMPLETED | OUTPATIENT
Start: 2025-05-15 | End: 2025-05-15

## 2025-05-15 RX ORDER — 0.9 % SODIUM CHLORIDE 0.9 %
1000 INTRAVENOUS SOLUTION INTRAVENOUS ONCE
Status: COMPLETED | OUTPATIENT
Start: 2025-05-15 | End: 2025-05-15

## 2025-05-15 RX ORDER — POTASSIUM CHLORIDE 7.45 MG/ML
10 INJECTION INTRAVENOUS PRN
Status: DISCONTINUED | OUTPATIENT
Start: 2025-05-15 | End: 2025-05-20 | Stop reason: HOSPADM

## 2025-05-15 RX ORDER — HYDRALAZINE HYDROCHLORIDE 20 MG/ML
5 INJECTION INTRAMUSCULAR; INTRAVENOUS EVERY 6 HOURS PRN
Status: DISCONTINUED | OUTPATIENT
Start: 2025-05-16 | End: 2025-05-20 | Stop reason: HOSPADM

## 2025-05-15 RX ORDER — ESTRADIOL 1 MG/1
0.5 TABLET ORAL DAILY
Status: DISCONTINUED | OUTPATIENT
Start: 2025-05-16 | End: 2025-05-20 | Stop reason: HOSPADM

## 2025-05-15 RX ORDER — CARVEDILOL 6.25 MG/1
6.25 TABLET ORAL 2 TIMES DAILY
Status: DISCONTINUED | OUTPATIENT
Start: 2025-05-15 | End: 2025-05-20 | Stop reason: HOSPADM

## 2025-05-15 RX ORDER — HYDROMORPHONE HYDROCHLORIDE 2 MG/1
2 TABLET ORAL EVERY 6 HOURS PRN
Status: DISCONTINUED | OUTPATIENT
Start: 2025-05-15 | End: 2025-05-20 | Stop reason: HOSPADM

## 2025-05-15 RX ORDER — PROMETHAZINE HYDROCHLORIDE 25 MG/ML
25 INJECTION, SOLUTION INTRAMUSCULAR; INTRAVENOUS ONCE
Status: DISCONTINUED | OUTPATIENT
Start: 2025-05-15 | End: 2025-05-15

## 2025-05-15 RX ADMIN — PROMETHAZINE HYDROCHLORIDE 25 MG: 25 INJECTION, SOLUTION INTRAMUSCULAR; INTRAVENOUS at 16:18

## 2025-05-15 RX ADMIN — ENOXAPARIN SODIUM 30 MG: 100 INJECTION SUBCUTANEOUS at 21:51

## 2025-05-15 RX ADMIN — AMLODIPINE BESYLATE 10 MG: 10 TABLET ORAL at 21:51

## 2025-05-15 RX ADMIN — HYDROMORPHONE HYDROCHLORIDE 1 MG: 1 INJECTION, SOLUTION INTRAMUSCULAR; INTRAVENOUS; SUBCUTANEOUS at 15:32

## 2025-05-15 RX ADMIN — PANTOPRAZOLE SODIUM 40 MG: 40 TABLET, DELAYED RELEASE ORAL at 21:51

## 2025-05-15 RX ADMIN — CARVEDILOL 6.25 MG: 6.25 TABLET, FILM COATED ORAL at 21:51

## 2025-05-15 RX ADMIN — FAMOTIDINE 20 MG: 20 TABLET, FILM COATED ORAL at 21:51

## 2025-05-15 RX ADMIN — SODIUM CHLORIDE: 0.9 INJECTION, SOLUTION INTRAVENOUS at 21:58

## 2025-05-15 RX ADMIN — HYDROMORPHONE HYDROCHLORIDE 1 MG: 1 INJECTION, SOLUTION INTRAMUSCULAR; INTRAVENOUS; SUBCUTANEOUS at 16:25

## 2025-05-15 RX ADMIN — HYDROMORPHONE HYDROCHLORIDE 1 MG: 1 INJECTION, SOLUTION INTRAMUSCULAR; INTRAVENOUS; SUBCUTANEOUS at 21:03

## 2025-05-15 RX ADMIN — HYDROMORPHONE HYDROCHLORIDE 1 MG: 1 INJECTION, SOLUTION INTRAMUSCULAR; INTRAVENOUS; SUBCUTANEOUS at 18:01

## 2025-05-15 RX ADMIN — SODIUM CHLORIDE 1000 ML: 0.9 INJECTION, SOLUTION INTRAVENOUS at 18:02

## 2025-05-15 ASSESSMENT — PAIN DESCRIPTION - FREQUENCY: FREQUENCY: CONTINUOUS

## 2025-05-15 ASSESSMENT — PAIN SCALES - GENERAL
PAINLEVEL_OUTOF10: 10
PAINLEVEL_OUTOF10: 10
PAINLEVEL_OUTOF10: 8
PAINLEVEL_OUTOF10: 10

## 2025-05-15 ASSESSMENT — PAIN DESCRIPTION - LOCATION
LOCATION: GENERALIZED
LOCATION: GENERALIZED

## 2025-05-15 ASSESSMENT — PAIN DESCRIPTION - DESCRIPTORS: DESCRIPTORS: STABBING;SORE

## 2025-05-15 ASSESSMENT — PAIN DESCRIPTION - PAIN TYPE: TYPE: ACUTE PAIN

## 2025-05-15 ASSESSMENT — PAIN - FUNCTIONAL ASSESSMENT: PAIN_FUNCTIONAL_ASSESSMENT: 0-10

## 2025-05-15 NOTE — TELEPHONE ENCOUNTER
Pt called states that her Heads are so much worse and that she is in so much pain. Pt requested to see Dr Fairchild today. Informed pt that Dr Fairchild is not in the clinic today and is next available opening is the beginning of June. Encourage pt to go to the ED to get her pain under control and try to get the headaches figured out. Pt verbalized understanding.

## 2025-05-15 NOTE — ED NOTES
Patient presents with c/o increased pain and concern for sickle cell crisis. Patient states that was at internal medicine prior to arrival and was told to come to ED for increased pain. Patient states pain has increased this past week as well as swelling in lower legs, with no relief.  
Pt ambulatory to [facility] per self w/steady gait. States pain not any better yet. Will update provider  
Pt continues uncomfortable, crying d/t generalized pain all over. Provider updated and will go to bedside  
Pt medicated per MAR.   
Pt medicated per MAR. Updated on POC.   
Spoke to  staff to inform them on patient transfer to -. Patient transported upstairs in stable condition.  
UPPER GASTROINTESTINAL ENDOSCOPY Left 12/2/2020    EGD BIOPSY performed by Frida Elaine MD at [facility]    UPPER GASTROINTESTINAL ENDOSCOPY Left 1/19/2022    EGD performed by Iron Michel MD at [facility]    UPPER GASTROINTESTINAL ENDOSCOPY Left 1/19/2022    EGD BIOPSY performed by Iron Michel MD at [facility]    UPPER GASTROINTESTINAL ENDOSCOPY N/A 7/15/2022    EGD WITH DILATION AND BIOPSY performed by Iron Michel MD at [facility] OR    UPPER GASTROINTESTINAL ENDOSCOPY N/A 1/24/2023    EGD ESOPHAGOGASTRODUODENOSCOPY performed by Frida Elaine MD at [facility]    UPPER GASTROINTESTINAL ENDOSCOPY N/A 5/24/2023    EGD performed by Iron Michel MD at [facility]    UPPER GASTROINTESTINAL ENDOSCOPY N/A 10/12/2023    EGD performed by Frida Elaine MD at [facility]    UPPER GASTROINTESTINAL ENDOSCOPY N/A 12/24/2024    EGD W/ G TUBE PLACEMENT performed by Janusz Lara MD at [facility]    UPPER GASTROINTESTINAL ENDOSCOPY Left 12/24/2024    ESOPHAGOGASTRODUODENOSCOPY BIOPSY performed by Janusz Lara MD at [facility]    UPPER GASTROINTESTINAL ENDOSCOPY N/A 1/17/2025    EGD FOR DYSFUNCTIONAL G-TUBE performed by Janusz Lara MD at [facility]    UPPER GASTROINTESTINAL ENDOSCOPY N/A 2/19/2025    EGD Peg Tube Placement performed by Janusz Lara MD at [facility]    VENTRAL HERNIA REPAIR N/A 8/22/2022    OPEN LEFT ABDOMINAL PORT SITE HERNIA REPAIR WITH MESH performed by Iron Michel MD at [facility] OR    WISDOM TOOTH EXTRACTION         PAST MEDICAL HISTORY       Past Medical History:   Diagnosis Date    Acute on chronic diastolic congestive heart failure (HCC) 06/25/2022    JANI (acute kidney injury) 07/07/2019    Anemia     Anesthesia     migraines    Anxiety     Asthma     CAD (coronary artery disease)     Depression     Diabetes (HCC)     Diet-controlled type 2 diabetes mellitus (HCC) 2016    Dyslipidemia 11/14/2016    Epilepsy (HCC)     last siezure is

## 2025-05-15 NOTE — PROGRESS NOTES
1990    Chief Complaint   Patient presents with    Hypertension    Generalized Body Aches       Pt is a 34 y.o. female who is an established patient.patient reported sudden onset of generalized body pain ongoing for the past 2 days, patient called Dr. Adams who manages her pain.  Reported patient presented to the ED for further evaluation.  Patient has previous history of sickle cell crisis, patient reports pills little bit like it.  Patient recently went on vacation on the 9th of this month, during which she did not have any episodes of pain crisis but resolved able to felt better after coming back from the medication.  Patient denies any outdoor activities during the vacation.  Patient also endorses chest pain, sharp stabbing in nature and pleuritic radiating to the back and down to her leg.  Associated with shortness of breath.  And also radiating down her left arm.  Patient also reports decreased oral intake as she is feeling nauseated and unable to tolerate p.o. diet.  Patient was supposed to be seen by [facility] for further PEG tube evaluation, for which patient has an appointment on 22 May. Patient reports that her blood pressure at home today was 107/108 and has been around that number.          Past Medical History:   Diagnosis Date    Acute on chronic diastolic congestive heart failure (HCC) 06/25/2022    JANI (acute kidney injury) 07/07/2019    Anemia     Anesthesia     migraines    Anxiety     Asthma     CAD (coronary artery disease)     Depression     Diabetes ([facility])     Diet-controlled type 2 diabetes mellitus (HCC) 2016    Dyslipidemia 11/14/2016    Epilepsy ([facility])     last siezure is 2 years ago    Epilepsy ([facility])     Fibroids     Gastro - esophageal reflux disease     Gastroparesis     History of esophagogastroduodenoscopy (EGD) 08/13/2022    Hypertension     Hypertrophy of tonsil and adenoid 11/04/2017    Malignant carcinoid tumor of other sites ([facility]) 05/14/2013    Migraine     PONV

## 2025-05-16 PROBLEM — Z51.5 PALLIATIVE CARE ENCOUNTER: Status: ACTIVE | Noted: 2025-05-16

## 2025-05-16 LAB
ANION GAP SERPL CALC-SCNC: 13 MEQ/L (ref 8–16)
ANISOCYTOSIS BLD QL SMEAR: PRESENT
BASOPHILS ABSOLUTE: 0 THOU/MM3 (ref 0–0.1)
BASOPHILS NFR BLD AUTO: 0.3 %
BUN SERPL-MCNC: 11 MG/DL (ref 8–23)
CALCIUM SERPL-MCNC: 8.8 MG/DL (ref 8.6–10)
CHLORIDE SERPL-SCNC: 109 MEQ/L (ref 98–111)
CO2 SERPL-SCNC: 24 MEQ/L (ref 22–29)
CREAT SERPL-MCNC: 0.7 MG/DL (ref 0.5–0.9)
DEPRECATED RDW RBC AUTO: 46.2 FL (ref 35–45)
EOSINOPHIL NFR BLD AUTO: 2 %
EOSINOPHILS ABSOLUTE: 0.2 THOU/MM3 (ref 0–0.4)
ERYTHROCYTE [DISTWIDTH] IN BLOOD BY AUTOMATED COUNT: 16.7 % (ref 11.5–14.5)
GFR SERPL CREATININE-BSD FRML MDRD: > 90 ML/MIN/1.73M2
GLUCOSE BLD STRIP.AUTO-MCNC: 112 MG/DL (ref 70–108)
GLUCOSE SERPL-MCNC: 109 MG/DL (ref 74–109)
HCT VFR BLD AUTO: 38.1 % (ref 37–47)
HGB BLD-MCNC: 11.9 GM/DL (ref 12–16)
IMM GRANULOCYTES # BLD AUTO: 0.03 THOU/MM3 (ref 0–0.07)
IMM GRANULOCYTES NFR BLD AUTO: 0.3 %
LYMPHOCYTES ABSOLUTE: 2.5 THOU/MM3 (ref 1–4.8)
LYMPHOCYTES NFR BLD AUTO: 24.8 %
MCH RBC QN AUTO: 23.8 PG (ref 26–33)
MCHC RBC AUTO-ENTMCNC: 31.2 GM/DL (ref 32.2–35.5)
MCV RBC AUTO: 76.4 FL (ref 81–99)
MONOCYTES ABSOLUTE: 0.6 THOU/MM3 (ref 0.4–1.3)
MONOCYTES NFR BLD AUTO: 6.4 %
NEUTROPHILS ABSOLUTE: 6.7 THOU/MM3 (ref 1.8–7.7)
NEUTROPHILS NFR BLD AUTO: 66.2 %
NRBC BLD AUTO-RTO: 0 /100 WBC
PLATELET # BLD AUTO: 218 THOU/MM3 (ref 130–400)
PLATELET BLD QL SMEAR: ADEQUATE
PMV BLD AUTO: 9.8 FL (ref 9.4–12.4)
POTASSIUM SERPL-SCNC: 4.3 MEQ/L (ref 3.5–5.2)
RBC # BLD AUTO: 4.99 MILL/MM3 (ref 4.2–5.4)
SCAN OF BLOOD SMEAR: NORMAL
SODIUM SERPL-SCNC: 146 MEQ/L (ref 135–145)
WBC # BLD AUTO: 10.1 THOU/MM3 (ref 4.8–10.8)

## 2025-05-16 PROCEDURE — 6360000002 HC RX W HCPCS

## 2025-05-16 PROCEDURE — 6370000000 HC RX 637 (ALT 250 FOR IP)

## 2025-05-16 PROCEDURE — 82948 REAGENT STRIP/BLOOD GLUCOSE: CPT

## 2025-05-16 PROCEDURE — 99223 1ST HOSP IP/OBS HIGH 75: CPT | Performed by: STUDENT IN AN ORGANIZED HEALTH CARE EDUCATION/TRAINING PROGRAM

## 2025-05-16 PROCEDURE — 80048 BASIC METABOLIC PNL TOTAL CA: CPT

## 2025-05-16 PROCEDURE — G0378 HOSPITAL OBSERVATION PER HR: HCPCS

## 2025-05-16 PROCEDURE — 2580000003 HC RX 258: Performed by: INTERNAL MEDICINE

## 2025-05-16 PROCEDURE — 36591 DRAW BLOOD OFF VENOUS DEVICE: CPT

## 2025-05-16 PROCEDURE — 2500000003 HC RX 250 WO HCPCS

## 2025-05-16 PROCEDURE — 85025 COMPLETE CBC W/AUTO DIFF WBC: CPT

## 2025-05-16 RX ORDER — INSULIN LISPRO 100 [IU]/ML
0-4 INJECTION, SOLUTION INTRAVENOUS; SUBCUTANEOUS
Status: DISCONTINUED | OUTPATIENT
Start: 2025-05-16 | End: 2025-05-20 | Stop reason: HOSPADM

## 2025-05-16 RX ORDER — SUMATRIPTAN 50 MG/1
50 TABLET, FILM COATED ORAL ONCE
Status: COMPLETED | OUTPATIENT
Start: 2025-05-16 | End: 2025-05-16

## 2025-05-16 RX ORDER — DIPHENHYDRAMINE HYDROCHLORIDE 50 MG/ML
25 INJECTION, SOLUTION INTRAMUSCULAR; INTRAVENOUS
Status: DISCONTINUED | OUTPATIENT
Start: 2025-05-16 | End: 2025-05-20 | Stop reason: HOSPADM

## 2025-05-16 RX ORDER — PROMETHAZINE HYDROCHLORIDE 25 MG/ML
6.25 INJECTION, SOLUTION INTRAMUSCULAR; INTRAVENOUS EVERY 6 HOURS PRN
Status: DISCONTINUED | OUTPATIENT
Start: 2025-05-16 | End: 2025-05-20 | Stop reason: HOSPADM

## 2025-05-16 RX ORDER — DEXTROSE MONOHYDRATE 100 MG/ML
INJECTION, SOLUTION INTRAVENOUS CONTINUOUS PRN
Status: DISCONTINUED | OUTPATIENT
Start: 2025-05-16 | End: 2025-05-20 | Stop reason: HOSPADM

## 2025-05-16 RX ORDER — GLUCAGON 1 MG/ML
1 KIT INJECTION PRN
Status: DISCONTINUED | OUTPATIENT
Start: 2025-05-16 | End: 2025-05-20 | Stop reason: HOSPADM

## 2025-05-16 RX ORDER — MORPHINE SULFATE 2 MG/ML
2 INJECTION, SOLUTION INTRAMUSCULAR; INTRAVENOUS
Refills: 0 | Status: DISCONTINUED | OUTPATIENT
Start: 2025-05-16 | End: 2025-05-20 | Stop reason: HOSPADM

## 2025-05-16 RX ORDER — MORPHINE SULFATE 4 MG/ML
4 INJECTION, SOLUTION INTRAMUSCULAR; INTRAVENOUS
Refills: 0 | Status: DISCONTINUED | OUTPATIENT
Start: 2025-05-16 | End: 2025-05-20 | Stop reason: HOSPADM

## 2025-05-16 RX ORDER — SODIUM CHLORIDE, SODIUM LACTATE, POTASSIUM CHLORIDE, CALCIUM CHLORIDE 600; 310; 30; 20 MG/100ML; MG/100ML; MG/100ML; MG/100ML
INJECTION, SOLUTION INTRAVENOUS CONTINUOUS
Status: DISCONTINUED | OUTPATIENT
Start: 2025-05-16 | End: 2025-05-17

## 2025-05-16 RX ADMIN — ACETAMINOPHEN, ASPIRIN, CAFFEINE 1 TABLET: 250; 65; 250 TABLET, FILM COATED ORAL at 11:29

## 2025-05-16 RX ADMIN — SODIUM CHLORIDE, SODIUM LACTATE, POTASSIUM CHLORIDE, AND CALCIUM CHLORIDE: .6; .31; .03; .02 INJECTION, SOLUTION INTRAVENOUS at 15:05

## 2025-05-16 RX ADMIN — HYDROMORPHONE HYDROCHLORIDE 1 MG: 1 INJECTION, SOLUTION INTRAMUSCULAR; INTRAVENOUS; SUBCUTANEOUS at 03:50

## 2025-05-16 RX ADMIN — CARVEDILOL 6.25 MG: 6.25 TABLET, FILM COATED ORAL at 20:46

## 2025-05-16 RX ADMIN — ENOXAPARIN SODIUM 30 MG: 100 INJECTION SUBCUTANEOUS at 21:00

## 2025-05-16 RX ADMIN — MORPHINE SULFATE 4 MG: 4 INJECTION, SOLUTION INTRAMUSCULAR; INTRAVENOUS at 17:05

## 2025-05-16 RX ADMIN — MORPHINE SULFATE 2 MG: 2 INJECTION, SOLUTION INTRAMUSCULAR; INTRAVENOUS at 12:06

## 2025-05-16 RX ADMIN — HYDROMORPHONE HYDROCHLORIDE 1 MG: 1 INJECTION, SOLUTION INTRAMUSCULAR; INTRAVENOUS; SUBCUTANEOUS at 00:48

## 2025-05-16 RX ADMIN — SODIUM CHLORIDE, SODIUM LACTATE, POTASSIUM CHLORIDE, AND CALCIUM CHLORIDE: .6; .31; .03; .02 INJECTION, SOLUTION INTRAVENOUS at 23:21

## 2025-05-16 RX ADMIN — MORPHINE SULFATE 4 MG: 4 INJECTION, SOLUTION INTRAMUSCULAR; INTRAVENOUS at 14:59

## 2025-05-16 RX ADMIN — PANTOPRAZOLE SODIUM 40 MG: 40 TABLET, DELAYED RELEASE ORAL at 20:43

## 2025-05-16 RX ADMIN — SUMATRIPTAN SUCCINATE 50 MG: 50 TABLET ORAL at 05:24

## 2025-05-16 RX ADMIN — HYDROMORPHONE HYDROCHLORIDE 1 MG: 1 INJECTION, SOLUTION INTRAMUSCULAR; INTRAVENOUS; SUBCUTANEOUS at 06:48

## 2025-05-16 RX ADMIN — PROMETHAZINE HYDROCHLORIDE 6.25 MG: 25 INJECTION, SOLUTION INTRAMUSCULAR; INTRAVENOUS at 11:26

## 2025-05-16 RX ADMIN — SODIUM CHLORIDE, PRESERVATIVE FREE 10 ML: 5 INJECTION INTRAVENOUS at 20:48

## 2025-05-16 RX ADMIN — SUMATRIPTAN SUCCINATE 25 MG: 50 TABLET ORAL at 00:49

## 2025-05-16 RX ADMIN — MORPHINE SULFATE 4 MG: 4 INJECTION, SOLUTION INTRAMUSCULAR; INTRAVENOUS at 23:17

## 2025-05-16 RX ADMIN — MORPHINE SULFATE 4 MG: 4 INJECTION, SOLUTION INTRAMUSCULAR; INTRAVENOUS at 20:43

## 2025-05-16 ASSESSMENT — PAIN DESCRIPTION - DESCRIPTORS
DESCRIPTORS: ACHING
DESCRIPTORS: ACHING;SHARP
DESCRIPTORS: ACHING;SHARP
DESCRIPTORS: ACHING
DESCRIPTORS: SHARP;SORE

## 2025-05-16 ASSESSMENT — PAIN DESCRIPTION - ORIENTATION
ORIENTATION: LEFT;RIGHT
ORIENTATION: RIGHT;LEFT

## 2025-05-16 ASSESSMENT — PAIN SCALES - GENERAL
PAINLEVEL_OUTOF10: 9
PAINLEVEL_OUTOF10: 8
PAINLEVEL_OUTOF10: 9
PAINLEVEL_OUTOF10: 7
PAINLEVEL_OUTOF10: 1
PAINLEVEL_OUTOF10: 6
PAINLEVEL_OUTOF10: 5
PAINLEVEL_OUTOF10: 9
PAINLEVEL_OUTOF10: 9
PAINLEVEL_OUTOF10: 8
PAINLEVEL_OUTOF10: 10
PAINLEVEL_OUTOF10: 0
PAINLEVEL_OUTOF10: 8
PAINLEVEL_OUTOF10: 8
PAINLEVEL_OUTOF10: 4

## 2025-05-16 ASSESSMENT — PAIN SCALES - WONG BAKER
WONGBAKER_NUMERICALRESPONSE: HURTS A LITTLE BIT
WONGBAKER_NUMERICALRESPONSE: HURTS A LITTLE BIT

## 2025-05-16 ASSESSMENT — PAIN DESCRIPTION - LOCATION
LOCATION: GENERALIZED
LOCATION: GENERALIZED
LOCATION: HEAD;THROAT
LOCATION: HEAD
LOCATION: GENERALIZED
LOCATION: OTHER (COMMENT)

## 2025-05-16 ASSESSMENT — PAIN DESCRIPTION - PAIN TYPE: TYPE: ACUTE PAIN

## 2025-05-16 ASSESSMENT — PAIN DESCRIPTION - FREQUENCY: FREQUENCY: CONTINUOUS

## 2025-05-16 NOTE — ED PROVIDER NOTES
[facility]      EMERGENCY MEDICINE     Pt Name: Samantha Nichols  MRN: [id number]  Birthdate 1990  Date of evaluation: 5/15/2025  Provider: JIMENEZ Espinoza    CHIEF COMPLAINT       Chief Complaint   Patient presents with    Generalized Body Aches    Sickle Cell Pain Crisis     HISTORY OF PRESENT ILLNESS   Samantha Nichols is a pleasant 34 y.o. female who presents to the emergency department from from home, by private vehicle for evaluation of \sickle cell pain.  The patient has been having increasing pain the last 2 days.  She is in no nausea or vomiting.  She had no fevers or chills no urinary symptoms.  No runny nose or cough.  She is followed by Dr. Fairchild.        PASTMEDICAL HISTORY     Past Medical History:   Diagnosis Date    Acute on chronic diastolic congestive heart failure (HCC) 06/25/2022    JANI (acute kidney injury) 07/07/2019    Anemia     Anesthesia     migraines    Anxiety     Asthma     CAD (coronary artery disease)     Depression     Diabetes ([facility])     Diet-controlled type 2 diabetes mellitus ([facility]) 2016    Dyslipidemia 11/14/2016    Epilepsy ([facility])     last siezure is 2 years ago    Epilepsy ([facility])     Fibroids     Gastro - esophageal reflux disease     Gastroparesis     History of esophagogastroduodenoscopy (EGD) 08/13/2022    Hypertension     Hypertrophy of tonsil and adenoid 11/04/2017    Malignant carcinoid tumor of other sites ([facility]) 05/14/2013    Migraine     PONV (postoperative nausea and vomiting)     Prolonged emergence from general anesthesia     Sickle cell anemia (HCC)     Sickle cell trait     PT STATES SHE HAS THE TRAIT NOT THE DISEASE    Tumor associated pain     neuroendrocrine tumor, gastroma       Patient Active Problem List   Diagnosis Code    Carcinoid tumor (HCC) D3A.00    Pelvic inflammatory disease N73.9    Microcytic anemia D50.9    Diet-controlled type 2 diabetes mellitus (HCC) E11.9    Esophageal dysphagia R13.19    Esophageal stricture K22.2    Morbid 
administered this visit:  (None if blank)  Medications   HYDROmorphone (DILAUDID) injection 1 mg (1 mg IntraVENous Given 5/15/25 1532)   promethazine (PHENERGAN) injection 25 mg (25 mg IntraMUSCular Given 5/15/25 1618)   HYDROmorphone (DILAUDID) injection 1 mg (1 mg IntraVENous Given 5/15/25 1625)         PROCEDURES: (None if blank)  Procedures:     CRITICAL CARE: (None if blank)      DISCHARGE PRESCRIPTIONS: (None if blank)  New Prescriptions    No medications on file       FINAL IMPRESSION    Sickle cell disease with pain    DISPOSITION/PLAN   Admit          OUTPATIENT FOLLOW UP THE PATIENT:  No follow-up provider specified.    AURELIO Hansen CNP, [place], APRN - CNP  05/15/25 [id number]

## 2025-05-16 NOTE — H&P
Hospitalist History & Physical     Patient: Samantha Nichols 34 y.o. female : 1990  Date of Admission: 5/15/2025  CODE STATUS: Full Code    Date of Service: Pt seen/examined on 05/15/25 and Admitted to Observation with expected LOS less than two midnights due to medical therapy.     ASSESSMENT AND PLAN    Active Problems:    Sickle-cell disease with pain, palliative care patient  Reports significant pain that started on .  She had gone on vacation with no significant events or significant pain.  Reports pain as glass across all the muscles in her body.  Patient of Dr. Fairchild.  Managed at home with Dilaudid 2 mg tablet every 6 hours, baclofen 3 times a day as needed  Patient reports that she had a history of sickle cell crisis many years ago when she was at [employer].  At that time was treated with PCA pump.  BMP was unrevealing.  CBC unremarkable. Reticular hemoglobin stable at 27.9.  Urinalysis was negative for any significant findings.  Patient received 3 doses of Dilaudid 1 mg in the emergency department with no relief of her pain.  Patient was sleepy on exam.  Will start aggressive pain control panel.    Consult palliative care with Dr. Fairchild for assistance, awaiting recommendations.    Type 2 diabetes mellitus with obesity  Admission glucose 113  Last A1C 2024: 6.0  Controlled with diet.  DM adjusted diet.  Consider low sliding scale insulin.  Monitor with daily BMP.     HFpEF, not acutely decompensated  Patient presents with no symptoms or evidence of volume overload.  Most recent echo on 2023 showed ejection fraction of 55 to 60% with no regional left ventricular wall motion or abnormalities and wall thickness within normal limits.  Admission weight 131.1 kg  See above for laboratory and imaging findings  Home med regimen: Carvedilol  Continue home meds. Strict I/O, daily standing weights, 2L fluid restriction, Na-resticted diet.    Hypertension  Managed at home with carvedilol and

## 2025-05-16 NOTE — CARE COORDINATION
Case Management Assessment Initial Evaluation    Date/Time of Evaluation: 5/16/2025 8:33 AM  Assessment Completed by: Adela Hernandez, RN    If patient is discharged prior to next notation, then this note serves as note for discharge by case management.    Patient Name: Samantha Nichols                   YOB: 1990  Diagnosis: Sickle cell crisis (HCC) [D57.00]  Sickle-cell disease with pain (HCC) [D57.00]                   Date / Time: 5/15/2025  2:20 PM  Location: [facility]     Patient Admission Status: Observation   Readmission Risk Low 0-14, Mod 15-19), High > 20: Readmission Risk Score: 26.1    Current PCP: Juan Dominguez,   Health Care Decision Makers:   Primary Decision Maker: Fely Nichols - Healthcare Decision Maker - [phone]    Secondary Decision Maker: Geovanna Ford - Healthcare Decision Maker - [phone]    Supplemental (Other) Decision Maker: matthew ford - Aunt/Uncle - [phone]    Additional Case Management Notes: Admitted through ED as observation with sickle cell pain. Current Palliative Care pt. Pain control.     Procedures: None    Imaging: None    Patient Goals/Plan/Treatment Preferences: Attempted to see pt. She would not open eyes or respond to questions after CM introduced self and role. Per chart, pt is from home with wife.

## 2025-05-16 NOTE — CONSULTS
care; normal or reduced intake; fully conscious    Assessment and Plan   Samantha Nichols is a 34 y.o. who is admitted to [facility] for Sickle-cell disease with pain (HCC). Palliative care is consulted for Symptom Management and Continuity of Care.  Her pain seems to be a little better controlled today compared to yesterday. From 8 AM yesterday to 8 AM today, they have used a total of 87.5 oral morphine equivalents.  I do not think the oral Dilaudid was doing a good job of controlling her pain and she may have had headaches related this, however there is a chance that the headaches are unrelated to the Dilaudid.  I will transition her to oral morphine today.  I told her that the oral morphine will be stronger than the IV morphine, however it will not work as quickly.  I would expect for her to start to take the oral morphine first before the IV in the next 24 to 48 hours. Palliative Care will continue to follow the patient while they are hospitalized. Patient will continue to follow with palliative care in the palliative care clinic on discharge.         Principal Problem:    Sickle-cell disease with pain (HCC)  Active Problems:    Morbid obesity (HCC)    Migraine equivalent    Mild persistent asthma without complication    Dyslipidemia    Essential hypertension    Tobacco use    Gastroparesis due to DM (HCC)    CAD (coronary artery disease)    Gastroesophageal reflux disease without esophagitis    Type 2 diabetes mellitus with obesity (HCC)    History of anaphylaxis    (HFpEF) heart failure with preserved ejection fraction (HCC)    Palliative care encounter  Resolved Problems:    * No resolved hospital problems. *      Continue current plan of care for acute and chronic conditions per primary and specialist teams  Start Morphine IR 7.5 mg every 3 hours as needed for breakthrough pain  Start Morphine IV 2 mg every 3 hours as needed for breakthrough pain not controlled on oral medication  Discontinue

## 2025-05-17 PROBLEM — D57.00 VASO-OCCLUSIVE SICKLE CELL CRISIS (HCC): Status: ACTIVE | Noted: 2025-05-17

## 2025-05-17 LAB
ANION GAP SERPL CALC-SCNC: 12 MEQ/L (ref 8–16)
BUN SERPL-MCNC: 11 MG/DL (ref 8–23)
CALCIUM SERPL-MCNC: 9.2 MG/DL (ref 8.6–10)
CHLORIDE SERPL-SCNC: 106 MEQ/L (ref 98–111)
CO2 SERPL-SCNC: 24 MEQ/L (ref 22–29)
CREAT SERPL-MCNC: 0.7 MG/DL (ref 0.5–0.9)
DEPRECATED MEAN GLUCOSE BLD GHB EST-ACNC: 129 MG/DL (ref 70–126)
DEPRECATED RDW RBC AUTO: 45.1 FL (ref 35–45)
ERYTHROCYTE [DISTWIDTH] IN BLOOD BY AUTOMATED COUNT: 16.6 % (ref 11.5–14.5)
GFR SERPL CREATININE-BSD FRML MDRD: > 90 ML/MIN/1.73M2
GLUCOSE BLD STRIP.AUTO-MCNC: 111 MG/DL (ref 70–108)
GLUCOSE BLD STRIP.AUTO-MCNC: 126 MG/DL (ref 70–108)
GLUCOSE BLD STRIP.AUTO-MCNC: 90 MG/DL (ref 70–108)
GLUCOSE BLD STRIP.AUTO-MCNC: 97 MG/DL (ref 70–108)
GLUCOSE SERPL-MCNC: 109 MG/DL (ref 74–109)
HBA1C MFR BLD HPLC: 6.3 % (ref 4–6)
HCT VFR BLD AUTO: 38.7 % (ref 37–47)
HGB BLD-MCNC: 11.8 GM/DL (ref 12–16)
MCH RBC QN AUTO: 23.1 PG (ref 26–33)
MCHC RBC AUTO-ENTMCNC: 30.5 GM/DL (ref 32.2–35.5)
MCV RBC AUTO: 75.7 FL (ref 81–99)
PLATELET # BLD AUTO: 281 THOU/MM3 (ref 130–400)
PMV BLD AUTO: 9.8 FL (ref 9.4–12.4)
POTASSIUM SERPL-SCNC: 3.7 MEQ/L (ref 3.5–5.2)
RBC # BLD AUTO: 5.11 MILL/MM3 (ref 4.2–5.4)
SODIUM SERPL-SCNC: 142 MEQ/L (ref 135–145)
WBC # BLD AUTO: 8.5 THOU/MM3 (ref 4.8–10.8)

## 2025-05-17 PROCEDURE — 2500000003 HC RX 250 WO HCPCS

## 2025-05-17 PROCEDURE — 6360000002 HC RX W HCPCS

## 2025-05-17 PROCEDURE — 83036 HEMOGLOBIN GLYCOSYLATED A1C: CPT

## 2025-05-17 PROCEDURE — 6370000000 HC RX 637 (ALT 250 FOR IP)

## 2025-05-17 PROCEDURE — 85027 COMPLETE CBC AUTOMATED: CPT

## 2025-05-17 PROCEDURE — 82948 REAGENT STRIP/BLOOD GLUCOSE: CPT

## 2025-05-17 PROCEDURE — 1200000000 HC SEMI PRIVATE

## 2025-05-17 PROCEDURE — 80048 BASIC METABOLIC PNL TOTAL CA: CPT

## 2025-05-17 RX ORDER — DIPHENHYDRAMINE HCL 25 MG
25 TABLET ORAL ONCE
Status: COMPLETED | OUTPATIENT
Start: 2025-05-17 | End: 2025-05-17

## 2025-05-17 RX ADMIN — PANTOPRAZOLE SODIUM 40 MG: 40 TABLET, DELAYED RELEASE ORAL at 10:11

## 2025-05-17 RX ADMIN — CARVEDILOL 6.25 MG: 6.25 TABLET, FILM COATED ORAL at 20:56

## 2025-05-17 RX ADMIN — PANTOPRAZOLE SODIUM 40 MG: 40 TABLET, DELAYED RELEASE ORAL at 20:56

## 2025-05-17 RX ADMIN — CARVEDILOL 6.25 MG: 6.25 TABLET, FILM COATED ORAL at 10:11

## 2025-05-17 RX ADMIN — MORPHINE SULFATE 4 MG: 4 INJECTION, SOLUTION INTRAMUSCULAR; INTRAVENOUS at 10:11

## 2025-05-17 RX ADMIN — ACETAMINOPHEN, ASPIRIN, CAFFEINE 1 TABLET: 250; 65; 250 TABLET, FILM COATED ORAL at 16:41

## 2025-05-17 RX ADMIN — ENOXAPARIN SODIUM 30 MG: 100 INJECTION SUBCUTANEOUS at 10:11

## 2025-05-17 RX ADMIN — MORPHINE SULFATE 4 MG: 4 INJECTION, SOLUTION INTRAMUSCULAR; INTRAVENOUS at 05:28

## 2025-05-17 RX ADMIN — MORPHINE SULFATE 4 MG: 4 INJECTION, SOLUTION INTRAMUSCULAR; INTRAVENOUS at 21:45

## 2025-05-17 RX ADMIN — MORPHINE SULFATE 4 MG: 4 INJECTION, SOLUTION INTRAMUSCULAR; INTRAVENOUS at 01:22

## 2025-05-17 RX ADMIN — DIPHENHYDRAMINE HYDROCHLORIDE 25 MG: 25 TABLET ORAL at 16:41

## 2025-05-17 RX ADMIN — MORPHINE SULFATE 4 MG: 4 INJECTION, SOLUTION INTRAMUSCULAR; INTRAVENOUS at 03:25

## 2025-05-17 RX ADMIN — AMLODIPINE BESYLATE 10 MG: 10 TABLET ORAL at 10:11

## 2025-05-17 RX ADMIN — MORPHINE SULFATE 4 MG: 4 INJECTION, SOLUTION INTRAMUSCULAR; INTRAVENOUS at 08:10

## 2025-05-17 RX ADMIN — MORPHINE SULFATE 4 MG: 4 INJECTION, SOLUTION INTRAMUSCULAR; INTRAVENOUS at 12:15

## 2025-05-17 RX ADMIN — ENOXAPARIN SODIUM 30 MG: 100 INJECTION SUBCUTANEOUS at 21:49

## 2025-05-17 RX ADMIN — SODIUM CHLORIDE, PRESERVATIVE FREE 10 ML: 5 INJECTION INTRAVENOUS at 20:59

## 2025-05-17 RX ADMIN — PROMETHAZINE HYDROCHLORIDE 6.25 MG: 25 INJECTION, SOLUTION INTRAMUSCULAR; INTRAVENOUS at 13:20

## 2025-05-17 ASSESSMENT — PAIN DESCRIPTION - LOCATION
LOCATION: GENERALIZED
LOCATION: GENERALIZED
LOCATION: HEAD
LOCATION: GENERALIZED
LOCATION: HEAD
LOCATION: GENERALIZED

## 2025-05-17 ASSESSMENT — PAIN - FUNCTIONAL ASSESSMENT
PAIN_FUNCTIONAL_ASSESSMENT: ACTIVITIES ARE NOT PREVENTED

## 2025-05-17 ASSESSMENT — PAIN DESCRIPTION - DESCRIPTORS
DESCRIPTORS: ACHING

## 2025-05-17 ASSESSMENT — PAIN SCALES - WONG BAKER
WONGBAKER_NUMERICALRESPONSE: NO HURT
WONGBAKER_NUMERICALRESPONSE: HURTS LITTLE MORE
WONGBAKER_NUMERICALRESPONSE: NO HURT

## 2025-05-17 ASSESSMENT — PAIN DESCRIPTION - ORIENTATION
ORIENTATION: MID
ORIENTATION: RIGHT;LEFT
ORIENTATION: MID
ORIENTATION: RIGHT;LEFT
ORIENTATION: RIGHT;LEFT

## 2025-05-17 ASSESSMENT — PAIN DESCRIPTION - ONSET
ONSET: ON-GOING
ONSET: ON-GOING

## 2025-05-17 ASSESSMENT — PAIN SCALES - GENERAL
PAINLEVEL_OUTOF10: 10
PAINLEVEL_OUTOF10: 10
PAINLEVEL_OUTOF10: 7
PAINLEVEL_OUTOF10: 9
PAINLEVEL_OUTOF10: 8
PAINLEVEL_OUTOF10: 8
PAINLEVEL_OUTOF10: 9
PAINLEVEL_OUTOF10: 9
PAINLEVEL_OUTOF10: 8
PAINLEVEL_OUTOF10: 9
PAINLEVEL_OUTOF10: 10
PAINLEVEL_OUTOF10: 7
PAINLEVEL_OUTOF10: 9
PAINLEVEL_OUTOF10: 8
PAINLEVEL_OUTOF10: 7
PAINLEVEL_OUTOF10: 10

## 2025-05-17 ASSESSMENT — PAIN DESCRIPTION - FREQUENCY
FREQUENCY: CONTINUOUS

## 2025-05-17 ASSESSMENT — PAIN DESCRIPTION - PAIN TYPE
TYPE: ACUTE PAIN

## 2025-05-18 LAB
ANION GAP SERPL CALC-SCNC: 11 MEQ/L (ref 8–16)
BUN SERPL-MCNC: 14 MG/DL (ref 8–23)
CALCIUM SERPL-MCNC: 9.4 MG/DL (ref 8.6–10)
CHLORIDE SERPL-SCNC: 105 MEQ/L (ref 98–111)
CO2 SERPL-SCNC: 25 MEQ/L (ref 22–29)
CREAT SERPL-MCNC: 0.8 MG/DL (ref 0.5–0.9)
DEPRECATED RDW RBC AUTO: 43.5 FL (ref 35–45)
ERYTHROCYTE [DISTWIDTH] IN BLOOD BY AUTOMATED COUNT: 16.3 % (ref 11.5–14.5)
GFR SERPL CREATININE-BSD FRML MDRD: > 90 ML/MIN/1.73M2
GLUCOSE BLD STRIP.AUTO-MCNC: 103 MG/DL (ref 70–108)
GLUCOSE BLD STRIP.AUTO-MCNC: 92 MG/DL (ref 70–108)
GLUCOSE BLD STRIP.AUTO-MCNC: 96 MG/DL (ref 70–108)
GLUCOSE BLD STRIP.AUTO-MCNC: 96 MG/DL (ref 70–108)
GLUCOSE SERPL-MCNC: 140 MG/DL (ref 74–109)
HCT VFR BLD AUTO: 39 % (ref 37–47)
HGB BLD-MCNC: 12 GM/DL (ref 12–16)
MCH RBC QN AUTO: 23.1 PG (ref 26–33)
MCHC RBC AUTO-ENTMCNC: 30.8 GM/DL (ref 32.2–35.5)
MCV RBC AUTO: 75 FL (ref 81–99)
PLATELET # BLD AUTO: 276 THOU/MM3 (ref 130–400)
PMV BLD AUTO: 9.4 FL (ref 9.4–12.4)
POTASSIUM SERPL-SCNC: 3.6 MEQ/L (ref 3.5–5.2)
RBC # BLD AUTO: 5.2 MILL/MM3 (ref 4.2–5.4)
SODIUM SERPL-SCNC: 141 MEQ/L (ref 135–145)
WBC # BLD AUTO: 8.8 THOU/MM3 (ref 4.8–10.8)

## 2025-05-18 PROCEDURE — 2500000003 HC RX 250 WO HCPCS

## 2025-05-18 PROCEDURE — 6370000000 HC RX 637 (ALT 250 FOR IP)

## 2025-05-18 PROCEDURE — 6360000002 HC RX W HCPCS

## 2025-05-18 PROCEDURE — 1200000000 HC SEMI PRIVATE

## 2025-05-18 PROCEDURE — 82948 REAGENT STRIP/BLOOD GLUCOSE: CPT

## 2025-05-18 PROCEDURE — 80048 BASIC METABOLIC PNL TOTAL CA: CPT

## 2025-05-18 PROCEDURE — 85027 COMPLETE CBC AUTOMATED: CPT

## 2025-05-18 RX ORDER — DIPHENHYDRAMINE HCL 25 MG
25 TABLET ORAL EVERY 6 HOURS PRN
Status: DISCONTINUED | OUTPATIENT
Start: 2025-05-18 | End: 2025-05-20 | Stop reason: HOSPADM

## 2025-05-18 RX ADMIN — AMLODIPINE BESYLATE 10 MG: 10 TABLET ORAL at 07:30

## 2025-05-18 RX ADMIN — MORPHINE SULFATE 4 MG: 4 INJECTION, SOLUTION INTRAMUSCULAR; INTRAVENOUS at 23:45

## 2025-05-18 RX ADMIN — PANTOPRAZOLE SODIUM 40 MG: 40 TABLET, DELAYED RELEASE ORAL at 07:30

## 2025-05-18 RX ADMIN — MORPHINE SULFATE 4 MG: 4 INJECTION, SOLUTION INTRAMUSCULAR; INTRAVENOUS at 01:11

## 2025-05-18 RX ADMIN — ENOXAPARIN SODIUM 30 MG: 100 INJECTION SUBCUTANEOUS at 07:30

## 2025-05-18 RX ADMIN — SODIUM CHLORIDE, PRESERVATIVE FREE 10 ML: 5 INJECTION INTRAVENOUS at 07:30

## 2025-05-18 RX ADMIN — PANTOPRAZOLE SODIUM 40 MG: 40 TABLET, DELAYED RELEASE ORAL at 20:06

## 2025-05-18 RX ADMIN — MORPHINE SULFATE 4 MG: 4 INJECTION, SOLUTION INTRAMUSCULAR; INTRAVENOUS at 20:01

## 2025-05-18 RX ADMIN — MORPHINE SULFATE 4 MG: 4 INJECTION, SOLUTION INTRAMUSCULAR; INTRAVENOUS at 15:34

## 2025-05-18 RX ADMIN — MORPHINE SULFATE 4 MG: 4 INJECTION, SOLUTION INTRAMUSCULAR; INTRAVENOUS at 10:24

## 2025-05-18 RX ADMIN — MORPHINE SULFATE 4 MG: 4 INJECTION, SOLUTION INTRAMUSCULAR; INTRAVENOUS at 22:04

## 2025-05-18 RX ADMIN — DIPHENHYDRAMINE HYDROCHLORIDE 25 MG: 25 TABLET ORAL at 16:03

## 2025-05-18 RX ADMIN — MORPHINE SULFATE 2 MG: 2 INJECTION, SOLUTION INTRAMUSCULAR; INTRAVENOUS at 12:53

## 2025-05-18 RX ADMIN — ENOXAPARIN SODIUM 30 MG: 100 INJECTION SUBCUTANEOUS at 20:05

## 2025-05-18 RX ADMIN — CARVEDILOL 6.25 MG: 6.25 TABLET, FILM COATED ORAL at 20:06

## 2025-05-18 RX ADMIN — ACETAMINOPHEN, ASPIRIN, CAFFEINE 1 TABLET: 250; 65; 250 TABLET, FILM COATED ORAL at 16:03

## 2025-05-18 RX ADMIN — MORPHINE SULFATE 4 MG: 4 INJECTION, SOLUTION INTRAMUSCULAR; INTRAVENOUS at 17:39

## 2025-05-18 RX ADMIN — CARVEDILOL 6.25 MG: 6.25 TABLET, FILM COATED ORAL at 07:30

## 2025-05-18 RX ADMIN — SODIUM CHLORIDE, PRESERVATIVE FREE 10 ML: 5 INJECTION INTRAVENOUS at 20:05

## 2025-05-18 RX ADMIN — MORPHINE SULFATE 4 MG: 4 INJECTION, SOLUTION INTRAMUSCULAR; INTRAVENOUS at 06:56

## 2025-05-18 RX ADMIN — MORPHINE SULFATE 4 MG: 4 INJECTION, SOLUTION INTRAMUSCULAR; INTRAVENOUS at 04:34

## 2025-05-18 ASSESSMENT — PAIN SCALES - GENERAL
PAINLEVEL_OUTOF10: 10
PAINLEVEL_OUTOF10: 6
PAINLEVEL_OUTOF10: 8
PAINLEVEL_OUTOF10: 10
PAINLEVEL_OUTOF10: 9
PAINLEVEL_OUTOF10: 8
PAINLEVEL_OUTOF10: 10
PAINLEVEL_OUTOF10: 10
PAINLEVEL_OUTOF10: 9
PAINLEVEL_OUTOF10: 10
PAINLEVEL_OUTOF10: 6
PAINLEVEL_OUTOF10: 8
PAINLEVEL_OUTOF10: 6
PAINLEVEL_OUTOF10: 9

## 2025-05-18 ASSESSMENT — PAIN - FUNCTIONAL ASSESSMENT

## 2025-05-18 ASSESSMENT — PAIN DESCRIPTION - ORIENTATION
ORIENTATION: RIGHT;MID;LEFT
ORIENTATION: RIGHT;LEFT;MID
ORIENTATION: RIGHT;LEFT
ORIENTATION: LEFT;RIGHT
ORIENTATION: RIGHT;LEFT;INNER;LOWER;MID
ORIENTATION: RIGHT;LEFT;MID
ORIENTATION: RIGHT;LEFT
ORIENTATION: RIGHT;LEFT;MID
ORIENTATION: MID
ORIENTATION: MID;LEFT;RIGHT

## 2025-05-18 ASSESSMENT — PAIN DESCRIPTION - DESCRIPTORS
DESCRIPTORS: ACHING
DESCRIPTORS: SHARP
DESCRIPTORS: ACHING

## 2025-05-18 ASSESSMENT — PAIN DESCRIPTION - LOCATION
LOCATION: GENERALIZED
LOCATION: HEAD
LOCATION: GENERALIZED

## 2025-05-18 ASSESSMENT — PAIN DESCRIPTION - ONSET
ONSET: ON-GOING
ONSET: ON-GOING

## 2025-05-18 ASSESSMENT — PAIN DESCRIPTION - FREQUENCY
FREQUENCY: CONTINUOUS
FREQUENCY: INTERMITTENT

## 2025-05-18 ASSESSMENT — PAIN SCALES - WONG BAKER: WONGBAKER_NUMERICALRESPONSE: NO HURT

## 2025-05-18 ASSESSMENT — PAIN DESCRIPTION - PAIN TYPE: TYPE: ACUTE PAIN

## 2025-05-18 NOTE — CARE COORDINATION
05/18/25 1427   Service Assessment   Patient Orientation Alert and Oriented   Cognition Alert   History Provided By Patient   Primary Caregiver Self   Accompanied By/Relationship n/a   Support Systems Spouse/Significant Other;Family Members   Patient's Healthcare Decision Maker is: Named in Scanned ACP Document   PCP Verified by CM Yes   Last Visit to PCP Within last 3 months   Prior Functional Level Independent in ADLs/IADLs   Current Functional Level Independent in ADLs/IADLs   Can patient return to prior living arrangement Yes   Ability to make needs known: Good   Family able to assist with home care needs: Yes   Would you like for me to discuss the discharge plan with any other family members/significant others, and if so, who? No   Financial Resources Medicare;Medicaid   Community Resources None   Social/Functional History   Active  No   Patient's  Info insurance transport or family   Discharge Planning   Type of Residence House   Living Arrangements Spouse/Significant Other   Current Services Prior To Admission None   Potential Assistance Needed Home Care   DME Ordered? No   Potential Assistance Purchasing Medications No   Type of Home Care Services None   Patient expects to be discharged to: House   Services At/After Discharge   Mode of Transport at Discharge Other (see comment)  (cab)   Confirm Follow Up Transport Cab   Condition of Participation: Discharge Planning   The Plan for Transition of Care is related to the following treatment goals: pain control     Patient Goals/Plan/Treatment Preferences: Samantha plans to return home with her wife at discharge. She would like new . She says  and [facility] will not admit her. She prefers [facility] [place] or [facility] [place]. SW consulted .    If patient is discharged prior to next notation, then this note serves as note for discharge by case management.

## 2025-05-19 LAB
ANION GAP SERPL CALC-SCNC: 11 MEQ/L (ref 8–16)
BUN SERPL-MCNC: 15 MG/DL (ref 8–23)
CALCIUM SERPL-MCNC: 9.2 MG/DL (ref 8.6–10)
CHLORIDE SERPL-SCNC: 106 MEQ/L (ref 98–111)
CO2 SERPL-SCNC: 23 MEQ/L (ref 22–29)
CREAT SERPL-MCNC: 0.8 MG/DL (ref 0.5–0.9)
DEPRECATED RDW RBC AUTO: 43.8 FL (ref 35–45)
ERYTHROCYTE [DISTWIDTH] IN BLOOD BY AUTOMATED COUNT: 16.2 % (ref 11.5–14.5)
GFR SERPL CREATININE-BSD FRML MDRD: > 90 ML/MIN/1.73M2
GLUCOSE BLD STRIP.AUTO-MCNC: 100 MG/DL (ref 70–108)
GLUCOSE BLD STRIP.AUTO-MCNC: 102 MG/DL (ref 70–108)
GLUCOSE BLD STRIP.AUTO-MCNC: 108 MG/DL (ref 70–108)
GLUCOSE BLD STRIP.AUTO-MCNC: 86 MG/DL (ref 70–108)
GLUCOSE SERPL-MCNC: 107 MG/DL (ref 74–109)
HCT VFR BLD AUTO: 38.9 % (ref 37–47)
HGB BLD-MCNC: 11.9 GM/DL (ref 12–16)
MCH RBC QN AUTO: 23.1 PG (ref 26–33)
MCHC RBC AUTO-ENTMCNC: 30.6 GM/DL (ref 32.2–35.5)
MCV RBC AUTO: 75.4 FL (ref 81–99)
PLATELET # BLD AUTO: 135 THOU/MM3 (ref 130–400)
PMV BLD AUTO: 9.7 FL (ref 9.4–12.4)
POTASSIUM SERPL-SCNC: 3.7 MEQ/L (ref 3.5–5.2)
RBC # BLD AUTO: 5.16 MILL/MM3 (ref 4.2–5.4)
SODIUM SERPL-SCNC: 140 MEQ/L (ref 135–145)
WBC # BLD AUTO: 9.2 THOU/MM3 (ref 4.8–10.8)

## 2025-05-19 PROCEDURE — 1200000000 HC SEMI PRIVATE

## 2025-05-19 PROCEDURE — 80048 BASIC METABOLIC PNL TOTAL CA: CPT

## 2025-05-19 PROCEDURE — 99233 SBSQ HOSP IP/OBS HIGH 50: CPT

## 2025-05-19 PROCEDURE — 6370000000 HC RX 637 (ALT 250 FOR IP)

## 2025-05-19 PROCEDURE — 2500000003 HC RX 250 WO HCPCS

## 2025-05-19 PROCEDURE — 6360000002 HC RX W HCPCS

## 2025-05-19 PROCEDURE — 82948 REAGENT STRIP/BLOOD GLUCOSE: CPT

## 2025-05-19 PROCEDURE — 85027 COMPLETE CBC AUTOMATED: CPT

## 2025-05-19 PROCEDURE — 6360000002 HC RX W HCPCS: Performed by: STUDENT IN AN ORGANIZED HEALTH CARE EDUCATION/TRAINING PROGRAM

## 2025-05-19 PROCEDURE — 36415 COLL VENOUS BLD VENIPUNCTURE: CPT

## 2025-05-19 RX ORDER — MORPHINE SULFATE 15 MG/1
15 TABLET ORAL
Qty: 56 TABLET | Refills: 0 | Status: SHIPPED | OUTPATIENT
Start: 2025-05-19 | End: 2025-05-26

## 2025-05-19 RX ORDER — MORPHINE SULFATE 15 MG/1
15 TABLET ORAL
Refills: 0 | Status: DISCONTINUED | OUTPATIENT
Start: 2025-05-19 | End: 2025-05-20 | Stop reason: HOSPADM

## 2025-05-19 RX ORDER — HYDROCODONE BITARTRATE AND ACETAMINOPHEN 5; 325 MG/1; MG/1
1 TABLET ORAL ONCE
Status: COMPLETED | OUTPATIENT
Start: 2025-05-19 | End: 2025-05-19

## 2025-05-19 RX ORDER — MORPHINE SULFATE 15 MG/1
15 TABLET ORAL
Qty: 56 TABLET | Refills: 0 | Status: SHIPPED | OUTPATIENT
Start: 2025-05-19 | End: 2025-05-19

## 2025-05-19 RX ADMIN — HYDROCODONE BITARTRATE AND ACETAMINOPHEN 1 TABLET: 5; 325 TABLET ORAL at 00:41

## 2025-05-19 RX ADMIN — MORPHINE SULFATE 15 MG: 15 TABLET ORAL at 14:01

## 2025-05-19 RX ADMIN — MORPHINE SULFATE 4 MG: 4 INJECTION, SOLUTION INTRAMUSCULAR; INTRAVENOUS at 16:49

## 2025-05-19 RX ADMIN — PANTOPRAZOLE SODIUM 40 MG: 40 TABLET, DELAYED RELEASE ORAL at 08:04

## 2025-05-19 RX ADMIN — SODIUM CHLORIDE, PRESERVATIVE FREE 10 ML: 5 INJECTION INTRAVENOUS at 20:53

## 2025-05-19 RX ADMIN — PANTOPRAZOLE SODIUM 40 MG: 40 TABLET, DELAYED RELEASE ORAL at 20:50

## 2025-05-19 RX ADMIN — CARVEDILOL 6.25 MG: 6.25 TABLET, FILM COATED ORAL at 08:04

## 2025-05-19 RX ADMIN — MORPHINE SULFATE 15 MG: 15 TABLET ORAL at 19:33

## 2025-05-19 RX ADMIN — MORPHINE SULFATE 4 MG: 4 INJECTION, SOLUTION INTRAMUSCULAR; INTRAVENOUS at 10:22

## 2025-05-19 RX ADMIN — CARVEDILOL 6.25 MG: 6.25 TABLET, FILM COATED ORAL at 20:52

## 2025-05-19 RX ADMIN — MORPHINE SULFATE 4 MG: 4 INJECTION, SOLUTION INTRAMUSCULAR; INTRAVENOUS at 23:10

## 2025-05-19 RX ADMIN — MORPHINE SULFATE 4 MG: 4 INJECTION, SOLUTION INTRAMUSCULAR; INTRAVENOUS at 05:38

## 2025-05-19 RX ADMIN — AMLODIPINE BESYLATE 10 MG: 10 TABLET ORAL at 08:04

## 2025-05-19 RX ADMIN — HYDROMORPHONE HYDROCHLORIDE 0.5 MG: 1 INJECTION, SOLUTION INTRAMUSCULAR; INTRAVENOUS; SUBCUTANEOUS at 02:10

## 2025-05-19 RX ADMIN — FAMOTIDINE 20 MG: 20 TABLET, FILM COATED ORAL at 20:50

## 2025-05-19 RX ADMIN — MORPHINE SULFATE 4 MG: 4 INJECTION, SOLUTION INTRAMUSCULAR; INTRAVENOUS at 20:52

## 2025-05-19 RX ADMIN — ALTEPLASE 1 MG: 2.2 INJECTION, POWDER, LYOPHILIZED, FOR SOLUTION INTRAVENOUS at 15:46

## 2025-05-19 RX ADMIN — ENOXAPARIN SODIUM 30 MG: 100 INJECTION SUBCUTANEOUS at 20:50

## 2025-05-19 RX ADMIN — ENOXAPARIN SODIUM 30 MG: 100 INJECTION SUBCUTANEOUS at 08:04

## 2025-05-19 RX ADMIN — ESTRADIOL 0.5 MG: 1 TABLET ORAL at 08:04

## 2025-05-19 RX ADMIN — MORPHINE SULFATE 4 MG: 4 INJECTION, SOLUTION INTRAMUSCULAR; INTRAVENOUS at 03:23

## 2025-05-19 RX ADMIN — MORPHINE SULFATE 4 MG: 4 INJECTION, SOLUTION INTRAMUSCULAR; INTRAVENOUS at 12:46

## 2025-05-19 RX ADMIN — MORPHINE SULFATE 4 MG: 4 INJECTION, SOLUTION INTRAMUSCULAR; INTRAVENOUS at 08:04

## 2025-05-19 ASSESSMENT — PAIN DESCRIPTION - DESCRIPTORS
DESCRIPTORS: SHARP
DESCRIPTORS: SHARP
DESCRIPTORS: ACHING;CRUSHING;THROBBING
DESCRIPTORS: SHARP
DESCRIPTORS: SHARP
DESCRIPTORS: ACHING

## 2025-05-19 ASSESSMENT — PAIN DESCRIPTION - LOCATION
LOCATION: GENERALIZED

## 2025-05-19 ASSESSMENT — PAIN SCALES - GENERAL
PAINLEVEL_OUTOF10: 9
PAINLEVEL_OUTOF10: 10
PAINLEVEL_OUTOF10: 8
PAINLEVEL_OUTOF10: 9
PAINLEVEL_OUTOF10: 8
PAINLEVEL_OUTOF10: 8
PAINLEVEL_OUTOF10: 10
PAINLEVEL_OUTOF10: 9

## 2025-05-19 ASSESSMENT — PAIN DESCRIPTION - ORIENTATION
ORIENTATION: RIGHT;LEFT
ORIENTATION: RIGHT;LEFT;MID
ORIENTATION: RIGHT;LEFT;MID
ORIENTATION: MID
ORIENTATION: RIGHT;LEFT;MID
ORIENTATION: RIGHT;LEFT;MID

## 2025-05-19 ASSESSMENT — PAIN DESCRIPTION - PAIN TYPE: TYPE: ACUTE PAIN

## 2025-05-19 NOTE — CARE COORDINATION
5/19/25, 1:00 PM EDT    DISCHARGE PLANNING EVALUATION     Spoke with [facility] and they do not have staff to take patient's services.

## 2025-05-19 NOTE — CARE COORDINATION
5/19/25, 11:16 AM EDT    DISCHARGE PLANNING EVALUATION       Spoke with patient and her preference is [facility], they are not in her insurance network.  Reviewed in network providers from the [facility] website with her.  She does not want [facility] or [facility]. [facility] are not in network with her insurance. Referral made to [facility] as requested.  She stated that if they can not accept then to try [facility]. Awaiting response from [facility].

## 2025-05-19 NOTE — DISCHARGE INSTRUCTIONS
Continue home medications.  Follow-up with PCP outpatient.  Follow-up with palliative care outpatient.

## 2025-05-19 NOTE — CARE COORDINATION
5/19/25, 1:38 PM EDT    Patient goals/plan/ treatment preferences discussed by  and .  Patient goals/plan/ treatment preferences reviewed with patient/ family.  Patient/ family verbalize understanding of discharge plan and are in agreement with goal/plan/treatment preferences.  Understanding was demonstrated using the teach back method.  AVS provided by RN at time of discharge, which includes all necessary medical information pertaining to the patients current course of illness, treatment, post-discharge goals of care, and treatment preferences.     Services At/After Discharge: Home Health    Referral made to [facility] and they can accept patient.  They can access information in EPIC and will get needed information.  Order is in place.

## 2025-05-20 VITALS
HEART RATE: 91 BPM | SYSTOLIC BLOOD PRESSURE: 107 MMHG | BODY MASS INDEX: 49.34 KG/M2 | WEIGHT: 289 LBS | RESPIRATION RATE: 20 BRPM | TEMPERATURE: 98.5 F | HEIGHT: 64 IN | OXYGEN SATURATION: 99 % | DIASTOLIC BLOOD PRESSURE: 59 MMHG

## 2025-05-20 LAB
ANION GAP SERPL CALC-SCNC: 10 MEQ/L (ref 8–16)
BUN SERPL-MCNC: 13 MG/DL (ref 8–23)
CALCIUM SERPL-MCNC: 9.2 MG/DL (ref 8.6–10)
CHLORIDE SERPL-SCNC: 107 MEQ/L (ref 98–111)
CO2 SERPL-SCNC: 24 MEQ/L (ref 22–29)
CREAT SERPL-MCNC: 0.7 MG/DL (ref 0.5–0.9)
DEPRECATED RDW RBC AUTO: 44.9 FL (ref 35–45)
ERYTHROCYTE [DISTWIDTH] IN BLOOD BY AUTOMATED COUNT: 16.3 % (ref 11.5–14.5)
GFR SERPL CREATININE-BSD FRML MDRD: > 90 ML/MIN/1.73M2
GLUCOSE BLD STRIP.AUTO-MCNC: 123 MG/DL (ref 70–108)
GLUCOSE BLD STRIP.AUTO-MCNC: 99 MG/DL (ref 70–108)
GLUCOSE SERPL-MCNC: 106 MG/DL (ref 74–109)
HCT VFR BLD AUTO: 39.1 % (ref 37–47)
HGB BLD-MCNC: 12.1 GM/DL (ref 12–16)
MCH RBC QN AUTO: 23.7 PG (ref 26–33)
MCHC RBC AUTO-ENTMCNC: 30.9 GM/DL (ref 32.2–35.5)
MCV RBC AUTO: 76.5 FL (ref 81–99)
PLATELET # BLD AUTO: 296 THOU/MM3 (ref 130–400)
PMV BLD AUTO: 9.5 FL (ref 9.4–12.4)
POTASSIUM SERPL-SCNC: 3.6 MEQ/L (ref 3.5–5.2)
RBC # BLD AUTO: 5.11 MILL/MM3 (ref 4.2–5.4)
SODIUM SERPL-SCNC: 141 MEQ/L (ref 135–145)
WBC # BLD AUTO: 11.4 THOU/MM3 (ref 4.8–10.8)

## 2025-05-20 PROCEDURE — 2580000003 HC RX 258

## 2025-05-20 PROCEDURE — 85027 COMPLETE CBC AUTOMATED: CPT

## 2025-05-20 PROCEDURE — 82948 REAGENT STRIP/BLOOD GLUCOSE: CPT

## 2025-05-20 PROCEDURE — 6370000000 HC RX 637 (ALT 250 FOR IP)

## 2025-05-20 PROCEDURE — 80048 BASIC METABOLIC PNL TOTAL CA: CPT

## 2025-05-20 PROCEDURE — 6360000002 HC RX W HCPCS

## 2025-05-20 RX ORDER — HEPARIN 100 UNIT/ML
500 SYRINGE INTRAVENOUS PRN
Status: DISCONTINUED | OUTPATIENT
Start: 2025-05-20 | End: 2025-05-20 | Stop reason: HOSPADM

## 2025-05-20 RX ORDER — CARVEDILOL 6.25 MG/1
6.25 TABLET ORAL 2 TIMES DAILY
Qty: 60 TABLET | Refills: 0 | Status: SHIPPED | OUTPATIENT
Start: 2025-05-20

## 2025-05-20 RX ADMIN — ACETAMINOPHEN, ASPIRIN, CAFFEINE 1 TABLET: 250; 65; 250 TABLET, FILM COATED ORAL at 10:32

## 2025-05-20 RX ADMIN — MORPHINE SULFATE 4 MG: 4 INJECTION, SOLUTION INTRAMUSCULAR; INTRAVENOUS at 12:35

## 2025-05-20 RX ADMIN — MORPHINE SULFATE 4 MG: 4 INJECTION, SOLUTION INTRAMUSCULAR; INTRAVENOUS at 05:07

## 2025-05-20 RX ADMIN — MORPHINE SULFATE 15 MG: 15 TABLET ORAL at 01:20

## 2025-05-20 RX ADMIN — MORPHINE SULFATE 4 MG: 4 INJECTION, SOLUTION INTRAMUSCULAR; INTRAVENOUS at 08:02

## 2025-05-20 RX ADMIN — HEPARIN 500 UNITS: 100 SYRINGE at 12:40

## 2025-05-20 RX ADMIN — AMLODIPINE BESYLATE 10 MG: 10 TABLET ORAL at 10:32

## 2025-05-20 RX ADMIN — PANTOPRAZOLE SODIUM 40 MG: 40 TABLET, DELAYED RELEASE ORAL at 10:28

## 2025-05-20 RX ADMIN — FAMOTIDINE 20 MG: 20 TABLET, FILM COATED ORAL at 10:28

## 2025-05-20 RX ADMIN — SODIUM CHLORIDE: 0.9 INJECTION, SOLUTION INTRAVENOUS at 00:54

## 2025-05-20 RX ADMIN — CARVEDILOL 6.25 MG: 6.25 TABLET, FILM COATED ORAL at 10:00

## 2025-05-20 RX ADMIN — ESTRADIOL 0.5 MG: 1 TABLET ORAL at 10:33

## 2025-05-20 RX ADMIN — MORPHINE SULFATE 4 MG: 4 INJECTION, SOLUTION INTRAMUSCULAR; INTRAVENOUS at 10:27

## 2025-05-20 ASSESSMENT — PAIN SCALES - GENERAL
PAINLEVEL_OUTOF10: 9
PAINLEVEL_OUTOF10: 9
PAINLEVEL_OUTOF10: 4
PAINLEVEL_OUTOF10: 9
PAINLEVEL_OUTOF10: 7
PAINLEVEL_OUTOF10: 9

## 2025-05-20 ASSESSMENT — PAIN DESCRIPTION - ORIENTATION
ORIENTATION: MID

## 2025-05-20 ASSESSMENT — PAIN DESCRIPTION - DESCRIPTORS
DESCRIPTORS: ACHING

## 2025-05-20 ASSESSMENT — PAIN DESCRIPTION - LOCATION
LOCATION: BACK
LOCATION: GENERALIZED
LOCATION: HEAD
LOCATION: GENERALIZED
LOCATION: GENERALIZED

## 2025-05-20 ASSESSMENT — PAIN - FUNCTIONAL ASSESSMENT: PAIN_FUNCTIONAL_ASSESSMENT: PREVENTS OR INTERFERES SOME ACTIVE ACTIVITIES AND ADLS

## 2025-05-20 NOTE — DISCHARGE SUMMARY
Resident Discharge Summary (Hospitalist)      Patient Identification:   Samantha Nichols   : 1990  MRN: [id number]   Account: [id number]   Patient's PCP: Juan Dominguez DO    Admit Date: 5/15/2025   Discharge Date:   25    Admitting Physician: Darren Crouch DO  Discharge Physician: Luis Eduardo Rojo DO       Discharge Diagnoses:  Sickle cell anemia with vaso-occlusive pain, improved  Palliative care following, deferring outpatient pain regiment to their service  Follow-up with PCP outpatient  Hypernatremia, resolved  Chronic microcytic anemia  T2DM  Migraines  Hypertension  GERD  JOVANY, noncompliant with CPAP  Hyperlipidemia  Asthma, not in exacerbation      Hospital Course:   Samantha Nichols is a 34 y.o. female with PMHx sickle cell anemia, T2DM, chronic microcytic anemia, hypertension, GERD, JOVANY noncompliant with CPAP, hyperlipidemia, asthma admitted to [facility] on 5/15/2025 for stabbing pleuritic chest pain as well as diffuse pain in her arms back and lower legs.  Patient has known history of sickle cell anemia.  Patient was treated with aggressive fluid hydration and pain management. Patient did not have notable hypoxia and did not require any O2 supplementation. Low suspsicion for infectious process given no sputum production and afebrile.  Palliative care was consulted as she follows with them outpatient and they managed her pain while inpatient.  Pain much better controlled than when she arrived.  Patient overall stable for discharge 25.  Plan of care to manage pain medications on discharge.  Will follow-up with them outpatient.  Will follow-up with PCP outpatient as well.  At patient request, also provided home health nursing/aide to help with medication administration at home.      The patient was seen and examined on day of discharge and this discharge summary is in conjunction with any daily progress note from day of discharge.    The patient is discharged 
tablet  Commonly known as: LIORESAL  Take 1 tablet by mouth 3 times daily as needed (Pain)     carvedilol 6.25 MG tablet  Commonly known as: Coreg  Take 1 tablet by mouth 2 times daily     Depend Silhouette Briefs L/XL Misc  Use as needed for urinary and bowel incontinence     docusate sodium 100 MG capsule  Commonly known as: COLACE     E-Z Spacer Medina  1 Device by Does not apply route daily     estradiol 0.5 MG tablet  Commonly known as: ESTRACE     famotidine 20 MG tablet  Commonly known as: PEPCID  Take 1 tablet by mouth 2 times daily     fluticasone 50 MCG/ACT nasal spray  Commonly known as: FLONASE  2 sprays by Each Nostril route daily     FreeStyle Lancets Misc  1 each by Does not apply route daily Use to check blood sugar once daily. Freestyle Freedom Lite Dx: E11.9     glucose monitoring kit  1 kit by Does not apply route daily     lactulose 10 GM/15ML solution  Commonly known as: CHRONULAC  Take 30 mLs by mouth every 8 (eight) hours Until you have a bowel movement and then stop.     nicotine 21 MG/24HR  Commonly known as: NICODERM CQ  Place 1 patch onto the skin daily     * OneTouch Verio strip  Generic drug: blood glucose test strips     * blood glucose test strips  Test 1 time a day Freestyle freedom Lite test strips Dx: E11.9     pantoprazole 40 MG tablet  Commonly known as: PROTONIX     Plecanatide 3 MG Tabs     potassium chloride 20 MEQ extended release tablet  Commonly known as: KLOR-CON M  Take 1 tablet by mouth daily     promethazine 6.25 MG/5ML syrup  Commonly known as: PHENERGAN  Take 5 mLs by mouth 4 times daily as needed for Nausea     simethicone 80 MG chewable tablet  Commonly known as: MYLICON  Take 1 tablet by mouth 4 times daily as needed for Flatulence (gas)           * This list has 2 medication(s) that are the same as other medications prescribed for you. Read the directions carefully, and ask your doctor or other care provider to review them with you.                STOP taking these

## 2025-05-20 NOTE — PLAN OF CARE
Problem: Chronic Conditions and Co-morbidities  Goal: Patient's chronic conditions and co-morbidity symptoms are monitored and maintained or improved  Outcome: Adequate for Discharge     Problem: Pain  Goal: Verbalizes/displays adequate comfort level or baseline comfort level  Outcome: Adequate for Discharge     Problem: Safety - Adult  Goal: Free from fall injury  Outcome: Adequate for Discharge     Problem: Discharge Planning  Goal: Discharge to home or other facility with appropriate resources  Outcome: Adequate for Discharge     Problem: Skin/Tissue Integrity - Adult  Goal: Skin integrity remains intact  Outcome: Adequate for Discharge     Problem: Gastrointestinal - Adult  Goal: Minimal or absence of nausea and vomiting  Outcome: Adequate for Discharge  Goal: Maintains adequate nutritional intake  Outcome: Adequate for Discharge     Problem: Nutrition Deficit:  Goal: Optimize nutritional status  Outcome: Adequate for Discharge     
  Problem: Chronic Conditions and Co-morbidities  Goal: Patient's chronic conditions and co-morbidity symptoms are monitored and maintained or improved  Outcome: Progressing     Problem: Pain  Goal: Verbalizes/displays adequate comfort level or baseline comfort level  Outcome: Progressing     Problem: Safety - Adult  Goal: Free from fall injury  Outcome: Progressing     Problem: Discharge Planning  Goal: Discharge to home or other facility with appropriate resources  Outcome: Progressing     Problem: Skin/Tissue Integrity - Adult  Goal: Skin integrity remains intact  Outcome: Progressing     Problem: Gastrointestinal - Adult  Goal: Minimal or absence of nausea and vomiting  Outcome: Progressing  Goal: Maintains adequate nutritional intake  Outcome: Progressing       Care plan reviewed with patient, patient verbalized understanding of plan of care and contributed to goal setting.     
  Problem: Chronic Conditions and Co-morbidities  Goal: Patient's chronic conditions and co-morbidity symptoms are monitored and maintained or improved  Outcome: Progressing  Flowsheets (Taken 5/17/2025 1254)  Care Plan - Patient's Chronic Conditions and Co-Morbidity Symptoms are Monitored and Maintained or Improved: Monitor and assess patient's chronic conditions and comorbid symptoms for stability, deterioration, or improvement     Problem: Pain  Goal: Verbalizes/displays adequate comfort level or baseline comfort level  5/18/2025 0950 by Alexus Mckeon RN  Outcome: Progressing  Flowsheets (Taken 5/18/2025 0158 by Alisa Tran RN)  Verbalizes/displays adequate comfort level or baseline comfort level:   Encourage patient to monitor pain and request assistance   Assess pain using appropriate pain scale   Administer analgesics based on type and severity of pain and evaluate response   Implement non-pharmacological measures as appropriate and evaluate response   Consider cultural and social influences on pain and pain management  5/18/2025 0158 by Alisa Tran RN  Outcome: Progressing  Flowsheets (Taken 5/18/2025 0158)  Verbalizes/displays adequate comfort level or baseline comfort level:   Encourage patient to monitor pain and request assistance   Assess pain using appropriate pain scale   Administer analgesics based on type and severity of pain and evaluate response   Implement non-pharmacological measures as appropriate and evaluate response   Consider cultural and social influences on pain and pain management  Note: Care plan reviewed with patient.       Problem: Safety - Adult  Goal: Free from fall injury  Outcome: Progressing  Flowsheets (Taken 5/17/2025 1254)  Free From Fall Injury: Instruct family/caregiver on patient safety     Problem: Discharge Planning  Goal: Discharge to home or other facility with appropriate resources  Outcome: Progressing  Flowsheets (Taken 5/17/2025 1254)  Discharge to home 
  Problem: Pain  Goal: Verbalizes/displays adequate comfort level or baseline comfort level  5/16/2025 2038 by Alisa Tran, RN  Outcome: Progressing  Flowsheets (Taken 5/16/2025 2038)  Verbalizes/displays adequate comfort level or baseline comfort level:   Encourage patient to monitor pain and request assistance   Assess pain using appropriate pain scale   Administer analgesics based on type and severity of pain and evaluate response   Implement non-pharmacological measures as appropriate and evaluate response   Consider cultural and social influences on pain and pain management  Note: Care plan reviewed with patient.  5/16/2025 1007 by Alma Rosa Zelaya, RN  Outcome: Progressing     
  Problem: Pain  Goal: Verbalizes/displays adequate comfort level or baseline comfort level  5/18/2025 0158 by Alisa Tran RN  Outcome: Progressing  Flowsheets (Taken 5/18/2025 0158)  Verbalizes/displays adequate comfort level or baseline comfort level:   Encourage patient to monitor pain and request assistance   Assess pain using appropriate pain scale   Administer analgesics based on type and severity of pain and evaluate response   Implement non-pharmacological measures as appropriate and evaluate response   Consider cultural and social influences on pain and pain management  Note: Care plan reviewed with patient.    5/17/2025 1254 by Alexus Mckeon RN  Outcome: Progressing  Flowsheets (Taken 5/16/2025 2038 by Alisa Tran, RN)  Verbalizes/displays adequate comfort level or baseline comfort level:   Encourage patient to monitor pain and request assistance   Assess pain using appropriate pain scale   Administer analgesics based on type and severity of pain and evaluate response   Implement non-pharmacological measures as appropriate and evaluate response   Consider cultural and social influences on pain and pain management     Problem: Safety - Adult  Goal: Free from fall injury  5/17/2025 1254 by Alexus Mckeon RN  Outcome: Progressing  Flowsheets (Taken 5/17/2025 1254)  Free From Fall Injury: Instruct family/caregiver on patient safety     
Consult Received.  Patient wants , working to set up an agency   
(Taken 5/18/2025 2302)  Skin Integrity Remains Intact: Monitor for areas of redness and/or skin breakdown     Problem: Gastrointestinal - Adult  Goal: Minimal or absence of nausea and vomiting  5/18/2025 2302 by Luersman, Kristen, RN  Outcome: Progressing  Flowsheets (Taken 5/18/2025 2302)  Minimal or absence of nausea and vomiting: Administer ordered antiemetic medications as needed     Problem: Gastrointestinal - Adult  Goal: Maintains adequate nutritional intake  5/18/2025 2302 by Luersman, Kristen, RN  Outcome: Progressing  Flowsheets (Taken 5/18/2025 2302)  Maintains adequate nutritional intake: Monitor percentage of each meal consumed   Care plan reviewed with patient.  Patient verbalized understanding of the plan of care and contribute to goal setting.    
Progressing  Flowsheets (Taken 5/17/2025 1254)  Maintains adequate nutritional intake:   Monitor percentage of each meal consumed   Identify factors contributing to decreased intake, treat as appropriate

## 2025-05-20 NOTE — PROGRESS NOTES
Provider Progress Note        Patient:   Samantha Nichols  YOB: 1990  Age:  34 y.o.  Room:  [facility]03/003-A  MRN:  [id number]   Acct: [id number]  PCP: Juan Dominguez DO    Date of Admission:  5/15/2025  2:20 PM  Date of Service:  5/19/2025    Reason for Consult: Symptom Management and Continuity of Care    History Obtained From:-  Patient, Electronic Medical Record, and Patient's primary nurse             Subjective   Samantha Nichols was seen in their room today for follow up with the palliative care team. There was not family present at the bedside. Patient is complaining of pain. Patient denies shortness of breath, insomnia, fatigue, drowsiness, decreased appetite, nausea, vomiting, constipation, diarrhea, depression, and anxiety. They have No Medications for scheduled symptom relief. From 8 AM yesterday to 8 AM today, they have used morphine IV 2 mg once, 4 mg 8 times for as needed symptom relief.  Their comfort medications are working well to control their symptoms.  They did get a good night sleep last night. The patient is eating or receiving tube feeds. They have had a bowel movement in the last 24 hours.       Objective   Vitals:-    /82   Pulse 82   Temp 98.5 °F (36.9 °C) (Oral)   Resp 16   Ht 1.626 m (5' 4\")   Wt 131.1 kg (289 lb)   LMP 07/11/2015   SpO2 94%   BMI 49.61 kg/m²     Physical Exam  Constitutional:       General: She is not in acute distress.     Appearance: She is ill-appearing.   HENT:      Head: Normocephalic and atraumatic.      Right Ear: External ear normal.      Left Ear: External ear normal.      Nose: No rhinorrhea.   Eyes:      General:         Right eye: No discharge.         Left eye: No discharge.   Cardiovascular:      Rate and Rhythm: Normal rate.   Pulmonary:      Effort: Pulmonary effort is normal. No respiratory distress.   Musculoskeletal:      Cervical back: Neck supple.   Psychiatric:         Mood and Affect: Mood 
    Hospitalist Progress Note  Internal Medicine Resident      Patient: Samantha Nichols 34 y.o. female      Unit/Bed: 5K-03/003-A    Admit Date: 5/15/2025      ASSESSMENT AND PLAN  Active Problems  Sickle cell anemia with vaso-occlusive pain: Ongoing since 5/9.  Afebrile, hemodynamically stable, no chest pain or dyspnea on presentation.  Low concern currently for acute chest syndrome.  5/16 patient request transition from Dilaudid to morphine due to concern of Dilaudid exacerbating headache.  Palliative care following   cc/h x 24 hours  Morphine pain panel  STAT CXR if febrile, hypotensive, chest pain/SOB  Incentive spirometer  Okay to give Zofran or Compazine - only allergic reaction is hives/itching.  Administer with   Migraines: No improvement with sumatriptan  Excedrin if needed, order 25 mg Benadryl if patient allergi  Resolved Problems  Hypernatremia  Chronic Conditions (reviewed and stable unless otherwise stated)  Chronic microcytic anemia: 2/2 sickle cell anemia.   T2DM: 12/2024 A1c 6.0%.  LDSSI, hypoglycemia protocol  HTN: Poorly controlled.  Continue amlodipine, carvedilol  GERD, ? Gastroparesis: Has not had formal gastric emptying study.  Is currently being evaluated by [facility] possible PEG tube placement.  Has had PEG tube placement in the past due to recurrent nausea/vomiting/decreased p.o. intake.  Continue famotidine, PPI, plecanatide  JOVANY: Noncompliant with CPAP.  Not a candidate for INSPIRE due to morbid obesity.  Family history of premature CAD: Cath 5/2021 no significant CAD seen.  HLD  Asthma    LDA: []CVC / []PICC / []Midline / []Xiao / []Drains / [x]Mediport / []None  Antibiotics: None  Steroids: None  Labs (still needed?): []Yes / []No  IVF (still needed?): [x]Yes / []No    Level of care: []Step Down / [x]Med-Surg  Bed Status: [x]Inpatient / []Observation  Telemetry: [x]Yes / []No  PT/OT: []Yes / [x]No    DVT Prophylaxis: [x] Lovenox / [] Heparin / [] SCDs / [] Already on Systemic 
    Hospitalist Progress Note  Internal Medicine Resident      Patient: Samantha Nichols 34 y.o. female      Unit/Bed: 5K-03/003-A    Admit Date: 5/15/2025      ASSESSMENT AND PLAN  Active Problems  Sickle cell anemia with vaso-occlusive pain: Ongoing since 5/9.  Afebrile, hemodynamically stable, no chest pain or dyspnea on presentation.  Low concern currently for acute chest syndrome.  5/16 patient request transition from Dilaudid to morphine due to concern of Dilaudid exacerbating headache.  Palliative care following  Morphine pain panel  STAT CXR if febrile, hypotensive, chest pain/SOB  Incentive spirometer  Okay to give Zofran or Compazine - only allergic reaction is hives/itching.  Administer with Benadryl  Anticipate DC on 5/19 after discussion with palliative care for pain control regimen  Migraines: No improvement with sumatriptan  Excedrin if needed, order 25 mg Benadryl if patient allergy  Resolved Problems  Hypernatremia  Chronic Conditions (reviewed and stable unless otherwise stated)  Chronic microcytic anemia: 2/2 sickle cell anemia.   T2DM: 12/2024 A1c 6.0%.  LDSSI, hypoglycemia protocol  HTN: Poorly controlled.  Continue amlodipine, carvedilol  GERD, ? Gastroparesis: Has not had formal gastric emptying study.  Is currently being evaluated by [facility] possible PEG tube placement.  Has had PEG tube placement in the past due to recurrent nausea/vomiting/decreased p.o. intake.  Continue famotidine, PPI, plecanatide  JOVANY: Noncompliant with CPAP.  Not a candidate for INSPIRE due to morbid obesity.  Family history of premature CAD: Cath 5/2021 no significant CAD seen.  HLD  Asthma    LDA: []CVC / []PICC / []Midline / []Xiao / []Drains / [x]Mediport / []None  Antibiotics: None  Steroids: None  Labs (still needed?): []Yes / []No  IVF (still needed?): [x]Yes / []No    Level of care: []Step Down / [x]Med-Surg  Bed Status: [x]Inpatient / []Observation  Telemetry: [x]Yes / []No  PT/OT: []Yes / [x]No    DVT 
Comprehensive Nutrition Assessment    Type and Reason for Visit:  Initial, Positive nutrition screen (Weight Loss and Decreased Appetite)    Nutrition Recommendations/Plan:   Pt has seen outpatient RD throughout the years for gastroparesis MNT and tube feeding management. Pt has not been following a gastroparesis friendly diet this admit. Weight appears stable despite report of very little intake per pt report. Recent appointment with [facility] RD outpatient on 5/13 - diet report at that time does not match what pt has been ordering this admission (see below). Pt very prompt on asking for pain medications upon this RD's visit - benadryl also being given.   Pt has appointment at [facility] on 5/22 for potential PEG replacement. Noted to also be seeing RD at this appointment. Question if pt needs enteral feedings at this time.   Recommend modifying diet to low fat  Continue ONS: Glucerna (BID) - states sometimes she can tolerate and other times she cannot.  Will monitor need for additional nutrition interventions as appropriate/able     Malnutrition Assessment:  Malnutrition Status:  At risk for malnutrition (05/19/25 1213)    Context:  Acute Illness on Chronic Illness   Findings of the 6 clinical characteristics of malnutrition:  Energy Intake:  Mild decrease in energy intake  Weight Loss:  No weight loss     Body Fat Loss:  No body fat loss     Muscle Mass Loss:  No muscle mass loss    Fluid Accumulation:  Mild Extremities   Strength:  Not Performed    Nutrition Assessment:     Pt. nutritionally compromised AEB varied PO intake and reported GI intolerance and reported weight loss.  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 
Patient discharged at this time. [facility] Discharge instructions, medication changes and follow up appointments explained at this time. All questions answered at this time. AVS given to patient and reviewed with this RN. All patient belongings returned. Chart broken down and placed in yellow bin.     Patient walked to lobby with her belongings by this RN. [employer] is driving her home.       
Patient requested her Coreg be filled at our pharmacy instead of [facility] because she is out and has no way to pick it up. Outpatient pharmacy is agreeable and will pull script from [facility] to fill it.   
Pt c/o nausea, unable to take morning medications. Reached out to hospitalist for remedy. Confirmed with patient on allergies listed to zofran, compazine and phenergan. Pt denied any allergy to phenergan. Contacted hospitalist, pt denies allergy to phenergan.     
Spiritual Health History and Assessment/Progress Note  [facility]    (P) Attempted Encounter,  ,  ,      Name: Samantha Nichols MRN: [id number]    Age: 34 y.o.     Sex: female   Language: English   Worship: Temple   Sickle-cell disease with pain (HCC)     Date: 5/20/2025            Total Time Calculated: (P) 5 min              Spiritual Assessment continued in [facility] ONC MED 5K        Referral/Consult From: (P) Rounding   Encounter Overview/Reason: (P) Attempted Encounter  Service Provided For: (P) Patient    Kortney, Belief, Meaning:   Patient unable to assess at this time  Family/Friends No family/friends present      Importance and Influence:  Patient unable to assess at this time  Family/Friends No family/friends present    Community:  Patient feels well-supported. Support system includes: Friends  Family/Friends feel well-supported. Support system includes: Extended family    Assessment and Plan of Care:   During my encounter with the 34 yr old Palliative Care patient, I attempted to visit with the pt on 5K. The patient appears to be resting (not responsive/resting) now and I didn't want to disturb the patient. I or another  will attempt to visit the patient or the family at another time. The pt was admitted due to sickle cell disease with pain.     Patient Interventions include: Other: Prayer  Family/Friends Interventions include: No family/friends present    Patient Plan of Care: Spiritual Care available upon further referral  Family/Friends Plan of Care: Spiritual Care available upon further referral    Electronically signed by BIA Trivedi on 5/20/2025 at 2:21 PM   
Spiritual Health History and Assessment/Progress Note  [facility]    Attempted Encounter,  ,  ,      Name: Samantha Nichols MRN: [id number]    Age: 34 y.o.     Sex: female   Language: English   [employer]ybeterian: Orthodox   Sickle-cell disease with pain (HCC)     Date: 5/19/2025            Total Time Calculated: (P) 5 min              Spiritual Assessment began in [facility] ONC MED 5K        Referral/Consult From: Rounding   Encounter Overview/Reason: Attempted Encounter  Service Provided For: Patient    Kortney, Belief, Meaning:   Patient unable to assess at this time  Family/Friends No family/friends present      Importance and Influence:  Patient unable to assess at this time  Family/Friends No family/friends present    Community:  Patient feels well-supported. Support system includes: Extended family  Family/Friends feel well-supported. Support system includes: Extended family    Assessment and Plan of Care:   During my encounter with the 34 yr old patient, I attempted to visit with the pt on 5K. The patient appears to be resting (not responsive/resting) now and I didn't want to disturb the patient. I or another  will attempt to visit the patient or the family at another time. The pt was admitted due to sickle-cell disease with pain.     Patient Interventions include: Other: Prayer  Family/Friends Interventions include: No family/friends present    Patient Plan of Care: Spiritual Care available upon further referral  Family/Friends Plan of Care: Spiritual Care available upon further referral    Electronically signed by BIA Trivedi on 5/19/2025 at 12:14 PM   
TPA withdrawn from mediport with waste blood per protocol and discarded.  Mediport flushed with normal saline.  
Home  [] Inpatient Rehab  [] Psychiatric Unit  [] SNF  [] Long Term Care Facility  [] Other-    Chief Complaint: Sickle cell pain    Initial H and P/Hospital Course:-    \"Samantha Nichols is a 34 y.o. female with PMH sickle cell anemia, T2DM, JOVANY, morbid obesity who presents to [facility] on 5/15/2025 with sudden onset generalized body pain ongoing since 5/9.  She was seen at outpatient visit prior to ED presentation and was noted to have sharp stabbing pleuritic chest pain, radiating to her left arm, back, lower leg.  Reports recently going on vacation and not having any episodes of pain crisis.  Apparently patient attempted to contact Dr. Fairchild palliative care and was unable to be seen, thus presented to the ED.  Worse with movement, palpation, nothing makes it better.  On initial presentation, no change in appetite, urination, bowel movement, no fevers, chills, N/V. in ED patient received morphine, 3 mg Dilaudid, 1 L NS bolus.  Palliative care consulted. \"    Subjective (past 24 hours):   Seen and evaluated bedside.  Was going to discharge today, however there was an insurance issue with the oral morphine prescription.  Pharmacy unable to fill this today.  Will monitor patient overnight.  Suspect discharge tomorrow morning.      Past medical history, family history, social history and allergies reviewed again and is unchanged since admission.    ROS (12 point review of systems completed.  Pertinent positives noted. Otherwise ROS is negative)     Medications:  Reviewed    Infusion Medications    dextrose      sodium chloride       Scheduled Medications    insulin lispro  0-4 Units SubCUTAneous 4x Daily AC & HS    amLODIPine  10 mg Oral Daily    carvedilol  6.25 mg Oral BID    estradiol  0.5 mg Oral Daily    famotidine  20 mg Oral BID    pantoprazole  40 mg Oral BID    Plecanatide  3 mg Oral Daily    sodium chloride flush  5-40 mL IntraVENous 2 times per day    enoxaparin  30 mg SubCUTAneous Q12H     PRN Meds: 
oral replacement **OR** potassium chloride, magnesium sulfate, polyethylene glycol, hydrALAZINE    Exam:  BP (!) 176/101   Pulse 69   Temp 97.4 °F (36.3 °C) (Oral)   Resp 18   Ht 1.626 m (5' 4\")   Wt 131.1 kg (289 lb)   LMP 07/11/2015   SpO2 100%   BMI 49.61 kg/m²   Physical Exam  Constitutional:       General: She is in acute distress (Secondary to pain).      Appearance: She is obese.   HENT:      Head: Normocephalic and atraumatic.      Comments: Tender palpation of head, worse on left parietal/occipital     Mouth/Throat:      Mouth: Mucous membranes are moist.      Pharynx: Oropharynx is clear.   Eyes:      Conjunctiva/sclera: Conjunctivae normal.   Cardiovascular:      Rate and Rhythm: Normal rate and regular rhythm.      Pulses: Normal pulses.      Heart sounds: Normal heart sounds.   Pulmonary:      Effort: Pulmonary effort is normal.      Breath sounds: Normal breath sounds. No wheezing or rales.   Abdominal:      General: Abdomen is flat.      Palpations: Abdomen is soft.      Tenderness: There is no abdominal tenderness.   Musculoskeletal:         General: Swelling and tenderness present.      Right lower leg: No edema.      Left lower leg: No edema.   Skin:     General: Skin is warm and dry.      Capillary Refill: Capillary refill takes less than 2 seconds.   Neurological:      General: No focal deficit present.      Mental Status: She is alert and oriented to person, place, and time. Mental status is at baseline.   Psychiatric:         Mood and Affect: Mood normal.         Behavior: Behavior normal.         Thought Content: Thought content normal.         Judgment: Judgment normal.         Labs/Radiology: See chart or assessment above.     Electronically signed by Tadeo LIZ DO on 5/16/2025 at 4:52 PM  Case was discussed with Attending, Darren Pichardo DO.

## 2025-05-20 NOTE — CARE COORDINATION
5/20/25, 11:05 AM EDT    Patient goals/plan/ treatment preferences discussed by  and .  Patient goals/plan/ treatment preferences reviewed with patient/ family.  Patient/ family verbalize understanding of discharge plan and are in agreement with goal/plan/treatment preferences.  Understanding was demonstrated using the teach back method.  AVS provided by RN at time of discharge, which includes all necessary medical information pertaining to the patients current course of illness, treatment, post-discharge goals of care, and treatment preferences.     Services At/After Discharge: [facility] Health [facility]    Call from Fanny with [facility] as pt did not dc last night. NEGIN let her know anticipating dc today, however would get back to her on this.     Call to back to Fanny, let her know planning for discharge today. They will follow up with pt at home.       1:25 PM EDT  Call from Fanny with [facility], reports she just found out they are not in network with pt's insurance.     NEGIN did call pt as pt has left the hospital, left a voicemail for a call back.

## 2025-05-22 ENCOUNTER — TELEPHONE (OUTPATIENT)
Dept: INTERNAL MEDICINE CLINIC | Age: 35
End: 2025-05-22

## 2025-05-22 NOTE — TELEPHONE ENCOUNTER
Care Transitions Initial Follow Up Call    Outreach made within 2 business days of discharge: Yes    Patient: Samantha Nichols Patient : 1990   MRN: [id number]  Reason for Admission: Sickle Cell Crisis  Discharge Date: 25       Spoke with: Left voicemail to call office back    Discharge department/facility: [facility]    TCM Interactive Patient Contact:  Was patient able to fill all prescriptions:   Was patient instructed to bring all medications to the follow-up visit:   Is patient taking all medications as directed in the discharge summary?   Does patient understand their discharge instructions:   Does patient have questions or concerns that need addressed prior to 7-14 day follow up office visit:     Additional needs identified to be addressed with provider               Scheduled appointment with PCP within 7-14 days    Follow Up  Future Appointments   Date Time Provider Department Center   2025  2:30 PM Tadeo Coburn DO [facility] Physic BS [facility] DEP   2025  2:30 PM STR CT IMAGING RM1  OP [facility] OUT EXP STR Rad/Card   2025  2:00 PM STR EXAM ROOM 2 INFUSION [facility] OP NURS [place] HOD   2025  2:45 PM Simon Lowery MD N ENT [facility] [place]   2025  1:30 PM Bradly Fairchild MD N [facility] PALLI [facility] [place]   2025  2:00 PM Giuseppe Wilson MD [facility] Physic BS [facility] DEP   2025  2:00 PM Sully Ramos RD, LD [facility] Physic BS [facility] DEP   2026 10:00 AM Ramona You MD N [facility]PX Heart [facility] [place]       Colleen Sharma MA

## 2025-05-27 ENCOUNTER — TELEPHONE (OUTPATIENT)
Dept: PALLATIVE CARE | Age: 35
End: 2025-05-27

## 2025-05-27 ENCOUNTER — OFFICE VISIT (OUTPATIENT)
Dept: INTERNAL MEDICINE CLINIC | Age: 35
End: 2025-05-27
Payer: COMMERCIAL

## 2025-05-27 VITALS
BODY MASS INDEX: 50.02 KG/M2 | SYSTOLIC BLOOD PRESSURE: 165 MMHG | HEIGHT: 64 IN | HEART RATE: 79 BPM | DIASTOLIC BLOOD PRESSURE: 111 MMHG | OXYGEN SATURATION: 99 % | WEIGHT: 293 LBS

## 2025-05-27 DIAGNOSIS — K31.84 GASTROPARESIS DUE TO DM (HCC): ICD-10-CM

## 2025-05-27 DIAGNOSIS — D57.00 SICKLE CELL ANEMIA WITH PAIN (HCC): Primary | ICD-10-CM

## 2025-05-27 DIAGNOSIS — M25.50 ARTHRALGIA, UNSPECIFIED JOINT: ICD-10-CM

## 2025-05-27 DIAGNOSIS — M79.10 MUSCLE ACHE: ICD-10-CM

## 2025-05-27 DIAGNOSIS — Z09 HOSPITAL DISCHARGE FOLLOW-UP: Primary | ICD-10-CM

## 2025-05-27 DIAGNOSIS — Z51.5 PALLIATIVE CARE PATIENT: ICD-10-CM

## 2025-05-27 DIAGNOSIS — E11.43 GASTROPARESIS DUE TO DM (HCC): ICD-10-CM

## 2025-05-27 DIAGNOSIS — R06.02 SHORTNESS OF BREATH: ICD-10-CM

## 2025-05-27 DIAGNOSIS — E66.01 MORBID OBESITY (HCC): ICD-10-CM

## 2025-05-27 DIAGNOSIS — R25.2 MUSCLE CRAMPING: ICD-10-CM

## 2025-05-27 DIAGNOSIS — G89.4 CHRONIC PAIN SYNDROME: ICD-10-CM

## 2025-05-27 DIAGNOSIS — G89.29 OTHER CHRONIC PAIN: ICD-10-CM

## 2025-05-27 PROBLEM — D57.3 SICKLE CELL TRAIT: Status: RESOLVED | Noted: 2023-11-27 | Resolved: 2025-05-27

## 2025-05-27 PROCEDURE — 3080F DIAST BP >= 90 MM HG: CPT

## 2025-05-27 PROCEDURE — 2022F DILAT RTA XM EVC RTNOPTHY: CPT

## 2025-05-27 PROCEDURE — G8427 DOCREV CUR MEDS BY ELIG CLIN: HCPCS

## 2025-05-27 PROCEDURE — 3077F SYST BP >= 140 MM HG: CPT

## 2025-05-27 PROCEDURE — G8417 CALC BMI ABV UP PARAM F/U: HCPCS

## 2025-05-27 PROCEDURE — 1111F DSCHRG MED/CURRENT MED MERGE: CPT

## 2025-05-27 PROCEDURE — 3044F HG A1C LEVEL LT 7.0%: CPT

## 2025-05-27 PROCEDURE — 1036F TOBACCO NON-USER: CPT

## 2025-05-27 PROCEDURE — 99213 OFFICE O/P EST LOW 20 MIN: CPT

## 2025-05-27 RX ORDER — LIDOCAINE 4 G/G
1 PATCH TOPICAL DAILY
Qty: 30 PATCH | Refills: 0 | Status: SHIPPED | OUTPATIENT
Start: 2025-05-27 | End: 2025-06-26

## 2025-05-27 RX ORDER — LACTULOSE 10 G/15ML
30 SOLUTION ORAL EVERY 8 HOURS
Qty: 120 ML | Refills: 0 | Status: SHIPPED | OUTPATIENT
Start: 2025-05-27

## 2025-05-27 RX ORDER — MORPHINE SULFATE 15 MG/1
15 TABLET ORAL EVERY 4 HOURS PRN
COMMUNITY
Start: 2025-05-20

## 2025-05-27 RX ORDER — OLANZAPINE 5 MG/1
5 TABLET, ORALLY DISINTEGRATING ORAL NIGHTLY
COMMUNITY
Start: 2025-05-22 | End: 2025-06-21

## 2025-05-27 RX ORDER — MELOXICAM 7.5 MG/1
7.5 TABLET ORAL DAILY PRN
Qty: 30 TABLET | Refills: 0 | Status: SHIPPED | OUTPATIENT
Start: 2025-05-27 | End: 2025-06-26

## 2025-05-27 RX ORDER — METHYLPREDNISOLONE 4 MG/1
4 TABLET ORAL SEE ADMIN INSTRUCTIONS
COMMUNITY
Start: 2025-03-31

## 2025-05-27 RX ORDER — SUZETRIGINE 50 MG/1
50 TABLET, FILM COATED ORAL DAILY
Qty: 30 TABLET | Refills: 0 | Status: SHIPPED | OUTPATIENT
Start: 2025-05-27 | End: 2025-06-26

## 2025-05-27 ASSESSMENT — ENCOUNTER SYMPTOMS
WHEEZING: 0
ABDOMINAL PAIN: 0
SHORTNESS OF BREATH: 1

## 2025-05-27 NOTE — TELEPHONE ENCOUNTER
Patient reports she is having increased pain. Patient reports the morphine is working but after taking the morphine, it only lasts about 2 hours.

## 2025-05-27 NOTE — PATIENT INSTRUCTIONS
Google obesity and chronic pain  Take mobic daily as needed, lidocaine patches, scheduled tylenol every 8 hours  Will try journavx to help out with pain  Follow up with palliative care

## 2025-05-29 ENCOUNTER — HOSPITAL ENCOUNTER (OUTPATIENT)
Dept: CT IMAGING | Age: 35
Discharge: HOME | End: 2025-05-29
Attending: REGISTERED NURSE
Payer: COMMERCIAL

## 2025-05-29 ENCOUNTER — HOSPITAL ENCOUNTER (OUTPATIENT)
Dept: NURSING | Age: 35
Discharge: HOME | End: 2025-05-29

## 2025-05-29 DIAGNOSIS — G47.33 OSA TREATED WITH BIPAP: ICD-10-CM

## 2025-05-29 DIAGNOSIS — R49.0 HOARSENESS OF VOICE: ICD-10-CM

## 2025-05-29 DIAGNOSIS — K31.84 GASTROPARESIS: ICD-10-CM

## 2025-05-29 DIAGNOSIS — D50.9 IRON DEFICIENCY ANEMIA, UNSPECIFIED IRON DEFICIENCY ANEMIA TYPE: Primary | ICD-10-CM

## 2025-05-29 DIAGNOSIS — K21.9 GASTROESOPHAGEAL REFLUX DISEASE, UNSPECIFIED WHETHER ESOPHAGITIS PRESENT: ICD-10-CM

## 2025-05-29 PROCEDURE — 70491 CT SOFT TISSUE NECK W/DYE: CPT

## 2025-05-29 PROCEDURE — 6360000004 HC RX CONTRAST MEDICATION: Performed by: REGISTERED NURSE

## 2025-05-29 RX ORDER — IOPAMIDOL 755 MG/ML
75 INJECTION, SOLUTION INTRAVASCULAR
Status: COMPLETED | OUTPATIENT
Start: 2025-05-29 | End: 2025-05-29

## 2025-05-29 RX ORDER — SODIUM CHLORIDE 0.9 % (FLUSH) 0.9 %
5-40 SYRINGE (ML) INJECTION PRN
Status: DISCONTINUED | OUTPATIENT
Start: 2025-05-29 | End: 2025-05-30 | Stop reason: HOSPADM

## 2025-05-29 RX ORDER — MORPHINE SULFATE 30 MG/1
30 TABLET, FILM COATED, EXTENDED RELEASE ORAL 2 TIMES DAILY
Qty: 60 TABLET | Refills: 0 | Status: SHIPPED | OUTPATIENT
Start: 2025-05-29 | End: 2025-06-28

## 2025-05-29 RX ORDER — SODIUM CHLORIDE 0.9 % (FLUSH) 0.9 %
5-40 SYRINGE (ML) INJECTION PRN
OUTPATIENT
Start: 2025-05-29

## 2025-05-29 RX ORDER — HEPARIN 100 UNIT/ML
500 SYRINGE INTRAVENOUS PRN
Status: DISCONTINUED | OUTPATIENT
Start: 2025-05-29 | End: 2025-05-30 | Stop reason: HOSPADM

## 2025-05-29 RX ORDER — HEPARIN 100 UNIT/ML
500 SYRINGE INTRAVENOUS PRN
OUTPATIENT
Start: 2025-05-29

## 2025-05-29 RX ORDER — SODIUM CHLORIDE 9 MG/ML
25 INJECTION, SOLUTION INTRAVENOUS PRN
OUTPATIENT
Start: 2025-05-29

## 2025-05-29 RX ADMIN — IOPAMIDOL 75 ML: 755 INJECTION, SOLUTION INTRAVENOUS at 14:51

## 2025-05-29 NOTE — TELEPHONE ENCOUNTER
Patient reports she is taking Morphine 15 mg 1 tablet every 3 hours.      Pharmacy: [facility]

## 2025-06-01 ENCOUNTER — RESULTS FOLLOW-UP (OUTPATIENT)
Dept: ENT CLINIC | Age: 35
End: 2025-06-01

## 2025-06-02 ENCOUNTER — HOSPITAL ENCOUNTER (OUTPATIENT)
Dept: NURSING | Age: 35
Discharge: HOME | End: 2025-06-02
Payer: COMMERCIAL

## 2025-06-02 VITALS
DIASTOLIC BLOOD PRESSURE: 76 MMHG | OXYGEN SATURATION: 97 % | HEART RATE: 88 BPM | SYSTOLIC BLOOD PRESSURE: 132 MMHG | TEMPERATURE: 97.9 F | RESPIRATION RATE: 22 BRPM

## 2025-06-02 DIAGNOSIS — E66.01 MORBID OBESITY (HCC): ICD-10-CM

## 2025-06-02 DIAGNOSIS — G89.4 CHRONIC PAIN SYNDROME: ICD-10-CM

## 2025-06-02 DIAGNOSIS — M79.10 MUSCLE ACHE: ICD-10-CM

## 2025-06-02 DIAGNOSIS — D50.9 IRON DEFICIENCY ANEMIA, UNSPECIFIED IRON DEFICIENCY ANEMIA TYPE: Primary | ICD-10-CM

## 2025-06-02 DIAGNOSIS — R25.2 MUSCLE CRAMPING: ICD-10-CM

## 2025-06-02 LAB
CRP SERPL-MCNC: 1.33 MG/DL (ref 0–0.5)
ERYTHROCYTE [SEDIMENTATION RATE] IN BLOOD BY WESTERGREN METHOD: 20 MM/HR (ref 0–20)
FIBRINOGEN PPP-MCNC: 472 MG/100ML (ref 155–475)

## 2025-06-02 PROCEDURE — 85651 RBC SED RATE NONAUTOMATED: CPT

## 2025-06-02 PROCEDURE — 86140 C-REACTIVE PROTEIN: CPT

## 2025-06-02 PROCEDURE — 2500000003 HC RX 250 WO HCPCS

## 2025-06-02 PROCEDURE — 83520 IMMUNOASSAY QUANT NOS NONAB: CPT

## 2025-06-02 PROCEDURE — 36591 DRAW BLOOD OFF VENOUS DEVICE: CPT

## 2025-06-02 PROCEDURE — 83529 ASAY OF INTERLEUKIN-6 (IL-6): CPT

## 2025-06-02 PROCEDURE — 99212 OFFICE O/P EST SF 10 MIN: CPT

## 2025-06-02 PROCEDURE — 84681 ASSAY OF C-PEPTIDE: CPT

## 2025-06-02 PROCEDURE — 85384 FIBRINOGEN ACTIVITY: CPT

## 2025-06-02 PROCEDURE — 6360000002 HC RX W HCPCS

## 2025-06-02 RX ORDER — HEPARIN 100 UNIT/ML
500 SYRINGE INTRAVENOUS PRN
Status: DISCONTINUED | OUTPATIENT
Start: 2025-06-02 | End: 2025-06-03 | Stop reason: HOSPADM

## 2025-06-02 RX ORDER — SODIUM CHLORIDE 0.9 % (FLUSH) 0.9 %
5-40 SYRINGE (ML) INJECTION PRN
Status: DISCONTINUED | OUTPATIENT
Start: 2025-06-02 | End: 2025-06-03 | Stop reason: HOSPADM

## 2025-06-02 RX ORDER — HEPARIN 100 UNIT/ML
500 SYRINGE INTRAVENOUS PRN
OUTPATIENT
Start: 2025-06-02

## 2025-06-02 RX ORDER — SODIUM CHLORIDE 0.9 % (FLUSH) 0.9 %
5-40 SYRINGE (ML) INJECTION PRN
OUTPATIENT
Start: 2025-06-02

## 2025-06-02 RX ORDER — SODIUM CHLORIDE 9 MG/ML
25 INJECTION, SOLUTION INTRAVENOUS PRN
OUTPATIENT
Start: 2025-06-02

## 2025-06-02 RX ADMIN — SODIUM CHLORIDE, PRESERVATIVE FREE 10 ML: 5 INJECTION INTRAVENOUS at 14:16

## 2025-06-02 RX ADMIN — HEPARIN 500 UNITS: 100 SYRINGE at 14:16

## 2025-06-02 ASSESSMENT — PAIN SCALES - GENERAL: PAINLEVEL_OUTOF10: 7

## 2025-06-02 NOTE — PROGRESS NOTES
1400 Patient ambulatory to OPN for Port flush and blood work. Patient verbalizes understanding. PT RIGHTS AND RESPONSIBILITIES OFFERED TO PT.    1416 Mediport accessed. Labs obtained. Patient tolerated well.     1420 AVS reviewed with patient. Patient verbalizes understanding. Patient left ambulatory to discharge lobby.         _M___ Safety:       (Environmental)  [place] to environment  Ensure ID band is correct and in place/ allergy band as needed  Assess for fall risk  Initiate fall precautions as applicable (fall band, side rails, etc.)  Call light within reach  Bed in low position/ wheels locked    _M___ Pain:       Assess pain level and characteristics  Administer analgesics as ordered  Assess effectiveness of pain management and report to MD as needed    _M___ Knowledge Deficit:  Assess baseline knowledge  Provide teaching at level of understanding  Provide teaching via preferred learning method  Evaluate teaching effectiveness    _M___ Hemodynamic/Respiratory Status:       (Pre and Post Procedure Monitoring)  Assess/Monitor vital signs and LOC  Assess Baseline SpO2 prior to any sedation  Obtain weight/height  Assess vital signs/ LOC until patient meets discharge criteria  Monitor procedure site and notify MD of any issues    _M___ Infection-Risk of Central Venous Catheter:  Monitor for infection signs and symptoms (catheter site redness, temperature elevation, etc)  Assess for infection risks  Educate regarding infection prevention  Manage central venous catheter (flushes/ dressing changes per protocol)

## 2025-06-02 NOTE — DISCHARGE INSTRUCTIONS
ACTIVITY:  Continue usual care with your doctor. Call your doctor immediately if any severe problems or go to the nearest emergency room.      I have been treated and hereby acknowledge receiving this instruction sheet.        Next appt 06/30/25 @ 2p

## 2025-06-03 LAB — C PEPTIDE SERPL-MCNC: 6.5 NG/ML (ref 1.1–4.4)

## 2025-06-05 LAB
MISC. #1 REFERENCE GROUP TEST: NORMAL
MISC. #1 REFERENCE GROUP TEST: NORMAL

## 2025-06-12 ENCOUNTER — OFFICE VISIT (OUTPATIENT)
Dept: ENT CLINIC | Age: 35
End: 2025-06-12
Payer: COMMERCIAL

## 2025-06-12 VITALS
DIASTOLIC BLOOD PRESSURE: 80 MMHG | SYSTOLIC BLOOD PRESSURE: 130 MMHG | OXYGEN SATURATION: 98 % | WEIGHT: 293 LBS | HEIGHT: 64 IN | HEART RATE: 75 BPM | RESPIRATION RATE: 18 BRPM | BODY MASS INDEX: 50.02 KG/M2 | TEMPERATURE: 97.1 F

## 2025-06-12 DIAGNOSIS — J35.2 ADENOIDAL ENLARGEMENT: Primary | ICD-10-CM

## 2025-06-12 PROCEDURE — 4004F PT TOBACCO SCREEN RCVD TLK: CPT | Performed by: OTOLARYNGOLOGY

## 2025-06-12 PROCEDURE — 3079F DIAST BP 80-89 MM HG: CPT | Performed by: OTOLARYNGOLOGY

## 2025-06-12 PROCEDURE — 3075F SYST BP GE 130 - 139MM HG: CPT | Performed by: OTOLARYNGOLOGY

## 2025-06-12 PROCEDURE — 99212 OFFICE O/P EST SF 10 MIN: CPT | Performed by: OTOLARYNGOLOGY

## 2025-06-12 PROCEDURE — G8427 DOCREV CUR MEDS BY ELIG CLIN: HCPCS | Performed by: OTOLARYNGOLOGY

## 2025-06-12 PROCEDURE — 1111F DSCHRG MED/CURRENT MED MERGE: CPT | Performed by: OTOLARYNGOLOGY

## 2025-06-12 PROCEDURE — G8417 CALC BMI ABV UP PARAM F/U: HCPCS | Performed by: OTOLARYNGOLOGY

## 2025-06-12 NOTE — PROGRESS NOTES
(postoperative nausea and vomiting)     Prolonged emergence from general anesthesia     Tumor associated pain     neuroendrocrine tumor, gastroma      Past Surgical History:   Procedure Laterality Date    ABDOMEN SURGERY  03/23/2017    Laparoscopic , Bi lat oopherectomy Dr Hinojosa    ABDOMINAL EXPLORATION SURGERY      x2    BREAST REDUCTION SURGERY      CENTRAL VENOUS CATHETER Right 12/11/2015    right subclavian single lumen mediport insertion--Dr. Michel    CHOLECYSTECTOMY, LAPAROSCOPIC N/A 7/11/2019    CHOLECYSTECTOMY LAPAROSCOPIC performed by Iron Michel MD at [facility] OR    COLONOSCOPY N/A 8/24/2020    COLONOSCOPY POLYPECTOMY SNARE/COLD BIOPSY performed by Frida Elaine MD at [facility] Endoscopy    DENTAL SURGERY      DILATION AND CURETTAGE OF UTERUS  10/21/2013    Dilation and curettage, Diagnostic Hysteroscopy and laparoscopy (Dr. Bartholomew, [facility])    EGD  2019    EGD COLONOSCOPY N/A 1/8/2019    EGD DIL performed by Ajit Washburn MD at [facility] Endoscopy    EGD COLONOSCOPY Left 5/14/2019    EGD DILATATION performed by Ajit Washburn MD at [facility] Endoscopy    EGD COLONOSCOPY Left 8/27/2019    EGD DILATATION performed by Ajit Washburn MD at [facility] Endoscopy    ENDOSCOPY, COLON, DIAGNOSTIC      GASTRIC FUNDOPLICATION      [facility]    GASTRIC FUNDOPLICATION      GASTROSTOMY TUBE PLACEMENT N/A 1/25/2023    EGD PEG TUBE PLACEMENT performed by Frida Elaine MD at [facility] Endoscopy    GASTROSTOMY TUBE PLACEMENT N/A 2/22/2023    EGD PEG TUBE PLACEMENT performed by Frida Elaine MD at [facility] Endoscopy    GASTROSTOMY TUBE PLACEMENT N/A 12/18/2023    EGD PEG TUBE PLACEMENT performed by Iron Michel MD at [facility] ENDOSCOPY    HERNIA REPAIR  2010, 2016    [facility]    HIATAL HERNIA REPAIR N/A 1/17/2023    Robotic Exploratory Paparoscopy with Extensive Lysis of Adhesions G Tube Insertion performed by Tonny Florence MD at [facility] OR    HYSTERECTOMY (CERVIX STATUS UNKNOWN)  04/2016    INSERTION / REMOVAL / REPLACEMENT VENOUS

## 2025-06-16 ENCOUNTER — OFFICE VISIT (OUTPATIENT)
Dept: PALLATIVE CARE | Age: 35
End: 2025-06-16
Payer: COMMERCIAL

## 2025-06-16 VITALS
DIASTOLIC BLOOD PRESSURE: 72 MMHG | WEIGHT: 292 LBS | RESPIRATION RATE: 17 BRPM | SYSTOLIC BLOOD PRESSURE: 148 MMHG | BODY MASS INDEX: 49.85 KG/M2 | HEIGHT: 64 IN | HEART RATE: 87 BPM | OXYGEN SATURATION: 97 %

## 2025-06-16 DIAGNOSIS — G89.29 OTHER CHRONIC PAIN: Primary | ICD-10-CM

## 2025-06-16 DIAGNOSIS — Z79.891 LONG TERM PRESCRIPTION OPIATE USE: ICD-10-CM

## 2025-06-16 DIAGNOSIS — Z51.5 PALLIATIVE CARE PATIENT: ICD-10-CM

## 2025-06-16 LAB

## 2025-06-16 PROCEDURE — 4004F PT TOBACCO SCREEN RCVD TLK: CPT | Performed by: STUDENT IN AN ORGANIZED HEALTH CARE EDUCATION/TRAINING PROGRAM

## 2025-06-16 PROCEDURE — 80305 DRUG TEST PRSMV DIR OPT OBS: CPT | Performed by: STUDENT IN AN ORGANIZED HEALTH CARE EDUCATION/TRAINING PROGRAM

## 2025-06-16 PROCEDURE — 1111F DSCHRG MED/CURRENT MED MERGE: CPT | Performed by: STUDENT IN AN ORGANIZED HEALTH CARE EDUCATION/TRAINING PROGRAM

## 2025-06-16 PROCEDURE — 3077F SYST BP >= 140 MM HG: CPT | Performed by: STUDENT IN AN ORGANIZED HEALTH CARE EDUCATION/TRAINING PROGRAM

## 2025-06-16 PROCEDURE — 3078F DIAST BP <80 MM HG: CPT | Performed by: STUDENT IN AN ORGANIZED HEALTH CARE EDUCATION/TRAINING PROGRAM

## 2025-06-16 PROCEDURE — G8417 CALC BMI ABV UP PARAM F/U: HCPCS | Performed by: STUDENT IN AN ORGANIZED HEALTH CARE EDUCATION/TRAINING PROGRAM

## 2025-06-16 PROCEDURE — 99214 OFFICE O/P EST MOD 30 MIN: CPT | Performed by: STUDENT IN AN ORGANIZED HEALTH CARE EDUCATION/TRAINING PROGRAM

## 2025-06-16 PROCEDURE — G8427 DOCREV CUR MEDS BY ELIG CLIN: HCPCS | Performed by: STUDENT IN AN ORGANIZED HEALTH CARE EDUCATION/TRAINING PROGRAM

## 2025-06-16 NOTE — PROGRESS NOTES
Samantha Nichols is a 34 y.o. female who presents to the palliative care clinic today for:  Chief Complaint   Patient presents with    Follow-up     6 week follow up    Pain     Stiffness. 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       HPI:   Samantha Nichols is currently being seen in the palliative care clinic for specialized medical care focused on relieving symptoms, supporting complex decision making improving quality of life related to their pain. Samantha Nichols presents to the palliative care clinic today Symptoms related to her chronic medical conditions.    Symptoms:  Pain: They report having everywhere pain. They rate their pain as a 5 on scale of 1-10. Their pain is getting better. They have Morphine ER 30 mg twice a day for scheduled pain relief. From 8 AM yesterday to 8 AM today, they have used Morphine IR 15 mg twice for as needed pain relief. Their pain medications are not working well to control their pain.  Shortness of breath: Patient denies shortness of breath or difficulty breathing.  Sleep: Patient reports they are not having issues with sleep.  Fatigue/Drowsiness: Patient denies fatigue.  Patient denies drowsiness.   Appetite: Patient denied issues with appetite. Patient reports their appetite is good.  Wt. Loss: Patient's weight is stable.   Dry Mouth: Patient denies dry mouth.  Nausea/Vomiting: Patient denies nausea and vomiting.  Constipation/Diarrhea: Patient denies constipation and diarrhea.  Depression: Patient denies depression or issues with mood.  Anxiety: Patient denies anxiety.        Current Outpatient Medications   Medication Sig Dispense Refill    morphine (MS CONTIN) 30 MG extended release tablet Take 1 tablet by mouth 2 times daily for 30 days. Max Daily Amount: 60 mg 60 tablet 0    OLANZapine zydis (ZYPREXA) 5 MG disintegrating tablet Take 1 tablet by mouth nightly      morphine (MSIR) 15 MG tablet Take 1 tablet by mouth every 4 hours as needed for Pain.      methylPREDNISolone

## 2025-06-16 NOTE — PATIENT INSTRUCTIONS
Continue current plan of care for acute and chronic conditions per primary and specialist teams  Continue Morphine ER 30 mg twice a day scheduled for  pain  Continue Morphine IR 7.5 to 15 mg every 4 hours as needed for breakthrough pain  PDMP reviewed  Please call the office if your symptoms worsen or if you were to develop new symptoms  Please call the palliative care office when you need refills

## 2025-06-22 NOTE — PROGRESS NOTES
1990    Chief Complaint   Patient presents with    1 Month Follow-Up     F/u labs        HPI  Pt is a 35 y.o. female who presents for follow-up appointment patient was last seen by the internal medicine clinic 5/27/2025 for hospital discharge follow-up.    Since last office visit patient seen palliative medicine Dr. Fairchild on 6/16/2025    Lab work since last office visit: 6/2/25 C-peptide of 6.5 (elevated), sed rate 20, CRP 1.33 (elevated), fibrinogen 472, interleukin-6 of less than 2, tissue necrosis factor alpha less than 1.7.    During visit patient inquiring about breast cancer screening states that she is significant family history for breast cancer with grandmother, mother, sister, cousin.  Has never had mammogram/genetic counseling in the past.    Past Medical History:   Diagnosis Date    Acute on chronic diastolic congestive heart failure (HCC) 06/25/2022    JANI (acute kidney injury) 07/07/2019    Anemia     Anesthesia     migraines    Anxiety     Asthma     CAD (coronary artery disease)     Depression     Diabetes (HCC)     Diet-controlled type 2 diabetes mellitus (HCC) 2016    Dyslipidemia 11/14/2016    Epilepsy (HCC)     last siezure is 2 years ago    Epilepsy (HCC)     Fibroids     Gastro - esophageal reflux disease     Gastroparesis     History of esophagogastroduodenoscopy (EGD) 08/13/2022    Hypertension     Hypertrophy of tonsil and adenoid 11/04/2017    Malignant carcinoid tumor of other sites (HCC) 05/14/2013    Migraine     PONV (postoperative nausea and vomiting)     Prolonged emergence from general anesthesia     Tumor associated pain     neuroendrocrine tumor, gastroma       Past Surgical History:   Procedure Laterality Date    ABDOMEN SURGERY  03/23/2017    Laparoscopic , Bi lat oopherectomy Dr Hinojosa    ABDOMINAL EXPLORATION SURGERY      x2    BREAST REDUCTION SURGERY      CENTRAL VENOUS CATHETER Right 12/11/2015    right subclavian single lumen mediport insertion--Dr. Michel

## 2025-06-23 ENCOUNTER — TELEPHONE (OUTPATIENT)
Dept: INTERNAL MEDICINE CLINIC | Age: 35
End: 2025-06-23

## 2025-06-23 ENCOUNTER — OFFICE VISIT (OUTPATIENT)
Dept: INTERNAL MEDICINE CLINIC | Age: 35
End: 2025-06-23
Payer: COMMERCIAL

## 2025-06-23 VITALS
HEIGHT: 64 IN | DIASTOLIC BLOOD PRESSURE: 82 MMHG | TEMPERATURE: 97.6 F | SYSTOLIC BLOOD PRESSURE: 130 MMHG | WEIGHT: 293 LBS | HEART RATE: 80 BPM | BODY MASS INDEX: 50.02 KG/M2

## 2025-06-23 DIAGNOSIS — R73.03 PRE-DIABETES: Primary | ICD-10-CM

## 2025-06-23 DIAGNOSIS — M79.10 MUSCLE ACHE: ICD-10-CM

## 2025-06-23 DIAGNOSIS — I10 ESSENTIAL HYPERTENSION: ICD-10-CM

## 2025-06-23 DIAGNOSIS — K21.9 GASTROESOPHAGEAL REFLUX DISEASE WITHOUT ESOPHAGITIS: ICD-10-CM

## 2025-06-23 DIAGNOSIS — Z12.31 BREAST CANCER SCREENING BY MAMMOGRAM: ICD-10-CM

## 2025-06-23 DIAGNOSIS — K59.09 CHRONIC CONSTIPATION: ICD-10-CM

## 2025-06-23 DIAGNOSIS — E53.9 VITAMIN B DEFICIENCY: ICD-10-CM

## 2025-06-23 DIAGNOSIS — E66.01 MORBID OBESITY (HCC): ICD-10-CM

## 2025-06-23 DIAGNOSIS — Z80.3 FAMILY HISTORY OF BREAST CANCER: ICD-10-CM

## 2025-06-23 DIAGNOSIS — Z12.9 CANCER SCREENING: ICD-10-CM

## 2025-06-23 PROBLEM — D57.1 SICKLE CELL DISEASE (HCC): Status: RESOLVED | Noted: 2020-01-08 | Resolved: 2025-06-23

## 2025-06-23 PROBLEM — E66.813 OBESITY, CLASS III, BMI 40-49.9 (MORBID OBESITY) (HCC): Status: ACTIVE | Noted: 2025-05-22

## 2025-06-23 PROCEDURE — 4004F PT TOBACCO SCREEN RCVD TLK: CPT

## 2025-06-23 PROCEDURE — 3075F SYST BP GE 130 - 139MM HG: CPT

## 2025-06-23 PROCEDURE — 99214 OFFICE O/P EST MOD 30 MIN: CPT

## 2025-06-23 PROCEDURE — G8427 DOCREV CUR MEDS BY ELIG CLIN: HCPCS

## 2025-06-23 PROCEDURE — G8417 CALC BMI ABV UP PARAM F/U: HCPCS

## 2025-06-23 PROCEDURE — 3078F DIAST BP <80 MM HG: CPT

## 2025-06-23 RX ORDER — LACTULOSE 10 G/15ML
30 SOLUTION ORAL EVERY 8 HOURS
Qty: 120 ML | Refills: 0 | Status: SHIPPED | OUTPATIENT
Start: 2025-06-23

## 2025-06-23 NOTE — TELEPHONE ENCOUNTER
Per Dr Herron and Dr Wilson, the issue with a letter regarding air conditioner, they stated if there is a form to fill out, they would fill it out.  No letter since they did not really know what she was needing.

## 2025-06-25 DIAGNOSIS — Z51.5 PALLIATIVE CARE ENCOUNTER: ICD-10-CM

## 2025-06-25 DIAGNOSIS — G89.29 OTHER CHRONIC PAIN: ICD-10-CM

## 2025-06-25 DIAGNOSIS — D57.00 SICKLE CELL ANEMIA WITH PAIN (HCC): ICD-10-CM

## 2025-06-25 DIAGNOSIS — Z51.5 PALLIATIVE CARE PATIENT: ICD-10-CM

## 2025-06-25 DIAGNOSIS — R07.89 OTHER CHEST PAIN: ICD-10-CM

## 2025-06-25 DIAGNOSIS — D57.00 SICKLE-CELL DISEASE WITH PAIN (HCC): ICD-10-CM

## 2025-06-25 RX ORDER — MORPHINE SULFATE 15 MG/1
15 TABLET ORAL EVERY 4 HOURS PRN
Qty: 42 TABLET | Refills: 0 | Status: SHIPPED | OUTPATIENT
Start: 2025-06-25 | End: 2025-07-02

## 2025-06-25 RX ORDER — MORPHINE SULFATE 30 MG/1
30 TABLET, FILM COATED, EXTENDED RELEASE ORAL 2 TIMES DAILY
Qty: 60 TABLET | Refills: 0 | Status: SHIPPED | OUTPATIENT
Start: 2025-07-01 | End: 2025-07-31

## 2025-06-30 ENCOUNTER — HOSPITAL ENCOUNTER (OUTPATIENT)
Dept: NURSING | Age: 35
Discharge: HOME | End: 2025-06-30
Payer: COMMERCIAL

## 2025-06-30 VITALS — RESPIRATION RATE: 16 BRPM | OXYGEN SATURATION: 97 % | HEART RATE: 80 BPM | TEMPERATURE: 96.9 F

## 2025-06-30 DIAGNOSIS — D50.9 IRON DEFICIENCY ANEMIA, UNSPECIFIED IRON DEFICIENCY ANEMIA TYPE: Primary | ICD-10-CM

## 2025-06-30 PROCEDURE — 99212 OFFICE O/P EST SF 10 MIN: CPT

## 2025-06-30 PROCEDURE — 2500000003 HC RX 250 WO HCPCS

## 2025-06-30 PROCEDURE — 6360000002 HC RX W HCPCS

## 2025-06-30 RX ORDER — SODIUM CHLORIDE 0.9 % (FLUSH) 0.9 %
5-40 SYRINGE (ML) INJECTION PRN
Status: DISCONTINUED | OUTPATIENT
Start: 2025-06-30 | End: 2025-07-01 | Stop reason: HOSPADM

## 2025-06-30 RX ORDER — SODIUM CHLORIDE 9 MG/ML
25 INJECTION, SOLUTION INTRAVENOUS PRN
OUTPATIENT
Start: 2025-06-30

## 2025-06-30 RX ORDER — SODIUM CHLORIDE 0.9 % (FLUSH) 0.9 %
5-40 SYRINGE (ML) INJECTION PRN
OUTPATIENT
Start: 2025-06-30

## 2025-06-30 RX ORDER — HEPARIN 100 UNIT/ML
500 SYRINGE INTRAVENOUS PRN
OUTPATIENT
Start: 2025-06-30

## 2025-06-30 RX ORDER — HEPARIN 100 UNIT/ML
500 SYRINGE INTRAVENOUS PRN
Status: DISCONTINUED | OUTPATIENT
Start: 2025-06-30 | End: 2025-07-01 | Stop reason: HOSPADM

## 2025-06-30 RX ADMIN — HEPARIN 500 UNITS: 100 SYRINGE at 14:17

## 2025-06-30 RX ADMIN — SODIUM CHLORIDE, PRESERVATIVE FREE 10 ML: 5 INJECTION INTRAVENOUS at 14:17

## 2025-06-30 ASSESSMENT — PAIN DESCRIPTION - DESCRIPTORS: DESCRIPTORS: ACHING

## 2025-06-30 ASSESSMENT — PAIN - FUNCTIONAL ASSESSMENT: PAIN_FUNCTIONAL_ASSESSMENT: 0-10

## 2025-06-30 NOTE — PROGRESS NOTES
1410 PT ADMITTED TO OPN PER AMBULATION FOR A PORT FLUSH.  PT RIGHTS AND RESPONSIBILITIES OFFERED TO PT.PT HAS TONI WELL IN THE PAST.      1420 port accessed with no issues. Good blood return. Flushes easily.  Deaccessed.    1422 pt given discharge instructions. Verbalize understanding of home going instructions.    1427 discharge per ambulation             __M__ Safety:       (Environmental)  [place] to environment  Ensure ID band is correct and in place/ allergy band as needed  Assess for fall risk  Initiate fall precautions as applicable (fall band, side rails, etc.)  Call light within reach  Bed in low position/ wheels locked    ___M_ Pain:       Assess pain level and characteristics  Administer analgesics as ordered  Assess effectiveness of pain management and report to MD as needed    __M__ Knowledge Deficit:  Assess baseline knowledge  Provide teaching at level of understanding  Provide teaching via preferred learning method  Evaluate teaching effectiveness    __M__ Hemodynamic/Respiratory Status:       (Pre and Post Procedure Monitoring)  Assess/Monitor vital signs and LOC  Assess Baseline SpO2 prior to any sedation  Obtain weight/height  Assess vital signs/ LOC until patient meets discharge criteria  Monitor procedure site and notify MD of any issues    __M__ Infection-Risk of Central Venous Catheter:  Monitor for infection signs and symptoms (catheter site redness, temperature elevation, etc)  Assess for infection risks  Educate regarding infection prevention  Manage central venous catheter (flushes/ dressing changes per protocol)

## 2025-06-30 NOTE — DISCHARGE INSTRUCTIONS
ACTIVITY:  Continue usual care with your doctor. Call your doctor immediately if any severe problems or go to the nearest emergency room.      I have been treated and hereby acknowledge receiving this instruction sheet.     Next appointment is July 28th, Monday at 2 pm

## 2025-07-06 DIAGNOSIS — E11.43 GASTROPARESIS DUE TO DM (HCC): ICD-10-CM

## 2025-07-06 DIAGNOSIS — K31.84 GASTROPARESIS DUE TO DM (HCC): ICD-10-CM

## 2025-07-07 ENCOUNTER — HOSPITAL ENCOUNTER (OUTPATIENT)
Dept: WOMENS IMAGING | Age: 35
Discharge: HOME | End: 2025-07-07
Payer: COMMERCIAL

## 2025-07-07 VITALS — WEIGHT: 293 LBS | BODY MASS INDEX: 50.02 KG/M2 | HEIGHT: 64 IN

## 2025-07-07 DIAGNOSIS — Z12.9 CANCER SCREENING: ICD-10-CM

## 2025-07-07 DIAGNOSIS — Z12.31 BREAST CANCER SCREENING BY MAMMOGRAM: ICD-10-CM

## 2025-07-07 PROCEDURE — 77063 BREAST TOMOSYNTHESIS BI: CPT

## 2025-07-07 RX ORDER — LACTULOSE 10 G/15ML
SOLUTION ORAL
Qty: 120 ML | Refills: 0 | OUTPATIENT
Start: 2025-07-07

## 2025-07-14 DIAGNOSIS — Z51.5 PALLIATIVE CARE ENCOUNTER: ICD-10-CM

## 2025-07-14 DIAGNOSIS — D57.00 SICKLE-CELL DISEASE WITH PAIN (HCC): ICD-10-CM

## 2025-07-14 DIAGNOSIS — R07.89 OTHER CHEST PAIN: ICD-10-CM

## 2025-07-14 RX ORDER — MORPHINE SULFATE 15 MG/1
15 TABLET ORAL EVERY 4 HOURS PRN
Qty: 42 TABLET | Refills: 0 | Status: SHIPPED | OUTPATIENT
Start: 2025-07-14 | End: 2025-07-21

## 2025-07-14 NOTE — TELEPHONE ENCOUNTER
Pt called requesting medication refill: Morphine 15mg  IR. Pt states he tries to take a half tablet but right now she has an infected tooth that hurts really bad and she has been taking a whole tablet. Pt sees the dentist on Wednesday.     [facility].

## 2025-07-18 DIAGNOSIS — Z51.5 PALLIATIVE CARE PATIENT: ICD-10-CM

## 2025-07-18 DIAGNOSIS — D57.00 SICKLE CELL ANEMIA WITH PAIN (HCC): ICD-10-CM

## 2025-07-18 DIAGNOSIS — G89.29 OTHER CHRONIC PAIN: ICD-10-CM

## 2025-07-18 RX ORDER — MORPHINE SULFATE 30 MG/1
30 TABLET, FILM COATED, EXTENDED RELEASE ORAL 2 TIMES DAILY
Qty: 60 TABLET | Refills: 0 | Status: SHIPPED | OUTPATIENT
Start: 2025-07-31 | End: 2025-08-30

## 2025-07-21 ENCOUNTER — OFFICE VISIT (OUTPATIENT)
Dept: INTERNAL MEDICINE CLINIC | Age: 35
End: 2025-07-21
Payer: COMMERCIAL

## 2025-07-21 VITALS
OXYGEN SATURATION: 98 % | BODY MASS INDEX: 50.41 KG/M2 | TEMPERATURE: 97.2 F | SYSTOLIC BLOOD PRESSURE: 112 MMHG | RESPIRATION RATE: 18 BRPM | WEIGHT: 293 LBS | DIASTOLIC BLOOD PRESSURE: 78 MMHG | HEART RATE: 90 BPM

## 2025-07-21 DIAGNOSIS — Z11.59 NEED FOR HEPATITIS C SCREENING TEST: ICD-10-CM

## 2025-07-21 DIAGNOSIS — Z13.220 NEED FOR LIPID SCREENING: ICD-10-CM

## 2025-07-21 DIAGNOSIS — E11.69 TYPE 2 DIABETES MELLITUS WITH OBESITY (HCC): ICD-10-CM

## 2025-07-21 DIAGNOSIS — K02.9 DENTAL CARIES: Primary | ICD-10-CM

## 2025-07-21 DIAGNOSIS — E66.9 TYPE 2 DIABETES MELLITUS WITH OBESITY (HCC): ICD-10-CM

## 2025-07-21 PROCEDURE — 3078F DIAST BP <80 MM HG: CPT

## 2025-07-21 PROCEDURE — 3044F HG A1C LEVEL LT 7.0%: CPT

## 2025-07-21 PROCEDURE — G8427 DOCREV CUR MEDS BY ELIG CLIN: HCPCS

## 2025-07-21 PROCEDURE — G8417 CALC BMI ABV UP PARAM F/U: HCPCS

## 2025-07-21 PROCEDURE — 3074F SYST BP LT 130 MM HG: CPT

## 2025-07-21 PROCEDURE — 2022F DILAT RTA XM EVC RTNOPTHY: CPT

## 2025-07-21 PROCEDURE — 99213 OFFICE O/P EST LOW 20 MIN: CPT

## 2025-07-21 PROCEDURE — 4004F PT TOBACCO SCREEN RCVD TLK: CPT

## 2025-07-21 ASSESSMENT — ENCOUNTER SYMPTOMS
VOMITING: 0
FACIAL SWELLING: 0
WHEEZING: 0
COUGH: 0
SHORTNESS OF BREATH: 0
NAUSEA: 0
ABDOMINAL PAIN: 0

## 2025-07-21 NOTE — PROGRESS NOTES
Patient Name: Samantha Nichols  YOB: 1990  MRN: [id number]  Office visit date: 7/21/2025    Chief Complaint: Gastroparesis, Obesity, Gastroesophageal Reflux, and Teeth infected (Getting pulled 8/6 - NEEDS MEDICAL CLEARANCE)    Assessment/Plan:  1. Dental caries  2. Type 2 diabetes mellitus with obesity (HCC)    Planned for extraction and fillings. Okay to go for this.     Get lipid panel. Albumin/creat ratio and hepatitis C screen prior to next visit    Annual wellness visit next month.     Return in about 1 month (around 8/21/2025).      Subjective/Objective:    HPI:     Samantha Nichols is a 35 y.o. female who presents for an established patient visit. She is here for evaluation of Gastroparesis, Obesity, Gastroesophageal Reflux, and Teeth infected (Getting pulled 8/6 - NEEDS MEDICAL CLEARANCE)    Here for pre-eval for planned tooth extractions. Is on morphine from palliative care. Endorsing tooth pain.   Tolerates 4 mets of activity. No angina.     Complicated past medical history. Has been seen by multiple GI specialists and surgeons. Follows w/ palliative care for muscle pain. Had mammogram 7/7/25 that was BIRADS 2: benign.    Past Medical History:   Diagnosis Date    Acute on chronic diastolic congestive heart failure (HCC) 06/25/2022    JANI (acute kidney injury) 07/07/2019    Anemia     Anesthesia     migraines    Anxiety     Asthma     CAD (coronary artery disease)     Depression     Diabetes (HCC)     Diet-controlled type 2 diabetes mellitus (HCC) 2016    Dyslipidemia 11/14/2016    Epilepsy (HCC)     last siezure is 2 years ago    Epilepsy (HCC)     Fibroids     Gastro - esophageal reflux disease     Gastroparesis     History of esophagogastroduodenoscopy (EGD) 08/13/2022    Hypertension     Hypertrophy of tonsil and adenoid 11/04/2017    Malignant carcinoid tumor of other sites (HCC) 05/14/2013    Migraine     PONV (postoperative nausea and vomiting)     Prolonged emergence from general

## 2025-07-23 PROBLEM — J45.909 ASTHMA: Status: RESOLVED | Noted: 2019-06-13 | Resolved: 2025-07-23

## 2025-07-23 PROBLEM — K31.84 GASTROPARESIS DUE TO DM (HCC): Status: RESOLVED | Noted: 2023-03-30 | Resolved: 2025-07-23

## 2025-07-23 PROBLEM — E66.9 TYPE 2 DIABETES MELLITUS WITH OBESITY (HCC): Status: RESOLVED | Noted: 2024-07-30 | Resolved: 2025-07-23

## 2025-07-23 PROBLEM — I50.33 ACUTE ON CHRONIC DIASTOLIC CONGESTIVE HEART FAILURE (HCC): Status: RESOLVED | Noted: 2022-06-25 | Resolved: 2025-07-23

## 2025-07-23 PROBLEM — K52.9 GASTROENTERITIS: Status: RESOLVED | Noted: 2017-02-26 | Resolved: 2025-07-23

## 2025-07-23 PROBLEM — K90.9 MALABSORPTION SYNDROME: Status: RESOLVED | Noted: 2025-05-13 | Resolved: 2025-07-23

## 2025-07-23 PROBLEM — E11.69 TYPE 2 DIABETES MELLITUS WITH OBESITY (HCC): Status: RESOLVED | Noted: 2024-07-30 | Resolved: 2025-07-23

## 2025-07-23 PROBLEM — E11.43 GASTROPARESIS DUE TO DM (HCC): Status: RESOLVED | Noted: 2023-03-30 | Resolved: 2025-07-23

## 2025-07-24 ENCOUNTER — HOSPITAL ENCOUNTER (EMERGENCY)
Age: 35
Discharge: HOME | End: 2025-07-24
Attending: STUDENT IN AN ORGANIZED HEALTH CARE EDUCATION/TRAINING PROGRAM
Payer: COMMERCIAL

## 2025-07-24 ENCOUNTER — APPOINTMENT (OUTPATIENT)
Dept: CT IMAGING | Age: 35
End: 2025-07-24
Payer: COMMERCIAL

## 2025-07-24 VITALS
RESPIRATION RATE: 16 BRPM | HEIGHT: 64 IN | SYSTOLIC BLOOD PRESSURE: 164 MMHG | WEIGHT: 293 LBS | DIASTOLIC BLOOD PRESSURE: 97 MMHG | HEART RATE: 81 BPM | TEMPERATURE: 98.8 F | BODY MASS INDEX: 50.02 KG/M2 | OXYGEN SATURATION: 99 %

## 2025-07-24 DIAGNOSIS — K04.7 DENTAL INFECTION: ICD-10-CM

## 2025-07-24 DIAGNOSIS — K08.89 PAIN, DENTAL: Primary | ICD-10-CM

## 2025-07-24 LAB
ALBUMIN SERPL BCG-MCNC: 4 G/DL (ref 3.4–4.9)
ALP SERPL-CCNC: 113 U/L (ref 38–126)
ALT SERPL W/O P-5'-P-CCNC: 15 U/L (ref 10–35)
ANION GAP SERPL CALC-SCNC: 13 MEQ/L (ref 8–16)
AST SERPL-CCNC: 21 U/L (ref 10–35)
BASOPHILS ABSOLUTE: 0 THOU/MM3 (ref 0–0.1)
BASOPHILS NFR BLD AUTO: 0.4 %
BILIRUB CONJ SERPL-MCNC: < 0.1 MG/DL (ref 0–0.2)
BILIRUB SERPL-MCNC: 0.2 MG/DL (ref 0.3–1.2)
BUN SERPL-MCNC: 16 MG/DL (ref 8–23)
CALCIUM SERPL-MCNC: 9.8 MG/DL (ref 8.6–10)
CHLORIDE SERPL-SCNC: 104 MEQ/L (ref 98–111)
CO2 SERPL-SCNC: 23 MEQ/L (ref 22–29)
CREAT SERPL-MCNC: 0.9 MG/DL (ref 0.5–0.9)
DEPRECATED RDW RBC AUTO: 42.8 FL (ref 35–45)
EOSINOPHIL NFR BLD AUTO: 2 %
EOSINOPHILS ABSOLUTE: 0.2 THOU/MM3 (ref 0–0.4)
ERYTHROCYTE [DISTWIDTH] IN BLOOD BY AUTOMATED COUNT: 15.8 % (ref 11.5–14.5)
GFR SERPL CREATININE-BSD FRML MDRD: 85 ML/MIN/1.73M2
GLUCOSE SERPL-MCNC: 110 MG/DL (ref 74–109)
HCT VFR BLD AUTO: 41.3 % (ref 37–47)
HGB BLD-MCNC: 12.7 GM/DL (ref 12–16)
HGB RETIC QN AUTO: 26.3 PG (ref 28.2–35.7)
IMM GRANULOCYTES # BLD AUTO: 0.03 THOU/MM3 (ref 0–0.07)
IMM GRANULOCYTES NFR BLD AUTO: 0.3 %
IMM RETICS NFR: 16.8 % (ref 3–15.9)
LACTATE SERPL-SCNC: 1.1 MMOL/L (ref 0.5–2.2)
LYMPHOCYTES ABSOLUTE: 3.1 THOU/MM3 (ref 1–4.8)
LYMPHOCYTES NFR BLD AUTO: 32.9 %
MAGNESIUM SERPL-MCNC: 2 MG/DL (ref 1.6–2.6)
MCH RBC QN AUTO: 23.4 PG (ref 26–33)
MCHC RBC AUTO-ENTMCNC: 30.8 GM/DL (ref 32.2–35.5)
MCV RBC AUTO: 76.1 FL (ref 81–99)
MONOCYTES ABSOLUTE: 0.5 THOU/MM3 (ref 0.4–1.3)
MONOCYTES NFR BLD AUTO: 5.7 %
NEUTROPHILS ABSOLUTE: 5.6 THOU/MM3 (ref 1.8–7.7)
NEUTROPHILS NFR BLD AUTO: 58.7 %
NRBC BLD AUTO-RTO: 0 /100 WBC
OSMOLALITY SERPL CALC.SUM OF ELEC: 281.2 MOSMOL/KG (ref 275–300)
PLATELET # BLD AUTO: 287 THOU/MM3 (ref 130–400)
PMV BLD AUTO: 9.4 FL (ref 9.4–12.4)
POTASSIUM SERPL-SCNC: 3.7 MEQ/L (ref 3.5–5.2)
PROT SERPL-MCNC: 7.6 G/DL (ref 6.4–8.3)
RBC # BLD AUTO: 5.43 MILL/MM3 (ref 4.2–5.4)
RETICS # AUTO: 61 THOU/MM3 (ref 20–115)
RETICS/RBC NFR AUTO: 1.1 % (ref 0.5–2)
SODIUM SERPL-SCNC: 140 MEQ/L (ref 135–145)
WBC # BLD AUTO: 9.5 THOU/MM3 (ref 4.8–10.8)

## 2025-07-24 PROCEDURE — 6360000002 HC RX W HCPCS: Performed by: PHYSICIAN ASSISTANT

## 2025-07-24 PROCEDURE — 83605 ASSAY OF LACTIC ACID: CPT

## 2025-07-24 PROCEDURE — 96376 TX/PRO/DX INJ SAME DRUG ADON: CPT

## 2025-07-24 PROCEDURE — 96372 THER/PROPH/DIAG INJ SC/IM: CPT

## 2025-07-24 PROCEDURE — 82248 BILIRUBIN DIRECT: CPT

## 2025-07-24 PROCEDURE — 83735 ASSAY OF MAGNESIUM: CPT

## 2025-07-24 PROCEDURE — 70487 CT MAXILLOFACIAL W/DYE: CPT

## 2025-07-24 PROCEDURE — 6360000004 HC RX CONTRAST MEDICATION: Performed by: PHYSICIAN ASSISTANT

## 2025-07-24 PROCEDURE — 85046 RETICYTE/HGB CONCENTRATE: CPT

## 2025-07-24 PROCEDURE — 96375 TX/PRO/DX INJ NEW DRUG ADDON: CPT

## 2025-07-24 PROCEDURE — 36415 COLL VENOUS BLD VENIPUNCTURE: CPT

## 2025-07-24 PROCEDURE — 85025 COMPLETE CBC W/AUTO DIFF WBC: CPT

## 2025-07-24 PROCEDURE — 6370000000 HC RX 637 (ALT 250 FOR IP): Performed by: PHYSICIAN ASSISTANT

## 2025-07-24 PROCEDURE — 96374 THER/PROPH/DIAG INJ IV PUSH: CPT

## 2025-07-24 PROCEDURE — 99285 EMERGENCY DEPT VISIT HI MDM: CPT

## 2025-07-24 PROCEDURE — 80053 COMPREHEN METABOLIC PANEL: CPT

## 2025-07-24 RX ORDER — CLINDAMYCIN HYDROCHLORIDE 300 MG/1
300 CAPSULE ORAL 3 TIMES DAILY
Qty: 30 CAPSULE | Refills: 0 | Status: SHIPPED | OUTPATIENT
Start: 2025-07-24 | End: 2025-08-03

## 2025-07-24 RX ORDER — MORPHINE SULFATE 4 MG/ML
4 INJECTION, SOLUTION INTRAMUSCULAR; INTRAVENOUS ONCE
Status: COMPLETED | OUTPATIENT
Start: 2025-07-24 | End: 2025-07-24

## 2025-07-24 RX ORDER — PROMETHAZINE HYDROCHLORIDE 25 MG/ML
25 INJECTION, SOLUTION INTRAMUSCULAR; INTRAVENOUS ONCE
Status: COMPLETED | OUTPATIENT
Start: 2025-07-24 | End: 2025-07-24

## 2025-07-24 RX ORDER — IOPAMIDOL 755 MG/ML
80 INJECTION, SOLUTION INTRAVASCULAR
Status: COMPLETED | OUTPATIENT
Start: 2025-07-24 | End: 2025-07-24

## 2025-07-24 RX ORDER — MORPHINE SULFATE 2 MG/ML
2 INJECTION, SOLUTION INTRAMUSCULAR; INTRAVENOUS ONCE
Refills: 0 | Status: COMPLETED | OUTPATIENT
Start: 2025-07-24 | End: 2025-07-24

## 2025-07-24 RX ADMIN — HYDROMORPHONE HYDROCHLORIDE 1 MG: 1 INJECTION, SOLUTION INTRAMUSCULAR; INTRAVENOUS; SUBCUTANEOUS at 17:03

## 2025-07-24 RX ADMIN — MORPHINE SULFATE 4 MG: 4 INJECTION, SOLUTION INTRAMUSCULAR; INTRAVENOUS at 14:42

## 2025-07-24 RX ADMIN — IOPAMIDOL 80 ML: 755 INJECTION, SOLUTION INTRAVENOUS at 15:44

## 2025-07-24 RX ADMIN — PROMETHAZINE HYDROCHLORIDE 25 MG: 25 INJECTION INTRAMUSCULAR; INTRAVENOUS at 15:08

## 2025-07-24 RX ADMIN — Medication 5 ML: at 17:04

## 2025-07-24 RX ADMIN — MORPHINE SULFATE 2 MG: 2 INJECTION, SOLUTION INTRAMUSCULAR; INTRAVENOUS at 15:59

## 2025-07-24 ASSESSMENT — PAIN - FUNCTIONAL ASSESSMENT: PAIN_FUNCTIONAL_ASSESSMENT: 0-10

## 2025-07-24 ASSESSMENT — PAIN SCALES - GENERAL: PAINLEVEL_OUTOF10: 10

## 2025-07-24 ASSESSMENT — PAIN DESCRIPTION - LOCATION: LOCATION: JAW;TEETH

## 2025-07-24 ASSESSMENT — PAIN DESCRIPTION - ORIENTATION: ORIENTATION: LEFT

## 2025-07-24 NOTE — DISCHARGE INSTRUCTIONS
Please  antibiotics and start today.  Take until completed even if you start to feel better.      Please make sure to attend your appointment on August 5 in [place].    If you develop fevers, chills, increased pain, or increased swelling please return the emergency department.

## 2025-07-24 NOTE — ED PROVIDER NOTES
EMERGENCY DEPARTMENT ENCOUNTER   ATTENDING ATTESTATION     Pt Name: Samantha Nichols  MRN: [id number]  Birthdate 1990  Date of evaluation: 7/24/25   Samantha Nichols is a 35 y.o. female with CC: Oral Swelling and Dental Pain    I personally evaluated and examined the patient. I personally approved the documented management plan and agree with the APC's documentation    35-year-old female presented today with what appears to be a left lower premolar dental carry.    Physical exam:  There is a dental carry bottom left premolar.  The oropharynx is not erythematous.  There are no tonsillar exudates.  The tonsils are nonenlarged.  The uvula is midline.  There is no peritonsillar abscess.  There is no tenderness/swelling/erythema over the mastoid.  There is no sublingual elevation or submandibular swelling.  The patient is well-appearing and tolerating secretions speaking full sentences.  There is some slight trismus as well as some left-sided facial swelling.  The tongue is not raised and there is no edema of the upper midline neck nor floor of the mouth.  There is no brawny texture to the floor the mouth or visible swelling there is no stridor.  There is no dysphonia.    Discussed case with patient's pain management doctor.    Will start on antibiotics.  Patient has appropriate dental follow-up.  Strict return precaution discussed at length including but not limited to dysphonia, difficulty breathing, fevers, uncontrollable pain, difficulty swallowing        ED Course as of 07/24/25 1655   Thu Jul 24, 2025   1655 CT FACIAL BONES W CONTRAST  Susie spoke with reading radiologist.  Does not see any evidence of submandibular infection, abscess or soft tissue infection [TM]      ED Course User Index  [TM] Christopher Aleman MD       RADIOLOGY:All plain film, CT, MRI, and formal ultrasound images (except ED POC U/S) are read by the radiologist, unless otherwise noted here.  CT FACIAL BONES W CONTRAST   Final Result      1. 
daily for 30 days. Max Daily Amount: 60 mg, Disp-60 tablet, R-0Normal      lactulose (CHRONULAC) 10 GM/15ML solution Take 30 mLs by mouth every 8 (eight) hours Until you have a bowel movement and then stop., Disp-120 mL, R-0Normal      OLANZapine zydis (ZYPREXA) 5 MG disintegrating tablet Take 1 tablet by mouth nightlyHistorical Med      meloxicam (MOBIC) 7.5 MG tablet Take 1 tablet by mouth daily as needed for Pain, Disp-30 tablet, R-0Normal      carvedilol (COREG) 6.25 MG tablet Take 1 tablet by mouth 2 times daily, Disp-60 tablet, R-0Normal      nicotine (NICODERM CQ) 21 MG/24HR Place 1 patch onto the skin daily, Disp-42 patch, R-0Normal      albuterol (PROVENTIL) (2.5 MG/3ML) 0.083% nebulizer solution Take 3 mLs by nebulization every 6 hours as needed for Wheezing, Disp-90 each, R-0Normal      !! blood glucose monitor strips Test 1 time a day Freestyle freedom Lite test strips Dx: E11.9, Disp-100 strip, R-1, Normal      FreeStyle Lancets MISC DAILY Starting Thu 4/24/2025, Disp-100 each, R-3, NormalUse to check blood sugar once daily. Freestyle Freedom Lite Dx: E11.9      fluticasone (FLONASE) 50 MCG/ACT nasal spray 2 sprays by Each Nostril route daily, Disp-1 each, R-0Normal      baclofen (LIORESAL) 10 MG tablet Take 1 tablet by mouth 3 times daily as needed (Pain), Disp-20 tablet, R-0Normal      famotidine (PEPCID) 20 MG tablet Take 1 tablet by mouth 2 times daily, Disp-60 tablet, R-3Normal      potassium chloride (KLOR-CON M) 20 MEQ extended release tablet Take 1 tablet by mouth daily, Disp-30 tablet, R-0Normal      pantoprazole (PROTONIX) 40 MG tablet Take 1 tablet by mouth in the morning and at bedtimeHistorical Med      amLODIPine (NORVASC) 10 MG tablet TAKE 1 TABLET BY MOUTH DAILY, Disp-90 tablet, R-3Normal      glucose monitoring kit DAILY Starting Tue 8/20/2024, Disp-1 kit, R-0, Normal      simethicone (MYLICON) 80 MG chewable tablet Take 1 tablet by mouth 4 times daily as needed for Flatulence (gas),

## 2025-07-24 NOTE — ED TRIAGE NOTES
Pt presents to the ER with c/o left sided jaw swelling and dental pain. Pt states she was given abx three days ago which she has been taking. Pt is on oral morphine at home. Pt is unable to open her mouth for examination. Pt is alert and oriented, respirations even and unlabored. VSS

## 2025-07-28 ENCOUNTER — HOSPITAL ENCOUNTER (OUTPATIENT)
Dept: NURSING | Age: 35
Discharge: HOME | End: 2025-07-28
Payer: COMMERCIAL

## 2025-07-28 VITALS
OXYGEN SATURATION: 96 % | DIASTOLIC BLOOD PRESSURE: 88 MMHG | HEART RATE: 82 BPM | TEMPERATURE: 97.6 F | RESPIRATION RATE: 18 BRPM | SYSTOLIC BLOOD PRESSURE: 137 MMHG

## 2025-07-28 DIAGNOSIS — E66.9 TYPE 2 DIABETES MELLITUS WITH OBESITY (HCC): ICD-10-CM

## 2025-07-28 DIAGNOSIS — Z11.59 NEED FOR HEPATITIS C SCREENING TEST: ICD-10-CM

## 2025-07-28 DIAGNOSIS — D50.9 IRON DEFICIENCY ANEMIA, UNSPECIFIED IRON DEFICIENCY ANEMIA TYPE: Primary | ICD-10-CM

## 2025-07-28 DIAGNOSIS — E11.69 TYPE 2 DIABETES MELLITUS WITH OBESITY (HCC): ICD-10-CM

## 2025-07-28 DIAGNOSIS — Z13.220 NEED FOR LIPID SCREENING: ICD-10-CM

## 2025-07-28 LAB
CHOLEST SERPL-MCNC: 168 MG/DL (ref 100–199)
CREAT UR-MCNC: 166 MG/DL
HCV IGG SERPL QL IA: NONREACTIVE
HDLC SERPL-MCNC: 35 MG/DL
LDLC SERPL CALC-MCNC: 108 MG/DL
MICROALBUMIN UR-MCNC: < 2 MG/DL
MICROALBUMIN/CREAT RATIO PNL UR: 12 MG/G (ref 0–30)
TRIGL SERPL-MCNC: 126 MG/DL (ref 0–199)

## 2025-07-28 PROCEDURE — 82570 ASSAY OF URINE CREATININE: CPT

## 2025-07-28 PROCEDURE — 6360000002 HC RX W HCPCS

## 2025-07-28 PROCEDURE — 80061 LIPID PANEL: CPT

## 2025-07-28 PROCEDURE — 86803 HEPATITIS C AB TEST: CPT

## 2025-07-28 PROCEDURE — 36591 DRAW BLOOD OFF VENOUS DEVICE: CPT

## 2025-07-28 PROCEDURE — 99213 OFFICE O/P EST LOW 20 MIN: CPT

## 2025-07-28 PROCEDURE — 2500000003 HC RX 250 WO HCPCS

## 2025-07-28 PROCEDURE — 82043 UR ALBUMIN QUANTITATIVE: CPT

## 2025-07-28 RX ORDER — HEPARIN 100 UNIT/ML
500 SYRINGE INTRAVENOUS PRN
OUTPATIENT
Start: 2025-07-28

## 2025-07-28 RX ORDER — HEPARIN 100 UNIT/ML
500 SYRINGE INTRAVENOUS PRN
Status: DISCONTINUED | OUTPATIENT
Start: 2025-07-28 | End: 2025-07-29 | Stop reason: HOSPADM

## 2025-07-28 RX ORDER — SODIUM CHLORIDE 0.9 % (FLUSH) 0.9 %
5-40 SYRINGE (ML) INJECTION PRN
Status: DISCONTINUED | OUTPATIENT
Start: 2025-07-28 | End: 2025-07-29 | Stop reason: HOSPADM

## 2025-07-28 RX ORDER — SODIUM CHLORIDE 9 MG/ML
25 INJECTION, SOLUTION INTRAVENOUS PRN
OUTPATIENT
Start: 2025-07-28

## 2025-07-28 RX ORDER — SODIUM CHLORIDE 0.9 % (FLUSH) 0.9 %
5-40 SYRINGE (ML) INJECTION PRN
OUTPATIENT
Start: 2025-07-28

## 2025-07-28 RX ADMIN — SODIUM CHLORIDE, PRESERVATIVE FREE 10 ML: 5 INJECTION INTRAVENOUS at 14:16

## 2025-07-28 RX ADMIN — HEPARIN 500 UNITS: 100 SYRINGE at 14:16

## 2025-07-28 ASSESSMENT — PAIN DESCRIPTION - PAIN TYPE: TYPE: CHRONIC PAIN

## 2025-07-28 ASSESSMENT — PAIN SCALES - GENERAL: PAINLEVEL_OUTOF10: 7

## 2025-07-28 ASSESSMENT — PAIN DESCRIPTION - LOCATION: LOCATION: JAW;TEETH

## 2025-07-28 NOTE — DISCHARGE INSTRUCTIONS
ACTIVITY:  Continue usual care with your doctor. Call your doctor immediately if any severe problems or go to the nearest emergency room.      I have been treated and hereby acknowledge receiving this instruction sheet.        Next appt 8/25/25 @ 2p

## 2025-07-28 NOTE — PROGRESS NOTES
1400 Patient ambulatory to OPN for Port flush and blood work. Patient verbalizes understanding. PT RIGHTS AND RESPONSIBILITIES OFFERED TO PT.     1416 Mediport accessed. Labs obtained. Patient tolerated well.      1420 AVS reviewed with patient. Patient verbalizes understanding. Patient left ambulatory to discharge lobby.            _M___ Safety:       (Environmental)  [place] to environment  Ensure ID band is correct and in place/ allergy band as needed  Assess for fall risk  Initiate fall precautions as applicable (fall band, side rails, etc.)  Call light within reach  Bed in low position/ wheels locked     _M___ Pain:       Assess pain level and characteristics  Administer analgesics as ordered  Assess effectiveness of pain management and report to MD as needed     _M___ Knowledge Deficit:  Assess baseline knowledge  Provide teaching at level of understanding  Provide teaching via preferred learning method  Evaluate teaching effectiveness     _M___ Hemodynamic/Respiratory Status:                  (Pre and Post Procedure Monitoring)  Assess/Monitor vital signs and LOC  Assess Baseline SpO2 prior to any sedation  Obtain weight/height  Assess vital signs/ LOC until patient meets discharge criteria  Monitor procedure site and notify MD of any issues     _M___ Infection-Risk of Central Venous Catheter:  Monitor for infection signs and symptoms (catheter site redness, temperature elevation, etc)  Assess for infection risks  Educate regarding infection prevention  Manage central venous catheter (flushes/ dressing changes per protocol)      Detail Level: Zone Topical Sulfur Applications Counseling: Topical Sulfur Counseling: Patient counseled that this medication may cause skin irritation or allergic reactions.  In the event of skin irritation, the patient was advised to reduce the amount of the drug applied or use it less frequently.   The patient verbalized understanding of the proper use and possible adverse effects of topical sulfur application.  All of the patient's questions and concerns were addressed. Benzoyl Peroxide Pregnancy And Lactation Text: This medication is Pregnancy Category C. It is unknown if benzoyl peroxide is excreted in breast milk. Doxycycline Pregnancy And Lactation Text: This medication is Pregnancy Category D and not consider safe during pregnancy. It is also excreted in breast milk but is considered safe for shorter treatment courses. Topical Retinoid Pregnancy And Lactation Text: This medication is Pregnancy Category C. It is unknown if this medication is excreted in breast milk. Winlevi Counseling:  I discussed with the patient the risks of topical clascoterone including but not limited to erythema, scaling, itching, and stinging. Patient voiced their understanding. High Dose Vitamin A Pregnancy And Lactation Text: High dose vitamin A therapy is contraindicated during pregnancy and breast feeding. Tetracycline Counseling: Patient counseled regarding possible photosensitivity and increased risk for sunburn.  Patient instructed to avoid sunlight, if possible.  When exposed to sunlight, patients should wear protective clothing, sunglasses, and sunscreen.  The patient was instructed to call the office immediately if the following severe adverse effects occur:  hearing changes, easy bruising/bleeding, severe headache, or vision changes.  The patient verbalized understanding of the proper use and possible adverse effects of tetracycline.  All of the patient's questions and concerns were addressed. Patient understands to avoid pregnancy while on therapy due to potential birth defects. Bactrim Counseling:  I discussed with the patient the risks of sulfa antibiotics including but not limited to GI upset, allergic reaction, drug rash, diarrhea, dizziness, photosensitivity, and yeast infections.  Rarely, more serious reactions can occur including but not limited to aplastic anemia, agranulocytosis, methemoglobinemia, blood dyscrasias, liver or kidney failure, lung infiltrates or desquamative/blistering drug rashes. Birth Control Pills Pregnancy And Lactation Text: This medication should be avoided if pregnant and for the first 30 days post-partum. Isotretinoin Pregnancy And Lactation Text: This medication is Pregnancy Category X and is considered extremely dangerous during pregnancy. It is unknown if it is excreted in breast milk. Spironolactone Counseling: Patient advised regarding risks of diarrhea, abdominal pain, hyperkalemia, birth defects (for female patients), liver toxicity and renal toxicity. The patient may need blood work to monitor liver and kidney function and potassium levels while on therapy. The patient verbalized understanding of the proper use and possible adverse effects of spironolactone.  All of the patient's questions and concerns were addressed. Use Enhanced Medication Counseling?: No Bactrim Pregnancy And Lactation Text: This medication is Pregnancy Category D and is known to cause fetal risk.  It is also excreted in breast milk. Tazorac Counseling:  Patient advised that medication is irritating and drying.  Patient may need to apply sparingly and wash off after an hour before eventually leaving it on overnight.  The patient verbalized understanding of the proper use and possible adverse effects of tazorac.  All of the patient's questions and concerns were addressed. Winlevi Pregnancy And Lactation Text: This medication is considered safe during pregnancy and breastfeeding. Aklief Pregnancy And Lactation Text: It is unknown if this medication is safe to use during pregnancy.  It is unknown if this medication is excreted in breast milk.  Breastfeeding women should use the topical cream on the smallest area of the skin for the shortest time needed while breastfeeding.  Do not apply to nipple and areola. Dapsone Pregnancy And Lactation Text: This medication is Pregnancy Category C and is not considered safe during pregnancy or breast feeding. Erythromycin Counseling:  I discussed with the patient the risks of erythromycin including but not limited to GI upset, allergic reaction, drug rash, diarrhea, increase in liver enzymes, and yeast infections. Sarecycline Counseling: Patient advised regarding possible photosensitivity and discoloration of the teeth, skin, lips, tongue and gums.  Patient instructed to avoid sunlight, if possible.  When exposed to sunlight, patients should wear protective clothing, sunglasses, and sunscreen.  The patient was instructed to call the office immediately if the following severe adverse effects occur:  hearing changes, easy bruising/bleeding, severe headache, or vision changes.  The patient verbalized understanding of the proper use and possible adverse effects of sarecycline.  All of the patient's questions and concerns were addressed. Azithromycin Counseling:  I discussed with the patient the risks of azithromycin including but not limited to GI upset, allergic reaction, drug rash, diarrhea, and yeast infections. Topical Clindamycin Counseling: Patient counseled that this medication may cause skin irritation or allergic reactions.  In the event of skin irritation, the patient was advised to reduce the amount of the drug applied or use it less frequently.   The patient verbalized understanding of the proper use and possible adverse effects of clindamycin.  All of the patient's questions and concerns were addressed. Azelaic Acid Pregnancy And Lactation Text: This medication is considered safe during pregnancy and breast feeding. Azithromycin Pregnancy And Lactation Text: This medication is considered safe during pregnancy and is also secreted in breast milk. Minocycline Counseling: Patient advised regarding possible photosensitivity and discoloration of the teeth, skin, lips, tongue and gums.  Patient instructed to avoid sunlight, if possible.  When exposed to sunlight, patients should wear protective clothing, sunglasses, and sunscreen.  The patient was instructed to call the office immediately if the following severe adverse effects occur:  hearing changes, easy bruising/bleeding, severe headache, or vision changes.  The patient verbalized understanding of the proper use and possible adverse effects of minocycline.  All of the patient's questions and concerns were addressed. Tetracycline Pregnancy And Lactation Text: This medication is Pregnancy Category D and not consider safe during pregnancy. It is also excreted in breast milk. Topical Clindamycin Pregnancy And Lactation Text: This medication is Pregnancy Category B and is considered safe during pregnancy. It is unknown if it is excreted in breast milk. Benzoyl Peroxide Counseling: Patient counseled that medicine may cause skin irritation and bleach clothing.  In the event of skin irritation, the patient was advised to reduce the amount of the drug applied or use it less frequently.   The patient verbalized understanding of the proper use and possible adverse effects of benzoyl peroxide.  All of the patient's questions and concerns were addressed. High Dose Vitamin A Counseling: Side effects reviewed, pt to contact office should one occur. Spironolactone Pregnancy And Lactation Text: This medication can cause feminization of the male fetus and should be avoided during pregnancy. The active metabolite is also found in breast milk. Topical Sulfur Applications Pregnancy And Lactation Text: This medication is Pregnancy Category C and has an unknown safety profile during pregnancy. It is unknown if this topical medication is excreted in breast milk. Topical Retinoid counseling:  Patient advised to apply a pea-sized amount only at bedtime and wait 30 minutes after washing their face before applying.  If too drying, patient may add a non-comedogenic moisturizer. The patient verbalized understanding of the proper use and possible adverse effects of retinoids.  All of the patient's questions and concerns were addressed. Doxycycline Counseling:  Patient counseled regarding possible photosensitivity and increased risk for sunburn.  Patient instructed to avoid sunlight, if possible.  When exposed to sunlight, patients should wear protective clothing, sunglasses, and sunscreen.  The patient was instructed to call the office immediately if the following severe adverse effects occur:  hearing changes, easy bruising/bleeding, severe headache, or vision changes.  The patient verbalized understanding of the proper use and possible adverse effects of doxycycline.  All of the patient's questions and concerns were addressed. Birth Control Pills Counseling: Birth Control Pill Counseling: I discussed with the patient the potential side effects of OCPs including but not limited to increased risk of stroke, heart attack, thrombophlebitis, deep venous thrombosis, hepatic adenomas, breast changes, GI upset, headaches, and depression.  The patient verbalized understanding of the proper use and possible adverse effects of OCPs. All of the patient's questions and concerns were addressed. Tazorac Pregnancy And Lactation Text: This medication is not safe during pregnancy. It is unknown if this medication is excreted in breast milk. Azelaic Acid Counseling: Patient counseled that medicine may cause skin irritation and to avoid applying near the eyes.  In the event of skin irritation, the patient was advised to reduce the amount of the drug applied or use it less frequently.   The patient verbalized understanding of the proper use and possible adverse effects of azelaic acid.  All of the patient's questions and concerns were addressed. Aklief counseling:  Patient advised to apply a pea-sized amount only at bedtime and wait 30 minutes after washing their face before applying.  If too drying, patient may add a non-comedogenic moisturizer.  The most commonly reported side effects including irritation, redness, scaling, dryness, stinging, burning, itching, and increased risk of sunburn.  The patient verbalized understanding of the proper use and possible adverse effects of retinoids.  All of the patient's questions and concerns were addressed. Erythromycin Pregnancy And Lactation Text: This medication is Pregnancy Category B and is considered safe during pregnancy. It is also excreted in breast milk. Isotretinoin Counseling: Patient should get monthly blood tests, not donate blood, not drive at night if vision affected, not share medication, and not undergo elective surgery for 6 months after tx completed. Side effects reviewed, pt to contact office should one occur. Dapsone Counseling: I discussed with the patient the risks of dapsone including but not limited to hemolytic anemia, agranulocytosis, rashes, methemoglobinemia, kidney failure, peripheral neuropathy, headaches, GI upset, and liver toxicity.  Patients who start dapsone require monitoring including baseline LFTs and weekly CBCs for the first month, then every month thereafter.  The patient verbalized understanding of the proper use and possible adverse effects of dapsone.  All of the patient's questions and concerns were addressed.

## 2025-08-04 ENCOUNTER — TELEPHONE (OUTPATIENT)
Dept: PALLATIVE CARE | Age: 35
End: 2025-08-04

## 2025-08-10 ENCOUNTER — HOSPITAL ENCOUNTER (EMERGENCY)
Age: 35
Discharge: HOME | End: 2025-08-10
Attending: EMERGENCY MEDICINE
Payer: COMMERCIAL

## 2025-08-10 ENCOUNTER — APPOINTMENT (OUTPATIENT)
Dept: CT IMAGING | Age: 35
End: 2025-08-10
Payer: COMMERCIAL

## 2025-08-10 VITALS
TEMPERATURE: 98.8 F | WEIGHT: 293 LBS | SYSTOLIC BLOOD PRESSURE: 136 MMHG | BODY MASS INDEX: 50.29 KG/M2 | RESPIRATION RATE: 14 BRPM | DIASTOLIC BLOOD PRESSURE: 99 MMHG | OXYGEN SATURATION: 97 % | HEART RATE: 81 BPM

## 2025-08-10 DIAGNOSIS — K59.00 CONSTIPATION, UNSPECIFIED CONSTIPATION TYPE: ICD-10-CM

## 2025-08-10 DIAGNOSIS — R10.12 LEFT UPPER QUADRANT ABDOMINAL PAIN: Primary | ICD-10-CM

## 2025-08-10 DIAGNOSIS — K08.89 PAIN, DENTAL: ICD-10-CM

## 2025-08-10 LAB
ALBUMIN SERPL BCG-MCNC: 3.8 G/DL (ref 3.4–4.9)
ALP SERPL-CCNC: 110 U/L (ref 38–126)
ALT SERPL W/O P-5'-P-CCNC: 14 U/L (ref 10–35)
ANION GAP SERPL CALC-SCNC: 12 MEQ/L (ref 8–16)
AST SERPL-CCNC: 23 U/L (ref 10–35)
B-HCG SERPL QL: NEGATIVE
BASOPHILS ABSOLUTE: 0 THOU/MM3 (ref 0–0.1)
BASOPHILS NFR BLD AUTO: 0.4 %
BILIRUB CONJ SERPL-MCNC: < 0.1 MG/DL (ref 0–0.2)
BILIRUB SERPL-MCNC: < 0.2 MG/DL (ref 0.3–1.2)
BUN SERPL-MCNC: 12 MG/DL (ref 8–23)
CALCIUM SERPL-MCNC: 9.6 MG/DL (ref 8.6–10)
CHLORIDE SERPL-SCNC: 106 MEQ/L (ref 98–111)
CO2 SERPL-SCNC: 24 MEQ/L (ref 22–29)
CREAT SERPL-MCNC: 0.9 MG/DL (ref 0.5–0.9)
DEPRECATED RDW RBC AUTO: 45.8 FL (ref 35–45)
EOSINOPHIL NFR BLD AUTO: 2.1 %
EOSINOPHILS ABSOLUTE: 0.2 THOU/MM3 (ref 0–0.4)
ERYTHROCYTE [DISTWIDTH] IN BLOOD BY AUTOMATED COUNT: 15.9 % (ref 11.5–14.5)
GFR SERPL CREATININE-BSD FRML MDRD: 85 ML/MIN/1.73M2
GLUCOSE SERPL-MCNC: 113 MG/DL (ref 74–109)
HCT VFR BLD AUTO: 40 % (ref 37–47)
HGB BLD-MCNC: 12 GM/DL (ref 12–16)
HGB RETIC QN AUTO: 26.1 PG (ref 28.2–35.7)
IMM GRANULOCYTES # BLD AUTO: 0.03 THOU/MM3 (ref 0–0.07)
IMM GRANULOCYTES NFR BLD AUTO: 0.3 %
IMM RETICS NFR: 14.2 % (ref 3–15.9)
LDH SERPL L TO P-CCNC: 268 U/L (ref 135–214)
LIPASE SERPL-CCNC: 16 U/L (ref 13–60)
LYMPHOCYTES ABSOLUTE: 3.4 THOU/MM3 (ref 1–4.8)
LYMPHOCYTES NFR BLD AUTO: 35.3 %
MAGNESIUM SERPL-MCNC: 2.2 MG/DL (ref 1.6–2.6)
MCH RBC QN AUTO: 23.7 PG (ref 26–33)
MCHC RBC AUTO-ENTMCNC: 30 GM/DL (ref 32.2–35.5)
MCV RBC AUTO: 79.1 FL (ref 81–99)
MONOCYTES ABSOLUTE: 0.5 THOU/MM3 (ref 0.4–1.3)
MONOCYTES NFR BLD AUTO: 5.6 %
NEUTROPHILS ABSOLUTE: 5.4 THOU/MM3 (ref 1.8–7.7)
NEUTROPHILS NFR BLD AUTO: 56.3 %
NRBC BLD AUTO-RTO: 0 /100 WBC
OSMOLALITY SERPL CALC.SUM OF ELEC: 283.7 MOSMOL/KG (ref 275–300)
PLATELET # BLD AUTO: 266 THOU/MM3 (ref 130–400)
PMV BLD AUTO: 9.5 FL (ref 9.4–12.4)
POTASSIUM SERPL-SCNC: 4.1 MEQ/L (ref 3.5–5.2)
PROT SERPL-MCNC: 7.3 G/DL (ref 6.4–8.3)
RBC # BLD AUTO: 5.06 MILL/MM3 (ref 4.2–5.4)
RETICS # AUTO: 60 THOU/MM3 (ref 20–115)
RETICS/RBC NFR AUTO: 1.2 % (ref 0.5–2)
SODIUM SERPL-SCNC: 142 MEQ/L (ref 135–145)
TSH SERPL DL<=0.05 MIU/L-ACNC: 2.75 UIU/ML (ref 0.27–4.2)
WBC # BLD AUTO: 9.6 THOU/MM3 (ref 4.8–10.8)

## 2025-08-10 PROCEDURE — 96374 THER/PROPH/DIAG INJ IV PUSH: CPT

## 2025-08-10 PROCEDURE — 83615 LACTATE (LD) (LDH) ENZYME: CPT

## 2025-08-10 PROCEDURE — 74177 CT ABD & PELVIS W/CONTRAST: CPT

## 2025-08-10 PROCEDURE — 36415 COLL VENOUS BLD VENIPUNCTURE: CPT

## 2025-08-10 PROCEDURE — 84703 CHORIONIC GONADOTROPIN ASSAY: CPT

## 2025-08-10 PROCEDURE — 83735 ASSAY OF MAGNESIUM: CPT

## 2025-08-10 PROCEDURE — 84443 ASSAY THYROID STIM HORMONE: CPT

## 2025-08-10 PROCEDURE — 99285 EMERGENCY DEPT VISIT HI MDM: CPT

## 2025-08-10 PROCEDURE — 6360000004 HC RX CONTRAST MEDICATION: Performed by: EMERGENCY MEDICINE

## 2025-08-10 PROCEDURE — 83690 ASSAY OF LIPASE: CPT

## 2025-08-10 PROCEDURE — 6370000000 HC RX 637 (ALT 250 FOR IP): Performed by: EMERGENCY MEDICINE

## 2025-08-10 PROCEDURE — 85046 RETICYTE/HGB CONCENTRATE: CPT

## 2025-08-10 PROCEDURE — 85025 COMPLETE CBC W/AUTO DIFF WBC: CPT

## 2025-08-10 PROCEDURE — 80053 COMPREHEN METABOLIC PANEL: CPT

## 2025-08-10 PROCEDURE — 6360000002 HC RX W HCPCS: Performed by: EMERGENCY MEDICINE

## 2025-08-10 PROCEDURE — 82248 BILIRUBIN DIRECT: CPT

## 2025-08-10 PROCEDURE — 96372 THER/PROPH/DIAG INJ SC/IM: CPT

## 2025-08-10 PROCEDURE — 96375 TX/PRO/DX INJ NEW DRUG ADDON: CPT

## 2025-08-10 RX ORDER — PROMETHAZINE HYDROCHLORIDE 25 MG/ML
25 INJECTION, SOLUTION INTRAMUSCULAR; INTRAVENOUS ONCE
Status: COMPLETED | OUTPATIENT
Start: 2025-08-10 | End: 2025-08-10

## 2025-08-10 RX ORDER — CLINDAMYCIN HYDROCHLORIDE 150 MG/1
300 CAPSULE ORAL ONCE
Status: COMPLETED | OUTPATIENT
Start: 2025-08-10 | End: 2025-08-10

## 2025-08-10 RX ORDER — HEPARIN 100 UNIT/ML
300 SYRINGE INTRAVENOUS ONCE
Status: COMPLETED | OUTPATIENT
Start: 2025-08-10 | End: 2025-08-10

## 2025-08-10 RX ORDER — MORPHINE SULFATE 4 MG/ML
4 INJECTION, SOLUTION INTRAMUSCULAR; INTRAVENOUS ONCE
Status: COMPLETED | OUTPATIENT
Start: 2025-08-10 | End: 2025-08-10

## 2025-08-10 RX ORDER — IOPAMIDOL 755 MG/ML
80 INJECTION, SOLUTION INTRAVASCULAR
Status: COMPLETED | OUTPATIENT
Start: 2025-08-10 | End: 2025-08-10

## 2025-08-10 RX ORDER — DROPERIDOL 2.5 MG/ML
2.5 INJECTION, SOLUTION INTRAMUSCULAR; INTRAVENOUS ONCE
Status: DISCONTINUED | OUTPATIENT
Start: 2025-08-10 | End: 2025-08-10

## 2025-08-10 RX ORDER — POLYETHYLENE GLYCOL 3350 17 G/17G
17 POWDER, FOR SOLUTION ORAL DAILY
Qty: 238 G | Refills: 1 | Status: SHIPPED | OUTPATIENT
Start: 2025-08-10 | End: 2025-09-09

## 2025-08-10 RX ORDER — CLINDAMYCIN HYDROCHLORIDE 300 MG/1
300 CAPSULE ORAL 3 TIMES DAILY
Qty: 21 CAPSULE | Refills: 0 | Status: SHIPPED | OUTPATIENT
Start: 2025-08-10 | End: 2025-08-17

## 2025-08-10 RX ADMIN — PROMETHAZINE HYDROCHLORIDE 25 MG: 25 INJECTION INTRAMUSCULAR; INTRAVENOUS at 19:00

## 2025-08-10 RX ADMIN — IOPAMIDOL 80 ML: 755 INJECTION, SOLUTION INTRAVENOUS at 17:20

## 2025-08-10 RX ADMIN — CLINDAMYCIN HYDROCHLORIDE 300 MG: 150 CAPSULE ORAL at 19:01

## 2025-08-10 RX ADMIN — HEPARIN 300 UNITS: 100 SYRINGE at 19:31

## 2025-08-10 RX ADMIN — MORPHINE SULFATE 4 MG: 4 INJECTION, SOLUTION INTRAMUSCULAR; INTRAVENOUS at 16:38

## 2025-08-10 RX ADMIN — HYDROMORPHONE HYDROCHLORIDE 1 MG: 1 INJECTION, SOLUTION INTRAMUSCULAR; INTRAVENOUS; SUBCUTANEOUS at 19:02

## 2025-08-10 ASSESSMENT — PAIN SCALES - GENERAL: PAINLEVEL_OUTOF10: 10

## 2025-08-10 ASSESSMENT — PAIN DESCRIPTION - FREQUENCY: FREQUENCY: CONTINUOUS

## 2025-08-10 ASSESSMENT — PAIN - FUNCTIONAL ASSESSMENT: PAIN_FUNCTIONAL_ASSESSMENT: 0-10

## 2025-08-10 ASSESSMENT — PAIN DESCRIPTION - PAIN TYPE: TYPE: ACUTE PAIN

## 2025-08-10 ASSESSMENT — PAIN DESCRIPTION - DESCRIPTORS: DESCRIPTORS: ACHING

## 2025-08-10 ASSESSMENT — PAIN DESCRIPTION - LOCATION: LOCATION: TEETH

## 2025-08-14 ENCOUNTER — APPOINTMENT (OUTPATIENT)
Dept: CT IMAGING | Age: 35
End: 2025-08-14
Payer: COMMERCIAL

## 2025-08-14 ENCOUNTER — HOSPITAL ENCOUNTER (EMERGENCY)
Age: 35
Discharge: HOME | End: 2025-08-15
Attending: EMERGENCY MEDICINE
Payer: COMMERCIAL

## 2025-08-14 VITALS
WEIGHT: 293 LBS | HEART RATE: 78 BPM | SYSTOLIC BLOOD PRESSURE: 157 MMHG | RESPIRATION RATE: 17 BRPM | OXYGEN SATURATION: 93 % | HEIGHT: 64 IN | DIASTOLIC BLOOD PRESSURE: 105 MMHG | BODY MASS INDEX: 50.02 KG/M2 | TEMPERATURE: 98.3 F

## 2025-08-14 DIAGNOSIS — G89.29 CHRONIC ABDOMINAL PAIN: Primary | ICD-10-CM

## 2025-08-14 DIAGNOSIS — R10.9 CHRONIC ABDOMINAL PAIN: Primary | ICD-10-CM

## 2025-08-14 LAB
ALBUMIN SERPL BCG-MCNC: 3.9 G/DL (ref 3.4–4.9)
ALP SERPL-CCNC: 106 U/L (ref 38–126)
ALT SERPL W/O P-5'-P-CCNC: 15 U/L (ref 10–35)
ANION GAP SERPL CALC-SCNC: 14 MEQ/L (ref 8–16)
AST SERPL-CCNC: 22 U/L (ref 10–35)
B-HCG SERPL QL: NEGATIVE
BACTERIA URNS QL MICRO: ABNORMAL /HPF
BASOPHILS ABSOLUTE: 0.1 THOU/MM3 (ref 0–0.1)
BASOPHILS NFR BLD AUTO: 0.5 %
BILIRUB CONJ SERPL-MCNC: < 0.1 MG/DL (ref 0–0.2)
BILIRUB SERPL-MCNC: 0.2 MG/DL (ref 0.3–1.2)
BILIRUB UR QL STRIP.AUTO: NEGATIVE
BUN SERPL-MCNC: 13 MG/DL (ref 8–23)
CALCIUM SERPL-MCNC: 9.6 MG/DL (ref 8.5–10.5)
CASTS #/AREA URNS LPF: ABNORMAL /LPF
CASTS 2: ABNORMAL /LPF
CHARACTER UR: CLEAR
CHLORIDE SERPL-SCNC: 106 MEQ/L (ref 98–111)
CO2 SERPL-SCNC: 22 MEQ/L (ref 22–29)
COLOR, UA: YELLOW
CREAT SERPL-MCNC: 0.8 MG/DL (ref 0.5–0.9)
CRYSTALS URNS MICRO: ABNORMAL
DEPRECATED RDW RBC AUTO: 42.7 FL (ref 35–45)
EOSINOPHIL NFR BLD AUTO: 1.9 %
EOSINOPHILS ABSOLUTE: 0.2 THOU/MM3 (ref 0–0.4)
EPITHELIAL CELLS, UA: ABNORMAL /HPF
ERYTHROCYTE [DISTWIDTH] IN BLOOD BY AUTOMATED COUNT: 15.6 % (ref 11.5–14.5)
ETHANOL SERPL-MCNC: < 0.01 % (ref 0–0.08)
GFR SERPL CREATININE-BSD FRML MDRD: > 90 ML/MIN/1.73M2
GLUCOSE SERPL-MCNC: 106 MG/DL (ref 74–109)
GLUCOSE UR QL STRIP.AUTO: NEGATIVE MG/DL
HCT VFR BLD AUTO: 39.2 % (ref 37–47)
HGB BLD-MCNC: 12.2 GM/DL (ref 12–16)
HGB UR QL STRIP.AUTO: ABNORMAL
IMM GRANULOCYTES # BLD AUTO: 0.03 THOU/MM3 (ref 0–0.07)
IMM GRANULOCYTES NFR BLD AUTO: 0.3 %
KETONES UR QL STRIP.AUTO: NEGATIVE
LIPASE SERPL-CCNC: 17 U/L (ref 13–60)
LYMPHOCYTES ABSOLUTE: 3.5 THOU/MM3 (ref 1–4.8)
LYMPHOCYTES NFR BLD AUTO: 31.7 %
MAGNESIUM SERPL-MCNC: 2 MG/DL (ref 1.6–2.6)
MCH RBC QN AUTO: 23.7 PG (ref 26–33)
MCHC RBC AUTO-ENTMCNC: 31.1 GM/DL (ref 32.2–35.5)
MCV RBC AUTO: 76.1 FL (ref 81–99)
MISCELLANEOUS 2: ABNORMAL
MONOCYTES ABSOLUTE: 0.7 THOU/MM3 (ref 0.4–1.3)
MONOCYTES NFR BLD AUTO: 6 %
NEUTROPHILS ABSOLUTE: 6.6 THOU/MM3 (ref 1.8–7.7)
NEUTROPHILS NFR BLD AUTO: 59.6 %
NITRITE UR QL STRIP: NEGATIVE
NRBC BLD AUTO-RTO: 0 /100 WBC
NT-PROBNP SERPL IA-MCNC: 59 PG/ML (ref 0–124)
OSMOLALITY SERPL CALC.SUM OF ELEC: 283.7 MOSMOL/KG (ref 275–300)
PH UR STRIP.AUTO: 5.5 [PH] (ref 5–9)
PLATELET # BLD AUTO: 309 THOU/MM3 (ref 130–400)
PMV BLD AUTO: 9.8 FL (ref 9.4–12.4)
POTASSIUM SERPL-SCNC: 3.7 MEQ/L (ref 3.5–5.2)
PROT SERPL-MCNC: 7.5 G/DL (ref 6.4–8.3)
PROT UR STRIP.AUTO-MCNC: NEGATIVE MG/DL
RBC # BLD AUTO: 5.15 MILL/MM3 (ref 4.2–5.4)
RBC URINE: ABNORMAL /HPF
RENAL EPI CELLS #/AREA URNS HPF: ABNORMAL /[HPF]
SODIUM SERPL-SCNC: 142 MEQ/L (ref 135–145)
SP GR UR REFRACT.AUTO: 1.01 (ref 1–1.03)
TROPONIN, HIGH SENSITIVITY: 10 NG/L (ref 0–12)
UROBILINOGEN, URINE: 0.2 EU/DL (ref 0–1)
WBC # BLD AUTO: 11 THOU/MM3 (ref 4.8–10.8)
WBC #/AREA URNS HPF: ABNORMAL /HPF
WBC #/AREA URNS HPF: NEGATIVE /[HPF]
YEAST LIKE FUNGI URNS QL MICRO: ABNORMAL

## 2025-08-14 PROCEDURE — 83880 ASSAY OF NATRIURETIC PEPTIDE: CPT

## 2025-08-14 PROCEDURE — 80307 DRUG TEST PRSMV CHEM ANLYZR: CPT

## 2025-08-14 PROCEDURE — 36415 COLL VENOUS BLD VENIPUNCTURE: CPT

## 2025-08-14 PROCEDURE — 6360000002 HC RX W HCPCS: Performed by: EMERGENCY MEDICINE

## 2025-08-14 PROCEDURE — 96372 THER/PROPH/DIAG INJ SC/IM: CPT

## 2025-08-14 PROCEDURE — 85025 COMPLETE CBC W/AUTO DIFF WBC: CPT

## 2025-08-14 PROCEDURE — 82077 ASSAY SPEC XCP UR&BREATH IA: CPT

## 2025-08-14 PROCEDURE — 83735 ASSAY OF MAGNESIUM: CPT

## 2025-08-14 PROCEDURE — 83690 ASSAY OF LIPASE: CPT

## 2025-08-14 PROCEDURE — 82248 BILIRUBIN DIRECT: CPT

## 2025-08-14 PROCEDURE — 6360000004 HC RX CONTRAST MEDICATION: Performed by: EMERGENCY MEDICINE

## 2025-08-14 PROCEDURE — 99285 EMERGENCY DEPT VISIT HI MDM: CPT

## 2025-08-14 PROCEDURE — 81001 URINALYSIS AUTO W/SCOPE: CPT

## 2025-08-14 PROCEDURE — 84703 CHORIONIC GONADOTROPIN ASSAY: CPT

## 2025-08-14 PROCEDURE — 96374 THER/PROPH/DIAG INJ IV PUSH: CPT

## 2025-08-14 PROCEDURE — 71275 CT ANGIOGRAPHY CHEST: CPT

## 2025-08-14 PROCEDURE — 84484 ASSAY OF TROPONIN QUANT: CPT

## 2025-08-14 PROCEDURE — 74177 CT ABD & PELVIS W/CONTRAST: CPT

## 2025-08-14 PROCEDURE — 80053 COMPREHEN METABOLIC PANEL: CPT

## 2025-08-14 PROCEDURE — 93005 ELECTROCARDIOGRAM TRACING: CPT | Performed by: EMERGENCY MEDICINE

## 2025-08-14 RX ORDER — IOPAMIDOL 755 MG/ML
80 INJECTION, SOLUTION INTRAVASCULAR
Status: COMPLETED | OUTPATIENT
Start: 2025-08-14 | End: 2025-08-14

## 2025-08-14 RX ORDER — PROMETHAZINE HYDROCHLORIDE 25 MG/ML
25 INJECTION, SOLUTION INTRAMUSCULAR; INTRAVENOUS ONCE
Status: COMPLETED | OUTPATIENT
Start: 2025-08-14 | End: 2025-08-14

## 2025-08-14 RX ADMIN — PROMETHAZINE HYDROCHLORIDE 25 MG: 25 INJECTION INTRAMUSCULAR; INTRAVENOUS at 22:09

## 2025-08-14 RX ADMIN — IOPAMIDOL 80 ML: 755 INJECTION, SOLUTION INTRAVENOUS at 22:23

## 2025-08-14 RX ADMIN — HYDROMORPHONE HYDROCHLORIDE 1 MG: 1 INJECTION, SOLUTION INTRAMUSCULAR; INTRAVENOUS; SUBCUTANEOUS at 22:10

## 2025-08-14 ASSESSMENT — PAIN SCALES - GENERAL: PAINLEVEL_OUTOF10: 10

## 2025-08-14 ASSESSMENT — ENCOUNTER SYMPTOMS
NAUSEA: 0
ANAL BLEEDING: 0
EYE PAIN: 0
SORE THROAT: 0
EYE DISCHARGE: 0
BLOOD IN STOOL: 0
COUGH: 0
BACK PAIN: 0
CHOKING: 0
EYE REDNESS: 0
ABDOMINAL DISTENTION: 0
WHEEZING: 0
TROUBLE SWALLOWING: 0
CHEST TIGHTNESS: 0
DIARRHEA: 0
VOICE CHANGE: 0
RHINORRHEA: 0
ABDOMINAL PAIN: 1
EYE ITCHING: 0
SINUS PRESSURE: 0
SHORTNESS OF BREATH: 0
VOMITING: 0
PHOTOPHOBIA: 0
CONSTIPATION: 0
STRIDOR: 0

## 2025-08-14 ASSESSMENT — PAIN - FUNCTIONAL ASSESSMENT: PAIN_FUNCTIONAL_ASSESSMENT: 0-10

## 2025-08-15 LAB
AMPHETAMINES UR QL SCN: NEGATIVE
BARBITURATES UR QL SCN: NEGATIVE
BENZODIAZ UR QL SCN: NEGATIVE
BUPRENORPHINE URINE: NEGATIVE
BZE UR QL SCN: NEGATIVE
CANNABINOIDS UR QL SCN: NEGATIVE
EKG ATRIAL RATE: 80 BPM
EKG P AXIS: 27 DEGREES
EKG P-R INTERVAL: 172 MS
EKG Q-T INTERVAL: 358 MS
EKG QRS DURATION: 86 MS
EKG QTC CALCULATION (BAZETT): 412 MS
EKG R AXIS: 8 DEGREES
EKG T AXIS: 27 DEGREES
EKG VENTRICULAR RATE: 80 BPM
FENTANYL: NEGATIVE
OPIATES UR QL SCN: POSITIVE
OXYCODONE: NEGATIVE
PCP UR QL SCN: NEGATIVE

## 2025-08-15 PROCEDURE — 6360000002 HC RX W HCPCS: Performed by: EMERGENCY MEDICINE

## 2025-08-15 PROCEDURE — 96372 THER/PROPH/DIAG INJ SC/IM: CPT

## 2025-08-15 PROCEDURE — 96376 TX/PRO/DX INJ SAME DRUG ADON: CPT

## 2025-08-15 PROCEDURE — 93010 ELECTROCARDIOGRAM REPORT: CPT | Performed by: NUCLEAR MEDICINE

## 2025-08-15 RX ORDER — HEPARIN 100 UNIT/ML
300 SYRINGE INTRAVENOUS ONCE
Status: COMPLETED | OUTPATIENT
Start: 2025-08-15 | End: 2025-08-15

## 2025-08-15 RX ORDER — PROMETHAZINE HYDROCHLORIDE 25 MG/ML
25 INJECTION, SOLUTION INTRAMUSCULAR; INTRAVENOUS ONCE
Status: COMPLETED | OUTPATIENT
Start: 2025-08-15 | End: 2025-08-15

## 2025-08-15 RX ADMIN — PROMETHAZINE HYDROCHLORIDE 25 MG: 25 INJECTION INTRAMUSCULAR; INTRAVENOUS at 01:05

## 2025-08-15 RX ADMIN — HEPARIN 300 UNITS: 100 SYRINGE at 01:20

## 2025-08-15 RX ADMIN — HYDROMORPHONE HYDROCHLORIDE 1 MG: 1 INJECTION, SOLUTION INTRAMUSCULAR; INTRAVENOUS; SUBCUTANEOUS at 01:05

## 2025-08-18 ENCOUNTER — OFFICE VISIT (OUTPATIENT)
Dept: PALLATIVE CARE | Age: 35
End: 2025-08-18
Payer: COMMERCIAL

## 2025-08-18 VITALS
HEART RATE: 72 BPM | RESPIRATION RATE: 20 BRPM | HEIGHT: 64 IN | OXYGEN SATURATION: 100 % | DIASTOLIC BLOOD PRESSURE: 102 MMHG | BODY MASS INDEX: 50.02 KG/M2 | SYSTOLIC BLOOD PRESSURE: 140 MMHG | WEIGHT: 293 LBS

## 2025-08-18 DIAGNOSIS — D57.1 SICKLE CELL DISEASE WITHOUT CRISIS (HCC): ICD-10-CM

## 2025-08-18 DIAGNOSIS — D57.00 SICKLE-CELL DISEASE WITH PAIN (HCC): Primary | ICD-10-CM

## 2025-08-18 DIAGNOSIS — G89.29 OTHER CHRONIC PAIN: ICD-10-CM

## 2025-08-18 DIAGNOSIS — Z51.5 PALLIATIVE CARE PATIENT: ICD-10-CM

## 2025-08-18 PROCEDURE — 4004F PT TOBACCO SCREEN RCVD TLK: CPT | Performed by: STUDENT IN AN ORGANIZED HEALTH CARE EDUCATION/TRAINING PROGRAM

## 2025-08-18 PROCEDURE — G8417 CALC BMI ABV UP PARAM F/U: HCPCS | Performed by: STUDENT IN AN ORGANIZED HEALTH CARE EDUCATION/TRAINING PROGRAM

## 2025-08-18 PROCEDURE — 99214 OFFICE O/P EST MOD 30 MIN: CPT | Performed by: STUDENT IN AN ORGANIZED HEALTH CARE EDUCATION/TRAINING PROGRAM

## 2025-08-18 PROCEDURE — 3080F DIAST BP >= 90 MM HG: CPT | Performed by: STUDENT IN AN ORGANIZED HEALTH CARE EDUCATION/TRAINING PROGRAM

## 2025-08-18 PROCEDURE — 3077F SYST BP >= 140 MM HG: CPT | Performed by: STUDENT IN AN ORGANIZED HEALTH CARE EDUCATION/TRAINING PROGRAM

## 2025-08-18 PROCEDURE — G8427 DOCREV CUR MEDS BY ELIG CLIN: HCPCS | Performed by: STUDENT IN AN ORGANIZED HEALTH CARE EDUCATION/TRAINING PROGRAM

## 2025-08-18 RX ORDER — HYDROMORPHONE HYDROCHLORIDE 2 MG/1
2-4 TABLET ORAL EVERY 4 HOURS PRN
Qty: 180 TABLET | Refills: 0 | Status: SHIPPED | OUTPATIENT
Start: 2025-08-18 | End: 2025-09-02

## 2025-08-20 ENCOUNTER — OFFICE VISIT (OUTPATIENT)
Dept: INTERNAL MEDICINE CLINIC | Age: 35
End: 2025-08-20
Payer: COMMERCIAL

## 2025-08-20 VITALS
BODY MASS INDEX: 50.02 KG/M2 | HEART RATE: 80 BPM | TEMPERATURE: 98 F | SYSTOLIC BLOOD PRESSURE: 138 MMHG | DIASTOLIC BLOOD PRESSURE: 76 MMHG | HEIGHT: 64 IN | WEIGHT: 293 LBS

## 2025-08-20 DIAGNOSIS — R10.12 LEFT UPPER QUADRANT ABDOMINAL PAIN: ICD-10-CM

## 2025-08-20 DIAGNOSIS — Z72.89 OTHER PROBLEMS RELATED TO LIFESTYLE: ICD-10-CM

## 2025-08-20 DIAGNOSIS — Z00.00 MEDICARE ANNUAL WELLNESS VISIT, INITIAL: Primary | ICD-10-CM

## 2025-08-20 DIAGNOSIS — Z00.00 WELCOME TO MEDICARE PREVENTIVE VISIT: ICD-10-CM

## 2025-08-20 DIAGNOSIS — Z11.3 SCREENING FOR STDS (SEXUALLY TRANSMITTED DISEASES): ICD-10-CM

## 2025-08-20 PROCEDURE — 3075F SYST BP GE 130 - 139MM HG: CPT

## 2025-08-20 PROCEDURE — 3078F DIAST BP <80 MM HG: CPT

## 2025-08-20 PROCEDURE — G0402 INITIAL PREVENTIVE EXAM: HCPCS

## 2025-08-20 RX ORDER — METHOCARBAMOL 750 MG/1
750 TABLET, FILM COATED ORAL NIGHTLY
Qty: 10 TABLET | Refills: 0 | Status: SHIPPED | OUTPATIENT
Start: 2025-08-20 | End: 2025-08-30

## 2025-08-20 ASSESSMENT — PATIENT HEALTH QUESTIONNAIRE - PHQ9
5. POOR APPETITE OR OVEREATING: NEARLY EVERY DAY
7. TROUBLE CONCENTRATING ON THINGS, SUCH AS READING THE NEWSPAPER OR WATCHING TELEVISION: NEARLY EVERY DAY
2. FEELING DOWN, DEPRESSED OR HOPELESS: NOT AT ALL
1. LITTLE INTEREST OR PLEASURE IN DOING THINGS: MORE THAN HALF THE DAYS
4. FEELING TIRED OR HAVING LITTLE ENERGY: NEARLY EVERY DAY
SUM OF ALL RESPONSES TO PHQ QUESTIONS 1-9: 17
9. THOUGHTS THAT YOU WOULD BE BETTER OFF DEAD, OR OF HURTING YOURSELF: NOT AT ALL
SUM OF ALL RESPONSES TO PHQ QUESTIONS 1-9: 17
6. FEELING BAD ABOUT YOURSELF - OR THAT YOU ARE A FAILURE OR HAVE LET YOURSELF OR YOUR FAMILY DOWN: NOT AT ALL
3. TROUBLE FALLING OR STAYING ASLEEP: NEARLY EVERY DAY
10. IF YOU CHECKED OFF ANY PROBLEMS, HOW DIFFICULT HAVE THESE PROBLEMS MADE IT FOR YOU TO DO YOUR WORK, TAKE CARE OF THINGS AT HOME, OR GET ALONG WITH OTHER PEOPLE: EXTREMELY DIFFICULT
SUM OF ALL RESPONSES TO PHQ QUESTIONS 1-9: 17
SUM OF ALL RESPONSES TO PHQ QUESTIONS 1-9: 17
8. MOVING OR SPEAKING SO SLOWLY THAT OTHER PEOPLE COULD HAVE NOTICED. OR THE OPPOSITE, BEING SO FIGETY OR RESTLESS THAT YOU HAVE BEEN MOVING AROUND A LOT MORE THAN USUAL: NEARLY EVERY DAY

## 2025-08-20 ASSESSMENT — LIFESTYLE VARIABLES
HOW MANY STANDARD DRINKS CONTAINING ALCOHOL DO YOU HAVE ON A TYPICAL DAY: PATIENT DOES NOT DRINK
HOW OFTEN DO YOU HAVE A DRINK CONTAINING ALCOHOL: NEVER

## 2025-08-27 DIAGNOSIS — G89.29 OTHER CHRONIC PAIN: ICD-10-CM

## 2025-08-27 DIAGNOSIS — Z51.5 PALLIATIVE CARE PATIENT: ICD-10-CM

## 2025-08-27 DIAGNOSIS — D57.00 SICKLE-CELL DISEASE WITH PAIN (HCC): ICD-10-CM

## 2025-08-27 DIAGNOSIS — D57.1 SICKLE CELL DISEASE WITHOUT CRISIS (HCC): ICD-10-CM

## 2025-08-27 DIAGNOSIS — D57.00 SICKLE CELL ANEMIA WITH PAIN (HCC): ICD-10-CM

## 2025-08-27 RX ORDER — MORPHINE SULFATE 30 MG/1
30 TABLET, FILM COATED, EXTENDED RELEASE ORAL 2 TIMES DAILY
Qty: 60 TABLET | Refills: 0 | Status: SHIPPED | OUTPATIENT
Start: 2025-08-30 | End: 2025-09-29

## 2025-08-27 RX ORDER — HYDROMORPHONE HYDROCHLORIDE 2 MG/1
2-4 TABLET ORAL EVERY 4 HOURS PRN
Qty: 180 TABLET | Refills: 0 | Status: CANCELLED | OUTPATIENT
Start: 2025-08-27 | End: 2025-09-11

## 2025-08-28 ENCOUNTER — HOSPITAL ENCOUNTER (EMERGENCY)
Age: 35
Discharge: LEFT BEFORE DISCHARGE | End: 2025-08-28
Attending: EMERGENCY MEDICINE
Payer: COMMERCIAL

## 2025-08-28 ENCOUNTER — APPOINTMENT (OUTPATIENT)
Dept: CT IMAGING | Age: 35
End: 2025-08-28
Payer: COMMERCIAL

## 2025-08-28 VITALS
SYSTOLIC BLOOD PRESSURE: 152 MMHG | HEART RATE: 68 BPM | TEMPERATURE: 97.8 F | RESPIRATION RATE: 17 BRPM | OXYGEN SATURATION: 97 % | BODY MASS INDEX: 50.02 KG/M2 | DIASTOLIC BLOOD PRESSURE: 110 MMHG | WEIGHT: 293 LBS | HEIGHT: 64 IN

## 2025-08-28 DIAGNOSIS — R10.9 ABDOMINAL PAIN OF UNKNOWN ETIOLOGY: Primary | ICD-10-CM

## 2025-08-28 LAB
ALBUMIN SERPL BCG-MCNC: 3.9 G/DL (ref 3.4–4.9)
ALP SERPL-CCNC: 111 U/L (ref 38–126)
ALT SERPL W/O P-5'-P-CCNC: 10 U/L (ref 10–35)
ANION GAP SERPL CALC-SCNC: 10 MEQ/L (ref 8–16)
AST SERPL-CCNC: 28 U/L (ref 10–35)
BASOPHILS ABSOLUTE: 0.1 THOU/MM3 (ref 0–0.1)
BASOPHILS NFR BLD AUTO: 0.5 %
BILIRUB SERPL-MCNC: 0.2 MG/DL (ref 0.3–1.2)
BUN SERPL-MCNC: 13 MG/DL (ref 8–23)
CALCIUM SERPL-MCNC: 9.7 MG/DL (ref 8.5–10.5)
CHLORIDE SERPL-SCNC: 106 MEQ/L (ref 98–111)
CO2 SERPL-SCNC: 25 MEQ/L (ref 22–29)
CREAT SERPL-MCNC: 0.9 MG/DL (ref 0.5–0.9)
DEPRECATED RDW RBC AUTO: 42.6 FL (ref 35–45)
EKG ATRIAL RATE: 80 BPM
EKG P AXIS: 34 DEGREES
EKG P-R INTERVAL: 142 MS
EKG Q-T INTERVAL: 356 MS
EKG QRS DURATION: 78 MS
EKG QTC CALCULATION (BAZETT): 410 MS
EKG R AXIS: 18 DEGREES
EKG T AXIS: 41 DEGREES
EKG VENTRICULAR RATE: 80 BPM
EOSINOPHIL NFR BLD AUTO: 1.2 %
EOSINOPHILS ABSOLUTE: 0.1 THOU/MM3 (ref 0–0.4)
ERYTHROCYTE [DISTWIDTH] IN BLOOD BY AUTOMATED COUNT: 15.7 % (ref 11.5–14.5)
GFR SERPL CREATININE-BSD FRML MDRD: 85 ML/MIN/1.73M2
GLUCOSE SERPL-MCNC: 95 MG/DL (ref 74–109)
HCT VFR BLD AUTO: 39.1 % (ref 37–47)
HGB BLD-MCNC: 11.9 GM/DL (ref 12–16)
HGB RETIC QN AUTO: 26.7 PG (ref 28.2–35.7)
IMM GRANULOCYTES # BLD AUTO: 0.05 THOU/MM3 (ref 0–0.07)
IMM GRANULOCYTES NFR BLD AUTO: 0.4 %
IMM RETICS NFR: 18.7 % (ref 3–15.9)
LIPASE SERPL-CCNC: 25 U/L (ref 13–60)
LYMPHOCYTES ABSOLUTE: 3.7 THOU/MM3 (ref 1–4.8)
LYMPHOCYTES NFR BLD AUTO: 29.9 %
MCH RBC QN AUTO: 23.4 PG (ref 26–33)
MCHC RBC AUTO-ENTMCNC: 30.4 GM/DL (ref 32.2–35.5)
MCV RBC AUTO: 77 FL (ref 81–99)
MONOCYTES ABSOLUTE: 0.8 THOU/MM3 (ref 0.4–1.3)
MONOCYTES NFR BLD AUTO: 6.6 %
NEUTROPHILS ABSOLUTE: 7.6 THOU/MM3 (ref 1.8–7.7)
NEUTROPHILS NFR BLD AUTO: 61.4 %
NRBC BLD AUTO-RTO: 0 /100 WBC
OSMOLALITY SERPL CALC.SUM OF ELEC: 281.2 MOSMOL/KG (ref 275–300)
PLATELET # BLD AUTO: 320 THOU/MM3 (ref 130–400)
PMV BLD AUTO: 10 FL (ref 9.4–12.4)
POTASSIUM SERPL-SCNC: 4.2 MEQ/L (ref 3.5–5.2)
PROT SERPL-MCNC: 7.5 G/DL (ref 6.4–8.3)
RBC # BLD AUTO: 5.08 MILL/MM3 (ref 4.2–5.4)
RETICS # AUTO: 58 THOU/MM3 (ref 20–115)
RETICS/RBC NFR AUTO: 1.1 % (ref 0.5–2)
SODIUM SERPL-SCNC: 141 MEQ/L (ref 135–145)
WBC # BLD AUTO: 12.4 THOU/MM3 (ref 4.8–10.8)

## 2025-08-28 PROCEDURE — 36415 COLL VENOUS BLD VENIPUNCTURE: CPT

## 2025-08-28 PROCEDURE — 99285 EMERGENCY DEPT VISIT HI MDM: CPT

## 2025-08-28 PROCEDURE — 80053 COMPREHEN METABOLIC PANEL: CPT

## 2025-08-28 PROCEDURE — 6360000002 HC RX W HCPCS: Performed by: EMERGENCY MEDICINE

## 2025-08-28 PROCEDURE — 85046 RETICYTE/HGB CONCENTRATE: CPT

## 2025-08-28 PROCEDURE — 96376 TX/PRO/DX INJ SAME DRUG ADON: CPT

## 2025-08-28 PROCEDURE — 74177 CT ABD & PELVIS W/CONTRAST: CPT

## 2025-08-28 PROCEDURE — 85025 COMPLETE CBC W/AUTO DIFF WBC: CPT

## 2025-08-28 PROCEDURE — 6360000004 HC RX CONTRAST MEDICATION: Performed by: EMERGENCY MEDICINE

## 2025-08-28 PROCEDURE — 93005 ELECTROCARDIOGRAM TRACING: CPT | Performed by: EMERGENCY MEDICINE

## 2025-08-28 PROCEDURE — 83690 ASSAY OF LIPASE: CPT

## 2025-08-28 PROCEDURE — 93010 ELECTROCARDIOGRAM REPORT: CPT | Performed by: INTERNAL MEDICINE

## 2025-08-28 PROCEDURE — 96374 THER/PROPH/DIAG INJ IV PUSH: CPT

## 2025-08-28 PROCEDURE — 2580000003 HC RX 258: Performed by: EMERGENCY MEDICINE

## 2025-08-28 PROCEDURE — 96375 TX/PRO/DX INJ NEW DRUG ADDON: CPT

## 2025-08-28 RX ORDER — PROCHLORPERAZINE EDISYLATE 5 MG/ML
10 INJECTION INTRAMUSCULAR; INTRAVENOUS ONCE
Status: COMPLETED | OUTPATIENT
Start: 2025-08-28 | End: 2025-08-28

## 2025-08-28 RX ORDER — 0.9 % SODIUM CHLORIDE 0.9 %
1000 INTRAVENOUS SOLUTION INTRAVENOUS ONCE
Status: COMPLETED | OUTPATIENT
Start: 2025-08-28 | End: 2025-08-28

## 2025-08-28 RX ORDER — IOPAMIDOL 755 MG/ML
80 INJECTION, SOLUTION INTRAVASCULAR
Status: COMPLETED | OUTPATIENT
Start: 2025-08-28 | End: 2025-08-28

## 2025-08-28 RX ADMIN — HYDROMORPHONE HYDROCHLORIDE 0.5 MG: 1 INJECTION, SOLUTION INTRAMUSCULAR; INTRAVENOUS; SUBCUTANEOUS at 06:19

## 2025-08-28 RX ADMIN — SODIUM CHLORIDE 1000 ML: 9 INJECTION, SOLUTION INTRAVENOUS at 03:47

## 2025-08-28 RX ADMIN — PROCHLORPERAZINE EDISYLATE 10 MG: 5 INJECTION INTRAMUSCULAR; INTRAVENOUS at 03:47

## 2025-08-28 RX ADMIN — IOPAMIDOL 80 ML: 755 INJECTION, SOLUTION INTRAVENOUS at 04:16

## 2025-08-28 RX ADMIN — HYDROMORPHONE HYDROCHLORIDE 0.5 MG: 1 INJECTION, SOLUTION INTRAMUSCULAR; INTRAVENOUS; SUBCUTANEOUS at 03:47

## 2025-08-28 ASSESSMENT — PAIN - FUNCTIONAL ASSESSMENT: PAIN_FUNCTIONAL_ASSESSMENT: 0-10

## 2025-08-28 ASSESSMENT — PAIN SCALES - GENERAL: PAINLEVEL_OUTOF10: 10

## 2025-08-29 ENCOUNTER — HOSPITAL ENCOUNTER (OUTPATIENT)
Dept: ULTRASOUND IMAGING | Age: 35
Discharge: HOME | End: 2025-08-29
Payer: COMMERCIAL

## 2025-08-29 DIAGNOSIS — R10.12 LEFT UPPER QUADRANT ABDOMINAL PAIN: ICD-10-CM

## 2025-08-29 PROCEDURE — 76700 US EXAM ABDOM COMPLETE: CPT

## 2025-09-03 DIAGNOSIS — D57.00 SICKLE-CELL DISEASE WITH PAIN (HCC): ICD-10-CM

## 2025-09-03 DIAGNOSIS — G89.29 OTHER CHRONIC PAIN: ICD-10-CM

## 2025-09-03 DIAGNOSIS — Z51.5 PALLIATIVE CARE PATIENT: ICD-10-CM

## 2025-09-03 DIAGNOSIS — R11.2 NAUSEA AND VOMITING, UNSPECIFIED VOMITING TYPE: Primary | ICD-10-CM

## 2025-09-03 DIAGNOSIS — D57.1 SICKLE CELL DISEASE WITHOUT CRISIS (HCC): ICD-10-CM

## 2025-09-04 RX ORDER — TRAMADOL HYDROCHLORIDE 50 MG/1
50 TABLET ORAL EVERY 4 HOURS PRN
Qty: 84 TABLET | Refills: 0 | Status: SHIPPED | OUTPATIENT
Start: 2025-09-04 | End: 2025-09-18

## 2025-09-04 RX ORDER — PROMETHAZINE HYDROCHLORIDE 25 MG/1
25 TABLET ORAL EVERY 6 HOURS PRN
Qty: 90 TABLET | Refills: 0 | Status: SHIPPED | OUTPATIENT
Start: 2025-09-04

## 2025-09-04 RX ORDER — HYDROMORPHONE HYDROCHLORIDE 2 MG/1
2-4 TABLET ORAL EVERY 4 HOURS PRN
Qty: 180 TABLET | Refills: 0 | Status: CANCELLED | OUTPATIENT
Start: 2025-09-04 | End: 2025-09-19

## (undated) DEVICE — BANDAGE ADH W1XL3IN NAT FAB WVN FLX DURABLE N ADH PD SEAL

## (undated) DEVICE — SEAL

## (undated) DEVICE — COLUMN DRAPE

## (undated) DEVICE — BLADELESS OBTURATOR: Brand: WECK VISTA

## (undated) DEVICE — 35 ML SYRINGE LUER-LOCK TIP: Brand: MONOJECT

## (undated) DEVICE — BASIC SINGLE BASIN BTC-LF: Brand: MEDLINE INDUSTRIES, INC.

## (undated) DEVICE — ARM DRAPE

## (undated) DEVICE — SUTURE VCRL + SZ 0 L27IN ABSRB VLT L26MM UR-6 5/8 CIR VCP603H

## (undated) DEVICE — GLOVE SURG SZ 7.5 L11.73IN FNGR THK9.8MIL STRW LTX POLYMER

## (undated) DEVICE — ELECTRO LUBE IS A SINGLE PATIENT USE DEVICE THAT IS INTENDED TO BE USED ON ELECTROSURGICAL ELECTRODES TO REDUCE STICKING.: Brand: KEY SURGICAL ELECTRO LUBE

## (undated) DEVICE — INSUFFLATION NEEDLE TO ESTABLISH PNEUMOPERITONEUM.: Brand: INSUFFLATION NEEDLE

## (undated) DEVICE — TIP COVER ACCESSORY

## (undated) DEVICE — GENERAL LAPAROSCOPY-LF: Brand: MEDLINE INDUSTRIES, INC.

## (undated) DEVICE — SUTURE VICRYL + SZ 0 L27IN ABSRB VLT L26MM UR-6 5/8 CIR VCP603H

## (undated) DEVICE — SYRINGE MED 30ML STD CLR PLAS LUERLOCK TIP N CTRL DISP

## (undated) DEVICE — TUBING INSUFFLATOR HEAT HUMIDIFIED SMK EVAC SET PNEUMOCLEAR

## (undated) DEVICE — KIT PEG DIA20FR STD PUSH DISP ENDOVIVE

## (undated) DEVICE — GASTROSTOMY TUBE WITH ENFIT CONNECTOR, 20FR: Brand: GASTROSTOMY TUBE WITH ENFIT CONNECTOR